# Patient Record
Sex: MALE | Race: WHITE | Employment: OTHER | URBAN - METROPOLITAN AREA
[De-identification: names, ages, dates, MRNs, and addresses within clinical notes are randomized per-mention and may not be internally consistent; named-entity substitution may affect disease eponyms.]

---

## 2017-01-20 ENCOUNTER — TELEPHONE (OUTPATIENT)
Dept: INTERNAL MEDICINE CLINIC | Age: 57
End: 2017-01-20

## 2017-01-20 DIAGNOSIS — I48.19 PERSISTENT ATRIAL FIBRILLATION (HCC): ICD-10-CM

## 2017-01-20 DIAGNOSIS — E78.5 DYSLIPIDEMIA: ICD-10-CM

## 2017-01-20 DIAGNOSIS — E11.9 WELL CONTROLLED DIABETES MELLITUS (HCC): Primary | ICD-10-CM

## 2017-01-20 DIAGNOSIS — I10 ESSENTIAL HYPERTENSION: ICD-10-CM

## 2017-02-03 ENCOUNTER — HOSPITAL ENCOUNTER (OUTPATIENT)
Dept: LAB | Age: 57
Discharge: HOME | End: 2017-02-03

## 2017-02-03 DIAGNOSIS — I48.19 PERSISTENT ATRIAL FIBRILLATION (HCC): ICD-10-CM

## 2017-02-03 DIAGNOSIS — E11.9 WELL CONTROLLED DIABETES MELLITUS (HCC): ICD-10-CM

## 2017-02-03 DIAGNOSIS — E78.5 DYSLIPIDEMIA: ICD-10-CM

## 2017-02-03 DIAGNOSIS — I10 ESSENTIAL HYPERTENSION: ICD-10-CM

## 2017-02-03 LAB — SENTARA SPECIMEN COL,SENBCF: NORMAL

## 2017-02-03 PROCEDURE — 99001 SPECIMEN HANDLING PT-LAB: CPT | Performed by: NURSE PRACTITIONER

## 2017-02-13 ENCOUNTER — OFFICE VISIT (OUTPATIENT)
Dept: INTERNAL MEDICINE CLINIC | Age: 57
End: 2017-02-13

## 2017-02-13 VITALS
HEIGHT: 71 IN | SYSTOLIC BLOOD PRESSURE: 118 MMHG | BODY MASS INDEX: 37.94 KG/M2 | RESPIRATION RATE: 22 BRPM | TEMPERATURE: 98.2 F | WEIGHT: 271 LBS | DIASTOLIC BLOOD PRESSURE: 76 MMHG | OXYGEN SATURATION: 96 % | HEART RATE: 76 BPM

## 2017-02-13 DIAGNOSIS — G47.33 OSA (OBSTRUCTIVE SLEEP APNEA): ICD-10-CM

## 2017-02-13 DIAGNOSIS — E78.5 HYPERLIPIDEMIA, UNSPECIFIED HYPERLIPIDEMIA TYPE: ICD-10-CM

## 2017-02-13 DIAGNOSIS — E66.9 OBESITY (BMI 30-39.9): ICD-10-CM

## 2017-02-13 DIAGNOSIS — I10 ESSENTIAL HYPERTENSION: ICD-10-CM

## 2017-02-13 DIAGNOSIS — Z23 ENCOUNTER FOR IMMUNIZATION: ICD-10-CM

## 2017-02-13 DIAGNOSIS — I48.19 PERSISTENT ATRIAL FIBRILLATION (HCC): ICD-10-CM

## 2017-02-13 NOTE — MR AVS SNAPSHOT
Visit Information Date & Time Provider Department Dept. Phone Encounter #  
 2/13/2017  8:30 AM Elijah Andre NP Internist of [address] [place] [address] Place [id number] Follow-up Instructions Return in about 2 weeks (around 2/27/2017), or if symptoms worsen or fail to improve. Your Appointments 2/27/2017 11:30 AM  
Office Visit with Elijah Andre NP Internist of [facility] ([facility]) Appt Note: 2 week follow up  
 [address], [address] Ramos [address] [place]  
  
   
 [address], [address]  
  
    
 5/1/2017  9:00 AM  
PROCEDURE with Tony Arenas MD  
[facility]) Appt Note: [facility]  
 [address] [address]  
[phone] [address] [id number]  
  
    
 6/16/2017  8:20 AM  
Follow Up with Yuridia Alicia MD  
[facility] ([facility]) Appt Note: 6 mth f/up [place] [id number]  
[phone] [address] Upcoming Health Maintenance Date Due Pneumococcal 19-64 Medium Risk (1 of 1 - PPSV23) 10/11/1978 DTaP/Tdap/Td series (1 - Tdap) 10/11/1980 INFLUENZA AGE 9 TO ADULT 8/1/2016 HEMOGLOBIN A1C Q6M 10/4/2016 MICROALBUMIN Q1 4/4/2017 LIPID PANEL Q1 4/4/2017 EYE EXAM RETINAL OR DILATED Q1 11/30/2017 FOOT EXAM Q1 2/13/2018 COLONOSCOPY 6/27/2026 Allergies as of 2/13/2017  Review Complete On: 2/13/2017 By: Elijah Andre NP Severity Noted Reaction Type Reactions Penicillins  10/16/2012    Hives Current Immunizations  Never Reviewed Name Date Pneumococcal Polysaccharide (PPSV-23) 2/13/2017 Tdap  Incomplete Not reviewed this visit You Were Diagnosed With   
  
 Codes Comments Uncontrolled type 2 diabetes mellitus without complication, with long-term current use of insulin ([facility] [address]    -  Primary ICD-10-CM: E11.65, Z79.4 ICD-9-CM: 250.02, V58.67 Essential hypertension     ICD-10-CM: I10 
ICD-9-CM: 401.9 Hyperlipidemia, unspecified hyperlipidemia type     ICD-10-CM: E78.5 ICD-9-CM: 272.4 Persistent atrial fibrillation (HCC)     ICD-10-CM: I48.1 ICD-9-CM: 427.31 Obesity (BMI 30-39. 9)     ICD-10-CM: E66.9 ICD-9-CM: 278.00   
 FITO (obstructive sleep apnea)     ICD-10-CM: G47.33 
ICD-9-CM: 327.23 Encounter for immunization     ICD-10-CM: X09 ICD-9-CM: V03.89 Vitals BP Pulse Temp Resp Height(growth percentile) Weight(growth percentile) 118/76 76 98.2 °F (36.8 °C) 22 5' 11\" (1.803 m) 271 lb (122.9 kg) SpO2 BMI Smoking Status 96% 37.8 kg/m2 Passive Smoke Exposure - Never Smoker Vitals History BMI and BSA Data Body Mass Index Body Surface Area  
 37.8 kg/m 2 2.48 m 2 Preferred Pharmacy Pharmacy Name Phone [facility] [phone] [place] [address] [phone] Your Updated Medication List  
  
   
This list is accurate as of: 2/13/17  9:19 AM.  Always use your most recent med list.  
  
  
  
  
 albuterol 90 mcg/actuation inhaler Commonly known as:  PROVENTIL HFA, VENTOLIN HFA, PROAIR HFA Take 2 Puffs by inhalation every six (6) hours as needed for Wheezing. amLODIPine 10 mg tablet Commonly known as:  Muldoon Blade Take 1 Tab by mouth daily. bipap machine kit  
by Does Not Apply route. BiPAP at 23/18 cm with heated humidifier. Mask: Simplus FF, medium size or mask of choice. Length of need 99 months. Replace mask and accessories as needed times 12 months. Please download data after 30 days and fax at [phone]. Dx: Severe FITO, Obesity, snoring. cholecalciferol 1,000 unit Cap Commonly known as:  VITAMIN D3  
 Take 2 Caps by mouth daily. Fenofibrate 150 mg Cap TAKE 1 CAPSULE BY MOUTH DAILY  
  
 gabapentin 300 mg capsule Commonly known as:  NEURONTIN Take 1 Cap by mouth two (2) times a day. hydroCHLOROthiazide 12.5 mg tablet Commonly known as:  HYDRODIURIL Take 1 Tab by mouth daily. HYDROcodone-acetaminophen 5-325 mg per tablet Commonly known as:  NORCO  
1 PO Q12hrs PRN pain  
  
 hyoscyamine SL 0.125 mg SL tablet Commonly known as:  LEVSIN/SL  
0.125 mg by SubLINGual route every four (4) hours as needed. insulin aspart 100 unit/mL Inpn Commonly known as:  NOVOLOG  
by SubCUTAneous route. Sliding scale:  101-150 4 units 151-200 6 units 201-250  8 units 251-300 10 units Call if blood sugar is greater than 300 LEVEMIR FLEXTOUCH 100 unit/mL (3 mL) Inpn Generic drug:  insulin detemir INJECT 20 UNITS UNDER THE SKIN EVERY MORNING  
  
 meloxicam 15 mg tablet Commonly known as:  MOBIC Take 15 mg by mouth daily. metoprolol succinate 50 mg XL tablet Commonly known as:  TOPROL-XL  
TAKE 1 TABLET BY MOUTH EVERY DAY Vianney Pen Needle 32 gauge x 5/32\" Ndle Generic drug:  Insulin Needles (Disposable) USS THREE TIMES DAILY PLUS SLIDING SCALE  
  
 NASONEX 50 mcg/actuation nasal spray Generic drug:  mometasone USE 2 SPRAYS IN EACH NOSTRIL DAILY Omega-3-DHA-EPA-Fish Oil 1,000 mg (120 mg-180 mg) Cap Take  by mouth two (2) times a day. ONE-A-DAY MEN'S 50+ ADVANTAGE PO Take  by mouth daily. rivaroxaban 20 mg Tab tablet Commonly known as:  Jeneen Mention Take 1 Tab by mouth daily (with breakfast). rosuvastatin 40 mg tablet Commonly known as:  CRESTOR Take 1 Tab by mouth nightly. NON FORMULARY SITagliptin-metFORMIN 50-1,000 mg per tablet Commonly known as:  Andree Mateus Take 1 Tab by mouth two (2) times daily (with meals). SYMBICORT 160-4.5 mcg/actuation HFA inhaler Generic drug:  budesonide-formoterol TAKE 2 PUFFS BY MOUTH TWICE DAILY  
  
 traMADol 50 mg tablet Commonly known as:  ULTRAM  
Take 50 mg by mouth every six (6) hours as needed for Pain.  
  
 valsartan 320 mg tablet Commonly known as:  DIOVAN Take 1 Tab by mouth daily. VITAMIN B-12 1,000 mcg tablet Generic drug:  cyanocobalamin Take 1,000 mcg by mouth daily. We Performed the Following INFLUENZA VIRUS VAC QUAD,SPLIT,PRESV FREE SYRINGE 3/> YRS IM [id number] CPT(R)] PNEUMOCOCCAL POLYSACCHARIDE VACCINE, 23-VALENT, ADULT OR IMMUNOSUPPRESSED PT DOSE, [73193 CPT(R)] REFERRAL TO NUTRITION [REF50 Custom] Comments:  
 Poorly controlled diabetes, A1C 10, obese. TETANUS, DIPHTHERIA TOXOIDS AND ACELLULAR PERTUSSIS VACCINE (TDAP), IN INDIVIDS. >=7, IM [id number] CPT(R)] Follow-up Instructions Return in about 2 weeks (around 2/27/2017), or if symptoms worsen or fail to improve. Referral Information Referral ID Referred By Referred To  
  
 [id number] Ihsan Ford Not Available Visits Status Start Date End Date 1 New Request 2/13/17 2/13/18 If your referral has a status of pending review or denied, additional information will be sent to support the outcome of this decision. Patient Instructions 1. Increase levemir to 25 units in the morning. 2. Very important to check your blood sugars before every meal and before bedtime. Bring this log to follow up apt in 2 weeks. 3. Food journal- everything you eat. 4. Referral to nutrition. Nutrition Tips for Diabetes: After Your Visit Your Care Instructions A healthy diet is important to manage diabetes. It helps you lose weight (if you need to) and keep it off. It gives you the nutrition and energy your body needs and helps prevent heart disease. But a diet for diabetes does not mean that you have to eat special foods. You can eat what your family eats, including occasional sweets and other favorites. But you do have to pay attention to how often you eat and how much you eat of certain foods. The right plan for you will give you meals that help you keep your blood sugar at healthy levels. Try to eat a variety of foods and to spread carbohydrate throughout the day. Carbohydrate raises blood sugar higher and more quickly than any other nutrient does. Carbohydrate is found in sugar, breads and cereals, fruit, starchy vegetables such as potatoes and corn, and milk and yogurt. You may want to work with a dietitian or diabetes educator to help you plan meals and snacks. A dietitian or diabetes educator also can help you lose weight if that is one of your goals. The following tips can help you enjoy your meals and stay healthy. Follow-up care is a key part of your treatment and safety. Be sure to make and go to all appointments, and call your doctor if you are having problems. Its also a good idea to know your test results and keep a list of the medicines you take. How can you care for yourself at home? · Learn which foods have carbohydrate and how much carbohydrate to eat. A dietitian or diabetes educator can help you learn to keep track of how much carbohydrate you eat. · Spread carbohydrate throughout the day. Eat some carbohydrate at all meals, but do not eat too much at any one time. · Plan meals to include food from all the food groups. These are the food groups and some example portion sizes: ¨ Grains: 1 slice of bread (1 ounce), ½ cup of cooked cereal, and 1/3 cup of cooked pasta or rice. These have about 15 grams of carbohydrate in a serving. Choose whole grains such as whole wheat bread or crackers, oatmeal, and brown rice more often than refined grains. ¨ Fruit: 1 small fresh fruit, such as an apple or orange; ½ of a banana; ½ cup of chopped, cooked, or canned fruit; ½ cup of fruit juice; 1 cup of melon or raspberries; and 2 tablespoons of dried fruit. These have about 15 grams of carbohydrate in a serving. ¨ Dairy: 1 cup of nonfat or low-fat milk and 2/3 cup of plain yogurt. These have about 15 grams of carbohydrate in a serving. ¨ Protein foods: Beef, chicken, turkey, fish, eggs, tofu, cheese, cottage cheese, and peanut butter. A serving size of meat is 3 ounces, which is about the size of a deck of cards. Examples of meat substitute serving sizes (equal to 1 ounce of meat) are 1/4 cup of cottage cheese, 1 egg, 1 tablespoon of peanut butter, and ½ cup of tofu. These have very little or no carbohydrate per serving. ¨ Vegetables: Starchy vegetables such as ½ cup of cooked dried beans, peas, potatoes, or corn have about 15 grams of carbohydrate. Nonstarchy vegetables have very little carbohydrate, such as 1 cup of raw leafy vegetables (such as spinach), ½ cup of other vegetables (cooked or chopped), and 3/4 cup of vegetable juice. · Use the plate format to plan meals. It is a good, quick way to make sure that you have a balanced meal. It also helps you spread carbohydrate throughout the day. You divide your plate by types of foods. Put vegetables on half the plate, meat or meat substitutes on one-quarter of the plate, and a grain or starchy vegetable (such as brown rice or a potato) in the final quarter of the plate.  To this you can add a small piece of fruit and 1 cup of milk or yogurt, depending on how much carbohydrate you are supposed to eat at a meal. 
 · Talk to your dietitian or diabetes educator about ways to add limited amounts of sweets into your meal plan. You can eat these foods now and then, as long as you include the amount of carbohydrate they have in your daily carbohydrate allowance. · If you drink alcohol, limit it to no more than 1 drink a day for women and 2 drinks a day for men. If you are pregnant, no amount of alcohol is known to be safe. · Protein, fat, and fiber do not raise blood sugar as much as carbohydrate does. If you eat a lot of these nutrients in a meal, your blood sugar will rise more slowly than it would otherwise. · Limit saturated fats, such as those from meat and dairy products. Try to replace it with monounsaturated fat, such as olive oil. This is a healthier choice because people who have diabetes are at higher-than-average risk of heart disease. But use a modest amount of olive oil. A tablespoon of olive oil has 14 grams of fat and 120 calories. · Exercise lowers blood sugar. If you take insulin by shots or pump, you can use less than you would if you were not exercising. Keep in mind that timing matters. If you exercise within 1 hour after a meal, your body may need less insulin for that meal than it would if you exercised 3 hours after the meal. Test your blood sugar to find out how exercise affects your need for insulin. · Exercise on most days of the week. Aim for at least 30 minutes. Exercise helps you stay at a healthy weight and helps your body use insulin. Walking is an easy way to get exercise. Gradually increase the amount you walk every day. You also may want to swim, bike, or do other activities. When you eat out · Learn to estimate the serving sizes of foods that have carbohydrate. If you measure food at home, it will be easier to estimate the amount in a serving of restaurant food. · If the meal you order has too much carbohydrate (such as potatoes, corn, or baked beans), ask to have a low-carbohydrate food instead. Ask for a salad or green vegetables. · If you use insulin, check your blood sugar before and after eating out to help you plan how much to eat in the future. · If you eat more carbohydrate at a meal than you had planned, take a walk or do other exercise. This will help lower your blood sugar. Where can you learn more? Go to BioIQ.be Enter I254 in the search box to learn more about \"Nutrition Tips for Diabetes: After Your Visit. \"  
© 1904-6041 Healthwise, Incorporated. Care instructions adapted under license by [employer] (which disclaims liability or warranty for this information). This care instruction is for use with your licensed healthcare professional. If you have questions about a medical condition or this instruction, always ask your healthcare professional. [address] any warranty or liability for your use of this information. Content Version: 70.9.011442; Current as of: June 4, 2014 Learning About Type 2 Diabetes What is type 2 diabetes? Insulin is a hormone that helps your body use sugar from your food as energy. Type 2 diabetes happens when your body can't use insulin the right way. Over time, the pancreas can't make enough insulin. If you don't have enough insulin, too much sugar stays in your blood. If you are overweight, get little or no exercise, or have type 2 diabetes in your family, you are more likely to have problems with the way insulin works in your body.  Americans, Hispanics, Native Americans,  Americans, and Pacific Islanders have a higher risk for type 2 diabetes. Type 2 diabetes can be prevented or delayed with a healthy lifestyle, which includes staying at a healthy weight, making smart food choices, and getting regular exercise. What can you expect with type 2 diabetes? Sarah Laughlin keep hearing about how important it is to keep your blood sugar within a target range. That's because over time, high blood sugar can lead to serious problems. It can: 
· Harm your eyes, nerves, and kidneys. · Damage your blood vessels, leading to heart disease and stroke. · Reduce blood flow and cause nerve damage to parts of your body, especially your feet. This can cause slow healing and pain when you walk. · Make your immune system weak and less able to fight infections. When people hear the word \"diabetes,\" they often think of problems like these. But daily care and treatment can help prevent or delay these problems. The goal is to keep your blood sugar in a target range. That's the best way to reduce your chance of having more problems from diabetes. What are the symptoms? Some people who have type 2 diabetes may not have any symptoms early on. Many people with the disease don't even know they have it at first. But with time, diabetes starts to cause symptoms. You experience most symptoms of type 2 diabetes when your blood sugar is either too high or too low. The most common symptoms of high blood sugar include: · Thirst. 
· Frequent urination. · Weight loss. · Blurry vision. The symptoms of low blood sugar include: · Sweating. · Shakiness. · Weakness. · Hunger. · Confusion. How can you prevent type 2 diabetes? The best way to prevent or delay type 2 diabetes is to adopt healthy habits, which include: 
· Staying at a healthy weight. · Exercising regularly. · Eating healthy foods. How is type 2 diabetes treated? If you have type 2 diabetes, here are the most important things you can do. · Take your diabetes medicines. · Check your blood sugar as often as your doctor recommends. Also, get a hemoglobin A1c test at least every 6 months. · Try to eat a variety of foods and to spread carbohydrate throughout the day. Carbohydrate raises blood sugar higher and more quickly than any other nutrient does. Carbohydrate is found in sugar, breads and cereals, fruit, starchy vegetables such as potatoes and corn, and milk and yogurt. · Get at least 30 minutes of exercise on most days of the week. Walking is a good choice. You also may want to do other activities, such as running, swimming, cycling, or playing tennis or team sports. If your doctor says it's okay, do muscle-strengthening exercises at least 2 times a week. · See your doctor for checkups and tests on a regular schedule. · If you have high blood pressure or high cholesterol, take the medicines as prescribed by your doctor. · Do not smoke. Smoking can make health problems worse. This includes problems you might have with type 2 diabetes. If you need help quitting, talk to your doctor about stop-smoking programs and medicines. These can increase your chances of quitting for good. Follow-up care is a key part of your treatment and safety. Be sure to make and go to all appointments, and call your doctor if you are having problems. It's also a good idea to know your test results and keep a list of the medicines you take. Where can you learn more? Go to http://vasile-fito.info/. Enter W574 in the search box to learn more about \"Learning About Type 2 Diabetes. \" Current as of: May 23, 2016 Content Version: 11.1 © 8952-0539 [employer]. Care instructions adapted under license by [employer] (which disclaims liability or warranty for this information). If you have questions about a medical condition or this instruction, always ask your healthcare professional. [place] [address] any warranty or liability for your use of this information. Introducing \[facility] [place] [facility]\"" & HEALTH SERVICES! Earl Marcos introduces [facility] patient portal. Now you can access parts of your medical record, email your doctor's office, and request medication refills online. 1. In your internet browser, go to [email or web address] 2. Click on the First Time User? Click Here link in the Sign In box. You will see the New Member Sign Up page. 3. Enter your [facility] Access Code exactly as it appears below. You will not need to use this code after youve completed the sign-up process. If you do not sign up before the expiration date, you must request a new code. · [facility] Access Code: [email or web address] Expires: 5/14/2017  8:31 AM 
 
4. Enter the last four digits of your Social Security Number (xxxx) and Date of Birth (mm/dd/yyyy) as indicated and click Submit. You will be taken to the next sign-up page. 5. Create a [facility] ID. This will be your [facility] login ID and cannot be changed, so think of one that is secure and easy to remember. 6. Create a [facility] password. You can change your password at any time. 7. Enter your Password Reset Question and Answer. This can be used at a later time if you forget your password. 8. Enter your e-mail address. You will receive e-mail notification when new information is available in [address] 9. Click Sign Up. You can now view and download portions of your medical record. 10. Click the Download Summary menu link to download a portable copy of your medical information. If you have questions, please visit the Frequently Asked Questions section of the [employer] website. Remember, [facility] is NOT to be used for urgent needs. For medical emergencies, dial 911. Now available from your iPhone and Android! Please provide this summary of care documentation to your next provider. Your primary care clinician is listed as Radha Agrawal. If you have any questions after today's visit, please call [phone].

## 2017-02-13 NOTE — PROGRESS NOTES
Rustam Guo. is a 61 y.o. male who presents for routine immunizations. He denies any symptoms , reactions or allergies that would exclude them from being immunized today. Risks and adverse reactions were discussed and the VIS was given to them. All questions were addressed. He was observed for 10   min post injection. There were no reactions observed.     Ayde Hall LPN
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
## 2017-02-13 NOTE — PATIENT INSTRUCTIONS
1. Increase levemir to 25 units in the morning. 2. Very important to check your blood sugars before every meal and before bedtime. Bring this log to follow up apt in 2 weeks. 3. Food journal- everything you eat. 4. Referral to nutrition. Nutrition Tips for Diabetes: After Your Visit  Your Care Instructions  A healthy diet is important to manage diabetes. It helps you lose weight (if you need to) and keep it off. It gives you the nutrition and energy your body needs and helps prevent heart disease. But a diet for diabetes does not mean that you have to eat special foods. You can eat what your family eats, including occasional sweets and other favorites. But you do have to pay attention to how often you eat and how much you eat of certain foods. The right plan for you will give you meals that help you keep your blood sugar at healthy levels. Try to eat a variety of foods and to spread carbohydrate throughout the day. Carbohydrate raises blood sugar higher and more quickly than any other nutrient does. Carbohydrate is found in sugar, breads and cereals, fruit, starchy vegetables such as potatoes and corn, and milk and yogurt. You may want to work with a dietitian or diabetes educator to help you plan meals and snacks. A dietitian or diabetes educator also can help you lose weight if that is one of your goals. The following tips can help you enjoy your meals and stay healthy. Follow-up care is a key part of your treatment and safety. Be sure to make and go to all appointments, and call your doctor if you are having problems. Its also a good idea to know your test results and keep a list of the medicines you take. How can you care for yourself at home? · Learn which foods have carbohydrate and how much carbohydrate to eat. A dietitian or diabetes educator can help you learn to keep track of how much carbohydrate you eat. · Spread carbohydrate throughout the day.  Eat some carbohydrate at all meals, but do not eat too much at any one time. · Plan meals to include food from all the food groups. These are the food groups and some example portion sizes:  ¨ Grains: 1 slice of bread (1 ounce), ½ cup of cooked cereal, and 1/3 cup of cooked pasta or rice. These have about 15 grams of carbohydrate in a serving. Choose whole grains such as whole wheat bread or crackers, oatmeal, and brown rice more often than refined grains. ¨ Fruit: 1 small fresh fruit, such as an apple or orange; ½ of a banana; ½ cup of chopped, cooked, or canned fruit; ½ cup of fruit juice; 1 cup of melon or raspberries; and 2 tablespoons of dried fruit. These have about 15 grams of carbohydrate in a serving. ¨ Dairy: 1 cup of nonfat or low-fat milk and 2/3 cup of plain yogurt. These have about 15 grams of carbohydrate in a serving. ¨ Protein foods: Beef, chicken, turkey, fish, eggs, tofu, cheese, cottage cheese, and peanut butter. A serving size of meat is 3 ounces, which is about the size of a deck of cards. Examples of meat substitute serving sizes (equal to 1 ounce of meat) are 1/4 cup of cottage cheese, 1 egg, 1 tablespoon of peanut butter, and ½ cup of tofu. These have very little or no carbohydrate per serving. ¨ Vegetables: Starchy vegetables such as ½ cup of cooked dried beans, peas, potatoes, or corn have about 15 grams of carbohydrate. Nonstarchy vegetables have very little carbohydrate, such as 1 cup of raw leafy vegetables (such as spinach), ½ cup of other vegetables (cooked or chopped), and 3/4 cup of vegetable juice. · Use the plate format to plan meals. It is a good, quick way to make sure that you have a balanced meal. It also helps you spread carbohydrate throughout the day. You divide your plate by types of foods. Put vegetables on half the plate, meat or meat substitutes on one-quarter of the plate, and a grain or starchy vegetable (such as brown rice or a potato) in the final quarter of the plate.  To this you can add a small piece of fruit and 1 cup of milk or yogurt, depending on how much carbohydrate you are supposed to eat at a meal.  · Talk to your dietitian or diabetes educator about ways to add limited amounts of sweets into your meal plan. You can eat these foods now and then, as long as you include the amount of carbohydrate they have in your daily carbohydrate allowance. · If you drink alcohol, limit it to no more than 1 drink a day for women and 2 drinks a day for men. If you are pregnant, no amount of alcohol is known to be safe. · Protein, fat, and fiber do not raise blood sugar as much as carbohydrate does. If you eat a lot of these nutrients in a meal, your blood sugar will rise more slowly than it would otherwise. · Limit saturated fats, such as those from meat and dairy products. Try to replace it with monounsaturated fat, such as olive oil. This is a healthier choice because people who have diabetes are at higher-than-average risk of heart disease. But use a modest amount of olive oil. A tablespoon of olive oil has 14 grams of fat and 120 calories. · Exercise lowers blood sugar. If you take insulin by shots or pump, you can use less than you would if you were not exercising. Keep in mind that timing matters. If you exercise within 1 hour after a meal, your body may need less insulin for that meal than it would if you exercised 3 hours after the meal. Test your blood sugar to find out how exercise affects your need for insulin. · Exercise on most days of the week. Aim for at least 30 minutes. Exercise helps you stay at a healthy weight and helps your body use insulin. Walking is an easy way to get exercise. Gradually increase the amount you walk every day. You also may want to swim, bike, or do other activities. When you eat out  · Learn to estimate the serving sizes of foods that have carbohydrate. If you measure food at home, it will be easier to estimate the amount in a serving of restaurant food.   · If the meal you order has too much carbohydrate (such as potatoes, corn, or baked beans), ask to have a low-carbohydrate food instead. Ask for a salad or green vegetables. · If you use insulin, check your blood sugar before and after eating out to help you plan how much to eat in the future. · If you eat more carbohydrate at a meal than you had planned, take a walk or do other exercise. This will help lower your blood sugar. Where can you learn more? Go to [employer]Fl[employer]it.be  Enter D548 in the search box to learn more about \"Nutrition Tips for Diabetes: After Your Visit. \"   © 3617-7976 [employer]wise, [employer]. Care instructions adapted under license by [employer] (which disclaims liability or warranty for this information). This care instruction is for use with your licensed healthcare professional. If you have questions about a medical condition or this instruction, always ask your healthcare professional. [place] [id number] any warranty or liability for your use of this information. Content Version: 89.6.141344; Current as of: June 4, 2014                 Learning About Type 2 Diabetes  What is type 2 diabetes? Insulin is a hormone that helps your body use sugar from your food as energy. Type 2 diabetes happens when your body can't use insulin the right way. Over time, the pancreas can't make enough insulin. If you don't have enough insulin, too much sugar stays in your blood. If you are overweight, get little or no exercise, or have type 2 diabetes in your family, you are more likely to have problems with the way insulin works in your body.  Americans, Hispanics, Native Americans,  Americans, and Pacific Islanders have a higher risk for type 2 diabetes. Type 2 diabetes can be prevented or delayed with a healthy lifestyle, which includes staying at a healthy weight, making smart food choices, and getting regular exercise.   What can you expect with type 2 diabetes? Miguel Bo keep hearing about how important it is to keep your blood sugar within a target range. That's because over time, high blood sugar can lead to serious problems. It can:  · Harm your eyes, nerves, and kidneys. · Damage your blood vessels, leading to heart disease and stroke. · Reduce blood flow and cause nerve damage to parts of your body, especially your feet. This can cause slow healing and pain when you walk. · Make your immune system weak and less able to fight infections. When people hear the word \"diabetes,\" they often think of problems like these. But daily care and treatment can help prevent or delay these problems. The goal is to keep your blood sugar in a target range. That's the best way to reduce your chance of having more problems from diabetes. What are the symptoms? Some people who have type 2 diabetes may not have any symptoms early on. Many people with the disease don't even know they have it at first. But with time, diabetes starts to cause symptoms. You experience most symptoms of type 2 diabetes when your blood sugar is either too high or too low. The most common symptoms of high blood sugar include:  · Thirst.  · Frequent urination. · Weight loss. · Blurry vision. The symptoms of low blood sugar include:  · Sweating. · Shakiness. · Weakness. · Hunger. · Confusion. How can you prevent type 2 diabetes? The best way to prevent or delay type 2 diabetes is to adopt healthy habits, which include:  · Staying at a healthy weight. · Exercising regularly. · Eating healthy foods. How is type 2 diabetes treated? If you have type 2 diabetes, here are the most important things you can do. · Take your diabetes medicines. · Check your blood sugar as often as your doctor recommends. Also, get a hemoglobin A1c test at least every 6 months. · Try to eat a variety of foods and to spread carbohydrate throughout the day.  Carbohydrate raises blood sugar higher and more quickly than any other nutrient does. Carbohydrate is found in sugar, breads and cereals, fruit, starchy vegetables such as potatoes and corn, and milk and yogurt. · Get at least 30 minutes of exercise on most days of the week. Walking is a good choice. You also may want to do other activities, such as running, swimming, cycling, or playing tennis or team sports. If your doctor says it's okay, do muscle-strengthening exercises at least 2 times a week. · See your doctor for checkups and tests on a regular schedule. · If you have high blood pressure or high cholesterol, take the medicines as prescribed by your doctor. · Do not smoke. Smoking can make health problems worse. This includes problems you might have with type 2 diabetes. If you need help quitting, talk to your doctor about stop-smoking programs and medicines. These can increase your chances of quitting for good. Follow-up care is a key part of your treatment and safety. Be sure to make and go to all appointments, and call your doctor if you are having problems. It's also a good idea to know your test results and keep a list of the medicines you take. Where can you learn more? Go to http://vasile-fito.info/. Enter M758 in the search box to learn more about \"Learning About Type 2 Diabetes. \"  Current as of: May 23, 2016  Content Version: 11.1  © 7254-4136 [employer]. Care instructions adapted under license by [facility] (which disclaims liability or warranty for this information). If you have questions about a medical condition or this instruction, always ask your healthcare professional. [place] [id number] any warranty or liability for your use of this information.

## 2017-02-27 ENCOUNTER — OFFICE VISIT (OUTPATIENT)
Dept: INTERNAL MEDICINE CLINIC | Age: 57
End: 2017-02-27

## 2017-02-27 VITALS
SYSTOLIC BLOOD PRESSURE: 127 MMHG | WEIGHT: 262.4 LBS | HEART RATE: 74 BPM | OXYGEN SATURATION: 96 % | TEMPERATURE: 98.2 F | HEIGHT: 71 IN | DIASTOLIC BLOOD PRESSURE: 72 MMHG | BODY MASS INDEX: 36.73 KG/M2 | RESPIRATION RATE: 14 BRPM

## 2017-02-27 DIAGNOSIS — Z79.4 TYPE 2 DIABETES MELLITUS WITHOUT COMPLICATION, WITH LONG-TERM CURRENT USE OF INSULIN (HCC): Primary | ICD-10-CM

## 2017-02-27 DIAGNOSIS — E11.9 TYPE 2 DIABETES MELLITUS WITHOUT COMPLICATION, WITH LONG-TERM CURRENT USE OF INSULIN (HCC): Primary | ICD-10-CM

## 2017-02-27 NOTE — PATIENT INSTRUCTIONS
Learning About Type 2 Diabetes  What is type 2 diabetes? Insulin is a hormone that helps your body use sugar from your food as energy. Type 2 diabetes happens when your body can't use insulin the right way. Over time, the pancreas can't make enough insulin. If you don't have enough insulin, too much sugar stays in your blood. If you are overweight, get little or no exercise, or have type 2 diabetes in your family, you are more likely to have problems with the way insulin works in your body.  Americans, Hispanics, Native Americans,  Americans, and Pacific Islanders have a higher risk for type 2 diabetes. Type 2 diabetes can be prevented or delayed with a healthy lifestyle, which includes staying at a healthy weight, making smart food choices, and getting regular exercise. What can you expect with type 2 diabetes? Darwin Alejandro keep hearing about how important it is to keep your blood sugar within a target range. That's because over time, high blood sugar can lead to serious problems. It can:  · Harm your eyes, nerves, and kidneys. · Damage your blood vessels, leading to heart disease and stroke. · Reduce blood flow and cause nerve damage to parts of your body, especially your feet. This can cause slow healing and pain when you walk. · Make your immune system weak and less able to fight infections. When people hear the word \"diabetes,\" they often think of problems like these. But daily care and treatment can help prevent or delay these problems. The goal is to keep your blood sugar in a target range. That's the best way to reduce your chance of having more problems from diabetes. What are the symptoms? Some people who have type 2 diabetes may not have any symptoms early on. Many people with the disease don't even know they have it at first. But with time, diabetes starts to cause symptoms. You experience most symptoms of type 2 diabetes when your blood sugar is either too high or too low.   The most common symptoms of high blood sugar include:  · Thirst.  · Frequent urination. · Weight loss. · Blurry vision. The symptoms of low blood sugar include:  · Sweating. · Shakiness. · Weakness. · Hunger. · Confusion. How can you prevent type 2 diabetes? The best way to prevent or delay type 2 diabetes is to adopt healthy habits, which include:  · Staying at a healthy weight. · Exercising regularly. · Eating healthy foods. How is type 2 diabetes treated? If you have type 2 diabetes, here are the most important things you can do. · Take your diabetes medicines. · Check your blood sugar as often as your doctor recommends. Also, get a hemoglobin A1c test at least every 6 months. · Try to eat a variety of foods and to spread carbohydrate throughout the day. Carbohydrate raises blood sugar higher and more quickly than any other nutrient does. Carbohydrate is found in sugar, breads and cereals, fruit, starchy vegetables such as potatoes and corn, and milk and yogurt. · Get at least 30 minutes of exercise on most days of the week. Walking is a good choice. You also may want to do other activities, such as running, swimming, cycling, or playing tennis or team sports. If your doctor says it's okay, do muscle-strengthening exercises at least 2 times a week. · See your doctor for checkups and tests on a regular schedule. · If you have high blood pressure or high cholesterol, take the medicines as prescribed by your doctor. · Do not smoke. Smoking can make health problems worse. This includes problems you might have with type 2 diabetes. If you need help quitting, talk to your doctor about stop-smoking programs and medicines. These can increase your chances of quitting for good. Follow-up care is a key part of your treatment and safety. Be sure to make and go to all appointments, and call your doctor if you are having problems.  It's also a good idea to know your test results and keep a list of the medicines you take. Where can you learn more? Go to http://vasile-fito.info/. Enter P458 in the search box to learn more about \"Learning About Type 2 Diabetes. \"  Current as of: May 23, 2016  Content Version: 11.1  © 2202-4632 [employer]. Care instructions adapted under license by [employer] (which disclaims liability or warranty for this information). If you have questions about a medical condition or this instruction, always ask your healthcare professional. [place] [address] any warranty or liability for your use of this information.   Health Maintenance Due   Topic Date Due    INFLUENZA AGE 9 TO ADULT  08/01/2016    HEMOGLOBIN A1C Q6M  10/04/2016

## 2017-02-27 NOTE — MR AVS SNAPSHOT
Visit Information Date & Time Provider Department Dept. Phone Encounter #  
 2/27/2017 11:30 AM Aura Lynn NP Internist of [address] [place] [address] Place [id number] Follow-up Instructions Return in about 4 weeks (around 3/27/2017), or if symptoms worsen or fail to improve. Your Appointments 3/27/2017 10:30 AM  
Office Visit with Aura Lynn NP Internist of [facility] ([address] [place] [address]) Appt Note: ov 4wks michael [address], [address] [place] [address] [address]  
  
   
 [address], Critical access hospital  
  
    
 5/1/2017  9:00 AM  
PROCEDURE with Reymundo Collins MD  
[facility] [address] [place] [address]) Appt Note: [facility]U  
 [address] [address]  
[phone] [address] [id number]  
  
    
 6/16/2017  8:20 AM  
Follow Up with Chaparrita Whiting MD  
[facility] [place] [address] ([address] [place] [address]) Appt Note: 6 mth f/up [place] [address] [address] [address] [phone] [address] Upcoming Health Maintenance Date Due INFLUENZA AGE 9 TO ADULT 8/1/2016 HEMOGLOBIN A1C Q6M 10/4/2016 MICROALBUMIN Q1 4/4/2017 EYE EXAM RETINAL OR DILATED Q1 11/30/2017 LIPID PANEL Q1 2/4/2018 FOOT EXAM Q1 2/13/2018 COLONOSCOPY 6/27/2026 DTaP/Tdap/Td series (2 - Td) 2/13/2027 Allergies as of 2/27/2017  Review Complete On: 2/27/2017 By: Aura Lynn NP Severity Noted Reaction Type Reactions Penicillins Medium 08/17/2010    Hives Current Immunizations  Reviewed on 2/13/2017 Name Date Pneumococcal Polysaccharide (PPSV-23) 2/13/2017 Tdap 2/13/2017 Not reviewed this visit You Were Diagnosed With   
  
 Codes Comments Type 2 diabetes mellitus without complication, with long-term current use of insulin (HCC)    -  Primary ICD-10-CM: E11.9, Z79.4 ICD-9-CM: 250.00, V58.67 Vitals BP  
  
  
  
  
  
 127/72 (BP 1 Location: Left arm, BP Patient Position: Sitting) BMI and BSA Data Body Mass Index Body Surface Area  
 36.6 kg/m 2 2.44 m 2 Preferred Pharmacy Pharmacy Name Phone [facility]aria[facility] Byrne [address] [place] [address] [phone] Your Updated Medication List  
  
   
This list is accurate as of: 2/27/17 12:21 PM.  Always use your most recent med list.  
  
  
  
  
 albuterol 90 mcg/actuation inhaler Commonly known as:  PROVENTIL HFA, VENTOLIN HFA, PROAIR HFA Take 2 Puffs by inhalation every six (6) hours as needed for Wheezing. amLODIPine 10 mg tablet Commonly known as:  [place] Take 1 Tab by mouth daily. bipap machine kit  
by Does Not Apply route. BiPAP at 23/18 cm with heated humidifier. Mask: Simplus FF, medium size or mask of choice. Length of need 99 months. Replace mask and accessories as needed times 12 months. Please download data after 30 days and fax at [phone]. Dx: Severe FITO, Obesity, snoring. cholecalciferol 1,000 unit Cap Commonly known as:  VITAMIN D3 Take 2 Caps by mouth daily. Fenofibrate 150 mg Cap TAKE 1 CAPSULE BY MOUTH DAILY  
  
 gabapentin 300 mg capsule Commonly known as:  NEURONTIN Take 1 Cap by mouth two (2) times a day. hydroCHLOROthiazide 12.5 mg tablet Commonly known as:  HYDRODIURIL Take 1 Tab by mouth daily. HYDROcodone-acetaminophen 5-325 mg per tablet Commonly known as:  NORCO  
1 PO Q12hrs PRN pain  
  
 hyoscyamine SL 0.125 mg SL tablet Commonly known as:  LEVSIN/SL  
0.125 mg by SubLINGual route every four (4) hours as needed. insulin aspart 100 unit/mL Inpn Commonly known as:  Chen García by SubCUTAneous route. Sliding scale:  101-150 4 units 151-200 6 units 201-250  8 units 251-300 10 units Call if blood sugar is greater than 300 LEVEMIR FLEXTOUCH 100 unit/mL (3 mL) Inpn Generic drug:  insulin detemir INJECT 20 UNITS UNDER THE SKIN EVERY MORNING  
  
 meloxicam 15 mg tablet Commonly known as:  MOBIC Take 15 mg by mouth daily. metoprolol succinate 50 mg XL tablet Commonly known as:  TOPROL-XL  
TAKE 1 TABLET BY MOUTH EVERY DAY Vianney Pen Needle 32 gauge x 5/32\" Ndle Generic drug:  Insulin Needles (Disposable) USS THREE TIMES DAILY PLUS SLIDING SCALE  
  
 NASONEX 50 mcg/actuation nasal spray Generic drug:  mometasone USE 2 SPRAYS IN EACH NOSTRIL DAILY Omega-3-DHA-EPA-Fish Oil 1,000 mg (120 mg-180 mg) Cap Take  by mouth two (2) times a day. ONE-A-DAY MEN'S 50+ ADVANTAGE PO Take  by mouth daily. rivaroxaban 20 mg Tab tablet Commonly known as:  Dieter Safe Take 1 Tab by mouth daily (with breakfast). rosuvastatin 40 mg tablet Commonly known as:  CRESTOR Take 1 Tab by mouth nightly. NON FORMULARY SITagliptin-metFORMIN 50-1,000 mg per tablet Commonly known as:  Johanna Room Take 1 Tab by mouth two (2) times daily (with meals). SYMBICORT 160-4.5 mcg/actuation HFA inhaler Generic drug:  budesonide-formoterol TAKE 2 PUFFS BY MOUTH TWICE DAILY  
  
 traMADol 50 mg tablet Commonly known as:  ULTRAM  
Take 50 mg by mouth every six (6) hours as needed for Pain.  
  
 valsartan 320 mg tablet Commonly known as:  DIOVAN Take 1 Tab by mouth daily. VITAMIN B-12 1,000 mcg tablet Generic drug:  cyanocobalamin Take 1,000 mcg by mouth daily. Follow-up Instructions Return in about 4 weeks (around 3/27/2017), or if symptoms worsen or fail to improve. To-Do List   
 03/28/2017 8:30 AM  
  Appointment with Nataly Zavala RD at [facility] Patient Instructions Learning About Type 2 Diabetes What is type 2 diabetes? Insulin is a hormone that helps your body use sugar from your food as energy. Type 2 diabetes happens when your body can't use insulin the right way. Over time, the pancreas can't make enough insulin. If you don't have enough insulin, too much sugar stays in your blood. If you are overweight, get little or no exercise, or have type 2 diabetes in your family, you are more likely to have problems with the way insulin works in your body.  Americans, Hispanics, Native Americans,  Americans, and Pacific Islanders have a higher risk for type 2 diabetes. Type 2 diabetes can be prevented or delayed with a healthy lifestyle, which includes staying at a healthy weight, making smart food choices, and getting regular exercise. What can you expect with type 2 diabetes? Asia Kilgore keep hearing about how important it is to keep your blood sugar within a target range. That's because over time, high blood sugar can lead to serious problems. It can: 
· Harm your eyes, nerves, and kidneys. · Damage your blood vessels, leading to heart disease and stroke. · Reduce blood flow and cause nerve damage to parts of your body, especially your feet. This can cause slow healing and pain when you walk. · Make your immune system weak and less able to fight infections. When people hear the word \"diabetes,\" they often think of problems like these. But daily care and treatment can help prevent or delay these problems. The goal is to keep your blood sugar in a target range. That's the best way to reduce your chance of having more problems from diabetes. What are the symptoms? Some people who have type 2 diabetes may not have any symptoms early on. Many people with the disease don't even know they have it at first. But with time, diabetes starts to cause symptoms. You experience most symptoms of type 2 diabetes when your blood sugar is either too high or too low. The most common symptoms of high blood sugar include: · Thirst. 
· Frequent urination. · Weight loss. · Blurry vision. The symptoms of low blood sugar include: · Sweating. · Shakiness. · Weakness. · Hunger. · Confusion. How can you prevent type 2 diabetes? The best way to prevent or delay type 2 diabetes is to adopt healthy habits, which include: 
· Staying at a healthy weight. · Exercising regularly. · Eating healthy foods. How is type 2 diabetes treated? If you have type 2 diabetes, here are the most important things you can do. · Take your diabetes medicines. · Check your blood sugar as often as your doctor recommends. Also, get a hemoglobin A1c test at least every 6 months. · Try to eat a variety of foods and to spread carbohydrate throughout the day. Carbohydrate raises blood sugar higher and more quickly than any other nutrient does. Carbohydrate is found in sugar, breads and cereals, fruit, starchy vegetables such as potatoes and corn, and milk and yogurt. · Get at least 30 minutes of exercise on most days of the week. Walking is a good choice. You also may want to do other activities, such as running, swimming, cycling, or playing tennis or team sports. If your doctor says it's okay, do muscle-strengthening exercises at least 2 times a week. · See your doctor for checkups and tests on a regular schedule. · If you have high blood pressure or high cholesterol, take the medicines as prescribed by your doctor. · Do not smoke. Smoking can make health problems worse. This includes problems you might have with type 2 diabetes. If you need help quitting, talk to your doctor about stop-smoking programs and medicines. These can increase your chances of quitting for good. Follow-up care is a key part of your treatment and safety. Be sure to make and go to all appointments, and call your doctor if you are having problems. It's also a good idea to know your test results and keep a list of the medicines you take. Where can you learn more? Go to http://vasile-fito.info/. Enter H691 in the search box to learn more about \"Learning About Type 2 Diabetes. \" Current as of: May 23, 2016 Content Version: 11.1 © 9231-7035 [employer]. Care instructions adapted under license by [employer] (which disclaims liability or warranty for this information). If you have questions about a medical condition or this instruction, always ask your healthcare professional. [address] any warranty or liability for your use of this information. Health Maintenance Due Topic Date Due  
 INFLUENZA AGE 9 TO ADULT  08/01/2016  
 HEMOGLOBIN A1C Q6M  10/04/2016 Introducing [facility]! Rachel Wright introduces SAVO patient portal. Now you can access parts of your medical record, email your doctor's office, and request medication refills online. 1. In your internet browser, go to [email or web address] 2. Click on the First Time User? Click Here link in the Sign In box. You will see the New Member Sign Up page. 3. Enter your SAVO Access Code exactly as it appears below. You will not need to use this code after youve completed the sign-up process. If you do not sign up before the expiration date, you must request a new code. · SAVO Access Code: [email or web address] Expires: 5/14/2017  8:31 AM 
 
4. Enter the last four digits of your Social Security Number (xxxx) and Date of Birth (mm/dd/yyyy) as indicated and click Submit. You will be taken to the next sign-up page. 5. Create a SAVO ID. This will be your SAVO login ID and cannot be changed, so think of one that is secure and easy to remember. 6. Create a Listiki password. You can change your password at any time. 7. Enter your Password Reset Question and Answer. This can be used at a later time if you forget your password. 8. Enter your e-mail address. You will receive e-mail notification when new information is available in [address] 9. Click Sign Up. You can now view and download portions of your medical record. 10. Click the Download Summary menu link to download a portable copy of your medical information. If you have questions, please visit the Frequently Asked Questions section of the Listiki website. Remember, Listiki is NOT to be used for urgent needs. For medical emergencies, dial 911. Now available from your iPhone and Android! Please provide this summary of care documentation to your next provider. Your primary care clinician is listed as Lyssa Ortiz. If you have any questions after today's visit, please call [phone].

## 2017-02-27 NOTE — PROGRESS NOTES
Chief Complaint   Patient presents with    Diabetes     patient has Blood Sugar Log to discuss    Hypertension     follow up     1. Have you been to the ER, urgent care clinic since your last visit? Hospitalized since your last visit? No    2. Have you seen or consulted any other health care providers outside of the [address] [place] [address] since your last visit? Include any pap smears or colon screening.  No
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
## 2017-03-22 ENCOUNTER — TELEPHONE (OUTPATIENT)
Dept: INTERNAL MEDICINE CLINIC | Age: 57
End: 2017-03-22

## 2017-03-22 NOTE — TELEPHONE ENCOUNTER
[facility]  To us a few times and it was faxed back not filled out. I don't see anything scanned in computer. Please check.   PT# [id number]

## 2017-03-27 ENCOUNTER — OFFICE VISIT (OUTPATIENT)
Dept: INTERNAL MEDICINE CLINIC | Age: 57
End: 2017-03-27

## 2017-03-27 ENCOUNTER — TELEPHONE (OUTPATIENT)
Dept: CARDIOLOGY CLINIC | Age: 57
End: 2017-03-27

## 2017-03-27 ENCOUNTER — TELEPHONE (OUTPATIENT)
Dept: INTERNAL MEDICINE CLINIC | Age: 57
End: 2017-03-27

## 2017-03-27 VITALS
OXYGEN SATURATION: 96 % | SYSTOLIC BLOOD PRESSURE: 115 MMHG | BODY MASS INDEX: 37.13 KG/M2 | HEART RATE: 68 BPM | TEMPERATURE: 97 F | WEIGHT: 265.2 LBS | HEIGHT: 71 IN | RESPIRATION RATE: 20 BRPM | DIASTOLIC BLOOD PRESSURE: 70 MMHG

## 2017-03-27 DIAGNOSIS — E11.00 UNCONTROLLED TYPE 2 DIABETES MELLITUS WITH HYPEROSMOLARITY WITHOUT COMA, WITH LONG-TERM CURRENT USE OF INSULIN (HCC): Primary | ICD-10-CM

## 2017-03-27 DIAGNOSIS — R22.31 FINGER MASS, RIGHT: ICD-10-CM

## 2017-03-27 DIAGNOSIS — I48.19 PERSISTENT ATRIAL FIBRILLATION (HCC): ICD-10-CM

## 2017-03-27 DIAGNOSIS — Z79.4 UNCONTROLLED TYPE 2 DIABETES MELLITUS WITH HYPEROSMOLARITY WITHOUT COMA, WITH LONG-TERM CURRENT USE OF INSULIN (HCC): Primary | ICD-10-CM

## 2017-03-27 DIAGNOSIS — I10 ESSENTIAL HYPERTENSION: ICD-10-CM

## 2017-03-27 DIAGNOSIS — Z01.818 PRE-OP EVALUATION: ICD-10-CM

## 2017-03-27 DIAGNOSIS — G47.33 OSA ON CPAP: ICD-10-CM

## 2017-03-27 DIAGNOSIS — E78.5 DYSLIPIDEMIA: ICD-10-CM

## 2017-03-27 DIAGNOSIS — Z99.89 OSA ON CPAP: ICD-10-CM

## 2017-03-27 DIAGNOSIS — J44.9 CHRONIC OBSTRUCTIVE PULMONARY DISEASE, UNSPECIFIED COPD TYPE (HCC): ICD-10-CM

## 2017-03-27 NOTE — TELEPHONE ENCOUNTER
Pt asking at check out if he needs to take any antibiotic before surgery for his knee. Says you talked about it in his ov today but doesn't remember if you answered.

## 2017-03-27 NOTE — TELEPHONE ENCOUNTER
PCP calling to see if it is ok for patient to have a mass removed from his finger with MAC. Pt is on Xarelto for afib. Verbal order and read back per Olvin Quintanilla MD  [facility] to hold xarelto 2 days prior to procedure.

## 2017-03-27 NOTE — PROGRESS NOTES
Chief Complaint   Patient presents with    Diabetes     follow up    Pre-op Exam     Pre Op Clearance Form to complete for upcoming Surgery on 4-12-17 with Right Middle Finger Mass Excision at the [facility] Dr Concepcion Millan     1. Have you been to the ER, urgent care clinic since your last visit? Hospitalized since your last visit? No    2. Have you seen or consulted any other health care providers outside of the [address] [place] [address] since your last visit? Include any pap smears or colon screening.  No

## 2017-03-27 NOTE — TELEPHONE ENCOUNTER
Called patient back, no prophylaxis indicated for previous knee replacement surg, but he may be on treatment per his hand surgeon.

## 2017-03-27 NOTE — MR AVS SNAPSHOT
Visit Information Date & Time Provider Department Dept. Phone Encounter #  
 3/27/2017 10:30 AM Tiffany Cerda NP Internist of [address] [place] [address] Place [id number] Follow-up Instructions Return if symptoms worsen or fail to improve. Your Appointments 5/1/2017  9:00 AM  
PROCEDURE with Deepak Alexander MD  
[facility] [address] [place] [address]) Appt Note: KUB  
 [address] Benji A [address] [address]  
[phone] [address] [address] 5/1/2017 11:00 AM  
Office Visit with Tiffany Cerda NP Internist of [facility] ([facility]) Appt Note: follow up; follow up  
 [address] [facility], [address] Jessica Shankar [address] [place] [address] [place]  
  
   
 [address], [address]  
  
    
 6/16/2017  8:20 AM  
Follow Up with Thierno Hunt MD  
[facility] ([address] [place] [address]) Appt Note: 6 mth f/up [place] Jessica Shankar [id number]  
[phone] [address] [facility] [address] Upcoming Health Maintenance Date Due INFLUENZA AGE 9 TO ADULT 8/1/2016 HEMOGLOBIN A1C Q6M 10/4/2016 MICROALBUMIN Q1 4/4/2017 EYE EXAM RETINAL OR DILATED Q1 11/30/2017 LIPID PANEL Q1 2/4/2018 FOOT EXAM Q1 2/13/2018 COLONOSCOPY 6/27/2026 DTaP/Tdap/Td series (2 - Td) 2/13/2027 Allergies as of 3/27/2017  Review Complete On: 3/27/2017 By: Tiffany Cerda NP Severity Noted Reaction Type Reactions Penicillins Medium 08/17/2010    Hives Current Immunizations  Reviewed on 2/13/2017 Name Date Pneumococcal Polysaccharide (PPSV-23) 2/13/2017 Tdap 2/13/2017 Not reviewed this visit You Were Diagnosed With   
  
 Codes Comments Uncontrolled type 2 diabetes mellitus with hyperosmolarity without coma, with long-term current use of insulin ([facility].)    -  Primary ICD-10-CM: E11.00, Z79.4 ICD-9-CM: 250.22, V58.67 Essential hypertension     ICD-10-CM: I10 
ICD-9-CM: 401.9 Dyslipidemia     ICD-10-CM: E78.5 ICD-9-CM: 272.4 Persistent atrial fibrillation (HCC)     ICD-10-CM: I48.1 ICD-9-CM: 427.31   
 FITO on CPAP     ICD-10-CM: G47.33 
ICD-9-CM: 327.23 Finger mass, right     ICD-10-CM: R22.31 
ICD-9-CM: 782.2 Pre-op evaluation     ICD-10-CM: D71.614 ICD-9-CM: V72.84 Vitals BP Pulse Temp Resp Height(growth percentile) Weight(growth percentile) 115/70 (BP 1 Location: Left arm, BP Patient Position: Sitting) 68 97 °F (36.1 °C) (Oral) 20 5' 11\" (1.803 m) 265 lb 3.2 oz (120.3 kg) SpO2 BMI Smoking Status 96% 36.99 kg/m2 Passive Smoke Exposure - Never Smoker BMI and BSA Data Body Mass Index Body Surface Area  
 36.99 kg/m 2 2.45 m 2 Preferred Pharmacy Pharmacy Name Phone Zachariah Byrne [address] [place] [address] [phone] Your Updated Medication List  
  
   
This list is accurate as of: 3/27/17 11:36 AM.  Always use your most recent med list.  
  
  
  
  
 albuterol 90 mcg/actuation inhaler Commonly known as:  PROVENTIL HFA, VENTOLIN HFA, PROAIR HFA Take 2 Puffs by inhalation every six (6) hours as needed for Wheezing. amLODIPine 10 mg tablet Commonly known as:  Ilana Mcdonough Take 1 Tab by mouth daily. bipap machine kit  
by Does Not Apply route. BiPAP at 23/18 cm with heated humidifier. Mask: Simplus FF, medium size or mask of choice. Length of need 99 months. Replace mask and accessories as needed times 12 months. Please download data after 30 days and fax at [phone]. Dx: Severe FITO, Obesity, snoring. cholecalciferol 1,000 unit Cap Commonly known as:  VITAMIN D3  
 Take 2 Caps by mouth daily. Fenofibrate 150 mg Cap TAKE 1 CAPSULE BY MOUTH DAILY  
  
 gabapentin 300 mg capsule Commonly known as:  NEURONTIN Take 1 Cap by mouth two (2) times a day. hydroCHLOROthiazide 12.5 mg tablet Commonly known as:  HYDRODIURIL Take 1 Tab by mouth daily. HYDROcodone-acetaminophen 5-325 mg per tablet Commonly known as:  NORCO  
1 PO Q12hrs PRN pain  
  
 hyoscyamine SL 0.125 mg SL tablet Commonly known as:  LEVSIN/SL  
0.125 mg by SubLINGual route every four (4) hours as needed. insulin aspart 100 unit/mL Inpn Commonly known as:  NOVOLOG  
by SubCUTAneous route. Sliding scale:  101-150 4 units 151-200 6 units 201-250  8 units 251-300 10 units Call if blood sugar is greater than 300 LEVEMIR FLEXTOUCH 100 unit/mL (3 mL) Inpn Generic drug:  insulin detemir INJECT 20 UNITS UNDER THE SKIN EVERY MORNING  
  
 meloxicam 15 mg tablet Commonly known as:  MOBIC Take 15 mg by mouth daily. metoprolol succinate 50 mg XL tablet Commonly known as:  TOPROL-XL  
TAKE 1 TABLET BY MOUTH EVERY DAY Vianney Pen Needle 32 gauge x 5/32\" Ndle Generic drug:  Insulin Needles (Disposable) USS THREE TIMES DAILY PLUS SLIDING SCALE  
  
 NASONEX 50 mcg/actuation nasal spray Generic drug:  mometasone USE 2 SPRAYS IN EACH NOSTRIL DAILY Omega-3-DHA-EPA-Fish Oil 1,000 mg (120 mg-180 mg) Cap Take  by mouth two (2) times a day. ONE-A-DAY MEN'S 50+ ADVANTAGE PO Take  by mouth daily. rivaroxaban 20 mg Tab tablet Commonly known as:  Sharon Sparrow Take 1 Tab by mouth daily (with breakfast). rosuvastatin 40 mg tablet Commonly known as:  CRESTOR Take 1 Tab by mouth nightly. NON FORMULARY SITagliptin-metFORMIN 50-1,000 mg per tablet Commonly known as:  Kenton Brochure Take 1 Tab by mouth two (2) times daily (with meals). SYMBICORT 160-4.5 mcg/actuation HFA inhaler Generic drug:  budesonide-formoterol TAKE 2 PUFFS BY MOUTH TWICE DAILY  
  
 traMADol 50 mg tablet Commonly known as:  ULTRAM  
Take 50 mg by mouth every six (6) hours as needed for Pain.  
  
 valsartan 320 mg tablet Commonly known as:  DIOVAN Take 1 Tab by mouth daily. VITAMIN B-12 1,000 mcg tablet Generic drug:  cyanocobalamin Take 1,000 mcg by mouth daily. Follow-up Instructions Return if symptoms worsen or fail to improve. To-Do List   
 03/28/2017 8:30 AM  
  Appointment with Joana Soto RD at [facility] OP NUTRITION  
  
 05/01/2017 Lab:  HEMOGLOBIN A1C WITH EAG   
  
 05/01/2017 Lab:  LIPID PANEL   
  
 05/01/2017 Lab:  METABOLIC PANEL, COMPREHENSIVE Patient Instructions Health Maintenance Due Topic Date Due  
 INFLUENZA AGE 9 TO ADULT  08/01/2016  
 HEMOGLOBIN A1C Q6M  10/04/2016  MICROALBUMIN Q1  04/04/2017 Stop eliquis 2 days prior to finger surgery. Learning About Diabetes Food Guidelines Your Care Instructions Meal planning is important to manage diabetes. It helps keep your blood sugar at a target level (which you set with your doctor). You don't have to eat special foods. You can eat what your family eats, including sweets once in a while. But you do have to pay attention to how often you eat and how much you eat of certain foods. You may want to work with a dietitian or a certified diabetes educator (CDE) to help you plan meals and snacks. A dietitian or CDE can also help you lose weight if that is one of your goals. What should you know about eating carbs? Managing the amount of carbohydrate (carbs) you eat is an important part of healthy meals when you have diabetes. Carbohydrate is found in many foods. · Learn which foods have carbs. And learn the amounts of carbs in different foods. ¨ Bread, cereal, pasta, and rice have about 15 grams of carbs in a serving. A serving is 1 slice of bread (1 ounce), ½ cup of cooked cereal, or 1/3 cup of cooked pasta or rice. ¨ Fruits have 15 grams of carbs in a serving. A serving is 1 small fresh fruit, such as an apple or orange; ½ of a banana; ½ cup of cooked or canned fruit; ½ cup of fruit juice; 1 cup of melon or raspberries; or 2 tablespoons of dried fruit. ¨ Milk and no-sugar-added yogurt have 15 grams of carbs in a serving. A serving is 1 cup of milk or 2/3 cup of no-sugar-added yogurt. ¨ Starchy vegetables have 15 grams of carbs in a serving. A serving is ½ cup of mashed potatoes or sweet potato; 1 cup winter squash; ½ of a small baked potato; ½ cup of cooked beans; or ½ cup cooked corn or green peas. · Learn how much carbs to eat each day and at each meal. A dietitian or CDE can teach you how to keep track of the amount of carbs you eat. This is called carbohydrate counting. · If you are not sure how to count carbohydrate grams, use the Plate Method to plan meals. It is a good, quick way to make sure that you have a balanced meal. It also helps you spread carbs throughout the day. ¨ Divide your plate by types of foods. Put non-starchy vegetables on half the plate, meat or other protein food on one-quarter of the plate, and a grain or starchy vegetable in the final quarter of the plate.  To this you can add a small piece of fruit and 1 cup of milk or yogurt, depending on how many carbs you are supposed to eat at a meal. 
· Try to eat about the same amount of carbs at each meal. Do not \"save up\" your daily allowance of carbs to eat at one meal. 
 · Proteins have very little or no carbs per serving. Examples of proteins are beef, chicken, turkey, fish, eggs, tofu, cheese, cottage cheese, and peanut butter. A serving size of meat is 3 ounces, which is about the size of a deck of cards. Examples of meat substitute serving sizes (equal to 1 ounce of meat) are 1/4 cup of cottage cheese, 1 egg, 1 tablespoon of peanut butter, and ½ cup of tofu. How can you eat out and still eat healthy? · Learn to estimate the serving sizes of foods that have carbohydrate. If you measure food at home, it will be easier to estimate the amount in a serving of restaurant food. · If the meal you order has too much carbohydrate (such as potatoes, corn, or baked beans), ask to have a low-carbohydrate food instead. Ask for a salad or green vegetables. · If you use insulin, check your blood sugar before and after eating out to help you plan how much to eat in the future. · If you eat more carbohydrate at a meal than you had planned, take a walk or do other exercise. This will help lower your blood sugar. What else should you know? · Limit saturated fat, such as the fat from meat and dairy products. This is a healthy choice because people who have diabetes are at higher risk of heart disease. So choose lean cuts of meat and nonfat or low-fat dairy products. Use olive or canola oil instead of butter or shortening when cooking. · Don't skip meals. Your blood sugar may drop too low if you skip meals and take insulin or certain medicines for diabetes. · Check with your doctor before you drink alcohol. Alcohol can cause your blood sugar to drop too low. Alcohol can also cause a bad reaction if you take certain diabetes medicines. Follow-up care is a key part of your treatment and safety. Be sure to make and go to all appointments, and call your doctor if you are having problems. It's also a good idea to know your test results and keep a list of the medicines you take. Where can you learn more? Go to http://vasile-fito.info/. Enter T923 in the search box to learn more about \"Learning About Diabetes Food Guidelines. \" Current as of: May 23, 2016 Content Version: 11.1 © 0305-4510 [employer]. Care instructions adapted under license by [employer] (which disclaims liability or warranty for this information). If you have questions about a medical condition or this instruction, always ask your healthcare professional. [address] any warranty or liability for your use of this information. Introducing [facility]! Orquidea Harris introduces Leveler patient portal. Now you can access parts of your medical record, email your doctor's office, and request medication refills online. 1. In your internet browser, go to [email or web address]. [email or web address] 2. Click on the First Time User? Click Here link in the Sign In box. You will see the New Member Sign Up page. 3. Enter your Leveler Access Code exactly as it appears below. You will not need to use this code after youve completed the sign-up process. If you do not sign up before the expiration date, you must request a new code. · Leveler Access Code: [email or web address] Expires: 5/14/2017  9:31 AM 
 
4. Enter the last four digits of your Social Security Number (xxxx) and Date of Birth (mm/dd/yyyy) as indicated and click Submit. You will be taken to the next sign-up page. 5. Create a Leveler ID. This will be your Leveler login ID and cannot be changed, so think of one that is secure and easy to remember. 6. Create a Leveler password. You can change your password at any time. 7. Enter your Password Reset Question and Answer. This can be used at a later time if you forget your password. 8. Enter your e-mail address. You will receive e-mail notification when new information is available in [employer]. 9. Click Sign Up. You can now view and download portions of your medical record. 10. Click the Download Summary menu link to download a portable copy of your medical information. If you have questions, please visit the Frequently Asked Questions section of the [employer] website. Remember, [employer] is NOT to be used for urgent needs. For medical emergencies, dial 911. Now available from your iPhone and Android! Please provide this summary of care documentation to your next provider. Your primary care clinician is listed as Brian Jimenes. If you have any questions after today's visit, please call [phone].
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
## 2017-03-27 NOTE — PATIENT INSTRUCTIONS
Health Maintenance Due   Topic Date Due    INFLUENZA AGE 9 TO ADULT  08/01/2016    HEMOGLOBIN A1C Q6M  10/04/2016    MICROALBUMIN Q1  04/04/2017     Stop eliquis 2 days prior to finger surgery. Learning About Diabetes Food Guidelines  Your Care Instructions  Meal planning is important to manage diabetes. It helps keep your blood sugar at a target level (which you set with your doctor). You don't have to eat special foods. You can eat what your family eats, including sweets once in a while. But you do have to pay attention to how often you eat and how much you eat of certain foods. You may want to work with a dietitian or a certified diabetes educator (CDE) to help you plan meals and snacks. A dietitian or CDE can also help you lose weight if that is one of your goals. What should you know about eating carbs? Managing the amount of carbohydrate (carbs) you eat is an important part of healthy meals when you have diabetes. Carbohydrate is found in many foods. · Learn which foods have carbs. And learn the amounts of carbs in different foods. ¨ Bread, cereal, pasta, and rice have about 15 grams of carbs in a serving. A serving is 1 slice of bread (1 ounce), ½ cup of cooked cereal, or 1/3 cup of cooked pasta or rice. ¨ Fruits have 15 grams of carbs in a serving. A serving is 1 small fresh fruit, such as an apple or orange; ½ of a banana; ½ cup of cooked or canned fruit; ½ cup of fruit juice; 1 cup of melon or raspberries; or 2 tablespoons of dried fruit. ¨ Milk and no-sugar-added yogurt have 15 grams of carbs in a serving. A serving is 1 cup of milk or 2/3 cup of no-sugar-added yogurt. ¨ Starchy vegetables have 15 grams of carbs in a serving. A serving is ½ cup of mashed potatoes or sweet potato; 1 cup winter squash; ½ of a small baked potato; ½ cup of cooked beans; or ½ cup cooked corn or green peas.   · Learn how much carbs to eat each day and at each meal. A dietitian or CDE can teach you how to keep track of the amount of carbs you eat. This is called carbohydrate counting. · If you are not sure how to count carbohydrate grams, use the Plate Method to plan meals. It is a good, quick way to make sure that you have a balanced meal. It also helps you spread carbs throughout the day. ¨ Divide your plate by types of foods. Put non-starchy vegetables on half the plate, meat or other protein food on one-quarter of the plate, and a grain or starchy vegetable in the final quarter of the plate. To this you can add a small piece of fruit and 1 cup of milk or yogurt, depending on how many carbs you are supposed to eat at a meal.  · Try to eat about the same amount of carbs at each meal. Do not \"save up\" your daily allowance of carbs to eat at one meal.  · Proteins have very little or no carbs per serving. Examples of proteins are beef, chicken, turkey, fish, eggs, tofu, cheese, cottage cheese, and peanut butter. A serving size of meat is 3 ounces, which is about the size of a deck of cards. Examples of meat substitute serving sizes (equal to 1 ounce of meat) are 1/4 cup of cottage cheese, 1 egg, 1 tablespoon of peanut butter, and ½ cup of tofu. How can you eat out and still eat healthy? · Learn to estimate the serving sizes of foods that have carbohydrate. If you measure food at home, it will be easier to estimate the amount in a serving of restaurant food. · If the meal you order has too much carbohydrate (such as potatoes, corn, or baked beans), ask to have a low-carbohydrate food instead. Ask for a salad or green vegetables. · If you use insulin, check your blood sugar before and after eating out to help you plan how much to eat in the future. · If you eat more carbohydrate at a meal than you had planned, take a walk or do other exercise. This will help lower your blood sugar. What else should you know? · Limit saturated fat, such as the fat from meat and dairy products.  This is a healthy choice because people who have diabetes are at higher risk of heart disease. So choose lean cuts of meat and nonfat or low-fat dairy products. Use olive or canola oil instead of butter or shortening when cooking. · Don't skip meals. Your blood sugar may drop too low if you skip meals and take insulin or certain medicines for diabetes. · Check with your doctor before you drink alcohol. Alcohol can cause your blood sugar to drop too low. Alcohol can also cause a bad reaction if you take certain diabetes medicines. Follow-up care is a key part of your treatment and safety. Be sure to make and go to all appointments, and call your doctor if you are having problems. It's also a good idea to know your test results and keep a list of the medicines you take. Where can you learn more? Go to http://vasile-fito.info/. Enter D411 in the search box to learn more about \"Learning About Diabetes Food Guidelines. \"  Current as of: May 23, 2016  Content Version: 11.1  © 7031-3762 [employer]. Care instructions adapted under license by [employer] (which disclaims liability or warranty for this information). If you have questions about a medical condition or this instruction, always ask your healthcare professional. [address] any warranty or liability for your use of this information.

## 2017-03-28 ENCOUNTER — HOSPITAL ENCOUNTER (OUTPATIENT)
Dept: NUTRITION | Age: 57
Discharge: HOME | End: 2017-03-28
Payer: COMMERCIAL

## 2017-03-28 PROCEDURE — 97802 MEDICAL NUTRITION INDIV IN: CPT

## 2017-03-28 NOTE — PROGRESS NOTES
[address] [facility]  [address] [facility], [address] - Phone: [phone]     Nutrition Assessment - Medical Nutrition Therapy   Outpatient  Initial Evaluation         Patient Name: Luella Fabry. : 1960   Treatment Diagnosis: Diabetes Onset Date:    Referral Source: Vinh Arita NP Start of Care [facility]): 3/28/2017     Ht:  71 in  cm Wt: 269.8 lb  kg   BMI: 37.6  BMR male 5   BMR female      Diagnosis: See Above        Medications of Nutritional Significance:   Pt will bring med list to next visit     Subjective/Assessment:  Pt is a 60 yo male with diabetes, heart disease, and high BP. Pt has labs from feb: A1C: 10, HDL: 26L, LDL:4L, TRIH. Pt is retired , but does work part time 3 nights per week from [id number]. Pt lives alone and loves to cook, he has started adopting better eating habits since his results were reviewed with his PCP. Pt no longer eats outside of the house packs his lunch, and has given up bread and soda. Pt drinks a lot of water, and has coffee periodically throughout the week. Pt does often choose carbs alone for meals and snacks, but does not consume large portions sizes, he also has large gaps of time and skips meals at times. -Educated pt on the pathophysiology of Type II Diabetes and insulin resistance and the rationale for dietary modification and increased activity. Eat 3 meals + 1-2 optional high protein low-no carb snacks each day. (within carbohydrate limits 15-25 gm per meal and 10-15 gm per snack)  Discussed eating with 1-2 hours of waking, going no longer than 5 hours without eating, but no closer than 3 hours, and stopping eating 2 hours before bed. Pt has a stationary bike at home and used to walk neighborhood. Will start exercising for 30 min 4 times per week. Pt had his mother with him to help keep him accountable and work on diet/lifestyle changes.  Pt expressed comprehension, high motivation, and compliance is expected. Current Eating Patterns:  0700: wake up  0730: 1 packet oatmeal (plain), water  Skipped lunch  1900: 6 chicken wings in ranch, green peas, water  2100: boiled egg, water  0100: sleep     Handouts/  Information Provided: [x]  Carbohydrates  [x]  Protein  []  Fiber  [x]  Serving Sizes  [x]  Meal and Snack Ideas  []  Food Journals [x]  Diabetes  []  Cholesterol  []  Sodium  [x]  Gen Nutr Guidelines  []  SBGM Guidelines  [x]  Others: Veggies    Estimate Needs:   Calories: 1800  Protein: 180 Carbs: 135 Fat: 60   Kcal/day  g/day  g/day  g/day         percent: 40 percent: 30 percent: 30     Nutritional Goal - To promote lifestyle changes to result in:    [x]  weight loss  [x]  Improved diabetic control  []  Decreased cholesterol levels  []  Decreased blood pressure  []  weight maintenance []  Preventing any interactions associated with food allergies  []  Adequate weight gain toward goal weight  []  Other:          Recommendations: 1. Follow meal timeline (eat every 3-5 hours)  2. Always have protein with carbohydrates; use the plate method for portion control  3. Carb limits: 15-25 gm at meals; 10-15 gm at snacks  4.  Exercise: Start using stationary bike or walk around neighborhood for 30 min 4 times per week      Information Reviewed with: Patient and Patients mom   Potential Barriers to Learning: None     Dietitian Signature: Chance Jones MA RD Date: 3/28/2017   Follow-up: 5-11-17 @ 0830 Time: 8:10 AM

## 2017-04-05 DIAGNOSIS — N20.0 KIDNEY STONE: Primary | ICD-10-CM

## 2017-04-05 NOTE — PROGRESS NOTES
RBV Per Dr. Oralia Mckeon order placed for patient to have KUB prior to next appointment. Cara Padilla

## 2017-04-11 DIAGNOSIS — Z12.5 PROSTATE CANCER SCREENING: ICD-10-CM

## 2017-04-11 DIAGNOSIS — Z11.59 NEED FOR HEPATITIS C SCREENING TEST: ICD-10-CM

## 2017-04-11 DIAGNOSIS — E11.9 WELL CONTROLLED DIABETES MELLITUS (HCC): ICD-10-CM

## 2017-04-11 DIAGNOSIS — I10 ESSENTIAL HYPERTENSION: ICD-10-CM

## 2017-04-11 RX ORDER — ROSUVASTATIN CALCIUM 40 MG/1
40 TABLET, COATED ORAL
Qty: 90 TAB | Refills: 3 | Status: SHIPPED | OUTPATIENT
Start: 2017-04-11 | End: 2018-04-30 | Stop reason: SDUPTHER

## 2017-04-26 ENCOUNTER — HOSPITAL ENCOUNTER (OUTPATIENT)
Dept: LAB | Age: 57
Discharge: HOME | End: 2017-04-26

## 2017-04-26 LAB — SENTARA SPECIMEN COL,SENBCF: NORMAL

## 2017-04-26 PROCEDURE — 99001 SPECIMEN HANDLING PT-LAB: CPT | Performed by: NURSE PRACTITIONER

## 2017-04-27 LAB
A-G RATIO,AGRAT: 2.5 RATIO (ref 1.1–2.6)
ALBUMIN SERPL-MCNC: 4.7 G/DL (ref 3.5–5)
ALP SERPL-CCNC: 48 U/L (ref 25–115)
ALT SERPL-CCNC: 17 U/L (ref 5–40)
ANION GAP SERPL CALC-SCNC: 15 MMOL/L
AST SERPL W P-5'-P-CCNC: 20 U/L (ref 10–37)
AVG GLU, 10930: 173 MG/DL (ref 91–123)
BILIRUB SERPL-MCNC: 0.4 MG/DL (ref 0.2–1.2)
BUN SERPL-MCNC: 29 MG/DL (ref 6–22)
CALCIUM SERPL-MCNC: 10.2 MG/DL (ref 8.4–10.4)
CHLORIDE SERPL-SCNC: 101 MMOL/L (ref 98–110)
CHOLEST SERPL-MCNC: 97 MG/DL (ref 110–200)
CO2 SERPL-SCNC: 25 MMOL/L (ref 20–32)
CREAT SERPL-MCNC: 1 MG/DL (ref 0.5–1.2)
GFRAA, 66117: >60
GFRNA, 66118: >60
GLOBULIN,GLOB: 1.9 G/DL (ref 2–4)
GLUCOSE SERPL-MCNC: 189 MG/DL (ref 65–99)
HBA1C MFR BLD HPLC: 7.7 % (ref 4.8–5.9)
HDLC SERPL-MCNC: 24 MG/DL (ref 40–59)
LDLC SERPL CALC-MCNC: 22 MG/DL (ref 50–99)
POTASSIUM SERPL-SCNC: 4.4 MMOL/L (ref 3.5–5.5)
PROT SERPL-MCNC: 6.6 G/DL (ref 6.4–8.3)
SODIUM SERPL-SCNC: 141 MMOL/L (ref 133–145)
TRIGL SERPL-MCNC: 257 MG/DL (ref 40–149)
VLDLC SERPL CALC-MCNC: 51 MG/DL (ref 8–30)

## 2017-05-01 ENCOUNTER — OFFICE VISIT (OUTPATIENT)
Dept: UROLOGY | Age: 57
End: 2017-05-01

## 2017-05-01 ENCOUNTER — OFFICE VISIT (OUTPATIENT)
Dept: INTERNAL MEDICINE CLINIC | Age: 57
End: 2017-05-01

## 2017-05-01 VITALS
HEART RATE: 72 BPM | TEMPERATURE: 97.1 F | BODY MASS INDEX: 38.22 KG/M2 | DIASTOLIC BLOOD PRESSURE: 84 MMHG | SYSTOLIC BLOOD PRESSURE: 134 MMHG | HEIGHT: 71 IN | WEIGHT: 273 LBS | OXYGEN SATURATION: 97 %

## 2017-05-01 VITALS
HEART RATE: 70 BPM | TEMPERATURE: 98.3 F | SYSTOLIC BLOOD PRESSURE: 125 MMHG | BODY MASS INDEX: 38.08 KG/M2 | OXYGEN SATURATION: 97 % | HEIGHT: 71 IN | DIASTOLIC BLOOD PRESSURE: 82 MMHG | WEIGHT: 272 LBS

## 2017-05-01 DIAGNOSIS — N20.0 KIDNEY STONE: Primary | ICD-10-CM

## 2017-05-01 DIAGNOSIS — F32.A DEPRESSION, UNSPECIFIED DEPRESSION TYPE: ICD-10-CM

## 2017-05-01 DIAGNOSIS — E11.00 UNCONTROLLED TYPE 2 DIABETES MELLITUS WITH HYPEROSMOLARITY WITHOUT COMA, WITH LONG-TERM CURRENT USE OF INSULIN (HCC): ICD-10-CM

## 2017-05-01 DIAGNOSIS — Z79.4 UNCONTROLLED TYPE 2 DIABETES MELLITUS WITH HYPEROSMOLARITY WITHOUT COMA, WITH LONG-TERM CURRENT USE OF INSULIN (HCC): ICD-10-CM

## 2017-05-01 DIAGNOSIS — E78.5 DYSLIPIDEMIA: ICD-10-CM

## 2017-05-01 DIAGNOSIS — E11.9 WELL CONTROLLED DIABETES MELLITUS (HCC): Primary | ICD-10-CM

## 2017-05-01 DIAGNOSIS — N40.0 BENIGN NON-NODULAR PROSTATIC HYPERPLASIA, PRESENCE OF LOWER URINARY TRACT SYMPTOMS UNSPECIFIED: ICD-10-CM

## 2017-05-01 DIAGNOSIS — I48.19 PERSISTENT ATRIAL FIBRILLATION (HCC): ICD-10-CM

## 2017-05-01 DIAGNOSIS — I10 ESSENTIAL HYPERTENSION: ICD-10-CM

## 2017-05-01 LAB
BILIRUB UR QL STRIP: NEGATIVE
GLUCOSE UR-MCNC: NEGATIVE MG/DL
KETONES P FAST UR STRIP-MCNC: NEGATIVE MG/DL
PH UR STRIP: 5.5 [PH] (ref 4.6–8)
PROT UR QL STRIP: NEGATIVE MG/DL
SP GR UR STRIP: 1.02 (ref 1–1.03)
UA UROBILINOGEN AMB POC: NORMAL (ref 0.2–1)
URINALYSIS CLARITY POC: CLEAR
URINALYSIS COLOR POC: YELLOW
URINE BLOOD POC: NEGATIVE
URINE LEUKOCYTES POC: NEGATIVE
URINE NITRITES POC: NEGATIVE

## 2017-05-01 RX ORDER — BUPROPION HYDROCHLORIDE 150 MG/1
150 TABLET, EXTENDED RELEASE ORAL DAILY
Qty: 30 TAB | Refills: 3 | Status: SHIPPED | OUTPATIENT
Start: 2017-05-01 | End: 2017-08-13 | Stop reason: SDUPTHER

## 2017-05-01 RX ORDER — BUDESONIDE AND FORMOTEROL FUMARATE DIHYDRATE 160; 4.5 UG/1; UG/1
AEROSOL RESPIRATORY (INHALATION)
COMMUNITY
End: 2017-05-01 | Stop reason: ALTCHOICE

## 2017-05-01 RX ORDER — FENOFIBRATE 145 MG/1
145 TABLET, COATED ORAL
COMMUNITY
End: 2017-05-01 | Stop reason: SDUPTHER
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
## 2017-05-01 NOTE — MR AVS SNAPSHOT
Visit Information Date & Time Provider Department Dept. Phone Encounter #  
 5/1/2017  9:00 AM Manish Valenciar, [address] [facility] [phone] Your Appointments 5/1/2017 11:00 AM  
Office Visit with Jasvir Roberts NP Internist of [facility] ([facility]) Appt Note: follow up; follow up  
 [address], [address] [place] [address] [place]  
  
   
 [address], [address]  
  
    
 6/16/2017  8:20 AM  
Follow Up with Denver Munoz MD  
[facility] \[facility]\"" ([facility]) Appt Note: 6 mth f/up [place] [id number] [phone] [address]  
  
    
 7/27/2017  7:30 AM  
Follow Up with Ivon Napoles MD  
[facility] - [address]) Appt Note: rt knee fu  
 [address] [place], [address] [facility] [address]  
[phone]  
  
   
 [address] [place], [address] [address] Upcoming Health Maintenance Date Due MICROALBUMIN Q1 4/4/2017 INFLUENZA AGE 9 TO ADULT 8/1/2017 HEMOGLOBIN A1C Q6M 10/26/2017 EYE EXAM RETINAL OR DILATED Q1 11/30/2017 FOOT EXAM Q1 2/13/2018 LIPID PANEL Q1 4/26/2018 COLONOSCOPY 6/27/2026 DTaP/Tdap/Td series (2 - Td) 2/13/2027 Allergies as of 5/1/2017  Review Complete On: 5/1/2017 By: Adelfo Sanchez LPN Severity Noted Reaction Type Reactions Penicillins Medium 08/17/2010    Hives Current Immunizations  Reviewed on 2/13/2017 Name Date Pneumococcal Polysaccharide (PPSV-23) 2/13/2017 Tdap 2/13/2017 Not reviewed this visit You Were Diagnosed With   
  
 Codes Comments Kidney stone    -  Primary ICD-10-CM: N20.0 ICD-9-CM: 592.0 Benign non-nodular prostatic hyperplasia, presence of lower urinary tract symptoms unspecified     ICD-10-CM: N40.0 ICD-9-CM: 600.90 Vitals BP Pulse Temp Height(growth percentile) Weight(growth percentile) SpO2  
 134/84 (BP 1 Location: Left arm, BP Patient Position: Sitting) 72 97.1 °F (36.2 °C) 5' 11\" (1.803 m) 273 lb (123.8 kg) 97% BMI Smoking Status 38.08 kg/m2 Passive Smoke Exposure - Never Smoker Vitals History BMI and BSA Data Body Mass Index Body Surface Area 38.08 kg/m 2 2.49 m 2 Preferred Pharmacy Pharmacy Name Phone [facility] [phone] [phone] [facility] 18 [phone] Your Updated Medication List  
  
   
This list is accurate as of: 5/1/17  9:33 AM.  Always use your most recent med list.  
  
  
  
  
 albuterol 90 mcg/actuation inhaler Commonly known as:  PROVENTIL HFA, VENTOLIN HFA, PROAIR HFA Take 2 Puffs by inhalation every six (6) hours as needed for Wheezing. amLODIPine 10 mg tablet Commonly known as:  [place] Roxie Take 1 Tab by mouth daily. bipap machine kit  
by Does Not Apply route. BiPAP at 23/18 cm with heated humidifier. Mask: Simplus FF, medium size or mask of choice. Length of need 99 months. Replace mask and accessories as needed times 12 months. Please download data after 30 days and fax at [phone]. Dx: Severe FITO, Obesity, snoring. cholecalciferol 1,000 unit Cap Commonly known as:  VITAMIN D3 Take 2 Caps by mouth daily. Fenofibrate 150 mg Cap TAKE 1 CAPSULE BY MOUTH DAILY  
  
 fenofibrate nanocrystallized 145 mg tablet Commonly known as:  TRICOR  
145 mg.  
  
 gabapentin 300 mg capsule Commonly known as:  NEURONTIN Take 1 Cap by mouth two (2) times a day. hydroCHLOROthiazide 12.5 mg tablet Commonly known as:  HYDRODIURIL Take 1 Tab by mouth daily. HYDROcodone-acetaminophen 5-325 mg per tablet Commonly known as:  NORCO  
1 PO Q12hrs PRN pain  
  
 hyoscyamine SL 0.125 mg SL tablet Commonly known as:  LEVSIN/SL  
0.125 mg by SubLINGual route every four (4) hours as needed. insulin aspart 100 unit/mL Inpn Commonly known as:  NOVOLOG  
by SubCUTAneous route. Sliding scale:  101-150 4 units 151-200 6 units 201-250  8 units 251-300 10 units Call if blood sugar is greater than 300 LEVEMIR FLEXTOUCH 100 unit/mL (3 mL) Inpn Generic drug:  insulin detemir INJECT 20 UNITS UNDER THE SKIN EVERY MORNING  
  
 meloxicam 15 mg tablet Commonly known as:  MOBIC Take 15 mg by mouth daily. metoprolol succinate 50 mg XL tablet Commonly known as:  TOPROL-XL  
TAKE 1 TABLET BY MOUTH EVERY DAY Vianney Pen Needle 32 gauge x 5/32\" Ndle Generic drug:  Insulin Needles (Disposable) USS THREE TIMES DAILY PLUS SLIDING SCALE  
  
 NASONEX 50 mcg/actuation nasal spray Generic drug:  mometasone USE 2 SPRAYS IN EACH NOSTRIL DAILY Omega-3-DHA-EPA-Fish Oil 1,000 mg (120 mg-180 mg) Cap Take  by mouth two (2) times a day. ONE-A-DAY MEN'S 50+ ADVANTAGE PO Take  by mouth daily. rivaroxaban 20 mg Tab tablet Commonly known as:  Macrina Hicks Take 1 Tab by mouth daily (with breakfast). rosuvastatin 40 mg tablet Commonly known as:  CRESTOR Take 1 Tab by mouth nightly. NON FORMULARY SITagliptin-metFORMIN 50-1,000 mg per tablet Commonly known as:  Stephanie Ugartece Take 1 Tab by mouth two (2) times daily (with meals). * SYMBICORT 160-4.5 mcg/actuation HFA inhaler Generic drug:  budesonide-formoterol Take  inhaled by mouth. * SYMBICORT 160-4.5 mcg/actuation HFA inhaler Generic drug:  budesonide-formoterol TAKE 2 PUFFS BY MOUTH TWICE DAILY  
  
 traMADol 50 mg tablet Commonly known as:  ULTRAM  
Take 50 mg by mouth every six (6) hours as needed for Pain.  
  
 valsartan 320 mg tablet Commonly known as:  DIOVAN Take 1 Tab by mouth daily. VITAMIN B-12 1,000 mcg tablet Generic drug:  cyanocobalamin Take 1,000 mcg by mouth daily. * Notice: This list has 2 medication(s) that are the same as other medications prescribed for you. Read the directions carefully, and ask your doctor or other care provider to review them with you. We Performed the Following AMB POC URINALYSIS DIP STICK AUTO W/O MICRO [16111 CPT(R)] MS COLLECTION VENOUS BLOOD,VENIPUNCTURE [id number] CPT(R)] PROSTATE SPECIFIC AG (PSA) [id number] CPT(R)] To-Do List   
 05/11/2017 8:30 AM  
  Appointment with Yolande Lambert RD at [facility] Patient Instructions Learning About Diet for Kidney Stone Prevention What are kidney stones? Kidney stones are made of salts and minerals in the urine that form small \"maddie. \" Stones can form in the kidneys and the ureters (the tubes that lead from the kidneys to the bladder). They can also form in the bladder. Stones may not cause a problem as long as they stay in the kidneys. But they can cause sudden, severe pain. Pain is most likely when the stones travel from the kidneys to the bladder. Kidney stones can cause bloody urine. Kidney stones often run in families. You are more likely to get them if you don't drink enough fluids, mainly water. Certain foods and drinks and some dietary supplements may also increase your risk for kidney stones if you consume too much of them. What can you do to prevent kidney stones? Changing what you eat may not prevent all types of kidney stones. But for people who have a history of certain kinds of kidney stones, some changes in diet may help. A dietitian can help you set up a meal plan that includes healthy, low-oxalate choices. Here are some general guidelines to get you started. Plan your meals and snacks around foods that are low in oxalate. These foods include: · Corn, kale, parsnips, and squash,. · Beef, chicken, pork, turkey, and fish. · Milk, butter, cheese, and yogurt. You can eat certain foods that are medium-high in oxalate, but eat them only once in a while. These foods include: · Bread. · Brown rice. · English muffins. · Figs. · Popcorn. · String beans. · Tomatoes. Limit very high-oxalate foods, including: · Black tea. · Coffee. · Chocolate. · Dark green vegetables. · Nuts. Here are some other things you can do to help prevent kidney stones. · Drink plenty of fluids. If you have kidney, heart, or liver disease and have to limit fluids, talk with your doctor before you increase the amount of fluids you drink. · Do not take more than the recommended daily dose of vitamins C and D. 
· Limit the salt in your diet. · Eat a balanced diet that is not too high in protein. Follow-up care is a key part of your treatment and safety. Be sure to make and go to all appointments, and call your doctor if you are having problems. It's also a good idea to know your test results and keep a list of the medicines you take. Where can you learn more? Go to http://vasile-fito.info/. Enter C138 in the search box to learn more about \"Learning About Diet for Kidney Stone Prevention. \" Current as of: July 26, 2016 Content Version: 11.2 © 5031-2413 [employer]. Care instructions adapted under license by [employer] (which disclaims liability or warranty for this information). If you have questions about a medical condition or this instruction, always ask your healthcare professional. [place] [address] any warranty or liability for your use of this information. Introducing [facility]! Maryam Anguloverkamron introduces [employer] patient portal. Now you can access parts of your medical record, email your doctor's office, and request medication refills online. 1. In your internet browser, go to [email or web address] 2. Click on the First Time User? Click Here link in the Sign In box. You will see the New Member Sign Up page. 3. Enter your Moprise Access Code exactly as it appears below. You will not need to use this code after youve completed the sign-up process. If you do not sign up before the expiration date, you must request a new code. · Moprise Access Code: [email or web address] Expires: 5/14/2017  9:31 AM 
 
4. Enter the last four digits of your Social Security Number (xxxx) and Date of Birth (mm/dd/yyyy) as indicated and click Submit. You will be taken to the next sign-up page. 5. Create a Moprise ID. This will be your Moprise login ID and cannot be changed, so think of one that is secure and easy to remember. 6. Create a Moprise password. You can change your password at any time. 7. Enter your Password Reset Question and Answer. This can be used at a later time if you forget your password. 8. Enter your e-mail address. You will receive e-mail notification when new information is available in [address] 9. Click Sign Up. You can now view and download portions of your medical record. 10. Click the Download Summary menu link to download a portable copy of your medical information. If you have questions, please visit the Frequently Asked Questions section of the Moprise website. Remember, Moprise is NOT to be used for urgent needs. For medical emergencies, dial 911. Now available from your iPhone and Android! Please provide this summary of care documentation to your next provider. Your primary care clinician is listed as Toney Viera. If you have any questions after today's visit, please call [phone].

## 2017-05-01 NOTE — PATIENT INSTRUCTIONS
Learning About Depression Screening  What is depression screening? Depression screening is a way to see if you have depression symptoms. It may be done by a doctor or counselor. This screening is often part of a routine checkup. That's because your mental health is just as important as your physical health. Depression is a medical illness that affects how you feel, think, and act. You may:  · Have less energy. · Lose interest in your daily activities. · Feel sad and grouchy for a long time. Depression is very common. It affects men and women of all ages. Many things can trigger depression. Some people become depressed after they have a stroke or find out they have a major illness like cancer or heart disease. The death of a loved one, a breakup, or changes in the natural brain chemicals may lead to depression. It can run in families. Most experts believe that a combination of family history (a person's genes) and stressful life events can cause depression. What happens during screening? Your doctor may ask about your feelings, any changes in eating habits, your energy level, and your interest in your daily tasks. He or she may ask other questions, such as how well you are sleeping and if you can focus on the things you do. This may be an informal talk between the two of you. Or your doctor may ask you to fill out a quick form and then talk about your answers. Some diseases or changes in your body can cause symptoms that look like depression. So your doctor may do blood tests to help rule out other problems, such as hormone changes, a low thyroid level, or anemia. What happens after screening? If you have signs of depression, your doctor will talk to you about your options. Doctors usually treat depression with medicines or counseling. Often, combining the two works best. Many people don't get help because they think that they'll get over the depression on their own.  But people with depression may not get better unless they get treatment. Many people feel embarrassed or ashamed about having depression. But it isn't a sign of personal weakness. It's not a character flaw. A person who is depressed is not \"crazy. \" Depression is caused by changes in the brain. A serious symptom of depression is thinking about death or suicide. If you or someone you care about talks about this or about feeling hopeless, get help right away. It's important to know that depression can be treated. The first step toward feeling better is often just seeing that the problem exists. Where can you learn more? Go to http://vasilePanacela Labsfito.info/. Enter T185 in the search box to learn more about \"Learning About Depression Screening. \"  Current as of: October 28, 2016  Content Version: 11.2  © 4242-9246 [employer]. Care instructions adapted under license by [employer] (which disclaims liability or warranty for this information). If you have questions about a medical condition or this instruction, always ask your healthcare professional. [place] [address] any warranty or liability for your use of this information. Recovering From Depression: Care Instructions  Your Care Instructions  Taking good care of yourself is important as you recover from depression. In time, your symptoms will fade as your treatment takes hold. Do not give up. Instead, focus your energy on getting better. Your mood will improve. It just takes some time. Focus on things that can help you feel better, such as being with friends and family, eating well, and getting enough rest. But take things slowly. Do not do too much too soon. You will begin to feel better gradually. Follow-up care is a key part of your treatment and safety. Be sure to make and go to all appointments, and call your doctor if you are having problems.  It's also a good idea to know your test results and keep a list of the medicines you take.  How can you care for yourself at home? Be realistic  · If you have a large task to do, break it up into smaller steps you can handle, and just do what you can. · You may want to put off important decisions until your depression has lifted. If you have plans that will have a major impact on your life, such as marriage, divorce, or a job change, try to wait a bit. Talk it over with friends and loved ones who can help you look at the overall picture first.  · Reaching out to people for help is important. Do not isolate yourself. Let your family and friends help you. Find someone you can trust and confide in, and talk to that person. · Be patient, and be kind to yourself. Remember that depression is not your fault and is not something you can overcome with willpower alone. Treatment is necessary for depression, just like for any other illness. Feeling better takes time, and your mood will improve little by little. Stay active  · Stay busy and get outside. Take a walk, or try some other light exercise. · Talk with your doctor about an exercise program. Exercise can help with mild depression. · Go to a movie or concert. Take part in a Religion activity or other social gathering. Go to a ball game. · Ask a friend to have dinner with you. Take care of yourself  · Eat a balanced diet with plenty of fresh fruits and vegetables, whole grains, and lean protein. If you have lost your appetite, eat small snacks rather than large meals. · Avoid drinking alcohol or using illegal drugs. Do not take medicines that have not been prescribed for you. They may interfere with medicines you may be taking for depression, or they may make your depression worse. · Take your medicines exactly as they are prescribed. You may start to feel better within 1 to 3 weeks of taking antidepressant medicine. But it can take as many as 6 to 8 weeks to see more improvement.  If you have questions or concerns about your medicines, or if you do not notice any improvement by 3 weeks, talk to your doctor. · If you have any side effects from your medicine, tell your doctor. Antidepressants can make you feel tired, dizzy, or nervous. Some people have dry mouth, constipation, headaches, sexual problems, or diarrhea. Many of these side effects are mild and will go away on their own after you have been taking the medicine for a few weeks. Some may last longer. Talk to your doctor if side effects are bothering you too much. You might be able to try a different medicine. · Get enough sleep. If you have problems sleeping:  ¨ Go to bed at the same time every night, and get up at the same time every morning. ¨ Keep your bedroom dark and quiet. ¨ Do not exercise after 5:00 p.m. ¨ Avoid drinks with caffeine after 5:00 p.m. · Avoid sleeping pills unless they are prescribed by the doctor treating your depression. Sleeping pills may make you groggy during the day, and they may interact with other medicine you are taking. · If you have any other illnesses, such as diabetes, heart disease, or high blood pressure, make sure to continue with your treatment. Tell your doctor about all of the medicines you take, including those with or without a prescription. · Keep the numbers for these national suicide hotlines: 0-153-120-TALK (9-886.751.9372) and 0-916-KASGVCL (1-833.929.1314). If you or someone you know talks about suicide or feeling hopeless, get help right away. When should you call for help? Call 911 anytime you think you may need emergency care. For example, call if:  · You feel like hurting yourself or someone else. · Someone you know has depression and is about to attempt or is attempting suicide. Call your doctor now or seek immediate medical care if:  · You hear voices. · Someone you know has depression and:  ¨ Starts to give away his or her possessions. ¨ Uses illegal drugs or drinks alcohol heavily.   ¨ Talks or writes about death, including writing suicide notes or talking about guns, knives, or pills. ¨ Starts to spend a lot of time alone. ¨ Acts very aggressively or suddenly appears calm. Watch closely for changes in your health, and be sure to contact your doctor if:  · You do not get better as expected. Where can you learn more? Go to http://vasile-fito.info/. Enter Z962 in the search box to learn more about \"Recovering From Depression: Care Instructions. \"  Current as of: July 26, 2016  Content Version: 11.2  © 7591-1600 [employer]. Care instructions adapted under license by [employer] (which disclaims liability or warranty for this information). If you have questions about a medical condition or this instruction, always ask your healthcare professional. [address] any warranty or liability for your use of this information. Bupropion (By mouth)   Bupropion ([id number])  Treats depression and aids in quitting smoking. Also prevents depression caused by seasonal affective disorder (SAD). Brand Name(s): Aplenzin, Forfivo XL, Wellbutrin, Wellbutrin SR, Wellbutrin XL, Zyban   There may be other brand names for this medicine. When This Medicine Should Not Be Used: This medicine is not right for everyone. Do not use it if you had an allergic reaction to bupropion, or if you have seizures, anorexia, or bulimia. How to Use This Medicine:   Tablet, Long Acting Tablet  · Take your medicine as directed. Your dose may need to be changed several times to find what works best for you. · You may need to take Wellbutrin® for up to 4 weeks before you feel better. You may need to take Zyban® for 1 to 2 weeks before the date that you plan to stop smoking. · Swallow the tablet whole. Do not break, crush, divide, or chew it. · It is best to take Aplenzin® in the morning. · Do not take Wellbutrin® or Zyban® close to bedtime if you have trouble sleeping.   · You may take this medicine with or without food. If you have nausea, it may help to take it with food. · If you take the extended-release tablet, part of the tablet may pass into your stools. This is normal and is nothing to worry about. · This medicine should come with a Medication Guide. Ask your pharmacist for a copy if you do not have one. · Missed dose: Skip the missed dose and go back to your regular dosing schedule. Never take extra medicine to make up for a missed dose. · Store the medicine in a closed container at room temperature, away from heat, moisture, and direct light. Drugs and Foods to Avoid:   Ask your doctor or pharmacist before using any other medicine, including over-the-counter medicines, vitamins, and herbal products. · Do not use this medicine and an MAO inhibitor (MAOI), such as linezolid or methylene blue injection, within 14 days of each other. Do not use Zyban® to quit smoking if you already take Aplenzin® or Wellbutrin® for depression, because they are the same medicine. · Some medicines can affect how bupropion works. Tell your doctor if you are using the following:   ¨ Amantadine, cimetidine, clopidogrel, cyclophosphamide, levodopa, nicotine patch, orphenadrine, tamoxifen, theophylline, thiotepa, ticlopidine  ¨ Beta blocker (such as metoprolol), insulin or diabetes medicine that you take by mouth, medicine for heart rhythm problems (propafenone, flecainide), medicine to treat HIV or AIDS (efavirenz, lopinavir, nelfinavir, ritonavir), medicine for seizures (carbamazepine, phenobarbital, phenytoin), other medicine for depression (desipramine, fluoxetine, imipramine, nortriptyline, paroxetine, sertraline), medicine to treat mental illness (haloperidol, risperidone, thioridazine), steroid medicine (hydrocortisone, methylprednisolone, prednisone, prednisolone, dexamethasone), or a blood thinner  · Limit alcohol, or do not drink alcohol at all while you are using this medicine.   Warnings While Using This Medicine: · Tell your doctor if you are pregnant or breastfeeding, or if you have kidney disease, liver disease, diabetes, glaucoma, heart disease, or high blood pressure. · Tell your doctor if you take barbiturates, benzodiazepines, antiseizure medicine, or sedatives, or if you recently stopped taking them. Tell your doctor if you have a history of drug addiction, or if you drink alcohol. · For some children, teenagers, and young adults, this medicine may increase mental or emotional problems. This may lead to thoughts of suicide and violence. Talk with your doctor right away if you have any thoughts or behavior changes that concern you. Tell your doctor if you or anyone in your family has a history of bipolar disorder or suicide attempts. · This medicine may cause the following problems:  ¨ An increased risk of seizures  ¨ Changes in mood or behavior  ¨ High blood pressure  ¨ Serious skin reactions  · This medicine may make you dizzy or drowsy. Do not drive or do anything that could be dangerous until you know how this medicine affects you. · Zyban® is only part of a complete program to help you quit smoking. You may still want to smoke at times. Have a plan to cope with these situations. · Do not stop using this medicine suddenly. Your doctor will need to slowly decrease your dose before you stop it completely. · Tell any doctor or dentist who treats you that you are using this medicine. This medicine may affect certain medical test results. · Your doctor will check your progress and the effects of this medicine at regular visits. Keep all appointments. · Keep all medicine out of the reach of children. Never share your medicine with anyone.   Possible Side Effects While Using This Medicine:   Call your doctor right away if you notice any of these side effects:  · Allergic reaction: Itching or hives, swelling in your face or hands, swelling or tingling in your mouth or throat, chest tightness, trouble breathing  · Blistering, peeling, or red skin rash  · Chest pain, trouble breathing, fast, slow, or uneven heartbeat  · Muscle or joint pain, fever with rash  · Seeing or hearing things that are not there, feeling like people are against you  · Seizures or tremors  · Sudden increase in energy, racing thoughts, trouble sleeping  · Thoughts of hurting yourself, worsening depression, severe agitation or confusion  If you notice these less serious side effects, talk with your doctor:   · Dry mouth  · Eye pain, vision changes, seeing halos around lights  · Headache or dizziness  · Nausea, vomiting, constipation, diarrhea, gas, stomach pain  · Weight gain or loss  If you notice other side effects that you think are caused by this medicine, tell your doctor. Call your doctor for medical advice about side effects. You may report side effects to FDA at 4-369-FDA-4585  © 2017 [address] Information is for End User's use only and may not be sold, redistributed or otherwise used for commercial purposes. The above information is an  only. It is not intended as medical advice for individual conditions or treatments. Talk to your doctor, nurse or pharmacist before following any medical regimen to see if it is safe and effective for you.

## 2017-05-01 NOTE — PROGRESS NOTES
Mr. Tamica Uriostegui has a reminder for a \"due or due soon\" health maintenance. I have asked that he contact his primary care provider for follow-up on this health maintenance.

## 2017-05-01 NOTE — PATIENT INSTRUCTIONS
Learning About Diet for Kidney Stone Prevention  What are kidney stones? Kidney stones are made of salts and minerals in the urine that form small \"maddie. \" Stones can form in the kidneys and the ureters (the tubes that lead from the kidneys to the bladder). They can also form in the bladder. Stones may not cause a problem as long as they stay in the kidneys. But they can cause sudden, severe pain. Pain is most likely when the stones travel from the kidneys to the bladder. Kidney stones can cause bloody urine. Kidney stones often run in families. You are more likely to get them if you don't drink enough fluids, mainly water. Certain foods and drinks and some dietary supplements may also increase your risk for kidney stones if you consume too much of them. What can you do to prevent kidney stones? Changing what you eat may not prevent all types of kidney stones. But for people who have a history of certain kinds of kidney stones, some changes in diet may help. A dietitian can help you set up a meal plan that includes healthy, low-oxalate choices. Here are some general guidelines to get you started. Plan your meals and snacks around foods that are low in oxalate. These foods include:  · Corn, kale, parsnips, and squash,. · Beef, chicken, pork, turkey, and fish. · Milk, butter, cheese, and yogurt. You can eat certain foods that are medium-high in oxalate, but eat them only once in a while. These foods include:  · Bread. · Brown rice. · English muffins. · Figs. · Popcorn. · String beans. · Tomatoes. Limit very high-oxalate foods, including:  · Black tea. · Coffee. · Chocolate. · Dark green vegetables. · Nuts. Here are some other things you can do to help prevent kidney stones. · Drink plenty of fluids. If you have kidney, heart, or liver disease and have to limit fluids, talk with your doctor before you increase the amount of fluids you drink.   · Do not take more than the recommended daily dose of vitamins C and D.  · Limit the salt in your diet. · Eat a balanced diet that is not too high in protein. Follow-up care is a key part of your treatment and safety. Be sure to make and go to all appointments, and call your doctor if you are having problems. It's also a good idea to know your test results and keep a list of the medicines you take. Where can you learn more? Go to http://vasile-fito.info/. Enter C138 in the search box to learn more about \"Learning About Diet for Kidney Stone Prevention. \"  Current as of: July 26, 2016  Content Version: 11.2  © 0033-4110 [employer]. Care instructions adapted under license by [employer] (which disclaims liability or warranty for this information). If you have questions about a medical condition or this instruction, always ask your healthcare professional. [address] any warranty or liability for your use of this information.

## 2017-05-01 NOTE — MR AVS SNAPSHOT
Visit Information Date & Time Provider Department Dept. Phone Encounter #  
 5/1/2017 11:00 AM Asia Urias NP Internist of [address] [id number] Your Appointments 6/6/2017  8:00 AM  
Office Visit with Spencer Mallory MD  
Internist of [address] [place] [address]) Appt Note: 1 month f/u  
 [address] [facility], [address] [place] [address] [place]  
  
   
 [address], [address]  
  
    
 6/16/2017  8:20 AM  
Follow Up with Vinny Kemp MD  
[facility] ([facility]) Appt Note: 6 mth f/up [place] [id number] [phone] [address]  
  
    
 7/27/2017  7:30 AM  
Follow Up with Gabino Hein MD  
[facility] [address]) Appt Note: rt knee fu  
 [address], [address] [facility] [address]  
[phone]  
  
   
 [address], [address] [address] 5/1/2018  9:00 AM  
PROCEDURE with Puneet Multani MD  
[facility]) Appt Note: Needs order for KUB  
 [address] [place] [address] [facility] [address]  
[phone] [address] [place] [id number] Upcoming Health Maintenance Date Due MICROALBUMIN Q1 4/4/2017 INFLUENZA AGE 9 TO ADULT 8/1/2017 HEMOGLOBIN A1C Q6M 10/26/2017 EYE EXAM RETINAL OR DILATED Q1 11/30/2017 FOOT EXAM Q1 2/13/2018 LIPID PANEL Q1 4/26/2018 COLONOSCOPY 6/27/2026 DTaP/Tdap/Td series (2 - Td) 2/13/2027 Allergies as of 5/1/2017  Review Complete On: 5/1/2017 By: Asia Urias NP Severity Noted Reaction Type Reactions Penicillins Medium 08/17/2010    Hives Current Immunizations  Reviewed on 2/13/2017 Name Date Pneumococcal Polysaccharide (PPSV-23) 2/13/2017 Tdap 2/13/2017 Not reviewed this visit You Were Diagnosed With   
  
 Codes Comments Well controlled diabetes mellitus ([facility] [address]    -  Primary ICD-10-CM: E11.9 ICD-9-CM: 250.00 Essential hypertension     ICD-10-CM: I10 
ICD-9-CM: 401.9 Dyslipidemia     ICD-10-CM: E78.5 ICD-9-CM: 272.4 Depression, unspecified depression type     ICD-10-CM: F32.9 ICD-9-CM: 952 Persistent atrial fibrillation (HCC)     ICD-10-CM: I48.1 ICD-9-CM: 427.31 Vitals BP Pulse Temp Height(growth percentile) Weight(growth percentile) SpO2  
 125/82 70 98.3 °F (36.8 °C) (Oral) 5' 11\" (1.803 m) 272 lb (123.4 kg) 97% BMI Smoking Status 37.94 kg/m2 Passive Smoke Exposure - Never Smoker Vitals History BMI and BSA Data Body Mass Index Body Surface Area  
 37.94 kg/m 2 2.49 m 2 Preferred Pharmacy Pharmacy Name Phone [facility] [phone] [place] [address] [phone] Your Updated Medication List  
  
   
This list is accurate as of: 5/1/17 11:41 AM.  Always use your most recent med list.  
  
  
  
  
 albuterol 90 mcg/actuation inhaler Commonly known as:  PROVENTIL HFA, VENTOLIN HFA, PROAIR HFA Take 2 Puffs by inhalation every six (6) hours as needed for Wheezing. amLODIPine 10 mg tablet Commonly known as:  [place] Take 1 Tab by mouth daily. bipap machine kit  
by Does Not Apply route. BiPAP at 23/18 cm with heated humidifier. Mask: Simplus FF, medium size or mask of choice. Length of need 99 months. Replace mask and accessories as needed times 12 months. Please download data after 30 days and fax at [phone]. Dx: Severe FITO, Obesity, snoring. buPROPion  mg SR tablet Commonly known as:  Tiffany Rear Take 1 Tab by mouth daily. cholecalciferol 1,000 unit Cap Commonly known as:  VITAMIN D3 Take 2 Caps by mouth daily. Fenofibrate 150 mg Cap TAKE 1 CAPSULE BY MOUTH DAILY  
  
 gabapentin 300 mg capsule Commonly known as:  NEURONTIN Take 1 Cap by mouth two (2) times a day. hydroCHLOROthiazide 12.5 mg tablet Commonly known as:  HYDRODIURIL Take 1 Tab by mouth daily. HYDROcodone-acetaminophen 5-325 mg per tablet Commonly known as:  NORCO  
1 PO Q12hrs PRN pain  
  
 hyoscyamine SL 0.125 mg SL tablet Commonly known as:  LEVSIN/SL  
0.125 mg by SubLINGual route every four (4) hours as needed. insulin aspart 100 unit/mL Inpn Commonly known as:  NOVOLOG  
by SubCUTAneous route. Sliding scale:  101-150 4 units 151-200 6 units 201-250  8 units 251-300 10 units Call if blood sugar is greater than 300 LEVEMIR FLEXTOUCH 100 unit/mL (3 mL) Inpn Generic drug:  insulin detemir INJECT 20 UNITS UNDER THE SKIN EVERY MORNING  
  
 meloxicam 15 mg tablet Commonly known as:  MOBIC Take 15 mg by mouth daily. metoprolol succinate 50 mg XL tablet Commonly known as:  TOPROL-XL  
TAKE 1 TABLET BY MOUTH EVERY DAY Vianney Pen Needle 32 gauge x 5/32\" Ndle Generic drug:  Insulin Needles (Disposable) USS THREE TIMES DAILY PLUS SLIDING SCALE Omega-3-DHA-EPA-Fish Oil 1,000 mg (120 mg-180 mg) Cap Take  by mouth two (2) times a day. ONE-A-DAY MEN'S 50+ ADVANTAGE PO Take  by mouth daily. rivaroxaban 20 mg Tab tablet Commonly known as:  [place] Take 1 Tab by mouth daily (with breakfast). rosuvastatin 40 mg tablet Commonly known as:  CRESTOR Take 1 Tab by mouth nightly. NON FORMULARY SITagliptin-metFORMIN 50-1,000 mg per tablet Commonly known as:  Bijal Louder Take 1 Tab by mouth two (2) times daily (with meals). SYMBICORT 160-4.5 mcg/actuation HFA inhaler Generic drug:  budesonide-formoterol TAKE 2 PUFFS BY MOUTH TWICE DAILY  
  
 traMADol 50 mg tablet Commonly known as:  ULTRAM  
Take 50 mg by mouth every six (6) hours as needed for Pain.  
  
 valsartan 320 mg tablet Commonly known as:  DIOVAN Take 1 Tab by mouth daily. VITAMIN B-12 1,000 mcg tablet Generic drug:  cyanocobalamin Take 1,000 mcg by mouth daily. Prescriptions Sent to Pharmacy Refills buPROPion SR (WELLBUTRIN SR) 150 mg SR tablet 3 Sig: Take 1 Tab by mouth daily. Class: Normal  
 Pharmacy: [facility]  [address] Ph #: [phone] Route: Oral  
  
To-Do List   
 05/11/2017 8:30 AM  
  Appointment with Dmitri Pardo RD at [address] [facility] Patient Instructions Learning About Depression Screening What is depression screening? Depression screening is a way to see if you have depression symptoms. It may be done by a doctor or counselor. This screening is often part of a routine checkup. That's because your mental health is just as important as your physical health. Depression is a medical illness that affects how you feel, think, and act. You may: 
· Have less energy. · Lose interest in your daily activities. · Feel sad and grouchy for a long time. Depression is very common. It affects men and women of all ages. Many things can trigger depression. Some people become depressed after they have a stroke or find out they have a major illness like cancer or heart disease. The death of a loved one, a breakup, or changes in the natural brain chemicals may lead to depression. It can run in families. Most experts believe that a combination of family history (a person's genes) and stressful life events can cause depression. What happens during screening? Your doctor may ask about your feelings, any changes in eating habits, your energy level, and your interest in your daily tasks. He or she may ask other questions, such as how well you are sleeping and if you can focus on the things you do. This may be an informal talk between the two of you. Or your doctor may ask you to fill out a quick form and then talk about your answers. Some diseases or changes in your body can cause symptoms that look like depression. So your doctor may do blood tests to help rule out other problems, such as hormone changes, a low thyroid level, or anemia. What happens after screening? If you have signs of depression, your doctor will talk to you about your options. Doctors usually treat depression with medicines or counseling. Often, combining the two works best. Many people don't get help because they think that they'll get over the depression on their own. But people with depression may not get better unless they get treatment. Many people feel embarrassed or ashamed about having depression. But it isn't a sign of personal weakness. It's not a character flaw. A person who is depressed is not \"crazy. \" Depression is caused by changes in the brain. A serious symptom of depression is thinking about death or suicide. If you or someone you care about talks about this or about feeling hopeless, get help right away. It's important to know that depression can be treated. The first step toward feeling better is often just seeing that the problem exists. Where can you learn more? Go to http://vasile-fito.info/. Enter T185 in the search box to learn more about \"Learning About Depression Screening. \" Current as of: October 28, 2016 Content Version: 11.2 © 4425-1413 Healthwise, Incorporated. Care instructions adapted under license by [employer] (which disclaims liability or warranty for this information). If you have questions about a medical condition or this instruction, always ask your healthcare professional. [address] any warranty or liability for your use of this information. Recovering From Depression: Care Instructions Your Care Instructions Taking good care of yourself is important as you recover from depression. In time, your symptoms will fade as your treatment takes hold. Do not give up. Instead, focus your energy on getting better. Your mood will improve. It just takes some time. Focus on things that can help you feel better, such as being with friends and family, eating well, and getting enough rest. But take things slowly. Do not do too much too soon. You will begin to feel better gradually. Follow-up care is a key part of your treatment and safety. Be sure to make and go to all appointments, and call your doctor if you are having problems. It's also a good idea to know your test results and keep a list of the medicines you take. How can you care for yourself at home? Be realistic · If you have a large task to do, break it up into smaller steps you can handle, and just do what you can. · You may want to put off important decisions until your depression has lifted. If you have plans that will have a major impact on your life, such as marriage, divorce, or a job change, try to wait a bit. Talk it over with friends and loved ones who can help you look at the overall picture first. 
· Reaching out to people for help is important. Do not isolate yourself. Let your family and friends help you. Find someone you can trust and confide in, and talk to that person. · Be patient, and be kind to yourself. Remember that depression is not your fault and is not something you can overcome with willpower alone. Treatment is necessary for depression, just like for any other illness. Feeling better takes time, and your mood will improve little by little. Stay active · Stay busy and get outside. Take a walk, or try some other light exercise. · Talk with your doctor about an exercise program. Exercise can help with mild depression. · Go to a movie or concert. Take part in a Mormonism activity or other social gathering. Go to a [employer] game. · Ask a friend to have dinner with you. Take care of yourself · Eat a balanced diet with plenty of fresh fruits and vegetables, whole grains, and lean protein. If you have lost your appetite, eat small snacks rather than large meals. · Avoid drinking alcohol or using illegal drugs. Do not take medicines that have not been prescribed for you. They may interfere with medicines you may be taking for depression, or they may make your depression worse. · Take your medicines exactly as they are prescribed. You may start to feel better within 1 to 3 weeks of taking antidepressant medicine. But it can take as many as 6 to 8 weeks to see more improvement. If you have questions or concerns about your medicines, or if you do not notice any improvement by 3 weeks, talk to your doctor. · If you have any side effects from your medicine, tell your doctor. Antidepressants can make you feel tired, dizzy, or nervous. Some people have dry mouth, constipation, headaches, sexual problems, or diarrhea. Many of these side effects are mild and will go away on their own after you have been taking the medicine for a few weeks. Some may last longer. Talk to your doctor if side effects are bothering you too much. You might be able to try a different medicine. · Get enough sleep. If you have problems sleeping: ¨ Go to bed at the same time every night, and get up at the same time every morning. ¨ Keep your bedroom dark and quiet. ¨ Do not exercise after 5:00 p.m. ¨ Avoid drinks with caffeine after 5:00 p.m. · Avoid sleeping pills unless they are prescribed by the doctor treating your depression. Sleeping pills may make you groggy during the day, and they may interact with other medicine you are taking. · If you have any other illnesses, such as diabetes, heart disease, or high blood pressure, make sure to continue with your treatment. Tell your doctor about all of the medicines you take, including those with or without a prescription. · Keep the numbers for these national suicide hotlines: 8-861-925-TALK (2-517.249.9124) and 6-162-ALHNOWW (9-570.597.6383). If you or someone you know talks about suicide or feeling hopeless, get help right away. When should you call for help? Call 911 anytime you think you may need emergency care. For example, call if: 
· You feel like hurting yourself or someone else. · Someone you know has depression and is about to attempt or is attempting suicide. Call your doctor now or seek immediate medical care if: 
· You hear voices. · Someone you know has depression and: 
¨ Starts to give away his or her possessions. ¨ Uses illegal drugs or drinks alcohol heavily. ¨ Talks or writes about death, including writing suicide notes or talking about guns, knives, or pills. ¨ Starts to spend a lot of time alone. ¨ Acts very aggressively or suddenly appears calm. Watch closely for changes in your health, and be sure to contact your doctor if: 
· You do not get better as expected. Where can you learn more? Go to http://vasile-fito.info/. Enter V328 in the search box to learn more about \"Recovering From Depression: Care Instructions. \" Current as of: July 26, 2016 Content Version: 11.2 © 2055-5526 [employer] Assistant. Care instructions adapted under license by [employer] Inc[employer] (which disclaims liability or warranty for this information). If you have questions about a medical condition or this instruction, always ask your healthcare professional. [address] any warranty or liability for your use of this information. Bupropion (By mouth) Bupropion ([id number]-PIQF-uap-on) Treats depression and aids in quitting smoking. Also prevents depression caused by seasonal affective disorder (SAD). Brand Name(s): Aplenzin, Forfivo XL, Wellbutrin, Wellbutrin SR, Wellbutrin XL, Zyban There may be other brand names for this medicine. When This Medicine Should Not Be Used: This medicine is not right for everyone. Do not use it if you had an allergic reaction to bupropion, or if you have seizures, anorexia, or bulimia. How to Use This Medicine:  
Tablet, Long Acting Tablet · Take your medicine as directed. Your dose may need to be changed several times to find what works best for you. · You may need to take Wellbutrin® for up to 4 weeks before you feel better. You may need to take Zyban® for 1 to 2 weeks before the date that you plan to stop smoking. · Swallow the tablet whole. Do not break, crush, divide, or chew it. · It is best to take Aplenzin® in the morning. · Do not take Wellbutrin® or Zyban® close to bedtime if you have trouble sleeping. · You may take this medicine with or without food. If you have nausea, it may help to take it with food. · If you take the extended-release tablet, part of the tablet may pass into your stools. This is normal and is nothing to worry about. · This medicine should come with a Medication Guide. Ask your pharmacist for a copy if you do not have one. · Missed dose: Skip the missed dose and go back to your regular dosing schedule. Never take extra medicine to make up for a missed dose. · Store the medicine in a closed container at room temperature, away from heat, moisture, and direct light. Drugs and Foods to Avoid: Ask your doctor or pharmacist before using any other medicine, including over-the-counter medicines, vitamins, and herbal products. · Do not use this medicine and an MAO inhibitor (MAOI), such as linezolid or methylene blue injection, within 14 days of each other. Do not use Zyban® to quit smoking if you already take Aplenzin® or Wellbutrin® for depression, because they are the same medicine. · Some medicines can affect how bupropion works. Tell your doctor if you are using the following: ¨ Amantadine, cimetidine, clopidogrel, cyclophosphamide, levodopa, nicotine patch, orphenadrine, tamoxifen, theophylline, thiotepa, ticlopidine ¨ Beta blocker (such as metoprolol), insulin or diabetes medicine that you take by mouth, medicine for heart rhythm problems (propafenone, flecainide), medicine to treat HIV or AIDS (efavirenz, lopinavir, nelfinavir, ritonavir), medicine for seizures (carbamazepine, phenobarbital, phenytoin), other medicine for depression (desipramine, fluoxetine, imipramine, nortriptyline, paroxetine, sertraline), medicine to treat mental illness (haloperidol, risperidone, thioridazine), steroid medicine (hydrocortisone, methylprednisolone, prednisone, prednisolone, dexamethasone), or a blood thinner · Limit alcohol, or do not drink alcohol at all while you are using this medicine. Warnings While Using This Medicine: · Tell your doctor if you are pregnant or breastfeeding, or if you have kidney disease, liver disease, diabetes, glaucoma, heart disease, or high blood pressure. · Tell your doctor if you take barbiturates, benzodiazepines, antiseizure medicine, or sedatives, or if you recently stopped taking them. Tell your doctor if you have a history of drug addiction, or if you drink alcohol. · For some children, teenagers, and young adults, this medicine may increase mental or emotional problems. This may lead to thoughts of suicide and violence. Talk with your doctor right away if you have any thoughts or behavior changes that concern you. Tell your doctor if you or anyone in your family has a history of bipolar disorder or suicide attempts. · This medicine may cause the following problems: ¨ An increased risk of seizures ¨ Changes in mood or behavior ¨ High blood pressure ¨ Serious skin reactions · This medicine may make you dizzy or drowsy. Do not drive or do anything that could be dangerous until you know how this medicine affects you. · Zyban® is only part of a complete program to help you quit smoking. You may still want to smoke at times. Have a plan to cope with these situations. · Do not stop using this medicine suddenly. Your doctor will need to slowly decrease your dose before you stop it completely. · Tell any doctor or dentist who treats you that you are using this medicine. This medicine may affect certain medical test results. · Your doctor will check your progress and the effects of this medicine at regular visits. Keep all appointments. · Keep all medicine out of the reach of children. Never share your medicine with anyone. Possible Side Effects While Using This Medicine:  
Call your doctor right away if you notice any of these side effects: · Allergic reaction: Itching or hives, swelling in your face or hands, swelling or tingling in your mouth or throat, chest tightness, trouble breathing · Blistering, peeling, or red skin rash · Chest pain, trouble breathing, fast, slow, or uneven heartbeat · Muscle or joint pain, fever with rash · Seeing or hearing things that are not there, feeling like people are against you · Seizures or tremors · Sudden increase in energy, racing thoughts, trouble sleeping · Thoughts of hurting yourself, worsening depression, severe agitation or confusion If you notice these less serious side effects, talk with your doctor: · Dry mouth · Eye pain, vision changes, seeing halos around lights · Headache or dizziness · Nausea, vomiting, constipation, diarrhea, gas, stomach pain · Weight gain or loss If you notice other side effects that you think are caused by this medicine, tell your doctor. Call your doctor for medical advice about side effects. You may report side effects to FDA at 6-050-FGX-9100 © 2017 [address] Information is for End User's use only and may not be sold, redistributed or otherwise used for commercial purposes. The above information is an  only. It is not intended as medical advice for individual conditions or treatments. Talk to your doctor, nurse or pharmacist before following any medical regimen to see if it is safe and effective for you. Introducing hospitals & HEALTH SERVICES! [employer] introduces [employer] patient portal. Now you can access parts of your medical record, email your doctor's office, and request medication refills online. 1. In your internet browser, go to [email or web address] Health: [email or web address] 2. Click on the First Time User? Click Here link in the Sign In box. You will see the New Member Sign Up page. 3. Enter your [employer] Access Code exactly as it appears below. You will not need to use this code after youve completed the sign-up process. If you do not sign up before the expiration date, you must request a new code. · [employer] Access Code: [email or web address] Expires: 5/14/2017  9:31 AM 
 
4. Enter the last four digits of your Social Security Number (xxxx) and Date of Birth (mm/dd/yyyy) as indicated and click Submit. You will be taken to the next sign-up page. 5. Create a [employer] ID. This will be your [employer] login ID and cannot be changed, so think of one that is secure and easy to remember. 6. Create a American Board of Addiction Medicine (ABAM) password. You can change your password at any time. 7. Enter your Password Reset Question and Answer. This can be used at a later time if you forget your password. 8. Enter your e-mail address. You will receive e-mail notification when new information is available in [address] 9. Click Sign Up. You can now view and download portions of your medical record. 10. Click the Download Summary menu link to download a portable copy of your medical information. If you have questions, please visit the Frequently Asked Questions section of the American Board of Addiction Medicine (ABAM) website. Remember, American Board of Addiction Medicine (ABAM) is NOT to be used for urgent needs. For medical emergencies, dial 911. Now available from your iPhone and Android! Please provide this summary of care documentation to your next provider. Your primary care clinician is listed as Asia Urias. If you have any questions after today's visit, please call [phone].

## 2017-05-02 LAB — PSA SERPL-MCNC: 0.97 NG/ML
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
## 2017-05-11 ENCOUNTER — HOSPITAL ENCOUNTER (OUTPATIENT)
Dept: NUTRITION | Age: 57
Discharge: HOME | End: 2017-05-11
Payer: COMMERCIAL

## 2017-05-11 PROCEDURE — 97803 MED NUTRITION INDIV SUBSEQ: CPT

## 2017-05-11 NOTE — PROGRESS NOTES
NUTRITION - DAILY TREATMENT NOTE  Patient Name: Arpit Cunningham. Date: 2017  : 1960    YES Patient  Verified  Insurance: Payor: Dana Cabral / Plan: [facility] PPO / Product Type: PPO /    Diagnosis: In time:   830             Out time:  0900   Total Treatment Time (min):   30     SUBJECTIVE    Medication Changes/New allergies or changes in medical history, any new surgeries or procedures? NO    If yes, update Summary List   Subjective Functional Status/Changes:  []  No changes reported   Pt has been doing well. His A1C has dropped to 7.7 from 10! He has been working on not skipping meals and eats 3 meals a day the majority of the time. He dies admit he needs to work on portion sizes and increasing his exercise. Pt Cholesterol has reduced but his HDL is also reduced, Trigs are still elevated: 254 (improved from one year ago. Discussed healthy fats and good foods choices for snacks. Discussed cuts of meat and balancing his plate. Pt expressed comprehension, high motivation, and compliance is expected. Current Wt: 271.2 Previous Wt: 269.8 Wt Change: +1.4     OBJECTIVE    Patient Education:  [x]  Review current plan with patient   []  Other:    Handouts/  Information Provided: []  Carbohydrates  []  Protein  []  Fiber  []  Serving Sizes  []  Fluids  []  General guidelines []  Diabetes  []  Cholesterol  []  Sodium  []  SBGM  []  Food Journals  []  Others:    Estimated Needs: 1800 180 135 60    Calories Protein CHO Fat     ASSESSMENT    [x]  Pt Progressing Well  []  Slow Progress []  No Progress    Other:    [x]  Understand Dietary Changes/Recs []  No Change [x]  Improving []  N/A   [x]  Weight [x]  No Change []  Improving []  N/A   x  Glucose Levels []  No Change [x]  Improving []  N/A   [x]  Cholesterol Levels []  No Change [x]  Improving []  N/A     RECOMMENDATIONS    1. Work on reducing you portion size   2. Absolutely No skipping meals; eat at least 3 meals /day   3.  Start eating healthy fats and increase your exercsie   4.    5.       PLAN    [x]  Continue on current plan []  Follow-up PRN   []  Discharge due to :    [x]  Next Appt[de-identified] 6-27-17 @ 0900     Dietitian: Mary Lou Rowan MA, RD    Date: 5/11/2017 Time: 8:27 AM

## 2017-06-09 ENCOUNTER — OFFICE VISIT (OUTPATIENT)
Dept: INTERNAL MEDICINE CLINIC | Age: 57
End: 2017-06-09

## 2017-06-09 VITALS
BODY MASS INDEX: 37.94 KG/M2 | WEIGHT: 271 LBS | TEMPERATURE: 98.2 F | DIASTOLIC BLOOD PRESSURE: 72 MMHG | SYSTOLIC BLOOD PRESSURE: 136 MMHG | HEIGHT: 71 IN | HEART RATE: 76 BPM | OXYGEN SATURATION: 96 % | RESPIRATION RATE: 22 BRPM

## 2017-06-09 DIAGNOSIS — I10 ESSENTIAL HYPERTENSION: ICD-10-CM

## 2017-06-09 DIAGNOSIS — R80.9 MICROALBUMINURIA: ICD-10-CM

## 2017-06-09 DIAGNOSIS — E11.9 WELL CONTROLLED DIABETES MELLITUS (HCC): Primary | ICD-10-CM

## 2017-06-09 PROBLEM — N28.1 RENAL CYST: Status: RESOLVED | Noted: 2017-06-09 | Resolved: 2017-06-09

## 2017-06-09 PROBLEM — J44.9 COPD (CHRONIC OBSTRUCTIVE PULMONARY DISEASE) (HCC): Status: ACTIVE | Noted: 2017-06-09

## 2017-06-09 PROBLEM — K75.81 NASH (NONALCOHOLIC STEATOHEPATITIS): Status: ACTIVE | Noted: 2017-06-09

## 2017-06-09 PROBLEM — N28.1 RENAL CYST: Status: ACTIVE | Noted: 2017-06-09

## 2017-06-09 PROBLEM — N40.0 BPH (BENIGN PROSTATIC HYPERPLASIA): Status: ACTIVE | Noted: 2017-06-09

## 2017-06-09 PROBLEM — N20.0 NEPHROLITHIASIS: Status: ACTIVE | Noted: 2017-06-09

## 2017-06-09 PROBLEM — M75.81 RIGHT ROTATOR CUFF TENDINITIS: Status: ACTIVE | Noted: 2017-06-09

## 2017-06-09 PROBLEM — K57.90 DIVERTICULOSIS: Status: ACTIVE | Noted: 2017-06-09

## 2017-06-09 PROBLEM — D12.6 TUBULAR ADENOMA OF COLON: Status: ACTIVE | Noted: 2017-06-09

## 2017-06-09 NOTE — PROGRESS NOTES
1. Have you been to the ER, urgent care clinic since your last visit? Hospitalized since your last visit? No    2. Have you seen or consulted any other health care providers outside of the [address] [place] [address] since your last visit? Include any pap smears or colon screening.  No

## 2017-06-09 NOTE — PATIENT INSTRUCTIONS
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
## 2017-06-09 NOTE — MR AVS SNAPSHOT
Visit Information Date & Time Provider Department Dept. Phone Encounter #  
 6/9/2017  8:00 AM Raisa Tinoco MD Internist of [facility] [address] [id number] Follow-up Instructions Return in about 4 weeks (around 7/7/2017) for BP check, DM check. Your Appointments 7/17/2017  9:30 AM  
Office Visit with Raisa Tinoco MD  
Internist of [facility] [address] [place] [address]) Appt Note: 4 week follow up  
 [address] [facility], [address] [facility] [address] [place]  
  
   
 [address] [facility], [address]  
  
    
 7/27/2017  7:30 AM  
Follow Up with Calvin Fuentes MD  
[facility] - [address]) Appt Note: rt knee fu  
 [address] [facility], [address]  
[phone]  
  
   
 [address] [facility], [address] [address]  
  
    
 8/9/2017  9:00 AM  
Follow Up with Laurel Hewitt MD  
[facility] ([address]) Appt Note: 6 mth f/up; 5/5/17 - lmom to r/s june appt provider out of office plk- plk; 6 mth f/up [facility] [place] [id number]  
[phone] [facility] [address] [phone]  
  
    
 5/1/2018  9:00 AM  
PROCEDURE with Artemio Garrett MD  
[facility] [address]) Appt Note: Needs order for KUB  
 [address] [facility] [address]  
[phone] [address] [facility] [address] Upcoming Health Maintenance Date Due MICROALBUMIN Q1 4/4/2017 INFLUENZA AGE 9 TO ADULT 8/1/2017 HEMOGLOBIN A1C Q6M 10/26/2017 EYE EXAM RETINAL OR DILATED Q1 11/30/2017 FOOT EXAM Q1 2/13/2018 LIPID PANEL Q1 4/26/2018 COLONOSCOPY 6/27/2026 DTaP/Tdap/Td series (2 - Td) 2/13/2027 Allergies as of 6/9/2017  Review Complete On: 6/9/2017 By: Anthony Boston LPN  
  
 Severity Noted Reaction Type Reactions Penicillins Medium 08/17/2010    Hives Current Immunizations  Reviewed on 2/13/2017 Name Date Pneumococcal Polysaccharide (PPSV-23) 2/13/2017 Tdap 2/13/2017 Not reviewed this visit You Were Diagnosed With   
  
 Codes Comments Well controlled diabetes mellitus ([facility] [address]    -  Primary ICD-10-CM: E11.9 ICD-9-CM: 250.00 Microalbuminuria     ICD-10-CM: R80.9 ICD-9-CM: 791.0 Essential hypertension     ICD-10-CM: I10 
ICD-9-CM: 401.9 Vitals BP Pulse Temp Resp Height(growth percentile) Weight(growth percentile) 136/72 76 98.2 °F (36.8 °C) 22 5' 11\" (1.803 m) 271 lb (122.9 kg) SpO2 BMI Smoking Status 96% 37.8 kg/m2 Passive Smoke Exposure - Never Smoker Vitals History BMI and BSA Data Body Mass Index Body Surface Area  
 37.8 kg/m 2 2.48 m 2 Preferred Pharmacy Pharmacy Name Phone [facility] [address] [place] [address] [phone] Your Updated Medication List  
  
   
This list is accurate as of: 6/9/17  8:54 AM.  Always use your most recent med list.  
  
  
  
  
 albuterol 90 mcg/actuation inhaler Commonly known as:  PROVENTIL HFA, VENTOLIN HFA, PROAIR HFA Take 2 Puffs by inhalation every six (6) hours as needed for Wheezing. amLODIPine 10 mg tablet Commonly known as:  Madisontta Hikes Take 1 Tab by mouth daily. bipap machine kit  
by Does Not Apply route. BiPAP at 23/18 cm with heated humidifier. Mask: Simplus FF, medium size or mask of choice. Length of need 99 months. Replace mask and accessories as needed times 12 months. Please download data after 30 days and fax at [phone]. Dx: Severe FITO, Obesity, snoring. buPROPion  mg SR tablet Commonly known as:  Cote Buddy Take 1 Tab by mouth daily. cholecalciferol 1,000 unit Cap Commonly known as:  VITAMIN D3  
 Take 2 Caps by mouth daily. Fenofibrate 150 mg Cap TAKE 1 CAPSULE BY MOUTH DAILY  
  
 gabapentin 300 mg capsule Commonly known as:  NEURONTIN Take 1 Cap by mouth two (2) times a day. hydroCHLOROthiazide 12.5 mg tablet Commonly known as:  HYDRODIURIL Take 1 Tab by mouth daily. HYDROcodone-acetaminophen 5-325 mg per tablet Commonly known as:  NORCO  
1 PO Q12hrs PRN pain  
  
 hyoscyamine SL 0.125 mg SL tablet Commonly known as:  LEVSIN/SL  
0.125 mg by SubLINGual route every four (4) hours as needed. insulin aspart 100 unit/mL Inpn Commonly known as:  NOVOLOG  
by SubCUTAneous route. Sliding scale:  101-150 4 units 151-200 6 units 201-250  8 units 251-300 10 units Call if blood sugar is greater than 300 LEVEMIR FLEXTOUCH 100 unit/mL (3 mL) Inpn Generic drug:  insulin detemir INJECT 20 UNITS UNDER THE SKIN EVERY MORNING  
  
 metoprolol succinate 50 mg XL tablet Commonly known as:  TOPROL-XL  
TAKE 1 TABLET BY MOUTH EVERY DAY Vianney Pen Needle 32 gauge x 5/32\" Ndle Generic drug:  Insulin Needles (Disposable) USS THREE TIMES DAILY PLUS SLIDING SCALE Omega-3-DHA-EPA-Fish Oil 1,000 mg (120 mg-180 mg) Cap Take  by mouth two (2) times a day. ONE-A-DAY MEN'S 50+ ADVANTAGE PO Take  by mouth daily. rivaroxaban 20 mg Tab tablet Commonly known as:  Eric Stephanie Take 1 Tab by mouth daily (with breakfast). rosuvastatin 40 mg tablet Commonly known as:  CRESTOR Take 1 Tab by mouth nightly. NON FORMULARY SITagliptin-metFORMIN 50-1,000 mg per tablet Commonly known as:  Latrelle Tiffany Take 1 Tab by mouth two (2) times daily (with meals). SYMBICORT 160-4.5 mcg/actuation HFA inhaler Generic drug:  budesonide-formoterol TAKE 2 PUFFS BY MOUTH TWICE DAILY  
  
 traMADol 50 mg tablet Commonly known as:  ULTRAM  
Take 50 mg by mouth every six (6) hours as needed for Pain.  
  
 valsartan 320 mg tablet Commonly known as:  DIOVAN  
 Take 1 Tab by mouth daily. VITAMIN B-12 1,000 mcg tablet Generic drug:  cyanocobalamin Take 1,000 mcg by mouth daily. Follow-up Instructions Return in about 4 weeks (around 7/7/2017) for BP check, DM check. To-Do List   
 06/27/2017 9:00 AM  
  Appointment with Ely Vazquez RD at [facility] OP NUTRITION  
  
 08/08/2017 Lab:  MICROALBUMIN, UR, RAND W/ MICROALBUMIN/CREA RATIO Patient Instructions Learning About Diabetes Food Guidelines Your Care Instructions Meal planning is important to manage diabetes. It helps keep your blood sugar at a target level (which you set with your doctor). You don't have to eat special foods. You can eat what your family eats, including sweets once in a while. But you do have to pay attention to how often you eat and how much you eat of certain foods. You may want to work with a dietitian or a certified diabetes educator (CDE) to help you plan meals and snacks. A dietitian or CDE can also help you lose weight if that is one of your goals. What should you know about eating carbs? Managing the amount of carbohydrate (carbs) you eat is an important part of healthy meals when you have diabetes. Carbohydrate is found in many foods. · Learn which foods have carbs. And learn the amounts of carbs in different foods. ¨ Bread, cereal, pasta, and rice have about 15 grams of carbs in a serving. A serving is 1 slice of bread (1 ounce), ½ cup of cooked cereal, or 1/3 cup of cooked pasta or rice. ¨ Fruits have 15 grams of carbs in a serving. A serving is 1 small fresh fruit, such as an apple or orange; ½ of a banana; ½ cup of cooked or canned fruit; ½ cup of fruit juice; 1 cup of melon or raspberries; or 2 tablespoons of dried fruit. ¨ Milk and no-sugar-added yogurt have 15 grams of carbs in a serving. A serving is 1 cup of milk or 2/3 cup of no-sugar-added yogurt. ¨ Starchy vegetables have 15 grams of carbs in a serving. A serving is ½ cup of mashed potatoes or sweet potato; 1 cup winter squash; ½ of a small baked potato; ½ cup of cooked beans; or ½ cup cooked corn or green peas. · Learn how much carbs to eat each day and at each meal. A dietitian or CDE can teach you how to keep track of the amount of carbs you eat. This is called carbohydrate counting. · If you are not sure how to count carbohydrate grams, use the Plate Method to plan meals. It is a good, quick way to make sure that you have a balanced meal. It also helps you spread carbs throughout the day. ¨ Divide your plate by types of foods. Put non-starchy vegetables on half the plate, meat or other protein food on one-quarter of the plate, and a grain or starchy vegetable in the final quarter of the plate. To this you can add a small piece of fruit and 1 cup of milk or yogurt, depending on how many carbs you are supposed to eat at a meal. 
· Try to eat about the same amount of carbs at each meal. Do not \"save up\" your daily allowance of carbs to eat at one meal. 
· Proteins have very little or no carbs per serving. Examples of proteins are beef, chicken, turkey, fish, eggs, tofu, cheese, cottage cheese, and peanut butter. A serving size of meat is 3 ounces, which is about the size of a deck of cards. Examples of meat substitute serving sizes (equal to 1 ounce of meat) are 1/4 cup of cottage cheese, 1 egg, 1 tablespoon of peanut butter, and ½ cup of tofu. How can you eat out and still eat healthy? · Learn to estimate the serving sizes of foods that have carbohydrate. If you measure food at home, it will be easier to estimate the amount in a serving of restaurant food. · If the meal you order has too much carbohydrate (such as potatoes, corn, or baked beans), ask to have a low-carbohydrate food instead. Ask for a salad or green vegetables. · If you use insulin, check your blood sugar before and after eating out to help you plan how much to eat in the future. · If you eat more carbohydrate at a meal than you had planned, take a walk or do other exercise. This will help lower your blood sugar. What else should you know? · Limit saturated fat, such as the fat from meat and dairy products. This is a healthy choice because people who have diabetes are at higher risk of heart disease. So choose lean cuts of meat and nonfat or low-fat dairy products. Use olive or canola oil instead of butter or shortening when cooking. · Don't skip meals. Your blood sugar may drop too low if you skip meals and take insulin or certain medicines for diabetes. · Check with your doctor before you drink alcohol. Alcohol can cause your blood sugar to drop too low. Alcohol can also cause a bad reaction if you take certain diabetes medicines. Follow-up care is a key part of your treatment and safety. Be sure to make and go to all appointments, and call your doctor if you are having problems. It's also a good idea to know your test results and keep a list of the medicines you take. Where can you learn more? Go to http://vasile-fito.info/. Enter P537 in the search box to learn more about \"Learning About Diabetes Food Guidelines. \" Current as of: May 23, 2016 Content Version: 11.2 © 5454-4096 [employer]. Care instructions adapted under license by [employer] (which disclaims liability or warranty for this information). If you have questions about a medical condition or this instruction, always ask your healthcare professional. [place] [address] any warranty or liability for your use of this information. Introducing [facility]! Maryam Calle introduces [employer] patient portal. Now you can access parts of your medical record, email your doctor's office, and request medication refills online. 1. In your internet browser, go to [email or web address] 2. Click on the First Time User? Click Here link in the Sign In box. You will see the New Member Sign Up page. 3. Enter your FamilyID Access Code exactly as it appears below. You will not need to use this code after youve completed the sign-up process. If you do not sign up before the expiration date, you must request a new code. · FamilyID Access Code: [email or web address] Expires: 9/7/2017  8:54 AM 
 
4. Enter the last four digits of your Social Security Number (xxxx) and Date of Birth (mm/dd/yyyy) as indicated and click Submit. You will be taken to the next sign-up page. 5. Create a [employer] On[employer]et ID. This will be your FamilyID login ID and cannot be changed, so think of one that is secure and easy to remember. 6. Create a FamilyID password. You can change your password at any time. 7. Enter your Password Reset Question and Answer. This can be used at a later time if you forget your password. 8. Enter your e-mail address. You will receive e-mail notification when new information is available in [address] 9. Click Sign Up. You can now view and download portions of your medical record. 10. Click the Download Summary menu link to download a portable copy of your medical information. If you have questions, please visit the Frequently Asked Questions section of the FamilyID website. Remember, FamilyID is NOT to be used for urgent needs. For medical emergencies, dial 911. Now available from your iPhone and Android! Please provide this summary of care documentation to your next provider. Your primary care clinician is listed as Melita Luna. If you have any questions after today's visit, please call [phone].

## 2017-06-10 LAB
CREATININE, URINE: 73 MG/DL
MICROALB/CREAT RATIO, 140286: 21.5 MCG/MG OF CREATININE (ref 0–30)
MICROALBUMIN,URINE RANDOM 140054: 15.7 UG/ML (ref 0.1–17)

## 2017-06-11 NOTE — PROGRESS NOTES
Please let  know that his urine protein screening shows no evidence of reversible kidney injury like it did 1 year ago. He has reversed his kidney injury and should continue working hard to improve his blood pressure and diabetes numbers/goals. No changes to his medications are necessary at this time.     Dr. Sherie Cano  [facility] [address] [facility]  Phone: [phone]  Fax: [phone]

## 2017-06-16 ENCOUNTER — TELEPHONE (OUTPATIENT)
Dept: INTERNAL MEDICINE CLINIC | Age: 57
End: 2017-06-16

## 2017-06-16 NOTE — TELEPHONE ENCOUNTER
----- Message from Hernan Drew MD sent at 6/11/2017  5:00 PM EDT -----  Please let  know that his urine protein screening shows no evidence of reversible kidney injury like it did 1 year ago. He has reversed his kidney injury and should continue working hard to improve his blood pressure and diabetes numbers/goals. No changes to his medications are necessary at this time.     Dr. Kathe Swain  [facility]ists of [address]  Phone: [phone]  Fax: [phone]

## 2017-07-11 RX ORDER — AMLODIPINE BESYLATE 10 MG/1
10 TABLET ORAL DAILY
Qty: 90 TAB | Refills: 3 | Status: SHIPPED | OUTPATIENT
Start: 2017-07-11 | End: 2018-07-21 | Stop reason: SDUPTHER

## 2017-07-11 RX ORDER — GABAPENTIN 300 MG/1
300 CAPSULE ORAL 2 TIMES DAILY
Qty: 180 CAP | Refills: 3 | Status: SHIPPED | OUTPATIENT
Start: 2017-07-11 | End: 2018-07-21 | Stop reason: SDUPTHER

## 2017-07-11 RX ORDER — VALSARTAN 320 MG/1
320 TABLET ORAL DAILY
Qty: 90 TAB | Refills: 3 | Status: SHIPPED | OUTPATIENT
Start: 2017-07-11 | End: 2018-08-06 | Stop reason: ALTCHOICE

## 2017-07-11 RX ORDER — FENOFIBRATE 150 MG/1
CAPSULE ORAL
Qty: 90 CAP | Refills: 3 | Status: SHIPPED | OUTPATIENT
Start: 2017-07-11 | End: 2018-07-21 | Stop reason: SDUPTHER

## 2017-07-17 ENCOUNTER — OFFICE VISIT (OUTPATIENT)
Dept: INTERNAL MEDICINE CLINIC | Age: 57
End: 2017-07-17

## 2017-07-17 VITALS
HEART RATE: 70 BPM | HEIGHT: 71 IN | OXYGEN SATURATION: 96 % | WEIGHT: 269.2 LBS | SYSTOLIC BLOOD PRESSURE: 129 MMHG | BODY MASS INDEX: 37.69 KG/M2 | DIASTOLIC BLOOD PRESSURE: 69 MMHG | TEMPERATURE: 96.9 F | RESPIRATION RATE: 18 BRPM

## 2017-07-17 DIAGNOSIS — E11.9 WELL CONTROLLED DIABETES MELLITUS (HCC): Primary | ICD-10-CM

## 2017-07-17 NOTE — PROGRESS NOTES
Chief Complaint   Patient presents with    Hypertension     follow up    Diabetes     follow up    Labs     done 6-9-17 to discuss     Patient was given a copy of the Advanced Directive form and understands to bring it in once completed. 1. Have you been to the ER, urgent care clinic since your last visit? Hospitalized since your last visit? No    2. Have you seen or consulted any other health care providers outside of the [address] [place] [address] since your last visit? Include any pap smears or colon screening.  No

## 2017-07-17 NOTE — MR AVS SNAPSHOT
Visit Information Date & Time Provider Department Dept. Phone Encounter #  
 7/17/2017  9:30 AM Chris Burden MD [facility] [place] [phone] Follow-up Instructions Return in about 14 weeks (around 10/23/2017) for BP check, DM check. Your Appointments 7/27/2017  7:30 AM  
Follow Up with Zan Diallo MD  
[facility] - [address]) Appt Note: rt knee fu  
 [address], [address] [facility] [address]  
[phone]  
  
   
 [address], [address] [address]  
  
    
 7/27/2017  8:00 AM  
LAB with [facility] NURSE VISIT Internist of [facility] ([facility]) Appt Note: lab only per [address] [facility] [address] [address]  
  
   
 [address], [address]  
  
    
 8/9/2017  9:00 AM  
Follow Up with Veronica Daugherty MD  
[facility] ([facility]) Appt Note: 6 mth f/up; 5/5/17 - lmom to r/s polly appt provider out of office plk- plk; 6 mth f/up [facility] [address] [address] [address] [phone] [facility]  
  
    
 10/24/2017  8:00 AM  
Office Visit with Chris Burden MD  
[facility] [place]) Appt Note: 3 months [address], [address] [place] [address] [address]  
  
   
 [address], [address]  
  
    
 5/1/2018  9:00 AM  
PROCEDURE with Gabino Desai MD  
[facility] [address] [place] [address]) Appt Note: Needs order for KUB  
 [address] [place] [address] [address]  
[phone] [address] [address] Upcoming Health Maintenance Date Due INFLUENZA AGE 9 TO ADULT 8/1/2017 HEMOGLOBIN A1C Q6M 10/26/2017 EYE EXAM RETINAL OR DILATED Q1 11/30/2017 FOOT EXAM Q1 2/13/2018 LIPID PANEL Q1 4/26/2018 MICROALBUMIN Q1 6/9/2018 COLONOSCOPY 6/27/2026 DTaP/Tdap/Td series (2 - Td) 2/13/2027 Allergies as of 7/17/2017  Review Complete On: 7/17/2017 By: Karen Borges Severity Noted Reaction Type Reactions Penicillins Medium 08/17/2010    Hives Current Immunizations  Reviewed on 2/13/2017 Name Date Pneumococcal Polysaccharide (PPSV-23) 2/13/2017 Tdap 2/13/2017 Not reviewed this visit You Were Diagnosed With   
  
 Codes Comments Well [facility]    -  Primary ICD-10-CM: E11.9 ICD-9-CM: 250.00 Vitals BP Pulse Temp Resp Height(growth percentile) Weight(growth percentile) 129/69 (BP 1 Location: Left arm, BP Patient Position: Sitting) 70 96.9 °F (36.1 °C) (Oral) 18 5' 11\" (1.803 m) 269 lb 3.2 oz (122.1 kg) SpO2 BMI Smoking Status 96% 37.55 kg/m2 Passive Smoke Exposure - Never Smoker BMI and BSA Data Body Mass Index Body Surface Area  
 37.55 kg/m 2 2.47 m 2 Preferred Pharmacy Pharmacy Name Phone [facility] [address] [address] [phone] Your Updated Medication List  
  
   
This list is accurate as of: 7/17/17 10:15 AM.  Always use your most recent med list.  
  
  
  
  
 albuterol 90 mcg/actuation inhaler Commonly known as:  PROVENTIL HFA, VENTOLIN HFA, PROAIR HFA Take 2 Puffs by inhalation every six (6) hours as needed for Wheezing. amLODIPine 10 mg tablet Commonly known as:  Tanisha Shield Take 1 Tab by mouth daily. bipap machine kit  
by Does Not Apply route. BiPAP at 23/18 cm with heated humidifier. Mask: Simplus FF, medium size or mask of choice. Length of need 99 months. Replace mask and accessories as needed times 12 months. Please download data after 30 days and fax at [phone]. Dx: Severe FITO, Obesity, snoring. buPROPion  mg SR tablet Commonly known as:  [place] Take 1 Tab by mouth daily. cholecalciferol 1,000 unit Cap Commonly known as:  VITAMIN D3 Take 2 Caps by mouth daily. Fenofibrate 150 mg Cap TAKE 1 CAPSULE BY MOUTH DAILY  
  
 gabapentin 300 mg capsule Commonly known as:  NEURONTIN Take 1 Cap by mouth two (2) times a day. hydroCHLOROthiazide 12.5 mg tablet Commonly known as:  HYDRODIURIL Take 1 Tab by mouth daily. hyoscyamine SL 0.125 mg SL tablet Commonly known as:  LEVSIN/SL  
0.125 mg by SubLINGual route every four (4) hours as needed. insulin aspart 100 unit/mL Inpn Commonly known as:  NOVOLOG  
by SubCUTAneous route. Sliding scale:  101-150 4 units 151-200 6 units 201-250  8 units 251-300 10 units Call if blood sugar is greater than 300 LEVEMIR FLEXTOUCH 100 unit/mL (3 mL) Inpn Generic drug:  insulin detemir INJECT 20 UNITS UNDER THE SKIN EVERY MORNING  
  
 metoprolol succinate 50 mg XL tablet Commonly known as:  TOPROL-XL  
TAKE 1 TABLET BY MOUTH EVERY DAY Vianney Pen Needle 32 gauge x 5/32\" Ndle Generic drug:  Insulin Needles (Disposable) USS THREE TIMES DAILY PLUS SLIDING SCALE Omega-3-DHA-EPA-Fish Oil 1,000 mg (120 mg-180 mg) Cap Take  by mouth two (2) times a day. ONE-A-DAY MEN'S 50+ ADVANTAGE PO Take  by mouth daily. rivaroxaban 20 mg Tab tablet Commonly known as:  Anand Preethi Take 1 Tab by mouth daily (with breakfast). rosuvastatin 40 mg tablet Commonly known as:  CRESTOR Take 1 Tab by mouth nightly. NON FORMULARY SITagliptin-metFORMIN 50-1,000 mg per tablet Commonly known as:  Luther Carrow Take 1 Tab by mouth two (2) times daily (with meals). SYMBICORT 160-4.5 mcg/actuation HFA inhaler Generic drug:  budesonide-formoterol TAKE 2 PUFFS BY MOUTH TWICE DAILY  
  
 traMADol 50 mg tablet Commonly known as:  Tesfaye Pali  
 Take 50 mg by mouth every six (6) hours as needed for Pain.  
  
 valsartan 320 mg tablet Commonly known as:  DIOVAN Take 1 Tab by mouth daily. VITAMIN B-12 1,000 mcg tablet Generic drug:  cyanocobalamin Take 1,000 mcg by mouth daily. Follow-up Instructions Return in about 14 weeks (around 10/23/2017) for BP check, DM check. To-Do List   
 Around 07/28/2017 Lab:  HEMOGLOBIN A1C W/O EAG Patient Instructions There are no preventive care reminders to display for this patient. Patient was given a copy of the Advanced Directive form and understands to bring it in once completed Learning About Meal Planning for Diabetes Why plan your meals? Meal planning can be a key part of managing diabetes. Planning meals and snacks with the right balance of carbohydrate, protein, and fat can help you keep your blood sugar at the target level you set with your doctor. You don't have to eat special foods. You can eat what your family eats, including sweets once in a while. But you do have to pay attention to how often you eat and how much you eat of certain foods. You may want to work with a dietitian or a certified diabetes educator. He or she can give you tips and meal ideas and can answer your questions about meal planning. This health professional can also help you reach a healthy weight if that is one of your goals. What plan is right for you? Your dietitian or diabetes educator may suggest that you start with the plate format or carbohydrate counting. The plate format The plate format is a simple way to help you manage how you eat. You plan meals by learning how much space each food should take on a plate. Using the plate format helps you spread carbohydrate throughout the day. It can make it easier to keep your blood sugar level within your target range. It also helps you see if you're eating healthy portion sizes. To use the plate format, you put non-starchy vegetables on half your plate. Add meat or meat substitutes on one-quarter of the plate. Put a grain or starchy vegetable (such as brown rice or a potato) on the final quarter of the plate. You can add a small piece of fruit and some low-fat or fat-free milk or yogurt, depending on your carbohydrate goal for each meal. 
Here are some tips for using the plate format: · Make sure that you are not using an oversized plate. A 9-inch plate is best. Many restaurants use larger plates. · Get used to using the plate format at home. Then you can use it when you eat out. · Write down your questions about using the plate format. Talk to your doctor, a dietitian, or a diabetes educator about your concerns. Carbohydrate counting With carbohydrate counting, you plan meals based on the amount of carbohydrate in each food. Carbohydrate raises blood sugar higher and more quickly than any other nutrient. It is found in desserts, breads and cereals, and fruit. It's also found in starchy vegetables such as potatoes and corn, grains such as rice and pasta, and milk and yogurt. Spreading carbohydrate throughout the day helps keep your blood sugar levels within your target range. Your daily amount depends on several things, including your weight, how active you are, which diabetes medicines you take, and what your goals are for your blood sugar levels. A registered dietitian or diabetes educator can help you plan how much carbohydrate to include in each meal and snack. A guideline for your daily amount of carbohydrate is: · 45 to 60 grams at each meal. That's about the same as 3 to 4 carbohydrate servings. · 15 to 20 grams at each snack. That's about the same as 1 carbohydrate serving. The Nutrition Facts label on packaged foods tells you how much carbohydrate is in a serving of the food. First, look at the serving size on the food label. Is that the amount you eat in a serving? All of the nutrition information on a food label is based on that serving size. So if you eat more or less than that, you'll need to adjust the other numbers. Total carbohydrate is the next thing you need to look for on the label. If you count carbohydrate servings, one serving of carbohydrate is 15 grams. For foods that don't come with labels, such as fresh fruits and vegetables, you'll need a guide that lists carbohydrate in these foods. Ask your doctor, dietitian, or diabetes educator about books or other nutrition guides you can use. If you take insulin, you need to know how many grams of carbohydrate are in a meal. This lets you know how much rapid-acting insulin to take before you eat. If you use an insulin pump, you get a constant rate of insulin during the day. So the pump must be programmed at meals to give you extra insulin to cover the rise in blood sugar after meals. When you know how much carbohydrate you will eat, you can take the right amount of insulin. Or, if you always use the same amount of insulin, you need to make sure that you eat the same amount of carbohydrate at meals. If you need more help to understand carbohydrate counting and food labels, ask your doctor, dietitian, or diabetes educator. How do you get started with meal planning? Here are some tips to get started: 
· Plan your meals a week at a time. Don't forget to include snacks too. · Use cookbooks or online recipes to plan several main meals. Plan some quick meals for busy nights. You also can double some recipes that freeze well. Then you can save half for other busy nights when you don't have time to cook. · Make sure you have the ingredients you need for your recipes. If you're running low on basic items, put these items on your shopping list too. · List foods that you use to make breakfasts, lunches, and snacks. List plenty of fruits and vegetables. · Post this list on the refrigerator. Add to it as you think of more things you need. · Take the list to the store to do your weekly shopping. Follow-up care is a key part of your treatment and safety. Be sure to make and go to all appointments, and call your doctor if you are having problems. It's also a good idea to know your test results and keep a list of the medicines you take. Where can you learn more? Go to http://vasile-fito.info/. Lisa Check in the search box to learn more about \"Learning About Meal Planning for Diabetes. \" Current as of: March 13, 2017 Content Version: 11.3 © 1451-7062 [employer]. Care instructions adapted under license by [employer] (which disclaims liability or warranty for this information). If you have questions about a medical condition or this instruction, always ask your healthcare professional. [address] any warranty or liability for your use of this information. Introducing [facility]! [facility] introduces [employer] patient portal. Now you can access parts of your medical record, email your doctor's office, and request medication refills online. 1. In your internet browser, go to [email or web address] 2. Click on the First Time User? Click Here link in the Sign In box. You will see the New Member Sign Up page. 3. Enter your [employer] Access Code exactly as it appears below. You will not need to use this code after youve completed the sign-up process. If you do not sign up before the expiration date, you must request a new code. · [employer] Access Code: [email or web address] Expires: 9/7/2017  8:54 AM 
 
 4. Enter the last four digits of your Social Security Number (xxxx) and Date of Birth (mm/dd/yyyy) as indicated and click Submit. You will be taken to the next sign-up page. 5. Create a [employer] ID. This will be your [employer] login ID and cannot be changed, so think of one that is secure and easy to remember. 6. Create a [employer] password. You can change your password at any time. 7. Enter your Password Reset Question and Answer. This can be used at a later time if you forget your password. 8. Enter your e-mail address. You will receive e-mail notification when new information is available in [address] 9. Click Sign Up. You can now view and download portions of your medical record. 10. Click the Download Summary menu link to download a portable copy of your medical information. If you have questions, please visit the Frequently Asked Questions section of the [employer] website. Remember, [employer] is NOT to be used for urgent needs. For medical emergencies, dial 911. Now available from your iPhone and Android! Please provide this summary of care documentation to your next provider. Your primary care clinician is listed as Vinny Wall. If you have any questions after today's visit, please call [phone].
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
## 2017-07-17 NOTE — PATIENT INSTRUCTIONS
There are no preventive care reminders to display for this patient. Patient was given a copy of the Advanced Directive form and understands to bring it in once completed     Learning About Meal Planning for Diabetes  Why plan your meals? Meal planning can be a key part of managing diabetes. Planning meals and snacks with the right balance of carbohydrate, protein, and fat can help you keep your blood sugar at the target level you set with your doctor. You don't have to eat special foods. You can eat what your family eats, including sweets once in a while. But you do have to pay attention to how often you eat and how much you eat of certain foods. You may want to work with a dietitian or a certified diabetes educator. He or she can give you tips and meal ideas and can answer your questions about meal planning. This health professional can also help you reach a healthy weight if that is one of your goals. What plan is right for you? Your dietitian or diabetes educator may suggest that you start with the plate format or carbohydrate counting. The plate format  The plate format is a simple way to help you manage how you eat. You plan meals by learning how much space each food should take on a plate. Using the plate format helps you spread carbohydrate throughout the day. It can make it easier to keep your blood sugar level within your target range. It also helps you see if you're eating healthy portion sizes. To use the plate format, you put non-starchy vegetables on half your plate. Add meat or meat substitutes on one-quarter of the plate. Put a grain or starchy vegetable (such as brown rice or a potato) on the final quarter of the plate. You can add a small piece of fruit and some low-fat or fat-free milk or yogurt, depending on your carbohydrate goal for each meal.  Here are some tips for using the plate format:  · Make sure that you are not using an oversized plate.  A 9-inch plate is best. Many restaurants use larger plates. · Get used to using the plate format at home. Then you can use it when you eat out. · Write down your questions about using the plate format. Talk to your doctor, a dietitian, or a diabetes educator about your concerns. Carbohydrate counting  With carbohydrate counting, you plan meals based on the amount of carbohydrate in each food. Carbohydrate raises blood sugar higher and more quickly than any other nutrient. It is found in desserts, breads and cereals, and fruit. It's also found in starchy vegetables such as potatoes and corn, grains such as rice and pasta, and milk and yogurt. Spreading carbohydrate throughout the day helps keep your blood sugar levels within your target range. Your daily amount depends on several things, including your weight, how active you are, which diabetes medicines you take, and what your goals are for your blood sugar levels. A registered dietitian or diabetes educator can help you plan how much carbohydrate to include in each meal and snack. A guideline for your daily amount of carbohydrate is:  · 45 to 60 grams at each meal. That's about the same as 3 to 4 carbohydrate servings. · 15 to 20 grams at each snack. That's about the same as 1 carbohydrate serving. The Nutrition Facts label on packaged foods tells you how much carbohydrate is in a serving of the food. First, look at the serving size on the food label. Is that the amount you eat in a serving? All of the nutrition information on a food label is based on that serving size. So if you eat more or less than that, you'll need to adjust the other numbers. Total carbohydrate is the next thing you need to look for on the label. If you count carbohydrate servings, one serving of carbohydrate is 15 grams. For foods that don't come with labels, such as fresh fruits and vegetables, you'll need a guide that lists carbohydrate in these foods.  Ask your doctor, dietitian, or diabetes educator about books or other nutrition guides you can use. If you take insulin, you need to know how many grams of carbohydrate are in a meal. This lets you know how much rapid-acting insulin to take before you eat. If you use an insulin pump, you get a constant rate of insulin during the day. So the pump must be programmed at meals to give you extra insulin to cover the rise in blood sugar after meals. When you know how much carbohydrate you will eat, you can take the right amount of insulin. Or, if you always use the same amount of insulin, you need to make sure that you eat the same amount of carbohydrate at meals. If you need more help to understand carbohydrate counting and food labels, ask your doctor, dietitian, or diabetes educator. How do you get started with meal planning? Here are some tips to get started:  · Plan your meals a week at a time. Don't forget to include snacks too. · Use cookbooks or online recipes to plan several main meals. Plan some quick meals for busy nights. You also can double some recipes that freeze well. Then you can save half for other busy nights when you don't have time to cook. · Make sure you have the ingredients you need for your recipes. If you're running low on basic items, put these items on your shopping list too. · List foods that you use to make breakfasts, lunches, and snacks. List plenty of fruits and vegetables. · Post this list on the refrigerator. Add to it as you think of more things you need. · Take the list to the store to do your weekly shopping. Follow-up care is a key part of your treatment and safety. Be sure to make and go to all appointments, and call your doctor if you are having problems. It's also a good idea to know your test results and keep a list of the medicines you take. Where can you learn more? Go to http://vasile-fito.info/. Luther Landaverde in the search box to learn more about \"Learning About Meal Planning for Diabetes. \"  Current as of: March 13, 2017  Content Version: 11.3  © 4307-0914 [employer]. Care instructions adapted under license by [employer] (which disclaims liability or warranty for this information). If you have questions about a medical condition or this instruction, always ask your healthcare professional. [address] any warranty or liability for your use of this information.

## 2017-07-27 ENCOUNTER — OFFICE VISIT (OUTPATIENT)
Dept: ORTHOPEDIC SURGERY | Facility: CLINIC | Age: 57
End: 2017-07-27

## 2017-07-27 ENCOUNTER — LAB ONLY (OUTPATIENT)
Dept: INTERNAL MEDICINE CLINIC | Age: 57
End: 2017-07-27

## 2017-07-27 VITALS
OXYGEN SATURATION: 97 % | HEIGHT: 71 IN | SYSTOLIC BLOOD PRESSURE: 133 MMHG | DIASTOLIC BLOOD PRESSURE: 76 MMHG | HEART RATE: 67 BPM | BODY MASS INDEX: 37.66 KG/M2 | TEMPERATURE: 97.5 F | RESPIRATION RATE: 18 BRPM | WEIGHT: 269 LBS

## 2017-07-27 DIAGNOSIS — Z96.651 HISTORY OF RIGHT KNEE JOINT REPLACEMENT: Primary | ICD-10-CM

## 2017-07-27 DIAGNOSIS — E11.9 WELL CONTROLLED DIABETES MELLITUS (HCC): Primary | ICD-10-CM

## 2017-07-27 DIAGNOSIS — M70.51 PES ANSERINUS BURSITIS OF RIGHT KNEE: ICD-10-CM

## 2017-07-27 RX ORDER — DICLOFENAC SODIUM 20 MG/G
2 SOLUTION TOPICAL
Qty: 1 BOTTLE | Refills: 0 | Status: SHIPPED | OUTPATIENT
Start: 2017-07-27 | End: 2017-10-24 | Stop reason: ALTCHOICE
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
## 2017-07-28 ENCOUNTER — OFFICE VISIT (OUTPATIENT)
Dept: ORTHOPEDIC SURGERY | Age: 57
End: 2017-07-28

## 2017-07-28 DIAGNOSIS — E11.9 WELL CONTROLLED DIABETES MELLITUS (HCC): ICD-10-CM

## 2017-07-28 DIAGNOSIS — M70.51 PES ANSERINUS BURSITIS OF RIGHT KNEE: Primary | ICD-10-CM

## 2017-07-28 LAB
AVG GLU, 10930: 158 MG/DL (ref 91–123)
HBA1C MFR BLD HPLC: 7.1 % (ref 4.8–5.9)

## 2017-07-28 RX ORDER — BETAMETHASONE SODIUM PHOSPHATE AND BETAMETHASONE ACETATE 3; 3 MG/ML; MG/ML
6 INJECTION, SUSPENSION INTRA-ARTICULAR; INTRALESIONAL; INTRAMUSCULAR; SOFT TISSUE ONCE
Qty: 1 ML | Refills: 0
Start: 2017-07-28 | End: 2017-07-28
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
## 2017-08-08 DIAGNOSIS — I10 ESSENTIAL HYPERTENSION: ICD-10-CM

## 2017-08-08 DIAGNOSIS — E11.9 WELL CONTROLLED DIABETES MELLITUS (HCC): ICD-10-CM

## 2017-08-08 DIAGNOSIS — R80.9 MICROALBUMINURIA: ICD-10-CM

## 2017-08-08 NOTE — TELEPHONE ENCOUNTER
Refill req for levemir flextouch 100 unit, 90 day supply, pls send  to [facility] on file, [facility]

## 2017-08-09 ENCOUNTER — OFFICE VISIT (OUTPATIENT)
Dept: CARDIOLOGY CLINIC | Age: 57
End: 2017-08-09

## 2017-08-09 VITALS
WEIGHT: 270 LBS | HEIGHT: 71 IN | SYSTOLIC BLOOD PRESSURE: 126 MMHG | HEART RATE: 75 BPM | DIASTOLIC BLOOD PRESSURE: 78 MMHG | BODY MASS INDEX: 37.8 KG/M2

## 2017-08-09 DIAGNOSIS — I48.19 PERSISTENT ATRIAL FIBRILLATION (HCC): Primary | ICD-10-CM

## 2017-08-09 DIAGNOSIS — E66.9 OBESITY, UNSPECIFIED OBESITY SEVERITY, UNSPECIFIED OBESITY TYPE: ICD-10-CM

## 2017-08-09 DIAGNOSIS — E78.5 DYSLIPIDEMIA: ICD-10-CM

## 2017-08-09 DIAGNOSIS — I10 ESSENTIAL HYPERTENSION: ICD-10-CM

## 2017-08-09 DIAGNOSIS — G47.33 OSA TREATED WITH BIPAP: ICD-10-CM

## 2017-08-09 NOTE — PATIENT INSTRUCTIONS
Continue current medications.    If you have any further questions or concerns, please contact our office. [phone]

## 2017-08-09 NOTE — PROGRESS NOTES
1. Have you been to the ER, urgent care clinic since your last visit? Hospitalized since your last visit? no    2. Have you seen or consulted any other health care providers outside of the [address] [place] [address] since your last visit? Include any pap smears or colon screening.   no
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
## 2017-08-09 NOTE — MR AVS SNAPSHOT
Visit Information Date & Time Provider Department Dept. Phone Encounter #  
 8/9/2017  9:00 AM Sushant Glez MD [facility] [address] [id number] Your Appointments 10/24/2017  8:00 AM  
Office Visit with Dawood Meraz MD  
Internist of [address] [place] [address] [address] [facility]) Appt Note: 3 months [address], [address] [place] [address] [place]  
  
   
 [address], [facility]  
  
    
 5/1/2018  9:00 AM  
PROCEDURE with Haley Hernandez MD  
[facility]) Appt Note: Needs order for KUB  
 [address] [place] [facility] [address]  
[phone] [address] [facility] [address] Upcoming Health Maintenance Date Due INFLUENZA AGE 9 TO ADULT 8/1/2017 EYE EXAM RETINAL OR DILATED Q1 11/30/2017 HEMOGLOBIN A1C Q6M 1/27/2018 FOOT EXAM Q1 2/13/2018 LIPID PANEL Q1 4/26/2018 MICROALBUMIN Q1 6/9/2018 COLONOSCOPY 6/27/2026 DTaP/Tdap/Td series (2 - Td) 2/13/2027 Allergies as of 8/9/2017  Review Complete On: 7/28/2017 By: Phyliss Habermann, MD  
  
 Severity Noted Reaction Type Reactions Penicillins Medium 08/17/2010    Hives Current Immunizations  Reviewed on 2/13/2017 Name Date Pneumococcal Polysaccharide (PPSV-23) 2/13/2017 Tdap 2/13/2017 Not reviewed this visit You Were Diagnosed With   
  
 Codes Comments Persistent atrial fibrillation (HCC)    -  Primary ICD-10-CM: I48.1 ICD-9-CM: 427.31 Essential hypertension     ICD-10-CM: I10 
ICD-9-CM: 401.9 Dyslipidemia     ICD-10-CM: E78.5 ICD-9-CM: 272.4 Vitals BP Pulse Height(growth percentile) Weight(growth percentile) BMI Smoking Status 126/78 75 5' 11\" (1.803 m) 270 lb (122.5 kg) 37.66 kg/m2 Passive Smoke Exposure - Never Smoker Vitals History BMI and BSA Data Body Mass Index Body Surface Area 37.66 kg/m 2 2.48 m 2 Preferred Pharmacy Pharmacy Name Phone [facility] [phone] [place] [address] [phone] Your Updated Medication List  
  
   
This list is accurate as of: 8/9/17 10:00 AM.  Always use your most recent med list.  
  
  
  
  
 albuterol 90 mcg/actuation inhaler Commonly known as:  PROVENTIL HFA, VENTOLIN HFA, PROAIR HFA Take 2 Puffs by inhalation every six (6) hours as needed for Wheezing. amLODIPine 10 mg tablet Commonly known as:  Skip Newcomer Take 1 Tab by mouth daily. bipap machine kit  
by Does Not Apply route. BiPAP at 23/18 cm with heated humidifier. Mask: Simplus FF, medium size or mask of choice. Length of need 99 months. Replace mask and accessories as needed times 12 months. Please download data after 30 days and fax at [phone]. Dx: Severe FITO, Obesity, snoring. buPROPion  mg SR tablet Commonly known as:  Alicia Pak Take 1 Tab by mouth daily. cholecalciferol 1,000 unit Cap Commonly known as:  VITAMIN D3 Take 2 Caps by mouth daily. diclofenac sodium 20 mg/gram /actuation(2 %) Sopm  
Commonly known as:  PENNSAID  
2 Pump(s) by Apply Externally route two (2) times daily as needed. Fenofibrate 150 mg Cap TAKE 1 CAPSULE BY MOUTH DAILY  
  
 gabapentin 300 mg capsule Commonly known as:  NEURONTIN Take 1 Cap by mouth two (2) times a day. hydroCHLOROthiazide 12.5 mg tablet Commonly known as:  HYDRODIURIL Take 1 Tab by mouth daily. hyoscyamine SL 0.125 mg SL tablet Commonly known as:  LEVSIN/SL  
0.125 mg by SubLINGual route every four (4) hours as needed. insulin aspart 100 unit/mL Inpn Commonly known as:  NOVOLOG  
by SubCUTAneous route. Sliding scale:  101-150 4 units 151-200 6 units 201-250  8 units 251-300 10 units Call if blood sugar is greater than 300  
  
 insulin detemir 100 unit/mL (3 mL) Inpn Commonly known as:  Enrique Collier INJECT 25 UNITS UNDER THE SKIN EVERY MORNING  
  
 metoprolol succinate 50 mg XL tablet Commonly known as:  TOPROL-XL  
TAKE 1 TABLET BY MOUTH EVERY DAY Vianney Pen Needle 32 gauge x 5/32\" Ndle Generic drug:  Insulin Needles (Disposable) USS THREE TIMES DAILY PLUS SLIDING SCALE Omega-3-DHA-EPA-Fish Oil 1,000 mg (120 mg-180 mg) Cap Take  by mouth two (2) times a day. ONE-A-DAY MEN'S 50+ ADVANTAGE PO Take  by mouth daily. rivaroxaban 20 mg Tab tablet Commonly known as:  Wendi Benes Take 1 Tab by mouth daily (with breakfast). rosuvastatin 40 mg tablet Commonly known as:  CRESTOR Take 1 Tab by mouth nightly. NON FORMULARY SITagliptin-metFORMIN 50-1,000 mg per tablet Commonly known as:  Julian Chito Take 1 Tab by mouth two (2) times daily (with meals). SYMBICORT 160-4.5 mcg/actuation HFA inhaler Generic drug:  budesonide-formoterol TAKE 2 PUFFS BY MOUTH TWICE DAILY  
  
 traMADol 50 mg tablet Commonly known as:  ULTRAM  
Take 50 mg by mouth every six (6) hours as needed for Pain.  
  
 valsartan 320 mg tablet Commonly known as:  DIOVAN Take 1 Tab by mouth daily. VITAMIN B-12 1,000 mcg tablet Generic drug:  cyanocobalamin Take 1,000 mcg by mouth daily. We Performed the Following AMB POC EKG ROUTINE W/ 12 LEADS, INTER & REP [90878 CPT(R)] To-Do List   
 08/21/2017 10:00 AM  
  Appointment with Maria Guadalupe Vera RD at [address] [place] [address] Patient Instructions Continue current medications. If you have any further questions or concerns, please contact our office. [phone] [facility]! Dear Clary Soulier: 
Thank you for requesting a Se[employer] account. Our records indicate that you already have an active Seekly account. You can access your account anytime at [email or web address] Did you know that you can access your hospital and ER discharge instructions at any time in [employer]? You can also review all of your test results from your hospital stay or ER visit. Additional Information If you have questions, please visit the Frequently Asked Questions section of the [employer] website at [email or web address] Remember, [employer] is NOT to be used for urgent needs. For medical emergencies, dial 911. Now available from your iPhone and Android! Please provide this summary of care documentation to your next provider. Your primary care clinician is listed as Trish Nichole. If you have any questions after today's visit, please call [phone].

## 2017-08-14 RX ORDER — METOPROLOL SUCCINATE 50 MG/1
50 TABLET, EXTENDED RELEASE ORAL DAILY
Qty: 90 TAB | Refills: 3 | Status: SHIPPED | OUTPATIENT
Start: 2017-08-14 | End: 2018-07-21 | Stop reason: SDUPTHER

## 2017-08-21 ENCOUNTER — HOSPITAL ENCOUNTER (OUTPATIENT)
Dept: NUTRITION | Age: 57
Discharge: HOME | End: 2017-08-21
Payer: COMMERCIAL

## 2017-08-21 PROCEDURE — 97803 MED NUTRITION INDIV SUBSEQ: CPT

## 2017-09-28 ENCOUNTER — APPOINTMENT (OUTPATIENT)
Dept: NUTRITION | Age: 57
End: 2017-09-28

## 2017-09-28 RX ORDER — PEN NEEDLE, DIABETIC 31 GX3/16"
NEEDLE, DISPOSABLE MISCELLANEOUS
Qty: 300 PEN NEEDLE | Refills: 3 | Status: SHIPPED | OUTPATIENT
Start: 2017-09-28 | End: 2018-06-14

## 2017-10-24 ENCOUNTER — OFFICE VISIT (OUTPATIENT)
Dept: INTERNAL MEDICINE CLINIC | Age: 57
End: 2017-10-24

## 2017-10-24 VITALS
WEIGHT: 262.4 LBS | DIASTOLIC BLOOD PRESSURE: 64 MMHG | TEMPERATURE: 97.2 F | BODY MASS INDEX: 36.73 KG/M2 | HEIGHT: 71 IN | HEART RATE: 74 BPM | OXYGEN SATURATION: 95 % | RESPIRATION RATE: 18 BRPM | SYSTOLIC BLOOD PRESSURE: 123 MMHG

## 2017-10-24 DIAGNOSIS — B37.0 THRUSH: ICD-10-CM

## 2017-10-24 DIAGNOSIS — Z99.89 OSA ON CPAP: ICD-10-CM

## 2017-10-24 DIAGNOSIS — R16.1 SPLENOMEGALY: ICD-10-CM

## 2017-10-24 DIAGNOSIS — K58.0 IRRITABLE BOWEL SYNDROME WITH DIARRHEA: ICD-10-CM

## 2017-10-24 DIAGNOSIS — E11.9 WELL CONTROLLED DIABETES MELLITUS (HCC): ICD-10-CM

## 2017-10-24 DIAGNOSIS — I48.19 PERSISTENT ATRIAL FIBRILLATION (HCC): ICD-10-CM

## 2017-10-24 DIAGNOSIS — R19.7 DIARRHEA, UNSPECIFIED TYPE: Primary | ICD-10-CM

## 2017-10-24 DIAGNOSIS — G47.33 OSA ON CPAP: ICD-10-CM

## 2017-10-24 DIAGNOSIS — R19.7 DIARRHEA, UNSPECIFIED TYPE: ICD-10-CM

## 2017-10-24 DIAGNOSIS — I10 ESSENTIAL HYPERTENSION: ICD-10-CM

## 2017-10-24 RX ORDER — NYSTATIN 100000 [USP'U]/ML
500000 SUSPENSION ORAL 4 TIMES DAILY
Qty: 473 ML | Refills: 11 | Status: SHIPPED | OUTPATIENT
Start: 2017-10-24 | End: 2018-06-11 | Stop reason: ALTCHOICE

## 2017-10-24 NOTE — PROGRESS NOTES
Chief Complaint   Patient presents with    Diabetes     Type 2 follow up    Labs     done 7-27-17     Patient still has his copy of the Advanced Directive form and understands to bring it in once completed. 1. Have you been to the ER, urgent care clinic since your last visit? Hospitalized since your last visit? No    2. Have you seen or consulted any other health care providers outside of the [address] [place] [address] since your last visit? Include any pap smears or colon screening.  No
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
## 2017-10-24 NOTE — ACP (ADVANCE CARE PLANNING)
Patient still has his copy of the Advanced Directive form and understands to bring it in once completed

## 2017-10-24 NOTE — PATIENT INSTRUCTIONS
Patient still has his copy of the Advanced Directive form and understands to bring it in once completed  There are no preventive care reminders to display for this patient. Learning About Diabetes Food Guidelines  Your Care Instructions  Meal planning is important to manage diabetes. It helps keep your blood sugar at a target level (which you set with your doctor). You don't have to eat special foods. You can eat what your family eats, including sweets once in a while. But you do have to pay attention to how often you eat and how much you eat of certain foods. You may want to work with a dietitian or a certified diabetes educator (CDE) to help you plan meals and snacks. A dietitian or CDE can also help you lose weight if that is one of your goals. What should you know about eating carbs? Managing the amount of carbohydrate (carbs) you eat is an important part of healthy meals when you have diabetes. Carbohydrate is found in many foods. · Learn which foods have carbs. And learn the amounts of carbs in different foods. ¨ Bread, cereal, pasta, and rice have about 15 grams of carbs in a serving. A serving is 1 slice of bread (1 ounce), ½ cup of cooked cereal, or 1/3 cup of cooked pasta or rice. ¨ Fruits have 15 grams of carbs in a serving. A serving is 1 small fresh fruit, such as an apple or orange; ½ of a banana; ½ cup of cooked or canned fruit; ½ cup of fruit juice; 1 cup of melon or raspberries; or 2 tablespoons of dried fruit. ¨ Milk and no-sugar-added yogurt have 15 grams of carbs in a serving. A serving is 1 cup of milk or 2/3 cup of no-sugar-added yogurt. ¨ Starchy vegetables have 15 grams of carbs in a serving. A serving is ½ cup of mashed potatoes or sweet potato; 1 cup winter squash; ½ of a small baked potato; ½ cup of cooked beans; or ½ cup cooked corn or green peas.   · Learn how much carbs to eat each day and at each meal. A dietitian or CDE can teach you how to keep track of the amount of carbs you eat. This is called carbohydrate counting. · If you are not sure how to count carbohydrate grams, use the Plate Method to plan meals. It is a good, quick way to make sure that you have a balanced meal. It also helps you spread carbs throughout the day. ¨ Divide your plate by types of foods. Put non-starchy vegetables on half the plate, meat or other protein food on one-quarter of the plate, and a grain or starchy vegetable in the final quarter of the plate. To this you can add a small piece of fruit and 1 cup of milk or yogurt, depending on how many carbs you are supposed to eat at a meal.  · Try to eat about the same amount of carbs at each meal. Do not \"save up\" your daily allowance of carbs to eat at one meal.  · Proteins have very little or no carbs per serving. Examples of proteins are beef, chicken, turkey, fish, eggs, tofu, cheese, cottage cheese, and peanut butter. A serving size of meat is 3 ounces, which is about the size of a deck of cards. Examples of meat substitute serving sizes (equal to 1 ounce of meat) are 1/4 cup of cottage cheese, 1 egg, 1 tablespoon of peanut butter, and ½ cup of tofu. How can you eat out and still eat healthy? · Learn to estimate the serving sizes of foods that have carbohydrate. If you measure food at home, it will be easier to estimate the amount in a serving of restaurant food. · If the meal you order has too much carbohydrate (such as potatoes, corn, or baked beans), ask to have a low-carbohydrate food instead. Ask for a salad or green vegetables. · If you use insulin, check your blood sugar before and after eating out to help you plan how much to eat in the future. · If you eat more carbohydrate at a meal than you had planned, take a walk or do other exercise. This will help lower your blood sugar. What else should you know? · Limit saturated fat, such as the fat from meat and dairy products.  This is a healthy choice because people who have diabetes are Do not use more than directed. · It is best to take this medicine with food or milk. · This medicine should come with a Medication Guide. Ask your pharmacist for a copy if you do not have one. · Missed dose: Skip the missed dose and go back to your regular dosing schedule. Do not double doses. · Store the medicine in a closed container at room temperature, away from heat, moisture, and direct light. Drugs and Foods to Avoid:   Ask your doctor or pharmacist before using any other medicine, including over-the-counter medicines, vitamins, and herbal products. · Some foods and medicines can affect how eluxadoline works. Tell your doctor if you are using any of the following:  ¨ Alfentanil, alosetron, bupropion, ciprofloxacin, clarithromycin, cyclosporine, dihydroergotamine, eltrombopag, ergotamine, fentanyl, fluconazole, gemfibrozil, loperamide, paroxetine, pimozide, quinidine, rifampin, rosuvastatin, sirolimus, tacrolimus  ¨ Medicine to treat HIV/AIDS (including atazanavir, lopinavir, ritonavir, saquinavir, tipranavir)  ¨ Narcotic pain medicines  · Tell your doctor if you drink alcohol. You should limit the amount of alcohol you drink while you are taking this medicine. You should not take this medicine if you have a history of alcohol addiction or if you drink more than 3 alcoholic drinks a day. Warnings While Using This Medicine:   · Tell your doctor right away if you are pregnant or breastfeeding, or if you have liver disease or a history of chronic or severe constipation. · This medicine may cause the following problems:  ¨ Sphincter of Oddi spasm (increased risk if you do not have a gallbladder)  ¨ Pancreatitis  · This medicine may make you dizzy or drowsy. Do not drive or do anything that could be dangerous until you know how this medicine affects you. · Call your doctor if your symptoms do not improve or if they get worse. Call your doctor if you have constipation for more than 4 days.   · Your doctor will do lab tests at regular visits to check on the effects of this medicine. Keep all appointments. · Keep all medicine out of the reach of children. Never share your medicine with anyone. Possible Side Effects While Using This Medicine:   Call your doctor right away if you notice any of these side effects:  · Allergic reaction: Itching or hives, swelling in your face or hands, swelling or tingling in your mouth or throat, chest tightness, trouble breathing  · Constipation  · Sudden and severe stomach pain, nausea, vomiting, fever, lightheadedness  If you notice these less serious side effects, talk with your doctor:   · Cough, stuffy or runny nose, sore throat  If you notice other side effects that you think are caused by this medicine, tell your doctor. Call your doctor for medical advice about side effects. You may report side effects to FDA at 8-472-FDA-7104  © 2017 [employer] Information is for End User's use only and may not be sold, redistributed or otherwise used for commercial purposes. The above information is an  only. It is not intended as medical advice for individual conditions or treatments. Talk to your doctor, nurse or pharmacist before following any medical regimen to see if it is safe and effective for you.

## 2017-10-24 NOTE — MR AVS SNAPSHOT
Visit Information Date & Time Provider Department Dept. Phone Encounter #  
 10/24/2017  8:00 AM Claudia Santoro MD [facility] [phone] [id number] Follow-up Instructions Return in about 4 weeks (around 11/21/2017) for BP check, DM check, diarrhea. Your Appointments 11/27/2017  8:30 AM  
Office Visit with Claudia Santoro MD  
[facility] [address] [place] [address]) Appt Note: ov 4wks [place] [address], [address] [place] [address] [address]  
  
   
 [address], [address]  
  
    
 2/9/2018  8:40 AM  
Follow Up with Cari Delgado MD  
[facility] ([facility]) Appt Note: 6 month follow up [place] [address] [address] [id number] [phone] [place] [id number]  
  
    
 5/1/2018  9:00 AM  
PROCEDURE with Sky Mcdonnell MD  
[facility] [address] [place] [address]) Appt Note: Needs order for KUB  
 [address] [place] [address] [facility] [address]  
[phone] [address] [place] [address] Upcoming Health Maintenance Date Due  
 EYE EXAM RETINAL OR DILATED Q1 11/30/2017 HEMOGLOBIN A1C Q6M 1/27/2018 FOOT EXAM Q1 2/13/2018 LIPID PANEL Q1 4/26/2018 MICROALBUMIN Q1 6/9/2018 COLONOSCOPY 6/27/2026 DTaP/Tdap/Td series (2 - Td) 2/13/2027 Allergies as of 10/24/2017  Review Complete On: 10/24/2017 By: Claudia Santoro MD  
  
 Severity Noted Reaction Type Reactions Penicillins Medium 08/17/2010    Hives Current Immunizations  Reviewed on 2/13/2017 Name Date Influenza Vaccine 10/1/2017 Pneumococcal Polysaccharide (PPSV-23) 2/13/2017 Tdap 2/13/2017 Not reviewed this visit You Were Diagnosed With   
  
 Codes Comments Irritable bowel syndrome with diarrhea    -  Primary ICD-10-CM: K58.0 ICD-9-CM: 432.7 Well controlled diabetes mellitus ([facility] [address]     ICD-10-CM: E11.9 ICD-9-CM: 250.00 Persistent atrial fibrillation (HCC)     ICD-10-CM: I48.1 ICD-9-CM: 427.31   
 FITO on CPAP     ICD-10-CM: G47.33, Z99.89 ICD-9-CM: 327.23, V46.8 Class 2 obesity with serious comorbidity and body mass index (BMI) of 36.0 to 36.9 in adult, unspecified obesity type     ICD-10-CM: E66.9, Z68.36 
ICD-9-CM: 278.00, V85.36 Essential hypertension     ICD-10-CM: I10 
ICD-9-CM: 401.9 Splenomegaly     ICD-10-CM: R16.1 ICD-9-CM: 789.2 Diarrhea, unspecified type     ICD-10-CM: R19.7 ICD-9-CM: 787.91 Vitals BP Pulse Temp Resp Height(growth percentile) Weight(growth percentile) 123/64 (BP 1 Location: Left arm, BP Patient Position: Sitting) 74 97.2 °F (36.2 °C) (Oral) 18 5' 11\" (1.803 m) 262 lb 6.4 oz (119 kg) SpO2 BMI Smoking Status 95% 36.6 kg/m2 Passive Smoke Exposure - Never Smoker BMI and BSA Data Body Mass Index Body Surface Area  
 36.6 kg/m 2 2.44 m 2 Preferred Pharmacy Pharmacy Name Phone [facility] [address] [place] [address] [phone] Your Updated Medication List  
  
   
This list is accurate as of: 10/24/17  8:50 AM.  Always use your most recent med list.  
  
  
  
  
 albuterol 90 mcg/actuation inhaler Commonly known as:  PROVENTIL HFA, VENTOLIN HFA, PROAIR HFA Take 2 Puffs by inhalation every six (6) hours as needed for Wheezing. amLODIPine 10 mg tablet Commonly known as:  Izetta Harder Take 1 Tab by mouth daily. bipap machine kit  
by Does Not Apply route. BiPAP at 23/18 cm with heated humidifier. Mask: Simplus FF, medium size or mask of choice. Length of need 99 months. Replace mask and accessories as needed times 12 months. Please download data after 30 days and fax at [phone]. Dx: Severe FITO, Obesity, snoring. buPROPion  mg SR tablet Commonly known as:  WELLBUTRIN SR  
TAKE 1 TABLET BY MOUTH DAILY  
  
 cholecalciferol 1,000 unit Cap Commonly known as:  VITAMIN D3 Take 2 Caps by mouth daily. eluxadoline 75 mg tablet Commonly known as:  VIBERZI Take 1 Tab by mouth two (2) times daily (with meals). Max Daily Amount: 150 mg. Indications: DIARRHEA PREDOMINANT IRRITABLE BOWEL SYNDROME Fenofibrate 150 mg Cap TAKE 1 CAPSULE BY MOUTH DAILY  
  
 gabapentin 300 mg capsule Commonly known as:  NEURONTIN Take 1 Cap by mouth two (2) times a day. hydroCHLOROthiazide 12.5 mg tablet Commonly known as:  HYDRODIURIL Take 1 Tab by mouth daily. hyoscyamine SL 0.125 mg SL tablet Commonly known as:  LEVSIN/SL  
0.125 mg by SubLINGual route every four (4) hours as needed. * insulin aspart 100 unit/mL Inpn Commonly known as:  NOVOLOG  
by SubCUTAneous route. Sliding scale:  101-150 4 units 151-200 6 units 201-250  8 units 251-300 10 units Call if blood sugar is greater than 300 * NovoLOG Flexpen 100 unit/mL Inpn Generic drug:  insulin aspart INJECT 6 UNITS UNDER THE SKIN THREE TIMES DAILY  
  
 insulin detemir 100 unit/mL (3 mL) Inpn Commonly known as:  Jerone Sovereign INJECT 25 UNITS UNDER THE SKIN EVERY MORNING Insulin Needles (Disposable) 32 gauge x 5/32\" Ndle Commonly known as:  Vianney Pen Needle USS THREE TIMES DAILY PLUS SLIDING SCALE  
  
 metoprolol succinate 50 mg XL tablet Commonly known as:  TOPROL-XL Take 1 Tab by mouth daily. Omega-3-DHA-EPA-Fish Oil 1,000 mg (120 mg-180 mg) Cap Take  by mouth two (2) times a day. ONE-A-DAY MEN'S 50+ ADVANTAGE PO Take  by mouth daily. rivaroxaban 20 mg Tab tablet Commonly known as:  Eve Cj Take 1 Tab by mouth daily (with breakfast). rosuvastatin 40 mg tablet Commonly known as:  CRESTOR Take 1 Tab by mouth nightly. NON FORMULARY SITagliptin-metFORMIN 50-1,000 mg per tablet Commonly known as:  Andree Mateus Take 1 Tab by mouth two (2) times daily (with meals). SYMBICORT 160-4.5 mcg/actuation [facility]faa Generic drug:  budesonide-formoterol TAKE 2 PUFFS BY MOUTH TWICE DAILY  
  
 traMADol 50 mg tablet Commonly known as:  ULTRAM  
Take 50 mg by mouth every six (6) hours as needed for Pain.  
  
 valsartan 320 mg tablet Commonly known as:  DIOVAN Take 1 Tab by mouth daily. VITAMIN B-12 1,000 mcg tablet Generic drug:  cyanocobalamin Take 1,000 mcg by mouth daily. * Notice: This list has 2 medication(s) that are the same as other medications prescribed for you. Read the directions carefully, and ask your doctor or other care provider to review them with you. Prescriptions Printed Refills  
 eluxadoline (VIBERZI) 75 mg tablet 1 Sig: Take 1 Tab by mouth two (2) times daily (with meals). Max Daily Amount: 150 mg. Indications: DIARRHEA PREDOMINANT IRRITABLE BOWEL SYNDROME Class: Print Route: Oral  
  
Follow-up Instructions Return in about 4 weeks (around 11/21/2017) for BP check, DM check, diarrhea. To-Do List   
 10/24/2017 Lab:  CBC WITH AUTOMATED DIFF   
  
 10/24/2017 Imaging:  CT ABD PELV W WO CONT   
  
 10/24/2017 Lab:  LIPASE   
  
 10/24/2017 Lab:  METABOLIC PANEL, COMPREHENSIVE Referral Information Referral ID Referred By Referred To  
  
 [id number] Zulema Meño Not Available Visits Status Start Date End Date 1 New Request 10/24/17 10/24/18 If your referral has a status of pending review or denied, additional information will be sent to support the outcome of this decision. Patient Instructions Patient still has his copy of the Advanced Directive form and understands to bring it in once completed There are no preventive care reminders to display for this patient. Learning About Diabetes Food Guidelines Your Care Instructions Meal planning is important to manage diabetes. It helps keep your blood sugar at a target level (which you set with your doctor). You don't have to eat special foods. You can eat what your family eats, including sweets once in a while. But you do have to pay attention to how often you eat and how much you eat of certain foods. You may want to work with a dietitian or a certified diabetes educator (CDE) to help you plan meals and snacks. A dietitian or CDE can also help you lose weight if that is one of your goals. What should you know about eating carbs? Managing the amount of carbohydrate (carbs) you eat is an important part of healthy meals when you have diabetes. Carbohydrate is found in many foods. · Learn which foods have carbs. And learn the amounts of carbs in different foods. ¨ Bread, cereal, pasta, and rice have about 15 grams of carbs in a serving. A serving is 1 slice of bread (1 ounce), ½ cup of cooked cereal, or 1/3 cup of cooked pasta or rice. ¨ Fruits have 15 grams of carbs in a serving. A serving is 1 small fresh fruit, such as an apple or orange; ½ of a banana; ½ cup of cooked or canned fruit; ½ cup of fruit juice; 1 cup of melon or raspberries; or 2 tablespoons of dried fruit. ¨ Milk and no-sugar-added yogurt have 15 grams of carbs in a serving. A serving is 1 cup of milk or 2/3 cup of no-sugar-added yogurt. ¨ Starchy vegetables have 15 grams of carbs in a serving. A serving is ½ cup of mashed potatoes or sweet potato; 1 cup winter squash; ½ of a small baked potato; ½ cup of cooked beans; or ½ cup cooked corn or green peas. · Learn how much carbs to eat each day and at each meal. A dietitian or CDE can teach you how to keep track of the amount of carbs you eat. This is called carbohydrate counting. · If you are not sure how to count carbohydrate grams, use the Plate Method to plan meals. It is a good, quick way to make sure that you have a balanced meal. It also helps you spread carbs throughout the day. ¨ Divide your plate by types of foods. Put non-starchy vegetables on half the plate, meat or other protein food on one-quarter of the plate, and a grain or starchy vegetable in the final quarter of the plate. To this you can add a small piece of fruit and 1 cup of milk or yogurt, depending on how many carbs you are supposed to eat at a meal. 
· Try to eat about the same amount of carbs at each meal. Do not \"save up\" your daily allowance of carbs to eat at one meal. 
· Proteins have very little or no carbs per serving. Examples of proteins are beef, chicken, turkey, fish, eggs, tofu, cheese, cottage cheese, and peanut butter. A serving size of meat is 3 ounces, which is about the size of a deck of cards. Examples of meat substitute serving sizes (equal to 1 ounce of meat) are 1/4 cup of cottage cheese, 1 egg, 1 tablespoon of peanut butter, and ½ cup of tofu. How can you eat out and still eat healthy? · Learn to estimate the serving sizes of foods that have carbohydrate. If you measure food at home, it will be easier to estimate the amount in a serving of restaurant food. · If the meal you order has too much carbohydrate (such as potatoes, corn, or baked beans), ask to have a low-carbohydrate food instead. Ask for a salad or green vegetables. · If you use insulin, check your blood sugar before and after eating out to help you plan how much to eat in the future. · If you eat more carbohydrate at a meal than you had planned, take a walk or do other exercise. This will help lower your blood sugar. What else should you know? · Limit saturated fat, such as the fat from meat and dairy products. This is a healthy choice because people who have diabetes are at higher risk of heart disease. So choose lean cuts of meat and nonfat or low-fat dairy products. Use olive or canola oil instead of butter or shortening when cooking. · Don't skip meals. Your blood sugar may drop too low if you skip meals and take insulin or certain medicines for diabetes. · Check with your doctor before you drink alcohol. Alcohol can cause your blood sugar to drop too low. Alcohol can also cause a bad reaction if you take certain diabetes medicines. Follow-up care is a key part of your treatment and safety. Be sure to make and go to all appointments, and call your doctor if you are having problems. It's also a good idea to know your test results and keep a list of the medicines you take. Where can you learn more? Go to http://vasile-fito.info/. Enter F217 in the search box to learn more about \"Learning About Diabetes Food Guidelines. \" Current as of: March 13, 2017 Content Version: 11.3 © 2714-3244 [employer]. Care instructions adapted under license by [employer] (which disclaims liability or warranty for this information). If you have questions about a medical condition or this instruction, always ask your healthcare professional. [address] any warranty or liability for your use of this information. Eluxadoline (By mouth) Eluxadoline (yy-ti-TZ-oh-cuco) Treats irritable bowel syndrome with diarrhea. Brand Name(s): Luis Alberto Orellana There may be other brand names for this medicine. When This Medicine Should Not Be Used: This medicine is not right for everyone. Do not use it if you had an allergic reaction to eluxadoline, or if you have a gallbladder blockage, sphincter of Oddi disease, history of pancreatitis, pancreas disease (including a blockage), or a bowel blockage. Do not use this medicine if you do not have a gallbladder. How to Use This Medicine:  
Tablet · Your doctor will tell you how much medicine to use. Do not use more than directed. · It is best to take this medicine with food or milk. · This medicine should come with a Medication Guide. Ask your pharmacist for a copy if you do not have one. · Missed dose: Skip the missed dose and go back to your regular dosing schedule. Do not double doses. · Store the medicine in a closed container at room temperature, away from heat, moisture, and direct light. Drugs and Foods to Avoid: Ask your doctor or pharmacist before using any other medicine, including over-the-counter medicines, vitamins, and herbal products. · Some foods and medicines can affect how eluxadoline works. Tell your doctor if you are using any of the following: ¨ Alfentanil, alosetron, bupropion, ciprofloxacin, clarithromycin, cyclosporine, dihydroergotamine, eltrombopag, ergotamine, fentanyl, fluconazole, gemfibrozil, loperamide, paroxetine, pimozide, quinidine, rifampin, rosuvastatin, sirolimus, tacrolimus ¨ Medicine to treat HIV/AIDS (including atazanavir, lopinavir, ritonavir, saquinavir, tipranavir) ¨ Narcotic pain medicines · Tell your doctor if you drink alcohol. You should limit the amount of alcohol you drink while you are taking this medicine. You should not take this medicine if you have a history of alcohol addiction or if you drink more than 3 alcoholic drinks a day. Warnings While Using This Medicine: · Tell your doctor right away if you are pregnant or breastfeeding, or if you have liver disease or a history of chronic or severe constipation. · This medicine may cause the following problems: ¨ Sphincter of Oddi spasm (increased risk if you do not have a gallbladder) ¨ Pancreatitis · This medicine may make you dizzy or drowsy. Do not drive or do anything that could be dangerous until you know how this medicine affects you. · Call your doctor if your symptoms do not improve or if they get worse. Call your doctor if you have constipation for more than 4 days. · Your doctor will do lab tests at regular visits to check on the effects of this medicine. Keep all appointments. · Keep all medicine out of the reach of children. Never share your medicine with anyone. Possible Side Effects While Using This Medicine:  
Call your doctor right away if you notice any of these side effects: · Allergic reaction: Itching or hives, swelling in your face or hands, swelling or tingling in your mouth or throat, chest tightness, trouble breathing · Constipation · Sudden and severe stomach pain, nausea, vomiting, fever, lightheadedness If you notice these less serious side effects, talk with your doctor: · Cough, stuffy or runny nose, sore throat If you notice other side effects that you think are caused by this medicine, tell your doctor. Call your doctor for medical advice about side effects. You may report side effects to FDA at 6-137-FDA-1069 © 2017 [facility] Information is for End User's use only and may not be sold, redistributed or otherwise used for commercial purposes. The above information is an  only. It is not intended as medical advice for individual conditions or treatments. Talk to your doctor, nurse or pharmacist before following any medical regimen to see if it is safe and effective for you. Introducing \[facility]\"" & HEALTH SERVICES! Dear [facility]: Thank you for requesting a [employer] account. Our records indicate that you already have an active [employer] account. You can access your account anytime at [email or web address] Did you know that you can access your hospital and ER discharge instructions at any time in [employer]? You can also review all of your test results from your hospital stay or ER visit. Additional Information If you have questions, please visit the Frequently Asked Questions section of the [employer] website at [email or web address] Remember, [employer] is NOT to be used for urgent needs. For medical emergencies, dial 911. Now available from your iPhone and Android! Please provide this summary of care documentation to your next provider. Your primary care clinician is listed as Rachel Trevino. If you have any questions after today's visit, please call [phone].

## 2017-11-03 ENCOUNTER — HOSPITAL ENCOUNTER (OUTPATIENT)
Dept: CT IMAGING | Age: 57
Discharge: HOME | End: 2017-11-03
Attending: INTERNAL MEDICINE
Payer: COMMERCIAL

## 2017-11-03 DIAGNOSIS — R16.1 SPLENOMEGALY: ICD-10-CM

## 2017-11-03 DIAGNOSIS — R19.7 DIARRHEA, UNSPECIFIED TYPE: ICD-10-CM

## 2017-11-03 LAB — CREAT UR-MCNC: 1.1 MG/DL (ref 0.6–1.3)

## 2017-11-03 PROCEDURE — 82565 ASSAY OF CREATININE: CPT

## 2017-11-03 PROCEDURE — 74011636320 HC RX REV CODE- 636/320: Performed by: INTERNAL MEDICINE

## 2017-11-03 PROCEDURE — 74178 CT ABD&PLV WO CNTR FLWD CNTR: CPT

## 2017-11-03 RX ADMIN — IOPAMIDOL 100 ML: 612 INJECTION, SOLUTION INTRAVENOUS at 08:00

## 2017-11-08 PROBLEM — M51.9 LUMBAR DISC DISEASE: Status: ACTIVE | Noted: 2017-11-08

## 2017-11-08 PROBLEM — R16.1 SPLENOMEGALY: Status: ACTIVE | Noted: 2017-11-08

## 2017-11-08 NOTE — PROGRESS NOTES
Please let the pt know that his spleen is still enlarged. He has no suspicious masses on his CT study. There is a benign collection of fatty tissue - lipoma - in his stomach. He also has evidence of diverticula but no sign of diverticulitis - inflammation of the colon. He has evidence of lower back disc disease as well. No additional studies are warranted for these findings at this time - except for the splenomegaly. I will discuss his splenomegaly with him more in detail at his f/u apt. He will need to be referred to the Hematology team for additional studies - which I will discuss with him at his f/u apt with me later this month.     Dr. Liz Bonner  [facility] [place] [address] [address]  Phone: [phone]  Fax: [phone]

## 2017-12-28 ENCOUNTER — TELEPHONE (OUTPATIENT)
Dept: INTERNAL MEDICINE CLINIC | Age: 57
End: 2017-12-28

## 2018-02-09 ENCOUNTER — OFFICE VISIT (OUTPATIENT)
Dept: CARDIOLOGY CLINIC | Age: 58
End: 2018-02-09

## 2018-02-09 VITALS
OXYGEN SATURATION: 98 % | SYSTOLIC BLOOD PRESSURE: 144 MMHG | DIASTOLIC BLOOD PRESSURE: 80 MMHG | BODY MASS INDEX: 37.94 KG/M2 | WEIGHT: 271 LBS | HEIGHT: 71 IN | HEART RATE: 63 BPM

## 2018-02-09 DIAGNOSIS — I48.19 PERSISTENT ATRIAL FIBRILLATION (HCC): Primary | ICD-10-CM

## 2018-02-09 DIAGNOSIS — G47.33 OSA TREATED WITH BIPAP: ICD-10-CM

## 2018-02-09 DIAGNOSIS — E66.9 OBESITY, UNSPECIFIED OBESITY SEVERITY, UNSPECIFIED OBESITY TYPE: ICD-10-CM

## 2018-02-09 DIAGNOSIS — E11.9 WELL CONTROLLED DIABETES MELLITUS (HCC): ICD-10-CM

## 2018-02-09 DIAGNOSIS — E78.5 DYSLIPIDEMIA: ICD-10-CM

## 2018-02-09 DIAGNOSIS — I10 ESSENTIAL HYPERTENSION: ICD-10-CM

## 2018-02-09 NOTE — MR AVS SNAPSHOT
[address] [address] [address] [address] [phone] Patient: Nel Huertas. MRN: [id number] :[id number] Visit Information Date & Time Provider Department Dept. Phone Encounter #  
 2018  8:40 AM Carmen Whyte MD [facility] [address] [id number] Your Appointments 2018  9:00 AM  
PROCEDURE with Dirk Frankel, MD  
[facility] Appt Note: Needs order for KUB  
 [address] [place] [address] [address]  
[phone] [address] [place] [address] Upcoming Health Maintenance Date Due  
 EYE EXAM RETINAL OR DILATED Q1 2017 HEMOGLOBIN A1C Q6M 2018 FOOT EXAM Q1 2018 LIPID PANEL Q1 2018 MICROALBUMIN Q1 2018 COLONOSCOPY 2026 DTaP/Tdap/Td series (2 - Td) 2027 Allergies as of 2018  Review Complete On: 11/3/2017 By: Veena Edgar Severity Noted Reaction Type Reactions Penicillins Medium 2010    Hives Current Immunizations  Reviewed on 2017 Name Date Influenza Vaccine 10/1/2017 Pneumococcal Polysaccharide (PPSV-23) 2017 Tdap 2017 Not reviewed this visit You Were Diagnosed With   
  
 Codes Comments Persistent atrial fibrillation (HCC)    -  Primary ICD-10-CM: I48.1 ICD-9-CM: 427.31 Essential hypertension     ICD-10-CM: I10 
ICD-9-CM: 401.9 Dyslipidemia     ICD-10-CM: E78.5 ICD-9-CM: 272.4 Vitals BP Pulse Height(growth percentile) Weight(growth percentile) SpO2 BMI  
 144/80 63 5' 11\" (1.803 m) 271 lb (122.9 kg) 98% 37.8 kg/m2 Smoking Status Passive Smoke Exposure - Never Smoker Vitals History BMI and BSA Data Body Mass Index Body Surface Area  
 37.8 kg/m 2 2.48 m 2 Preferred Pharmacy Pharmacy Name Phone [facility] [phone] 29 [facility] [address] [phone] Your Updated Medication List  
  
   
This list is accurate as of: 2/9/18  9:08 AM.  Always use your most recent med list.  
  
  
  
  
 albuterol 90 mcg/actuation inhaler Commonly known as:  PROVENTIL HFA, VENTOLIN HFA, PROAIR HFA Take 2 Puffs by inhalation every six (6) hours as needed for Wheezing. amLODIPine 10 mg tablet Commonly known as:  Flagler Sandhoff Take 1 Tab by mouth daily. bipap machine kit  
by Does Not Apply route. BiPAP at 23/18 cm with heated humidifier. Mask: Simplus FF, medium size or mask of choice. Length of need 99 months. Replace mask and accessories as needed times 12 months. Please download data after 30 days and fax at [phone]. Dx: Severe FITO, Obesity, snoring. buPROPion  mg SR tablet Commonly known as:  WELLBUTRIN SR  
TAKE 1 TABLET BY MOUTH DAILY  
  
 cholecalciferol 1,000 unit Cap Commonly known as:  VITAMIN D3 Take 2 Caps by mouth daily. Fenofibrate 150 mg Cap TAKE 1 CAPSULE BY MOUTH DAILY  
  
 gabapentin 300 mg capsule Commonly known as:  NEURONTIN Take 1 Cap by mouth two (2) times a day. * hydroCHLOROthiazide 12.5 mg tablet Commonly known as:  HYDRODIURIL Take 1 Tab by mouth daily. * hydroCHLOROthiazide 12.5 mg capsule Commonly known as:  Deborah Alexi TAKE ONE CAPSULE BY MOUTH EVERY MORNING FOR HIGH BLOOD PRESSURE TAKE WITH BANANAS OR ORANGE JUICE  
  
 hyoscyamine SL 0.125 mg SL tablet Commonly known as:  LEVSIN/SL  
0.125 mg by SubLINGual route every four (4) hours as needed. * insulin aspart 100 unit/mL Inpn Commonly known as:  NOVOLOG  
by SubCUTAneous route. Sliding scale:  101-150 4 units 151-200 6 units 201-250  8 units 251-300 10 units Call if blood sugar is greater than 300 * NovoLOG Flexpen 100 unit/mL Inpn Generic drug:  insulin aspart INJECT 6 UNITS UNDER THE SKIN THREE TIMES DAILY  
  
 insulin detemir 100 unit/mL (3 mL) Inpn Commonly known as:  Manolo Matlock INJECT 25 UNITS UNDER THE SKIN EVERY MORNING Insulin Needles (Disposable) 32 gauge x 5/32\" Ndle Commonly known as:  Vianney Pen Needle USS THREE TIMES DAILY PLUS SLIDING SCALE  
  
 metoprolol succinate 50 mg XL tablet Commonly known as:  TOPROL-XL Take 1 Tab by mouth daily. nystatin 100,000 unit/mL suspension Commonly known as:  MYCOSTATIN Take 5 mL by mouth four (4) times daily. swish and spit Omega-3-DHA-EPA-Fish Oil 1,000 mg (120 mg-180 mg) Cap Take  by mouth two (2) times a day. ONE-A-DAY MEN'S 50+ ADVANTAGE PO Take  by mouth daily. rosuvastatin 40 mg tablet Commonly known as:  CRESTOR Take 1 Tab by mouth nightly. NON FORMULARY SITagliptin-metFORMIN 50-1,000 mg per tablet Commonly known as:  Hannah Osier Take 1 Tab by mouth two (2) times daily (with meals). SYMBICORT 160-4.5 mcg/actuation [facility] Generic drug:  budesonide-formoterol TAKE 2 PUFFS BY MOUTH TWICE DAILY  
  
 traMADol 50 mg tablet Commonly known as:  ULTRAM  
Take 50 mg by mouth every six (6) hours as needed for Pain.  
  
 valsartan 320 mg tablet Commonly known as:  DIOVAN Take 1 Tab by mouth daily. VIBERZI 75 mg tablet Generic drug:  eluxadoline TAKE 1 TABLET BY MOUTH TWICE DAILY WITH MEALS  
  
 VITAMIN B-12 1,000 mcg tablet Generic drug:  cyanocobalamin Take 1,000 mcg by mouth daily. XARELTO 20 mg Tab tablet Generic drug:  rivaroxaban TAKE 1 TABLET BY MOUTH DAILY WITH BREAKFAST * Notice: This list has 4 medication(s) that are the same as other medications prescribed for you. Read the directions carefully, and ask your doctor or other care provider to review them with you. We Performed the Following AMB POC EKG ROUTINE W/ 12 LEADS, INTER & REP [28369 CPT(R)] Introducing [facility]! Dear Viola Meyer: Thank you for requesting a [employer] account. Our records indicate that you already have an active [employer] account. You can access your account anytime at [email or web address] Did you know that you can access your hospital and ER discharge instructions at any time in [employer]? You can also review all of your test results from your hospital stay or ER visit. Additional Information If you have questions, please visit the Frequently Asked Questions section of the [employer] website at [email or web address] Remember, [employer] is NOT to be used for urgent needs. For medical emergencies, dial 911. Now available from your iPhone and Android! Please provide this summary of care documentation to your next provider. Your primary care clinician is listed as Tarah Vásquez. If you have any questions after today's visit, please call [phone].
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
## 2018-02-09 NOTE — PROGRESS NOTES
1. Have you been to the ER, urgent care clinic since your last visit? Hospitalized since your last visit? No     2. Have you seen or consulted any other health care providers outside of the [address] [place] [address] since your last visit? Include any pap smears or colon screening.  No

## 2018-04-11 DIAGNOSIS — N20.0 KIDNEY STONE: Primary | ICD-10-CM

## 2018-04-11 NOTE — PROGRESS NOTES
RBV Per Dr. Sandra Paredes order placed for patient to have KUB prior to next appointment. Brandyn Galvan

## 2018-04-23 ENCOUNTER — HOSPITAL ENCOUNTER (OUTPATIENT)
Dept: GENERAL RADIOLOGY | Age: 58
Discharge: HOME | End: 2018-04-23
Payer: COMMERCIAL

## 2018-04-23 DIAGNOSIS — N20.0 KIDNEY STONE: ICD-10-CM

## 2018-04-23 PROCEDURE — 74018 RADEX ABDOMEN 1 VIEW: CPT

## 2018-04-25 ENCOUNTER — OFFICE VISIT (OUTPATIENT)
Dept: ORTHOPEDIC SURGERY | Facility: CLINIC | Age: 58
End: 2018-04-25

## 2018-04-25 VITALS
DIASTOLIC BLOOD PRESSURE: 82 MMHG | WEIGHT: 271.2 LBS | RESPIRATION RATE: 18 BRPM | OXYGEN SATURATION: 96 % | TEMPERATURE: 97.6 F | BODY MASS INDEX: 37.97 KG/M2 | SYSTOLIC BLOOD PRESSURE: 146 MMHG | HEIGHT: 71 IN | HEART RATE: 79 BPM

## 2018-04-25 DIAGNOSIS — Z96.651 HISTORY OF TOTAL RIGHT KNEE REPLACEMENT: Primary | ICD-10-CM

## 2018-04-25 DIAGNOSIS — M25.561 CHRONIC PAIN OF RIGHT KNEE: ICD-10-CM

## 2018-04-25 DIAGNOSIS — G89.29 CHRONIC PAIN OF RIGHT KNEE: ICD-10-CM

## 2018-04-25 DIAGNOSIS — M70.51 PES ANSERINUS BURSITIS OF RIGHT KNEE: ICD-10-CM

## 2018-04-25 PROBLEM — E66.01 SEVERE OBESITY (BMI 35.0-39.9) WITH COMORBIDITY (HCC): Status: ACTIVE | Noted: 2018-04-25

## 2018-04-25 RX ORDER — BETAMETHASONE SODIUM PHOSPHATE AND BETAMETHASONE ACETATE 3; 3 MG/ML; MG/ML
6 INJECTION, SUSPENSION INTRA-ARTICULAR; INTRALESIONAL; INTRAMUSCULAR; SOFT TISSUE ONCE
Qty: 1 ML | Refills: 0
Start: 2018-04-25 | End: 2018-04-25

## 2018-04-30 DIAGNOSIS — E11.9 WELL CONTROLLED DIABETES MELLITUS (HCC): ICD-10-CM

## 2018-04-30 DIAGNOSIS — I10 ESSENTIAL HYPERTENSION: ICD-10-CM

## 2018-04-30 DIAGNOSIS — Z11.59 NEED FOR HEPATITIS C SCREENING TEST: ICD-10-CM

## 2018-04-30 DIAGNOSIS — Z12.5 PROSTATE CANCER SCREENING: ICD-10-CM

## 2018-04-30 NOTE — TELEPHONE ENCOUNTER
Last Visit: 10/24/2017 with MD Nida Haley    Next Appointment: 05/03/2018 with MD Nida Haley   Previous Refill Encounters: 04/11/2017 per NP Sara Burrows #[phone] with 3 refills     Requested Prescriptions     Pending Prescriptions Disp Refills    rosuvastatin (CRESTOR) 40 mg tablet 90 Tab 3     Sig: Take 1 Tab by mouth nightly.

## 2018-05-01 ENCOUNTER — OFFICE VISIT (OUTPATIENT)
Dept: UROLOGY | Age: 58
End: 2018-05-01

## 2018-05-01 VITALS
HEART RATE: 69 BPM | SYSTOLIC BLOOD PRESSURE: 151 MMHG | OXYGEN SATURATION: 96 % | BODY MASS INDEX: 38.22 KG/M2 | HEIGHT: 71 IN | WEIGHT: 273 LBS | DIASTOLIC BLOOD PRESSURE: 77 MMHG

## 2018-05-01 DIAGNOSIS — N40.0 BENIGN PROSTATIC HYPERPLASIA, UNSPECIFIED WHETHER LOWER URINARY TRACT SYMPTOMS PRESENT: ICD-10-CM

## 2018-05-01 DIAGNOSIS — N52.02 CORPORO-VENOUS OCCLUSIVE ERECTILE DYSFUNCTION: ICD-10-CM

## 2018-05-01 DIAGNOSIS — N20.0 KIDNEY STONES: Primary | ICD-10-CM

## 2018-05-01 LAB
BILIRUB UR QL STRIP: NEGATIVE
GLUCOSE UR-MCNC: NORMAL MG/DL
KETONES P FAST UR STRIP-MCNC: NEGATIVE MG/DL
PH UR STRIP: 5 [PH] (ref 4.6–8)
PROT UR QL STRIP: NORMAL
SP GR UR STRIP: 1.02 (ref 1–1.03)
UA UROBILINOGEN AMB POC: NORMAL (ref 0.2–1)
URINALYSIS CLARITY POC: CLEAR
URINALYSIS COLOR POC: YELLOW
URINE BLOOD POC: NEGATIVE
URINE LEUKOCYTES POC: NEGATIVE
URINE NITRITES POC: NEGATIVE

## 2018-05-01 RX ORDER — ROSUVASTATIN CALCIUM 40 MG/1
40 TABLET, COATED ORAL
Qty: 90 TAB | Refills: 3 | Status: SHIPPED | OUTPATIENT
Start: 2018-05-01 | End: 2019-05-18 | Stop reason: SDUPTHER

## 2018-05-01 RX ORDER — VARDENAFIL HYDROCHLORIDE 20 MG/1
20 TABLET ORAL AS NEEDED
Qty: 2 TAB | Refills: 11 | Status: SHIPPED | OUTPATIENT
Start: 2018-05-01 | End: 2019-07-16 | Stop reason: ALTCHOICE
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
## 2018-05-01 NOTE — PROGRESS NOTES
Mr. Collene Kanner has a reminder for a \"due or due soon\" health maintenance. I have asked that he contact his primary care provider for follow-up on this health maintenance. RBV Per Dr. Clovis Valderrama draw lab today for PSA for BPH.

## 2018-05-01 NOTE — PATIENT INSTRUCTIONS
Kidney Stone: Care Instructions  Your Care Instructions    Kidney stones are formed when salts, minerals, and other substances normally found in the urine clump together. They can be as small as grains of sand or, rarely, as large as golf balls. While the stone is traveling through the ureter, which is the tube that carries urine from the kidney to the bladder, you will probably feel pain. The pain may be mild or very severe. You may also have some blood in your urine. As soon as the stone reaches the bladder, any intense pain should go away. If a stone is too large to pass on its own, you may need a medical procedure to help you pass the stone. The doctor has checked you carefully, but problems can develop later. If you notice any problems or new symptoms, get medical treatment right away. Follow-up care is a key part of your treatment and safety. Be sure to make and go to all appointments, and call your doctor if you are having problems. It's also a good idea to know your test results and keep a list of the medicines you take. How can you care for yourself at home? · Drink plenty of fluids, enough so that your urine is light yellow or clear like water. If you have kidney, heart, or liver disease and have to limit fluids, talk with your doctor before you increase the amount of fluids you drink. · Take pain medicines exactly as directed. Call your doctor if you think you are having a problem with your medicine. ¨ If the doctor gave you a prescription medicine for pain, take it as prescribed. ¨ If you are not taking a prescription pain medicine, ask your doctor if you can take an over-the-counter medicine. Read and follow all instructions on the label. · Your doctor may ask you to strain your urine so that you can collect your kidney stone when it passes. You can use a kitchen strainer or a tea strainer to catch the stone. Store it in a plastic bag until you see your doctor again.   Preventing future kidney stones  Some changes in your diet may help prevent kidney stones. Depending on the cause of your stones, your doctor may recommend that you:  · Drink plenty of fluids, enough so that your urine is light yellow or clear like water. If you have kidney, heart, or liver disease and have to limit fluids, talk with your doctor before you increase the amount of fluids you drink. · Limit coffee, tea, and alcohol. Also avoid grapefruit juice. · Do not take more than the recommended daily dose of vitamins C and D.  · Avoid antacids such as Gaviscon, Maalox, Mylanta, or Tums. · Limit the amount of salt (sodium) in your diet. · Eat a balanced diet that is not too high in protein. · Limit foods that are high in a substance called oxalate, which can cause kidney stones. These foods include dark green vegetables, rhubarb, chocolate, wheat bran, nuts, cranberries, and beans. When should you call for help? Call your doctor now or seek immediate medical care if:  ? · You cannot keep down fluids. ? · Your pain gets worse. ? · You have a fever or chills. ? · You have new or worse pain in your back just below your rib cage (the flank area). ? · You have new or more blood in your urine. ? Watch closely for changes in your health, and be sure to contact your doctor if:  ? · You do not get better as expected. Where can you learn more? Go to http://vasile-fito.info/. Enter Q848 in the search box to learn more about \"Kidney Stone: Care Instructions. \"  Current as of: May 12, 2017  Content Version: 11.4  © 7968-6737 [employer]. Care instructions adapted under license by [employer] (which disclaims liability or warranty for this information). If you have questions about a medical condition or this instruction, always ask your healthcare professional. [address] any warranty or liability for your use of this information.

## 2018-05-01 NOTE — MR AVS SNAPSHOT
[address] [facility] Benji A [address] [address] 
[phone] Patient: Elsy Castellanos. MRN: [id number] :[id number] Visit Information Date & Time Provider Department Dept. Phone Encounter #  
 2018  9:00 AM Lani Danielle, [address] [facility]  Your Appointments 5/3/2018  8:30 AM  
Office Visit with Anand Barcenas MD  
[facility] Appt Note: f/u for diabetes [address], [address] [place] [address] [address] [place]  
  
   
 [address], [address] [facility]  
  
    
 5/10/2018  8:30 AM  
Follow Up with Kolby Araujo MD  
[facility]) Appt Note: 2 WK FU LT KNEE  
 [address] [facility], [address] [facility] [address]  
[phone]  
  
   
 [address] [facility], [address] [facility] [address] 2018  9:40 AM  
Follow Up with Tamica Lancaster MD  
[facility] Appt Note: 6 month follow up [place] [address] [address] [address] [phone] [address]  
  
    
 2019  9:00 AM  
Office Visit with Lani Danielle MD  
[facility]) Appt Note: checkup/No KUB per Dr Charline Guillen [address] [facility] [address] Ben[place] A [address] [address]  
[phone] [address] [facility] [address] [facility] [address] Upcoming Health Maintenance Date Due  
 EYE EXAM RETINAL OR DILATED Q1 2017 HEMOGLOBIN A1C Q6M 2018 FOOT EXAM Q1 2018 LIPID PANEL Q1 2018 MICROALBUMIN Q1 2018 Influenza Age 5 to Adult 2018 COLONOSCOPY 2026 DTaP/Tdap/Td series (2 - Td) 2027 Allergies as of 2018  Review Complete On: 2018 By: Lani Danielle MD  
  
 Severity Noted Reaction Type Reactions Penicillins Medium 08/17/2010    Hives Current Immunizations  Reviewed on 2/13/2017 Name Date Influenza Vaccine 10/1/2017 Pneumococcal Polysaccharide (PPSV-23) 2/13/2017 Tdap 2/13/2017 Not reviewed this visit You Were Diagnosed With   
  
 Codes Comments Kidney stones    -  Primary ICD-10-CM: N20.0 ICD-9-CM: 592.0 Benign prostatic hyperplasia, unspecified whether lower urinary tract symptoms present     ICD-10-CM: N40.0 ICD-9-CM: 600.00 Corporo-venous occlusive erectile dysfunction     ICD-10-CM: N52.02 
ICD-9-CM: 607.84 Vitals BP Pulse Height(growth percentile) Weight(growth percentile) SpO2 BMI  
 151/77 (BP 1 Location: Left arm, BP Patient Position: Sitting) 69 5' 11\" (1.803 m) 273 lb (123.8 kg) 96% 38.08 kg/m2 Smoking Status Passive Smoke Exposure - Never Smoker Vitals History BMI and BSA Data Body Mass Index Body Surface Area 38.08 kg/m 2 2.49 m 2 Preferred Pharmacy Pharmacy Name Phone [facility] [address] [place] [address] [phone] Your Updated Medication List  
  
   
This list is accurate as of 5/1/18  2:31 PM.  Always use your most recent med list.  
  
  
  
  
 albuterol 90 mcg/actuation inhaler Commonly known as:  PROVENTIL HFA, VENTOLIN HFA, PROAIR HFA Take 2 Puffs by inhalation every six (6) hours as needed for Wheezing. amLODIPine 10 mg tablet Commonly known as:  Erick Gasca Take 1 Tab by mouth daily. bipap machine kit  
by Does Not Apply route. BiPAP at 23/18 cm with heated humidifier. Mask: Simplus FF, medium size or mask of choice. Length of need 99 months. Replace mask and accessories as needed times 12 months. Please download data after 30 days and fax at [phone]. Dx: Severe FITO, Obesity, snoring. buPROPion  mg SR tablet Commonly known as:  WELLBUTRIN SR  
TAKE 1 TABLET BY MOUTH DAILY  
  
 cholecalciferol 1,000 unit Cap Commonly known as:  VITAMIN D3 Take 2 Caps by mouth daily. Fenofibrate 150 mg Cap TAKE 1 CAPSULE BY MOUTH DAILY  
  
 gabapentin 300 mg capsule Commonly known as:  NEURONTIN Take 1 Cap by mouth two (2) times a day. * hydroCHLOROthiazide 12.5 mg tablet Commonly known as:  HYDRODIURIL Take 1 Tab by mouth daily. * hydroCHLOROthiazide 12.5 mg capsule Commonly known as:  Jennifer Crest TAKE ONE CAPSULE BY MOUTH EVERY MORNING FOR HIGH BLOOD PRESSURE TAKE WITH BANANAS OR ORANGE JUICE  
  
 hyoscyamine SL 0.125 mg SL tablet Commonly known as:  LEVSIN/SL  
0.125 mg by SubLINGual route every four (4) hours as needed. * insulin aspart U-100 100 unit/mL Inpn Commonly known as:  NOVOLOG  
by SubCUTAneous route. Sliding scale:  101-150 4 units 151-200 6 units 201-250  8 units 251-300 10 units Call if blood sugar is greater than 300 * NovoLOG Flexpen U-100 Insulin 100 unit/mL Inpn Generic drug:  insulin aspart U-100 INJECT 6 UNITS UNDER THE SKIN THREE TIMES DAILY Insulin Needles (Disposable) 32 gauge x 5/32\" Ndle Commonly known as:  Vianney Pen Needle USS THREE TIMES DAILY PLUS SLIDING SCALE  
  
 LEVEMIR FLEXTOUCH U-100 INSULN 100 unit/mL (3 mL) Inpn Generic drug:  insulin detemir U-100 INJECT 25 UNITS UNDER THE SKIN EVERY MORNING  
  
 metoprolol succinate 50 mg XL tablet Commonly known as:  TOPROL-XL Take 1 Tab by mouth daily. nystatin 100,000 unit/mL suspension Commonly known as:  MYCOSTATIN Take 5 mL by mouth four (4) times daily. swish and spit  
  
 omega 3-DHA-EPA-fish oil 1,000 mg (120 mg-180 mg) capsule Take  by mouth two (2) times a day. ONE-A-DAY MEN'S 50+ ADVANTAGE PO Take  by mouth daily. rosuvastatin 40 mg tablet Commonly known as:  CRESTOR Take 1 Tab by mouth nightly. SITagliptin-metFORMIN 50-1,000 mg per tablet Commonly known as:  Sugey Kristen Take 1 Tab by mouth two (2) times daily (with meals). SYMBICORT 160-4.5 mcg/actuation [facility] Generic drug:  budesonide-formoterol TAKE 2 PUFFS BY MOUTH TWICE DAILY  
  
 traMADol 50 mg tablet Commonly known as:  ULTRAM  
Take 50 mg by mouth every six (6) hours as needed for Pain.  
  
 valsartan 320 mg tablet Commonly known as:  DIOVAN Take 1 Tab by mouth daily. vardenafil 20 mg tablet Commonly known as:  LEVITRA Take 20 mg by mouth as needed. Indications: Erectile Dysfunction VIBERZI 75 mg tablet Generic drug:  eluxadoline TAKE 1 TABLET BY MOUTH TWICE DAILY WITH MEALS  
  
 VITAMIN B-12 1,000 mcg tablet Generic drug:  cyanocobalamin Take 1,000 mcg by mouth daily. XARELTO 20 mg Tab tablet Generic drug:  rivaroxaban TAKE 1 TABLET BY MOUTH DAILY WITH BREAKFAST * Notice: This list has 4 medication(s) that are the same as other medications prescribed for you. Read the directions carefully, and ask your doctor or other care provider to review them with you. Prescriptions Sent to Pharmacy Refills  
 vardenafil (LEVITRA) 20 mg tablet 11 Sig: Take 20 mg by mouth as needed. Indications: Erectile Dysfunction Class: Normal  
 Pharmacy: [facility] [address] [place] [address] Wilfred[address] [place],[facility] Ph #: [phone] Route: Oral  
  
We Performed the Following AMB POC URINALYSIS DIP STICK AUTO W/O MICRO [17035 CPT(R)] COLLECTION VENOUS BLOOD,VENIPUNCTURE [id number] CPT(R)] PSA, DIAGNOSTIC (PROSTATE SPECIFIC AG) [id number] CPT(R)] Patient Instructions Kidney Stone: Care Instructions Your Care Instructions Kidney stones are formed when salts, minerals, and other substances normally found in the urine clump together. They can be as small as grains of sand or, rarely, as large as golf balls. While the stone is traveling through the ureter, which is the tube that carries urine from the kidney to the bladder, you will probably feel pain. The pain may be mild or very severe. You may also have some blood in your urine. As soon as the stone reaches the bladder, any intense pain should go away. If a stone is too large to pass on its own, you may need a medical procedure to help you pass the stone. The doctor has checked you carefully, but problems can develop later. If you notice any problems or new symptoms, get medical treatment right away. Follow-up care is a key part of your treatment and safety. Be sure to make and go to all appointments, and call your doctor if you are having problems. It's also a good idea to know your test results and keep a list of the medicines you take. How can you care for yourself at home? · Drink plenty of fluids, enough so that your urine is light yellow or clear like water. If you have kidney, heart, or liver disease and have to limit fluids, talk with your doctor before you increase the amount of fluids you drink. · Take pain medicines exactly as directed. Call your doctor if you think you are having a problem with your medicine. ¨ If the doctor gave you a prescription medicine for pain, take it as prescribed. ¨ If you are not taking a prescription pain medicine, ask your doctor if you can take an over-the-counter medicine. Read and follow all instructions on the label. · Your doctor may ask you to strain your urine so that you can collect your kidney stone when it passes. You can use a kitchen strainer or a tea strainer to catch the stone. Store it in a plastic bag until you see your doctor again. Preventing future kidney stones Some changes in your diet may help prevent kidney stones. Depending on the cause of your stones, your doctor may recommend that you: · Drink plenty of fluids, enough so that your urine is light yellow or clear like water. If you have kidney, heart, or liver disease and have to limit fluids, talk with your doctor before you increase the amount of fluids you drink. · Limit coffee, tea, and alcohol. Also avoid grapefruit juice. · Do not take more than the recommended daily dose of vitamins C and D. 
· Avoid antacids such as Gaviscon, Maalox, Mylanta, or Tums. · Limit the amount of salt (sodium) in your diet. · Eat a balanced diet that is not too high in protein. · Limit foods that are high in a substance called oxalate, which can cause kidney stones. These foods include dark green vegetables, rhubarb, chocolate, wheat bran, nuts, cranberries, and beans. When should you call for help? Call your doctor now or seek immediate medical care if: 
? · You cannot keep down fluids. ? · Your pain gets worse. ? · You have a fever or chills. ? · You have new or worse pain in your back just below your rib cage (the flank area). ? · You have new or more blood in your urine. ? Watch closely for changes in your health, and be sure to contact your doctor if: 
? · You do not get better as expected. Where can you learn more? Go to http://vasile-fito.info/. Enter Y920 in the search box to learn more about \"Kidney Stone: Care Instructions. \" Current as of: May 12, 2017 Content Version: 11.4 © 3954-2954 [employer]. Care instructions adapted under license by [employer] (which disclaims liability or warranty for this information). If you have questions about a medical condition or this instruction, always ask your healthcare professional. [place] [address] any warranty or liability for your use of this information. Introducing [facility]! Dear Anaya Starr: Thank you for requesting a [employer] account. Our records indicate that you already have an active [employer] account. You can access your account anytime at [email or web address] Did you know that you can access your hospital and ER discharge instructions at any time in [employer]? You can also review all of your test results from your hospital stay or ER visit. Additional Information If you have questions, please visit the Frequently Asked Questions section of the [employer] website at [email or web address] Remember, [employer] is NOT to be used for urgent needs. For medical emergencies, dial 911. Now available from your iPhone and Android! Please provide this summary of care documentation to your next provider. Your primary care clinician is listed as Guicho Worley. If you have any questions after today's visit, please call [phone].

## 2018-05-02 LAB — PSA SERPL-MCNC: 1.08 NG/ML

## 2018-05-03 ENCOUNTER — OFFICE VISIT (OUTPATIENT)
Dept: INTERNAL MEDICINE CLINIC | Age: 58
End: 2018-05-03

## 2018-05-03 VITALS
HEIGHT: 71 IN | BODY MASS INDEX: 38.08 KG/M2 | HEART RATE: 70 BPM | RESPIRATION RATE: 16 BRPM | DIASTOLIC BLOOD PRESSURE: 82 MMHG | OXYGEN SATURATION: 96 % | SYSTOLIC BLOOD PRESSURE: 137 MMHG | WEIGHT: 272 LBS | TEMPERATURE: 96.7 F

## 2018-05-03 DIAGNOSIS — J44.9 CHRONIC OBSTRUCTIVE PULMONARY DISEASE, UNSPECIFIED COPD TYPE (HCC): ICD-10-CM

## 2018-05-03 DIAGNOSIS — I10 ESSENTIAL HYPERTENSION: ICD-10-CM

## 2018-05-03 DIAGNOSIS — E66.01 SEVERE OBESITY (BMI 35.0-39.9) WITH COMORBIDITY (HCC): ICD-10-CM

## 2018-05-03 DIAGNOSIS — K75.81 NASH (NONALCOHOLIC STEATOHEPATITIS): ICD-10-CM

## 2018-05-03 DIAGNOSIS — G47.33 OSA TREATED WITH BIPAP: ICD-10-CM

## 2018-05-03 DIAGNOSIS — E78.5 DYSLIPIDEMIA: ICD-10-CM

## 2018-05-03 DIAGNOSIS — N40.0 BENIGN PROSTATIC HYPERPLASIA, UNSPECIFIED WHETHER LOWER URINARY TRACT SYMPTOMS PRESENT: ICD-10-CM

## 2018-05-03 DIAGNOSIS — R16.1 SPLENOMEGALY: ICD-10-CM

## 2018-05-03 DIAGNOSIS — R80.9 MICROALBUMINURIA: ICD-10-CM

## 2018-05-03 DIAGNOSIS — E11.9 WELL CONTROLLED DIABETES MELLITUS (HCC): Primary | ICD-10-CM

## 2018-05-03 DIAGNOSIS — I48.19 PERSISTENT ATRIAL FIBRILLATION (HCC): ICD-10-CM
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
## 2018-05-03 NOTE — PROGRESS NOTES
1. Have you been to the ER, urgent care clinic since your last visit? Hospitalized since your last visit? No    2. Have you seen or consulted any other health care providers outside of the [address] [place] [address] since your last visit? Include any pap smears or colon screening.    Urology

## 2018-05-03 NOTE — MR AVS SNAPSHOT
Lorenajessica Archibald 
 
 
 [address], [address] [facility] 
[phone] Patient: Ivon Barors. MRN: [id number] :1960 Visit Information Date & Time Provider Department Dept. Phone Encounter #  
 5/3/2018  8:30 AM Hermes Patterson MD [phone] Follow-up Instructions Return in about 14 weeks (around 2018) for BP check, DM check. Your Appointments 5/10/2018  8:30 AM  
Follow Up with Author Luiz MD  
[facility] [address]) Appt Note: 2 WK FU LT KNEE  
 [address] [facility], [address] [facility] [address] [phone]  
  
   
 [address] [employer], [address] [place] [id number] 2018  9:40 AM  
Follow Up with Raeann Greene MD  
[facility] ([facility]) Appt Note: 6 month follow up [place] [address] [address] [address] [phone] [address] [facility] [address]  [address] 2018  8:00 AM  
Office Visit with Hermes Patterson MD  
[facility] Appt Note: 3 month follow up  
 [address] [facility], [address] [address] [address] [place]  
  
   
 [address] [facility]  
  
    
 2019  9:00 AM  
Office Visit with Courtney Kumar MD  
[facility] Appt Note: checkup/No KUB per Dr Ragini Martines [address] [place] [facility] [address]  
[phone] [address] [facility] [address] [place] [address] Upcoming Health Maintenance Date Due  
 EYE EXAM RETINAL OR DILATED Q1 2017 HEMOGLOBIN A1C Q6M 2018 FOOT EXAM Q1 2018 LIPID PANEL Q1 2018 MICROALBUMIN Q1 2018 Influenza Age 5 to Adult 2018 COLONOSCOPY 2026 DTaP/Tdap/Td series (2 - Td) 2027 Allergies as of 5/3/2018  Review Complete On: 5/3/2018 By: Rosita Osler, MD  
  
 Severity Noted Reaction Type Reactions Penicillins Medium 08/17/2010    Hives Current Immunizations  Reviewed on 2/13/2017 Name Date Influenza Vaccine 10/1/2017 Pneumococcal Polysaccharide (PPSV-23) 2/13/2017 Tdap 2/13/2017 Not reviewed this visit You Were Diagnosed With   
  
 Codes Comments Severe obesity (BMI 35.0-39. 9) with comorbidity ([address]    -  Primary ICD-10-CM: E66.01 
ICD-9-CM: 278.01 Well controlled diabetes mellitus ([address]     ICD-10-CM: E11.9 ICD-9-CM: 250.00 Splenomegaly     ICD-10-CM: R16.1 ICD-9-CM: 789.2 Benign prostatic hyperplasia, unspecified whether lower urinary tract symptoms present     ICD-10-CM: N40.0 ICD-9-CM: 600.00 Microalbuminuria     ICD-10-CM: R80.9 ICD-9-CM: 791.0   
 KILLIAN (nonalcoholic steatohepatitis)     ICD-10-CM: W15.94 ICD-9-CM: 571.8 Essential hypertension     ICD-10-CM: I10 
ICD-9-CM: 401.9 Dyslipidemia     ICD-10-CM: E78.5 ICD-9-CM: 272.4 FITO treated with BiPAP     ICD-10-CM: G47.33 
ICD-9-CM: 327.23 Vitals BP Pulse Temp Resp Height(growth percentile) Weight(growth percentile) 137/82 70 96.7 °F (35.9 °C) 16 5' 11\" (1.803 m) 272 lb (123.4 kg) SpO2 BMI Smoking Status 96% 37.94 kg/m2 Passive Smoke Exposure - Never Smoker Vitals History BMI and BSA Data Body Mass Index Body Surface Area  
 37.94 kg/m 2 2.49 m 2 Preferred Pharmacy Pharmacy Name Phone [facility] [address] [place] [address] [phone] Your Updated Medication List  
  
   
This list is accurate as of 5/3/18  9:14 AM.  Always use your most recent med list.  
  
  
  
  
 albuterol 90 mcg/actuation inhaler Commonly known as:  PROVENTIL HFA, VENTOLIN HFA, PROAIR HFA Take 2 Puffs by inhalation every six (6) hours as needed for Wheezing. amLODIPine 10 mg tablet Commonly known as:  Melissa Matar Take 1 Tab by mouth daily. bipap machine kit  
by Does Not Apply route. BiPAP at 23/18 cm with heated humidifier. Mask: Simplus FF, medium size or mask of choice. Length of need 99 months. Replace mask and accessories as needed times 12 months. Please download data after 30 days and fax at [phone]. Dx: Severe FITO, Obesity, snoring. buPROPion  mg SR tablet Commonly known as:  WELLBUTRIN SR  
TAKE 1 TABLET BY MOUTH DAILY  
  
 cholecalciferol 1,000 unit Cap Commonly known as:  VITAMIN D3 Take 2 Caps by mouth daily. Fenofibrate 150 mg Cap TAKE 1 CAPSULE BY MOUTH DAILY  
  
 gabapentin 300 mg capsule Commonly known as:  NEURONTIN Take 1 Cap by mouth two (2) times a day. hydroCHLOROthiazide 12.5 mg capsule Commonly known as:  Marianna Donate TAKE ONE CAPSULE BY MOUTH EVERY MORNING FOR HIGH BLOOD PRESSURE TAKE WITH BANANAS OR ORANGE JUICE  
  
 hyoscyamine SL 0.125 mg SL tablet Commonly known as:  LEVSIN/SL  
0.125 mg by SubLINGual route every four (4) hours as needed. * insulin aspart U-100 100 unit/mL Inpn Commonly known as:  NOVOLOG  
by SubCUTAneous route. Sliding scale:  101-150 4 units 151-200 6 units 201-250  8 units 251-300 10 units Call if blood sugar is greater than 300 * NovoLOG Flexpen U-100 Insulin 100 unit/mL Inpn Generic drug:  insulin aspart U-100 INJECT 6 UNITS UNDER THE SKIN THREE TIMES DAILY Insulin Needles (Disposable) 32 gauge x 5/32\" Ndle Commonly known as:  Vianney Pen Needle USS THREE TIMES DAILY PLUS SLIDING SCALE  
  
 LEVEMIR FLEXTOUCH U-100 INSULN 100 unit/mL (3 mL) Inpn Generic drug:  insulin detemir U-100 INJECT 25 UNITS UNDER THE SKIN EVERY MORNING  
  
 metoprolol succinate 50 mg XL tablet Commonly known as:  TOPROL-XL Take 1 Tab by mouth daily. nystatin 100,000 unit/mL suspension Commonly known as:  MYCOSTATIN  
 Take 5 mL by mouth four (4) times daily. swish and spit  
  
 omega 3-DHA-EPA-fish oil 1,000 mg (120 mg-180 mg) capsule Take  by mouth two (2) times a day. ONE-A-DAY MEN'S 50+ ADVANTAGE PO Take  by mouth daily. rosuvastatin 40 mg tablet Commonly known as:  CRESTOR Take 1 Tab by mouth nightly. SITagliptin-metFORMIN 50-1,000 mg per tablet Commonly known as:  Emily De La Torre Take 1 Tab by mouth two (2) times daily (with meals). SYMBICORT 160-4.5 mcg/actuation [facility] Generic drug:  budesonide-formoterol TAKE 2 PUFFS BY MOUTH TWICE DAILY  
  
 traMADol 50 mg tablet Commonly known as:  ULTRAM  
Take 50 mg by mouth every six (6) hours as needed for Pain.  
  
 valsartan 320 mg tablet Commonly known as:  DIOVAN Take 1 Tab by mouth daily. vardenafil 20 mg tablet Commonly known as:  LEVITRA Take 20 mg by mouth as needed. Indications: Erectile Dysfunction VIBERZI 75 mg tablet Generic drug:  eluxadoline TAKE 1 TABLET BY MOUTH TWICE DAILY WITH MEALS  
  
 VITAMIN B-12 1,000 mcg tablet Generic drug:  cyanocobalamin Take 1,000 mcg by mouth daily. XARELTO 20 mg Tab tablet Generic drug:  rivaroxaban TAKE 1 TABLET BY MOUTH DAILY WITH BREAKFAST * Notice: This list has 2 medication(s) that are the same as other medications prescribed for you. Read the directions carefully, and ask your doctor or other care provider to review them with you. Follow-up Instructions Return in about 14 weeks (around 8/9/2018) for BP check, DM check. To-Do List   
 05/03/2018 Lab:  CBC WITH AUTOMATED DIFF Around 05/03/2018 Lab:  HEMOGLOBIN A1C W/O EAG   
  
 05/03/2018 Lab:  LIPID PANEL   
  
 05/03/2018 Lab:  METABOLIC PANEL, COMPREHENSIVE   
  
 05/03/2018 Lab:  MICROALBUMIN, UR, RAND W/ MICROALB/CREAT RATIO Patient Instructions Learning About Diabetes Food Guidelines Your Care Instructions Meal planning is important to manage diabetes. It helps keep your blood sugar at a target level (which you set with your doctor). You don't have to eat special foods. You can eat what your family eats, including sweets once in a while. But you do have to pay attention to how often you eat and how much you eat of certain foods. You may want to work with a dietitian or a certified diabetes educator (CDE) to help you plan meals and snacks. A dietitian or CDE can also help you lose weight if that is one of your goals. What should you know about eating carbs? Managing the amount of carbohydrate (carbs) you eat is an important part of healthy meals when you have diabetes. Carbohydrate is found in many foods. · Learn which foods have carbs. And learn the amounts of carbs in different foods. ¨ Bread, cereal, pasta, and rice have about 15 grams of carbs in a serving. A serving is 1 slice of bread (1 ounce), ½ cup of cooked cereal, or 1/3 cup of cooked pasta or rice. ¨ Fruits have 15 grams of carbs in a serving. A serving is 1 small fresh fruit, such as an apple or orange; ½ of a banana; ½ cup of cooked or canned fruit; ½ cup of fruit juice; 1 cup of melon or raspberries; or 2 tablespoons of dried fruit. ¨ Milk and no-sugar-added yogurt have 15 grams of carbs in a serving. A serving is 1 cup of milk or 2/3 cup of no-sugar-added yogurt. ¨ Starchy vegetables have 15 grams of carbs in a serving. A serving is ½ cup of mashed potatoes or sweet potato; 1 cup winter squash; ½ of a small baked potato; ½ cup of cooked beans; or ½ cup cooked corn or green peas. · Learn how much carbs to eat each day and at each meal. A dietitian or CDE can teach you how to keep track of the amount of carbs you eat. This is called carbohydrate counting. · If you are not sure how to count carbohydrate grams, use the Plate Method to plan meals. It is a good, quick way to make sure that you have a balanced meal. It also helps you spread carbs throughout the day. ¨ Divide your plate by types of foods. Put non-starchy vegetables on half the plate, meat or other protein food on one-quarter of the plate, and a grain or starchy vegetable in the final quarter of the plate. To this you can add a small piece of fruit and 1 cup of milk or yogurt, depending on how many carbs you are supposed to eat at a meal. 
· Try to eat about the same amount of carbs at each meal. Do not \"save up\" your daily allowance of carbs to eat at one meal. 
· Proteins have very little or no carbs per serving. Examples of proteins are beef, chicken, turkey, fish, eggs, tofu, cheese, cottage cheese, and peanut butter. A serving size of meat is 3 ounces, which is about the size of a deck of cards. Examples of meat substitute serving sizes (equal to 1 ounce of meat) are 1/4 cup of cottage cheese, 1 egg, 1 tablespoon of peanut butter, and ½ cup of tofu. How can you eat out and still eat healthy? · Learn to estimate the serving sizes of foods that have carbohydrate. If you measure food at home, it will be easier to estimate the amount in a serving of restaurant food. · If the meal you order has too much carbohydrate (such as potatoes, corn, or baked beans), ask to have a low-carbohydrate food instead. Ask for a salad or green vegetables. · If you use insulin, check your blood sugar before and after eating out to help you plan how much to eat in the future. · If you eat more carbohydrate at a meal than you had planned, take a walk or do other exercise. This will help lower your blood sugar. What else should you know? · Limit saturated fat, such as the fat from meat and dairy products. This is a healthy choice because people who have diabetes are at higher risk of heart disease. So choose lean cuts of meat and nonfat or low-fat dairy products. Use olive or canola oil instead of butter or shortening when cooking. · Don't skip meals. Your blood sugar may drop too low if you skip meals and take insulin or certain medicines for diabetes. · Check with your doctor before you drink alcohol. Alcohol can cause your blood sugar to drop too low. Alcohol can also cause a bad reaction if you take certain diabetes medicines. Follow-up care is a key part of your treatment and safety. Be sure to make and go to all appointments, and call your doctor if you are having problems. It's also a good idea to know your test results and keep a list of the medicines you take. Where can you learn more? Go to http://vasile-fito.info/. Enter J117 in the search box to learn more about \"Learning About Diabetes Food Guidelines. \" Current as of: March 13, 2017 Content Version: 11.4 © 1416-8035 [employer]. Care instructions adapted under license by [employer] (which disclaims liability or warranty for this information). If you have questions about a medical condition or this instruction, always ask your healthcare professional. [address] any warranty or liability for your use of this information. Introducing [facility]! Dear Shanice Rosales: 
Thank you for requesting a Phylogy account. Our records indicate that you already have an active Phylogy account. You can access your account anytime at [email or web address] Did you know that you can access your hospital and ER discharge instructions at any time in Phylogy? You can also review all of your test results from your hospital stay or ER visit. Additional Information If you have questions, please visit the Frequently Asked Questions section of the [email or web address] website at [email or web address] Remember, Vidaao is NOT to be used for urgent needs. For medical emergencies, dial 911. Now available from your iPhone and Android! Please provide this summary of care documentation to your next provider. Your primary care clinician is listed as Alvarado Barron. If you have any questions after today's visit, please call [phone].

## 2018-05-03 NOTE — PATIENT INSTRUCTIONS

## 2018-05-04 ENCOUNTER — TELEPHONE (OUTPATIENT)
Dept: INTERNAL MEDICINE CLINIC | Age: 58
End: 2018-05-04

## 2018-05-10 ENCOUNTER — OFFICE VISIT (OUTPATIENT)
Dept: ORTHOPEDIC SURGERY | Facility: CLINIC | Age: 58
End: 2018-05-10

## 2018-05-10 ENCOUNTER — HOSPITAL ENCOUNTER (OUTPATIENT)
Dept: LAB | Age: 58
Discharge: HOME | End: 2018-05-10

## 2018-05-10 VITALS
RESPIRATION RATE: 18 BRPM | HEART RATE: 84 BPM | HEIGHT: 71 IN | WEIGHT: 277.4 LBS | OXYGEN SATURATION: 95 % | BODY MASS INDEX: 38.84 KG/M2 | DIASTOLIC BLOOD PRESSURE: 83 MMHG | TEMPERATURE: 97 F | SYSTOLIC BLOOD PRESSURE: 143 MMHG

## 2018-05-10 DIAGNOSIS — M70.51 PES ANSERINUS BURSITIS OF RIGHT KNEE: ICD-10-CM

## 2018-05-10 DIAGNOSIS — Z96.651 HISTORY OF TOTAL RIGHT KNEE REPLACEMENT: ICD-10-CM

## 2018-05-10 DIAGNOSIS — E11.8 TYPE 2 DIABETES MELLITUS WITH COMPLICATION, UNSPECIFIED WHETHER LONG TERM INSULIN USE: ICD-10-CM

## 2018-05-10 DIAGNOSIS — M17.12 PRIMARY OSTEOARTHRITIS OF LEFT KNEE: Primary | ICD-10-CM

## 2018-05-10 LAB — SENTARA SPECIMEN COL,SENBCF: NORMAL

## 2018-05-10 PROCEDURE — 99001 SPECIMEN HANDLING PT-LAB: CPT | Performed by: ORTHOPAEDIC SURGERY

## 2018-05-10 RX ORDER — BETAMETHASONE SODIUM PHOSPHATE AND BETAMETHASONE ACETATE 3; 3 MG/ML; MG/ML
6 INJECTION, SUSPENSION INTRA-ARTICULAR; INTRALESIONAL; INTRAMUSCULAR; SOFT TISSUE ONCE
Qty: 1 ML | Refills: 0
Start: 2018-05-10 | End: 2018-05-10
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
## 2018-05-17 ENCOUNTER — TELEPHONE (OUTPATIENT)
Dept: ORTHOPEDIC SURGERY | Facility: CLINIC | Age: 58
End: 2018-05-17

## 2018-06-11 ENCOUNTER — OFFICE VISIT (OUTPATIENT)
Dept: ORTHOPEDIC SURGERY | Age: 58
End: 2018-06-11

## 2018-06-11 VITALS
TEMPERATURE: 97.4 F | SYSTOLIC BLOOD PRESSURE: 141 MMHG | BODY MASS INDEX: 38.08 KG/M2 | HEIGHT: 71 IN | OXYGEN SATURATION: 95 % | DIASTOLIC BLOOD PRESSURE: 78 MMHG | HEART RATE: 68 BPM | WEIGHT: 272 LBS

## 2018-06-11 DIAGNOSIS — M43.16 SPONDYLOLISTHESIS OF LUMBAR REGION: Primary | ICD-10-CM

## 2018-06-11 DIAGNOSIS — E11.42 DIABETIC POLYNEUROPATHY ASSOCIATED WITH TYPE 2 DIABETES MELLITUS (HCC): ICD-10-CM

## 2018-06-11 RX ORDER — TOPIRAMATE 25 MG/1
TABLET ORAL
Qty: 60 TAB | Refills: 1 | Status: SHIPPED | OUTPATIENT
Start: 2018-06-11 | End: 2018-10-01

## 2018-06-11 NOTE — MR AVS SNAPSHOT
[address] [place] [address] [address] [facility] [address] 
[phone] Patient: Armond Garcia. MRN: [id number] :1960 Visit Information Date & Time Provider Department Dept. Phone Encounter #  
 2018  2:30  [address], MD  [facility] [phone] [id number] Follow-up Instructions Return in about 6 weeks (around 2018). Your Appointments 2018 11:30 AM  
Nurse Visit with [facility] ROHIT [address] ([facility]) Appt Note: apt 12pm  
 [address] [place] [address], [address] [address]  
[phone]  
  
   
 [address] [place] [address], [address] [place] [address],[address]  [facility] [address]  
  
    
 2018 12:00 PM  
Follow Up with Ashia Dial MD  
[address] ([facility]) Appt Note: from 2016; [place], [address] [address]  
[phone]  
  
   
 [address] [place] [address], [address] [place] [address],[address]  [facility] [address]  
  
    
 2018  8:15 AM  
Follow Up with Geroldine Rinne, MD  
[address]14 [facility], [address] [facility] - Shivani Guerrero [facility]) Appt Note: 3 mo f/u LT KNEE  
 [address] [facility], [address] [facility] [address]  
[phone]  
  
   
 340 [facility], [address] [place] [address] 2018  9:40 AM  
Follow Up with Kate Ferreira MD  
[facility] ([facility]) Appt Note: 6 month follow up [facility] [address] [address] [address] [phone] 1212 [facility] [address] 2018  8:00 AM  
Office Visit with Rosita Osler, MD  
[facility] [address] [place] [address] [facility]) Appt Note: 3 month follow up  
 [address], [address], [address] [place] [address] Rd  
  
    
 2019  9:00 AM  
 Office Visit with Kristopher Oro MD  
[facility]) Appt Note: checkup/No KUB per Dr Haily Hickey [address] [facility] [address] [facility] [address]  
[phone] [address] [place] [address] Upcoming Health Maintenance Date Due  
 LIPID PANEL Q1 4/26/2018 MICROALBUMIN Q1 6/9/2018 Influenza Age 5 to Adult 8/1/2018 HEMOGLOBIN A1C Q6M 11/11/2018 EYE EXAM RETINAL OR DILATED Q1 2/15/2019 FOOT EXAM Q1 5/3/2019 COLONOSCOPY 6/27/2026 DTaP/Tdap/Td series (2 - Td) 2/13/2027 Allergies as of 6/11/2018  Review Complete On: 6/11/2018 By: Michael Gutierrez Severity Noted Reaction Type Reactions Penicillins Medium 08/17/2010    Hives Current Immunizations  Reviewed on 2/13/2017 Name Date Influenza Vaccine 10/1/2017 Pneumococcal Polysaccharide (PPSV-23) 2/13/2017 Tdap 2/13/2017 Not reviewed this visit You Were Diagnosed With   
  
 Codes Comments Spondylolisthesis of lumbar region    -  Primary ICD-10-CM: M43.16 
ICD-9-CM: 738.4 Diabetic polyneuropathy associated with type 2 diabetes mellitus ([facility].)     ICD-10-CM: E11.42 
ICD-9-CM: 250.60, 357.2 Vitals BP Pulse Temp Height(growth percentile) Weight(growth percentile) SpO2  
 141/78 68 97.4 °F (36.3 °C) (Oral) 5' 11\" (1.803 m) 272 lb (123.4 kg) 95% BMI Smoking Status 37.94 kg/m2 Passive Smoke Exposure - Never Smoker BMI and BSA Data Body Mass Index Body Surface Area  
 37.94 kg/m 2 2.49 m 2 Preferred Pharmacy Pharmacy Name Phone [facility] [phone] [facility] [phone] Your Updated Medication List  
  
   
This list is accurate as of 6/11/18  3:13 PM.  Always use your most recent med list.  
  
  
  
  
 albuterol 90 mcg/actuation inhaler Commonly known as:  PROVENTIL HFA, VENTOLIN HFA, PROAIR HFA  
 Take 2 Puffs by inhalation every six (6) hours as needed for Wheezing. amLODIPine 10 mg tablet Commonly known as:  Nettiekareen Slater Take 1 Tab by mouth daily. bipap machine kit  
by Does Not Apply route. BiPAP at 23/18 cm with heated humidifier. Mask: Simplus FF, medium size or mask of choice. Length of need 99 months. Replace mask and accessories as needed times 12 months. Please download data after 30 days and fax at [phone]. Dx: Severe FITO, Obesity, snoring. buPROPion  mg SR tablet Commonly known as:  WELLBUTRIN SR  
TAKE 1 TABLET BY MOUTH DAILY  
  
 cholecalciferol 1,000 unit Cap Commonly known as:  VITAMIN D3 Take 2 Caps by mouth daily. Fenofibrate 150 mg Cap TAKE 1 CAPSULE BY MOUTH DAILY  
  
 gabapentin 300 mg capsule Commonly known as:  NEURONTIN Take 1 Cap by mouth two (2) times a day. hydroCHLOROthiazide 12.5 mg capsule Commonly known as:  Amna Rand TAKE ONE CAPSULE BY MOUTH EVERY MORNING FOR HIGH BLOOD PRESSURE TAKE WITH BANANAS OR ORANGE JUICE  
  
 * insulin aspart U-100 100 unit/mL Inpn Commonly known as:  NOVOLOG  
by SubCUTAneous route. Sliding scale:  101-150 4 units 151-200 6 units 201-250  8 units 251-300 10 units Call if blood sugar is greater than 300 * NovoLOG Flexpen U-100 Insulin 100 unit/mL Inpn Generic drug:  insulin aspart U-100 INJECT 6 UNITS UNDER THE SKIN THREE TIMES DAILY Insulin Needles (Disposable) 32 gauge x 5/32\" Ndle Commonly known as:  Vianney Pen Needle USS THREE TIMES DAILY PLUS SLIDING SCALE  
  
 LEVEMIR FLEXTOUCH U-100 INSULN 100 unit/mL (3 mL) Inpn Generic drug:  insulin detemir U-100 INJECT 25 UNITS UNDER THE SKIN EVERY MORNING  
  
 metoprolol succinate 50 mg XL tablet Commonly known as:  TOPROL-XL Take 1 Tab by mouth daily. omega 3-DHA-EPA-fish oil 1,000 mg (120 mg-180 mg) capsule Take  by mouth two (2) times a day. ONE-A-DAY MEN'S 50+ ADVANTAGE PO Take  by mouth daily. rosuvastatin 40 mg tablet Commonly known as:  CRESTOR Take 1 Tab by mouth nightly. SITagliptin-metFORMIN 50-1,000 mg per tablet Commonly known as:  Lavona Rand Take 1 Tab by mouth two (2) times daily (with meals). SYMBICORT 160-4.5 mcg/actuation Hfaa Generic drug:  budesonide-formoterol TAKE 2 PUFFS BY MOUTH TWICE DAILY topiramate 25 mg tablet Commonly known as:  TOPAMAX Take 1 tab po QHS for 5 days then increase to 1 tab po BID  
  
 valsartan 320 mg tablet Commonly known as:  DIOVAN Take 1 Tab by mouth daily. vardenafil 20 mg tablet Commonly known as:  LEVITRA Take 20 mg by mouth as needed. Indications: Erectile Dysfunction VIBERZI 75 mg tablet Generic drug:  eluxadoline TAKE 1 TABLET BY MOUTH TWICE DAILY WITH MEALS  
  
 VITAMIN B-12 1,000 mcg tablet Generic drug:  cyanocobalamin Take 1,000 mcg by mouth daily. XARELTO 20 mg Tab tablet Generic drug:  rivaroxaban TAKE 1 TABLET BY MOUTH DAILY WITH BREAKFAST * Notice: This list has 2 medication(s) that are the same as other medications prescribed for you. Read the directions carefully, and ask your doctor or other care provider to review them with you. Prescriptions Sent to Pharmacy Refills  
 topiramate (TOPAMAX) 25 mg tablet 1 Sig: Take 1 tab po QHS for 5 days then increase to 1 tab po BID Class: Normal  
 Pharmacy: [facility]  [address],[facility] Ph #: [phone] We Performed the Following AMB POC XRAY, SPINE, LUMBOSACRAL; 4+ [id number] CPT(R)] Follow-up Instructions Return in about 6 weeks (around 7/23/2018). Patient Instructions Spondylolysis and Spondylolisthesis: Exercises Your Care Instructions Here are some examples of typical rehabilitation exercises for your condition. Start each exercise slowly. Ease off the exercise if you start to have pain. Your doctor or physical therapist will tell you when you can start these exercises and which ones will work best for you. How to do the exercises Single knee-to-chest 
 
1. Lie on your back with your knees bent and your feet flat on the floor. You can put a small pillow under your head and neck if it is more comfortable. 2. Bring one knee to your chest, keeping the other foot flat on the floor. 3. Keep your lower back pressed to the floor. Hold for 15 to 30 seconds. 4. Relax, and lower the knee to the starting position. 5. Repeat with the other leg. Repeat 2 to 4 times with each leg. 6. To get more stretch, put your other leg flat on the floor while pulling your knee to your chest. 
Double knee-to-chest 
 
1. Lie on your back with your knees bent and your feet flat on the floor. You can put a small pillow under your head and neck if it is more comfortable. 2. Bring both knees to your chest. 
3. Keep your lower back pressed to the floor. Hold for 15 to 30 seconds. 4. Relax, and lower your knees to the starting position. 5. Repeat 2 to 4 times. Alternate arm and leg (bird dog) exercise Do this exercise slowly. Try to keep your body straight at all times. 1. Start on the floor, on your hands and knees. 2. Tighten your belly muscles by pulling your belly button in toward your spine. Be sure you continue to breathe normally and do not hold your breath. 3. Raise one arm off the floor, and hold it straight out in front of you. Be careful not to let your shoulder drop down, because that will twist your trunk. 4. Hold for about 6 seconds, then lower your arm and switch to your other arm. 5. Repeat 8 to 12 times on each arm. 6. When you can do this exercise with ease and no pain, repeat steps 1 through 5. But this time do it with one leg raised off the floor, holding your leg straight out behind you. Be careful not to let your hip drop down, because that will twist your trunk. 7. When holding your leg straight out becomes easier, try raising your opposite arm at the same time, and repeat steps 1 through 5. Bridging 1. Lie on your back with both knees bent. Your knees should be bent about 90 degrees. 2. Then push your feet into the floor, squeeze your buttocks, and lift your hips off the floor until your shoulders, hips, and knees are all in a straight line. 3. Hold for about 6 seconds as you continue to breathe normally, and then slowly lower your hips back down to the floor and rest for up to 10 seconds. 4. Repeat 8 to 12 times. Curl-ups 1. Lie on the floor on your back with your knees bent at a 90-degree angle. Your feet should be flat on the floor, about 12 inches from your buttocks. 2. Cross your arms over your chest. If this bothers your neck, try putting your hands behind your neck (not your head), with your elbows spread apart. 3. Slowly tighten your belly muscles and raise your shoulder blades off the floor. 4. Keep your head in line with your body, and do not press your chin to your chest. 
5. Hold this position for 1 or 2 seconds, then slowly lower yourself back down to the floor. 6. Repeat 8 to 12 times. Shine Vicente Do this exercise slowly. Try to keep your body straight at all times, and do not let one hip drop lower than the other. 1. Lie on your stomach, resting your upper body on your forearms. 2. Tighten your belly muscles by pulling your belly button in toward your spine. 3. Keeping your knees on the floor, press down with your forearms to lift your upper body off the floor. 4. Hold for about 6 seconds, then lower your body to the floor. Rest for up to 10 seconds. 5. Repeat 8 to 12 times. 6. Over time, work up to holding for 15 to 30 seconds each time. 7. If this exercise is easy to do with your knees on the floor, try doing this exercise with your knees and legs straight, supported by your toes on the floor. Follow-up care is a key part of your treatment and safety. Be sure to make and go to all appointments, and call your doctor if you are having problems. It's also a good idea to know your test results and keep a list of the medicines you take. Where can you learn more? Go to http://vasile-fito.info/. Enter [phone] in the search box to learn more about \"Spondylolysis and Spondylolisthesis: Exercises. \" Current as of: March 21, 2017 Content Version: 11.4 © 4939-9943 [employer]. Care instructions adapted under license by [employer] (which disclaims liability or warranty for this information). If you have questions about a medical condition or this instruction, always ask your healthcare professional. [address] any warranty or liability for your use of this information. Introducing [facility]! Dear Delmy Metz: 
Thank you for requesting a [employer] account. Our records indicate that you already have an active [employer] account. You can access your account anytime at [email or web address]. [email or web address] Did you know that you can access your hospital and ER discharge instructions at any time in [employer]? You can also review all of your test results from your hospital stay or ER visit. Additional Information If you have questions, please visit the Frequently Asked Questions section of the [employer] website at [email or web address] Remember, [employer] is NOT to be used for urgent needs. For medical emergencies, dial 911. Now available from your iPhone and Android! Please provide this summary of care documentation to your next provider. Your primary care clinician is listed as Tamara Reyes. If you have any questions after today's visit, please call [phone].

## 2018-06-11 NOTE — PATIENT INSTRUCTIONS
Spondylolysis and Spondylolisthesis: Exercises  Your Care Instructions  Here are some examples of typical rehabilitation exercises for your condition. Start each exercise slowly. Ease off the exercise if you start to have pain. Your doctor or physical therapist will tell you when you can start these exercises and which ones will work best for you. How to do the exercises  Single knee-to-chest    1. Lie on your back with your knees bent and your feet flat on the floor. You can put a small pillow under your head and neck if it is more comfortable. 2. Bring one knee to your chest, keeping the other foot flat on the floor. 3. Keep your lower back pressed to the floor. Hold for 15 to 30 seconds. 4. Relax, and lower the knee to the starting position. 5. Repeat with the other leg. Repeat 2 to 4 times with each leg. 6. To get more stretch, put your other leg flat on the floor while pulling your knee to your chest.  Double knee-to-chest    1. Lie on your back with your knees bent and your feet flat on the floor. You can put a small pillow under your head and neck if it is more comfortable. 2. Bring both knees to your chest.  3. Keep your lower back pressed to the floor. Hold for 15 to 30 seconds. 4. Relax, and lower your knees to the starting position. 5. Repeat 2 to 4 times. Alternate arm and leg (bird dog) exercise    Do this exercise slowly. Try to keep your body straight at all times. 1. Start on the floor, on your hands and knees. 2. Tighten your belly muscles by pulling your belly button in toward your spine. Be sure you continue to breathe normally and do not hold your breath. 3. Raise one arm off the floor, and hold it straight out in front of you. Be careful not to let your shoulder drop down, because that will twist your trunk. 4. Hold for about 6 seconds, then lower your arm and switch to your other arm. 5. Repeat 8 to 12 times on each arm.   6. When you can do this exercise with ease and no pain, repeat steps 1 through 5. But this time do it with one leg raised off the floor, holding your leg straight out behind you. Be careful not to let your hip drop down, because that will twist your trunk. 7. When holding your leg straight out becomes easier, try raising your opposite arm at the same time, and repeat steps 1 through 5. Bridging    1. Lie on your back with both knees bent. Your knees should be bent about 90 degrees. 2. Then push your feet into the floor, squeeze your buttocks, and lift your hips off the floor until your shoulders, hips, and knees are all in a straight line. 3. Hold for about 6 seconds as you continue to breathe normally, and then slowly lower your hips back down to the floor and rest for up to 10 seconds. 4. Repeat 8 to 12 times. Curl-ups    1. Lie on the floor on your back with your knees bent at a 90-degree angle. Your feet should be flat on the floor, about 12 inches from your buttocks. 2. Cross your arms over your chest. If this bothers your neck, try putting your hands behind your neck (not your head), with your elbows spread apart. 3. Slowly tighten your belly muscles and raise your shoulder blades off the floor. 4. Keep your head in line with your body, and do not press your chin to your chest.  5. Hold this position for 1 or 2 seconds, then slowly lower yourself back down to the floor. 6. Repeat 8 to 12 times. Plank    Do this exercise slowly. Try to keep your body straight at all times, and do not let one hip drop lower than the other. 1. Lie on your stomach, resting your upper body on your forearms. 2. Tighten your belly muscles by pulling your belly button in toward your spine. 3. Keeping your knees on the floor, press down with your forearms to lift your upper body off the floor. 4. Hold for about 6 seconds, then lower your body to the floor. Rest for up to 10 seconds. 5. Repeat 8 to 12 times.   6. Over time, work up to holding for 15 to 30 seconds each time.  7. If this exercise is easy to do with your knees on the floor, try doing this exercise with your knees and legs straight, supported by your toes on the floor. Follow-up care is a key part of your treatment and safety. Be sure to make and go to all appointments, and call your doctor if you are having problems. It's also a good idea to know your test results and keep a list of the medicines you take. Where can you learn more? Go to http://vasile-fito.info/. Enter [phone] in the search box to learn more about \"Spondylolysis and Spondylolisthesis: Exercises. \"  Current as of: March 21, 2017  Content Version: 11.4  © 8834-4405 [employer]. Care instructions adapted under license by [employer] (which disclaims liability or warranty for this information). If you have questions about a medical condition or this instruction, always ask your healthcare professional. [place] [address] any warranty or liability for your use of this information.

## 2018-06-11 NOTE — PROGRESS NOTES
Verbal order entered per Dr. Debbie Aly as documented on blue sheet:Topamax 25mg take 1 tab po QHS x 5 days, then increase to 1 tab po BID
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
## 2018-06-14 ENCOUNTER — OFFICE VISIT (OUTPATIENT)
Dept: PULMONOLOGY | Age: 58
End: 2018-06-14

## 2018-06-14 VITALS
WEIGHT: 273 LBS | SYSTOLIC BLOOD PRESSURE: 130 MMHG | TEMPERATURE: 98.1 F | RESPIRATION RATE: 20 BRPM | OXYGEN SATURATION: 98 % | HEART RATE: 70 BPM | HEIGHT: 71 IN | DIASTOLIC BLOOD PRESSURE: 70 MMHG | BODY MASS INDEX: 38.22 KG/M2

## 2018-06-14 DIAGNOSIS — J41.8 MIXED SIMPLE AND MUCOPURULENT CHRONIC BRONCHITIS (HCC): Primary | ICD-10-CM

## 2018-06-14 DIAGNOSIS — K21.9 GASTROESOPHAGEAL REFLUX DISEASE WITHOUT ESOPHAGITIS: ICD-10-CM

## 2018-06-14 DIAGNOSIS — I10 ESSENTIAL HYPERTENSION: ICD-10-CM

## 2018-06-14 DIAGNOSIS — G47.33 OSA (OBSTRUCTIVE SLEEP APNEA): ICD-10-CM

## 2018-06-14 DIAGNOSIS — I48.19 PERSISTENT ATRIAL FIBRILLATION (HCC): ICD-10-CM

## 2018-06-14 DIAGNOSIS — E66.01 SEVERE OBESITY (BMI 35.0-39.9) WITH COMORBIDITY (HCC): ICD-10-CM

## 2018-06-14 DIAGNOSIS — E66.2 CLASS 2 OBESITY WITH ALVEOLAR HYPOVENTILATION, SERIOUS COMORBIDITY, AND BODY MASS INDEX (BMI) OF 38.0 TO 38.9 IN ADULT (HCC): ICD-10-CM

## 2018-06-14 DIAGNOSIS — G47.33 OSA TREATED WITH BIPAP: ICD-10-CM

## 2018-06-14 RX ORDER — BUDESONIDE AND FORMOTEROL FUMARATE DIHYDRATE 160; 4.5 UG/1; UG/1
AEROSOL RESPIRATORY (INHALATION)
Qty: 1 INHALER | Refills: 6 | Status: SHIPPED | OUTPATIENT
Start: 2018-06-14 | End: 2019-06-07 | Stop reason: SDUPTHER

## 2018-06-14 RX ORDER — ALBUTEROL SULFATE 90 UG/1
2 AEROSOL, METERED RESPIRATORY (INHALATION)
Qty: 1 INHALER | Refills: 6 | Status: SHIPPED | OUTPATIENT
Start: 2018-06-14 | End: 2020-10-07 | Stop reason: SDUPTHER

## 2018-06-14 NOTE — PROGRESS NOTES
[facility]  Pulmonary, Critical Care, and Sleep Medicine      Pulmonary Office Visit    Name: Carlos Enrique Olmedo. : 1960     Date: 2018        Subjective:     Patient is a 61 y.o. male is here for follow up- chronic bronchitis. 18   Has not been seen in clinic since 2016- Had sick family members  Overall doing well- had 1 episode of lower respiratory  infection 6 months back. Ran out of Symbicort  Was using Symbicort daily. Has some residual cough with minimal clear mucus. Denies any chest pain. SOB with exertion- he is trying to walk more and exercise. Cough and wheezing improved since last visit. No need for ER visits or prednisone taper over past year. Denies fever, chills, night sweats. Admits to weight gain. Uses BiPAP at night for FITO    HPI:  Patient initially seen for frequent episodes of persistent and difficult to clear cough over past 2-3 years. Had 2 episode last year and 3-4 flareup's [facility] and need for antibiotics on 3 occasions since spring. Eventually had CT scan and referred to pulmonary. Patient admited to some nasal congestion, postnasal drip and history of chronic sinus problems with previous sinus surgery. Had reflux and had difficulty sleeping with head elevated  Past Surgical History:   Procedure Laterality Date    HX APPENDECTOMY      HX COLONOSCOPY  2010    tubular adenoma, Dr. Kayce Lawson    HX HEENT      sinus surgery    HX KNEE ARTHROSCOPY      HX KNEE REPLACEMENT Right 2014    HX TONSILLECTOMY      HX WISDOM TEETH EXTRACTION      x4     Family History   Problem Relation Age of Onset    Other Father      Knee replacement    Elevated Lipids Mother     Glaucoma Maternal Grandmother     No Known Problems Maternal Grandfather     No Known Problems Paternal Grandmother     No Known Problems Paternal Grandfather     Arthritis-osteo Other     Hypertension Other      .   Current Outpatient Prescriptions   Medication Sig Dispense Refill    topiramate (TOPAMAX) 25 mg tablet Take 1 tab po QHS for 5 days then increase to 1 tab po BID 60 Tab 1    rosuvastatin (CRESTOR) 40 mg tablet Take 1 Tab by mouth nightly. 90 Tab 3    vardenafil (LEVITRA) 20 mg tablet Take 20 mg by mouth as needed. Indications: Erectile Dysfunction 2 Tab 11    LEVEMIR FLEXTOUCH U-100 INSULN 100 unit/mL (3 mL) inpn INJECT 25 UNITS UNDER THE SKIN EVERY MORNING 15 mL 6    VIBERZI 75 mg tablet TAKE 1 TABLET BY MOUTH TWICE DAILY WITH MEALS 60 Tab 0    XARELTO 20 mg tab tablet TAKE 1 TABLET BY MOUTH DAILY WITH BREAKFAST 30 Tab 11    hydroCHLOROthiazide (MICROZIDE) 12.5 mg capsule TAKE ONE CAPSULE BY MOUTH EVERY MORNING FOR HIGH BLOOD PRESSURE TAKE WITH BANANAS OR ORANGE JUICE 30 Cap 11    NOVOLOG FLEXPEN 100 unit/mL inpn INJECT 6 UNITS UNDER THE SKIN THREE TIMES DAILY 15 mL 11    metoprolol succinate (TOPROL-XL) 50 mg XL tablet Take 1 Tab by mouth daily. 90 Tab 3    SITagliptin-metFORMIN (JANUMET) 50-1,000 mg per tablet Take 1 Tab by mouth two (2) times daily (with meals). 180 Tab 3    buPROPion SR (WELLBUTRIN SR) 150 mg SR tablet TAKE 1 TABLET BY MOUTH DAILY 30 Tab 11    Fenofibrate 150 mg cap TAKE 1 CAPSULE BY MOUTH DAILY 90 Cap 3    amLODIPine (NORVASC) 10 mg tablet Take 1 Tab by mouth daily. 90 Tab 3    gabapentin (NEURONTIN) 300 mg capsule Take 1 Cap by mouth two (2) times a day. 180 Cap 3    valsartan (DIOVAN) 320 mg tablet Take 1 Tab by mouth daily. 90 Tab 3    insulin aspart (NOVOLOG) 100 unit/mL inpn by SubCUTAneous route. Sliding scale:   101-150 4 units  151-200 6 units  201-250  8 units  251-300 10 units  Call if blood sugar is greater than 300      cholecalciferol (VITAMIN D3) 1,000 unit cap Take 2 Caps by mouth daily. 60 Cap 5    Omega-3-DHA-EPA-Fish Oil 1,000 mg (120 mg-180 mg) cap Take  by mouth two (2) times a day.       SYMBICORT 160-4.5 mcg/actuation HFA inhaler TAKE 2 PUFFS BY MOUTH TWICE DAILY 1 Inhaler 6    MV,MINERALS/FA/LYCOPENE/GINKGO (ONE-A-DAY MEN'S 50+ ADVANTAGE PO) Take  by mouth daily.  bipap machine kit by Does Not Apply route. BiPAP at 23/18 cm with heated humidifier. Mask: Simplus FF, medium size or mask of choice. Length of need 99 months. Replace mask and accessories as needed times 12 months. Please download data after 30 days and fax at [phone]. Dx: Severe FITO, Obesity, snoring. 1 Kit 0    cyanocobalamin (VITAMIN B-12) 1,000 mcg tablet Take 1,000 mcg by mouth daily.  albuterol (PROVENTIL HFA, VENTOLIN HFA) 90 mcg/actuation inhaler Take 2 Puffs by inhalation every six (6) hours as needed for Wheezing.  1 Inhaler 0     Review of Systems:  HEENT: No epistaxis, no nasal drainage, no difficulty in swallowing, no redness in eyes  Respiratory: as above  Cardiovascular: no chest pain, no palpitations, no chronic leg edema, no syncope  Gastrointestinal: no abd pain, no vomiting, no diarrhea, no bleeding symptoms  Genitourinary: No urinary symptoms or hematuria  Integument/breast: No ulcers or rashes  Musculoskeletal:Neg  Neurological: No focal weakness, no seizures, no headaches  Behvioral/Psych: No anxiety, no depression  Constitutional: No fever, no chills, no weight loss, no night sweats     Objective:     Visit Vitals    /70 (BP 1 Location: Left arm, BP Patient Position: At rest)    Pulse 70    Temp 98.1 °F (36.7 °C) (Oral)    Resp 20    Ht 5' 11\" (1.803 m)    Wt 123.8 kg (273 lb)    SpO2 98%    BMI 38.08 kg/m2        Physical Exam:   General: comfortable, no acute distress  HEENT: pupils reactive, sclera anicteric, EOM intact  Neck: No adenopathy or thyroid swelling, no lymphadenopathy or JVD, supple  CVS: S1S2 no murmurs  RS: Mod AE bilaterally, no tactile fremitus or egophony, no accessory muscle use  Abd: soft, non tender, no hepatosplenomegaly  Neuro: non focal, awake, alert  Extrm: no leg edema, clubbing or cyanosis  Skin: no rash    Data review:   Sleep Study: 7/23/2014:  CPAP was applied at 5 cm and titrated up to 20 cm. At the setting  of 16 and 17 cm, sleep efficiency was 100%, AHI was 0, but REM or  REM supine was not seen and the lowest oxygen saturation was 90  and 88% respectively. At the setting of 20 cm, sleep efficiency  was 100%, AHI was 0, but REM or REM supine was not seen and the  lowest oxygen saturation was 90%. Mask leak, arousal and snoring  indices were tolerable. BiPAP was applied at 21/17 cm and titrated up to 23/18 cm. At the  setting of 23/18 cm, sleep efficiency was 98%, AHI was 1.7 with 1  obstructive hypopnea, REM was seen, REM supine was not seen and  the lowest oxygen saturation was 85% during hypopnea for 0.2 min  but mean oxygen saturation was 92%. Mask leak was tolerable, and  arousal and snoring were low    PFT's:  06/14/18    Patient effort:   Good  Meets ATS criteria for interpretation    Flows:  Maximal Mid Expiratory Flow rate is reduced to 36 % predicted  Forced Expiratory Volume in one second is reduced to 56 % predicted  FEV 1% is reduced    Flow Volume Loop:  Nonspecific obstructive pattern in Flow Volume Loop    Bronchodilator:  Significant partial  improvement with bronchodilator    Impression:  Moderate obstructive defect, Mild restrictive ventilatory defect    Comment:  Reduced Vital Capacity may be due to airflow obstruction or restrictive defect. Obtain lung volumes if clinically indicated, See technicians comments.       4/14/2016:  Maximal Mid Expiratory Flow rate is reduced to 41 % predicted  Forced Expiratory Volume in one second is reduced to 62 %  predicted  FEV 1% is reduced  Volumes:  Vital Capacity is reduced, Residual Volume is elevated and Total  Lung Capacity is normal  Flow Volume Loop:  Nonspecific obstructive pattern in Flow Volume Loop  Bronchodilator:  Significant improvement with bronchodilator  Diffusion:  Abnormal Diffusion Capacity reduced to 68 % predicted  Impression:  Mild to moderate obstructive defect, predominately small airways,  Air trapping, Reduced diffusion capacity indicating a decrease in  alveolar surface area for gas exchange      Imaging:  I have personally reviewed the patients radiographs and have reviewed the reports:  CXR-8/2014:  A single view of the chest demonstrates clear lungs. Atherosclerosis   of the aorta. Borderline heart size. Thoracic spondylosis. IMPRESSION: No acute cardiopulmonary disease. March 2013-FINDINGS: New peripheral predominantly alveolar opacities are observed in the   right midlung zone, presumably involving the inferior lateral aspect of the   right upper lobe. Additional new increased streaky densities are also observed   in the right lung base region. The cardiac silhouette appears mild to   moderately enlarged. No significant pleural effusion. No pneumothorax. IMPRESSION:   Right upper lobe and right middle lobe with new opacities as described above. Suggestive of developing multilobar pneumonia. CXR- April 2013 and June 2013- clear  Ct scan chest 6/13:  Acute or chronic bronchitis. Minimal infiltrate or scarring in the medial   bases. .   Gastric lipoma. Fatty infiltration of the liver. Prominent coronary artery calcifications    IMPRESSION:   · Asthma/COPD- significantly improved control however needs reenforcement for continuation of maintenance therapy  · Recurrent lower respiratory infections- acute obstructive and chronic obstructive bronchitis with episode of pneumonia-alveolitis. PFT's with obstructive and restrictive impairment with bronchodilator response. Has multiple triggers- chronic sinusitis and GERD as cause for recurrent insult. Has as a consequence developed reactive airways dysfunction- cough variant asthma. Evaluation for sinopulmonary syndrome /acquired hypogammaglobulinema is not diagnostic . · FITO- needs continued treatment optimization. New BiPAP and settings noted.   · DM  · Obesity  · GERD        RECOMMENDATIONS: · Continue Symbicort 160/4.5 and nasonex- prescriptions refilled  · Albuterol prn for rescue  · GERD instructions and strict control  · Bronchial hygiene and rescue inhaler methods reviewed. · Treat FITO- BiPAP use discussed   · Supplies for BiPAP ordered- DME changed from [facility] to ? [facility]  · Encouraged incremental exercise, weight loss  · Consider pulmonary rehab. · Discussed PFT's. · Follow up in 6 months. Discussed d/d and treatment rationale with patient.      Maverick Espinosa MD

## 2018-06-14 NOTE — PROGRESS NOTES
Chief Complaint   Patient presents with    COPD     1. Have you been to the ER, urgent care clinic since your last visit? Hospitalized since your last visit? Yes Where: [facility]     2. Have you seen or consulted any other health care providers outside of the [address] [place] [address] since your last visit? Include any pap smears or colon screening.  Yes Where: [facility]

## 2018-06-14 NOTE — MR AVS SNAPSHOT
[address] [facility], [address] [address] 
[phone] Patient: Thuy Mac. MRN: [id number] :[id number] Visit Information Date & Time Provider Department Dept. Phone Encounter #  
 2018 12:00 PM Mauricio Vilchis MD [facility] [place] [id number] Follow-up Instructions Return in about 6 months (around 2018). Your Appointments 2018  2:00 PM  
Follow Up with Maciej Orr MD  
[facility] ([address] [place] [address]) Appt Note: 6 wk f/u  
 [address] [facility] [address]  
[phone]  
  
   
 [address] [facility] [address]  
  
    
 2018  8:15 AM  
Follow Up with Keyla José MD  
[address] [place] [address], [address] [facility] - [address]) Appt Note: 3 mo f/u LT KNEE  
 [address] [facility], [address] [facility] [id number] [phone]  
  
   
 [address] [place], [address] [facility] [address] 2018  9:40 AM  
Follow Up with Avinash Harrington MD  
[facility]"" ([address] [place] [address]) Appt Note: 6 month follow up [place] [id number]  
[phone] [address] [facility] [address]  [address] 2018  8:00 AM  
Office Visit with Brittany Renteria MD  
[facility] [address] [place] [address]) Appt Note: 3 month follow up  
 [address], [address] [place] [address] [place]  
  
   
 [address], [facility]  
  
    
 2019  9:00 AM  
Office Visit with Leena Licona MD  
[facility] [address] [place] [address]) Appt Note: checkup/No KUB per Dr Yvonne Pinto [address] [address]  
[phone] [address] [facility] [address] Upcoming [facility] Date Due  
 LIPID PANEL Q1 4/26/2018 MICROALBUMIN Q1 6/9/2018 Influenza Age 5 to Adult 8/1/2018 HEMOGLOBIN A1C Q6M 11/11/2018 EYE EXAM RETINAL OR DILATED Q1 2/15/2019 FOOT EXAM Q1 5/3/2019 COLONOSCOPY 6/27/2026 DTaP/Tdap/Td series (2 - Td) 2/13/2027 Allergies as of 6/14/2018  Review Complete On: 6/14/2018 By: Guillermo Adorno MD  
  
 Severity Noted Reaction Type Reactions Penicillins Medium 08/17/2010    Hives Current Immunizations  Reviewed on 2/13/2017 Name Date Influenza Vaccine 10/1/2017 Pneumococcal Polysaccharide (PPSV-23) 2/13/2017 Tdap 2/13/2017 Not reviewed this visit You Were Diagnosed With   
  
 Codes Comments Chronic obstructive pulmonary disease, unspecified COPD type ([facility]    -  Primary ICD-10-CM: J44.9 ICD-9-CM: 149 FITO treated with BiPAP     ICD-10-CM: G47.33 
ICD-9-CM: 327.23 Persistent atrial fibrillation (HCC)     ICD-10-CM: I48.1 ICD-9-CM: 427.31 Severe obesity (BMI 35.0-39. 9) with comorbidity ([facility]     ICD-10-CM: E66.01 
ICD-9-CM: 278.01   
 FITO (obstructive sleep apnea)     ICD-10-CM: G47.33 
ICD-9-CM: 327.23 Class 2 obesity with alveolar hypoventilation, serious comorbidity, and body mass index (BMI) of 38.0 to 38.9 in adult [facility])     ICD-10-CM: E66.2, L68.11 
ICD-9-CM: 278.03, V85.38 Gastroesophageal reflux disease without esophagitis     ICD-10-CM: K21.9 ICD-9-CM: 530.81 Essential hypertension     ICD-10-CM: I10 
ICD-9-CM: 401.9 Vitals BP Pulse Temp Resp Height(growth percentile) Weight(growth percentile) 130/70 (BP 1 Location: Left arm, BP Patient Position: At rest) 70 98.1 °F (36.7 °C) (Oral) 20 5' 11\" (1.803 m) 273 lb (123.8 kg) SpO2 BMI Smoking Status 98% 38.08 kg/m2 Passive Smoke Exposure - Never Smoker BMI and BSA Data Body Mass Index Body Surface Area 38.08 kg/m 2 2.49 m 2 Preferred Pharmacy Pharmacy Name Phone [facility] [address] [facility] [address] [phone] Your Updated Medication List  
  
   
This list is accurate as of 6/14/18 12:30 PM.  Always use your most recent med list.  
  
  
  
  
 albuterol 90 mcg/actuation inhaler Commonly known as:  PROVENTIL HFA, VENTOLIN HFA, PROAIR HFA Take 2 Puffs by inhalation every six (6) hours as needed for Wheezing. amLODIPine 10 mg tablet Commonly known as:  Skip Newcomer Take 1 Tab by mouth daily. bipap machine kit  
by Does Not Apply route. BiPAP at 23/18 cm with heated humidifier. Mask: Simplus FF, medium size or mask of choice. Length of need 99 months. Replace mask and accessories as needed times 12 months. Please download data after 30 days and fax at [phone]. Dx: Severe FITO, Obesity, snoring. budesonide-formoterol 160-4.5 mcg/actuation Hfaa Commonly known as:  SYMBICORT  
TAKE 2 PUFFS BY MOUTH TWICE DAILY  
  
 buPROPion  mg SR tablet Commonly known as:  WELLBUTRIN SR  
TAKE 1 TABLET BY MOUTH DAILY  
  
 cholecalciferol 1,000 unit Cap Commonly known as:  VITAMIN D3 Take 2 Caps by mouth daily. Fenofibrate 150 mg Cap TAKE 1 CAPSULE BY MOUTH DAILY  
  
 gabapentin 300 mg capsule Commonly known as:  NEURONTIN Take 1 Cap by mouth two (2) times a day. hydroCHLOROthiazide 12.5 mg capsule Commonly known as:  Kentrell Torey TAKE ONE CAPSULE BY MOUTH EVERY MORNING FOR HIGH BLOOD PRESSURE TAKE WITH BANANAS OR ORANGE JUICE  
  
 * insulin aspart U-100 100 unit/mL Inpn Commonly known as:  NOVOLOG  
by SubCUTAneous route. Sliding scale:  101-150 4 units 151-200 6 units 201-250  8 units 251-300 10 units Call if blood sugar is greater than 300 * NovoLOG Flexpen U-100 Insulin 100 unit/mL Inpn Generic drug:  insulin aspart U-100 INJECT 6 UNITS UNDER THE SKIN THREE TIMES DAILY LEVEMIR FLEXTOUCH U-100 INSULN 100 unit/mL (3 mL) Inpn Generic drug:  insulin detemir U-100 INJECT 25 UNITS UNDER THE SKIN EVERY MORNING  
  
 metoprolol succinate 50 mg XL tablet Commonly known as:  TOPROL-XL Take 1 Tab by mouth daily. omega 3-DHA-EPA-fish oil 1,000 mg (120 mg-180 mg) capsule Take  by mouth two (2) times a day. ONE-A-DAY MEN'S 50+ ADVANTAGE PO Take  by mouth daily. rosuvastatin 40 mg tablet Commonly known as:  CRESTOR Take 1 Tab by mouth nightly. SITagliptin-metFORMIN 50-1,000 mg per tablet Commonly known as:  Emily De La Torre Take 1 Tab by mouth two (2) times daily (with meals). topiramate 25 mg tablet Commonly known as:  TOPAMAX Take 1 tab po QHS for 5 days then increase to 1 tab po BID  
  
 valsartan 320 mg tablet Commonly known as:  DIOVAN Take 1 Tab by mouth daily. vardenafil 20 mg tablet Commonly known as:  LEVITRA Take 20 mg by mouth as needed. Indications: Erectile Dysfunction VIBERZI 75 mg tablet Generic drug:  eluxadoline TAKE 1 TABLET BY MOUTH TWICE DAILY WITH MEALS  
  
 VITAMIN B-12 1,000 mcg tablet Generic drug:  cyanocobalamin Take 1,000 mcg by mouth daily. XARELTO 20 mg Tab tablet Generic drug:  rivaroxaban TAKE 1 TABLET BY MOUTH DAILY WITH BREAKFAST * Notice: This list has 2 medication(s) that are the same as other medications prescribed for you. Read the directions carefully, and ask your doctor or other care provider to review them with you. Prescriptions Sent to Pharmacy Refills  
 budesonide-formoterol (SYMBICORT) 160-4.5 mcg/actuation HFAA 6 Sig: TAKE 2 PUFFS BY MOUTH TWICE DAILY Class: Normal  
 Pharmacy: [facility] [address] [facility] Ph #: [phone]  
 albuterol (PROVENTIL HFA, VENTOLIN HFA, PROAIR HFA) 90 mcg/actuation inhaler 6 Sig: Take 2 Puffs by inhalation every six (6) hours as needed for Wheezing.   
 Class: Normal  
 Pharm[facility]: [facility]  [address] [facility] #: [phone] Route: Inhalation We Performed the Following AMB POC PFT COMPLETE W/BRONCHODILATOR [51913 CPT(R)] Follow-up Instructions Return in about 6 months (around 12/14/2018). To-Do List   
 06/14/2018 Procedures: AMB POC PFT COMPLETE W/BRONCHODILATOR Introducing [facility] SERVICES! Dear Ayanna Iverson: 
Thank you for requesting a [employer] account. Our records indicate that you already have an active [employer] account. You can access your account anytime at [email or web address] Did you know that you can access your hospital and ER discharge instructions at any time in [employer]? You can also review all of your test results from your hospital stay or ER visit. Additional Information If you have questions, please visit the Frequently Asked Questions section of the [employer] website at [email or web address] Remember, [employer] is NOT to be used for urgent needs. For medical emergencies, dial 911. Now available from your iPhone and Android! Please provide this summary of care documentation to your next provider. Your primary care clinician is listed as Stephanie Colón. If you have any questions after today's visit, please call [phone].

## 2018-06-14 NOTE — PROGRESS NOTES
Verbal Order with read back per mG Ugarte MD  For PFT smart panel. AMB POC PFT complete w/ bronchodilator  AMB POC PFT complete w/o bronchodilator    Dr. Alvin Marley MD will co-sign the orders.

## 2018-06-26 ENCOUNTER — TELEPHONE (OUTPATIENT)
Dept: INTERNAL MEDICINE CLINIC | Age: 58
End: 2018-06-26

## 2018-07-30 ENCOUNTER — OFFICE VISIT (OUTPATIENT)
Dept: ORTHOPEDIC SURGERY | Age: 58
End: 2018-07-30

## 2018-07-30 DIAGNOSIS — M43.16 SPONDYLOLISTHESIS OF LUMBAR REGION: Primary | ICD-10-CM

## 2018-07-30 NOTE — PATIENT INSTRUCTIONS
Low Back Pain: Exercises  Your Care Instructions  Here are some examples of typical rehabilitation exercises for your condition. Start each exercise slowly. Ease off the exercise if you start to have pain. Your doctor or physical therapist will tell you when you can start these exercises and which ones will work best for you. How to do the exercises  Press-up    1. Lie on your stomach, supporting your body with your forearms. 2. Press your elbows down into the floor to raise your upper back. As you do this, relax your stomach muscles and allow your back to arch without using your back muscles. As your press up, do not let your hips or pelvis come off the floor. 3. Hold for 15 to 30 seconds, then relax. 4. Repeat 2 to 4 times. Alternate arm and leg (bird dog) exercise    Do this exercise slowly. Try to keep your body straight at all times, and do not let one hip drop lower than the other. 1. Start on the floor, on your hands and knees. 2. Tighten your belly muscles. 3. Raise one leg off the floor, and hold it straight out behind you. Be careful not to let your hip drop down, because that will twist your trunk. 4. Hold for about 6 seconds, then lower your leg and switch to the other leg. 5. Repeat 8 to 12 times on each leg. 6. Over time, work up to holding for 10 to 30 seconds each time. 7. If you feel stable and secure with your leg raised, try raising the opposite arm straight out in front of you at the same time. Knee-to-chest exercise    1. Lie on your back with your knees bent and your feet flat on the floor. 2. Bring one knee to your chest, keeping the other foot flat on the floor (or keeping the other leg straight, whichever feels better on your lower back). 3. Keep your lower back pressed to the floor. Hold for at least 15 to 30 seconds. 4. Relax, and lower the knee to the starting position. 5. Repeat with the other leg. Repeat 2 to 4 times with each leg.   6. To get more stretch, put your other leg flat on the floor while pulling your knee to your chest.  Curl-ups    1. Lie on the floor on your back with your knees bent at a 90-degree angle. Your feet should be flat on the floor, about 12 inches from your buttocks. 2. Cross your arms over your chest. If this bothers your neck, try putting your hands behind your neck (not your head), with your elbows spread apart. 3. Slowly tighten your belly muscles and raise your shoulder blades off the floor. 4. Keep your head in line with your body, and do not press your chin to your chest.  5. Hold this position for 1 or 2 seconds, then slowly lower yourself back down to the floor. 6. Repeat 8 to 12 times. Pelvic tilt exercise    1. Lie on your back with your knees bent. 2. \"Brace\" your stomach. This means to tighten your muscles by pulling in and imagining your belly button moving toward your spine. You should feel like your back is pressing to the floor and your hips and pelvis are rocking back. 3. Hold for about 6 seconds while you breathe smoothly. 4. Repeat 8 to 12 times. Heel dig bridging    1. Lie on your back with both knees bent and your ankles bent so that only your heels are digging into the floor. Your knees should be bent about 90 degrees. 2. Then push your heels into the floor, squeeze your buttocks, and lift your hips off the floor until your shoulders, hips, and knees are all in a straight line. 3. Hold for about 6 seconds as you continue to breathe normally, and then slowly lower your hips back down to the floor and rest for up to 10 seconds. 4. Do 8 to 12 repetitions. Hamstring stretch in doorway    1. Lie on your back in a doorway, with one leg through the open door. 2. Slide your leg up the wall to straighten your knee. You should feel a gentle stretch down the back of your leg. 3. Hold the stretch for at least 15 to 30 seconds. Do not arch your back, point your toes, or bend either knee.  Keep one heel touching the floor and the other heel touching the wall. 4. Repeat with your other leg. 5. Do 2 to 4 times for each leg. Hip flexor stretch    1. Kneel on the floor with one knee bent and one leg behind you. Place your forward knee over your foot. Keep your other knee touching the floor. 2. Slowly push your hips forward until you feel a stretch in the upper thigh of your rear leg. 3. Hold the stretch for at least 15 to 30 seconds. Repeat with your other leg. 4. Do 2 to 4 times on each side. Wall sit    1. Stand with your back 10 to 12 inches away from a wall. 2. Lean into the wall until your back is flat against it. 3. Slowly slide down until your knees are slightly bent, pressing your lower back into the wall. 4. Hold for about 6 seconds, then slide back up the wall. 5. Repeat 8 to 12 times. Follow-up care is a key part of your treatment and safety. Be sure to make and go to all appointments, and call your doctor if you are having problems. It's also a good idea to know your test results and keep a list of the medicines you take. Where can you learn more? Go to http://vasile-fito.info/. Enter Y481 in the search box to learn more about \"Low Back Pain: Exercises. \"  Current as of: November 29, 2017  Content Version: 11.7  © 0888-5144 [employer]. Care instructions adapted under license by [employer] (which disclaims liability or warranty for this information). If you have questions about a medical condition or this instruction, always ask your healthcare professional. [place] [address] any warranty or liability for your use of this information. Learning About Medial Branch Block and Neurotomy  What are medial branch block and neurotomy? Facet joints connect your vertebrae to each other. Problems in these joints can cause chronic (long-term) pain in the neck or back. They can sometimes affect the shoulders, arms, buttocks, or legs.   Medial branch nerves are the nerves that carry many of the pain messages from your facet joints. Radiofrequency medial branch neurotomy is a type of medial branch neurotomy that is used to relieve arthritis pain. It uses radio waves to damage nerves in your neck or back so that they can no longer send pain messages to your brain. Before your doctor knows if a neurotomy will help you, he or she will do a medial branch block to find out if certain nerves are the ones that are a source of your pain. You will need two separate visits to the outpatient center or hospital to have both procedures. How is a medial branch block done? The doctor will use a tiny needle to numb the skin where you will get the block. Then he or she puts the block needle into the numbed area. You may feel some pressure, but you should not feel pain. Using fluoroscopy (live X-ray) to guide the needle, the doctor injects medicine onto one or more nerves to make them numb. If you get relief from your pain in the next 4 to 6 hours, it's a sign that those nerves may be contributing to your pain. The relief will last only a short time. You may then have a medial branch neurotomy at a later visit to try to get longer relief. It takes 20 to 30 minutes to get the block. You can go home after the doctor watches you for about an hour. You will get instructions on how to report how much pain you have when you are at home. You will need someone to drive you home. How is medial branch neurotomy done? The doctor will use a tiny needle to numb the skin where you will get the neurotomy. Then he or she puts the neurotomy needle into the numbed area. You may feel some pressure. Using fluoroscopy (live X-ray) to guide the needle, the doctor sends radio waves through the needle to the nerve for 60 to 90 seconds. The radio waves heat the nerve, which damages it. The doctor may do this several times. And he or she may treat more than one nerve.   It takes 45 to 90 minutes to get a neurotomy, depending on how many nerves are heated. You will probably go home 30 to 60 minutes later. You will need someone to drive you home. What can you expect after a neurotomy? You may feel a little sore or tender at the injection site at first. But after a successful neurotomy, most people have pain relief right away. It often lasts for 9 to 12 months or longer. Sometimes the pain relief is permanent. If your pain does come back, it may mean that the damaged nerve has healed and can send pain messages again. Or it can mean that a different nerve is causing pain. Your doctor will discuss your options with you. Follow-up care is a key part of your treatment and safety. Be sure to make and go to all appointments, and call your doctor if you are having problems. It's also a good idea to know your test results and keep a list of the medicines you take. Where can you learn more? Go to http://vasile-fito.info/. Enter O096 in the search box to learn more about \"Learning About Medial Branch Block and Neurotomy. \"  Current as of: October 9, 2017  Content Version: 11.7  © 0927-7980 [employer]. Care instructions adapted under license by [employer] (which disclaims liability or warranty for this information). If you have questions about a medical condition or this instruction, always ask your healthcare professional. [address] any warranty or liability for your use of this information.
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
## 2018-07-30 NOTE — MR AVS SNAPSHOT
[address] [facility] 
 
 
 [address] [facility] [address] 
[phone] Patient: Neela Aaron. MRN: [id number] :1960 Visit Information Date & Time Provider Department Dept. Phone Encounter #  
 2018  2:00 PM Bettye Valentin MD [facility] MAST [phone] [id number] Follow-up Instructions Return for after injections. Your Appointments 2018  8:15 AM  
Follow Up with Vivica Klinefelter, MD  
[facility] - [address]) Appt Note: 3 mo f/u LT KNEE  
 [address] [facility], [address] [facility] [id number] [phone]  
  
   
 [address] [facility], [address] [place] [address] Road [address] 2018  9:40 AM  
Follow Up with Angel Rutherford MD  
[facility] ([facility]) Appt Note: 6 month follow up [facility] [address] [address] [address] [phone] [address] [facility] [address] 2018  8:00 AM  
Office Visit with Gem Max MD  
[facility] [address] [place] [address]) Appt Note: 3 month follow up  
 [address] [facility], [address] [address] [address]  
  
   
 [address], [address]  
  
    
 10/1/2018  9:15 AM  
Follow Up with Btetye Valentin MD  
[facility] ([facility]) Appt Note: Facet Blk Ranjith L5-S1 2018  
 [address] [facility] [address]  
[phone]  
  
   
 [address] [facility] [address] 2019  9:00 AM  
Office Visit with Thai Reinoso MD  
[facility] [address] [facility]) Appt Note: checkup/No KUB per Dr Abbey Capellan [address] [facility] [address]  
[phone] [address] [address] Upcoming [facility] Maintenance Date Due  
 LIPID PANEL Q1 4/26/2018 MICROALBUMIN Q1 6/9/2018 Influenza Age 5 to Adult 8/1/2018 HEMOGLOBIN A1C Q6M 11/11/2018 EYE EXAM RETINAL OR DILATED Q1 2/15/2019 FOOT EXAM Q1 5/3/2019 COLONOSCOPY 6/27/2026 DTaP/Tdap/Td series (2 - Td) 2/13/2027 Allergies as of 7/30/2018  Review Complete On: 7/30/2018 By: Laron Chino LPN Severity Noted Reaction Type Reactions Penicillins Medium 08/17/2010    Hives Current Immunizations  Reviewed on 2/13/2017 Name Date Influenza Vaccine 10/1/2017 Pneumococcal Polysaccharide (PPSV-23) 2/13/2017 Tdap 2/13/2017 Not reviewed this visit You Were Diagnosed With   
  
 Codes Comments Spondylolisthesis of lumbar region    -  Primary ICD-10-CM: M43.16 
ICD-9-CM: 738.4 Vitals Smoking Status Passive Smoke Exposure - Never Smoker Preferred Pharmacy Pharmacy Name Phone [facility] [address] [address] [phone] Your Updated Medication List  
  
   
This list is accurate as of 7/30/18  4:05 PM.  Always use your most recent med list.  
  
  
  
  
 albuterol 90 mcg/actuation inhaler Commonly known as:  PROVENTIL HFA, VENTOLIN HFA, PROAIR HFA Take 2 Puffs by inhalation every six (6) hours as needed for Wheezing. amLODIPine 10 mg tablet Commonly known as:  Primo Presser TAKE 1 TABLET BY MOUTH DAILY  
  
 bipap machine kit  
by Does Not Apply route. BiPAP at 23/18 cm with heated humidifier. Mask: Simplus FF, medium size or mask of choice. Length of need 99 months. Replace mask and accessories as needed times 12 months. Please download data after 30 days and fax at [phone]. Dx: Severe FITO, Obesity, snoring. budesonide-formoterol 160-4.5 mcg/actuation Hfaa Commonly known as:  SYMBICORT  
TAKE 2 PUFFS BY MOUTH TWICE DAILY  
  
 buPROPion  mg SR tablet Commonly known as:  WELLBUTRIN SR  
TAKE 1 TABLET BY MOUTH DAILY  
  
 cholecalciferol 1,000 unit Cap Commonly known as:  VITAMIN D3 Take 2 Caps by mouth daily. Fenofibrate 150 mg Cap TAKE 1 CAPSULE BY MOUTH DAILY  
  
 gabapentin 300 mg capsule Commonly known as:  NEURONTIN  
TAKE 1 CAPSULE BY MOUTH TWICE DAILY  
  
 hydroCHLOROthiazide 12.5 mg capsule Commonly known as:  Yasmany Ny TAKE ONE CAPSULE BY MOUTH EVERY MORNING FOR HIGH BLOOD PRESSURE TAKE WITH BANANAS OR ORANGE JUICE  
  
 * insulin aspart U-100 100 unit/mL Inpn Commonly known as:  NOVOLOG  
by SubCUTAneous route. Sliding scale:  101-150 4 units 151-200 6 units 201-250  8 units 251-300 10 units Call if blood sugar is greater than 300 * NovoLOG Flexpen U-100 Insulin 100 unit/mL Inpn Generic drug:  insulin aspart U-100 INJECT 6 UNITS UNDER THE SKIN THREE TIMES DAILY JANUMET 50-1,000 mg per tablet Generic drug:  SITagliptin-metFORMIN  
TAKE 1 TABLET BY MOUTH TWICE DAILY WITH MEALS  
  
 LEVEMIR FLEXTOUCH U-100 INSULN 100 unit/mL (3 mL) Inpn Generic drug:  insulin detemir U-100 INJECT 25 UNITS UNDER THE SKIN EVERY MORNING  
  
 metoprolol succinate 50 mg XL tablet Commonly known as:  TOPROL-XL  
TAKE 1 TABLET BY MOUTH DAILY  
  
 omega 3-DHA-EPA-fish oil 1,000 mg (120 mg-180 mg) capsule Take  by mouth two (2) times a day. ONE-A-DAY MEN'S 50+ ADVANTAGE PO Take  by mouth daily. rosuvastatin 40 mg tablet Commonly known as:  CRESTOR Take 1 Tab by mouth nightly. topiramate 25 mg tablet Commonly known as:  TOPAMAX Take 1 tab po QHS for 5 days then increase to 1 tab po BID  
  
 valsartan 320 mg tablet Commonly known as:  DIOVAN Take 1 Tab by mouth daily. vardenafil 20 mg tablet Commonly known as:  LEVITRA Take 20 mg by mouth as needed. Indications: Erectile Dysfunction VIBERZI 75 mg tablet Generic drug:  eluxadoline TAKE 1 TABLET BY MOUTH TWICE DAILY WITH MEALS  
  
 VITAMIN B-12 1,000 mcg tablet Generic drug:  cyanocobalamin Take 1,000 mcg by mouth daily. XARELTO 20 mg Tab tablet Generic drug:  rivaroxaban TAKE 1 TABLET BY MOUTH DAILY WITH BREAKFAST * Notice: This list has 2 medication(s) that are the same as other medications prescribed for you. Read the directions carefully, and ask your doctor or other care provider to review them with you. Follow-up Instructions Return for after injections. Patient Instructions Low Back Pain: Exercises Your Care Instructions Here are some examples of typical rehabilitation exercises for your condition. Start each exercise slowly. Ease off the exercise if you start to have pain. Your doctor or physical therapist will tell you when you can start these exercises and which ones will work best for you. How to do the exercises Press-up 1. Lie on your stomach, supporting your body with your forearms. 2. Press your elbows down into the floor to raise your upper back. As you do this, relax your stomach muscles and allow your back to arch without using your back muscles. As your press up, do not let your hips or pelvis come off the floor. 3. Hold for 15 to 30 seconds, then relax. 4. Repeat 2 to 4 times. Alternate arm and leg (bird dog) exercise Do this exercise slowly. Try to keep your body straight at all times, and do not let one hip drop lower than the other. 1. Start on the floor, on your hands and knees. 2. Tighten your belly muscles. 3. Raise one leg off the floor, and hold it straight out behind you. Be careful not to let your hip drop down, because that will twist your trunk. 4. Hold for about 6 seconds, then lower your leg and switch to the other leg. 5. Repeat 8 to 12 times on each leg. 6. Over time, work up to holding for 10 to 30 seconds each time. 7. If you feel stable and secure with your leg raised, try raising the opposite arm straight out in front of you at the same time. Knee-to-chest exercise 1. Lie on your back with your knees bent and your feet flat on the floor. 2. Bring one knee to your chest, keeping the other foot flat on the floor (or keeping the other leg straight, whichever feels better on your lower back). 3. Keep your lower back pressed to the floor. Hold for at least 15 to 30 seconds. 4. Relax, and lower the knee to the starting position. 5. Repeat with the other leg. Repeat 2 to 4 times with each leg. 6. To get more stretch, put your other leg flat on the floor while pulling your knee to your chest. 
Curl-ups 1. Lie on the floor on your back with your knees bent at a 90-degree angle. Your feet should be flat on the floor, about 12 inches from your buttocks. 2. Cross your arms over your chest. If this bothers your neck, try putting your hands behind your neck (not your head), with your elbows spread apart. 3. Slowly tighten your belly muscles and raise your shoulder blades off the floor. 4. Keep your head in line with your body, and do not press your chin to your chest. 
5. Hold this position for 1 or 2 seconds, then slowly lower yourself back down to the floor. 6. Repeat 8 to 12 times. Pelvic tilt exercise 1. Lie on your back with your knees bent. 2. \"Brace\" your stomach. This means to tighten your muscles by pulling in and imagining your belly button moving toward your spine. You should feel like your back is pressing to the floor and your hips and pelvis are rocking back. 3. Hold for about 6 seconds while you breathe smoothly. 4. Repeat 8 to 12 times. Heel dig bridging 1. Lie on your back with both knees bent and your ankles bent so that only your heels are digging into the floor. Your knees should be bent about 90 degrees. 2. Then push your heels into the floor, squeeze your buttocks, and lift your hips off the floor until your shoulders, hips, and knees are all in a straight line. 3. Hold for about 6 seconds as you continue to breathe normally, and then slowly lower your hips back down to the floor and rest for up to 10 seconds. 4. Do 8 to 12 repetitions. Hamstring stretch in doorway 1. Lie on your back in a doorway, with one leg through the open door. 2. Slide your leg up the wall to straighten your knee. You should feel a gentle stretch down the back of your leg. 3. Hold the stretch for at least 15 to 30 seconds. Do not arch your back, point your toes, or bend either knee. Keep one heel touching the floor and the other heel touching the wall. 4. Repeat with your other leg. 5. Do 2 to 4 times for each leg. Hip flexor stretch 1. Kneel on the floor with one knee bent and one leg behind you. Place your forward knee over your foot. Keep your other knee touching the floor. 2. Slowly push your hips forward until you feel a stretch in the upper thigh of your rear leg. 3. Hold the stretch for at least 15 to 30 seconds. Repeat with your other leg. 4. Do 2 to 4 times on each side. Wall sit 1. Stand with your back 10 to 12 inches away from a wall. 2. Lean into the wall until your back is flat against it. 3. Slowly slide down until your knees are slightly bent, pressing your lower back into the wall. 4. Hold for about 6 seconds, then slide back up the wall. 5. Repeat 8 to 12 times. Follow-up care is a key part of your treatment and safety. Be sure to make and go to all appointments, and call your doctor if you are having problems. It's also a good idea to know your test results and keep a list of the medicines you take. Where can you learn more? Go to http://vasile-fito.info/. Enter Y581 in the search box to learn more about \"Low Back Pain: Exercises. \" Current as of: November 29, 2017 Content Version: 11.7 © 2690-0419 ybuy. Care instructions adapted under license by "[employer] (which disclaims liability or warranty for this information). If you have questions about a medical condition or this instruction, always ask your healthcare professional. [address] any warranty or liability for your use of this information. Learning About Medial Branch Block and Neurotomy What are medial branch block and neurotomy? Facet joints connect your vertebrae to each other. Problems in these joints can cause chronic (long-term) pain in the neck or back. They can sometimes affect the shoulders, arms, buttocks, or legs. Medial branch nerves are the nerves that carry many of the pain messages from your facet joints. Radiofrequency medial branch neurotomy is a type of medial branch neurotomy that is used to relieve arthritis pain. It uses radio waves to damage nerves in your neck or back so that they can no longer send pain messages to your brain. Before your doctor knows if a neurotomy will help you, he or she will do a medial branch block to find out if certain nerves are the ones that are a source of your pain. You will need two separate visits to the outpatient center or hospital to have both procedures. How is a medial branch block done? The doctor will use a tiny needle to numb the skin where you will get the block. Then he or she puts the block needle into the numbed area. You may feel some pressure, but you should not feel pain. Using fluoroscopy (live X-ray) to guide the needle, the doctor injects medicine onto one or more nerves to make them numb. If you get relief from your pain in the next 4 to 6 hours, it's a sign that those nerves may be contributing to your pain. The relief will last only a short time. You may then have a medial branch neurotomy at a later visit to try to get longer relief. It takes 20 to 30 minutes to get the block. You can go home after the doctor watches you for about an hour. You will get instructions on how to report how much pain you have when you are at home. You will need someone to drive you home. How is medial branch neurotomy done? The doctor will use a tiny needle to numb the skin where you will get the neurotomy. Then he or she puts the neurotomy needle into the numbed area. You may feel some pressure. Using fluoroscopy (live X-ray) to guide the needle, the doctor sends radio waves through the needle to the nerve for 60 to 90 seconds. The radio waves heat the nerve, which damages it. The doctor may do this several times. And he or she may treat more than one nerve. It takes 45 to 90 minutes to get a neurotomy, depending on how many nerves are heated. You will probably go home 30 to 60 minutes later. You will need someone to drive you home. What can you expect after a neurotomy? You may feel a little sore or tender at the injection site at first. But after a successful neurotomy, most people have pain relief right away. It often lasts for 9 to 12 months or longer. Sometimes the pain relief is permanent. If your pain does come back, it may mean that the damaged nerve has healed and can send pain messages again. Or it can mean that a different nerve is causing pain. Your doctor will discuss your options with you. Follow-up care is a key part of your treatment and safety. Be sure to make and go to all appointments, and call your doctor if you are having problems. It's also a good idea to know your test results and keep a list of the medicines you take. Where can you learn more? Go to http://vasile-fito.info/. Enter G417 in the search box to learn more about \"Learning About Medial Branch Block and Neurotomy. \" Current as of: October 9, 2017 Content Version: 11.7 © 4997-7351 [employer]. Care instructions adapted under license by [employer] (which disclaims liability or warranty for this information). If you have questions about a medical condition or this instruction, always ask your healthcare professional. [address] any warranty or liability for your use of this information. Introducing [facility]! Dear Rosario Boone: 
Thank you for requesting a [employer] account. Our records indicate that you already have an active [employer] account. You can access your account anytime at [email or web address]. [email or web address] Did you know that you can access your hospital and ER discharge instructions at any time in [employer]? You can also review all of your test results from your hospital stay or ER visit. Additional Information If you have questions, please visit the Frequently Asked Questions section of the [employer] website at [email or web address] Remember, [employer] is NOT to be used for urgent needs. For medical emergencies, dial 911. Now available from your iPhone and Android! Please provide this summary of care documentation to your next provider. Your primary care clinician is listed as Vivian Cote. If you have any questions after today's visit, please call [phone].

## 2018-08-06 ENCOUNTER — TELEPHONE (OUTPATIENT)
Dept: INTERNAL MEDICINE CLINIC | Age: 58
End: 2018-08-06

## 2018-08-06 DIAGNOSIS — E11.9 WELL CONTROLLED DIABETES MELLITUS (HCC): Primary | ICD-10-CM

## 2018-08-06 DIAGNOSIS — E78.5 DYSLIPIDEMIA: ICD-10-CM

## 2018-08-06 DIAGNOSIS — I10 ESSENTIAL HYPERTENSION: Primary | ICD-10-CM

## 2018-08-06 DIAGNOSIS — E66.01 SEVERE OBESITY (BMI 35.0-39.9) WITH COMORBIDITY (HCC): ICD-10-CM

## 2018-08-06 RX ORDER — FENOFIBRATE 145 MG/1
145 TABLET, COATED ORAL DAILY
Qty: 90 TAB | Refills: 3 | Status: SHIPPED | OUTPATIENT
Start: 2018-08-06 | End: 2019-07-17 | Stop reason: SDUPTHER

## 2018-08-06 RX ORDER — LOSARTAN POTASSIUM 100 MG/1
100 TABLET ORAL DAILY
Qty: 90 TAB | Refills: 3 | Status: ON HOLD | OUTPATIENT
Start: 2018-08-06 | End: 2018-12-17 | Stop reason: SDUPTHER

## 2018-08-06 NOTE — TELEPHONE ENCOUNTER
1. Finofibrate needs a prior auth per patient. .    2. Moses Saunders is on recall. He needs a replacement medication.      Mari Urbina

## 2018-08-06 NOTE — PROGRESS NOTES
Per his pharmacy team, his insurance will cover fenofibrate tablet 145mg daily in place of fenofibrate capsule 150mg daily. Rx changed as a result to 145mg daily of fenofibrate tablets.     Dr. Birder Cushing  [facility] [address]  Phone: [phone]  Fax: [phone]

## 2018-08-06 NOTE — TELEPHONE ENCOUNTER
Chief Complaint   Patient presents with    Prior Auth    Request For New Medication     per Dr So Adame has been recalled, new medication Losartan 100 mg one tab daily has been ordered check your pharmacy for      Please let him know that his valsartan was recalled. I am ordering losartan in place of this medication. He needs to monitor his BP and notify me if his BP is persistently greater tahn 140/90 for 2 consecutive weeks. Patient informed to check his pharmacy for  on the Losartan 100 mg and understands to take as directed. The patient states he has no more of the Valsartan tablets, and understands this medication must be stopped, due to the recall.

## 2018-08-09 ENCOUNTER — OFFICE VISIT (OUTPATIENT)
Dept: ORTHOPEDIC SURGERY | Facility: CLINIC | Age: 58
End: 2018-08-09

## 2018-08-09 VITALS
BODY MASS INDEX: 37.6 KG/M2 | SYSTOLIC BLOOD PRESSURE: 120 MMHG | HEART RATE: 65 BPM | HEIGHT: 71 IN | DIASTOLIC BLOOD PRESSURE: 80 MMHG | WEIGHT: 268.6 LBS | TEMPERATURE: 96.6 F | RESPIRATION RATE: 18 BRPM | OXYGEN SATURATION: 98 %

## 2018-08-09 DIAGNOSIS — M17.12 PRIMARY OSTEOARTHRITIS OF LEFT KNEE: ICD-10-CM

## 2018-08-09 DIAGNOSIS — M70.51 PES ANSERINUS BURSITIS OF RIGHT KNEE: Primary | ICD-10-CM

## 2018-08-09 RX ORDER — BETAMETHASONE SODIUM PHOSPHATE AND BETAMETHASONE ACETATE 3; 3 MG/ML; MG/ML
6 INJECTION, SUSPENSION INTRA-ARTICULAR; INTRALESIONAL; INTRAMUSCULAR; SOFT TISSUE ONCE
Qty: 1 ML | Refills: 0
Start: 2018-08-09 | End: 2018-08-09
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
## 2018-08-13 ENCOUNTER — OFFICE VISIT (OUTPATIENT)
Dept: CARDIOLOGY CLINIC | Age: 58
End: 2018-08-13

## 2018-08-13 VITALS
BODY MASS INDEX: 37.24 KG/M2 | OXYGEN SATURATION: 98 % | WEIGHT: 266 LBS | SYSTOLIC BLOOD PRESSURE: 132 MMHG | HEART RATE: 72 BPM | HEIGHT: 71 IN | DIASTOLIC BLOOD PRESSURE: 72 MMHG

## 2018-08-13 DIAGNOSIS — I48.19 PERSISTENT ATRIAL FIBRILLATION (HCC): Primary | ICD-10-CM

## 2018-08-13 DIAGNOSIS — I10 ESSENTIAL HYPERTENSION: ICD-10-CM

## 2018-08-13 DIAGNOSIS — E78.5 DYSLIPIDEMIA: ICD-10-CM

## 2018-08-13 DIAGNOSIS — E66.9 OBESITY, UNSPECIFIED OBESITY SEVERITY, UNSPECIFIED OBESITY TYPE: ICD-10-CM

## 2018-08-13 DIAGNOSIS — G47.33 OSA TREATED WITH BIPAP: ICD-10-CM

## 2018-08-13 NOTE — MR AVS SNAPSHOT
[address] [place] [address] [address] [place] [address] 
[phone] Patient: Roshan Dooley MRN: [id number] :1960 Visit Information Date & Time Provider Department Dept. Phone Encounter #  
 2018  9:40 AM Linda Carrel, MD [facility] [address] [id number] Your Appointments 2018  8:00 AM  
Office Visit with Sola العلي MD  
[facility] [address]) Appt Note: 3 month follow up  
 [address], [address]  
  
   
 [address], [address]  
  
    
 10/1/2018  9:15 AM  
Follow Up with Casimiro Schaefer MD  
[facility] ([address] [place] [address]) Appt Note: Facet Blk Javier L5-S1 2018  
 [address] [facility] [address]  
[phone]  
  
   
 [address]  
  
    
 2018  8:00 AM  
Follow Up with Sharon Rand MD  
[address] [facility], [address] [facility] [address]) Appt Note: 3 M FU JAVIER KNEE  
 [address] [facility], [address] [facility] [address]  
[phone]  
  
   
 [address] [facility], [address] [place] [address] Road [address] 2019  9:00 AM  
Office Visit with Shira Rae MD  
[facility] [address] [place] [address]) Appt Note: checkup/No KUB per Dr David Viveros [address] [place] [address] [address]  
[phone] [address] [place] [address] Upcoming Health Maintenance Date Due  
 LIPID PANEL Q1 2018 MICROALBUMIN Q1 2018 Influenza Age 5 to Adult 2018 HEMOGLOBIN A1C Q6M 2018 EYE EXAM RETINAL OR DILATED Q1 2/15/2019 FOOT EXAM Q1 5/3/2019 COLONOSCOPY 2026 DTaP/Tdap/Td series (2 - Td) 2027 Allergies as of 2018  Review Complete On: 2018 By: Sharon Rand MD  
 Severity Noted Reaction Type Reactions Penicillins Medium 08/17/2010    Hives Current Immunizations  Reviewed on 2/13/2017 Name Date Influenza Vaccine 10/1/2017 Pneumococcal Polysaccharide (PPSV-23) 2/13/2017 Tdap 2/13/2017 Not reviewed this visit You Were Diagnosed With   
  
 Codes Comments Persistent atrial fibrillation (HCC)    -  Primary ICD-10-CM: I48.1 ICD-9-CM: 427.31 Essential hypertension     ICD-10-CM: I10 
ICD-9-CM: 401.9 Dyslipidemia     ICD-10-CM: E78.5 ICD-9-CM: 272.4 Vitals BP Pulse Height(growth percentile) Weight(growth percentile) SpO2 BMI  
 132/72 72 5' 11\" (1.803 m) 266 lb (120.7 kg) 98% 37.1 kg/m2 Smoking Status Passive Smoke Exposure - Never Smoker Vitals History BMI and BSA Data Body Mass Index Body Surface Area  
 37.1 kg/m 2 2.46 m 2 Preferred Pharmacy Pharmacy Name Phone [facility] [phone] [place] [address] [phone] Your Updated Medication List  
  
   
This list is accurate as of 8/13/18 10:05 AM.  Always use your most recent med list.  
  
  
  
  
 albuterol 90 mcg/actuation inhaler Commonly known as:  PROVENTIL HFA, VENTOLIN HFA, PROAIR HFA Take 2 Puffs by inhalation every six (6) hours as needed for Wheezing. amLODIPine 10 mg tablet Commonly known as:  Fadia Reinoso TAKE 1 TABLET BY MOUTH DAILY  
  
 bipap machine kit  
by Does Not Apply route. BiPAP at 23/18 cm with heated humidifier. Mask: Simplus FF, medium size or mask of choice. Length of need 99 months. Replace mask and accessories as needed times 12 months. Please download data after 30 days and fax at [phone]. Dx: Severe FITO, Obesity, snoring. budesonide-formoterol 160-4.5 mcg/actuation Hfaa Commonly known as:  SYMBICORT  
TAKE 2 PUFFS BY MOUTH TWICE DAILY  
  
 buPROPion  mg SR tablet Commonly known as:  WELLBUTRIN SR  
TAKE 1 TABLET BY MOUTH DAILY  
  
 cholecalciferol 1,000 unit Cap Commonly known as:  VITAMIN D3 Take 2 Caps by mouth daily. fenofibrate nanocrystallized 145 mg tablet Commonly known as:  [employer] Take 1 Tab by mouth daily. gabapentin 300 mg capsule Commonly known as:  NEURONTIN  
TAKE 1 CAPSULE BY MOUTH TWICE DAILY  
  
 hydroCHLOROthiazide 12.5 mg capsule Commonly known as:  Bill Scales TAKE ONE CAPSULE BY MOUTH EVERY MORNING FOR HIGH BLOOD PRESSURE TAKE WITH BANANAS OR ORANGE JUICE  
  
 * insulin aspart U-100 100 unit/mL Inpn Commonly known as:  NOVOLOG  
by SubCUTAneous route. Sliding scale:  101-150 4 units 151-200 6 units 201-250  8 units 251-300 10 units Call if blood sugar is greater than 300 * NovoLOG Flexpen U-100 Insulin 100 unit/mL Inpn Generic drug:  insulin aspart U-100 INJECT 6 UNITS UNDER THE SKIN THREE TIMES DAILY JANUMET 50-1,000 mg per tablet Generic drug:  SITagliptin-metFORMIN  
TAKE 1 TABLET BY MOUTH TWICE DAILY WITH MEALS  
  
 LEVEMIR FLEXTOUCH U-100 INSULN 100 unit/mL (3 mL) Inpn Generic drug:  insulin detemir U-100 INJECT 25 UNITS UNDER THE SKIN EVERY MORNING  
  
 losartan 100 mg tablet Commonly known as:  COZAAR Take 1 Tab by mouth daily. metoprolol succinate 50 mg XL tablet Commonly known as:  TOPROL-XL  
TAKE 1 TABLET BY MOUTH DAILY  
  
 omega 3-DHA-EPA-fish oil 1,000 mg (120 mg-180 mg) capsule Take  by mouth two (2) times a day. ONE-A-DAY MEN'S 50+ ADVANTAGE PO Take  by mouth daily. rosuvastatin 40 mg tablet Commonly known as:  CRESTOR Take 1 Tab by mouth nightly. topiramate 25 mg tablet Commonly known as:  TOPAMAX Take 1 tab po QHS for 5 days then increase to 1 tab po BID  
  
 vardenafil 20 mg tablet Commonly known as:  LEVITRA Take 20 mg by mouth as needed. Indications: Erectile Dysfunction VIBERZI 75 mg tablet Generic drug:  eluxadoline TAKE 1 TABLET BY MOUTH TWICE DAILY WITH MEALS  
  
 VITAMIN B-12 1,000 mcg tablet Generic drug:  cyanocobalamin Take 1,000 mcg by mouth daily. XARELTO 20 mg Tab tablet Generic drug:  rivaroxaban TAKE 1 TABLET BY MOUTH DAILY WITH BREAKFAST * Notice: This list has 2 medication(s) that are the same as other medications prescribed for you. Read the directions carefully, and ask your doctor or other care provider to review them with you. We Performed the Following AMB POC EKG ROUTINE W/ 12 LEADS, INTER & REP [33353 CPT(R)] Introducing [facility]! Dear Lizy Washington: 
Thank you for requesting a [employer] account. Our records indicate that you already have an active [employer] account. You can access your account anytime at [email or web address]. [email or web address] Did you know that you can access your hospital and ER discharge instructions at any time in [employer]? You can also review all of your test results from your hospital stay or ER visit. Additional Information If you have questions, please visit the Frequently Asked Questions section of the [employer] website at [email or web address] Remember, [employer] is NOT to be used for urgent needs. For medical emergencies, dial 911. Now available from your iPhone and Android! Please provide this summary of care documentation to your next provider. Your primary care clinician is listed as Alvarado Marquez. If you have any questions after today's visit, please call [phone].

## 2018-08-13 NOTE — PROGRESS NOTES
1. Have you been to the ER, urgent care clinic since your last visit? Hospitalized since your last visit?no     2. Have you seen or consulted any other health care providers outside of the [address] [place] [address] since your last visit? Include any pap smears or colon screening.  No
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
## 2018-08-16 ENCOUNTER — OFFICE VISIT (OUTPATIENT)
Dept: INTERNAL MEDICINE CLINIC | Age: 58
End: 2018-08-16

## 2018-08-16 VITALS
WEIGHT: 262 LBS | BODY MASS INDEX: 36.68 KG/M2 | TEMPERATURE: 98.4 F | HEART RATE: 65 BPM | OXYGEN SATURATION: 97 % | RESPIRATION RATE: 16 BRPM | HEIGHT: 71 IN | DIASTOLIC BLOOD PRESSURE: 82 MMHG | SYSTOLIC BLOOD PRESSURE: 141 MMHG

## 2018-08-16 DIAGNOSIS — Z13.0 SCREENING FOR DEFICIENCY ANEMIA: ICD-10-CM

## 2018-08-16 DIAGNOSIS — K75.81 NASH (NONALCOHOLIC STEATOHEPATITIS): ICD-10-CM

## 2018-08-16 DIAGNOSIS — I10 ESSENTIAL HYPERTENSION: ICD-10-CM

## 2018-08-16 DIAGNOSIS — E78.5 DYSLIPIDEMIA: ICD-10-CM

## 2018-08-16 DIAGNOSIS — E11.9 WELL CONTROLLED DIABETES MELLITUS (HCC): Primary | ICD-10-CM

## 2018-08-16 DIAGNOSIS — N40.0 BENIGN PROSTATIC HYPERPLASIA, UNSPECIFIED WHETHER LOWER URINARY TRACT SYMPTOMS PRESENT: ICD-10-CM

## 2018-08-16 DIAGNOSIS — E66.01 SEVERE OBESITY (BMI 35.0-39.9) WITH COMORBIDITY (HCC): ICD-10-CM

## 2018-08-16 DIAGNOSIS — G47.33 OSA TREATED WITH BIPAP: ICD-10-CM

## 2018-08-16 LAB
ABSOLUTE LYMPHOCYTE COUNT, 10803: 1.9 K/UL (ref 1–4.8)
BASOPHILS # BLD: 0 K/UL (ref 0–0.2)
BASOPHILS NFR BLD: 0 % (ref 0–2)
CREATININE, URINE: 74 MG/DL
EOSINOPHIL # BLD: 0.2 K/UL (ref 0–0.5)
EOSINOPHIL NFR BLD: 3 % (ref 0–6)
ERYTHROCYTE [DISTWIDTH] IN BLOOD BY AUTOMATED COUNT: 14.6 % (ref 10–15.5)
GRANULOCYTES,GRANS: 59 % (ref 40–75)
HCT VFR BLD AUTO: 43 % (ref 39.3–51.6)
HGB BLD-MCNC: 13.3 G/DL (ref 13.1–17.2)
LYMPHOCYTES, LYMLT: 28 % (ref 20–45)
MCH RBC QN AUTO: 28 PG (ref 26–34)
MCHC RBC AUTO-ENTMCNC: 31 G/DL (ref 31–36)
MCV RBC AUTO: 90 FL (ref 80–95)
MICROALB/CREAT RATIO, 140286: 42.7 MCG/MG OF CREATININE (ref 0–30)
MICROALBUMIN,URINE RANDOM 140054: 31.6 MCG/ML (ref 0.1–17)
MONOCYTES # BLD: 0.7 K/UL (ref 0.1–1)
MONOCYTES NFR BLD: 10 % (ref 3–12)
NEUTROPHILS # BLD AUTO: 4.1 K/UL (ref 1.8–7.7)
PLATELET # BLD AUTO: 196 K/UL (ref 140–440)
PMV BLD AUTO: 10.4 FL (ref 9–13)
RBC # BLD AUTO: 4.78 M/UL (ref 3.8–5.8)
WBC # BLD AUTO: 7 K/UL (ref 4–11)
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
## 2018-08-16 NOTE — PATIENT INSTRUCTIONS

## 2018-08-16 NOTE — MR AVS SNAPSHOT
[address] [facility] 
 
 
 [address] [facility], [facility] [address] [facility] 
[phone] Patient: Wes Gee MRN: [id number] :1960 Visit Information Date & Time Provider Department Dept. Phone Encounter #  
 2018  8:00 AM Vicente Nayak MD [facility] [phone] Follow-up Instructions Return in about 3 months (around 2018) for BP check, DM check. Your Appointments 10/1/2018  9:15 AM  
Follow Up with Mat Yo MD  
[facility] ([facility] Appt Note: Facet Blk Javier L5-S1 2018  
 [address] [facility] [address]  
[phone]  
  
   
 [address]  
  
    
 2018  8:00 AM  
Follow Up with Tamir Cross MD  
[facility], [address] [facility] [address] [place] [address]) Appt Note: 3 M FU JAVIER KNEE  
 [address] [facility], [address] [place] [phone]  
  
   
 [address] [facility], [address] [place] [address]  
  
    
 11/15/2018  9:00 AM  
Office Visit with Vicente Nayak MD  
[facility]) [address] [facility], [facility] [address] [address]  
  
   
 [address], [address]  
  
    
 2019  8:40 AM  
Follow Up with Vi Rasheed MD  
[facility] ([facility]) Appt Note: 6 month f/up [place] [address] [address] [address] [phone] [address]  
  
    
 2019  9:00 AM  
Office Visit with Vega Metz MD  
[facility]) Appt Note: checkup/No KUB per Dr Yolanda Still [address] [address]  
[phone] [address] [facility] 600 [facility] [address] Upcoming Health Maintenance  Date Due  
 LIPID PANEL Q1 4/26/2018 MICROALBUMIN Q1 6/9/2018 Influenza Age 5 to Adult 8/1/2018 HEMOGLOBIN A1C Q6M 11/11/2018 EYE EXAM RETINAL OR DILATED Q1 2/15/2019 FOOT EXAM Q1 5/3/2019 COLONOSCOPY 6/27/2026 DTaP/Tdap/Td series (2 - Td) 2/13/2027 Allergies as of 8/16/2018  Review Complete On: 8/16/2018 By: Nichole Barriga LPN Severity Noted Reaction Type Reactions Penicillins Medium 08/17/2010    Hives Current Immunizations  Reviewed on 2/13/2017 Name Date Influenza Vaccine 10/1/2017 Pneumococcal Polysaccharide (PPSV-23) 2/13/2017 Tdap 2/13/2017 Not reviewed this visit You Were Diagnosed With   
  
 Codes Comments Well controlled diabetes mellitus ([facility] [address]    -  Primary ICD-10-CM: E11.9 ICD-9-CM: 250.00 Severe obesity (BMI 35.0-39. 9) with comorbidity (HCC)     ICD-10-CM: E66.01 
ICD-9-CM: 278.01   
 KILLIAN (nonalcoholic steatohepatitis)     ICD-10-CM: R39.69 ICD-9-CM: 571.8 Essential hypertension     ICD-10-CM: I10 
ICD-9-CM: 401.9 Dyslipidemia     ICD-10-CM: E78.5 ICD-9-CM: 272.4 Screening for deficiency anemia     ICD-10-CM: Z13.0 ICD-9-CM: V78.1 Benign prostatic hyperplasia, unspecified whether lower urinary tract symptoms present     ICD-10-CM: N40.0 ICD-9-CM: 600.00 Vitals BP Pulse Temp Resp Height(growth percentile) Weight(growth percentile) 141/82 65 98.4 °F (36.9 °C) 16 5' 11\" (1.803 m) 262 lb (118.8 kg) SpO2 BMI Smoking Status 97% 36.54 kg/m2 Passive Smoke Exposure - Never Smoker Vitals History BMI and BSA Data Body Mass Index Body Surface Area  
 36.54 kg/m 2 2.44 m 2 Preferred Pharmacy Pharmacy Name Phone [facility] [phone], [address] [place] [address] [phone] Your Updated Medication List  
  
   
This list is accurate as of 8/16/18  8:34 AM.  Always use your most recent med list.  
  
  
  
  
 albuterol 90 mcg/actuation inhaler Commonly known as:  PROVENTIL HFA, VENTOLIN HFA, PROAIR HFA Take 2 Puffs by inhalation every six (6) hours as needed for Wheezing. amLODIPine 10 mg tablet Commonly known as:  Love Breach TAKE 1 TABLET BY MOUTH DAILY  
  
 bipap machine kit  
by Does Not Apply route. BiPAP at 23/18 cm with heated humidifier. Mask: Simplus FF, medium size or mask of choice. Length of need 99 months. Replace mask and accessories as needed times 12 months. Please download data after 30 days and fax at [phone]. Dx: Severe FITO, Obesity, snoring. budesonide-formoterol 160-4.5 mcg/actuation Hfaa Commonly known as:  SYMBICORT  
TAKE 2 PUFFS BY MOUTH TWICE DAILY  
  
 buPROPion  mg SR tablet Commonly known as:  WELLBUTRIN SR  
TAKE 1 TABLET BY MOUTH DAILY  
  
 cholecalciferol 1,000 unit Cap Commonly known as:  VITAMIN D3 Take 2 Caps by mouth daily. fenofibrate nanocrystallized 145 mg tablet Commonly known as:  [employer] Take 1 Tab by mouth daily. gabapentin 300 mg capsule Commonly known as:  NEURONTIN  
TAKE 1 CAPSULE BY MOUTH TWICE DAILY  
  
 hydroCHLOROthiazide 12.5 mg capsule Commonly known as:  Ardella Dus TAKE ONE CAPSULE BY MOUTH EVERY MORNING FOR HIGH BLOOD PRESSURE TAKE WITH BANANAS OR ORANGE JUICE  
  
 * insulin aspart U-100 100 unit/mL Inpn Commonly known as:  NOVOLOG  
by SubCUTAneous route. Sliding scale:  101-150 4 units 151-200 6 units 201-250  8 units 251-300 10 units Call if blood sugar is greater than 300 * NovoLOG Flexpen U-100 Insulin 100 unit/mL Inpn Generic drug:  insulin aspart U-100 INJECT 6 UNITS UNDER THE SKIN THREE TIMES DAILY JANUMET 50-1,000 mg per tablet Generic drug:  SITagliptin-metFORMIN  
TAKE 1 TABLET BY MOUTH TWICE DAILY WITH MEALS  
  
 LEVEMIR FLEXTOUCH U-100 INSULN 100 unit/mL (3 mL) Inpn Generic drug:  insulin detemir U-100 INJECT 25 UNITS UNDER THE SKIN EVERY MORNING  
  
 losartan 100 mg tablet Commonly known as:  COZAAR Take 1 Tab by mouth daily. metoprolol succinate 50 mg XL tablet Commonly known as:  TOPROL-XL  
TAKE 1 TABLET BY MOUTH DAILY  
  
 omega 3-DHA-EPA-fish oil 1,000 mg (120 mg-180 mg) capsule Take  by mouth two (2) times a day. ONE-A-DAY MEN'S 50+ ADVANTAGE PO Take  by mouth daily. rosuvastatin 40 mg tablet Commonly known as:  CRESTOR Take 1 Tab by mouth nightly. topiramate 25 mg tablet Commonly known as:  TOPAMAX Take 1 tab po QHS for 5 days then increase to 1 tab po BID  
  
 vardenafil 20 mg tablet Commonly known as:  LEVITRA Take 20 mg by mouth as needed. Indications: Erectile Dysfunction VIBERZI 75 mg tablet Generic drug:  eluxadoline TAKE 1 TABLET BY MOUTH TWICE DAILY WITH MEALS  
  
 VITAMIN B-12 1,000 mcg tablet Generic drug:  cyanocobalamin Take 1,000 mcg by mouth daily. XARELTO 20 mg Tab tablet Generic drug:  rivaroxaban TAKE 1 TABLET BY MOUTH DAILY WITH BREAKFAST * Notice: This list has 2 medication(s) that are the same as other medications prescribed for you. Read the directions carefully, and ask your doctor or other care provider to review them with you. Follow-up Instructions Return in about 3 months (around 11/16/2018) for BP check, DM check. To-Do List   
 08/16/2018 Lab:  CBC WITH AUTOMATED DIFF Around 08/16/2018 Lab:  HEMOGLOBIN A1C W/O EAG   
  
 08/16/2018 Lab:  LIPID PANEL   
  
 08/16/2018 Lab:  METABOLIC PANEL, COMPREHENSIVE   
  
 08/16/2018 Lab:  MICROALBUMIN, UR, RAND W/ MICROALB/CREAT RATIO   
  
 08/16/2018 Lab:  PSA, DIAGNOSTIC (PROSTATE SPECIFIC AG) Patient Instructions Learning About Diabetes Food Guidelines Your Care Instructions Meal planning is important to manage diabetes. It helps keep your blood sugar at a target level (which you set with your doctor). You don't have to eat special foods. You can eat what your family eats, including sweets once in a while. But you do have to pay attention to how often you eat and how much you eat of certain foods. You may want to work with a dietitian or a certified diabetes educator (CDE) to help you plan meals and snacks. A dietitian or CDE can also help you lose weight if that is one of your goals. What should you know about eating carbs? Managing the amount of carbohydrate (carbs) you eat is an important part of healthy meals when you have diabetes. Carbohydrate is found in many foods. · Learn which foods have carbs. And learn the amounts of carbs in different foods. ¨ Bread, cereal, pasta, and rice have about 15 grams of carbs in a serving. A serving is 1 slice of bread (1 ounce), ½ cup of cooked cereal, or 1/3 cup of cooked pasta or rice. ¨ Fruits have 15 grams of carbs in a serving. A serving is 1 small fresh fruit, such as an apple or orange; ½ of a banana; ½ cup of cooked or canned fruit; ½ cup of fruit juice; 1 cup of melon or raspberries; or 2 tablespoons of dried fruit. ¨ Milk and no-sugar-added yogurt have 15 grams of carbs in a serving. A serving is 1 cup of milk or 2/3 cup of no-sugar-added yogurt. ¨ Starchy vegetables have 15 grams of carbs in a serving. A serving is ½ cup of mashed potatoes or sweet potato; 1 cup winter squash; ½ of a small baked potato; ½ cup of cooked beans; or ½ cup cooked corn or green peas. · Learn how much carbs to eat each day and at each meal. A dietitian or CDE can teach you how to keep track of the amount of carbs you eat. This is called carbohydrate counting. · If you are not sure how to count carbohydrate grams, use the Plate Method to plan meals. It is a good, quick way to make sure that you have a balanced meal. It also helps you spread carbs throughout the day. ¨ Divide your plate by types of foods. Put non-starchy vegetables on half the plate, meat or other protein food on one-quarter of the plate, and a grain or starchy vegetable in the final quarter of the plate. To this you can add a small piece of fruit and 1 cup of milk or yogurt, depending on how many carbs you are supposed to eat at a meal. 
· Try to eat about the same amount of carbs at each meal. Do not \"save up\" your daily allowance of carbs to eat at one meal. 
· Proteins have very little or no carbs per serving. Examples of proteins are beef, chicken, turkey, fish, eggs, tofu, cheese, cottage cheese, and peanut butter. A serving size of meat is 3 ounces, which is about the size of a deck of cards. Examples of meat substitute serving sizes (equal to 1 ounce of meat) are 1/4 cup of cottage cheese, 1 egg, 1 tablespoon of peanut butter, and ½ cup of tofu. How can you eat out and still eat healthy? · Learn to estimate the serving sizes of foods that have carbohydrate. If you measure food at home, it will be easier to estimate the amount in a serving of restaurant food. · If the meal you order has too much carbohydrate (such as potatoes, corn, or baked beans), ask to have a low-carbohydrate food instead. Ask for a salad or green vegetables. · If you use insulin, check your blood sugar before and after eating out to help you plan how much to eat in the future. · If you eat more carbohydrate at a meal than you had planned, take a walk or do other exercise. This will help lower your blood sugar. What else should you know? · Limit saturated fat, such as the fat from meat and dairy products. This is a healthy choice because people who have diabetes are at higher risk of heart disease. So choose lean cuts of meat and nonfat or low-fat dairy products. Use olive or canola oil instead of butter or shortening when cooking. · Don't skip meals. Your blood sugar may drop too low if you skip meals and take insulin or certain medicines for diabetes. · Check with your doctor before you drink alcohol. Alcohol can cause your blood sugar to drop too low. Alcohol can also cause a bad reaction if you take certain diabetes medicines. Follow-up care is a key part of your treatment and safety. Be sure to make and go to all appointments, and call your doctor if you are having problems. It's also a good idea to know your test results and keep a list of the medicines you take. Where can you learn more? Go to http://vasile-fito.info/. Enter V385 in the search box to learn more about \"Learning About Diabetes Food Guidelines. \" Current as of: December 7, 2017 Content Version: 11.7 © 0963-8511 [employer]. Care instructions adapted under license by [employer] (which disclaims liability or warranty for this information). If you have questions about a medical condition or this instruction, always ask your healthcare professional. [address] any warranty or liability for your use of this information. Introducing [facility]! Dear Gurvinder Trinidad: 
Thank you for requesting a TrackaPhone account. Our records indicate that you already have an active TrackaPhone account. You can access your account anytime at [email or web address]. [employer] Did you know that you can access your hospital and ER discharge instructions at any time in TrackaPhone? You can also review all of your test results from your hospital stay or ER visit. Additional Information If you have questions, please visit the Frequently Asked Questions section of the Guidekickhart website at [email or web address] Remember, [employer] is NOT to be used for urgent needs. For medical emergencies, dial 911. Now available from your iPhone and Android! Please provide this summary of care documentation to your next provider. Your primary care clinician is listed as Shelby Méndez. If you have any questions after today's visit, please call [phone].

## 2018-08-17 LAB
A-G RATIO,AGRAT: 1.8 RATIO (ref 1.1–2.6)
ALBUMIN SERPL-MCNC: 4.2 G/DL (ref 3.5–5)
ALP SERPL-CCNC: 51 U/L (ref 25–115)
ALT SERPL-CCNC: 19 U/L (ref 5–40)
ANION GAP SERPL CALC-SCNC: 21 MMOL/L
AST SERPL W P-5'-P-CCNC: 23 U/L (ref 10–37)
AVG GLU, 10930: 181 MG/DL (ref 91–123)
BILIRUB SERPL-MCNC: 0.3 MG/DL (ref 0.2–1.2)
BUN SERPL-MCNC: 27 MG/DL (ref 6–22)
CALCIUM SERPL-MCNC: 9.7 MG/DL (ref 8.4–10.4)
CHLORIDE SERPL-SCNC: 100 MMOL/L (ref 98–110)
CHOLEST SERPL-MCNC: 91 MG/DL (ref 110–200)
CO2 SERPL-SCNC: 21 MMOL/L (ref 20–32)
CREAT SERPL-MCNC: 1 MG/DL (ref 0.5–1.2)
GFRAA, 66117: >60
GFRNA, 66118: >60
GLOBULIN,GLOB: 2.4 G/DL (ref 2–4)
GLUCOSE SERPL-MCNC: 131 MG/DL (ref 70–99)
HBA1C MFR BLD HPLC: 7.9 % (ref 4.8–5.9)
HDLC SERPL-MCNC: 3 MG/DL (ref 0–5)
HDLC SERPL-MCNC: 30 MG/DL (ref 40–59)
LDLC SERPL CALC-MCNC: 23 MG/DL (ref 50–99)
POTASSIUM SERPL-SCNC: 4.1 MMOL/L (ref 3.5–5.5)
PROT SERPL-MCNC: 6.6 G/DL (ref 6.4–8.3)
PSA SERPL-MCNC: 0.82 NG/ML
SODIUM SERPL-SCNC: 142 MMOL/L (ref 133–145)
TRIGL SERPL-MCNC: 190 MG/DL (ref 40–149)
VLDLC SERPL CALC-MCNC: 38 MG/DL (ref 8–30)

## 2018-08-20 ENCOUNTER — TELEPHONE (OUTPATIENT)
Dept: INTERNAL MEDICINE CLINIC | Age: 58
End: 2018-08-20

## 2018-08-20 DIAGNOSIS — E11.9 WELL CONTROLLED DIABETES MELLITUS (HCC): Primary | ICD-10-CM

## 2018-08-20 NOTE — PROGRESS NOTES
Please let him know that his kidney function labs shows some reversible strain per his urine test result. There is no chronic kidney disease per his creatinine blood test result though. Meanwhile, his blood counts are normal. His cholesterol is adequately controlled with fenofibrate and Crestor. His PSA is down to 0.882. His A1C is up ot 7.9 from 7.1. He should continue all of his rx as prescribed but I want him to increase his insulin to 28 units daily vs 25 units of levemir daily. We need to get his A1C down to 7. He needs to lower his carbs!     Dr. Geovanna Gonzales  [facility], [facility], [address] [place] [address] Str.  Phone: [phone]  Fax: [phone]

## 2018-08-20 NOTE — TELEPHONE ENCOUNTER
Chief Complaint   Patient presents with    Labs     done 8-16-18 per Dr Larsen Due     Please let him know that his kidney function labs shows some reversible strain per his urine test result. There is no chronic kidney disease per his creatinine blood test result though. Meanwhile, his blood counts are normal. His cholesterol is adequately controlled with fenofibrate and Crestor. His PSA is down to 0.882. His A1C is up ot 7.9 from 7.1. He should continue all of his rx as prescribed but I want him to increase his insulin to 28 units daily vs 25 units of levemir daily.  We need to get his A1C down to 7. He needs to lower his carbs! Patient reached and informed, he understands to increase Levemir to 28 units insulin once a day, and will lower his carbohydrates to eat more of a Heart Healthy Diet.

## 2018-09-04 ENCOUNTER — APPOINTMENT (OUTPATIENT)
Dept: GENERAL RADIOLOGY | Age: 58
End: 2018-09-04
Attending: PHYSICAL MEDICINE & REHABILITATION
Payer: COMMERCIAL

## 2018-09-04 ENCOUNTER — HOSPITAL ENCOUNTER (OUTPATIENT)
Age: 58
Setting detail: OUTPATIENT SURGERY
Discharge: HOME | End: 2018-09-04
Attending: PHYSICAL MEDICINE & REHABILITATION | Admitting: PHYSICAL MEDICINE & REHABILITATION
Payer: COMMERCIAL

## 2018-09-04 VITALS
BODY MASS INDEX: 36.68 KG/M2 | OXYGEN SATURATION: 99 % | DIASTOLIC BLOOD PRESSURE: 82 MMHG | HEIGHT: 71 IN | HEART RATE: 77 BPM | SYSTOLIC BLOOD PRESSURE: 122 MMHG | TEMPERATURE: 98.3 F | RESPIRATION RATE: 18 BRPM | WEIGHT: 262 LBS

## 2018-09-04 LAB — GLUCOSE BLD STRIP.AUTO-MCNC: 103 MG/DL (ref 70–110)

## 2018-09-04 PROCEDURE — 74011250637 HC RX REV CODE- 250/637: Performed by: PHYSICAL MEDICINE & REHABILITATION

## 2018-09-04 PROCEDURE — 77030039433 HC TY MYLEOGRAM BD -B: Performed by: PHYSICAL MEDICINE & REHABILITATION

## 2018-09-04 PROCEDURE — 74011636320 HC RX REV CODE- 636/320

## 2018-09-04 PROCEDURE — 76010000009 HC PAIN MGT 0 TO 30 MIN PROC: Performed by: PHYSICAL MEDICINE & REHABILITATION

## 2018-09-04 PROCEDURE — 74011250636 HC RX REV CODE- 250/636: Performed by: PHYSICAL MEDICINE & REHABILITATION

## 2018-09-04 PROCEDURE — 77030003676 HC NDL SPN MPRI -A: Performed by: PHYSICAL MEDICINE & REHABILITATION

## 2018-09-04 PROCEDURE — 74011250636 HC RX REV CODE- 250/636

## 2018-09-04 PROCEDURE — 82962 GLUCOSE BLOOD TEST: CPT

## 2018-09-04 PROCEDURE — 74011636320 HC RX REV CODE- 636/320: Performed by: PHYSICAL MEDICINE & REHABILITATION

## 2018-09-04 RX ORDER — DEXAMETHASONE SODIUM PHOSPHATE 100 MG/10ML
INJECTION INTRAMUSCULAR; INTRAVENOUS AS NEEDED
Status: DISCONTINUED | OUTPATIENT
Start: 2018-09-04 | End: 2018-09-04 | Stop reason: HOSPADM

## 2018-09-04 RX ORDER — DIAZEPAM 5 MG/1
5-20 TABLET ORAL ONCE
Status: DISCONTINUED | OUTPATIENT
Start: 2018-09-04 | End: 2018-09-04 | Stop reason: HOSPADM

## 2018-09-04 RX ORDER — DIAZEPAM 5 MG/1
2.5-1 TABLET ORAL ONCE
Status: COMPLETED | OUTPATIENT
Start: 2018-09-04 | End: 2018-09-04

## 2018-09-04 RX ORDER — LIDOCAINE HYDROCHLORIDE 10 MG/ML
INJECTION, SOLUTION EPIDURAL; INFILTRATION; INTRACAUDAL; PERINEURAL AS NEEDED
Status: DISCONTINUED | OUTPATIENT
Start: 2018-09-04 | End: 2018-09-04 | Stop reason: HOSPADM

## 2018-09-04 RX ADMIN — DIAZEPAM 10 MG: 5 TABLET ORAL at 13:49

## 2018-09-04 NOTE — INTERVAL H&P NOTE
H&P Update:  Neela Bloom was seen and briefly examined. Interim PCP, Cardiology, and ortho notes appreciated. History and physical has been reviewed.   There have been no significant clinical changes since the completion of the originally dated History and Physical.    Signed By: Liliane Melvin MD     September 4, 2018 2:41 PM

## 2018-09-04 NOTE — PROCEDURES
Facet Joint Diagnostic/Therapeutic Block Procedure Note    Patient Name: Eder Candelaria. Date of Procedure: September 4, 2018    Preoperative Diagnosis: SPONDYLOSIS w/listhesis    Post Operative Diagnosis:SPONDYLOSIS     Procedure:  bilateral  L5-S1 Facet Joint Block        Consent: Informed consent was obtained prior to the procedure. The patient was given the opportunity to ask questions regarding the procedure and its associated risks. In addition to the potential risks associated with the procedure itself, the patient was informed both verbally and in writing of potential side effects of the use of glucocorticoids. The patient appeared to comprehend the informed consent and desired to have the procedure performed. Procedure: The patient was placed in the prone position on the fluoroscopy table and the back was prepped and draped in the usual sterile manner. At each blocked level, the exact location of the facet joint was identified with flouroscopy, and after local Lidocaine 1% injection, a #22 gauge spinal needle was then advanced toward the joint. Yes a small amount of Isovue was used to confirm placement, no vascular uptake was identified. A total of 30 mg of preservative free Dexamethasone and 5 cc of 1% Lidocaine was injected in and around all of the sites. The patient tolerated the procedure well. The injection area was cleaned and bandaids applied. No excessive bleeding was noted. Patient dressed and was discharged to home with instructions.        Margo España MD  September 4, 2018

## 2018-09-04 NOTE — DISCHARGE INSTRUCTIONS
[facility]   (HOOD)  Dr. Jordon Venegas, Dr. Earline Sanchez, Dr. August Ni not drive a car, operate heavy machinery or dangerous equipment for 24 hours. * Activity as tolerated; rest for the remainder of the day. * Resume pre-block medications including those for your family doctor. * Do not drink alcoholic beverages for 24 hours. Alcohol and the medications you have received may interact and cause an adverse reaction. * You may feel better this evening and worse tomorrow, as the numbing medications wears off and the steroid has yet to begin to work. After 48 hrs the steroid should begin to release bringing you relief. * You may shower this evening and remove any bandages. * Avoid hot tubs and heating pads for 24 hours. You may use cold packs on the procedure site as tolerated for the first 24 hours. * If a headache develops, drink plenty of fluids and rest.  Take over the counter medications for headache if needed. If the headache continues longer than 24 hours, call MD at the [address] [place] [address]. [phone]    * Continue taking pain medications as needed. * You may resume your regular diet if tolerated. Otherwise, start with sips of water and advance slowly. * If Diabetic: check your blood sugar three times a day for the next 3 days. If your sugar is greater than 300 call your family doctor. If your sugar is greater than 400, have someone transport you to the nearest Emergency Room. * If you experience any of the following problems, Please Call the [address] [place] [address] at [phone].         * Shortness of Breath    * Fever of 101 or higher    * Nausea / Vomiting    * Severe Headache    * Weakness or numbness in arms or legs that is not      resolving    * Prolonged increase in pain greater than 4 days      DISCHARGE SUMMARY from Nurse      PATIENT INSTRUCTIONS:    After oral sedation, for 24 hours or while taking prescription Narcotics:  · Limit your activities  · Do not drive and operate hazardous machinery  · Do not make important personal or business decisions  · Do  not drink alcoholic beverages  · If you have not urinated within 8 hours after discharge, please contact your surgeon on call. Report the following to your surgeon:  · Excessive pain, swelling, redness or odor of or around the surgical area  · Temperature over 101  · Nausea and vomiting lasting longer than 4 hours or if unable to take medications  · Any signs of decreased circulation or nerve impairment to extremity: change in color, persistent  numbness, tingling, coldness or increase pain  · Any questions            What to do at Home:  Recommended activity: Activity as tolerated, NO DRIVING FOR 12 Hours post injection          *  Please give a list of your current medications to your Primary Care Provider. *  Please update this list whenever your medications are discontinued, doses are      changed, or new medications (including over-the-counter products) are added. *  Please carry medication information at all times in case of emergency situations. These are general instructions for a healthy lifestyle:    No smoking/ No tobacco products/ Avoid exposure to second hand smoke    Surgeon General's Warning:  Quitting smoking now greatly reduces serious risk to your health. Obesity, smoking, and sedentary lifestyle greatly increases your risk for illness    A healthy diet, regular physical exercise & weight monitoring are important for maintaining a healthy lifestyle    You may be retaining fluid if you have a history of heart failure or if you experience any of the following symptoms:  Weight gain of 3 pounds or more overnight or 5 pounds in a week, increased swelling in our hands or feet or shortness of breath while lying flat in bed.   Please call your doctor as soon as you notice any of these symptoms; do not wait until your next office visit. Recognize signs and symptoms of STROKE:    F-face looks uneven    A-arms unable to move or move unevenly    S-speech slurred or non-existent    T-time-call 911 as soon as signs and symptoms begin-DO NOT go       Back to bed or wait to see if you get better-TIME IS BRAIN.
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
## 2018-10-01 ENCOUNTER — OFFICE VISIT (OUTPATIENT)
Dept: ORTHOPEDIC SURGERY | Age: 58
End: 2018-10-01

## 2018-10-01 VITALS
OXYGEN SATURATION: 95 % | HEART RATE: 80 BPM | RESPIRATION RATE: 18 BRPM | WEIGHT: 270 LBS | BODY MASS INDEX: 37.8 KG/M2 | TEMPERATURE: 97.8 F | DIASTOLIC BLOOD PRESSURE: 84 MMHG | SYSTOLIC BLOOD PRESSURE: 138 MMHG | HEIGHT: 71 IN

## 2018-10-01 DIAGNOSIS — M43.16 SPONDYLOLISTHESIS OF LUMBAR REGION: Primary | ICD-10-CM

## 2018-10-01 DIAGNOSIS — E11.42 DIABETIC POLYNEUROPATHY ASSOCIATED WITH TYPE 2 DIABETES MELLITUS (HCC): ICD-10-CM

## 2018-10-01 RX ORDER — CYCLOBENZAPRINE HCL 10 MG
10 TABLET ORAL
Qty: 30 TAB | Refills: 0 | Status: SHIPPED | OUTPATIENT
Start: 2018-10-01 | End: 2019-07-17 | Stop reason: SDUPTHER

## 2018-10-01 RX ORDER — TRAMADOL HYDROCHLORIDE 50 MG/1
TABLET ORAL
Qty: 28 TAB | Refills: 0 | Status: SHIPPED | OUTPATIENT
Start: 2018-10-01 | End: 2020-08-12
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
## 2018-10-01 NOTE — PROGRESS NOTES
Verbal order entered per Dr. Aleksey Kirk as documented on blue sheet:Tramadol 50mg take 1 to 2 tabs po every day to BID prn severe pain. Disp 28 no refills. Flexeril 10mg take 1 po QHS prn pain/spasms.  Disp 30 no refills

## 2018-10-01 NOTE — PATIENT INSTRUCTIONS
Back Care and Preventing Injuries: Care Instructions  Your Care Instructions    You can hurt your back doing many everyday activities: lifting a heavy box, bending down to garden, exercising at the gym, and even getting out of bed. But you can keep your back strong and healthy by doing some exercises. You also can follow a few tips for sitting, sleeping, and lifting to avoid hurting your back again. Talk to your doctor before you start an exercise program. Ask for help if you want to learn more about keeping your back healthy. Follow-up care is a key part of your treatment and safety. Be sure to make and go to all appointments, and call your doctor if you are having problems. It's also a good idea to know your test results and keep a list of the medicines you take. How can you care for yourself at home? · Stay at a healthy weight to avoid strain on your lower back. · Do not smoke. Smoking increases the risk of osteoporosis, which weakens the spine. If you need help quitting, talk to your doctor about stop-smoking programs and medicines. These can increase your chances of quitting for good. · Make sure you sleep in a position that maintains your back's normal curves and on a mattress that feels comfortable. Sleep on your side with a pillow between your knees, or sleep on your back with a pillow under your knees. These positions can reduce strain on your back. · When you get out of bed, lie on your side and bend both knees. Drop your feet over the edge of the bed as you push up with both arms. Scoot to the edge of the bed. Make sure your feet are in line with your rear end (buttocks), and then stand up. · If you must stand for a long time, put one foot on a stool, ledge, or box. Exercise to strengthen your back and other muscles  · Get at least 30 minutes of exercise on most days of the week. Walking is a good choice.  You also may want to do other activities, such as running, swimming, cycling, or playing tennis or team sports. · Stretch your back muscles. Here are few exercises to try:  Sangeetha Andino on your back with your knees bent and your feet flat on the floor. Gently pull one bent knee to your chest. Put that foot back on the floor, and then pull the other knee to your chest. Hold for 15 to 30 seconds. Repeat 2 to 4 times. ¨ Do pelvic tilts. Lie on your back with your knees bent. Tighten your stomach muscles. Pull your belly button (navel) in and up toward your ribs. You should feel like your back is pressing to the floor and your hips and pelvis are slightly lifting off the floor. Hold for 6 seconds while breathing smoothly. · Keep your core muscles strong. The muscles of your back, belly (abdomen), and buttocks support your spine. ¨ Pull in your belly, and imagine pulling your navel toward your spine. Hold this for 6 seconds, then relax. Remember to keep breathing normally as you tense your muscles. ¨ Do curl-ups. Always do them with your knees bent. Keep your low back on the floor, and curl your shoulders toward your knees using a smooth, slow motion. Keep your arms folded across your chest. If this bothers your neck, try putting your hands behind your neck (not your head), with your elbows spread apart. ¨ Lie on your back with your knees bent and your feet flat on the floor. Tighten your belly muscles, and then push with your feet and raise your buttocks up a few inches. Hold this position 6 seconds as you continue to breathe normally, then lower yourself slowly to the floor. Repeat 8 to 12 times. ¨ If you like group exercise, try Pilates or yoga. These classes have poses that strengthen the core muscles. Protect your back when you sit  · Place a small pillow, a rolled-up towel, or a lumbar roll in the curve of your back if you need extra support. · Sit in a chair that is low enough to let you place both feet flat on the floor with both knees nearly level with your hips.  If your chair or desk is too high, use a foot rest to raise your knees. · When driving, keep your knees nearly level with your hips. Sit straight, and drive with both hands on the steering wheel. Your arms should be in a slightly bent position. · Try a kneeling chair, which helps tilt your hips forward. This takes pressure off your lower back. · Try sitting on an exercise ball. It can rock from side to side, which helps keep your back loose. Lift properly  · Squat down, bending at the hips and knees only. If you need to, put one knee to the floor and extend your other knee in front of you, bent at a right angle (half kneeling). · Press your chest straight forward. This helps keep your upper back straight while keeping a slight arch in your low back. · Hold the load as close to your body as possible, at the level of your navel. · Use your feet to change direction, taking small steps. · Lead with your hips as you change direction. Keep your shoulders in line with your hips as you move. Do not twist your body. · Set down your load carefully, squatting with your knees and hips only. When should you call for help? Watch closely for changes in your health, and be sure to contact your doctor if you have any problems. Where can you learn more? Go to http://vasile-fito.info/. Enter S810 in the search box to learn more about \"Back Care and Preventing Injuries: Care Instructions. \"  Current as of: November 29, 2017  Content Version: 11.7  © 7881-0116 [employer]. Care instructions adapted under license by "[employer]" (which disclaims liability or warranty for this information). If you have questions about a medical condition or this instruction, always ask your healthcare professional. [place] [address] any warranty or liability for your use of this information. Learning About Medial Branch Block and Neurotomy  What are medial branch block and neurotomy?     Facet joints connect your vertebrae to each other. Problems in these joints can cause chronic (long-term) pain in the neck or back. They can sometimes affect the shoulders, arms, buttocks, or legs. Medial branch nerves are the nerves that carry many of the pain messages from your facet joints. Radiofrequency medial branch neurotomy is a type of medial branch neurotomy that is used to relieve arthritis pain. It uses radio waves to damage nerves in your neck or back so that they can no longer send pain messages to your brain. Before your doctor knows if a neurotomy will help you, he or she will do a medial branch block to find out if certain nerves are the ones that are a source of your pain. You will need two separate visits to the outpatient center or hospital to have both procedures. How is a medial branch block done? The doctor will use a tiny needle to numb the skin where you will get the block. Then he or she puts the block needle into the numbed area. You may feel some pressure, but you should not feel pain. Using fluoroscopy (live X-ray) to guide the needle, the doctor injects medicine onto one or more nerves to make them numb. If you get relief from your pain in the next 4 to 6 hours, it's a sign that those nerves may be contributing to your pain. The relief will last only a short time. You may then have a medial branch neurotomy at a later visit to try to get longer relief. It takes 20 to 30 minutes to get the block. You can go home after the doctor watches you for about an hour. You will get instructions on how to report how much pain you have when you are at home. You will need someone to drive you home. How is medial branch neurotomy done? The doctor will use a tiny needle to numb the skin where you will get the neurotomy. Then he or she puts the neurotomy needle into the numbed area. You may feel some pressure.  Using fluoroscopy (live X-ray) to guide the needle, the doctor sends radio waves through the needle to the nerve for 60 to 90 seconds. The radio waves heat the nerve, which damages it. The doctor may do this several times. And he or she may treat more than one nerve. It takes 45 to 90 minutes to get a neurotomy, depending on how many nerves are heated. You will probably go home 30 to 60 minutes later. You will need someone to drive you home. What can you expect after a neurotomy? You may feel a little sore or tender at the injection site at first. But after a successful neurotomy, most people have pain relief right away. It often lasts for 9 to 12 months or longer. Sometimes the pain relief is permanent. If your pain does come back, it may mean that the damaged nerve has healed and can send pain messages again. Or it can mean that a different nerve is causing pain. Your doctor will discuss your options with you. Follow-up care is a key part of your treatment and safety. Be sure to make and go to all appointments, and call your doctor if you are having problems. It's also a good idea to know your test results and keep a list of the medicines you take. Where can you learn more? Go to http://vasile-fito.info/. Enter B952 in the search box to learn more about \"Learning About Medial Branch Block and Neurotomy. \"  Current as of: October 9, 2017  Content Version: 11.7  © 6465-0255 [employer]. Care instructions adapted under license by [employer] (which disclaims liability or warranty for this information). If you have questions about a medical condition or this instruction, always ask your healthcare professional. [place] [address] any warranty or liability for your use of this information.

## 2018-10-01 NOTE — MR AVS SNAPSHOT
[address] [place] [address] [address] [facility] [address] 
[phone] Patient: Manoj James. MRN: [id number] :1960 Visit Information Date & Time Provider Department Dept. Phone Encounter #  
 10/1/2018  9:15 AM Wil Pierre MD [facility] [phone] [id number] Follow-up Instructions Return if symptoms worsen or fail to improve. Your Appointments 2018  8:00 AM  
Follow Up with Kathrin Fonseca MD  
[facility] [address] [place] [address] [facility] Appt Note: 3 M FU JAVIER KNEE  
 [address] [facility], [address] [facility]  
[phone]  
  
   
 [address] [place] [address], [address] [place] [address]  
  
    
 11/15/2018  9:00 AM  
Office Visit with Ugo Barba MD  
[facility]: 3 month f/u  
 [address] [facility], [address] [address] [address] [place]  
  
   
 [address], [address]  
  
    
 2019  8:40 AM  
Follow Up with Gorge Ryder MD  
[facility] [place] [facility]"" ([facility] Appt Note: 6 month f/up [place] [address] [address] [id number] [phone] [address]  
  
    
 2019  9:00 AM  
Office Visit with Pato Spencer MD  
[facility] Appt Note: checkup/No KUB per Dr Emerita Velásquez [address] [place] A [address] [address]  
[phone] [address] [place] [address] Upcoming Health Maintenance Date Due Shingrix Vaccine Age 50> (1 of 2) 10/11/2009 Influenza Age 5 to Adult 2018 EYE EXAM RETINAL OR DILATED Q1 2/15/2019 HEMOGLOBIN A1C Q6M 2019 FOOT EXAM Q1 5/3/2019 MICROALBUMIN Q1 2019 LIPID PANEL Q1 2019 COLONOSCOPY 2026 DTaP/Tdap/Td series (2 - Td) 2/13/2027 Allergies as of 10/1/2018  Review Complete On: 10/1/2018 By: Cori Tejeda MD  
  
 Severity Noted Reaction Type Reactions Penicillins Medium 08/17/2010    Hives Current Immunizations  Reviewed on 2/13/2017 Name Date Influenza Vaccine 10/1/2017 Pneumococcal Polysaccharide (PPSV-23) 2/13/2017 Tdap 2/13/2017 Not reviewed this visit You Were Diagnosed With   
  
 Codes Comments Spondylolisthesis of lumbar region    -  Primary ICD-10-CM: M43.16 
ICD-9-CM: 738.4 Diabetic polyneuropathy associated with type 2 diabetes mellitus ([facility]     ICD-10-CM: E11.42 
ICD-9-CM: 250.60, 357.2 Vitals BP Pulse Temp Resp Height(growth percentile) Weight(growth percentile) 138/84 80 97.8 °F (36.6 °C) 18 5' 11\" (1.803 m) 270 lb (122.5 kg) SpO2 BMI Smoking Status 95% 37.66 kg/m2 Passive Smoke Exposure - Never Smoker BMI and BSA Data Body Mass Index Body Surface Area  
 37.66 kg/m 2 2.48 m 2 Preferred Pharmacy Pharmacy Name Phone [facility] [address] [place] [address] [phone] Your Updated Medication List  
  
   
This list is accurate as of 10/1/18  9:47 AM.  Always use your most recent med list.  
  
  
  
  
 albuterol 90 mcg/actuation inhaler Commonly known as:  PROVENTIL HFA, VENTOLIN HFA, PROAIR HFA Take 2 Puffs by inhalation every six (6) hours as needed for Wheezing. amLODIPine 10 mg tablet Commonly known as:  Archana Evans TAKE 1 TABLET BY MOUTH DAILY  
  
 bipap machine kit  
by Does Not Apply route. BiPAP at 23/18 cm with heated humidifier. Mask: Simplus FF, medium size or mask of choice. Length of need 99 months. Replace mask and accessories as needed times 12 months. Please download data after 30 days and fax at [phone]. Dx: Severe FITO, Obesity, snoring.   
  
 budesonide-formoterol 160-4.5 mcg/actuation Hfaa  
 Commonly known as:  SYMBICORT  
TAKE 2 PUFFS BY MOUTH TWICE DAILY  
  
 buPROPion  mg SR tablet Commonly known as:  WELLBUTRIN SR  
TAKE 1 TABLET BY MOUTH DAILY  
  
 cholecalciferol 1,000 unit Cap Commonly known as:  VITAMIN D3 Take 2 Caps by mouth daily. cyclobenzaprine 10 mg tablet Commonly known as:  FLEXERIL Take 1 Tab by mouth nightly. fenofibrate nanocrystallized 145 mg tablet Commonly known as:  [employer] Take 1 Tab by mouth daily. gabapentin 300 mg capsule Commonly known as:  NEURONTIN  
TAKE 1 CAPSULE BY MOUTH TWICE DAILY  
  
 hydroCHLOROthiazide 12.5 mg capsule Commonly known as:  Cee Harley TAKE ONE CAPSULE BY MOUTH EVERY MORNING FOR HIGH BLOOD PRESSURE TAKE WITH BANANAS OR ORANGE JUICE  
  
 * insulin aspart U-100 100 unit/mL Inpn Commonly known as:  NOVOLOG  
by SubCUTAneous route. Sliding scale:  101-150 4 units 151-200 6 units 201-250  8 units 251-300 10 units Call if blood sugar is greater than 300 * NovoLOG Flexpen U-100 Insulin 100 unit/mL Inpn Generic drug:  insulin aspart U-100 INJECT 6 UNITS UNDER THE SKIN THREE TIMES DAILY  
  
 insulin detemir U-100 100 unit/mL (3 mL) Inpn Commonly known as:  LEVEMIR FLEXTOUCH U-100 INSULN  
28 Units by SubCUTAneous route daily. JANUMET 50-1,000 mg per tablet Generic drug:  SITagliptin-metFORMIN  
TAKE 1 TABLET BY MOUTH TWICE DAILY WITH MEALS  
  
 losartan 100 mg tablet Commonly known as:  COZAAR Take 1 Tab by mouth daily. metoprolol succinate 50 mg XL tablet Commonly known as:  TOPROL-XL  
TAKE 1 TABLET BY MOUTH DAILY Vianney Pen Needle 32 gauge x 5/32\" Ndle Generic drug:  Insulin Needles (Disposable) USE TO TEST BLOOD SUGAR THREE TIMES DAILY PLUS SLIDING SCALE  
  
 omega 3-DHA-EPA-fish oil 1,000 mg (120 mg-180 mg) capsule Take  by mouth two (2) times a day. ONE-A-DAY MEN'S 50+ ADVANTAGE PO Take  by mouth daily. rosuvastatin 40 mg tablet Commonly known as:  CRESTOR Take 1 Tab by mouth nightly. traMADol 50 mg tablet Commonly known as:  ULTRAM  
Take 1 to 2 tabs po every day to BID as needed severe pain  
  
 vardenafil 20 mg tablet Commonly known as:  LEVITRA Take 20 mg by mouth as needed. Indications: Erectile Dysfunction VIBERZI 75 mg tablet Generic drug:  eluxadoline TAKE 1 TABLET BY MOUTH TWICE DAILY WITH MEALS  
  
 VITAMIN B-12 1,000 mcg tablet Generic drug:  cyanocobalamin Take 1,000 mcg by mouth daily. XARELTO 20 mg Tab tablet Generic drug:  rivaroxaban TAKE 1 TABLET BY MOUTH DAILY WITH BREAKFAST * Notice: This list has 2 medication(s) that are the same as other medications prescribed for you. Read the directions carefully, and ask your doctor or other care provider to review them with you. Prescriptions Printed Refills  
 traMADol (ULTRAM) 50 mg tablet 0 Sig: Take 1 to 2 tabs po every day to BID as needed severe pain  
 Class: Print Prescriptions Sent to Pharmacy Refills  
 cyclobenzaprine (FLEXERIL) 10 mg tablet 0 Sig: Take 1 Tab by mouth nightly. Class: Normal  
 Pharmacy: [facility] [address] [place] [address] Wilfredo [address],[facility] Ph #: [phone] Route: Oral  
  
Follow-up Instructions Return if symptoms worsen or fail to improve. Patient Instructions Back Care and Preventing Injuries: Care Instructions Your Care Instructions You can hurt your back doing many everyday activities: lifting a heavy box, bending down to garden, exercising at the gym, and even getting out of bed. But you can keep your back strong and healthy by doing some exercises. You also can follow a few tips for sitting, sleeping, and lifting to avoid hurting your back again. Talk to your doctor before you start an exercise program. Ask for help if you want to learn more about keeping your back healthy. Follow-up care is a key part of your treatment and safety. Be sure to make and go to all appointments, and call your doctor if you are having problems. It's also a good idea to know your test results and keep a list of the medicines you take. How can you care for yourself at home? · Stay at a healthy weight to avoid strain on your lower back. · Do not smoke. Smoking increases the risk of osteoporosis, which weakens the spine. If you need help quitting, talk to your doctor about stop-smoking programs and medicines. These can increase your chances of quitting for good. · Make sure you sleep in a position that maintains your back's normal curves and on a mattress that feels comfortable. Sleep on your side with a pillow between your knees, or sleep on your back with a pillow under your knees. These positions can reduce strain on your back. · When you get out of bed, lie on your side and bend both knees. Drop your feet over the edge of the bed as you push up with both arms. Scoot to the edge of the bed. Make sure your feet are in line with your rear end (buttocks), and then stand up. · If you must stand for a long time, put one foot on a stool, ledge, or box. Exercise to strengthen your back and other muscles · Get at least 30 minutes of exercise on most days of the week. Walking is a good choice. You also may want to do other activities, such as running, swimming, cycling, or playing tennis or team sports. · Stretch your back muscles. Here are few exercises to try: ¨ Lie on your back with your knees bent and your feet flat on the floor. Gently pull one bent knee to your chest. Put that foot back on the floor, and then pull the other knee to your chest. Hold for 15 to 30 seconds. Repeat 2 to 4 times. ¨ Do pelvic tilts. Lie on your back with your knees bent. Tighten your stomach muscles. Pull your belly button (navel) in and up toward your ribs. You should feel like your back is pressing to the floor and your hips and pelvis are slightly lifting off the floor. Hold for 6 seconds while breathing smoothly. · Keep your core muscles strong. The muscles of your back, belly (abdomen), and buttocks support your spine. ¨ Pull in your belly, and imagine pulling your navel toward your spine. Hold this for 6 seconds, then relax. Remember to keep breathing normally as you tense your muscles. ¨ Do curl-ups. Always do them with your knees bent. Keep your low back on the floor, and curl your shoulders toward your knees using a smooth, slow motion. Keep your arms folded across your chest. If this bothers your neck, try putting your hands behind your neck (not your head), with your elbows spread apart. ¨ Lie on your back with your knees bent and your feet flat on the floor. Tighten your belly muscles, and then push with your feet and raise your buttocks up a few inches. Hold this position 6 seconds as you continue to breathe normally, then lower yourself slowly to the floor. Repeat 8 to 12 times. ¨ If you like group exercise, try Pilates or yoga. These classes have poses that strengthen the core muscles. Protect your back when you sit · Place a small pillow, a rolled-up towel, or a lumbar roll in the curve of your back if you need extra support. · Sit in a chair that is low enough to let you place both feet flat on the floor with both knees nearly level with your hips. If your chair or desk is too high, use a foot rest to raise your knees. · When driving, keep your knees nearly level with your hips. Sit straight, and drive with both hands on the steering wheel. Your arms should be in a slightly bent position. · Try a kneeling chair, which helps tilt your hips forward. This takes pressure off your lower back. · Try sitting on an exercise ball. It can rock from side to side, which helps keep your back loose. Lift properly · Squat down, bending at the hips and knees only. If you need to, put one knee to the floor and extend your other knee in front of you, bent at a right angle (half kneeling). · Press your chest straight forward. This helps keep your upper back straight while keeping a slight arch in your low back. · Hold the load as close to your body as possible, at the level of your navel. · Use your feet to change direction, taking small steps. · Lead with your hips as you change direction. Keep your shoulders in line with your hips as you move. Do not twist your body. · Set down your load carefully, squatting with your knees and hips only. When should you call for help? Watch closely for changes in your health, and be sure to contact your doctor if you have any problems. Where can you learn more? Go to http://vasile-fito.info/. Enter S810 in the search box to learn more about \"Back Care and Preventing Injuries: Care Instructions. \" Current as of: November 29, 2017 Content Version: 11.7 © 9511-0150 [employer]. Care instructions adapted under license by [employer] (which disclaims liability or warranty for this information). If you have questions about a medical condition or this instruction, always ask your healthcare professional. [address] any warranty or liability for your use of this information. Learning About Medial Branch Block and Neurotomy What are medial branch block and neurotomy? Facet joints connect your vertebrae to each other. Problems in these joints can cause chronic (long-term) pain in the neck or back. They can sometimes affect the shoulders, arms, buttocks, or legs. Medial branch nerves are the nerves that carry many of the pain messages from your facet joints. Radiofrequency medial branch neurotomy is a type of medial branch neurotomy that is used to relieve arthritis pain. It uses radio waves to damage nerves in your neck or back so that they can no longer send pain messages to your brain. Before your doctor knows if a neurotomy will help you, he or she will do a medial branch block to find out if certain nerves are the ones that are a source of your pain. You will need two separate visits to the outpatient center or hospital to have both procedures. How is a medial branch block done? The doctor will use a tiny needle to numb the skin where you will get the block. Then he or she puts the block needle into the numbed area. You may feel some pressure, but you should not feel pain. Using fluoroscopy (live X-ray) to guide the needle, the doctor injects medicine onto one or more nerves to make them numb. If you get relief from your pain in the next 4 to 6 hours, it's a sign that those nerves may be contributing to your pain. The relief will last only a short time. You may then have a medial branch neurotomy at a later visit to try to get longer relief. It takes 20 to 30 minutes to get the block. You can go home after the doctor watches you for about an hour. You will get instructions on how to report how much pain you have when you are at home. You will need someone to drive you home. How is medial branch neurotomy done? The doctor will use a tiny needle to numb the skin where you will get the neurotomy. Then he or she puts the neurotomy needle into the numbed area. You may feel some pressure. Using fluoroscopy (live X-ray) to guide the needle, the doctor sends radio waves through the needle to the nerve for 60 to 90 seconds. The radio waves heat the nerve, which damages it. The doctor may do this several times. And he or she may treat more than one nerve. It takes 45 to 90 minutes to get a neurotomy, depending on how many nerves are heated. You will probably go home 30 to 60 minutes later. You will need someone to drive you home. What can you expect after a neurotomy? You may feel a little sore or tender at the injection site at first. But after a successful neurotomy, most people have pain relief right away. It often lasts for 9 to 12 months or longer. Sometimes the pain relief is permanent. If your pain does come back, it may mean that the damaged nerve has healed and can send pain messages again. Or it can mean that a different nerve is causing pain. Your doctor will discuss your options with you. Follow-up care is a key part of your treatment and safety. Be sure to make and go to all appointments, and call your doctor if you are having problems. It's also a good idea to know your test results and keep a list of the medicines you take. Where can you learn more? Go to http://vasile-fito.info/. Enter D101 in the search box to learn more about \"Learning About Medial Branch Block and Neurotomy. \" Current as of: October 9, 2017 Content Version: 11.7 © 1180-3473 [employer]D. Care instructions adapted under license by [employer] (which disclaims liability or warranty for this information). If you have questions about a medical condition or this instruction, always ask your healthcare professional. [address] any warranty or liability for your use of this information. Introducing [facility]! Dear Remedios Needs: 
Thank you for requesting a [employer] account. Our records indicate that you already have an active [employer] account. You can access your account anytime at [email or web address] Did you know that you can access your hospital and ER discharge instructions at any time in [employer]? You can also review all of your test results from your hospital stay or ER visit. Additional Information If you have questions, please visit the Frequently Asked Questions section of the [employer] website at [email or web address] Remember, [employer] is NOT to be used for urgent needs. For medical emergencies, dial 911. Now available from your iPhone and Android! Please provide this summary of care documentation to your next provider. Your primary care clinician is listed as Delmi Grajeda. If you have any questions after today's visit, please call [phone].

## 2018-11-08 ENCOUNTER — OFFICE VISIT (OUTPATIENT)
Dept: ORTHOPEDIC SURGERY | Facility: CLINIC | Age: 58
End: 2018-11-08

## 2018-11-08 VITALS
OXYGEN SATURATION: 98 % | HEART RATE: 57 BPM | SYSTOLIC BLOOD PRESSURE: 138 MMHG | BODY MASS INDEX: 37.77 KG/M2 | WEIGHT: 269.8 LBS | TEMPERATURE: 96.6 F | DIASTOLIC BLOOD PRESSURE: 81 MMHG | HEIGHT: 71 IN | RESPIRATION RATE: 18 BRPM

## 2018-11-08 DIAGNOSIS — M70.51 PES ANSERINUS BURSITIS OF RIGHT KNEE: ICD-10-CM

## 2018-11-08 DIAGNOSIS — Z96.651 HISTORY OF TOTAL RIGHT KNEE REPLACEMENT: ICD-10-CM

## 2018-11-08 DIAGNOSIS — M17.12 PRIMARY OSTEOARTHRITIS OF LEFT KNEE: Primary | ICD-10-CM

## 2018-11-08 RX ORDER — BETAMETHASONE SODIUM PHOSPHATE AND BETAMETHASONE ACETATE 3; 3 MG/ML; MG/ML
6 INJECTION, SUSPENSION INTRA-ARTICULAR; INTRALESIONAL; INTRAMUSCULAR; SOFT TISSUE ONCE
Qty: 1 ML | Refills: 0
Start: 2018-11-08 | End: 2018-11-08

## 2018-11-08 RX ORDER — DICLOFENAC SODIUM 20 MG/G
1 SOLUTION TOPICAL
Qty: 1 BOTTLE | Refills: 2 | Status: SHIPPED | OUTPATIENT
Start: 2018-11-08 | End: 2018-12-21
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
## 2018-11-15 ENCOUNTER — OFFICE VISIT (OUTPATIENT)
Dept: INTERNAL MEDICINE CLINIC | Age: 58
End: 2018-11-15

## 2018-11-15 VITALS
OXYGEN SATURATION: 96 % | BODY MASS INDEX: 37.94 KG/M2 | HEART RATE: 65 BPM | TEMPERATURE: 98.6 F | WEIGHT: 271 LBS | DIASTOLIC BLOOD PRESSURE: 64 MMHG | HEIGHT: 71 IN | RESPIRATION RATE: 12 BRPM | SYSTOLIC BLOOD PRESSURE: 128 MMHG

## 2018-11-15 DIAGNOSIS — E11.42 DIABETIC POLYNEUROPATHY ASSOCIATED WITH TYPE 2 DIABETES MELLITUS (HCC): ICD-10-CM

## 2018-11-15 DIAGNOSIS — I10 ESSENTIAL HYPERTENSION: Primary | ICD-10-CM

## 2018-11-15 RX ORDER — PNEUMOCOCCAL 13-VALENT CONJUGATE VACCINE 2.2; 2.2; 2.2; 2.2; 2.2; 4.4; 2.2; 2.2; 2.2; 2.2; 2.2; 2.2; 2.2 UG/.5ML; UG/.5ML; UG/.5ML; UG/.5ML; UG/.5ML; UG/.5ML; UG/.5ML; UG/.5ML; UG/.5ML; UG/.5ML; UG/.5ML; UG/.5ML; UG/.5ML
INJECTION, SUSPENSION INTRAMUSCULAR
Refills: 0 | COMMUNITY
Start: 2018-10-01 | End: 2018-11-15 | Stop reason: ALTCHOICE

## 2018-11-15 NOTE — PROGRESS NOTES
Chief Complaint   Patient presents with    Diabetes     Type 2 follow up    Hypertension     follow up    Labs     done 8-16-18       1. Have you been to the ER, urgent care clinic since your last visit? Hospitalized since your last visit? No    2. Have you seen or consulted any other health care providers outside of the [address] [place] [address] since your last visit? Include any pap smears or colon screening. No    Patient was given a copy of the Advanced Directive and understands to bring it in once completed.   Health Maintenance Due   Topic Date Due    Shingrix Vaccine Age 49> (1 of 2) 10/11/2009
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
## 2018-11-15 NOTE — PATIENT INSTRUCTIONS
Patient was given a copy of the Advanced Medical Directive Form, and understands to bring it in once completed. Health Maintenance Due   Topic Date Due    Shingrix Vaccine Age 48> (1 of 2) 10/11/2009          Learning About Diabetes Food Guidelines  Your Care Instructions    Meal planning is important to manage diabetes. It helps keep your blood sugar at a target level (which you set with your doctor). You don't have to eat special foods. You can eat what your family eats, including sweets once in a while. But you do have to pay attention to how often you eat and how much you eat of certain foods. You may want to work with a dietitian or a certified diabetes educator (CDE) to help you plan meals and snacks. A dietitian or CDE can also help you lose weight if that is one of your goals. What should you know about eating carbs? Managing the amount of carbohydrate (carbs) you eat is an important part of healthy meals when you have diabetes. Carbohydrate is found in many foods. · Learn which foods have carbs. And learn the amounts of carbs in different foods. ? Bread, cereal, pasta, and rice have about 15 grams of carbs in a serving. A serving is 1 slice of bread (1 ounce), ½ cup of cooked cereal, or 1/3 cup of cooked pasta or rice. ? Fruits have 15 grams of carbs in a serving. A serving is 1 small fresh fruit, such as an apple or orange; ½ of a banana; ½ cup of cooked or canned fruit; ½ cup of fruit juice; 1 cup of melon or raspberries; or 2 tablespoons of dried fruit. ? Milk and no-sugar-added yogurt have 15 grams of carbs in a serving. A serving is 1 cup of milk or 2/3 cup of no-sugar-added yogurt. ? Starchy vegetables have 15 grams of carbs in a serving. A serving is ½ cup of mashed potatoes or sweet potato; 1 cup winter squash; ½ of a small baked potato; ½ cup of cooked beans; or ½ cup cooked corn or green peas.   · Learn how much carbs to eat each day and at each meal. A dietitian or CDE can teach you how to keep track of the amount of carbs you eat. This is called carbohydrate counting. · If you are not sure how to count carbohydrate grams, use the Plate Method to plan meals. It is a good, quick way to make sure that you have a balanced meal. It also helps you spread carbs throughout the day. ? Divide your plate by types of foods. Put non-starchy vegetables on half the plate, meat or other protein food on one-quarter of the plate, and a grain or starchy vegetable in the final quarter of the plate. To this you can add a small piece of fruit and 1 cup of milk or yogurt, depending on how many carbs you are supposed to eat at a meal.  · Try to eat about the same amount of carbs at each meal. Do not \"save up\" your daily allowance of carbs to eat at one meal.  · Proteins have very little or no carbs per serving. Examples of proteins are beef, chicken, turkey, fish, eggs, tofu, cheese, cottage cheese, and peanut butter. A serving size of meat is 3 ounces, which is about the size of a deck of cards. Examples of meat substitute serving sizes (equal to 1 ounce of meat) are 1/4 cup of cottage cheese, 1 egg, 1 tablespoon of peanut butter, and ½ cup of tofu. How can you eat out and still eat healthy? · Learn to estimate the serving sizes of foods that have carbohydrate. If you measure food at home, it will be easier to estimate the amount in a serving of restaurant food. · If the meal you order has too much carbohydrate (such as potatoes, corn, or baked beans), ask to have a low-carbohydrate food instead. Ask for a salad or green vegetables. · If you use insulin, check your blood sugar before and after eating out to help you plan how much to eat in the future. · If you eat more carbohydrate at a meal than you had planned, take a walk or do other exercise. This will help lower your blood sugar. What else should you know? · Limit saturated fat, such as the fat from meat and dairy products.  This is a healthy choice because people who have diabetes are at higher risk of heart disease. So choose lean cuts of meat and nonfat or low-fat dairy products. Use olive or canola oil instead of butter or shortening when cooking. · Don't skip meals. Your blood sugar may drop too low if you skip meals and take insulin or certain medicines for diabetes. · Check with your doctor before you drink alcohol. Alcohol can cause your blood sugar to drop too low. Alcohol can also cause a bad reaction if you take certain diabetes medicines. Follow-up care is a key part of your treatment and safety. Be sure to make and go to all appointments, and call your doctor if you are having problems. It's also a good idea to know your test results and keep a list of the medicines you take. Where can you learn more? Go to http://vasile-fito.info/. Enter Z463 in the search box to learn more about \"Learning About Diabetes Food Guidelines. \"  Current as of: December 7, 2017  Content Version: 11.8  © 0436-5115 [employer]. Care instructions adapted under license by [employer] (which disclaims liability or warranty for this information). If you have questions about a medical condition or this instruction, always ask your healthcare professional. [address] any warranty or liability for your use of this information.

## 2018-11-19 ENCOUNTER — APPOINTMENT (OUTPATIENT)
Dept: INTERNAL MEDICINE CLINIC | Age: 58
End: 2018-11-19

## 2018-11-19 LAB — HBA1C MFR BLD HPLC: 7.4 %

## 2018-11-21 ENCOUNTER — TELEPHONE (OUTPATIENT)
Dept: INTERNAL MEDICINE CLINIC | Age: 58
End: 2018-11-21

## 2018-11-21 NOTE — TELEPHONE ENCOUNTER
Called and spoke to patient about getting scheduled with Luis Miguel Jennings for Diabetic education. 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 Offered to get the patient scheduled for the week after and patient declined and stated that he will just call back when he is ready. Closed referral for the meantime.

## 2018-11-25 ENCOUNTER — APPOINTMENT (OUTPATIENT)
Dept: GENERAL RADIOLOGY | Age: 58
End: 2018-11-25
Attending: PHYSICIAN ASSISTANT
Payer: COMMERCIAL

## 2018-11-25 ENCOUNTER — APPOINTMENT (OUTPATIENT)
Dept: CT IMAGING | Age: 58
End: 2018-11-25
Attending: PHYSICIAN ASSISTANT
Payer: COMMERCIAL

## 2018-11-25 ENCOUNTER — HOSPITAL ENCOUNTER (EMERGENCY)
Age: 58
Discharge: HOME | End: 2018-11-25
Attending: EMERGENCY MEDICINE
Payer: COMMERCIAL

## 2018-11-25 VITALS
RESPIRATION RATE: 20 BRPM | WEIGHT: 265 LBS | HEART RATE: 77 BPM | HEIGHT: 71 IN | OXYGEN SATURATION: 97 % | TEMPERATURE: 97.9 F | DIASTOLIC BLOOD PRESSURE: 91 MMHG | SYSTOLIC BLOOD PRESSURE: 156 MMHG | BODY MASS INDEX: 37.1 KG/M2
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
## 2018-11-25 DIAGNOSIS — S60.221A CONTUSION OF RIGHT HAND, INITIAL ENCOUNTER: Primary | ICD-10-CM

## 2018-11-25 DIAGNOSIS — S80.02XA CONTUSION OF LEFT KNEE, INITIAL ENCOUNTER: ICD-10-CM

## 2018-11-25 DIAGNOSIS — H11.31 SUBCONJUNCTIVAL HEMORRHAGE OF RIGHT EYE: ICD-10-CM

## 2018-11-25 DIAGNOSIS — S05.01XA ABRASION OF RIGHT CORNEA, INITIAL ENCOUNTER: ICD-10-CM

## 2018-11-25 DIAGNOSIS — S09.90XA TRAUMATIC INJURY OF HEAD, INITIAL ENCOUNTER: ICD-10-CM

## 2018-11-25 DIAGNOSIS — M25.511 ACUTE PAIN OF RIGHT SHOULDER: ICD-10-CM

## 2018-11-25 PROCEDURE — 99283 EMERGENCY DEPT VISIT LOW MDM: CPT

## 2018-11-25 PROCEDURE — 74011250637 HC RX REV CODE- 250/637: Performed by: PHYSICIAN ASSISTANT

## 2018-11-25 PROCEDURE — 73130 X-RAY EXAM OF HAND: CPT

## 2018-11-25 PROCEDURE — 70450 CT HEAD/BRAIN W/O DYE: CPT

## 2018-11-25 PROCEDURE — 73030 X-RAY EXAM OF SHOULDER: CPT

## 2018-11-25 PROCEDURE — 74011000250 HC RX REV CODE- 250: Performed by: PHYSICIAN ASSISTANT

## 2018-11-25 PROCEDURE — 73564 X-RAY EXAM KNEE 4 OR MORE: CPT

## 2018-11-25 PROCEDURE — 70480 CT ORBIT/EAR/FOSSA W/O DYE: CPT

## 2018-11-25 PROCEDURE — 75810000053 HC SPLINT APPLICATION

## 2018-11-25 RX ORDER — ERYTHROMYCIN 5 MG/G
OINTMENT OPHTHALMIC
Qty: 1 G | Refills: 0 | Status: SHIPPED | OUTPATIENT
Start: 2018-11-25 | End: 2018-12-03

## 2018-11-25 RX ORDER — HYDROCODONE BITARTRATE AND ACETAMINOPHEN 5; 325 MG/1; MG/1
1 TABLET ORAL
Qty: 10 TAB | Refills: 0 | Status: SHIPPED | OUTPATIENT
Start: 2018-11-25 | End: 2018-12-03

## 2018-11-25 RX ORDER — OXYCODONE AND ACETAMINOPHEN 5; 325 MG/1; MG/1
1 TABLET ORAL
Status: COMPLETED | OUTPATIENT
Start: 2018-11-25 | End: 2018-11-25

## 2018-11-25 RX ORDER — NAPROXEN 500 MG/1
500 TABLET ORAL 2 TIMES DAILY WITH MEALS
Qty: 20 TAB | Refills: 0 | Status: SHIPPED | OUTPATIENT
Start: 2018-11-25 | End: 2018-12-05

## 2018-11-25 RX ORDER — PROPARACAINE HYDROCHLORIDE 5 MG/ML
1 SOLUTION/ DROPS OPHTHALMIC
Status: COMPLETED | OUTPATIENT
Start: 2018-11-25 | End: 2018-11-25

## 2018-11-25 RX ADMIN — OXYCODONE HYDROCHLORIDE AND ACETAMINOPHEN 1 TABLET: 5; 325 TABLET ORAL at 15:25

## 2018-11-25 RX ADMIN — FLUORESCEIN SODIUM 1 STRIP: 0.6 STRIP OPHTHALMIC at 14:32

## 2018-11-25 RX ADMIN — PROPARACAINE HYDROCHLORIDE 1 DROP: 5 SOLUTION/ DROPS OPHTHALMIC at 14:32

## 2018-11-25 NOTE — ED TRIAGE NOTES
Pt states he tripped and fell earlier. Hit his face on concrete. No LOC. Has red right eye, swollen right hand, right shoulder pain, left knee is bruised, has abrasions to right leg

## 2018-11-25 NOTE — LETTER
NOTIFICATION OF RETURN TO WORK / SCHOOL 
 
11/25/2018 Mr. Ibrahima Albarran. [address] [id number] To Whom it may concern, Please excuse Ibrahima Albarran. from work 11/25/18 - 12/1/18 for medical reasons, or until cleared to return by ortho.   
 
 
Sincerely,  
 
 
 
 
Mejia Jones PA-C
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
## 2018-11-25 NOTE — DISCHARGE INSTRUCTIONS
Hand Bruises: Care Instructions  Your Care Instructions  Bruises, or contusions, can happen as a result of an impact or fall. Most people think of a bruise as a black-and-blue spot. This happens when small blood vessels get torn and leak blood under the skin. The bruise may turn purplish black, reddish blue, or yellowish green as it heals. But bones and muscles can also get bruised. This may damage the hand but not cause a bruise that you can see. Most bruises aren't serious and will go away on their own in 2 to 4 weeks. But sometimes a more serious hand injury might not heal on its own. Tell your doctor if you have new symptoms or your injury is not getting better over time. You may have tests to see if you have bone or nerve damage. These tests may include X-rays, a CT scan, or an MRI. If you damaged bones or muscles, you may need more treatment. The doctor has checked you carefully, but problems can develop later. If you notice any problems or new symptoms, get medical treatment right away. Follow-up care is a key part of your treatment and safety. Be sure to make and go to all appointments, and call your doctor if you are having problems. It's also a good idea to know your test results and keep a list of the medicines you take. How can you care for yourself at home? · Put ice or a cold pack on the hand for 10 to 20 minutes at a time. Put a thin cloth between the ice and your skin. · Prop up your hand on a pillow when you ice it or anytime you sit or lie down during the next 3 days. Try to keep your hand above the level of your heart. This will help reduce swelling. · Be safe with medicines. Read and follow all instructions on the label. ? If the doctor gave you a prescription medicine for pain, take it as prescribed. ? If you are not taking a prescription pain medicine, ask your doctor if you can take an over-the-counter medicine.   · Be sure to follow your doctor's advice about moving and exercising your injured hand. When should you call for help? Call your doctor now or seek immediate medical care if:    · Your pain gets worse.     · You have new or worse swelling.     · You have tingling, weakness, or numbness in the area near the bruise.     · The area near the bruise is cold or pale.     · You have symptoms of infection, such as:  ? Increased pain, swelling, warmth, or redness. ? Red streaks leading from the area. ? Pus draining from the area. ? A fever.    Watch closely for changes in your health, and be sure to contact your doctor if:    · You do not get better as expected. Where can you learn more? Go to http://vasile-fito.info/. Enter K483 in the search box to learn more about \"Hand Bruises: Care Instructions. \"  Current as of: November 20, 2017  Content Version: 11.8  © 9188-5580 [employer]. Care instructions adapted under license by [employer] (which disclaims liability or warranty for this information). If you have questions about a medical condition or this instruction, always ask your healthcare professional. [place] [address] any warranty or liability for your use of this information. Learning About a Closed Head Injury  What is a closed head injury? A closed head injury happens when your head gets hit hard. The strong force of the blow causes your brain to shake in your skull. This movement can cause the brain to bruise, swell, or tear. Sometimes nerves or blood vessels also get damaged. This can cause bleeding in or around the brain. A concussion is a type of closed head injury. What are the symptoms? If you have a mild concussion, you may have a mild headache or feel \"not quite right. \" These symptoms are common. They usually go away over a few days to 4 weeks. But sometimes after a concussion, you feel like you can't function as well as before the injury. And you have new symptoms.  This is called postconcussive syndrome. You may:  · Find it harder to solve problems, think, concentrate, or remember. · Have headaches. · Have changes in your sleep patterns, such as not being able to sleep or sleeping all the time. · Have changes in your personality. · Not be interested in your usual activities. · Feel angry or anxious without a clear reason. · Lose your sense of taste or smell. · Be dizzy, lightheaded, or unsteady. It may be hard to stand or walk. How is a closed head injury treated? Any person who may have a concussion needs to see a doctor. Some people have to stay in the hospital to be watched. Others can go home safely. If you go home, follow your doctor's instructions. He or she will tell you if you need someone to watch you closely for the next 24 hours or longer. Rest is the best treatment. Get plenty of sleep at night. And try to rest during the day. · Avoid activities that are physically or mentally demanding. These include housework, exercise, and schoolwork. And don't play video games, send text messages, or use the computer. You may need to change your school or work schedule to be able to avoid these activities. · Ask your doctor when it's okay to drive, ride a bike, or operate machinery. · Take an over-the-counter pain medicine, such as acetaminophen (Tylenol), ibuprofen (Advil, Motrin), or naproxen (Aleve). Be safe with medicines. Read and follow all instructions on the label. · Check with your doctor before you use any other medicines for pain. · Do not drink alcohol or use illegal drugs. They can slow recovery. They can also increase your risk of getting a second head injury. Follow-up care is a key part of your treatment and safety. Be sure to make and go to all appointments, and call your doctor if you are having problems. It's also a good idea to know your test results and keep a list of the medicines you take. Where can you learn more?   Go to http://lili.info/. Enter E235 in the search box to learn more about \"Learning About a Closed Head Injury. \"  Current as of: June 4, 2018  Content Version: 11.8  © 2298-2090 [employer]. Care instructions adapted under license by [employer] (which disclaims liability or warranty for this information). If you have questions about a medical condition or this instruction, always ask your healthcare professional. [place] [address] any warranty or liability for your use of this information.

## 2018-11-28 ENCOUNTER — HOSPITAL ENCOUNTER (OUTPATIENT)
Dept: PREADMISSION TESTING | Age: 58
Discharge: HOME | End: 2018-11-28
Payer: COMMERCIAL

## 2018-11-28 ENCOUNTER — OFFICE VISIT (OUTPATIENT)
Dept: ORTHOPEDIC SURGERY | Facility: CLINIC | Age: 58
End: 2018-11-28

## 2018-11-28 VITALS
SYSTOLIC BLOOD PRESSURE: 138 MMHG | WEIGHT: 270.2 LBS | HEART RATE: 63 BPM | BODY MASS INDEX: 37.83 KG/M2 | RESPIRATION RATE: 18 BRPM | DIASTOLIC BLOOD PRESSURE: 78 MMHG | HEIGHT: 71 IN | OXYGEN SATURATION: 97 % | TEMPERATURE: 97.4 F

## 2018-11-28 DIAGNOSIS — Z01.818 PRE-OP TESTING: ICD-10-CM

## 2018-11-28 DIAGNOSIS — Z01.818 PRE-OP TESTING: Primary | ICD-10-CM

## 2018-11-28 DIAGNOSIS — S63.054A CARPOMETACARPAL JOINT DISLOCATION, RIGHT, INITIAL ENCOUNTER: Primary | ICD-10-CM

## 2018-11-28 DIAGNOSIS — M79.641 RIGHT HAND PAIN: ICD-10-CM

## 2018-11-28 LAB
ALBUMIN SERPL-MCNC: 4.4 G/DL (ref 3.4–5)
ALBUMIN/GLOB SERPL: 1.7 {RATIO} (ref 0.8–1.7)
ALP SERPL-CCNC: 52 U/L (ref 45–117)
ALT SERPL-CCNC: 26 U/L (ref 16–61)
ANION GAP SERPL CALC-SCNC: 7 MMOL/L (ref 3–18)
AST SERPL-CCNC: 28 U/L (ref 15–37)
ATRIAL RATE: 72 BPM
BASOPHILS # BLD: 0 K/UL (ref 0–0.1)
BASOPHILS NFR BLD: 0 % (ref 0–2)
BILIRUB SERPL-MCNC: 0.4 MG/DL (ref 0.2–1)
BUN SERPL-MCNC: 22 MG/DL (ref 7–18)
BUN/CREAT SERPL: 18 (ref 12–20)
CALCIUM SERPL-MCNC: 9.8 MG/DL (ref 8.5–10.1)
CALCULATED R AXIS, ECG10: 60 DEGREES
CALCULATED T AXIS, ECG11: 40 DEGREES
CHLORIDE SERPL-SCNC: 105 MMOL/L (ref 100–108)
CO2 SERPL-SCNC: 30 MMOL/L (ref 21–32)
CREAT SERPL-MCNC: 1.25 MG/DL (ref 0.6–1.3)
DIAGNOSIS, 93000: NORMAL
DIFFERENTIAL METHOD BLD: NORMAL
EOSINOPHIL # BLD: 0.2 K/UL (ref 0–0.4)
EOSINOPHIL NFR BLD: 3 % (ref 0–5)
ERYTHROCYTE [DISTWIDTH] IN BLOOD BY AUTOMATED COUNT: 14.2 % (ref 11.6–14.5)
GLOBULIN SER CALC-MCNC: 2.6 G/DL (ref 2–4)
GLUCOSE SERPL-MCNC: 106 MG/DL (ref 74–99)
HCT VFR BLD AUTO: 41.1 % (ref 36–48)
HGB BLD-MCNC: 13 G/DL (ref 13–16)
LYMPHOCYTES # BLD: 2 K/UL (ref 0.9–3.6)
LYMPHOCYTES NFR BLD: 31 % (ref 21–52)
MCH RBC QN AUTO: 27.2 PG (ref 24–34)
MCHC RBC AUTO-ENTMCNC: 31.6 G/DL (ref 31–37)
MCV RBC AUTO: 86 FL (ref 74–97)
MONOCYTES # BLD: 0.5 K/UL (ref 0.05–1.2)
MONOCYTES NFR BLD: 8 % (ref 3–10)
NEUTS SEG # BLD: 3.7 K/UL (ref 1.8–8)
NEUTS SEG NFR BLD: 58 % (ref 40–73)
PLATELET # BLD AUTO: 234 K/UL (ref 135–420)
PMV BLD AUTO: 10.3 FL (ref 9.2–11.8)
POTASSIUM SERPL-SCNC: 4.5 MMOL/L (ref 3.5–5.5)
PROT SERPL-MCNC: 7 G/DL (ref 6.4–8.2)
Q-T INTERVAL, ECG07: 382 MS
QRS DURATION, ECG06: 94 MS
QTC CALCULATION (BEZET), ECG08: 420 MS
RBC # BLD AUTO: 4.78 M/UL (ref 4.7–5.5)
SODIUM SERPL-SCNC: 142 MMOL/L (ref 136–145)
VENTRICULAR RATE, ECG03: 73 BPM
WBC # BLD AUTO: 6.5 K/UL (ref 4.6–13.2)

## 2018-11-28 PROCEDURE — 80053 COMPREHEN METABOLIC PANEL: CPT

## 2018-11-28 PROCEDURE — 93005 ELECTROCARDIOGRAM TRACING: CPT

## 2018-11-28 PROCEDURE — 36415 COLL VENOUS BLD VENIPUNCTURE: CPT

## 2018-11-28 PROCEDURE — 85025 COMPLETE CBC W/AUTO DIFF WBC: CPT
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
## 2018-12-02 ENCOUNTER — ANESTHESIA EVENT (OUTPATIENT)
Dept: SURGERY | Age: 58
End: 2018-12-02
Payer: COMMERCIAL

## 2018-12-03 ENCOUNTER — ANESTHESIA (OUTPATIENT)
Dept: SURGERY | Age: 58
End: 2018-12-03
Payer: COMMERCIAL

## 2018-12-03 ENCOUNTER — HOSPITAL ENCOUNTER (OUTPATIENT)
Age: 58
Setting detail: OUTPATIENT SURGERY
Discharge: HOME | End: 2018-12-03
Attending: ORTHOPAEDIC SURGERY | Admitting: ORTHOPAEDIC SURGERY
Payer: COMMERCIAL

## 2018-12-03 VITALS
HEIGHT: 71 IN | DIASTOLIC BLOOD PRESSURE: 75 MMHG | BODY MASS INDEX: 38.08 KG/M2 | HEART RATE: 76 BPM | WEIGHT: 272 LBS | OXYGEN SATURATION: 92 % | SYSTOLIC BLOOD PRESSURE: 128 MMHG | RESPIRATION RATE: 18 BRPM | TEMPERATURE: 96.8 F

## 2018-12-03 DIAGNOSIS — S63.054D DISLOCATION OF CARPOMETACARPAL JOINT OF RIGHT HAND, SUBSEQUENT ENCOUNTER: Primary | ICD-10-CM

## 2018-12-03 PROBLEM — S63.056A CMC (CARPOMETACARPAL JOINT) DISLOCATION: Status: ACTIVE | Noted: 2018-12-03

## 2018-12-03 LAB
GLUCOSE BLD STRIP.AUTO-MCNC: 137 MG/DL (ref 70–110)
GLUCOSE BLD STRIP.AUTO-MCNC: 164 MG/DL (ref 70–110)

## 2018-12-03 PROCEDURE — 77030032490 HC SLV COMPR SCD KNE COVD -B: Performed by: ORTHOPAEDIC SURGERY

## 2018-12-03 PROCEDURE — 77030033263 HC DRSG MEPILEX 16-48IN BORD MOLN -B: Performed by: ORTHOPAEDIC SURGERY

## 2018-12-03 PROCEDURE — 74011000250 HC RX REV CODE- 250

## 2018-12-03 PROCEDURE — 76210000021 HC REC RM PH II 0.5 TO 1 HR: Performed by: ORTHOPAEDIC SURGERY

## 2018-12-03 PROCEDURE — 74011000258 HC RX REV CODE- 258: Performed by: ORTHOPAEDIC SURGERY

## 2018-12-03 PROCEDURE — 74011636637 HC RX REV CODE- 636/637: Performed by: NURSE ANESTHETIST, CERTIFIED REGISTERED

## 2018-12-03 PROCEDURE — 77030020754 HC CUF TRNQT 2BLA STRY -B: Performed by: ORTHOPAEDIC SURGERY

## 2018-12-03 PROCEDURE — 74011250636 HC RX REV CODE- 250/636

## 2018-12-03 PROCEDURE — 74011250636 HC RX REV CODE- 250/636: Performed by: NURSE ANESTHETIST, CERTIFIED REGISTERED

## 2018-12-03 PROCEDURE — 74011000250 HC RX REV CODE- 250: Performed by: ORTHOPAEDIC SURGERY

## 2018-12-03 PROCEDURE — 74011250637 HC RX REV CODE- 250/637: Performed by: NURSE ANESTHETIST, CERTIFIED REGISTERED

## 2018-12-03 PROCEDURE — 76010000149 HC OR TIME 1 TO 1.5 HR: Performed by: ORTHOPAEDIC SURGERY

## 2018-12-03 PROCEDURE — 76060000033 HC ANESTHESIA 1 TO 1.5 HR: Performed by: ORTHOPAEDIC SURGERY

## 2018-12-03 PROCEDURE — 77030013140 HC MSK NEB VYRM -A: Performed by: ORTHOPAEDIC SURGERY

## 2018-12-03 PROCEDURE — 76210000017 HC OR PH I REC 1.5 TO 2 HR: Performed by: ORTHOPAEDIC SURGERY

## 2018-12-03 PROCEDURE — 77030020782 HC GWN BAIR PAWS FLX 3M -B: Performed by: ORTHOPAEDIC SURGERY

## 2018-12-03 PROCEDURE — 77030010396 HC WRE FIX C CNMD -A: Performed by: ORTHOPAEDIC SURGERY

## 2018-12-03 PROCEDURE — 77030008833 HC WRE K BRSM -A: Performed by: ORTHOPAEDIC SURGERY

## 2018-12-03 PROCEDURE — 82962 GLUCOSE BLOOD TEST: CPT

## 2018-12-03 DEVICE — IMPLANTABLE DEVICE: Type: IMPLANTABLE DEVICE | Site: HAND | Status: FUNCTIONAL

## 2018-12-03 RX ORDER — ALBUTEROL SULFATE 0.83 MG/ML
2.5 SOLUTION RESPIRATORY (INHALATION)
Status: COMPLETED | OUTPATIENT
Start: 2018-12-03 | End: 2018-12-03

## 2018-12-03 RX ORDER — MIDAZOLAM HYDROCHLORIDE 1 MG/ML
INJECTION, SOLUTION INTRAMUSCULAR; INTRAVENOUS AS NEEDED
Status: DISCONTINUED | OUTPATIENT
Start: 2018-12-03 | End: 2018-12-03 | Stop reason: HOSPADM

## 2018-12-03 RX ORDER — SODIUM CHLORIDE 0.9 % (FLUSH) 0.9 %
5-10 SYRINGE (ML) INJECTION EVERY 8 HOURS
Status: DISCONTINUED | OUTPATIENT
Start: 2018-12-03 | End: 2018-12-03 | Stop reason: HOSPADM

## 2018-12-03 RX ORDER — PROPOFOL 10 MG/ML
INJECTION, EMULSION INTRAVENOUS AS NEEDED
Status: DISCONTINUED | OUTPATIENT
Start: 2018-12-03 | End: 2018-12-03 | Stop reason: HOSPADM

## 2018-12-03 RX ORDER — FENTANYL CITRATE 50 UG/ML
INJECTION, SOLUTION INTRAMUSCULAR; INTRAVENOUS AS NEEDED
Status: DISCONTINUED | OUTPATIENT
Start: 2018-12-03 | End: 2018-12-03 | Stop reason: HOSPADM

## 2018-12-03 RX ORDER — SODIUM CHLORIDE 0.9 % (FLUSH) 0.9 %
5-10 SYRINGE (ML) INJECTION AS NEEDED
Status: DISCONTINUED | OUTPATIENT
Start: 2018-12-03 | End: 2018-12-03 | Stop reason: HOSPADM

## 2018-12-03 RX ORDER — LIDOCAINE HYDROCHLORIDE 20 MG/ML
INJECTION, SOLUTION EPIDURAL; INFILTRATION; INTRACAUDAL; PERINEURAL AS NEEDED
Status: DISCONTINUED | OUTPATIENT
Start: 2018-12-03 | End: 2018-12-03 | Stop reason: HOSPADM

## 2018-12-03 RX ORDER — SODIUM CHLORIDE, SODIUM LACTATE, POTASSIUM CHLORIDE, CALCIUM CHLORIDE 600; 310; 30; 20 MG/100ML; MG/100ML; MG/100ML; MG/100ML
75 INJECTION, SOLUTION INTRAVENOUS CONTINUOUS
Status: DISCONTINUED | OUTPATIENT
Start: 2018-12-03 | End: 2018-12-03 | Stop reason: HOSPADM

## 2018-12-03 RX ORDER — ALBUTEROL SULFATE 0.83 MG/ML
SOLUTION RESPIRATORY (INHALATION)
Status: COMPLETED
Start: 2018-12-03 | End: 2018-12-03

## 2018-12-03 RX ORDER — HYDROCODONE BITARTRATE AND ACETAMINOPHEN 5; 325 MG/1; MG/1
1 TABLET ORAL
Qty: 20 TAB | Refills: 0 | Status: SHIPPED | OUTPATIENT
Start: 2018-12-03 | End: 2018-12-21

## 2018-12-03 RX ORDER — DEXTROSE 50 % IN WATER (D50W) INTRAVENOUS SYRINGE
25-50 AS NEEDED
Status: DISCONTINUED | OUTPATIENT
Start: 2018-12-03 | End: 2018-12-03 | Stop reason: HOSPADM

## 2018-12-03 RX ORDER — ONDANSETRON 2 MG/ML
INJECTION INTRAMUSCULAR; INTRAVENOUS AS NEEDED
Status: DISCONTINUED | OUTPATIENT
Start: 2018-12-03 | End: 2018-12-03 | Stop reason: HOSPADM

## 2018-12-03 RX ORDER — BUPIVACAINE HYDROCHLORIDE 5 MG/ML
INJECTION, SOLUTION EPIDURAL; INTRACAUDAL AS NEEDED
Status: DISCONTINUED | OUTPATIENT
Start: 2018-12-03 | End: 2018-12-03 | Stop reason: HOSPADM

## 2018-12-03 RX ORDER — INSULIN LISPRO 100 [IU]/ML
INJECTION, SOLUTION INTRAVENOUS; SUBCUTANEOUS ONCE
Status: DISCONTINUED | OUTPATIENT
Start: 2018-12-03 | End: 2018-12-03 | Stop reason: HOSPADM

## 2018-12-03 RX ORDER — ONDANSETRON 2 MG/ML
4 INJECTION INTRAMUSCULAR; INTRAVENOUS ONCE
Status: DISCONTINUED | OUTPATIENT
Start: 2018-12-03 | End: 2018-12-03 | Stop reason: HOSPADM

## 2018-12-03 RX ORDER — FENTANYL CITRATE 50 UG/ML
50 INJECTION, SOLUTION INTRAMUSCULAR; INTRAVENOUS AS NEEDED
Status: DISCONTINUED | OUTPATIENT
Start: 2018-12-03 | End: 2018-12-03 | Stop reason: HOSPADM

## 2018-12-03 RX ORDER — GLYCOPYRROLATE 0.2 MG/ML
INJECTION INTRAMUSCULAR; INTRAVENOUS AS NEEDED
Status: DISCONTINUED | OUTPATIENT
Start: 2018-12-03 | End: 2018-12-03 | Stop reason: HOSPADM

## 2018-12-03 RX ORDER — INSULIN LISPRO 100 [IU]/ML
INJECTION, SOLUTION INTRAVENOUS; SUBCUTANEOUS ONCE
Status: COMPLETED | OUTPATIENT
Start: 2018-12-03 | End: 2018-12-03

## 2018-12-03 RX ORDER — FAMOTIDINE 20 MG/1
20 TABLET, FILM COATED ORAL ONCE
Status: COMPLETED | OUTPATIENT
Start: 2018-12-03 | End: 2018-12-03

## 2018-12-03 RX ORDER — MAGNESIUM SULFATE 100 %
4 CRYSTALS MISCELLANEOUS AS NEEDED
Status: DISCONTINUED | OUTPATIENT
Start: 2018-12-03 | End: 2018-12-03 | Stop reason: HOSPADM

## 2018-12-03 RX ADMIN — ALBUTEROL SULFATE 2.5 MG: 0.83 SOLUTION RESPIRATORY (INHALATION) at 09:33

## 2018-12-03 RX ADMIN — INSULIN LISPRO 3 UNITS: 100 INJECTION, SOLUTION INTRAVENOUS; SUBCUTANEOUS at 06:45

## 2018-12-03 RX ADMIN — MIDAZOLAM HYDROCHLORIDE 2 MG: 1 INJECTION, SOLUTION INTRAMUSCULAR; INTRAVENOUS at 07:34

## 2018-12-03 RX ADMIN — FENTANYL CITRATE 50 MCG: 50 INJECTION, SOLUTION INTRAMUSCULAR; INTRAVENOUS at 07:39

## 2018-12-03 RX ADMIN — PROPOFOL 200 MG: 10 INJECTION, EMULSION INTRAVENOUS at 07:39

## 2018-12-03 RX ADMIN — FAMOTIDINE 20 MG: 20 TABLET, FILM COATED ORAL at 06:40

## 2018-12-03 RX ADMIN — LIDOCAINE HYDROCHLORIDE 100 MG: 20 INJECTION, SOLUTION EPIDURAL; INFILTRATION; INTRACAUDAL; PERINEURAL at 07:39

## 2018-12-03 RX ADMIN — GLYCOPYRROLATE 0.4 MG: 0.2 INJECTION INTRAMUSCULAR; INTRAVENOUS at 07:43

## 2018-12-03 RX ADMIN — ONDANSETRON 4 MG: 2 INJECTION INTRAMUSCULAR; INTRAVENOUS at 07:43

## 2018-12-03 RX ADMIN — SODIUM CHLORIDE, SODIUM LACTATE, POTASSIUM CHLORIDE, AND CALCIUM CHLORIDE 75 ML/HR: 600; 310; 30; 20 INJECTION, SOLUTION INTRAVENOUS at 06:30

## 2018-12-03 RX ADMIN — ALBUTEROL SULFATE 2.5 MG: 2.5 SOLUTION RESPIRATORY (INHALATION) at 09:33

## 2018-12-03 RX ADMIN — FENTANYL CITRATE 50 MCG: 50 INJECTION, SOLUTION INTRAMUSCULAR; INTRAVENOUS at 08:30

## 2018-12-03 RX ADMIN — CLINDAMYCIN 900 MG: 150 INJECTION, SOLUTION INTRAMUSCULAR; INTRAVENOUS at 07:36

## 2018-12-03 NOTE — DISCHARGE INSTRUCTIONS
Do not lift, push, pull or carry anything with operative hand. Keep dressing clean, dry and in place until follow up. Keep hand elavated to reduce swelling and pain and promote healing. Learning About Closed Reduction of a Fractured Bone  What is a closed reduction? A closed reduction is a procedure to line up the ends of a broken (fractured) bone without the need for surgery. This will help the fractured bone heal correctly. It may be done right after your injury or several days later. How is a closed reduction done? Your doctor will give you medicine to help you relax and help with pain. He or she will push or pull the ends of the fractured bone until they line up. This part of the procedure is called reduction. Then your doctor will put a cast or splint on the affected arm or leg to help hold the bone in place while it heals. The doctor will take an X-ray to check that the bone is properly lined up. What can you expect after a closed reduction? Most people go home right after the procedure. You will need someone to drive you home. You may need extra help at home, especially if you live alone or provide care for another person. You may have some mild bone pain or aching for 2 to 3 weeks. It usually takes 6 to 12 weeks for a fractured bone to heal. This depends on your age, which bone you fractured, the type of fracture you have, and how badly the bone was injured. You will need to wear a cast or splint until the bone has healed. How soon you can return to work and your normal routine depends on your job and how long it takes the bone to heal. If you have a fractured leg and you sit at work, you may be able to go back within several days. But if your job requires a lot of standing or walking, you will need to wait until your fracture has healed. Follow-up care is a key part of your treatment and safety. Be sure to make and go to all appointments, and call your doctor if you are having problems. It's also a good idea to know your test results and keep a list of the medicines you take. Where can you learn more? Go to http://vasile-fito.info/. Enter U117 in the search box to learn more about \"Learning About Closed Reduction of a Fractured Bone. \"  Current as of: November 29, 2017  Content Version: 11.8  © 3635-4865 [employer]. Care instructions adapted under license by [employer] (which disclaims liability or warranty for this information). If you have questions about a medical condition or this instruction, always ask your healthcare professional. [address] any warranty or liability for your use of this information. Caring for Your Cast: After Your Visit  Your Care Instructions  A cast protects a broken bone or other injury. Most casts are made of fiberglass, but plaster casts are still sometimes used. Once a cast is on, you can't remove it yourself. Your doctor will take it off. Follow-up care is a key part of your treatment and safety. Be sure to make and go to all appointments, and call your doctor if you are having problems. It's also a good idea to know your test results and keep a list of the medicines you take. How can you care for yourself at home? General care  · Follow your doctor's instructions for when you can first put weight on the cast. Fiberglass casts dry quickly and are soon ready to bear weight. But plaster casts may take several days before they are hard enough to use. When it's okay to put weight on your cast, do not stand or walk on it unless it is designed for walking. · Prop up the injured arm or leg on a pillow anytime you sit or lie down during the next 3 days. Try to keep it above the level of your heart. This will help reduce swelling. · If the fingers or toes on the limb with the cast were not injured, wiggle them every now and then. This helps move the blood and fluids in the injured limb.   · Be safe with medicines. Read and follow all instructions on the label. ¨ If the doctor gave you a prescription medicine for pain, take it as prescribed. ¨ If you are not taking a prescription pain medicine, ask your doctor if you can take an over-the-counter medicine. · Keep up your muscle strength and tone as much as you can while protecting your injured limb or joint. Your doctor may want you to tense and relax the muscles protected by the cast. Check with your doctor or physical therapist for instructions. Water and your cast  · Do not get your cast wet unless you have a fiberglass cast with a quick-drying lining. · Keep your cast covered with at least two layers of plastic when you take a shower or bath or when you have any other contact with water. Moisture can collect under the cast and cause skin irritation and itching. It can make infection more likely if you have had surgery or have a wound under the cast.  · If you have a fiberglass cast with a fast-drying lining, make sure to rinse it with fresh water after you swim. It will take about an hour for the lining to dry. Cast and skin care  · Try blowing cool air from a hair dryer or fan into the cast to help relieve itching. Never stick items under your cast to scratch the skin. · Don't use oils or lotions near your cast. If the skin gets red or irritated around the edge of the cast, you may pad the edges with a soft material or use tape to cover them. · Watch for pressure sores. These can develop over bony areas. Symptoms include a warm spot under the cast, pain, drainage, or an odor. Call your doctor if you think you have a pressure sore. · Watch for compartment syndrome. This happens when pressure builds up in a group of muscles, nerves, and blood vessels. It is an emergency. Symptoms include severe pain or tingling or numbness. When should you call for help?   Call your doctor now or seek immediate medical care if:  · You have increased or severe pain.  · You feel a warm or painful spot under the cast.  · You have problems with your cast. For example:  ¨ The skin under the cast burns or stings. ¨ The cast feels too tight. ¨ There is a lot of swelling near the cast. (Some swelling is normal.)  ¨ You have a new fever. ¨ There is drainage or a bad smell coming from the cast.  · Your foot or hand is cool or pale or changes color. · You have trouble moving your fingers or toes. · You have symptoms of a blood clot in your arm or leg (called a deep vein thrombosis). These may include:  ¨ Pain in the arm, calf, back of the knee, thigh, or groin. ¨ Redness and swelling in the arm, leg, or groin. Watch closely for changes in your health, and be sure to contact your doctor if:  · The cast is breaking apart. · You are not getting better as expected. Where can you learn more? Go to agreement24 avtal24.be  Enter F968 in the search box to learn more about \"Caring for Your Cast: After Your Visit. \"   © 5799-6763 [employer]. Care instructions adapted under license by [employer] (which disclaims liability or warranty for this information). This care instruction is for use with your licensed healthcare professional. If you have questions about a medical condition or this instruction, always ask your healthcare professional. [address] any warranty or liability for your use of this information. Content Version: 24.8.664728; Current as of: June 4, 2014    Constipation: Care Instructions  Your Care Instructions    Constipation means that you have a hard time passing stools (bowel movements). People pass stools from 3 times a day to once every 3 days. What is normal for you may be different. Constipation may occur with pain in the rectum and cramping. The pain may get worse when you try to pass stools. Sometimes there are small amounts of bright red blood on toilet paper or the surface of stools.  This is because of enlarged veins near the rectum (hemorrhoids). A few changes in your diet and lifestyle may help you avoid ongoing constipation. Your doctor may also prescribe medicine to help loosen your stool. Some medicines can cause constipation. These include pain medicines and antidepressants. Tell your doctor about all the medicines you take. Your doctor may want to make a medicine change to ease your symptoms. Follow-up care is a key part of your treatment and safety. Be sure to make and go to all appointments, and call your doctor if you are having problems. It's also a good idea to know your test results and keep a list of the medicines you take. How can you care for yourself at home? · Drink plenty of fluids, enough so that your urine is light yellow or clear like water. If you have kidney, heart, or liver disease and have to limit fluids, talk with your doctor before you increase the amount of fluids you drink. · Include high-fiber foods in your diet each day. These include fruits, vegetables, beans, and whole grains. · Get at least 30 minutes of exercise on most days of the week. Walking is a good choice. You also may want to do other activities, such as running, swimming, cycling, or playing tennis or team sports. · Take a fiber supplement, such as Citrucel or Metamucil, every day. Read and follow all instructions on the label. · Schedule time each day for a bowel movement. A daily routine may help. Take your time having your bowel movement. · Support your feet with a small step stool when you sit on the toilet. This helps flex your hips and places your pelvis in a squatting position. · Your doctor may recommend an over-the-counter laxative to relieve your constipation. Examples are Milk of Magnesia and MiraLax. Read and follow all instructions on the label. Do not use laxatives on a long-term basis. When should you call for help?   Call your doctor now or seek immediate medical care if:    · You have new or worse belly pain.     · You have new or worse nausea or vomiting.     · You have blood in your stools.    Watch closely for changes in your health, and be sure to contact your doctor if:    · Your constipation is getting worse.     · You do not get better as expected. Where can you learn more? Go to http://vasile-fito.info/. Enter 21 [id number] in the search box to learn more about \"Constipation: Care Instructions. \"  Current as of: November 20, 2017  Content Version: 11.8  © 1122-5223 [employer]. Care instructions adapted under license by [employer] (which disclaims liability or warranty for this information). If you have questions about a medical condition or this instruction, always ask your healthcare professional. [place] [address] any warranty or liability for your use of this information. Narcotic-Analgesic/Acetaminophen (Percocet, Norco, Lorcet HD, Lortab 10/325) - (By mouth)   Why this medicine is used:   Relieves pain. Contact a nurse or doctor right away if you have:  · Extreme weakness, shallow breathing, slow heartbeat  · Severe confusion, lightheadedness, dizziness, fainting  · Yellow skin or eyes, dark urine or pale stools  · Severe constipation, severe stomach pain, nausea, vomiting, loss of appetite  · Sweating or cold, clammy skin     Common side effects:  · Mild constipation, nausea, vomiting  · Sleepiness, tiredness  · Itching, rash  © 2017 [facility] Information is for End User's use only and may not be sold, redistributed or otherwise used for commercial purposes.   DISCHARGE SUMMARY from Nurse    PATIENT INSTRUCTIONS:    After general anesthesia or intravenous sedation, for 24 hours or while taking prescription Narcotics:  · Limit your activities  · Do not drive and operate hazardous machinery  · Do not make important personal or business decisions  · Do  not drink alcoholic beverages  · If you have not urinated within 8 hours after discharge, please contact your surgeon on call. Report the following to your surgeon:  · Excessive pain, swelling, redness or odor of or around the surgical area  · Temperature over 100.5  · Nausea and vomiting lasting longer than 4 hours or if unable to take medications  · Any signs of decreased circulation or nerve impairment to extremity: change in color, persistent  numbness, tingling, coldness or increase pain  · Any questions      *  Please give a list of your current medications to your Primary Care Provider. *  Please update this list whenever your medications are discontinued, doses are      changed, or new medications (including over-the-counter products) are added. *  Please carry medication information at all times in case of emergency situations. These are general instructions for a healthy lifestyle:    No smoking/ No tobacco products/ Avoid exposure to second hand smoke  Surgeon General's Warning:  Quitting smoking now greatly reduces serious risk to your health. Obesity, smoking, and sedentary lifestyle greatly increases your risk for illness    A healthy diet, regular physical exercise & weight monitoring are important for maintaining a healthy lifestyle    You may be retaining fluid if you have a history of heart failure or if you experience any of the following symptoms:  Weight gain of 3 pounds or more overnight or 5 pounds in a week, increased swelling in our hands or feet or shortness of breath while lying flat in bed. Please call your doctor as soon as you notice any of these symptoms; do not wait until your next office visit. Recognize signs and symptoms of STROKE:    F-face looks uneven    A-arms unable to move or move unevenly    S-speech slurred or non-existent    T-time-call 911 as soon as signs and symptoms begin-DO NOT go       Back to bed or wait to see if you get better-TIME IS BRAIN.     Warning Signs of HEART ATTACK     Call 911 if you have these symptoms:   Chest discomfort. Most heart attacks involve discomfort in the center of the chest that lasts more than a few minutes, or that goes away and comes back. It can feel like uncomfortable pressure, squeezing, fullness, or pain.  Discomfort in other areas of the upper body. Symptoms can include pain or discomfort in one or both arms, the back, neck, jaw, or stomach.  Shortness of breath with or without chest discomfort.  Other signs may include breaking out in a cold sweat, nausea, or lightheadedness. Don't wait more than five minutes to call 911 - MINUTES MATTER! Fast action can save your life. Calling 911 is almost always the fastest way to get lifesaving treatment. Emergency Medical Services staff can begin treatment when they arrive -- up to an hour sooner than if someone gets to the hospital by car. The discharge information has been reviewed with the patient/daughter. The patient/daughter verbalized understanding. Discharge medications reviewed with the patient/daughter and appropriate educational materials and side effects teaching were provided.   ___________________________________________________________________________________________________________________________________

## 2018-12-03 NOTE — H&P
H&P Update:  Jacinta Harden. was seen and examined. History and physical has been reviewed. The patient has been examined.  There have been no significant clinical changes since the completion of the originally dated History and Physical.    Signed By: Ricky Siemens, DO     December 3, 2018 7:16 AM

## 2018-12-03 NOTE — ANESTHESIA PREPROCEDURE EVALUATION
Anesthetic History No history of anesthetic complications Review of Systems / Medical History Patient summary reviewed and pertinent labs reviewed Pulmonary COPD: mild Sleep apnea Asthma : well controlled Neuro/Psych Within defined limits Cardiovascular Hypertension: well controlled Dysrhythmias : atrial fibrillation Comments: Pt denies recent CP or change in cardiac status. GI/Hepatic/Renal 
  
GERD: well controlled Liver disease Endo/Other Diabetes: type 2 Morbid obesity and arthritis Other Findings Physical Exam 
 
Airway Mallampati: III 
TM Distance: 4 - 6 cm Neck ROM: normal range of motion Mouth opening: Normal 
 
 Cardiovascular Dental 
No notable dental hx Pulmonary Abdominal 
GI exam deferred Other Findings Anesthetic Plan ASA: 3 Anesthesia type: MAC and general - backup Induction: Intravenous Anesthetic plan and risks discussed with: Patient

## 2018-12-03 NOTE — BRIEF OP NOTE
BRIEF OPERATIVE NOTE    Date of Procedure: 12/3/2018   Preoperative Diagnosis: S63.054A RIGHT FIFTH CARPOMETACARPAL DISLOCATION  Postoperative Diagnosis: S63.054A RIGHT FIFTH CARPOMETACARPAL DISLOCATION    Procedure(s):  RIGHT CLOSED REDUCTION/PERCUATNEOUS PINNING RIGHT FIFTH CARPOMETACARPAL JOINT  Surgeon(s) and Role:     Lexx Tucker DO - Primary         Surgical Assistant: Noy Hu    Surgical Staff:  Circ-1: Martinez Delgado  Scrub RN-1: Fay Riley RN  Surg Asst-1: Jelani Rios  Event Time In Time Out   Incision Start 0800    Incision Close 0840      Anesthesia: General   Estimated Blood Loss: 1 mL  Specimens: * No specimens in log *   Findings: Unstable Right 5th CMC dislocation   Complications: none  Implants:   Implant Name Type Inv. Item Serial No.  Lot No. LRB No. Used Action   c-wire . 045 inch spade (1.14mm)    [employer] [id number] Right 1 Implanted   plain single trocar point. 035   N/A  N/A Right 1 Implanted

## 2018-12-03 NOTE — ANESTHESIA POSTPROCEDURE EVALUATION
Procedure(s): RIGHT CLOSED REDUCTION/PERCUATNEOUS PINNING RIGHT FIFTH CARPOMETACARPAL JOINT//MINI C-ARM/HAND TABLE/ACUMED/K-WIRES. Anesthesia Post Evaluation Multimodal analgesia: multimodal analgesia used between 6 hours prior to anesthesia start to PACU discharge Patient location during evaluation: bedside Patient participation: complete - patient participated Level of consciousness: awake Pain management: adequate Airway patency: patent Anesthetic complications: no 
Cardiovascular status: stable Respiratory status: acceptable Hydration status: acceptable Post anesthesia nausea and vomiting:  controlled Visit Vitals /75 (BP 1 Location: Left arm, BP Patient Position: At rest) Pulse 76 Temp 36 °C (96.8 °F) Resp 18 Ht 5' 11\" (1.803 m) Wt 123.4 kg (272 lb) SpO2 92% BMI 37.94 kg/m²

## 2018-12-03 NOTE — PROGRESS NOTES
conducted a pre-surgery visit with Bertha Feliciano, who is a 60 y.o.,adult. The  provided the following Interventions:  Initiated a relationship of care and support. Plan:  Chaplains will continue to follow and will provide pastoral care on an as needed/requested basis.  recommends bedside caregivers page  on duty if patient shows signs of acute spiritual or emotional distress.     [facility] Certified [address] [place] [address]   [phone]

## 2018-12-03 NOTE — PERIOP NOTES
Patient armband removed and shredded  Patient confirmed by two identifiers with discharge instructions prior too being provided to patient and daughter/sister.

## 2018-12-03 NOTE — OP NOTES
[address] [place] [address]   OPERATIVE REPORT    Name:IRENE JIANG  MR#: [id number]  : 1960  ACCOUNT #: [de-identified]   DATE OF SERVICE: 2018    PREOPERATIVE DIAGNOSIS:  Right 5th carpometacarpal dislocation. POSTOPERATIVE DIAGNOSIS:  Right 5th carpometacarpal dislocation. PROCEDURE PERFORMED:  Right closed reduction and percutaneous pinning of the 5th carpometacarpal joint. [address] [place] [address] Avenue:  Suzie Terrazas DO    ASSISTANT:  Zoe Dumont    ANESTHESIA:  General with local.    ESTIMATED BLOOD LOSS:  1 mL. SPECIMENS REMOVED:  None. FINDINGS:  Unstable right 5th carpometacarpal dislocation. COMPLICATIONS:  None. IMPLANTS:  One 0.045 K-wire and one 0.035 K-wire     INDICATIONS:  Unstable right 5th carpometacarpal dislocation. INFORMED CONSENT:  The patient had the procedure discussed with him in detail. Risks and benefits of the procedure included but are not limited to nerve injury, arterial injury, need for more surgery, anesthetic complications, hardware loosening, as well as potentially death. PROCEDURE IN DETAIL:  After informed consent was obtained as above, the patient was sent to the operative suite. The right upper extremity was prepped and draped in the normal sterile fashion. A tourniquet was applied prior to this. The right upper extremity was exsanguinated and the tourniquet was elevated to 250 mmHg. Using fluoroscopic guidance, the right 5th carpometacarpal joint was reduced. It was found to be grossly unstable and had to be held in reduction with the traction. Using a 0.045 K-wire in a retrograde fashion through the subchondral recess of the fifth metacarpal distally, the K-wire was passed while the fifth CMC joint was held in reduction. There was found to be a stable reduction and a K-wire in good position. After this another wire was passed from the 5th to the 4th metacarpal base across the inner metacarpal joint.   The fifth CMC joint was found to be stable at this point. The 3.5 K-wire that was passed from the fifth to fourth metacarpal base was carefully placed to not be too dorsal to avoid injury of the superficial branch of the ulnar nerve. The K-wires were cut and bent. Xeroform gauze was wrapped around the K-wires at their entry site. The 4 x 4's were placed over the 2 K-wire spots over the fifth metacarpal.  A sterile cast padding was also placed. This was wrapped with an Ace bandage and over the top of this was a brace that the patient originally came with. The patient was given appropriate instructions regarding his splint as well as instructions given to the family and the patient regarding starting to move his fingers very gently once a day. I will see the patient back for followup in 2 weeks.       DO AISHWARYA Jimenez  D: 12/03/2018 08:51     T: 12/03/2018 12:57  JOB #: [id number]

## 2018-12-09 ENCOUNTER — HOSPITAL ENCOUNTER (INPATIENT)
Age: 58
LOS: 11 days | Discharge: HOME HEALTH | DRG: 326 | End: 2018-12-21
Attending: EMERGENCY MEDICINE | Admitting: INTERNAL MEDICINE
Payer: COMMERCIAL

## 2018-12-09 ENCOUNTER — APPOINTMENT (OUTPATIENT)
Dept: CT IMAGING | Age: 58
DRG: 326 | End: 2018-12-09
Attending: EMERGENCY MEDICINE
Payer: COMMERCIAL

## 2018-12-09 ENCOUNTER — APPOINTMENT (OUTPATIENT)
Dept: GENERAL RADIOLOGY | Age: 58
DRG: 326 | End: 2018-12-09
Attending: EMERGENCY MEDICINE
Payer: COMMERCIAL

## 2018-12-09 DIAGNOSIS — I10 ESSENTIAL HYPERTENSION: ICD-10-CM

## 2018-12-09 DIAGNOSIS — K92.1 GASTROINTESTINAL HEMORRHAGE WITH MELENA: Primary | ICD-10-CM

## 2018-12-09 DIAGNOSIS — N28.9 RENAL INSUFFICIENCY: ICD-10-CM

## 2018-12-09 LAB
ALBUMIN SERPL-MCNC: 3.4 G/DL (ref 3.4–5)
ALBUMIN/GLOB SERPL: 1.2 {RATIO} (ref 0.8–1.7)
ALP SERPL-CCNC: 53 U/L (ref 45–117)
ALT SERPL-CCNC: 29 U/L (ref 16–61)
ANION GAP SERPL CALC-SCNC: 11 MMOL/L (ref 3–18)
AST SERPL-CCNC: 20 U/L (ref 15–37)
BASOPHILS # BLD: 0 K/UL (ref 0–0.1)
BASOPHILS NFR BLD: 0 % (ref 0–2)
BILIRUB SERPL-MCNC: 0.4 MG/DL (ref 0.2–1)
BUN SERPL-MCNC: 92 MG/DL (ref 7–18)
BUN/CREAT SERPL: 46 (ref 12–20)
CALCIUM SERPL-MCNC: 10 MG/DL (ref 8.5–10.1)
CHLORIDE SERPL-SCNC: 101 MMOL/L (ref 100–108)
CO2 SERPL-SCNC: 25 MMOL/L (ref 21–32)
CREAT SERPL-MCNC: 1.99 MG/DL (ref 0.6–1.3)
DIFFERENTIAL METHOD BLD: NORMAL
EOSINOPHIL # BLD: 0.1 K/UL (ref 0–0.4)
EOSINOPHIL NFR BLD: 1 % (ref 0–5)
ERYTHROCYTE [DISTWIDTH] IN BLOOD BY AUTOMATED COUNT: 14.1 % (ref 11.6–14.5)
GLOBULIN SER CALC-MCNC: 2.9 G/DL (ref 2–4)
GLUCOSE SERPL-MCNC: 355 MG/DL (ref 74–99)
HCT VFR BLD AUTO: 28.8 % (ref 36–48)
HEMOCCULT STL QL: POSITIVE
HGB BLD-MCNC: 8.9 G/DL (ref 13–16)
LIPASE SERPL-CCNC: 281 U/L (ref 73–393)
LYMPHOCYTES # BLD: 2.5 K/UL (ref 0.9–3.6)
LYMPHOCYTES NFR BLD: 21 % (ref 21–52)
MCH RBC QN AUTO: 27.3 PG (ref 24–34)
MCHC RBC AUTO-ENTMCNC: 30.9 G/DL (ref 31–37)
MCV RBC AUTO: 88.3 FL (ref 74–97)
MONOCYTES # BLD: 1.1 K/UL (ref 0.05–1.2)
MONOCYTES NFR BLD: 10 % (ref 3–10)
NEUTS SEG # BLD: 8 K/UL (ref 1.8–8)
NEUTS SEG NFR BLD: 68 % (ref 40–73)
PLATELET # BLD AUTO: 345 K/UL (ref 135–420)
PMV BLD AUTO: 10.6 FL (ref 9.2–11.8)
POTASSIUM SERPL-SCNC: 5 MMOL/L (ref 3.5–5.5)
PROT SERPL-MCNC: 6.3 G/DL (ref 6.4–8.2)
RBC # BLD AUTO: 3.26 M/UL (ref 4.7–5.5)
SODIUM SERPL-SCNC: 137 MMOL/L (ref 136–145)
WBC # BLD AUTO: 11.7 K/UL (ref 4.6–13.2)

## 2018-12-09 PROCEDURE — 85730 THROMBOPLASTIN TIME PARTIAL: CPT

## 2018-12-09 PROCEDURE — 82270 OCCULT BLOOD FECES: CPT

## 2018-12-09 PROCEDURE — 85610 PROTHROMBIN TIME: CPT

## 2018-12-09 PROCEDURE — 74011250636 HC RX REV CODE- 250/636

## 2018-12-09 PROCEDURE — 96375 TX/PRO/DX INJ NEW DRUG ADDON: CPT

## 2018-12-09 PROCEDURE — 74011250636 HC RX REV CODE- 250/636: Performed by: EMERGENCY MEDICINE

## 2018-12-09 PROCEDURE — 86901 BLOOD TYPING SEROLOGIC RH(D): CPT

## 2018-12-09 PROCEDURE — 71045 X-RAY EXAM CHEST 1 VIEW: CPT

## 2018-12-09 PROCEDURE — 80053 COMPREHEN METABOLIC PANEL: CPT

## 2018-12-09 PROCEDURE — 74177 CT ABD & PELVIS W/CONTRAST: CPT

## 2018-12-09 PROCEDURE — 83690 ASSAY OF LIPASE: CPT

## 2018-12-09 PROCEDURE — 85027 COMPLETE CBC AUTOMATED: CPT

## 2018-12-09 PROCEDURE — 99285 EMERGENCY DEPT VISIT HI MDM: CPT

## 2018-12-09 PROCEDURE — 86920 COMPATIBILITY TEST SPIN: CPT

## 2018-12-09 PROCEDURE — 96361 HYDRATE IV INFUSION ADD-ON: CPT

## 2018-12-09 RX ORDER — ONDANSETRON 2 MG/ML
INJECTION INTRAMUSCULAR; INTRAVENOUS
Status: COMPLETED
Start: 2018-12-09 | End: 2018-12-09

## 2018-12-09 RX ORDER — ONDANSETRON 2 MG/ML
4 INJECTION INTRAMUSCULAR; INTRAVENOUS
Status: COMPLETED | OUTPATIENT
Start: 2018-12-09 | End: 2018-12-09

## 2018-12-09 RX ADMIN — SODIUM CHLORIDE 1000 ML: 900 INJECTION, SOLUTION INTRAVENOUS at 23:22

## 2018-12-09 RX ADMIN — ONDANSETRON 4 MG: 2 INJECTION INTRAMUSCULAR; INTRAVENOUS at 23:52

## 2018-12-09 NOTE — LETTER
[address] [place] [address] [facility] 
[address] [facility] [id number] [phone] Work/School Note Date: 12/9/2018 To Whom It May concern: 
 
Please excuse Candiss Goodell from work 12/10/2018. Sincerely, Jose Enirque Rosas RN BSN ROSE NRP

## 2018-12-10 ENCOUNTER — ANESTHESIA EVENT (OUTPATIENT)
Dept: ENDOSCOPY | Age: 58
DRG: 326 | End: 2018-12-10
Payer: COMMERCIAL

## 2018-12-10 ENCOUNTER — APPOINTMENT (OUTPATIENT)
Dept: GENERAL RADIOLOGY | Age: 58
DRG: 326 | End: 2018-12-10
Attending: INTERNAL MEDICINE
Payer: COMMERCIAL

## 2018-12-10 ENCOUNTER — ANESTHESIA (OUTPATIENT)
Dept: ENDOSCOPY | Age: 58
DRG: 326 | End: 2018-12-10
Payer: COMMERCIAL

## 2018-12-10 PROBLEM — K92.2 ACUTE GI BLEEDING: Status: ACTIVE | Noted: 2018-12-10

## 2018-12-10 PROBLEM — I48.21 PERMANENT ATRIAL FIBRILLATION (HCC): Status: ACTIVE | Noted: 2018-12-10

## 2018-12-10 PROBLEM — D64.9 SYMPTOMATIC ANEMIA: Status: ACTIVE | Noted: 2018-12-10

## 2018-12-10 PROBLEM — Z79.01 CHRONIC ANTICOAGULATION: Status: ACTIVE | Noted: 2018-12-10

## 2018-12-10 LAB
ANION GAP SERPL CALC-SCNC: 10 MMOL/L (ref 3–18)
APTT PPP: 24.1 SEC (ref 23–36.4)
APTT PPP: 30.9 SEC (ref 23–36.4)
BASOPHILS # BLD: 0 K/UL (ref 0–0.1)
BASOPHILS NFR BLD: 0 % (ref 0–2)
BUN SERPL-MCNC: 97 MG/DL (ref 7–18)
BUN/CREAT SERPL: 57 (ref 12–20)
CALCIUM SERPL-MCNC: 9.4 MG/DL (ref 8.5–10.1)
CHLORIDE SERPL-SCNC: 107 MMOL/L (ref 100–108)
CO2 SERPL-SCNC: 23 MMOL/L (ref 21–32)
CREAT SERPL-MCNC: 1.69 MG/DL (ref 0.6–1.3)
DIFFERENTIAL METHOD BLD: ABNORMAL
EOSINOPHIL # BLD: 0 K/UL (ref 0–0.4)
EOSINOPHIL NFR BLD: 0 % (ref 0–5)
ERYTHROCYTE [DISTWIDTH] IN BLOOD BY AUTOMATED COUNT: 14.4 % (ref 11.6–14.5)
ERYTHROCYTE [DISTWIDTH] IN BLOOD BY AUTOMATED COUNT: 14.6 % (ref 11.6–14.5)
EST. AVERAGE GLUCOSE BLD GHB EST-MCNC: 166 MG/DL
GLUCOSE BLD STRIP.AUTO-MCNC: 166 MG/DL (ref 70–110)
GLUCOSE BLD STRIP.AUTO-MCNC: 219 MG/DL (ref 70–110)
GLUCOSE BLD STRIP.AUTO-MCNC: 259 MG/DL (ref 70–110)
GLUCOSE BLD STRIP.AUTO-MCNC: 322 MG/DL (ref 70–110)
GLUCOSE BLD STRIP.AUTO-MCNC: 349 MG/DL (ref 70–110)
GLUCOSE SERPL-MCNC: 346 MG/DL (ref 74–99)
HBA1C MFR BLD: 7.4 % (ref 4.2–5.6)
HCT VFR BLD AUTO: 22.5 % (ref 36–48)
HCT VFR BLD AUTO: 24 % (ref 36–48)
HCT VFR BLD AUTO: 24.4 % (ref 36–48)
HCT VFR BLD AUTO: 24.6 % (ref 36–48)
HGB BLD-MCNC: 7.1 G/DL (ref 13–16)
HGB BLD-MCNC: 7.5 G/DL (ref 13–16)
HGB BLD-MCNC: 7.6 G/DL (ref 13–16)
HGB BLD-MCNC: 7.6 G/DL (ref 13–16)
INR PPP: 1.2 (ref 0.8–1.2)
INR PPP: 1.4 (ref 0.8–1.2)
LYMPHOCYTES # BLD: 1.8 K/UL (ref 0.9–3.6)
LYMPHOCYTES NFR BLD: 19 % (ref 21–52)
MAGNESIUM SERPL-MCNC: 1.8 MG/DL (ref 1.6–2.6)
MCH RBC QN AUTO: 26.7 PG (ref 24–34)
MCH RBC QN AUTO: 27.1 PG (ref 24–34)
MCHC RBC AUTO-ENTMCNC: 30.5 G/DL (ref 31–37)
MCHC RBC AUTO-ENTMCNC: 31.7 G/DL (ref 31–37)
MCV RBC AUTO: 85.7 FL (ref 74–97)
MCV RBC AUTO: 87.5 FL (ref 74–97)
MONOCYTES # BLD: 0.8 K/UL (ref 0.05–1.2)
MONOCYTES NFR BLD: 9 % (ref 3–10)
NEUTS SEG # BLD: 6.7 K/UL (ref 1.8–8)
NEUTS SEG NFR BLD: 72 % (ref 40–73)
PHOSPHATE SERPL-MCNC: 2.5 MG/DL (ref 2.5–4.9)
PLATELET # BLD AUTO: 271 K/UL (ref 135–420)
PLATELET # BLD AUTO: 286 K/UL (ref 135–420)
PMV BLD AUTO: 10 FL (ref 9.2–11.8)
PMV BLD AUTO: 10.4 FL (ref 9.2–11.8)
POTASSIUM SERPL-SCNC: 4.5 MMOL/L (ref 3.5–5.5)
PROTHROMBIN TIME: 14.6 SEC (ref 11.5–15.2)
PROTHROMBIN TIME: 16.7 SEC (ref 11.5–15.2)
RBC # BLD AUTO: 2.8 M/UL (ref 4.7–5.5)
RBC # BLD AUTO: 2.81 M/UL (ref 4.7–5.5)
SODIUM SERPL-SCNC: 140 MMOL/L (ref 136–145)
WBC # BLD AUTO: 9.2 K/UL (ref 4.6–13.2)
WBC # BLD AUTO: 9.3 K/UL (ref 4.6–13.2)

## 2018-12-10 PROCEDURE — 74011250636 HC RX REV CODE- 250/636: Performed by: EMERGENCY MEDICINE

## 2018-12-10 PROCEDURE — C9113 INJ PANTOPRAZOLE SODIUM, VIA: HCPCS | Performed by: EMERGENCY MEDICINE

## 2018-12-10 PROCEDURE — 74011000258 HC RX REV CODE- 258: Performed by: EMERGENCY MEDICINE

## 2018-12-10 PROCEDURE — 83036 HEMOGLOBIN GLYCOSYLATED A1C: CPT

## 2018-12-10 PROCEDURE — 74011250637 HC RX REV CODE- 250/637: Performed by: EMERGENCY MEDICINE

## 2018-12-10 PROCEDURE — 77030010522

## 2018-12-10 PROCEDURE — C9113 INJ PANTOPRAZOLE SODIUM, VIA: HCPCS | Performed by: INTERNAL MEDICINE

## 2018-12-10 PROCEDURE — 74011636637 HC RX REV CODE- 636/637: Performed by: EMERGENCY MEDICINE

## 2018-12-10 PROCEDURE — 80048 BASIC METABOLIC PNL TOTAL CA: CPT

## 2018-12-10 PROCEDURE — 85610 PROTHROMBIN TIME: CPT

## 2018-12-10 PROCEDURE — 74011636637 HC RX REV CODE- 636/637: Performed by: INTERNAL MEDICINE

## 2018-12-10 PROCEDURE — 85014 HEMATOCRIT: CPT

## 2018-12-10 PROCEDURE — 96365 THER/PROPH/DIAG IV INF INIT: CPT

## 2018-12-10 PROCEDURE — 87077 CULTURE AEROBIC IDENTIFY: CPT

## 2018-12-10 PROCEDURE — 74011250636 HC RX REV CODE- 250/636

## 2018-12-10 PROCEDURE — 77030037875 HC DRSG MEPILEX <16IN BORD MOLN -A

## 2018-12-10 PROCEDURE — 74011636320 HC RX REV CODE- 636/320: Performed by: EMERGENCY MEDICINE

## 2018-12-10 PROCEDURE — 74011000250 HC RX REV CODE- 250: Performed by: INTERNAL MEDICINE

## 2018-12-10 PROCEDURE — 76060000031 HC ANESTHESIA FIRST 0.5 HR: Performed by: INTERNAL MEDICINE

## 2018-12-10 PROCEDURE — 77030018846 HC SOL IRR STRL H20 ICUM -A: Performed by: INTERNAL MEDICINE

## 2018-12-10 PROCEDURE — 87186 SC STD MICRODIL/AGAR DIL: CPT

## 2018-12-10 PROCEDURE — 88305 TISSUE EXAM BY PATHOLOGIST: CPT

## 2018-12-10 PROCEDURE — 74011000258 HC RX REV CODE- 258: Performed by: INTERNAL MEDICINE

## 2018-12-10 PROCEDURE — 77030010541

## 2018-12-10 PROCEDURE — 74011250636 HC RX REV CODE- 250/636: Performed by: INTERNAL MEDICINE

## 2018-12-10 PROCEDURE — 74011250637 HC RX REV CODE- 250/637: Performed by: PHYSICIAN ASSISTANT

## 2018-12-10 PROCEDURE — 76040000019: Performed by: INTERNAL MEDICINE

## 2018-12-10 PROCEDURE — 88342 IMHCHEM/IMCYTCHM 1ST ANTB: CPT

## 2018-12-10 PROCEDURE — 77030034850

## 2018-12-10 PROCEDURE — 65660000000 HC RM CCU STEPDOWN

## 2018-12-10 PROCEDURE — 77030008565 HC TBNG SUC IRR ERBE -B: Performed by: INTERNAL MEDICINE

## 2018-12-10 PROCEDURE — 82962 GLUCOSE BLOOD TEST: CPT

## 2018-12-10 PROCEDURE — 85025 COMPLETE CBC W/AUTO DIFF WBC: CPT

## 2018-12-10 PROCEDURE — 85730 THROMBOPLASTIN TIME PARTIAL: CPT

## 2018-12-10 PROCEDURE — 85027 COMPLETE CBC AUTOMATED: CPT

## 2018-12-10 PROCEDURE — 77030037878 HC DRSG MEPILEX >48IN BORD MOLN -B

## 2018-12-10 PROCEDURE — 77030019988 HC FCPS ENDOSC DISP BSC -B: Performed by: INTERNAL MEDICINE

## 2018-12-10 PROCEDURE — 94640 AIRWAY INHALATION TREATMENT: CPT

## 2018-12-10 PROCEDURE — 36415 COLL VENOUS BLD VENIPUNCTURE: CPT

## 2018-12-10 PROCEDURE — 83735 ASSAY OF MAGNESIUM: CPT

## 2018-12-10 PROCEDURE — 84100 ASSAY OF PHOSPHORUS: CPT

## 2018-12-10 PROCEDURE — C1751 CATH, INF, PER/CENT/MIDLINE: HCPCS

## 2018-12-10 PROCEDURE — 77030011256 HC DRSG MEPILEX <16IN NO BORD MOLN -A

## 2018-12-10 PROCEDURE — 87070 CULTURE OTHR SPECIMN AEROBIC: CPT

## 2018-12-10 RX ORDER — PANTOPRAZOLE SODIUM 40 MG/10ML
80 INJECTION, POWDER, LYOPHILIZED, FOR SOLUTION INTRAVENOUS
Status: COMPLETED | OUTPATIENT
Start: 2018-12-10 | End: 2018-12-10

## 2018-12-10 RX ORDER — SODIUM CHLORIDE 0.9 % (FLUSH) 0.9 %
5-10 SYRINGE (ML) INJECTION AS NEEDED
Status: CANCELLED | OUTPATIENT
Start: 2018-12-10

## 2018-12-10 RX ORDER — SODIUM CHLORIDE 0.9 % (FLUSH) 0.9 %
5-10 SYRINGE (ML) INJECTION EVERY 8 HOURS
Status: CANCELLED | OUTPATIENT
Start: 2018-12-10

## 2018-12-10 RX ORDER — ONDANSETRON 2 MG/ML
4 INJECTION INTRAMUSCULAR; INTRAVENOUS
Status: COMPLETED | OUTPATIENT
Start: 2018-12-10 | End: 2018-12-10

## 2018-12-10 RX ORDER — ONDANSETRON 2 MG/ML
4 INJECTION INTRAMUSCULAR; INTRAVENOUS ONCE
Status: CANCELLED | OUTPATIENT
Start: 2018-12-10 | End: 2018-12-10

## 2018-12-10 RX ORDER — INSULIN LISPRO 100 [IU]/ML
INJECTION, SOLUTION INTRAVENOUS; SUBCUTANEOUS
Status: DISCONTINUED | OUTPATIENT
Start: 2018-12-10 | End: 2018-12-21 | Stop reason: HOSPADM

## 2018-12-10 RX ORDER — INSULIN GLARGINE 100 [IU]/ML
14 INJECTION, SOLUTION SUBCUTANEOUS
Status: DISCONTINUED | OUTPATIENT
Start: 2018-12-10 | End: 2018-12-11

## 2018-12-10 RX ORDER — DEXTROSE 50 % IN WATER (D50W) INTRAVENOUS SYRINGE
25-50 AS NEEDED
Status: CANCELLED | OUTPATIENT
Start: 2018-12-10

## 2018-12-10 RX ORDER — PROPOFOL 10 MG/ML
INJECTION, EMULSION INTRAVENOUS AS NEEDED
Status: DISCONTINUED | OUTPATIENT
Start: 2018-12-10 | End: 2018-12-10 | Stop reason: HOSPADM

## 2018-12-10 RX ORDER — MAGNESIUM SULFATE 100 %
4 CRYSTALS MISCELLANEOUS AS NEEDED
Status: DISCONTINUED | OUTPATIENT
Start: 2018-12-10 | End: 2018-12-21 | Stop reason: HOSPADM

## 2018-12-10 RX ORDER — NALOXONE HYDROCHLORIDE 0.4 MG/ML
0.4 INJECTION, SOLUTION INTRAMUSCULAR; INTRAVENOUS; SUBCUTANEOUS AS NEEDED
Status: DISCONTINUED | OUTPATIENT
Start: 2018-12-10 | End: 2018-12-21 | Stop reason: HOSPADM

## 2018-12-10 RX ORDER — INSULIN LISPRO 100 [IU]/ML
INJECTION, SOLUTION INTRAVENOUS; SUBCUTANEOUS ONCE
Status: CANCELLED | OUTPATIENT
Start: 2018-12-10 | End: 2018-12-10

## 2018-12-10 RX ORDER — ROSUVASTATIN CALCIUM 20 MG/1
20 TABLET, COATED ORAL
Status: DISCONTINUED | OUTPATIENT
Start: 2018-12-10 | End: 2018-12-21 | Stop reason: HOSPADM

## 2018-12-10 RX ORDER — MORPHINE SULFATE 2 MG/ML
1 INJECTION, SOLUTION INTRAMUSCULAR; INTRAVENOUS
Status: DISCONTINUED | OUTPATIENT
Start: 2018-12-10 | End: 2018-12-13

## 2018-12-10 RX ORDER — MAGNESIUM SULFATE 100 %
4 CRYSTALS MISCELLANEOUS AS NEEDED
Status: CANCELLED | OUTPATIENT
Start: 2018-12-10

## 2018-12-10 RX ORDER — LIDOCAINE HYDROCHLORIDE 20 MG/ML
INJECTION, SOLUTION EPIDURAL; INFILTRATION; INTRACAUDAL; PERINEURAL AS NEEDED
Status: DISCONTINUED | OUTPATIENT
Start: 2018-12-10 | End: 2018-12-10 | Stop reason: HOSPADM

## 2018-12-10 RX ORDER — SODIUM CHLORIDE 9 MG/ML
250 INJECTION, SOLUTION INTRAVENOUS AS NEEDED
Status: DISCONTINUED | OUTPATIENT
Start: 2018-12-10 | End: 2018-12-17

## 2018-12-10 RX ORDER — IPRATROPIUM BROMIDE AND ALBUTEROL SULFATE 2.5; .5 MG/3ML; MG/3ML
3 SOLUTION RESPIRATORY (INHALATION)
Status: DISCONTINUED | OUTPATIENT
Start: 2018-12-10 | End: 2018-12-21 | Stop reason: HOSPADM

## 2018-12-10 RX ORDER — FENTANYL CITRATE 50 UG/ML
50 INJECTION, SOLUTION INTRAMUSCULAR; INTRAVENOUS AS NEEDED
Status: CANCELLED | OUTPATIENT
Start: 2018-12-10

## 2018-12-10 RX ORDER — ONDANSETRON 2 MG/ML
4 INJECTION INTRAMUSCULAR; INTRAVENOUS
Status: DISCONTINUED | OUTPATIENT
Start: 2018-12-10 | End: 2018-12-21 | Stop reason: HOSPADM

## 2018-12-10 RX ORDER — SODIUM CHLORIDE 9 MG/ML
200 INJECTION, SOLUTION INTRAVENOUS CONTINUOUS
Status: DISCONTINUED | OUTPATIENT
Start: 2018-12-10 | End: 2018-12-10

## 2018-12-10 RX ORDER — SODIUM CHLORIDE, SODIUM LACTATE, POTASSIUM CHLORIDE, CALCIUM CHLORIDE 600; 310; 30; 20 MG/100ML; MG/100ML; MG/100ML; MG/100ML
75 INJECTION, SOLUTION INTRAVENOUS CONTINUOUS
Status: CANCELLED | OUTPATIENT
Start: 2018-12-10 | End: 2018-12-10

## 2018-12-10 RX ORDER — DEXTROSE 50 % IN WATER (D50W) INTRAVENOUS SYRINGE
25-50 AS NEEDED
Status: DISCONTINUED | OUTPATIENT
Start: 2018-12-10 | End: 2018-12-21 | Stop reason: HOSPADM

## 2018-12-10 RX ORDER — DOCUSATE SODIUM 100 MG/1
100 CAPSULE, LIQUID FILLED ORAL
Status: DISCONTINUED | OUTPATIENT
Start: 2018-12-10 | End: 2018-12-21 | Stop reason: HOSPADM

## 2018-12-10 RX ORDER — INSULIN LISPRO 100 [IU]/ML
INJECTION, SOLUTION INTRAVENOUS; SUBCUTANEOUS
Status: DISCONTINUED | OUTPATIENT
Start: 2018-12-10 | End: 2018-12-10

## 2018-12-10 RX ORDER — INSULIN LISPRO 100 [IU]/ML
INJECTION, SOLUTION INTRAVENOUS; SUBCUTANEOUS EVERY 4 HOURS
Status: DISCONTINUED | OUTPATIENT
Start: 2018-12-10 | End: 2018-12-10

## 2018-12-10 RX ORDER — SODIUM CHLORIDE 9 MG/ML
100 INJECTION, SOLUTION INTRAVENOUS CONTINUOUS
Status: DISCONTINUED | OUTPATIENT
Start: 2018-12-10 | End: 2018-12-10

## 2018-12-10 RX ORDER — ACETAMINOPHEN 325 MG/1
650 TABLET ORAL
Status: DISCONTINUED | OUTPATIENT
Start: 2018-12-10 | End: 2018-12-21 | Stop reason: HOSPADM

## 2018-12-10 RX ORDER — CHLORPHENIRAMINE MALEATE 4 MG
TABLET ORAL 2 TIMES DAILY
Status: DISPENSED | OUTPATIENT
Start: 2018-12-10 | End: 2018-12-17

## 2018-12-10 RX ORDER — SODIUM CHLORIDE 9 MG/ML
500 INJECTION, SOLUTION INTRAVENOUS ONCE
Status: COMPLETED | OUTPATIENT
Start: 2018-12-10 | End: 2018-12-10

## 2018-12-10 RX ORDER — BUDESONIDE AND FORMOTEROL FUMARATE DIHYDRATE 160; 4.5 UG/1; UG/1
2 AEROSOL RESPIRATORY (INHALATION)
Status: DISCONTINUED | OUTPATIENT
Start: 2018-12-10 | End: 2018-12-21 | Stop reason: HOSPADM

## 2018-12-10 RX ADMIN — INSULIN LISPRO 3 UNITS: 100 INJECTION, SOLUTION INTRAVENOUS; SUBCUTANEOUS at 21:30

## 2018-12-10 RX ADMIN — LIDOCAINE HYDROCHLORIDE 60 MG: 20 INJECTION, SOLUTION EPIDURAL; INFILTRATION; INTRACAUDAL; PERINEURAL at 11:31

## 2018-12-10 RX ADMIN — SODIUM CHLORIDE 40 MG: 9 INJECTION, SOLUTION INTRAMUSCULAR; INTRAVENOUS; SUBCUTANEOUS at 20:49

## 2018-12-10 RX ADMIN — INSULIN LISPRO 8 UNITS: 100 INJECTION, SOLUTION INTRAVENOUS; SUBCUTANEOUS at 06:50

## 2018-12-10 RX ADMIN — INSULIN LISPRO 6 UNITS: 100 INJECTION, SOLUTION INTRAVENOUS; SUBCUTANEOUS at 13:11

## 2018-12-10 RX ADMIN — INSULIN LISPRO 12 UNITS: 100 INJECTION, SOLUTION INTRAVENOUS; SUBCUTANEOUS at 18:40

## 2018-12-10 RX ADMIN — PROPOFOL 100 MG: 10 INJECTION, EMULSION INTRAVENOUS at 11:29

## 2018-12-10 RX ADMIN — SODIUM CHLORIDE 500 ML: 900 INJECTION, SOLUTION INTRAVENOUS at 06:50

## 2018-12-10 RX ADMIN — SODIUM CHLORIDE 8 MG/HR: 900 INJECTION INTRAVENOUS at 00:39

## 2018-12-10 RX ADMIN — SODIUM CHLORIDE 200 ML/HR: 900 INJECTION, SOLUTION INTRAVENOUS at 02:02

## 2018-12-10 RX ADMIN — SODIUM CHLORIDE 40 MG: 9 INJECTION, SOLUTION INTRAMUSCULAR; INTRAVENOUS; SUBCUTANEOUS at 13:55

## 2018-12-10 RX ADMIN — ROSUVASTATIN CALCIUM 20 MG: 20 TABLET, FILM COATED ORAL at 21:14

## 2018-12-10 RX ADMIN — PANTOPRAZOLE SODIUM 80 MG: 40 INJECTION, POWDER, FOR SOLUTION INTRAVENOUS at 00:39

## 2018-12-10 RX ADMIN — INSULIN GLARGINE 14 UNITS: 100 INJECTION, SOLUTION SUBCUTANEOUS at 21:11

## 2018-12-10 RX ADMIN — PROPOFOL 70 MG: 10 INJECTION, EMULSION INTRAVENOUS at 11:39

## 2018-12-10 RX ADMIN — PROPOFOL 50 MG: 10 INJECTION, EMULSION INTRAVENOUS at 11:34

## 2018-12-10 RX ADMIN — SODIUM CHLORIDE 100 ML/HR: 900 INJECTION, SOLUTION INTRAVENOUS at 06:54

## 2018-12-10 RX ADMIN — SODIUM CHLORIDE 8 MG/HR: 9 INJECTION, SOLUTION INTRAVENOUS at 04:37

## 2018-12-10 RX ADMIN — ONDANSETRON 4 MG: 2 INJECTION INTRAMUSCULAR; INTRAVENOUS at 02:06

## 2018-12-10 RX ADMIN — IOPAMIDOL 100 ML: 612 INJECTION, SOLUTION INTRAVENOUS at 00:25

## 2018-12-10 RX ADMIN — BUDESONIDE AND FORMOTEROL FUMARATE DIHYDRATE 2 PUFF: 160; 4.5 AEROSOL RESPIRATORY (INHALATION) at 20:38
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
## 2018-12-10 NOTE — DIABETES MGMT
GLYCEMIC CONTROL PLAN OF CARE Assessment/Recommendations: 
Pt is a 60year old male with a past medical history significant for diabetes, hypertension, GERD, COPD, and A-fib. Blood glucose elevated above targets Recommend addition of Lantus insulin 14 units daily (50% of home dose) Recommend changing frequency of Lispro insulin to 4 times daily ACHS Will advance to the resistant correctional Lispro scale per protocol. Most recent blood glucose values: 
12/10/2018 05:43 12/10/2018 11:29 12/10/2018 12:11  
349 (H) 219 (H) 259 (H) Current A1C of 7.9% is equivalent to average blood glucose of 180 mg/dl over the past 2-3 months. Current hospital diabetes medications:  
Correctional Lispro insulin every 4 hours (normal sensitivity) Home diabetes medications: 
Levemir insulin 28 units daily Janumet twice daily Novolog insulin per sliding scale Diet: Clear liquid Education:  ____Refer to Diabetes Education Record _x___Education not indicated at this time Sharita Lopez, [address] [place] [address]

## 2018-12-10 NOTE — ANESTHESIA POSTPROCEDURE EVALUATION
Procedure(s): ENDOSCOPY with biopsies. Anesthesia Post Evaluation Multimodal analgesia: multimodal analgesia not used between 6 hours prior to anesthesia start to PACU discharge Patient location during evaluation: ICU Patient participation: complete - patient participated Level of consciousness: awake and alert Pain score: 0 Airway patency: patent Anesthetic complications: no 
Cardiovascular status: acceptable Respiratory status: acceptable Hydration status: acceptable Post anesthesia nausea and vomiting:  none Visit Vitals /62 Pulse 85 Temp 36.9 °C (98.4 °F) Resp 16 Wt 122.5 kg (270 lb) SpO2 100% BMI 37.66 kg/m²

## 2018-12-10 NOTE — ROUTINE PROCESS
TRANSFER - OUT REPORT: 
 
Verbal report given to [facility] RN on [facility].  being transferred to ICU(unit) for routine post - op Report consisted of patients Situation, Background, Assessment and  
Recommendations(SBAR). Information from the following report(s) SBAR was reviewed with the receiving nurse. Lines:  
Peripheral IV 12/09/18 Right Antecubital (Active) Site Assessment Clean, dry, & intact 12/10/2018  8:00 AM  
Phlebitis Assessment 0 12/10/2018  8:00 AM  
Infiltration Assessment 0 12/10/2018  8:00 AM  
Dressing Status Clean, dry, & intact 12/10/2018  8:00 AM  
Dressing Type Transparent;Tape 12/10/2018  8:00 AM  
Hub Color/Line Status Pink; Infusing;Flushed;Patent 12/10/2018  8:00 AM  
Action Taken Open ports on tubing capped 12/10/2018  8:00 AM  
Alcohol Cap Used Yes 12/10/2018  8:00 AM  
   
Peripheral IV 12/10/18 Left Wrist (Active) Site Assessment Clean, dry, & intact 12/10/2018  8:00 AM  
Phlebitis Assessment 0 12/10/2018  8:00 AM  
Infiltration Assessment 0 12/10/2018  8:00 AM  
Dressing Status Clean, dry, & intact 12/10/2018  8:00 AM  
Dressing Type Transparent 12/10/2018  8:00 AM  
Hub Color/Line Status Blue;Capped;Flushed;Patent 12/10/2018  8:00 AM  
Action Taken Open ports on tubing capped 12/10/2018  8:00 AM  
Alcohol Cap Used Yes 12/10/2018  8:00 AM  
  
 
Opportunity for questions and clarification was provided. Patient transported with: 
 Monitor O2 @ 3 liters Registered Nurse CRNA

## 2018-12-10 NOTE — PROGRESS NOTES
Visit Vitals /68 (BP 1 Location: Left arm, BP Patient Position: At rest) Pulse 90 Temp 98.2 °F (36.8 °C) Resp 19 Wt 122.5 kg (270 lb) SpO2 99% BMI 37.66 kg/m² Patient seen in f/u. Awake, alert. D/w Dr Author Hickey- he is planning surgery on 12/13/18. preOp cardio eval, check echo. Hold BP meds given low BP. Resume home bipap. Resume basal insulin. D/w patient

## 2018-12-10 NOTE — PROGRESS NOTES
TRANSFER - IN REPORT: 
 
Telephone report received from [facility] RN(name) on Shiva Rodriguez.  being received from [facility]) for routine progression of care Report consisted of patients Situation, Background, Assessment and  
Recommendations(SBAR). Information from the following report(s) SBAR, Kardex, ED Summary, Procedure Summary, Intake/Output, MAR, Accordion and Recent Results was reviewed with the receiving nurse. Opportunity for questions and clarification was provided. Assessment completed upon patients arrival to unit and care assumed.

## 2018-12-10 NOTE — ED NOTES
Patient ambulated to bathroom for bowel movement; missed specimen hat yet only a smear of stool in hat. Specimen/stool guiac positive and Provider informed.

## 2018-12-10 NOTE — ANESTHESIA PREPROCEDURE EVALUATION
Anesthetic History No history of anesthetic complications Review of Systems / Medical History Patient summary reviewed and pertinent labs reviewed Pulmonary COPD Sleep apnea: BiPAP Asthma Neuro/Psych Within defined limits Cardiovascular Within defined limits Hypertension Dysrhythmias : atrial fibrillation Exercise tolerance: >4 METS 
  
GI/Hepatic/Renal 
Within defined limits GERD Liver disease Comments: Upper GIB Endo/Other Within defined limits Diabetes: type 2 Morbid obesity Other Findings Physical Exam 
 
Airway Mallampati: III 
TM Distance: 4 - 6 cm Neck ROM: decreased range of motion, short neck Mouth opening: Diminished (comment) Cardiovascular Regular rate and rhythm,  S1 and S2 normal,  no murmur, click, rub, or gallop Rhythm: regular Rate: normal 
 
 
 
 Dental 
 
Dentition: Lower dentition intact and Upper dentition intact Pulmonary Breath sounds clear to auscultation Abdominal 
GI exam deferred Other Findings Anesthetic Plan ASA: 3, emergent Anesthesia type: MAC Induction: Intravenous Anesthetic plan and risks discussed with: Patient

## 2018-12-10 NOTE — ED TRIAGE NOTES
Patient states he has been vomiting \"coffee ground color emesis today\" along with dark diarrhea. Patient noted to be on Xarelto.

## 2018-12-10 NOTE — PROGRESS NOTES
Reason for Admission:  Acute GI bleeding RRAT Score:    12 Plan for utilizing home health: To be determined. Likelihood of Readmission:   LOW Transition of Care Plan:         
Initial assessment completed with patient, Emilee (daughter), and Noy Dukesuse, parent. Face sheet information confirmed:  yes. His daughter lives with him. He was independent of his ADL/IADL's prior to admission. His daughter will provide transportation upon discharge. He ambulates without assistance. He states that he can't stand for too long because he gets tired. He has no DME at home. The patient states that he can obtain his medications from the pharmacy, and take his medications as directed. He has no concerns for discharge. Patient is not currently active with home health. Patient has not ever stayed in a skilled nursing facility or rehab. Currently, the discharge plan is Home vs. Home with home health. Patient currently in ICU setting pending transfer to [address] [place] [address]. Care Management Interventions PCP Verified by CM: Yes Last Visit to PCP: 11/15/18 Mode of Transport at Discharge: Self Transition of Care Consult (CM Consult): Discharge Planning Discharge Durable Medical Equipment: No 
Physical Therapy Consult: No 
Occupational Therapy Consult: No 
Current Support Network: Other(His daughter: Emilee lives with him.) Confirm Follow Up Transport: Family(Daughter) Plan discussed with Pt/Family/Caregiver: Yes Discharge Location Discharge Placement: Other:(Home vs. home with home health) Terry Wise MSN, RN, [facility] Care Management [phone]

## 2018-12-10 NOTE — PROGRESS NOTES
Bedside and Verbal shift change report given to Sabina Peñaloza (oncoming nurse) by [address] [facility] (offgoing nurse). Report included the following information SBAR, Kardex, ED Summary, Procedure Summary, Intake/Output, MAR, Accordion, Recent Results, Med Rec Status, Cardiac Rhythm A-Fib and Alarm Parameters .

## 2018-12-10 NOTE — ED NOTES
Pt remains awake/alert/oriented with calm/conversant affect. Denies dizziness/shortness of breath/chest pain/other concerns. IV site clear/infusing with rails up x 2. No active vomiting/emesis/stools at this time. Plan of care for admission and further treatment, as well as possible need for possible blood transfusion as well as risks/benefits involved. Pt and daughter voiced understanding. Intensivist has been paged twice; still awaiting return call at this this time.

## 2018-12-10 NOTE — PROCEDURES
[place]  [facility], [address]    Procedure Note    Patient: Tia Garay MRN: [id number]  SSN: [id number]    YOB: 1960  Age: 60 y.o. 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    Recommendations:  -gen sug eval for resection Dr Noris Lerner notified   -continue ppi bid   - transfuse as needed   -repeat endoscopy for recurrent bleeding not rec as this lesion can not be enodscopically treated     Indication:  Hematemesis  Pre-operative Diagnosis: see indication above  Post-operative Diagnosis: see findings below  :  Chance Nichole MD  Referring Provider:   Zachary Price MD    Exam:  Airway: clear, no airway problems anticipated  Heart: RRR, without gallops or rubs  Lungs: clear bilaterally without wheezes, crackles, or rhonchi  Abdomen: soft, nontender, nondistended, bowel sounds present  Mental Status: awake, alert and oriented to person, place and time     Anethesia/Sedation:  MAC anesthesia Propofol  Procedure Details   After informed consent was obtained for the procedure, with all risks and benefits of procedure explained the patient was taken to the endoscopy suite and placed in the left lateral decubitus position. Following sequential administration of sedation as per above, the [id number] gastroscope was inserted into the mouth and advanced under direct vision to third portion of the duodenum. A careful inspection was made as the gastroscope was withdrawn, including a retroflexed view of the proximal stomach; findings and interventions are described below.                   Findings: Ulcerated 3cm mass with central ulceration c/w GIST     Therapies:  none  Specimens: ulcerated GIST in mid gastric body  Estimated blood loss:  none           Complications:   None; patient tolerated the procedure well. Discharge disposition:   To ICU  Luis Gamez MD

## 2018-12-10 NOTE — ED NOTES
Received report from off-going-shift RN (Ashlee),and assumed care of patient. Call bell with reach. Patient c/o nausea. Provider informed.

## 2018-12-10 NOTE — PROGRESS NOTES
conducted an initial consultation and Spiritual Assessment for Halima Armstrong, who is a 60 y.o.,adult. Patients Primary Language is: [place]. According to the patients EMR Evangelical Affiliation is: [facility].  
 
The reason the Patient came to the hospital is:  
Patient Active Problem List  
 Diagnosis Date Noted  Acute GI bleeding 12/10/2018  Symptomatic anemia 12/10/2018  Chronic anticoagulation 12/10/2018  Permanent atrial fibrillation ([address] 12/10/2018  CMC (carpometacarpal joint) dislocation 12/03/2018  Diabetic polyneuropathy associated with type 2 diabetes mellitus ([address] 10/01/2018  Spondylolisthesis of lumbar region, L5/S1 06/11/2018  Severe obesity (BMI 35.0-39. 9) with comorbidity ([address] 04/25/2018  Lumbar disc disease per abdomen CT findings 11/08/2017  Splenomegaly 11/08/2017  BPH (benign prostatic hyperplasia) 06/09/2017  Renal cyst 06/09/2017  COPD (chronic obstructive pulmonary disease)  06/09/2017  Microalbuminuria 06/09/2017  Tubular adenoma of colon, 6/27/16 06/09/2017  
 KILLIAN (nonalcoholic steatohepatitis) 06/09/2017  Diverticulosis 06/09/2017  Right rotator cuff tendinitis 06/09/2017  Nephrolithiasis 06/09/2017  Essential hypertension 12/07/2016  Persistent atrial fibrillation ([address] 05/09/2016  Well controlled diabetes mellitus ([address] 03/24/2016  Dyslipidemia 03/24/2016  Osteoarthrosis, unspecified whether generalized or localized, lower leg 12/15/2014  FITO treated with BiPAP  Acid reflux The  provided the following Interventions: 
Initiated a relationship of care and support. Explored issues of abel, belief, spirituality and Pentecostal/ritual needs while hospitalized. Listened empathically. Provided information about Spiritual Care Services. Chart reviewed. The following outcomes where achieved: Patient shared limited information about both their medical narrative and spiritual journey/beliefs. Patient expressed gratitude for 's visit. Assessment: 
Patient does not have any Restoration/cultural needs that will affect patients preferences in health care. There are no spiritual or Restoration issues which require intervention at this time. Plan: 
Chaplains will continue to follow and will provide pastoral care on an as needed/requested basis.  recommends bedside caregivers page  on duty if patient shows signs of acute spiritual or emotional distress. Rozann Favre [facility] 
([phone])
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
## 2018-12-10 NOTE — ROUTINE PROCESS
TRANSFER - OUT REPORT: 
 
Verbal report given to Moriah Minor RN(name) on Naomie Burns.  being transferred to MICU room 313 (unit) for urgent transfer for GI bleeding Report consisted of patients Situation, Background, Assessment and  
Recommendations (SBAR). Pt history/allergies/complaints/findings/plan of care reviewed. Information from the following report(s) ED Summary was reviewed with the receiving nurse. Lines:  
Peripheral IV 12/09/18 Right Antecubital (Active) Site Assessment Clean, dry, & intact 12/9/2018 11:19 PM  
Phlebitis Assessment 0 12/9/2018 11:19 PM  
Infiltration Assessment 0 12/9/2018 11:19 PM  
Dressing Status Clean, dry, & intact 12/9/2018 11:19 PM  
Dressing Type Tape;Transparent 12/9/2018 11:19 PM  
Hub Color/Line Status Flushed;Patent 12/9/2018 11:19 PM  
Action Taken Blood drawn 12/9/2018 11:19 PM  
  
 
Opportunity for questions and clarification was provided. Patient transported with: 
 Monitor/nasal cannula/iv fluids/iv medications

## 2018-12-10 NOTE — ED NOTES
Pt is awake/alert/oriented; affect calm/conversant. Respirations regular/non labored. Plan of care/explanation of wait times has been explained/understood. Pt placed on cardiac monitor/continuous pulse oximetry/continuous bp monitor. No active vomiting noted; iv site clear/rails up x 2.
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
## 2018-12-10 NOTE — PROGRESS NOTES
Problem: Falls - Risk of 
Goal: *Absence of Falls Document Niurka Jane Fall Risk and appropriate interventions in the flowsheet. Outcome: Progressing Towards Goal 
Fall Risk Interventions: 
Mobility Interventions: Assess mobility with egress test, Bed/chair exit alarm, Communicate number of staff needed for ambulation/transfer, OT consult for ADLs, PT Consult for mobility concerns, PT Consult for assist device competence Medication Interventions: Assess postural VS orthostatic hypotension, Bed/chair exit alarm, Evaluate medications/consider consulting pharmacy, Patient to call before getting OOB Elimination Interventions: Call light in reach, Urinal in reach History of Falls Interventions: Bed/chair exit alarm, Door open when patient unattended, Evaluate medications/consider consulting pharmacy, Investigate reason for fall, Room close to nurse's station
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
## 2018-12-10 NOTE — ED NOTES
Report given to EMS; pt en route to [address]. No active vomiting/bleeding noted; pt remains awake/alert/oriented/affect calm/conversant/respirations regular/non labored/skin warm and dry. No distress noted. No further needs identified at this time.

## 2018-12-10 NOTE — ROUTINE PROCESS
TRANSFER - IN REPORT: 
 
Verbal report received from [facility]) on [address]  being received from (unit) for ordered procedure Report consisted of patients Situation, Background, Assessment and  
Recommendations(SBAR). Information from the following report(s) SBAR was reviewed with the receiving nurse. Opportunity for questions and clarification was provided. Assessment completed upon patients arrival to unit and care assumed.

## 2018-12-11 LAB
ALBUMIN SERPL-MCNC: 3.6 G/DL (ref 3.4–5)
ALBUMIN/GLOB SERPL: 1.3 {RATIO} (ref 0.8–1.7)
ALP SERPL-CCNC: 34 U/L (ref 45–117)
ALT SERPL-CCNC: 24 U/L (ref 16–61)
ANION GAP SERPL CALC-SCNC: 10 MMOL/L (ref 3–18)
AST SERPL-CCNC: 17 U/L (ref 15–37)
ATRIAL RATE: 99 BPM
BASOPHILS # BLD: 0 K/UL (ref 0–0.06)
BASOPHILS NFR BLD: 0 % (ref 0–3)
BILIRUB SERPL-MCNC: 0.3 MG/DL (ref 0.2–1)
BUN SERPL-MCNC: 75 MG/DL (ref 7–18)
BUN/CREAT SERPL: 45 (ref 12–20)
CALCIUM SERPL-MCNC: 9.2 MG/DL (ref 8.5–10.1)
CALCULATED R AXIS, ECG10: 21 DEGREES
CALCULATED T AXIS, ECG11: -112 DEGREES
CHLORIDE SERPL-SCNC: 110 MMOL/L (ref 100–108)
CO2 SERPL-SCNC: 24 MMOL/L (ref 21–32)
CREAT SERPL-MCNC: 1.68 MG/DL (ref 0.6–1.3)
DIAGNOSIS, 93000: NORMAL
DIFFERENTIAL METHOD BLD: ABNORMAL
EOSINOPHIL # BLD: 0.3 K/UL (ref 0–0.4)
EOSINOPHIL NFR BLD: 3 % (ref 0–5)
ERYTHROCYTE [DISTWIDTH] IN BLOOD BY AUTOMATED COUNT: 14.9 % (ref 11.6–14.5)
ERYTHROCYTE [DISTWIDTH] IN BLOOD BY AUTOMATED COUNT: 15.2 % (ref 11.6–14.5)
GLOBULIN SER CALC-MCNC: 2.7 G/DL (ref 2–4)
GLUCOSE BLD STRIP.AUTO-MCNC: 194 MG/DL (ref 70–110)
GLUCOSE BLD STRIP.AUTO-MCNC: 202 MG/DL (ref 70–110)
GLUCOSE BLD STRIP.AUTO-MCNC: 209 MG/DL (ref 70–110)
GLUCOSE BLD STRIP.AUTO-MCNC: 258 MG/DL (ref 70–110)
GLUCOSE SERPL-MCNC: 169 MG/DL (ref 74–99)
HCT VFR BLD AUTO: 21.3 % (ref 36–48)
HCT VFR BLD AUTO: 23.4 % (ref 36–48)
HGB BLD-MCNC: 6.8 G/DL (ref 13–16)
HGB BLD-MCNC: 7.6 G/DL (ref 13–16)
INR PPP: 1.2 (ref 0.8–1.2)
LYMPHOCYTES # BLD: 2 K/UL (ref 0.8–3.5)
LYMPHOCYTES NFR BLD: 24 % (ref 20–51)
MAGNESIUM SERPL-MCNC: 1.9 MG/DL (ref 1.6–2.6)
MCH RBC QN AUTO: 27.8 PG (ref 24–34)
MCH RBC QN AUTO: 27.9 PG (ref 24–34)
MCHC RBC AUTO-ENTMCNC: 31.9 G/DL (ref 31–37)
MCHC RBC AUTO-ENTMCNC: 32.5 G/DL (ref 31–37)
MCV RBC AUTO: 86 FL (ref 74–97)
MCV RBC AUTO: 86.9 FL (ref 74–97)
MONOCYTES # BLD: 0.3 K/UL (ref 0–1)
MONOCYTES NFR BLD: 4 % (ref 2–9)
NEUTS SEG # BLD: 5.7 K/UL (ref 1.8–8)
NEUTS SEG NFR BLD: 69 % (ref 42–75)
PHOSPHATE SERPL-MCNC: 3.7 MG/DL (ref 2.5–4.9)
PLATELET # BLD AUTO: 218 K/UL (ref 135–420)
PLATELET # BLD AUTO: 273 K/UL (ref 135–420)
PLATELET COMMENTS,PCOM: ABNORMAL
PMV BLD AUTO: 8.8 FL (ref 9.2–11.8)
PMV BLD AUTO: 9.8 FL (ref 9.2–11.8)
POTASSIUM SERPL-SCNC: 3.6 MMOL/L (ref 3.5–5.5)
PROT SERPL-MCNC: 6.3 G/DL (ref 6.4–8.2)
PROTHROMBIN TIME: 14.7 SEC (ref 11.5–15.2)
Q-T INTERVAL, ECG07: 340 MS
QRS DURATION, ECG06: 98 MS
QTC CALCULATION (BEZET), ECG08: 436 MS
RBC # BLD AUTO: 2.45 M/UL (ref 4.7–5.5)
RBC # BLD AUTO: 2.72 M/UL (ref 4.7–5.5)
RBC MORPH BLD: ABNORMAL
RBC MORPH BLD: ABNORMAL
SODIUM SERPL-SCNC: 144 MMOL/L (ref 136–145)
VENTRICULAR RATE, ECG03: 99 BPM
WBC # BLD AUTO: 8.2 K/UL (ref 4.6–13.2)
WBC # BLD AUTO: 8.3 K/UL (ref 4.6–13.2)

## 2018-12-11 PROCEDURE — 93005 ELECTROCARDIOGRAM TRACING: CPT

## 2018-12-11 PROCEDURE — 65660000000 HC RM CCU STEPDOWN

## 2018-12-11 PROCEDURE — 85025 COMPLETE CBC W/AUTO DIFF WBC: CPT

## 2018-12-11 PROCEDURE — 0DB68ZX EXCISION OF STOMACH, VIA NATURAL OR ARTIFICIAL OPENING ENDOSCOPIC, DIAGNOSTIC: ICD-10-PCS | Performed by: INTERNAL MEDICINE

## 2018-12-11 PROCEDURE — 74011250636 HC RX REV CODE- 250/636: Performed by: INTERNAL MEDICINE

## 2018-12-11 PROCEDURE — 36415 COLL VENOUS BLD VENIPUNCTURE: CPT

## 2018-12-11 PROCEDURE — 85610 PROTHROMBIN TIME: CPT

## 2018-12-11 PROCEDURE — 36430 TRANSFUSION BLD/BLD COMPNT: CPT

## 2018-12-11 PROCEDURE — 74011250637 HC RX REV CODE- 250/637: Performed by: EMERGENCY MEDICINE

## 2018-12-11 PROCEDURE — 82962 GLUCOSE BLOOD TEST: CPT

## 2018-12-11 PROCEDURE — 83735 ASSAY OF MAGNESIUM: CPT

## 2018-12-11 PROCEDURE — 80053 COMPREHEN METABOLIC PANEL: CPT

## 2018-12-11 PROCEDURE — C9113 INJ PANTOPRAZOLE SODIUM, VIA: HCPCS | Performed by: INTERNAL MEDICINE

## 2018-12-11 PROCEDURE — 84100 ASSAY OF PHOSPHORUS: CPT

## 2018-12-11 PROCEDURE — 74011636637 HC RX REV CODE- 636/637: Performed by: EMERGENCY MEDICINE

## 2018-12-11 PROCEDURE — 94640 AIRWAY INHALATION TREATMENT: CPT

## 2018-12-11 PROCEDURE — 74011250637 HC RX REV CODE- 250/637: Performed by: PHYSICIAN ASSISTANT

## 2018-12-11 PROCEDURE — 5A09358 ASSISTANCE WITH RESPIRATORY VENTILATION, LESS THAN 24 CONSECUTIVE HOURS, INTERMITTENT POSITIVE AIRWAY PRESSURE: ICD-10-PCS | Performed by: EMERGENCY MEDICINE

## 2018-12-11 PROCEDURE — P9016 RBC LEUKOCYTES REDUCED: HCPCS

## 2018-12-11 PROCEDURE — 74011250637 HC RX REV CODE- 250/637: Performed by: INTERNAL MEDICINE

## 2018-12-11 PROCEDURE — 85027 COMPLETE CBC AUTOMATED: CPT

## 2018-12-11 PROCEDURE — 74011000250 HC RX REV CODE- 250: Performed by: INTERNAL MEDICINE

## 2018-12-11 RX ORDER — SODIUM CHLORIDE 9 MG/ML
250 INJECTION, SOLUTION INTRAVENOUS AS NEEDED
Status: DISCONTINUED | OUTPATIENT
Start: 2018-12-11 | End: 2018-12-17

## 2018-12-11 RX ORDER — GABAPENTIN 300 MG/1
300 CAPSULE ORAL 2 TIMES DAILY
Status: DISCONTINUED | OUTPATIENT
Start: 2018-12-11 | End: 2018-12-21 | Stop reason: HOSPADM

## 2018-12-11 RX ORDER — BUPROPION HYDROCHLORIDE 150 MG/1
150 TABLET, EXTENDED RELEASE ORAL DAILY
Status: DISCONTINUED | OUTPATIENT
Start: 2018-12-11 | End: 2018-12-21 | Stop reason: HOSPADM

## 2018-12-11 RX ORDER — METOPROLOL TARTRATE 25 MG/1
12.5 TABLET, FILM COATED ORAL 2 TIMES DAILY
Status: DISCONTINUED | OUTPATIENT
Start: 2018-12-11 | End: 2018-12-21 | Stop reason: HOSPADM

## 2018-12-11 RX ORDER — INSULIN GLARGINE 100 [IU]/ML
17 INJECTION, SOLUTION SUBCUTANEOUS
Status: DISCONTINUED | OUTPATIENT
Start: 2018-12-11 | End: 2018-12-13

## 2018-12-11 RX ADMIN — BUDESONIDE AND FORMOTEROL FUMARATE DIHYDRATE 2 PUFF: 160; 4.5 AEROSOL RESPIRATORY (INHALATION) at 20:19

## 2018-12-11 RX ADMIN — SODIUM CHLORIDE 40 MG: 9 INJECTION, SOLUTION INTRAMUSCULAR; INTRAVENOUS; SUBCUTANEOUS at 08:31

## 2018-12-11 RX ADMIN — CLOTRIMAZOLE: 1 CREAM TOPICAL at 08:30

## 2018-12-11 RX ADMIN — GABAPENTIN 300 MG: 300 CAPSULE ORAL at 12:37

## 2018-12-11 RX ADMIN — GABAPENTIN 300 MG: 300 CAPSULE ORAL at 17:51

## 2018-12-11 RX ADMIN — ROSUVASTATIN CALCIUM 20 MG: 20 TABLET, FILM COATED ORAL at 21:21

## 2018-12-11 RX ADMIN — INSULIN LISPRO 6 UNITS: 100 INJECTION, SOLUTION INTRAVENOUS; SUBCUTANEOUS at 22:16

## 2018-12-11 RX ADMIN — BUPROPION HYDROCHLORIDE 150 MG: 150 TABLET, EXTENDED RELEASE ORAL at 12:37

## 2018-12-11 RX ADMIN — ACETAMINOPHEN 650 MG: 325 TABLET ORAL at 12:39

## 2018-12-11 RX ADMIN — INSULIN LISPRO 3 UNITS: 100 INJECTION, SOLUTION INTRAVENOUS; SUBCUTANEOUS at 17:52

## 2018-12-11 RX ADMIN — METOPROLOL TARTRATE 12.5 MG: 25 TABLET ORAL at 10:37

## 2018-12-11 RX ADMIN — INSULIN LISPRO 9 UNITS: 100 INJECTION, SOLUTION INTRAVENOUS; SUBCUTANEOUS at 08:31

## 2018-12-11 RX ADMIN — BUDESONIDE AND FORMOTEROL FUMARATE DIHYDRATE 2 PUFF: 160; 4.5 AEROSOL RESPIRATORY (INHALATION) at 07:40

## 2018-12-11 RX ADMIN — METOPROLOL TARTRATE 12.5 MG: 25 TABLET ORAL at 17:51

## 2018-12-11 RX ADMIN — INSULIN GLARGINE 17 UNITS: 100 INJECTION, SOLUTION SUBCUTANEOUS at 22:18

## 2018-12-11 RX ADMIN — CLOTRIMAZOLE: 1 CREAM TOPICAL at 17:52

## 2018-12-11 RX ADMIN — INSULIN LISPRO 6 UNITS: 100 INJECTION, SOLUTION INTRAVENOUS; SUBCUTANEOUS at 12:37

## 2018-12-11 RX ADMIN — SODIUM CHLORIDE 40 MG: 9 INJECTION, SOLUTION INTRAMUSCULAR; INTRAVENOUS; SUBCUTANEOUS at 22:19

## 2018-12-11 NOTE — PROGRESS NOTES
Problem: Falls - Risk of 
Goal: *Absence of Falls Document [facility] Fall Risk and appropriate interventions in the flowsheet. Outcome: Progressing Towards Goal 
Fall Risk Interventions: 
Mobility Interventions: Assess mobility with egress test, Bed/chair exit alarm, Communicate number of staff needed for ambulation/transfer, OT consult for ADLs, PT Consult for mobility concerns, PT Consult for assist device competence Medication Interventions: Assess postural VS orthostatic hypotension, Bed/chair exit alarm, Evaluate medications/consider consulting pharmacy, Patient to call before getting OOB Elimination Interventions: Call light in reach, Urinal in reach History of Falls Interventions: Bed/chair exit alarm, Door open when patient unattended, Evaluate medications/consider consulting pharmacy, Investigate reason for fall, Room close to nurse's station

## 2018-12-11 NOTE — PROGRESS NOTES
[email or web address]  [phone]    Gastroenterology follow up-Progress note    Impression:  1. 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 Acute anemia due to #1  3. Afib - on Xarelto  4. Hx GERD  5. DM  6. COPD  7. S/p R metacarpal fx repair 10/4/2018  8. Obesity      Plan:  1. Per surgery resection scheduled for 12/13/2018  2. Continue to hold Xarelto if ok with cardiology  3. Monitor h/h, transfuse if hgb < 7.0  4. Diet per surgery/primary team      Chief Complaint: UGI bleed      Subjective:  Vomiting resolved, no abdominal pain. Notes slight improvement in melena      Eyes: conjunctiva normal, EOM normal   Neck: ROM normal, supple and trachea normal   Cardiovascular: heart normal, intact distal pulses, normal rate and regular rhythm   Pulmonary/Chest Wall: breath sounds normal and effort normal   Abdominal: obese, bowel sounds normal and soft, non-acute, non-tender     Patient Active Problem List   Diagnosis Code    FITO treated with BiPAP G47.33    Acid reflux K21.9    Osteoarthrosis, unspecified whether generalized or localized, lower leg M17.10    Well controlled diabetes mellitus ([address] E11.9    Dyslipidemia E78.5    Persistent atrial fibrillation ([address] I48.1    Essential hypertension I10    BPH (benign prostatic hyperplasia) N40.0    Renal cyst N28.1    COPD (chronic obstructive pulmonary disease)  J44.9    Microalbuminuria R80.9    Tubular adenoma of colon, 6/27/16 D12.6    KILLIAN (nonalcoholic steatohepatitis) K75.81    Diverticulosis K57.90    Right rotator cuff tendinitis M75.81    Nephrolithiasis N20.0    Lumbar disc disease per abdomen CT findings M51.9    Splenomegaly R16.1    Severe obesity (BMI 35.0-39. 9) with comorbidity ([address] E66.01    Spondylolisthesis of lumbar region, L5/S1 M43.16    Diabetic polyneuropathy associated with type 2 diabetes mellitus (HCC) E11.42    CMC (carpometacarpal joint) dislocation W92.220L    Acute GI bleeding K92.2    Symptomatic anemia D64.9    Chronic anticoagulation Z79.01    Permanent atrial fibrillation (HCC) I48.2         Visit Vitals  /69 (BP 1 Location: Left arm, BP Patient Position: At rest)   Pulse 85   Temp 98.1 °F (36.7 °C)   Resp 18   Wt 122.5 kg (270 lb)   SpO2 98%   BMI 37.66 kg/m²         No intake or output data in the 24 hours ending 12/11/18 1251    CBC w/Diff    Lab Results   Component Value Date/Time    WBC 8.3 12/11/2018 08:55 AM    RBC 2.45 (L) 12/11/2018 08:55 AM    HGB 6.8 (L) 12/11/2018 08:55 AM    HCT 21.3 (L) 12/11/2018 08:55 AM    MCV 86.9 12/11/2018 08:55 AM    MCH 27.8 12/11/2018 08:55 AM    MCHC 31.9 12/11/2018 08:55 AM    RDW 15.2 (H) 12/11/2018 08:55 AM     12/11/2018 08:55 AM    Lab Results   Component Value Date/Time    GRANS 69 12/11/2018 08:55 AM    LYMPH 24 12/11/2018 08:55 AM    EOS 3 12/11/2018 08:55 AM    BASOS 0 12/11/2018 08:55 AM      Basic Metabolic Profile   Recent Labs     12/11/18  0133      K 3.6   *   CO2 24   BUN 75*   CA 9.2   MG 1.9   PHOS 3.7        Hepatic Function    Lab Results   Component Value Date/Time    ALB 3.6 12/11/2018 01:33 AM    TP 6.3 (L) 12/11/2018 01:33 AM    AP 34 (L) 12/11/2018 01:33 AM    Lab Results   Component Value Date/Time    SGOT 17 12/11/2018 01:33 AM          Coags   Recent Labs     12/11/18  0133 12/10/18  1018 12/09/18  2300   PTP 14.7 14.6 16.7*   INR 1.2 1.2 1.4*   APTT  --  24.1 30.9               CARLINE Ervin    [facility]. [email or web address]  Phone: [phone]  Pager: [phone]

## 2018-12-11 NOTE — DIABETES MGMT
Glycemic Control Plan of Care    T2DM with current A1c of 7.4% (12/10/2018). See separate notes, 12/11/2018, for assessment of home diabetes management and education. Home diabetes meds: Levemir insulin 28 units daily every morning, novolog sliding scale insulin, and Janumet  mg twice daily with meals. POC BG rang on 12/10/2018: 166-349 mg/dL. POC BG report on 12/11/2018 at time of review: 258, 209 mg/dL. Recommendation(s):  1.) Consider increasing basal lantus insulin dose to 18 units daily. 2.) Modified clear liquid diet by adding no concentrated sweets. Assessment:  Patient is 60year old with past medical history including type 2 diabetes mellitus with polyneuropathy, COPD, atrial fibrillation, dyslipidemia, obesity, and recent ORIF right hand. He took extra naprosyn instead of narcotic - was admitted on 12/09/2018 with report of vomiting, diarrhea, abdominal pain, and hemetemesis. Noted:  Status post EGD on 12/10/2018: ulcerated GIST lesion. T2DM with current A1c of 7.4% (12/102018). Most recent blood glucose values:    Results for Miguel Angel Gaitan (MRN [id number]) as of 12/11/2018 11:40   Ref. Range 12/10/2018 05:43 12/10/2018 11:29 12/10/2018 12:11 12/10/2018 18:36 12/10/2018 21:13   GLUCOSE,FAST - POC Latest Ref Range: 70 - 110 mg/dL 349 (H) 219 (H) 259 (H) 322 (H) 166 (H)     Results for Miguel Agnel Gaitan (MRN [id number]) as of 12/11/2018 11:40   Ref. Range 12/11/2018 07:57 12/11/2018 11:36   GLUCOSE,FAST - POC Latest Ref Range: 70 - 110 mg/dL 258 (H) 209 (H)     Current A1C: 7.4% (12/10/2018) which is equivalent to estimated average blood glucose of 166 mg/dL during the past 2-3 months. Current hospital diabetes medications:  Basal lantus insulin 14 units daily at bedtime. Correctional lispro insulin ACHS. Very resistant dose. Total daily dose insulin requirement previous day: 12/10/2018  Lantus: 14 units  Lispro: 29 units (correctional).   TDD: 43 units of insulin    Home diabetes medications: Reported by patient on 12/10/2018:  Levemir insulin 28 units daily every morning. Humalog sliding scale insulin. Janumet  mg twice daily with meals. Diet:     Goals:  Blood glucose will be within target range of  mg/dL by 12/14/2018.     Education:  _X__  Refer to Diabetes Education Record: 12/11/2018             ___  Education not indicated at this time    Madhu Rodgers RN [facility]  Pager: [phone]

## 2018-12-11 NOTE — PROGRESS NOTES
Bedside shift change report given to this rn (oncoming nurse) by Felicia Merrill RN (offgoing nurse). Report included the following information SBAR, Kardex and Cardiac Rhythm afib. Pt A&OX4, Pt continues clear liquid diet. Pt planned for surgery on Thursday. Instructed to use call light when needs anything and will continue to monitor. Dr Shikha Braun informed of HGB of 6.8, no new orders at this time, will continue to monitor. Pt transfused with 1unit prbcs and will continue to monitor.

## 2018-12-11 NOTE — DIABETES MGMT
Diabetes Patient/Family Education Record  Factors That  May Influence Patients Ability  to Learn or  Comply with Recommendations   []   Language barrier    []   Cultural needs   []   Motivation    []   Cognitive limitation    []   Physical   [x]   Education    []   Physiological factors   []   Hearing/vision/speaking impairment   []   Faith beliefs    []   Financial factors   []  Other:   []  No factors identified at this time. Person Instructed:   [x]   Patient   [x]   Family: Retired cousin who was a registered nurse was visiting and she actively participated participated during education to continue to help patient. []  Other     Preference for Learning:   [x]   Verbal   []   Written   []  Demonstration     Level of Comprehension & Competence:    [x]  Good                                      [] Fair                                     []  Poor                             []  Needs Reinforcement   [x]  Teachback completed    Education Component:   [x]  Medication management, including how to administer insulin (if appropriate) and potential medication interactions: Yes. Patient reported his home diabetes medications and her verbalized understanding about action of meds:  Levemir 28 units daily every morning: long acting insulin taken daily at same time. Novolog sliding scale insulin: fast acting insulin must eat within 15 minutes after taking this insulin to help prevent low blood sugar. Janumet  mg twice daily taken before meals: patient verbalized understanding that this is two medications in one pill. [x]  Nutritional management -obtain usual meal pattern: Yes. Patient reported that he is actually eating better now since his daughter moved in to stay with her. Daughter and cousin who is a retired nurse are both helping patient to eat better including serving size/portion control of carbs (starches, fruits, dairy) and limiting intake of concentrated sweets.    [x] Exercise: Yes. See notes below under the subject of A1c. [x]  Signs, symptoms, and treatment of hyperglycemia and hypoglycemia: Yes. Patient verbalized understanding of high blood sugar, symptoms, causes, treatment, prevention, and when to contact his medical provide, or call for emergency medical help for persistently elevated high blood sugar. [x] Prevention, recognition and treatment of hyperglycemia and hypoglycemia: Yes. Patient verbalized understanding about low blood sugar, symptoms, causes, treatment, prevention, and when to contact his medical provider or call 911 for emergency medical help if unable to treat low blood sugar above 70. [x]  Importance of blood glucose monitoring and how to obtain a blood glucose meter: Yes. Patient reported that he has BG meter and testing supplies. He is monitoring his blood sugar at least 3x daily because he is on sliding scale insulin before each meal (breakfast, lunch, dinner). []  Instruction on use of the blood glucose meter   [x]  Discuss the importance of HbA1C monitoring: Yes, Patient verbalized understanding about A1c level and current A1c of 7.4% (12/10/2018) which is equivalent to estimated average blood glucose of 166 mg/dL during the past 2-3 months. Patient verbalized understanding that his current A1c slightly above the recommended level of <7%. Encouraged patient to continue to follow his diabetes treatment plan: medications, diet, and recommended regular exercise activity by his medical provider. Patient reported eating better now and exercising regularly since his daughter recently moved to stay with him. [x]  Sick day guidelines: Yes. Patient verbalized understanding about the recommendation to continue taking diabetes medications when sick or ill because blood sugar will be running high.  Call medical provider when blood sugar is 250 and above for further assistance, or get medical help when blood sugar is 300 and above with symptoms including nausea, vomiting, abd pain, confusion, fruity breath odor. [x]  Proper use and disposal of lancets, needles, syringes or insulin pens (if appropriate): Yes. [x]  Potential long-term complications (retinopathy, kidney disease, neuropathy, foot care): Yes. [x] Information about whom to contact in case of emergency or for more information: Yes. [x]  Goal:  Patient/family will demonstrate understanding of Diabetes Self Management Skills by: 12/18/2018  Plan for post-discharge education or self-management support:    [x] Outpatient class schedule provided            [] Patient Declined    [] Scheduled for outpatient classes (date) _______  Verify:  Does patient understand how diabetes medications work? Yes. Does patient know what their most recent A1c is? Yes. Does patient monitor glucose at home? Yes. Does patient have a glucometer, testing supplies or difficulty obtaining diabetes medications? See notes below. Does patient have BG meter and testing supplies? Yes. Does patient have difficulty obtaining testing supplies and diabetes medications?  No.     Charisse Prescott RN  [facility] [phone]

## 2018-12-11 NOTE — ROUTINE PROCESS
1900 Bedside and Verbal shift change  Received from Larry Chang RN (outgoing nurse), to DOMINICK Wilder (oncoming)  Pt. Is AOX 4. IV SL, Pt. denies  pain at this time. Report included the following information SBAR, Kardex, Procedure Summary, Intake/Output, MAR, Recent Lab Results, and  Cardiac Rhythm @ Afib. Will resume care and monitor Pt. Condition. Pt. Educated on call bell when in need of help and assistance. Pt. verbalized understanding. Pt. Walking in room. 2000 Pt. Head to toe Assessment Done and documented. 2100  Pt made no complaints. 2200 Pt denies pain. 0000  Pt resting comfortably, no sign of distress. 0200  Pt. OOB to bedroom, no complaints made. 0400  Pt. Denies pain. 0600  Pt. Able to rest well throughout the shift. Verbal and bedside Shift changed report given to Jayna Lucero RN (oncoming RN) on Pt. Condition. Report consisted of patients Situation, History, Activities, intake/output,  Background, Assessment and Recommendations(SBAR). Information from the following report(s) Kardex, order Summary, Lab results and MAR was reviewed with the receiving nurse. Opportunity for questions and clarification was provided.
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
## 2018-12-12 ENCOUNTER — APPOINTMENT (OUTPATIENT)
Dept: NON INVASIVE DIAGNOSTICS | Age: 58
DRG: 326 | End: 2018-12-12
Attending: PHYSICIAN ASSISTANT
Payer: COMMERCIAL

## 2018-12-12 LAB
ANION GAP SERPL CALC-SCNC: 8 MMOL/L (ref 3–18)
BASOPHILS # BLD: 0 K/UL (ref 0–0.1)
BASOPHILS NFR BLD: 0 % (ref 0–2)
BUN SERPL-MCNC: 48 MG/DL (ref 7–18)
BUN/CREAT SERPL: 36 (ref 12–20)
CALCIUM SERPL-MCNC: 9 MG/DL (ref 8.5–10.1)
CHLORIDE SERPL-SCNC: 110 MMOL/L (ref 100–108)
CO2 SERPL-SCNC: 25 MMOL/L (ref 21–32)
CREAT SERPL-MCNC: 1.34 MG/DL (ref 0.6–1.3)
DIFFERENTIAL METHOD BLD: ABNORMAL
ECHO AO ROOT DIAM: 3.29 CM
ECHO LA VOL BP: 95.11 ML (ref 18–58)
ECHO LA VOL/BSA BIPLANE: 39.7 ML/M2 (ref 16–28)
EOSINOPHIL # BLD: 0.2 K/UL (ref 0–0.4)
EOSINOPHIL NFR BLD: 3 % (ref 0–5)
ERYTHROCYTE [DISTWIDTH] IN BLOOD BY AUTOMATED COUNT: 15 % (ref 11.6–14.5)
ERYTHROCYTE [DISTWIDTH] IN BLOOD BY AUTOMATED COUNT: 15 % (ref 11.6–14.5)
ERYTHROCYTE [DISTWIDTH] IN BLOOD BY AUTOMATED COUNT: 15.1 % (ref 11.6–14.5)
ERYTHROCYTE [DISTWIDTH] IN BLOOD BY AUTOMATED COUNT: 15.2 % (ref 11.6–14.5)
GLUCOSE BLD STRIP.AUTO-MCNC: 158 MG/DL (ref 70–110)
GLUCOSE BLD STRIP.AUTO-MCNC: 181 MG/DL (ref 70–110)
GLUCOSE BLD STRIP.AUTO-MCNC: 194 MG/DL (ref 70–110)
GLUCOSE BLD STRIP.AUTO-MCNC: 229 MG/DL (ref 70–110)
GLUCOSE SERPL-MCNC: 149 MG/DL (ref 74–99)
HCT VFR BLD AUTO: 22.4 % (ref 36–48)
HCT VFR BLD AUTO: 23.4 % (ref 36–48)
HCT VFR BLD AUTO: 23.5 % (ref 36–48)
HCT VFR BLD AUTO: 24.1 % (ref 36–48)
HGB BLD-MCNC: 7.2 G/DL (ref 13–16)
HGB BLD-MCNC: 7.3 G/DL (ref 13–16)
HGB BLD-MCNC: 7.6 G/DL (ref 13–16)
HGB BLD-MCNC: 7.7 G/DL (ref 13–16)
INR PPP: 1.2 (ref 0.8–1.2)
LYMPHOCYTES # BLD: 1.7 K/UL (ref 0.9–3.6)
LYMPHOCYTES NFR BLD: 26 % (ref 21–52)
MCH RBC QN AUTO: 27.2 PG (ref 24–34)
MCH RBC QN AUTO: 27.8 PG (ref 24–34)
MCH RBC QN AUTO: 28 PG (ref 24–34)
MCH RBC QN AUTO: 28.5 PG (ref 24–34)
MCHC RBC AUTO-ENTMCNC: 31.2 G/DL (ref 31–37)
MCHC RBC AUTO-ENTMCNC: 32 G/DL (ref 31–37)
MCHC RBC AUTO-ENTMCNC: 32.1 G/DL (ref 31–37)
MCHC RBC AUTO-ENTMCNC: 32.3 G/DL (ref 31–37)
MCV RBC AUTO: 86.5 FL (ref 74–97)
MCV RBC AUTO: 87.3 FL (ref 74–97)
MCV RBC AUTO: 87.6 FL (ref 74–97)
MCV RBC AUTO: 88 FL (ref 74–97)
MONOCYTES # BLD: 0.7 K/UL (ref 0.05–1.2)
MONOCYTES NFR BLD: 10 % (ref 3–10)
NEUTS SEG # BLD: 4 K/UL (ref 1.8–8)
NEUTS SEG NFR BLD: 61 % (ref 40–73)
PLATELET # BLD AUTO: 199 K/UL (ref 135–420)
PLATELET # BLD AUTO: 201 K/UL (ref 135–420)
PLATELET # BLD AUTO: 207 K/UL (ref 135–420)
PLATELET # BLD AUTO: 209 K/UL (ref 135–420)
PMV BLD AUTO: 9.1 FL (ref 9.2–11.8)
PMV BLD AUTO: 9.4 FL (ref 9.2–11.8)
PMV BLD AUTO: 9.5 FL (ref 9.2–11.8)
PMV BLD AUTO: 9.6 FL (ref 9.2–11.8)
POTASSIUM SERPL-SCNC: 3.6 MMOL/L (ref 3.5–5.5)
PROTHROMBIN TIME: 15.3 SEC (ref 11.5–15.2)
RBC # BLD AUTO: 2.59 M/UL (ref 4.7–5.5)
RBC # BLD AUTO: 2.67 M/UL (ref 4.7–5.5)
RBC # BLD AUTO: 2.68 M/UL (ref 4.7–5.5)
RBC # BLD AUTO: 2.75 M/UL (ref 4.7–5.5)
SODIUM SERPL-SCNC: 143 MMOL/L (ref 136–145)
WBC # BLD AUTO: 6.4 K/UL (ref 4.6–13.2)
WBC # BLD AUTO: 6.4 K/UL (ref 4.6–13.2)
WBC # BLD AUTO: 6.5 K/UL (ref 4.6–13.2)
WBC # BLD AUTO: 6.6 K/UL (ref 4.6–13.2)

## 2018-12-12 PROCEDURE — 85025 COMPLETE CBC W/AUTO DIFF WBC: CPT

## 2018-12-12 PROCEDURE — C9113 INJ PANTOPRAZOLE SODIUM, VIA: HCPCS | Performed by: INTERNAL MEDICINE

## 2018-12-12 PROCEDURE — 65660000000 HC RM CCU STEPDOWN

## 2018-12-12 PROCEDURE — 94640 AIRWAY INHALATION TREATMENT: CPT

## 2018-12-12 PROCEDURE — 74011250636 HC RX REV CODE- 250/636: Performed by: INTERNAL MEDICINE

## 2018-12-12 PROCEDURE — 80048 BASIC METABOLIC PNL TOTAL CA: CPT

## 2018-12-12 PROCEDURE — 74011250637 HC RX REV CODE- 250/637: Performed by: PHYSICIAN ASSISTANT

## 2018-12-12 PROCEDURE — 74011250637 HC RX REV CODE- 250/637: Performed by: INTERNAL MEDICINE

## 2018-12-12 PROCEDURE — 93306 TTE W/DOPPLER COMPLETE: CPT

## 2018-12-12 PROCEDURE — 74011000258 HC RX REV CODE- 258: Performed by: EMERGENCY MEDICINE

## 2018-12-12 PROCEDURE — 74011000250 HC RX REV CODE- 250: Performed by: INTERNAL MEDICINE

## 2018-12-12 PROCEDURE — 36415 COLL VENOUS BLD VENIPUNCTURE: CPT

## 2018-12-12 PROCEDURE — 74011250636 HC RX REV CODE- 250/636: Performed by: EMERGENCY MEDICINE

## 2018-12-12 PROCEDURE — 82962 GLUCOSE BLOOD TEST: CPT

## 2018-12-12 PROCEDURE — 74011250637 HC RX REV CODE- 250/637: Performed by: EMERGENCY MEDICINE

## 2018-12-12 PROCEDURE — 85610 PROTHROMBIN TIME: CPT

## 2018-12-12 PROCEDURE — 74011636637 HC RX REV CODE- 636/637: Performed by: EMERGENCY MEDICINE

## 2018-12-12 PROCEDURE — 85027 COMPLETE CBC AUTOMATED: CPT

## 2018-12-12 RX ADMIN — METOPROLOL TARTRATE 12.5 MG: 25 TABLET ORAL at 17:26

## 2018-12-12 RX ADMIN — BUDESONIDE AND FORMOTEROL FUMARATE DIHYDRATE 2 PUFF: 160; 4.5 AEROSOL RESPIRATORY (INHALATION) at 10:59

## 2018-12-12 RX ADMIN — INSULIN LISPRO 3 UNITS: 100 INJECTION, SOLUTION INTRAVENOUS; SUBCUTANEOUS at 12:06

## 2018-12-12 RX ADMIN — GABAPENTIN 300 MG: 300 CAPSULE ORAL at 08:41

## 2018-12-12 RX ADMIN — ROSUVASTATIN CALCIUM 20 MG: 20 TABLET, FILM COATED ORAL at 22:06

## 2018-12-12 RX ADMIN — BUPROPION HYDROCHLORIDE 150 MG: 150 TABLET, EXTENDED RELEASE ORAL at 08:40

## 2018-12-12 RX ADMIN — INSULIN LISPRO 6 UNITS: 100 INJECTION, SOLUTION INTRAVENOUS; SUBCUTANEOUS at 08:41

## 2018-12-12 RX ADMIN — GABAPENTIN 300 MG: 300 CAPSULE ORAL at 17:26

## 2018-12-12 RX ADMIN — IRON SUCROSE 200 MG: 20 INJECTION, SOLUTION INTRAVENOUS at 17:26

## 2018-12-12 RX ADMIN — METOPROLOL TARTRATE 12.5 MG: 25 TABLET ORAL at 08:40

## 2018-12-12 RX ADMIN — SODIUM CHLORIDE 40 MG: 9 INJECTION, SOLUTION INTRAMUSCULAR; INTRAVENOUS; SUBCUTANEOUS at 22:05

## 2018-12-12 RX ADMIN — ACETAMINOPHEN 650 MG: 325 TABLET ORAL at 17:30

## 2018-12-12 RX ADMIN — INSULIN LISPRO 3 UNITS: 100 INJECTION, SOLUTION INTRAVENOUS; SUBCUTANEOUS at 22:21

## 2018-12-12 RX ADMIN — SODIUM CHLORIDE 40 MG: 9 INJECTION, SOLUTION INTRAMUSCULAR; INTRAVENOUS; SUBCUTANEOUS at 08:41

## 2018-12-12 RX ADMIN — INSULIN LISPRO 3 UNITS: 100 INJECTION, SOLUTION INTRAVENOUS; SUBCUTANEOUS at 17:27

## 2018-12-12 RX ADMIN — INSULIN GLARGINE 17 UNITS: 100 INJECTION, SOLUTION SUBCUTANEOUS at 22:19

## 2018-12-12 RX ADMIN — CLOTRIMAZOLE: 1 CREAM TOPICAL at 18:00

## 2018-12-12 RX ADMIN — CLOTRIMAZOLE: 1 CREAM TOPICAL at 09:00

## 2018-12-12 RX ADMIN — BUDESONIDE AND FORMOTEROL FUMARATE DIHYDRATE 2 PUFF: 160; 4.5 AEROSOL RESPIRATORY (INHALATION) at 20:53

## 2018-12-12 NOTE — DIABETES MGMT
Glycemic Control Plan of Care    T2DM with current A1c of 7.4% (12/10/2018). See separate notes, 12/11/2018, for assessment of home diabetes management and education. Home diabetes meds: Levemir insulin 28 units daily every morning, novolog sliding scale insulin, and Janumet  mg twice daily with meals. POC BG rang on 12/11/2018: 194-258 mg/dL. Increased basal lantus insulin to 17 units QHS. POC BG report on 12/12/2018 at time of review: 229, 181 mg/dL. Patient received a total of 41 units of insulin on 12/11/2018: lantus and lispro. Patient to echo. Recommendation(s):  1.) Consider increasing basal lantus insulin dose to 20 units daily at bedtime if BG remain above target range. Assessment:  Patient is 60year old with past medical history including type 2 diabetes mellitus with polyneuropathy, COPD, atrial fibrillation, dyslipidemia, obesity, and recent ORIF right hand. He took extra naprosyn instead of narcotic - was admitted on 12/09/2018 with report of vomiting, diarrhea, abdominal pain, and hemetemesis. Noted:  Status post EGD on 12/10/2018: ulcerated GIST lesion. T2DM with current A1c of 7.4% (12/102018). Most recent blood glucose values:    Results for Murphy Negrete (MRN [id number]) as of 12/12/2018 14:15   Ref. Range 12/11/2018 07:57 12/11/2018 11:36 12/11/2018 16:21 12/11/2018 22:03   GLUCOSE,FAST - POC Latest Ref Range: 70 - 110 mg/dL 258 (H) 209 (H) 194 (H) 202 (H)     Results for Murphy Negrete (MRN [id number]) as of 12/12/2018 14:15   Ref. Range 12/12/2018 11:37   GLUCOSE,FAST - POC Latest Ref Range: 70 - 110 mg/dL 181 (H)     Current A1C: 7.4% (12/10/2018) which is equivalent to estimated average blood glucose of 166 mg/dL during the past 2-3 months. Current hospital diabetes medications:  Basal lantus insulin 17 units daily at bedtime. Correctional lispro insulin ACHS. Very resistant dose.     Total daily dose insulin requirement previous day: 12/11/2018  Lantus: 17 units  Lispro: 24 units (correctional). TDD: 41 units of insulin    Home diabetes medications: Reported by patient on 12/10/2018:  Levemir insulin 28 units daily every morning. Humalog sliding scale insulin. Janumet  mg twice daily with meals. Diet: Clear liquid; no concentrated sweets. Goals:  Blood glucose will be within target range of  mg/dL by 12/15/2018.     Education:  _X__  Refer to Diabetes Education Record: 12/11/2018             ___  Education not indicated at this time    Ron Mcclure RN [facility]  Pager: [phone]

## 2018-12-12 NOTE — PROGRESS NOTES
[facility] - Progress Note  Admit Date: 12/9/2018    Assessment:     Hospital Problems  Date Reviewed: 11/28/2018          Codes Class Noted POA    * (Principal) Acute GI bleeding ICD-10-CM: K92.2  ICD-9-CM: 578.9  12/10/2018 Unknown        Symptomatic anemia ICD-10-CM: D64.9  ICD-9-CM: 285.9  12/10/2018 Unknown        Chronic anticoagulation ICD-10-CM: Z79.01  ICD-9-CM: V58.61  12/10/2018 Unknown        Permanent atrial fibrillation (HCC) ICD-10-CM: I48.2  ICD-9-CM: 427.31  12/10/2018 Unknown        COPD (chronic obstructive pulmonary disease)  ICD-10-CM: J44.9  ICD-9-CM: 028  6/9/2017 Yes        Essential hypertension ICD-10-CM: I10  ICD-9-CM: 401.9  12/7/2016 Yes                -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 Presented with coffee ground emesis and dark diarrhea in the setting of Xarelto (last dose 12/9/2018 AM). Reasonable risk from cardiac standpoint for planned surgical procedure.  -Acute blood loss anemia.  -Acute kidney injury.  -Recent hand fracture from mechanical fall requiring surgery.   -Persistent atrial fibrillation. Rate controlled with metoprolol and anticoagulated with Xarelto as outpatient.  -Hypertension. Low normal this admission. On metoprolol, amlodipine, losartan, and HCTZ as outpatient. Normal LV function EF 55-60% with moderate LVH by echo 2016.   -Diabetes mellitus. Hgb A1c 7.4.  -Dyslipidemia. On statin. -Mild nonobstructive CAD on cath 2010. -FITO on Bipap.     Primary cardiologist Dr. Cirilo Peoples. Plan: Will review echocardiogram once complete, techs to complete today. Tentative plan for surgery tomorrow. Hemodynamics stable, continued on low dose lopressor. Anticoagulation remains on hold. Cardiac status stable. Subjective:     No CP.  No SOb    Objective:      Patient Vitals for the past 8 hrs:   Temp Pulse Resp BP SpO2   12/12/18 1119 98.2 °F (36.8 °C) 83 18 149/82 100 %   12/12/18 1100     98 % 12/12/18 0752 98.1 °F (36.7 °C) 94 18 161/81 97 %         Patient Vitals for the past 96 hrs:   Weight   12/09/18 2239 122.5 kg (270 lb)                    Intake/Output Summary (Last 24 hours) at 12/12/2018 1225  Last data filed at 12/12/2018 8244  Gross per 24 hour   Intake 1855 ml   Output    Net 1855 ml       Physical Exam:  General:  alert, cooperative, no distress, appears stated age  Neck:  no JVD  Lungs:  clear to auscultation bilaterally  Heart:  irregularly irregular rhythm  Abdomen:  abdomen is soft without significant tenderness, masses, organomegaly or guarding  Extremities:  extremities normal, atraumatic, no cyanosis or edema    Data Review:     Labs: Results:       Chemistry Recent Labs     12/12/18  0313 12/11/18  0133 12/10/18  0400 12/09/18  2300   * 169* 346* 355*    144 140 137   K 3.6 3.6 4.5 5.0   * 110* 107 101   CO2 25 24 23 25   BUN 48* 75* 97* 92*   CREA 1.34* 1.68* 1.69* 1.99*   CA 9.0 9.2 9.4 10.0   MG  --  1.9 1.8  --    PHOS  --  3.7 2.5  --    AGAP 8 10 10 11   BUCR 36* 45* 57* 46*   AP  --  34*  --  53   TP  --  6.3*  --  6.3*   ALB  --  3.6  --  3.4   GLOB  --  2.7  --  2.9   AGRAT  --  1.3  --  1.2      CBC w/Diff Recent Labs     12/12/18  0930 12/12/18  0313 12/11/18  2120 12/11/18  0855  12/10/18  0400   WBC 6.4 6.5 8.2 8.3   < > 9.3   RBC 2.68* 2.59* 2.72* 2.45*   < > 2.80*   HGB 7.3* 7.2* 7.6* 6.8*   < > 7.6*  7.6*   HCT 23.4* 22.4* 23.4* 21.3*   < > 24.0*  24.4*    209 218 273   < > 286   GRANS  --  61  --  69  --  72   LYMPH  --  26  --  24  --  19*   EOS  --  3  --  3  --  0    < > = values in this interval not displayed.       Cardiac Enzymes No results found for: CPK, CK, CKMMB, CKMB, RCK3, CKMBT, CKNDX, CKND1, NAIN, TROPT, TROIQ, MAXI, TROPT, TNIPOC, BNP, BNPP   Coagulation Recent Labs     12/12/18  0313 12/11/18  0133 12/10/18  1018 12/09/18  2300   PTP 15.3* 14.7 14.6 16.7*   INR 1.2 1.2 1.2 1.4*   APTT  --   --  24.1 30.9       Lipid Panel Lab Results   Component Value Date/Time    Cholesterol, total 91 (L) 08/16/2018 08:51 AM    HDL Cholesterol 30 (L) 08/16/2018 08:51 AM    LDL,Direct 99 04/04/2016 10:35 AM    LDL, calculated 23 (L) 08/16/2018 08:51 AM    VLDL, calculated 38 (H) 08/16/2018 08:51 AM    Triglyceride 190 (H) 08/16/2018 08:51 AM      BNP No results found for: BNP, BNPP, XBNPT   Liver Enzymes Recent Labs     12/11/18  0133   TP 6.3*   ALB 3.6   AP 34*   SGOT 17      Digoxin    Thyroid Studies Lab Results   Component Value Date/Time    T4, Total 7.2 08/18/2010 12:00 PM    T3 Uptake 45 (H) 08/18/2010 12:00 PM    TSH 2.98 04/04/2016 10:35 AM          Signed By: CARLINE Tubbs     December 12, 2018

## 2018-12-12 NOTE — PROGRESS NOTES
0715-  Bedside and Verbal shift change report given to Jada Farrell RN (oncoming nurse) by Hodan Pack RN (offgoing nurse). Report included the following information SBAR, Kardex, Intake/Output, MAR and Recent Results. 1915- Bedside and Verbal shift change report given to Aiyana uAstin RN (oncoming nurse) by Jdaa Farrell RN (offgoing nurse). Report included the following information SBAR, Kardex, Intake/Output, MAR and Recent Results.

## 2018-12-12 NOTE — PROGRESS NOTES
Surgery  Good spirits.  Discussed risks and benefits of surgery, A-fib and GIST tumors  AF VSS  Normal abd exam  Labs reviewed Hct 22%, suspect he is equilibrating  No echo as yet  Planning resection in AM

## 2018-12-12 NOTE — ROUTINE PROCESS
Received report from [facility]. Pt AAOx3, NAD, breathing non labored, on room air, HOB up. IV site clean, dry and intact. Right wrist on a brace. Bed at the lowest level on lock position, call bell w/i reach. Bedside and Verbal shift change report given to TAYLOR Tee (oncoming nurse) by me (offgoing nurse). Report included the following information SBAR, Kardex, Intake/Output, MAR, Recent Results and Cardiac Rhythm A-Fib.

## 2018-12-12 NOTE — PROGRESS NOTES
[facility] Hospitalist Group  Progress Note    Patient: John Manjarrez.  Age: 60 y.o. : 1960 MR#: [id number] SSN: [id number]  Date/Time: 2018    Subjective:     Patient sitting in bed in NAD, awake, alert, denies cp or sob    Assessment/Plan:     1- UGI Bleed  2- Gastrointestinal Stromal Tumor ( GIST )  3- Chronic A Fib, was on xarelto  4- DM2  5- HTN  6- Acute blood loss anemia  7- dyslipidemia  8- Recent Right Hand fracture ( prior to this admission )  9- FITO    PLAN  Monitor H&H, transfuse PRN  S/p EGD  Surgery planning resection on 18  Cardio following  On BB  BIPAP QHS  D/w patient and family  Full code    Case discussed with:  [x]Patient  [x]Family  []Nursing  []Case Management  DVT Prophylaxis:  []Lovenox  []Hep SQ  [x]SCDs  []Coumadin   []On Heparin gtt    Objective:   VS:   Visit Vitals  /76 (BP 1 Location: Right arm, BP Patient Position: At rest)   Pulse 88   Temp 98.4 °F (36.9 °C)   Resp 18   Wt 122.5 kg (270 lb)   SpO2 99%   BMI 37.66 kg/m²      Tmax/24hrs: Temp (24hrs), Av.8 °F (36.6 °C), Min:97 °F (36.1 °C), Max:98.4 °F (36.9 °C)    Input/Output:     Intake/Output Summary (Last 24 hours) at 2018 1905  Last data filed at 2018 1828  Gross per 24 hour   Intake 315 ml   Output    Net 315 ml       General:  Awake, alert  Cardiovascular:  S1S2+, RRR  Pulmonary:  CTA b/l  GI:  Soft, BS+, NT, ND, obese  Extremities:  trace edema        Labs:    Recent Results (from the past 24 hour(s))   GLUCOSE, POC    Collection Time: 12/10/18  9:13 PM   Result Value Ref Range    Glucose (POC) 166 (H) 70 - 110 mg/dL   MAGNESIUM    Collection Time: 18  1:33 AM   Result Value Ref Range    Magnesium 1.9 1.6 - 2.6 mg/dL   PHOSPHORUS    Collection Time: 18  1:33 AM   Result Value Ref Range    Phosphorus 3.7 2.5 - 4.9 MG/DL   PROTHROMBIN TIME + INR    Collection Time: 18  1:33 AM   Result Value Ref Range    Prothrombin time 14.7 11.5 - 15.2 sec INR 1.2 0.8 - 1.2     METABOLIC PANEL, COMPREHENSIVE    Collection Time: 12/11/18  1:33 AM   Result Value Ref Range    Sodium 144 136 - 145 mmol/L    Potassium 3.6 3.5 - 5.5 mmol/L    Chloride 110 (H) 100 - 108 mmol/L    CO2 24 21 - 32 mmol/L    Anion gap 10 3.0 - 18 mmol/L    Glucose 169 (H) 74 - 99 mg/dL    BUN 75 (H) 7.0 - 18 MG/DL    Creatinine 1.68 (H) 0.6 - 1.3 MG/DL    BUN/Creatinine ratio 45 (H) 12 - 20      GFR est AA 51 (L) >60 ml/min/1.73m2    GFR est non-AA 42 (L) >60 ml/min/1.73m2    Calcium 9.2 8.5 - 10.1 MG/DL    Bilirubin, total 0.3 0.2 - 1.0 MG/DL    ALT (SGPT) 24 16 - 61 U/L    AST (SGOT) 17 15 - 37 U/L    Alk. phosphatase 34 (L) 45 - 117 U/L    Protein, total 6.3 (L) 6.4 - 8.2 g/dL    Albumin 3.6 3.4 - 5.0 g/dL    Globulin 2.7 2.0 - 4.0 g/dL    A-G Ratio 1.3 0.8 - 1.7     GLUCOSE, POC    Collection Time: 12/11/18  7:57 AM   Result Value Ref Range    Glucose (POC) 258 (H) 70 - 110 mg/dL   CBC WITH AUTOMATED DIFF    Collection Time: 12/11/18  8:55 AM   Result Value Ref Range    WBC 8.3 4.6 - 13.2 K/uL    RBC 2.45 (L) 4.70 - 5.50 M/uL    HGB 6.8 (L) 13.0 - 16.0 g/dL    HCT 21.3 (L) 36.0 - 48.0 %    MCV 86.9 74.0 - 97.0 FL    MCH 27.8 24.0 - 34.0 PG    MCHC 31.9 31.0 - 37.0 g/dL    RDW 15.2 (H) 11.6 - 14.5 %    PLATELET 761 747 - 098 K/uL    MPV 9.8 9.2 - 11.8 FL    NEUTROPHILS 69 42 - 75 %    LYMPHOCYTES 24 20 - 51 %    MONOCYTES 4 2 - 9 %    EOSINOPHILS 3 0 - 5 %    BASOPHILS 0 0 - 3 %    ABS. NEUTROPHILS 5.7 1.8 - 8.0 K/UL    ABS. LYMPHOCYTES 2.0 0.8 - 3.5 K/UL    ABS. MONOCYTES 0.3 0 - 1.0 K/UL    ABS. EOSINOPHILS 0.3 0.0 - 0.4 K/UL    ABS.  BASOPHILS 0.0 0.0 - 0.06 K/UL    DF MANUAL      PLATELET COMMENTS ADEQUATE PLATELETS      RBC COMMENTS ANISOCYTOSIS  1+        RBC COMMENTS POLYCHROMASIA  1+       EKG, 12 LEAD, SUBSEQUENT    Collection Time: 12/11/18 11:06 AM   Result Value Ref Range    Ventricular Rate 99 BPM    Atrial Rate 99 BPM    QRS Duration 98 ms    Q-T Interval 340 ms    QTC Calculation (Bezet) 436 ms    Calculated R Axis 21 degrees    Calculated T Axis -112 degrees    Diagnosis       Atrial fibrillation with premature atrial beats  Low voltage QRS  Nonspecific T wave abnormality  Abnormal ECG  Confirmed by Dalia Peguero MD, Archana Ordoñez ([id number]) on 12/11/2018 4:03:21 PM     GLUCOSE, POC    Collection Time: 12/11/18 11:36 AM   Result Value Ref Range    Glucose (POC) 209 (H) 70 - 110 mg/dL   GLUCOSE, POC    Collection Time: 12/11/18  4:21 PM   Result Value Ref Range    Glucose (POC) 194 (H) 70 - 110 mg/dL     Additional Data Reviewed:      Signed By: Alex Livingston MD     December 11, 2018

## 2018-12-12 NOTE — PROGRESS NOTES
Problem: Falls - Risk of  Goal: *Absence of Falls  Document Danielle Fall Risk and appropriate interventions in the flowsheet.   Outcome: Progressing Towards Goal  Fall Risk Interventions:  Mobility Interventions: Assess mobility with egress test, Bed/chair exit alarm, Strengthening exercises (ROM-active/passive)         Medication Interventions: Assess postural VS orthostatic hypotension, Evaluate medications/consider consulting pharmacy, Teach patient to arise slowly    Elimination Interventions: Call light in reach, Elevated toilet seat, Patient to call for help with toileting needs, Toilet paper/wipes in reach, Toileting schedule/hourly rounds, Urinal in reach    History of Falls Interventions: Evaluate medications/consider consulting pharmacy, Investigate reason for fall

## 2018-12-13 ENCOUNTER — ANESTHESIA EVENT (OUTPATIENT)
Dept: SURGERY | Age: 58
DRG: 326 | End: 2018-12-13
Payer: COMMERCIAL

## 2018-12-13 ENCOUNTER — ANESTHESIA (OUTPATIENT)
Dept: SURGERY | Age: 58
DRG: 326 | End: 2018-12-13
Payer: COMMERCIAL

## 2018-12-13 LAB
ABO + RH BLD: NORMAL
ABO + RH BLD: NORMAL
ANION GAP SERPL CALC-SCNC: 8 MMOL/L (ref 3–18)
BASOPHILS # BLD: 0 K/UL (ref 0–0.1)
BASOPHILS NFR BLD: 0 % (ref 0–2)
BLD PROD TYP BPU: NORMAL
BLD PROD TYP BPU: NORMAL
BLOOD GROUP ANTIBODIES SERPL: NORMAL
BLOOD GROUP ANTIBODIES SERPL: NORMAL
BPU ID: NORMAL
BPU ID: NORMAL
BUN SERPL-MCNC: 23 MG/DL (ref 7–18)
BUN/CREAT SERPL: 21 (ref 12–20)
CALCIUM SERPL-MCNC: 8.6 MG/DL (ref 8.5–10.1)
CALLED TO:,BCALL1: NORMAL
CALLED TO:,BCALL2: NORMAL
CHLORIDE SERPL-SCNC: 109 MMOL/L (ref 100–108)
CO2 SERPL-SCNC: 24 MMOL/L (ref 21–32)
CREAT SERPL-MCNC: 1.07 MG/DL (ref 0.6–1.3)
CROSSMATCH RESULT,%XM: NORMAL
CROSSMATCH RESULT,%XM: NORMAL
DIFFERENTIAL METHOD BLD: ABNORMAL
EOSINOPHIL # BLD: 0.2 K/UL (ref 0–0.4)
EOSINOPHIL NFR BLD: 3 % (ref 0–5)
ERYTHROCYTE [DISTWIDTH] IN BLOOD BY AUTOMATED COUNT: 15.3 % (ref 11.6–14.5)
ERYTHROCYTE [DISTWIDTH] IN BLOOD BY AUTOMATED COUNT: 15.3 % (ref 11.6–14.5)
ERYTHROCYTE [DISTWIDTH] IN BLOOD BY AUTOMATED COUNT: 15.6 % (ref 11.6–14.5)
GLUCOSE BLD STRIP.AUTO-MCNC: 155 MG/DL (ref 70–110)
GLUCOSE BLD STRIP.AUTO-MCNC: 156 MG/DL (ref 70–110)
GLUCOSE BLD STRIP.AUTO-MCNC: 174 MG/DL (ref 70–110)
GLUCOSE BLD STRIP.AUTO-MCNC: 226 MG/DL (ref 70–110)
GLUCOSE BLD STRIP.AUTO-MCNC: 235 MG/DL (ref 70–110)
GLUCOSE SERPL-MCNC: 147 MG/DL (ref 74–99)
HCT VFR BLD AUTO: 22.6 % (ref 36–48)
HCT VFR BLD AUTO: 23.1 % (ref 36–48)
HCT VFR BLD AUTO: 23.6 % (ref 36–48)
HGB BLD-MCNC: 7.2 G/DL (ref 13–16)
HGB BLD-MCNC: 7.2 G/DL (ref 13–16)
HGB BLD-MCNC: 7.5 G/DL (ref 13–16)
INR PPP: 1.2 (ref 0.8–1.2)
LYMPHOCYTES # BLD: 1.4 K/UL (ref 0.9–3.6)
LYMPHOCYTES NFR BLD: 25 % (ref 21–52)
MCH RBC QN AUTO: 28.1 PG (ref 24–34)
MCH RBC QN AUTO: 28.2 PG (ref 24–34)
MCH RBC QN AUTO: 28.5 PG (ref 24–34)
MCHC RBC AUTO-ENTMCNC: 31.2 G/DL (ref 31–37)
MCHC RBC AUTO-ENTMCNC: 31.8 G/DL (ref 31–37)
MCHC RBC AUTO-ENTMCNC: 31.9 G/DL (ref 31–37)
MCV RBC AUTO: 88.7 FL (ref 74–97)
MCV RBC AUTO: 89.3 FL (ref 74–97)
MCV RBC AUTO: 90.2 FL (ref 74–97)
MONOCYTES # BLD: 0.5 K/UL (ref 0.05–1.2)
MONOCYTES NFR BLD: 9 % (ref 3–10)
NEUTS SEG # BLD: 3.6 K/UL (ref 1.8–8)
NEUTS SEG NFR BLD: 63 % (ref 40–73)
PLATELET # BLD AUTO: 189 K/UL (ref 135–420)
PLATELET # BLD AUTO: 210 K/UL (ref 135–420)
PLATELET # BLD AUTO: 214 K/UL (ref 135–420)
PMV BLD AUTO: 9.8 FL (ref 9.2–11.8)
PMV BLD AUTO: 9.8 FL (ref 9.2–11.8)
PMV BLD AUTO: 9.9 FL (ref 9.2–11.8)
POTASSIUM SERPL-SCNC: 3.4 MMOL/L (ref 3.5–5.5)
PROTHROMBIN TIME: 15 SEC (ref 11.5–15.2)
RBC # BLD AUTO: 2.53 M/UL (ref 4.7–5.5)
RBC # BLD AUTO: 2.56 M/UL (ref 4.7–5.5)
RBC # BLD AUTO: 2.66 M/UL (ref 4.7–5.5)
SODIUM SERPL-SCNC: 141 MMOL/L (ref 136–145)
SPECIMEN EXP DATE BLD: NORMAL
SPECIMEN EXP DATE BLD: NORMAL
STATUS OF UNIT,%ST: NORMAL
STATUS OF UNIT,%ST: NORMAL
UNIT DIVISION, %UDIV: 0
UNIT DIVISION, %UDIV: 0
WBC # BLD AUTO: 5.6 K/UL (ref 4.6–13.2)
WBC # BLD AUTO: 8.9 K/UL (ref 4.6–13.2)
WBC # BLD AUTO: 9.4 K/UL (ref 4.6–13.2)

## 2018-12-13 PROCEDURE — 88307 TISSUE EXAM BY PATHOLOGIST: CPT

## 2018-12-13 PROCEDURE — 77030002966 HC SUT PDS J&J -A

## 2018-12-13 PROCEDURE — 94640 AIRWAY INHALATION TREATMENT: CPT

## 2018-12-13 PROCEDURE — C1751 CATH, INF, PER/CENT/MIDLINE: HCPCS

## 2018-12-13 PROCEDURE — C9113 INJ PANTOPRAZOLE SODIUM, VIA: HCPCS | Performed by: INTERNAL MEDICINE

## 2018-12-13 PROCEDURE — 77030009969 HC RELD STPLR GIA COVD -B

## 2018-12-13 PROCEDURE — 77030010296 HC STPLR INT COVD -B

## 2018-12-13 PROCEDURE — 65660000000 HC RM CCU STEPDOWN

## 2018-12-13 PROCEDURE — 77030009972 HC RELD STPLR INT COVD -B

## 2018-12-13 PROCEDURE — 74011000250 HC RX REV CODE- 250: Performed by: ANESTHESIOLOGY

## 2018-12-13 PROCEDURE — 77030008467 HC STPLR SKN COVD -B

## 2018-12-13 PROCEDURE — 77030013797 HC KT TRNSDUC PRSSR EDWD -A

## 2018-12-13 PROCEDURE — 74011250636 HC RX REV CODE- 250/636: Performed by: ANESTHESIOLOGY

## 2018-12-13 PROCEDURE — 74011636637 HC RX REV CODE- 636/637: Performed by: ANESTHESIOLOGY

## 2018-12-13 PROCEDURE — 77030015424 HC RELD STPLR TA COVD -B

## 2018-12-13 PROCEDURE — 74011250636 HC RX REV CODE- 250/636

## 2018-12-13 PROCEDURE — 74011250636 HC RX REV CODE- 250/636: Performed by: INTERNAL MEDICINE

## 2018-12-13 PROCEDURE — 77030002933 HC SUT MCRYL J&J -A

## 2018-12-13 PROCEDURE — 77030010293 HC STPLR INT TI J&J -B

## 2018-12-13 PROCEDURE — 74011000250 HC RX REV CODE- 250

## 2018-12-13 PROCEDURE — 77030002996 HC SUT SLK J&J -A

## 2018-12-13 PROCEDURE — 76210000006 HC OR PH I REC 0.5 TO 1 HR

## 2018-12-13 PROCEDURE — 82962 GLUCOSE BLOOD TEST: CPT

## 2018-12-13 PROCEDURE — 74011250636 HC RX REV CODE- 250/636: Performed by: EMERGENCY MEDICINE

## 2018-12-13 PROCEDURE — 80048 BASIC METABOLIC PNL TOTAL CA: CPT

## 2018-12-13 PROCEDURE — 86901 BLOOD TYPING SEROLOGIC RH(D): CPT

## 2018-12-13 PROCEDURE — 77030018836 HC SOL IRR NACL ICUM -A

## 2018-12-13 PROCEDURE — 74011636637 HC RX REV CODE- 636/637: Performed by: EMERGENCY MEDICINE

## 2018-12-13 PROCEDURE — 36415 COLL VENOUS BLD VENIPUNCTURE: CPT

## 2018-12-13 PROCEDURE — 76010000153 HC OR TIME 1.5 TO 2 HR

## 2018-12-13 PROCEDURE — 74011250637 HC RX REV CODE- 250/637: Performed by: PHYSICIAN ASSISTANT

## 2018-12-13 PROCEDURE — 88304 TISSUE EXAM BY PATHOLOGIST: CPT

## 2018-12-13 PROCEDURE — 85610 PROTHROMBIN TIME: CPT

## 2018-12-13 PROCEDURE — 77030018846 HC SOL IRR STRL H20 ICUM -A

## 2018-12-13 PROCEDURE — 77030032490 HC SLV COMPR SCD KNE COVD -B

## 2018-12-13 PROCEDURE — 76060000034 HC ANESTHESIA 1.5 TO 2 HR

## 2018-12-13 PROCEDURE — 0DB60ZZ EXCISION OF STOMACH, OPEN APPROACH: ICD-10-PCS

## 2018-12-13 PROCEDURE — 77030013079 HC BLNKT BAIR HGGR 3M -A

## 2018-12-13 PROCEDURE — 77030011264 HC ELECTRD BLD EXT COVD -A

## 2018-12-13 PROCEDURE — 77030034850

## 2018-12-13 PROCEDURE — 77030020782 HC GWN BAIR PAWS FLX 3M -B

## 2018-12-13 PROCEDURE — 85027 COMPLETE CBC AUTOMATED: CPT

## 2018-12-13 PROCEDURE — 77030031139 HC SUT VCRL2 J&J -A

## 2018-12-13 PROCEDURE — 85025 COMPLETE CBC W/AUTO DIFF WBC: CPT

## 2018-12-13 PROCEDURE — 74011250637 HC RX REV CODE- 250/637: Performed by: EMERGENCY MEDICINE

## 2018-12-13 RX ORDER — MIDAZOLAM HYDROCHLORIDE 1 MG/ML
INJECTION, SOLUTION INTRAMUSCULAR; INTRAVENOUS AS NEEDED
Status: DISCONTINUED | OUTPATIENT
Start: 2018-12-13 | End: 2018-12-13 | Stop reason: HOSPADM

## 2018-12-13 RX ORDER — FAMOTIDINE 10 MG/ML
20 INJECTION INTRAVENOUS EVERY 12 HOURS
Status: DISCONTINUED | OUTPATIENT
Start: 2018-12-14 | End: 2018-12-14 | Stop reason: SDUPTHER

## 2018-12-13 RX ORDER — MORPHINE SULFATE 2 MG/ML
2 INJECTION, SOLUTION INTRAMUSCULAR; INTRAVENOUS
Status: DISCONTINUED | OUTPATIENT
Start: 2018-12-13 | End: 2018-12-14 | Stop reason: SDUPTHER

## 2018-12-13 RX ORDER — MORPHINE SULFATE 10 MG/ML
5 INJECTION, SOLUTION INTRAMUSCULAR; INTRAVENOUS
Status: DISCONTINUED | OUTPATIENT
Start: 2018-12-13 | End: 2018-12-21 | Stop reason: HOSPADM

## 2018-12-13 RX ORDER — SUCCINYLCHOLINE CHLORIDE 20 MG/ML
INJECTION INTRAMUSCULAR; INTRAVENOUS AS NEEDED
Status: DISCONTINUED | OUTPATIENT
Start: 2018-12-13 | End: 2018-12-13 | Stop reason: HOSPADM

## 2018-12-13 RX ORDER — GLYCOPYRROLATE 0.2 MG/ML
INJECTION INTRAMUSCULAR; INTRAVENOUS AS NEEDED
Status: DISCONTINUED | OUTPATIENT
Start: 2018-12-13 | End: 2018-12-13 | Stop reason: HOSPADM

## 2018-12-13 RX ORDER — SODIUM CHLORIDE 9 MG/ML
75 INJECTION, SOLUTION INTRAVENOUS CONTINUOUS
Status: DISCONTINUED | OUTPATIENT
Start: 2018-12-13 | End: 2018-12-14

## 2018-12-13 RX ORDER — INSULIN GLARGINE 100 [IU]/ML
8 INJECTION, SOLUTION SUBCUTANEOUS
Status: DISCONTINUED | OUTPATIENT
Start: 2018-12-13 | End: 2018-12-19

## 2018-12-13 RX ORDER — FENTANYL CITRATE 50 UG/ML
INJECTION, SOLUTION INTRAMUSCULAR; INTRAVENOUS AS NEEDED
Status: DISCONTINUED | OUTPATIENT
Start: 2018-12-13 | End: 2018-12-13 | Stop reason: HOSPADM

## 2018-12-13 RX ORDER — LABETALOL HYDROCHLORIDE 5 MG/ML
INJECTION, SOLUTION INTRAVENOUS AS NEEDED
Status: DISCONTINUED | OUTPATIENT
Start: 2018-12-13 | End: 2018-12-13 | Stop reason: HOSPADM

## 2018-12-13 RX ORDER — NEOSTIGMINE METHYLSULFATE 5 MG/5 ML
SYRINGE (ML) INTRAVENOUS AS NEEDED
Status: DISCONTINUED | OUTPATIENT
Start: 2018-12-13 | End: 2018-12-13 | Stop reason: HOSPADM

## 2018-12-13 RX ORDER — INSULIN LISPRO 100 [IU]/ML
INJECTION, SOLUTION INTRAVENOUS; SUBCUTANEOUS ONCE
Status: COMPLETED | OUTPATIENT
Start: 2018-12-13 | End: 2018-12-13

## 2018-12-13 RX ORDER — FENTANYL CITRATE 50 UG/ML
25 INJECTION, SOLUTION INTRAMUSCULAR; INTRAVENOUS
Status: DISCONTINUED | OUTPATIENT
Start: 2018-12-13 | End: 2018-12-13 | Stop reason: HOSPADM

## 2018-12-13 RX ORDER — SODIUM CHLORIDE 9 MG/ML
250 INJECTION, SOLUTION INTRAVENOUS AS NEEDED
Status: DISCONTINUED | OUTPATIENT
Start: 2018-12-13 | End: 2018-12-17

## 2018-12-13 RX ORDER — HYDROCODONE BITARTRATE AND ACETAMINOPHEN 5; 325 MG/1; MG/1
1 TABLET ORAL
Status: DISCONTINUED | OUTPATIENT
Start: 2018-12-13 | End: 2018-12-13 | Stop reason: HOSPADM

## 2018-12-13 RX ORDER — SODIUM CHLORIDE, SODIUM LACTATE, POTASSIUM CHLORIDE, CALCIUM CHLORIDE 600; 310; 30; 20 MG/100ML; MG/100ML; MG/100ML; MG/100ML
INJECTION, SOLUTION INTRAVENOUS
Status: DISCONTINUED | OUTPATIENT
Start: 2018-12-13 | End: 2018-12-13 | Stop reason: HOSPADM

## 2018-12-13 RX ORDER — SODIUM CHLORIDE, SODIUM LACTATE, POTASSIUM CHLORIDE, CALCIUM CHLORIDE 600; 310; 30; 20 MG/100ML; MG/100ML; MG/100ML; MG/100ML
125 INJECTION, SOLUTION INTRAVENOUS CONTINUOUS
Status: DISCONTINUED | OUTPATIENT
Start: 2018-12-13 | End: 2018-12-13 | Stop reason: HOSPADM

## 2018-12-13 RX ORDER — MAGNESIUM SULFATE 100 %
4 CRYSTALS MISCELLANEOUS AS NEEDED
Status: DISCONTINUED | OUTPATIENT
Start: 2018-12-13 | End: 2018-12-13 | Stop reason: HOSPADM

## 2018-12-13 RX ORDER — DEXTROSE 50 % IN WATER (D50W) INTRAVENOUS SYRINGE
25-50 AS NEEDED
Status: DISCONTINUED | OUTPATIENT
Start: 2018-12-13 | End: 2018-12-13 | Stop reason: HOSPADM

## 2018-12-13 RX ORDER — NALOXONE HYDROCHLORIDE 0.4 MG/ML
0.2 INJECTION, SOLUTION INTRAMUSCULAR; INTRAVENOUS; SUBCUTANEOUS AS NEEDED
Status: DISCONTINUED | OUTPATIENT
Start: 2018-12-13 | End: 2018-12-13 | Stop reason: HOSPADM

## 2018-12-13 RX ORDER — ROCURONIUM BROMIDE 10 MG/ML
INJECTION, SOLUTION INTRAVENOUS AS NEEDED
Status: DISCONTINUED | OUTPATIENT
Start: 2018-12-13 | End: 2018-12-13 | Stop reason: HOSPADM

## 2018-12-13 RX ORDER — PROPOFOL 10 MG/ML
INJECTION, EMULSION INTRAVENOUS AS NEEDED
Status: DISCONTINUED | OUTPATIENT
Start: 2018-12-13 | End: 2018-12-13 | Stop reason: HOSPADM

## 2018-12-13 RX ORDER — ONDANSETRON 2 MG/ML
INJECTION INTRAMUSCULAR; INTRAVENOUS AS NEEDED
Status: DISCONTINUED | OUTPATIENT
Start: 2018-12-13 | End: 2018-12-13 | Stop reason: HOSPADM

## 2018-12-13 RX ORDER — SODIUM CHLORIDE 9 MG/ML
50 INJECTION, SOLUTION INTRAVENOUS CONTINUOUS
Status: DISCONTINUED | OUTPATIENT
Start: 2018-12-13 | End: 2018-12-13 | Stop reason: HOSPADM

## 2018-12-13 RX ORDER — LABETALOL HCL 20 MG/4 ML
5 SYRINGE (ML) INTRAVENOUS AS NEEDED
Status: DISCONTINUED | OUTPATIENT
Start: 2018-12-13 | End: 2018-12-13 | Stop reason: HOSPADM

## 2018-12-13 RX ORDER — HYDROMORPHONE HYDROCHLORIDE 2 MG/ML
0.5 INJECTION, SOLUTION INTRAMUSCULAR; INTRAVENOUS; SUBCUTANEOUS ONCE
Status: DISCONTINUED | OUTPATIENT
Start: 2018-12-13 | End: 2018-12-13 | Stop reason: HOSPADM

## 2018-12-13 RX ADMIN — SODIUM CHLORIDE, SODIUM LACTATE, POTASSIUM CHLORIDE, CALCIUM CHLORIDE: 600; 310; 30; 20 INJECTION, SOLUTION INTRAVENOUS at 08:58

## 2018-12-13 RX ADMIN — SODIUM CHLORIDE 50 ML/HR: 900 INJECTION, SOLUTION INTRAVENOUS at 08:43

## 2018-12-13 RX ADMIN — MORPHINE SULFATE 2 MG: 2 INJECTION, SOLUTION INTRAMUSCULAR; INTRAVENOUS at 22:17

## 2018-12-13 RX ADMIN — ONDANSETRON 4 MG: 2 INJECTION INTRAMUSCULAR; INTRAVENOUS at 10:16

## 2018-12-13 RX ADMIN — MORPHINE SULFATE 1 MG: 2 INJECTION, SOLUTION INTRAMUSCULAR; INTRAVENOUS at 15:42

## 2018-12-13 RX ADMIN — GLYCOPYRROLATE 0.5 MG: 0.2 INJECTION INTRAMUSCULAR; INTRAVENOUS at 10:31

## 2018-12-13 RX ADMIN — MORPHINE SULFATE 1 MG: 2 INJECTION, SOLUTION INTRAMUSCULAR; INTRAVENOUS at 00:04

## 2018-12-13 RX ADMIN — SUCCINYLCHOLINE CHLORIDE 160 MG: 20 INJECTION INTRAMUSCULAR; INTRAVENOUS at 09:32

## 2018-12-13 RX ADMIN — MORPHINE SULFATE 2 MG: 2 INJECTION, SOLUTION INTRAMUSCULAR; INTRAVENOUS at 17:01

## 2018-12-13 RX ADMIN — SODIUM CHLORIDE 75 ML/HR: 900 INJECTION, SOLUTION INTRAVENOUS at 22:57

## 2018-12-13 RX ADMIN — GABAPENTIN 300 MG: 300 CAPSULE ORAL at 17:01

## 2018-12-13 RX ADMIN — INSULIN GLARGINE 8 UNITS: 100 INJECTION, SOLUTION SUBCUTANEOUS at 22:57

## 2018-12-13 RX ADMIN — LABETALOL HYDROCHLORIDE 5 MG: 5 INJECTION, SOLUTION INTRAVENOUS at 10:40

## 2018-12-13 RX ADMIN — MIDAZOLAM HYDROCHLORIDE 2 MG: 1 INJECTION, SOLUTION INTRAMUSCULAR; INTRAVENOUS at 09:18

## 2018-12-13 RX ADMIN — ROCURONIUM BROMIDE 5 MG: 10 INJECTION, SOLUTION INTRAVENOUS at 09:32

## 2018-12-13 RX ADMIN — METOPROLOL TARTRATE 12.5 MG: 25 TABLET ORAL at 17:00

## 2018-12-13 RX ADMIN — INSULIN LISPRO 6 UNITS: 100 INJECTION, SOLUTION INTRAVENOUS; SUBCUTANEOUS at 11:45

## 2018-12-13 RX ADMIN — INSULIN LISPRO 3 UNITS: 100 INJECTION, SOLUTION INTRAVENOUS; SUBCUTANEOUS at 22:58

## 2018-12-13 RX ADMIN — SODIUM CHLORIDE 40 MG: 9 INJECTION, SOLUTION INTRAMUSCULAR; INTRAVENOUS; SUBCUTANEOUS at 22:24

## 2018-12-13 RX ADMIN — FAMOTIDINE 20 MG: 10 INJECTION INTRAVENOUS at 08:42

## 2018-12-13 RX ADMIN — PROPOFOL 150 MG: 10 INJECTION, EMULSION INTRAVENOUS at 09:32

## 2018-12-13 RX ADMIN — BUPROPION HYDROCHLORIDE 150 MG: 150 TABLET, EXTENDED RELEASE ORAL at 17:01

## 2018-12-13 RX ADMIN — INSULIN LISPRO 3 UNITS: 100 INJECTION, SOLUTION INTRAVENOUS; SUBCUTANEOUS at 09:12

## 2018-12-13 RX ADMIN — LABETALOL HYDROCHLORIDE 5 MG: 5 INJECTION, SOLUTION INTRAVENOUS at 11:26

## 2018-12-13 RX ADMIN — FENTANYL CITRATE 50 MCG: 50 INJECTION, SOLUTION INTRAMUSCULAR; INTRAVENOUS at 10:32

## 2018-12-13 RX ADMIN — FAMOTIDINE 20 MG: 10 INJECTION, SOLUTION INTRAVENOUS at 22:31

## 2018-12-13 RX ADMIN — INSULIN LISPRO 3 UNITS: 100 INJECTION, SOLUTION INTRAVENOUS; SUBCUTANEOUS at 17:02

## 2018-12-13 RX ADMIN — ROCURONIUM BROMIDE 25 MG: 10 INJECTION, SOLUTION INTRAVENOUS at 09:43

## 2018-12-13 RX ADMIN — FENTANYL CITRATE 50 MCG: 50 INJECTION, SOLUTION INTRAMUSCULAR; INTRAVENOUS at 10:11

## 2018-12-13 RX ADMIN — Medication 2.5 MG: at 10:31

## 2018-12-13 RX ADMIN — FENTANYL CITRATE 50 MCG: 50 INJECTION, SOLUTION INTRAMUSCULAR; INTRAVENOUS at 09:49

## 2018-12-13 RX ADMIN — CLOTRIMAZOLE: 1 CREAM TOPICAL at 18:00

## 2018-12-13 RX ADMIN — BUDESONIDE AND FORMOTEROL FUMARATE DIHYDRATE 2 PUFF: 160; 4.5 AEROSOL RESPIRATORY (INHALATION) at 21:01

## 2018-12-13 RX ADMIN — FENTANYL CITRATE 100 MCG: 50 INJECTION, SOLUTION INTRAMUSCULAR; INTRAVENOUS at 09:30

## 2018-12-13 NOTE — PROGRESS NOTES
conducted a Follow up consultation and Spiritual Assessment for Case Traylor, who is a 60 y.o.,adult. The  provided the following Interventions:  Continued the relationship of care and support. Patient was standing in his hospital room doorway. He said he is having surgery tomorrow. He talked about how he came to the hospital. His son then walked up and they went back into his room. Listened empathically. Offered prayer and assurance of continued prayer on patients behalf. Chart reviewed. The following outcomes were achieved:  Patient expressed gratitude for pastoral care visit. Patient indicated he is ready and accepting of the surgery. Assessment:  There are no further spiritual or Sabianist issues which require Spiritual Care Services interventions at this time. Plan:  Chaplains will continue to follow and will provide pastoral care on an as needed/requested basis.  recommends bedside caregivers page  on duty if patient shows signs of acute spiritual or emotional distress. Kevin Rodarte MDiv.   Board Certified [employer] [phone]

## 2018-12-13 NOTE — ANESTHESIA PREPROCEDURE EVALUATION
Anesthetic History   No history of anesthetic complications            Review of Systems / Medical History  Patient summary reviewed and pertinent labs reviewed    Pulmonary    COPD: mild    Sleep apnea: No treatment    Asthma : well controlled       Neuro/Psych   Within defined limits           Cardiovascular    Hypertension: well controlled        Dysrhythmias : atrial fibrillation        Comments: Pt denies recent CP or change in cardiac status.      GI/Hepatic/Renal     GERD: well controlled      Liver disease     Endo/Other    Diabetes: type 2    Morbid obesity, arthritis and anemia     Other Findings              Physical Exam    Airway  Mallampati: III  TM Distance: 4 - 6 cm  Neck ROM: normal range of motion   Mouth opening: Normal     Cardiovascular               Dental  No notable dental hx       Pulmonary                 Abdominal  GI exam deferred       Other Findings            Anesthetic Plan    ASA: 3, emergent  Anesthesia type: MAC and general - backup          Induction: Intravenous  Anesthetic plan and risks discussed with: Patient

## 2018-12-13 NOTE — PROGRESS NOTES
0715-  Bedside and Verbal shift change report given to Basilio Lopez RN (oncoming nurse) by Francisca Kennedy RN (offgoing nurse). Report included the following information SBAR, Kardex, Intake/Output, MAR and Recent Results. 1915-  Bedside and Verbal shift change report given to Francisca Kennedy RN (oncoming nurse) by Basilio Lopez RN (offgoing nurse). Report included the following information SBAR, Kardex, Intake/Output, MAR and Recent Results.

## 2018-12-13 NOTE — BRIEF OP NOTE
BRIEF OPERATIVE NOTE    Date of Procedure: 12/13/2018   Preoperative Diagnosis: Gastric Gist Tumor  Postoperative Diagnosis: Gastric Gist Tumor    Procedure(s):  LAPAROTOMY EXPLORATORY  Surgeon(s) and Role:     Mart Oppenheim, MD - Primary         Surgical Assistant: [address] [place] [address]    Surgical Staff:  Circ-1: Armen Arndt RN  Scrub Tech-1: Avani Stock  Surg Asst-1: Dell Lanes  Event Time In Time Out   Incision Start 12/13/2018 0950    Incision Close       Anesthesia: General   Estimated Blood Loss: <25cc  Specimens:   ID Type Source Tests Collected by Time Destination   1 : gastric tumor Preservative Abdomen  Kieran Damon MD 12/13/2018 5712 Pathology      Findings: mucosal tumor on posterior wall   Complications: 0  Implants: * No implants in log *

## 2018-12-13 NOTE — PROGRESS NOTES
Problem: Falls - Risk of  Goal: *Absence of Falls  Document Danielle Fall Risk and appropriate interventions in the flowsheet.   Outcome: Progressing Towards Goal  Fall Risk Interventions:  Mobility Interventions: Patient to call before getting OOB         Medication Interventions: Patient to call before getting OOB    Elimination Interventions: Call light in reach    History of Falls Interventions: Door open when patient unattended

## 2018-12-13 NOTE — ANESTHESIA POSTPROCEDURE EVALUATION
Date of procedure: 08/07/17      Surgeon: Dr. Chioma Gordon    Assistant: TAL Paul    Preoperative diagnosis: Acute urinary tract infection [N39.0], Colovesical fistula    Postoperative diagnosis: Acute urinary tract infection [N39.0], colovesical fistula    Procedure performed: Laparoscopic colostomy    Specimens: None    Indications for procedure: Patient has had recurrent UTIs. Workup this admission included CT scan with evidence of colovesical fistula and patient reports passing stool with urine for quite some time.   Risks, benefits and alternatives of surgical intervention were discussed with the patient and family at the bedside prior to surgery.    Procedure in detail:    Supine position.    4cm incision with GelPort at colostomy site marked by CELINA preoperatively. No complications upon entering abdomen.  Three 5 mm ports placed in right lower quadrant, all under direct visualization.    Pathology noted in the mid sigmoid colon adherent to bladder.      Left colon mobilized at the line of Toldt.      Abdomen inspected for hemostasis-achieved.     Bowel extracorporealized, brought up through gelport in normal anatomic position.     Gelport removed.     Colostomy matured using Keara technique with stoma bridge.     Skin closed with monocryl. Dermabond applied.     All counts were announced as correct at the end of the case. Patient tolerated procedure well, extubated in the operating room and transferred to recovery room in stable condition.        Procedure(s):  LAPAROTOMY EXPLORATORY.     Anesthesia Post Evaluation      Multimodal analgesia: multimodal analgesia used between 6 hours prior to anesthesia start to PACU discharge  Patient location during evaluation: PACU  Patient participation: complete - patient participated  Level of consciousness: awake  Pain score: 5  Pain management: adequate  Airway patency: patent  Anesthetic complications: no  Cardiovascular status: stable  Respiratory status: acceptable  Hydration status: acceptable  Post anesthesia nausea and vomiting:  controlled      Visit Vitals  /66 (BP 1 Location: Right arm, BP Patient Position: At rest)   Pulse 74   Temp 37.1 °C (98.8 °F)   Resp 18   Ht 5' 11\" (1.803 m)   Wt 122.5 kg (270 lb)   SpO2 98%   BMI 37.66 kg/m²
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
## 2018-12-13 NOTE — PROGRESS NOTES
[facility]  -  Progress Note      Patient: Tia Garay MRN: [id number]  SSN: [id number]    YOB: 1960  Age: 60 y.o. Sex: adult      Admit Date: 12/9/2018    Assessment:     -Gastrointestinal stromal tumor (GIST) of stomach by EGD, presented with coffee ground emesis and dark diarrhea in the setting of Xarelto (last dose 12/9/2018 AM).  Now s/p exploratory laparotomy by Dr. Noris Lerner 12/13/18.  -Acute blood loss anemia.  -Acute kidney injury.  -Recent hand fracture from mechanical fall requiring surgery.   -Persistent atrial fibrillation. Rate controlled with metoprolol and anticoagulated with Xarelto as outpatient.  -Hypertension. Low normal this admission. On metoprolol, amlodipine, losartan, and HCTZ as outpatient. Normal LV function EF 55-60% with moderate LVH by echo 2016.   -Diabetes mellitus. Hgb A1c 7.4.  -Dyslipidemia. On statin. -Mild nonobstructive CAD on cath 2010. -FITO on Bipap.     Primary cardiologist Dr. Rama Pierce. Plan:     -Defer timing of resuming anticoagulation per surgical team, appreciate assistance. -Remains in atrial fibrillation, rate-controlled. -No further recommendations from cardiac standpoint. Will sign off and be available as needed if additional questions arise. Subjective:     Resting comfortably in bed post-surgery.     Objective:      Patient Vitals for the past 8 hrs:   Temp Pulse Resp BP SpO2   12/13/18 1222 98.8 °F (37.1 °C) 74 18 111/66 98 %   12/13/18 1153 98 °F (36.7 °C)       12/13/18 1152  78 16 124/74 100 %   12/13/18 1142  78 24 127/74 100 %   12/13/18 1132  82 25 145/73 100 %   12/13/18 1126  94  (!) 159/91    12/13/18 1122  94 27 (!) 159/91 98 %   12/13/18 1112  99 25 (!) 163/94 100 %   12/13/18 1102 98.6 °F (37 °C) 100 14 (!) 169/102 97 %   12/13/18 0901 97.8 °F (36.6 °C) 84 20 129/74 100 %         Patient Vitals for the past 96 hrs:   Weight   12/13/18 0901 270 lb (122.5 kg)   12/12/18 1333 270 lb (122.5 kg)   12/09/18 2239 270 lb (122.5 kg)         Intake/Output Summary (Last 24 hours) at 12/13/2018 1304  Last data filed at 12/13/2018 1101  Gross per 24 hour   Intake 1600 ml   Output 155 ml   Net 1445 ml       Physical Exam:  General:  Resting comfortably in bed, no distress, appears stated age  Neck:  No JVD  Lungs:  clear to auscultation bilaterally to anterolateral lung fields  Heart:  Irregularly irregular rhythm  Abdomen:  abdomen is soft without significant tenderness, masses, organomegaly or guarding  Extremities:  Atraumatic, no edema     Data Review:     Labs: Results:       Chemistry Recent Labs     12/13/18 0513 12/12/18 0313 12/11/18  0133   * 149* 169*    143 144   K 3.4* 3.6 3.6   * 110* 110*   CO2 24 25 24   BUN 23* 48* 75*   CREA 1.07 1.34* 1.68*   CA 8.6 9.0 9.2   MG  --   --  1.9   PHOS  --   --  3.7   AGAP 8 8 10   BUCR 21* 36* 45*   AP  --   --  34*   TP  --   --  6.3*   ALB  --   --  3.6   GLOB  --   --  2.7   AGRAT  --   --  1.3      CBC w/Diff Recent Labs     12/13/18 0513 12/12/18  2221 12/12/18  1522  12/12/18 0313  12/11/18  0855   WBC 5.6 6.4 6.6   < > 6.5   < > 8.3   RBC 2.66* 2.67* 2.75*   < > 2.59*   < > 2.45*   HGB 7.5* 7.6* 7.7*   < > 7.2*   < > 6.8*   HCT 23.6* 23.5* 24.1*   < > 22.4*   < > 21.3*    201 207   < > 209   < > 273   GRANS 63  --   --   --  61  --  69   LYMPH 25  --   --   --  26  --  24   EOS 3  --   --   --  3  --  3    < > = values in this interval not displayed.       Coagulation Recent Labs     12/13/18 0513 12/12/18 0313   PTP 15.0 15.3*   INR 1.2 1.2       Lipid Panel Lab Results   Component Value Date/Time    Cholesterol, total 91 (L) 08/16/2018 08:51 AM    HDL Cholesterol 30 (L) 08/16/2018 08:51 AM    LDL,Direct 99 04/04/2016 10:35 AM    LDL, calculated 23 (L) 08/16/2018 08:51 AM    VLDL, calculated 38 (H) 08/16/2018 08:51 AM    Triglyceride 190 (H) 08/16/2018 08:51 AM      Liver Enzymes Recent Labs     12/11/18  0133   TP 6.3* ALB 3.6   AP 34*   SGOT 17      Thyroid Studies Lab Results   Component Value Date/Time    T4, Total 7.2 08/18/2010 12:00 PM    T3 Uptake 45 (H) 08/18/2010 12:00 PM    TSH 2.98 04/04/2016 10:35 AM

## 2018-12-13 NOTE — PERIOP NOTES
TRANSFER - OUT REPORT:    Verbal report given to George Trujillo RN(name) on [facility]  being transferred to [address] [place] [address](unit) for routine post - op       Report consisted of patients Situation, Background, Assessment and   Recommendations(SBAR). Information from the following report(s) SBAR, Kardex, OR Summary, Procedure Summary, Intake/Output, MAR, Recent Results and Med Rec Status was reviewed with the receiving nurse. Lines:   Peripheral IV 12/13/18 Anterior; Left Hand (Active)   Site Assessment Clean, dry, & intact 12/13/2018 11:02 AM   Phlebitis Assessment 0 12/13/2018 11:02 AM   Infiltration Assessment 0 12/13/2018 11:02 AM   Dressing Status Clean, dry, & intact 12/13/2018 11:02 AM   Dressing Type Tape;Transparent 12/13/2018 11:02 AM   Hub Color/Line Status Pink; Infusing 12/13/2018 11:02 AM   Alcohol Cap Used No 12/13/2018  9:09 AM        Opportunity for questions and clarification was provided.       Patient transported with:   Tech  3 L of oxygen

## 2018-12-14 LAB
ANION GAP SERPL CALC-SCNC: 9 MMOL/L (ref 3–18)
BASOPHILS # BLD: 0 K/UL (ref 0–0.1)
BASOPHILS NFR BLD: 0 % (ref 0–2)
BUN SERPL-MCNC: 14 MG/DL (ref 7–18)
BUN/CREAT SERPL: 15 (ref 12–20)
CALCIUM SERPL-MCNC: 8.6 MG/DL (ref 8.5–10.1)
CHLORIDE SERPL-SCNC: 111 MMOL/L (ref 100–108)
CO2 SERPL-SCNC: 23 MMOL/L (ref 21–32)
CREAT SERPL-MCNC: 0.94 MG/DL (ref 0.6–1.3)
DIFFERENTIAL METHOD BLD: ABNORMAL
EOSINOPHIL # BLD: 0.1 K/UL (ref 0–0.4)
EOSINOPHIL NFR BLD: 1 % (ref 0–5)
ERYTHROCYTE [DISTWIDTH] IN BLOOD BY AUTOMATED COUNT: 16.5 % (ref 11.6–14.5)
ERYTHROCYTE [DISTWIDTH] IN BLOOD BY AUTOMATED COUNT: 16.7 % (ref 11.6–14.5)
ERYTHROCYTE [DISTWIDTH] IN BLOOD BY AUTOMATED COUNT: 16.9 % (ref 11.6–14.5)
GLUCOSE BLD STRIP.AUTO-MCNC: 133 MG/DL (ref 70–110)
GLUCOSE BLD STRIP.AUTO-MCNC: 161 MG/DL (ref 70–110)
GLUCOSE BLD STRIP.AUTO-MCNC: 162 MG/DL (ref 70–110)
GLUCOSE BLD STRIP.AUTO-MCNC: 167 MG/DL (ref 70–110)
GLUCOSE SERPL-MCNC: 133 MG/DL (ref 74–99)
HCT VFR BLD AUTO: 21.4 % (ref 36–48)
HCT VFR BLD AUTO: 24.1 % (ref 36–48)
HCT VFR BLD AUTO: 24.9 % (ref 36–48)
HGB BLD-MCNC: 6.6 G/DL (ref 13–16)
HGB BLD-MCNC: 7.5 G/DL (ref 13–16)
HGB BLD-MCNC: 7.7 G/DL (ref 13–16)
INR PPP: 1.2 (ref 0.8–1.2)
LYMPHOCYTES # BLD: 1.3 K/UL (ref 0.9–3.6)
LYMPHOCYTES NFR BLD: 14 % (ref 21–52)
MAGNESIUM SERPL-MCNC: 2.4 MG/DL (ref 1.6–2.6)
MCH RBC QN AUTO: 28.2 PG (ref 24–34)
MCHC RBC AUTO-ENTMCNC: 30.8 G/DL (ref 31–37)
MCHC RBC AUTO-ENTMCNC: 30.9 G/DL (ref 31–37)
MCHC RBC AUTO-ENTMCNC: 31.1 G/DL (ref 31–37)
MCV RBC AUTO: 90.6 FL (ref 74–97)
MCV RBC AUTO: 91.2 FL (ref 74–97)
MCV RBC AUTO: 91.5 FL (ref 74–97)
MONOCYTES # BLD: 0.8 K/UL (ref 0.05–1.2)
MONOCYTES NFR BLD: 8 % (ref 3–10)
NEUTS SEG # BLD: 6.8 K/UL (ref 1.8–8)
NEUTS SEG NFR BLD: 77 % (ref 40–73)
PLATELET # BLD AUTO: 182 K/UL (ref 135–420)
PLATELET # BLD AUTO: 194 K/UL (ref 135–420)
PLATELET # BLD AUTO: 194 K/UL (ref 135–420)
PMV BLD AUTO: 9.7 FL (ref 9.2–11.8)
PMV BLD AUTO: 9.7 FL (ref 9.2–11.8)
PMV BLD AUTO: 9.8 FL (ref 9.2–11.8)
POTASSIUM SERPL-SCNC: 3.9 MMOL/L (ref 3.5–5.5)
PROTHROMBIN TIME: 14.9 SEC (ref 11.5–15.2)
RBC # BLD AUTO: 2.34 M/UL (ref 4.7–5.5)
RBC # BLD AUTO: 2.66 M/UL (ref 4.7–5.5)
RBC # BLD AUTO: 2.73 M/UL (ref 4.7–5.5)
SODIUM SERPL-SCNC: 143 MMOL/L (ref 136–145)
WBC # BLD AUTO: 8 K/UL (ref 4.6–13.2)
WBC # BLD AUTO: 8.9 K/UL (ref 4.6–13.2)
WBC # BLD AUTO: 9.2 K/UL (ref 4.6–13.2)

## 2018-12-14 PROCEDURE — 80048 BASIC METABOLIC PNL TOTAL CA: CPT

## 2018-12-14 PROCEDURE — 94640 AIRWAY INHALATION TREATMENT: CPT

## 2018-12-14 PROCEDURE — C9113 INJ PANTOPRAZOLE SODIUM, VIA: HCPCS | Performed by: INTERNAL MEDICINE

## 2018-12-14 PROCEDURE — 85027 COMPLETE CBC AUTOMATED: CPT

## 2018-12-14 PROCEDURE — 74011250637 HC RX REV CODE- 250/637: Performed by: PHYSICIAN ASSISTANT

## 2018-12-14 PROCEDURE — 74011636637 HC RX REV CODE- 636/637: Performed by: EMERGENCY MEDICINE

## 2018-12-14 PROCEDURE — 36415 COLL VENOUS BLD VENIPUNCTURE: CPT

## 2018-12-14 PROCEDURE — 74011000250 HC RX REV CODE- 250: Performed by: INTERNAL MEDICINE

## 2018-12-14 PROCEDURE — 85610 PROTHROMBIN TIME: CPT

## 2018-12-14 PROCEDURE — 74011250636 HC RX REV CODE- 250/636

## 2018-12-14 PROCEDURE — 85025 COMPLETE CBC W/AUTO DIFF WBC: CPT

## 2018-12-14 PROCEDURE — 74011250636 HC RX REV CODE- 250/636: Performed by: FAMILY MEDICINE

## 2018-12-14 PROCEDURE — 65660000000 HC RM CCU STEPDOWN

## 2018-12-14 PROCEDURE — 74011250637 HC RX REV CODE- 250/637: Performed by: EMERGENCY MEDICINE

## 2018-12-14 PROCEDURE — 74011250636 HC RX REV CODE- 250/636: Performed by: INTERNAL MEDICINE

## 2018-12-14 PROCEDURE — 82962 GLUCOSE BLOOD TEST: CPT

## 2018-12-14 PROCEDURE — 83735 ASSAY OF MAGNESIUM: CPT

## 2018-12-14 PROCEDURE — 94760 N-INVAS EAR/PLS OXIMETRY 1: CPT

## 2018-12-14 RX ORDER — SODIUM CHLORIDE 9 MG/ML
125 INJECTION, SOLUTION INTRAVENOUS CONTINUOUS
Status: DISCONTINUED | OUTPATIENT
Start: 2018-12-14 | End: 2018-12-17

## 2018-12-14 RX ADMIN — BUPROPION HYDROCHLORIDE 150 MG: 150 TABLET, EXTENDED RELEASE ORAL at 09:49

## 2018-12-14 RX ADMIN — CLOTRIMAZOLE: 1 CREAM TOPICAL at 17:55

## 2018-12-14 RX ADMIN — BUDESONIDE AND FORMOTEROL FUMARATE DIHYDRATE 2 PUFF: 160; 4.5 AEROSOL RESPIRATORY (INHALATION) at 20:34

## 2018-12-14 RX ADMIN — BUDESONIDE AND FORMOTEROL FUMARATE DIHYDRATE 2 PUFF: 160; 4.5 AEROSOL RESPIRATORY (INHALATION) at 08:15

## 2018-12-14 RX ADMIN — MORPHINE SULFATE 5 MG: 10 INJECTION INTRAMUSCULAR; INTRAVENOUS; SUBCUTANEOUS at 19:39

## 2018-12-14 RX ADMIN — INSULIN LISPRO 3 UNITS: 100 INJECTION, SOLUTION INTRAVENOUS; SUBCUTANEOUS at 22:02

## 2018-12-14 RX ADMIN — MORPHINE SULFATE 5 MG: 10 INJECTION INTRAMUSCULAR; INTRAVENOUS; SUBCUTANEOUS at 01:41

## 2018-12-14 RX ADMIN — MORPHINE SULFATE 5 MG: 10 INJECTION INTRAMUSCULAR; INTRAVENOUS; SUBCUTANEOUS at 09:49

## 2018-12-14 RX ADMIN — SODIUM CHLORIDE 40 MG: 9 INJECTION, SOLUTION INTRAMUSCULAR; INTRAVENOUS; SUBCUTANEOUS at 21:58

## 2018-12-14 RX ADMIN — SODIUM CHLORIDE 40 MG: 9 INJECTION, SOLUTION INTRAMUSCULAR; INTRAVENOUS; SUBCUTANEOUS at 09:49

## 2018-12-14 RX ADMIN — INSULIN GLARGINE 8 UNITS: 100 INJECTION, SOLUTION SUBCUTANEOUS at 22:09

## 2018-12-14 RX ADMIN — METOPROLOL TARTRATE 12.5 MG: 25 TABLET ORAL at 17:52

## 2018-12-14 RX ADMIN — MORPHINE SULFATE 5 MG: 10 INJECTION INTRAMUSCULAR; INTRAVENOUS; SUBCUTANEOUS at 14:09

## 2018-12-14 RX ADMIN — METOPROLOL TARTRATE 12.5 MG: 25 TABLET ORAL at 09:49

## 2018-12-14 RX ADMIN — SODIUM CHLORIDE 125 ML/HR: 900 INJECTION, SOLUTION INTRAVENOUS at 17:54

## 2018-12-14 RX ADMIN — GABAPENTIN 300 MG: 300 CAPSULE ORAL at 09:49

## 2018-12-14 RX ADMIN — GABAPENTIN 300 MG: 300 CAPSULE ORAL at 17:52

## 2018-12-14 NOTE — PROGRESS NOTES
[facility]  Progress Note    Patient: John Manjarrez.  Age: 60 y.o. : 1960 MR#: [id number] SSN: [id number]  Date/Time: 2018    Subjective:     Patient lying in bed in NAD, awake, alert, s/p surgery, ng tube in place    Assessment/Plan:     1- UGI Bleed  2- Gastrointestinal Stromal Tumor ( GIST )  3- Chronic A Fib, was on xarelto  4- DM2  5- HTN  6- Acute blood loss anemia  7- dyslipidemia  8- Recent Right Hand fracture ( prior to this admission )  9- FITO    PLAN  Monitor H&H, transfuse PRN  S/p EGD  S/p surgery, follow path  Cardio input noted  On BB  BIPAP QHS  Gentle hydration while npo  D/w patient and daughter  Full code  Consider resuming anticoagulation when ok with surgeon    Case discussed with:  [x]Patient  [x]Family  []Nursing  []Case Management  DVT Prophylaxis:  []Lovenox  []Hep SQ  [x]SCDs  []Coumadin   []On Heparin gtt    Objective:   VS:   Visit Vitals  /61 (BP 1 Location: Right arm, BP Patient Position: At rest)   Pulse 75   Temp 97.5 °F (36.4 °C)   Resp 18   Ht 5' 11\" (1.803 m)   Wt 122.5 kg (270 lb)   SpO2 96%   BMI 37.66 kg/m²      Tmax/24hrs: Temp (24hrs), Av.8 °F (36.6 °C), Min:97 °F (36.1 °C), Max:98.8 °F (37.1 °C)    Input/Output:     Intake/Output Summary (Last 24 hours) at 2018  Last data filed at 2018 1746  Gross per 24 hour   Intake 700 ml   Output 605 ml   Net 95 ml       General:  Awake, alert  Cardiovascular:  S1S2+, RRR  Pulmonary:  CTA b/l  GI:  Soft, BS+, NT, ND  Extremities:  No edema        Labs:    Recent Results (from the past 24 hour(s))   GLUCOSE, POC    Collection Time: 18 10:15 PM   Result Value Ref Range    Glucose (POC) 158 (H) 70 - 110 mg/dL   CBC W/O DIFF    Collection Time: 18 10:21 PM   Result Value Ref Range    WBC 6.4 4.6 - 13.2 K/uL    RBC 2.67 (L) 4.70 - 5.50 M/uL    HGB 7.6 (L) 13.0 - 16.0 g/dL    HCT 23.5 (L) 36.0 - 48.0 %    MCV 88.0 74.0 - 97.0 FL    MCH 28.5 24.0 - 34.0 PG    MCHC 32.3 31.0 - 37.0 g/dL    RDW 15.1 (H) 11.6 - 14.5 %    PLATELET 411 040 - 575 K/uL    MPV 9.5 9.2 - 11.0 FL   METABOLIC PANEL, BASIC    Collection Time: 12/13/18  5:13 AM   Result Value Ref Range    Sodium 141 136 - 145 mmol/L    Potassium 3.4 (L) 3.5 - 5.5 mmol/L    Chloride 109 (H) 100 - 108 mmol/L    CO2 24 21 - 32 mmol/L    Anion gap 8 3.0 - 18 mmol/L    Glucose 147 (H) 74 - 99 mg/dL    BUN 23 (H) 7.0 - 18 MG/DL    Creatinine 1.07 0.6 - 1.3 MG/DL    BUN/Creatinine ratio 21 (H) 12 - 20      GFR est AA >60 >60 ml/min/1.73m2    GFR est non-AA >60 >60 ml/min/1.73m2    Calcium 8.6 8.5 - 10.1 MG/DL   CBC WITH AUTOMATED DIFF    Collection Time: 12/13/18  5:13 AM   Result Value Ref Range    WBC 5.6 4.6 - 13.2 K/uL    RBC 2.66 (L) 4.70 - 5.50 M/uL    HGB 7.5 (L) 13.0 - 16.0 g/dL    HCT 23.6 (L) 36.0 - 48.0 %    MCV 88.7 74.0 - 97.0 FL    MCH 28.2 24.0 - 34.0 PG    MCHC 31.8 31.0 - 37.0 g/dL    RDW 15.3 (H) 11.6 - 14.5 %    PLATELET 762 498 - 305 K/uL    MPV 9.8 9.2 - 11.8 FL    NEUTROPHILS 63 40 - 73 %    LYMPHOCYTES 25 21 - 52 %    MONOCYTES 9 3 - 10 %    EOSINOPHILS 3 0 - 5 %    BASOPHILS 0 0 - 2 %    ABS. NEUTROPHILS 3.6 1.8 - 8.0 K/UL    ABS. LYMPHOCYTES 1.4 0.9 - 3.6 K/UL    ABS. MONOCYTES 0.5 0.05 - 1.2 K/UL    ABS. EOSINOPHILS 0.2 0.0 - 0.4 K/UL    ABS.  BASOPHILS 0.0 0.0 - 0.1 K/UL    DF AUTOMATED     PROTHROMBIN TIME + INR    Collection Time: 12/13/18  5:13 AM   Result Value Ref Range    Prothrombin time 15.0 11.5 - 15.2 sec    INR 1.2 0.8 - 1.2     GLUCOSE, POC    Collection Time: 12/13/18  8:13 AM   Result Value Ref Range    Glucose (POC) 174 (H) 70 - 110 mg/dL   GLUCOSE, POC    Collection Time: 12/13/18 11:41 AM   Result Value Ref Range    Glucose (POC) 226 (H) 70 - 110 mg/dL   GLUCOSE, POC    Collection Time: 12/13/18 12:23 PM   Result Value Ref Range    Glucose (POC) 235 (H) 70 - 110 mg/dL   TYPE & SCREEN    Collection Time: 12/13/18  1:13 PM   Result Value Ref Range    Crossmatch Expiration 12/16/2018     ABO/Rh(D) B POSITIVE     Antibody screen NEG    CBC W/O DIFF    Collection Time: 12/13/18  1:13 PM   Result Value Ref Range    WBC 8.9 4.6 - 13.2 K/uL    RBC 2.53 (L) 4.70 - 5.50 M/uL    HGB 7.2 (L) 13.0 - 16.0 g/dL    HCT 22.6 (L) 36.0 - 48.0 %    MCV 89.3 74.0 - 97.0 FL    MCH 28.5 24.0 - 34.0 PG    MCHC 31.9 31.0 - 37.0 g/dL    RDW 15.3 (H) 11.6 - 14.5 %    PLATELET 661 150 - 791 K/uL    MPV 9.8 9.2 - 11.8 FL   GLUCOSE, POC    Collection Time: 12/13/18  3:57 PM   Result Value Ref Range    Glucose (POC) 156 (H) 70 - 110 mg/dL   CBC W/O DIFF    Collection Time: 12/13/18  7:00 PM   Result Value Ref Range    WBC 9.4 4.6 - 13.2 K/uL    RBC 2.56 (L) 4.70 - 5.50 M/uL    HGB 7.2 (L) 13.0 - 16.0 g/dL    HCT 23.1 (L) 36.0 - 48.0 %    MCV 90.2 74.0 - 97.0 FL    MCH 28.1 24.0 - 34.0 PG    MCHC 31.2 31.0 - 37.0 g/dL    RDW 15.6 (H) 11.6 - 14.5 %    PLATELET 647 405 - 299 K/uL    MPV 9.9 9.2 - 11.8 FL     Additional Data Reviewed:      Signed By: Isa Duverney, MD     December 13, 2018

## 2018-12-14 NOTE — PROGRESS NOTES
[facility]  Progress Note    Patient: Johanna Blood. Age: 60 y.o. : 1960 MR#: [id number] SSN: [id number]  Date/Time: 2018    Subjective:     Seen with family in room. Denies F/C, N/V, CP, SOB, pain c/o. NGTube in place. No flatus, BM as yet. Assessment/Plan:   1. UGIBleed with GIST, acute blood loss anemia - s/p partial gastrectomy and mass resxn. Awaiting path. Will resume DOAC when ok with surgery. H/H stable. 2. Chronic AFib - Xarelto. Rate controlled. On BBlocker. 3. HTN - BPs wnl.   4. DM2 - SSI. 5. Dyslipidemia - statin. 6. Recent R hand fx - splinted. 7. FITO hx - no acute. 8. COPD hx - no acute. 9. 12/10 EGD path report: benign gastric mucosa with mild chronic active gastritis. PPI BID. Additional Notes:      Case discussed with:  [x]Patient  [x]Family  []Nursing  []Case Management  DVT Prophylaxis:  []Lovenox  []Hep SQ  [x]SCDs  []Coumadin   []On Heparin gtt    Objective:   VS:   Visit Vitals  /66 (BP 1 Location: Right arm, BP Patient Position: At rest)   Pulse 84   Temp 97.7 °F (36.5 °C)   Resp 19   Ht 5' 11\" (1.803 m)   Wt 122.5 kg (270 lb)   SpO2 96%   BMI 37.66 kg/m²      Tmax/24hrs: Temp (24hrs), Av.8 °F (36.6 °C), Min:96.9 °F (36.1 °C), Max:98.5 °F (36.9 °C)    Input/Output:     Intake/Output Summary (Last 24 hours) at 2018 1710  Last data filed at 2018 0953  Gross per 24 hour   Intake 0 ml   Output 500 ml   Net -500 ml       General:  Awake, alert, NAD. Cardiovascular:  RRR. Pulmonary:  CTA B.  GI:  Soft, ND, scattered BSounds. Op site dressed, c/d/i. Extremities:  No CT or edema.    Additional:      Labs:    Recent Results (from the past 24 hour(s))   CBC W/O DIFF    Collection Time: 18  7:00 PM   Result Value Ref Range    WBC 9.4 4.6 - 13.2 K/uL    RBC 2.56 (L) 4.70 - 5.50 M/uL    HGB 7.2 (L) 13.0 - 16.0 g/dL    HCT 23.1 (L) 36.0 - 48.0 %    MCV 90.2 74.0 - 97.0 FL    MCH 28.1 24.0 - 34.0 PG    MCHC 31.2 31.0 - 37.0 g/dL    RDW 15.6 (H) 11.6 - 14.5 %    PLATELET 121 595 - 993 K/uL    MPV 9.9 9.2 - 11.8 FL   GLUCOSE, POC    Collection Time: 12/13/18 10:02 PM   Result Value Ref Range    Glucose (POC) 155 (H) 70 - 110 mg/dL   PROTHROMBIN TIME + INR    Collection Time: 12/14/18  5:33 AM   Result Value Ref Range    Prothrombin time 14.9 11.5 - 15.2 sec    INR 1.2 0.8 - 1.2     CBC WITH AUTOMATED DIFF    Collection Time: 12/14/18  5:33 AM   Result Value Ref Range    WBC 8.9 4.6 - 13.2 K/uL    RBC 2.66 (L) 4.70 - 5.50 M/uL    HGB 7.5 (L) 13.0 - 16.0 g/dL    HCT 24.1 (L) 36.0 - 48.0 %    MCV 90.6 74.0 - 97.0 FL    MCH 28.2 24.0 - 34.0 PG    MCHC 31.1 31.0 - 37.0 g/dL    RDW 16.5 (H) 11.6 - 14.5 %    PLATELET 013 267 - 418 K/uL    MPV 9.7 9.2 - 11.8 FL    NEUTROPHILS 77 (H) 40 - 73 %    LYMPHOCYTES 14 (L) 21 - 52 %    MONOCYTES 8 3 - 10 %    EOSINOPHILS 1 0 - 5 %    BASOPHILS 0 0 - 2 %    ABS. NEUTROPHILS 6.8 1.8 - 8.0 K/UL    ABS. LYMPHOCYTES 1.3 0.9 - 3.6 K/UL    ABS. MONOCYTES 0.8 0.05 - 1.2 K/UL    ABS. EOSINOPHILS 0.1 0.0 - 0.4 K/UL    ABS.  BASOPHILS 0.0 0.0 - 0.1 K/UL    DF AUTOMATED     METABOLIC PANEL, BASIC    Collection Time: 12/14/18  5:33 AM   Result Value Ref Range    Sodium 143 136 - 145 mmol/L    Potassium 3.9 3.5 - 5.5 mmol/L    Chloride 111 (H) 100 - 108 mmol/L    CO2 23 21 - 32 mmol/L    Anion gap 9 3.0 - 18 mmol/L    Glucose 133 (H) 74 - 99 mg/dL    BUN 14 7.0 - 18 MG/DL    Creatinine 0.94 0.6 - 1.3 MG/DL    BUN/Creatinine ratio 15 12 - 20      GFR est AA >60 >60 ml/min/1.73m2    GFR est non-AA >60 >60 ml/min/1.73m2    Calcium 8.6 8.5 - 10.1 MG/DL   MAGNESIUM    Collection Time: 12/14/18  5:33 AM   Result Value Ref Range    Magnesium 2.4 1.6 - 2.6 mg/dL   GLUCOSE, POC    Collection Time: 12/14/18  8:54 AM   Result Value Ref Range    Glucose (POC) 133 (H) 70 - 110 mg/dL   CBC W/O DIFF    Collection Time: 12/14/18  9:50 AM   Result Value Ref Range    WBC 9.2 4.6 - 13.2 K/uL    RBC 2.73 (L) 4.70 - 5.50 M/uL    HGB 7.7 (L) 13.0 - 16.0 g/dL    HCT 24.9 (L) 36.0 - 48.0 %    MCV 91.2 74.0 - 97.0 FL    MCH 28.2 24.0 - 34.0 PG    MCHC 30.9 (L) 31.0 - 37.0 g/dL    RDW 16.7 (H) 11.6 - 14.5 %    PLATELET 900 031 - 387 K/uL    MPV 9.8 9.2 - 11.8 FL   GLUCOSE, POC    Collection Time: 12/14/18 12:17 PM   Result Value Ref Range    Glucose (POC) 167 (H) 70 - 110 mg/dL   GLUCOSE, POC    Collection Time: 12/14/18  4:43 PM   Result Value Ref Range    Glucose (POC) 162 (H) 70 - 110 mg/dL     Additional Data Reviewed:      Signed By: Evelyn Berry MD     December 14, 2018 5:10 PM

## 2018-12-14 NOTE — PROGRESS NOTES
Discharge Planning:  Pt has strong family support  Pt plans to return home with home health services  Pt's family will transport this pt when ready  Pt requires a PT and OT evaluation for needs for discharge to be address    [facility]  [phone]

## 2018-12-14 NOTE — PROGRESS NOTES
Pt refused administration of Bipap, stating that the device was to loud (alarms) and he could not sleep w/ it.
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
## 2018-12-14 NOTE — PROGRESS NOTES
NUTRITION    Nutrition Screen      RECOMMENDATIONS / PLAN:     - Recommend starting clear liquid diet when medically appropriate.  - Continue RD inpatient monitoring and evaluation. NUTRITION INTERVENTIONS & DIAGNOSIS:     [x] Meals/snacks: modify composition; NPO    Nutrition Diagnosis:  Inadequate energy intake related to inability to tolerate po due to recent partial gastrectomy and mass resection as evidenced by pt NPO, with NGT to suction. ASSESSMENT:     Pt had good appetite and meal intake PTA per family report. Had been tolerating po intake PTA. Was NPO, then on clear liquid diet x 2 days since admission. NPO yesterday for partial gastrectomy and mass resection. Pt remain NPO today with NGT to suction. Has some abdominal pain. Per chart review, pt has episodes of n/v and diarrhea PTA.      Average po intake adequate to meet patients estimated nutritional needs:   [] Yes     [x] No   [] Unable to determine at this time    Diet: DIET NPO      Food Allergies:  None known   Current Appetite:   [] Good     [x] Fair     [] Poor     [] Other:  Appetite/meal intake prior to admission:   [x] Good     [] Fair     [] Poor     [] Other:  Feeding Limitations:  [] Swallowing difficulty    [] Chewing difficulty    [] Other:  Current Meal Intake:   Patient Vitals for the past 100 hrs:   % Diet Eaten   12/14/18 0953 0 %   12/13/18 1746 0 %   12/13/18 1327 0 %   12/12/18 1749 75 %   12/12/18 1333 100 %   12/12/18 0934 100 %       NGT to suction: 500 mL output in last 24 hours  BM: 12/13  Skin Integrity:  Abdominal wound; right percutaneous wound  Edema:   [] No     [x] Yes   Pertinent Medications: Reviewed: lantus, SSI, zofran, phenergan    Recent Labs     12/14/18  0533 12/13/18  0513 12/12/18  0313    141 143   K 3.9 3.4* 3.6   * 109* 110*   CO2 23 24 25   * 147* 149*   BUN 14 23* 48*   CREA 0.94 1.07 1.34*   CA 8.6 8.6 9.0   MG 2.4  --   --        Intake/Output Summary (Last 24 hours) at 12/14/2018 1609  Last data filed at 12/14/2018 0953  Gross per 24 hour   Intake 0 ml   Output 950 ml   Net -950 ml       Anthropometrics:  Ht Readings from Last 1 Encounters:   12/13/18 5' 11\" (1.803 m)     Last 3 Recorded Weights in this Encounter    12/09/18 2239 12/12/18 1333 12/13/18 0901   Weight: 122.5 kg (270 lb) 122.5 kg (270 lb) 122.5 kg (270 lb)     Body mass index is 37.66 kg/m². Weight History:    Pt had not experienced any recent changes in weight PTA per family report    Weight Metrics 12/13/2018 12/3/2018 11/28/2018 11/25/2018 11/15/2018 11/8/2018 10/1/2018   Weight 270 lb 272 lb 270 lb 3.2 oz 265 lb 271 lb 269 lb 12.8 oz 270 lb   BMI 37.66 kg/m2 37.94 kg/m2 37.69 kg/m2 36.96 kg/m2 37.8 kg/m2 37.63 kg/m2 37.66 kg/m2        Admitting Diagnosis: Acute GI bleeding  Pertinent PMHx:  COPD, DM, GERD, high cholesterol, HTN, pneumonia    Education Needs:        [x] None identified  [] Identified - Not appropriate at this time  []  Identified and addressed - refer to education log  Learning Limitations:   [x] None identified  [] Identified    Cultural, Voodoo & ethnic food preferences:  [x] None identified    [] Identified and addressed     ESTIMATED NUTRITION NEEDS:     1000 kcal deficit for promotion of weight loss  Calories: 2009-6980 kcal (25-28 kcal/kg) based on  [x] Actual BW: 123      [] IBW   Protein: 123-148 gm (1-1.2 gm/kg) based on  [x] Actual BW      [] IBW   Fluid: 1 mL/kcal     MONITORING & EVALUATION:     Nutrition Goal(s):   1. Po intake of meals will meet >75% of patient estimated nutritional needs within the next 7 days.   Outcome:  [] Met/Ongoing    []  Not Met    [x] New/Initial Goal     Monitoring:   [x] Food and beverage intake   [x] Diet order   [x] Nutrition-focused physical findings   [x] Treatment/therapy   [] Weight   [] Enteral nutrition intake        Previous Recommendations (for follow-up assessments only):     []   Implemented       []   Not Implemented (RD to address) [] No Longer Appropriate     [] No Recommendation Made     Discharge Planning:  Cardiac, diabetic diet  [x] Participated in care planning, discharge planning, & interdisciplinary rounds as appropriate      Nathaniel Murray RD   Pager: [phone]

## 2018-12-14 NOTE — PROGRESS NOTES
Surgery  POD 1 s/p partial gastrectomy and mass resection  AF VSS  Dressing intact  500cc NG  600cc urine  Labs reviewed  IS stressed  Await path

## 2018-12-15 LAB
ANION GAP SERPL CALC-SCNC: 9 MMOL/L (ref 3–18)
BASOPHILS # BLD: 0 K/UL (ref 0–0.1)
BASOPHILS NFR BLD: 0 % (ref 0–2)
BUN SERPL-MCNC: 15 MG/DL (ref 7–18)
BUN/CREAT SERPL: 17 (ref 12–20)
CALCIUM SERPL-MCNC: 8.7 MG/DL (ref 8.5–10.1)
CHLORIDE SERPL-SCNC: 112 MMOL/L (ref 100–108)
CO2 SERPL-SCNC: 22 MMOL/L (ref 21–32)
CREAT SERPL-MCNC: 0.86 MG/DL (ref 0.6–1.3)
DIFFERENTIAL METHOD BLD: ABNORMAL
EOSINOPHIL # BLD: 0.1 K/UL (ref 0–0.4)
EOSINOPHIL NFR BLD: 1 % (ref 0–5)
ERYTHROCYTE [DISTWIDTH] IN BLOOD BY AUTOMATED COUNT: 17 % (ref 11.6–14.5)
GLUCOSE BLD STRIP.AUTO-MCNC: 146 MG/DL (ref 70–110)
GLUCOSE BLD STRIP.AUTO-MCNC: 156 MG/DL (ref 70–110)
GLUCOSE BLD STRIP.AUTO-MCNC: 157 MG/DL (ref 70–110)
GLUCOSE BLD STRIP.AUTO-MCNC: 175 MG/DL (ref 70–110)
GLUCOSE SERPL-MCNC: 154 MG/DL (ref 74–99)
HCT VFR BLD AUTO: 24.2 % (ref 36–48)
HGB BLD-MCNC: 7.4 G/DL (ref 13–16)
LYMPHOCYTES # BLD: 1 K/UL (ref 0.9–3.6)
LYMPHOCYTES NFR BLD: 12 % (ref 21–52)
MCH RBC QN AUTO: 28 PG (ref 24–34)
MCHC RBC AUTO-ENTMCNC: 30.6 G/DL (ref 31–37)
MCV RBC AUTO: 91.7 FL (ref 74–97)
MONOCYTES # BLD: 0.9 K/UL (ref 0.05–1.2)
MONOCYTES NFR BLD: 10 % (ref 3–10)
NEUTS SEG # BLD: 6.6 K/UL (ref 1.8–8)
NEUTS SEG NFR BLD: 77 % (ref 40–73)
PLATELET # BLD AUTO: 210 K/UL (ref 135–420)
PMV BLD AUTO: 9.9 FL (ref 9.2–11.8)
POTASSIUM SERPL-SCNC: 4 MMOL/L (ref 3.5–5.5)
RBC # BLD AUTO: 2.64 M/UL (ref 4.7–5.5)
SODIUM SERPL-SCNC: 143 MMOL/L (ref 136–145)
WBC # BLD AUTO: 8.5 K/UL (ref 4.6–13.2)

## 2018-12-15 PROCEDURE — 74011250637 HC RX REV CODE- 250/637: Performed by: EMERGENCY MEDICINE

## 2018-12-15 PROCEDURE — 74011250636 HC RX REV CODE- 250/636: Performed by: INTERNAL MEDICINE

## 2018-12-15 PROCEDURE — 74011250636 HC RX REV CODE- 250/636: Performed by: FAMILY MEDICINE

## 2018-12-15 PROCEDURE — 36415 COLL VENOUS BLD VENIPUNCTURE: CPT

## 2018-12-15 PROCEDURE — 74011000250 HC RX REV CODE- 250: Performed by: INTERNAL MEDICINE

## 2018-12-15 PROCEDURE — 82962 GLUCOSE BLOOD TEST: CPT

## 2018-12-15 PROCEDURE — 94640 AIRWAY INHALATION TREATMENT: CPT

## 2018-12-15 PROCEDURE — 74011636637 HC RX REV CODE- 636/637: Performed by: EMERGENCY MEDICINE

## 2018-12-15 PROCEDURE — 74011250636 HC RX REV CODE- 250/636

## 2018-12-15 PROCEDURE — C9113 INJ PANTOPRAZOLE SODIUM, VIA: HCPCS | Performed by: INTERNAL MEDICINE

## 2018-12-15 PROCEDURE — 80048 BASIC METABOLIC PNL TOTAL CA: CPT

## 2018-12-15 PROCEDURE — 65660000000 HC RM CCU STEPDOWN

## 2018-12-15 PROCEDURE — 74011250637 HC RX REV CODE- 250/637: Performed by: PHYSICIAN ASSISTANT

## 2018-12-15 PROCEDURE — 85025 COMPLETE CBC W/AUTO DIFF WBC: CPT

## 2018-12-15 RX ADMIN — MORPHINE SULFATE 5 MG: 10 INJECTION INTRAMUSCULAR; INTRAVENOUS; SUBCUTANEOUS at 15:22

## 2018-12-15 RX ADMIN — SODIUM CHLORIDE 125 ML/HR: 900 INJECTION, SOLUTION INTRAVENOUS at 02:26

## 2018-12-15 RX ADMIN — INSULIN LISPRO 3 UNITS: 100 INJECTION, SOLUTION INTRAVENOUS; SUBCUTANEOUS at 13:03

## 2018-12-15 RX ADMIN — BUDESONIDE AND FORMOTEROL FUMARATE DIHYDRATE 2 PUFF: 160; 4.5 AEROSOL RESPIRATORY (INHALATION) at 20:18

## 2018-12-15 RX ADMIN — GABAPENTIN 300 MG: 300 CAPSULE ORAL at 09:29

## 2018-12-15 RX ADMIN — BUPROPION HYDROCHLORIDE 150 MG: 150 TABLET, EXTENDED RELEASE ORAL at 09:28

## 2018-12-15 RX ADMIN — MORPHINE SULFATE 5 MG: 10 INJECTION INTRAMUSCULAR; INTRAVENOUS; SUBCUTANEOUS at 02:27

## 2018-12-15 RX ADMIN — MORPHINE SULFATE 5 MG: 10 INJECTION INTRAMUSCULAR; INTRAVENOUS; SUBCUTANEOUS at 21:04

## 2018-12-15 RX ADMIN — CLOTRIMAZOLE: 1 CREAM TOPICAL at 10:52

## 2018-12-15 RX ADMIN — INSULIN LISPRO 3 UNITS: 100 INJECTION, SOLUTION INTRAVENOUS; SUBCUTANEOUS at 16:22

## 2018-12-15 RX ADMIN — METOPROLOL TARTRATE 12.5 MG: 25 TABLET ORAL at 17:10

## 2018-12-15 RX ADMIN — INSULIN GLARGINE 8 UNITS: 100 INJECTION, SOLUTION SUBCUTANEOUS at 23:20

## 2018-12-15 RX ADMIN — GABAPENTIN 300 MG: 300 CAPSULE ORAL at 17:10

## 2018-12-15 RX ADMIN — METOPROLOL TARTRATE 12.5 MG: 25 TABLET ORAL at 09:29

## 2018-12-15 RX ADMIN — MORPHINE SULFATE 5 MG: 10 INJECTION INTRAMUSCULAR; INTRAVENOUS; SUBCUTANEOUS at 10:49

## 2018-12-15 RX ADMIN — INSULIN LISPRO 3 UNITS: 100 INJECTION, SOLUTION INTRAVENOUS; SUBCUTANEOUS at 09:36

## 2018-12-15 RX ADMIN — BUDESONIDE AND FORMOTEROL FUMARATE DIHYDRATE 2 PUFF: 160; 4.5 AEROSOL RESPIRATORY (INHALATION) at 10:28

## 2018-12-15 RX ADMIN — SODIUM CHLORIDE 40 MG: 9 INJECTION, SOLUTION INTRAMUSCULAR; INTRAVENOUS; SUBCUTANEOUS at 09:32

## 2018-12-15 RX ADMIN — SODIUM CHLORIDE 40 MG: 9 INJECTION, SOLUTION INTRAMUSCULAR; INTRAVENOUS; SUBCUTANEOUS at 21:04

## 2018-12-15 RX ADMIN — SODIUM CHLORIDE 125 ML/HR: 900 INJECTION, SOLUTION INTRAVENOUS at 20:30

## 2018-12-15 NOTE — PROGRESS NOTES
[facility]  Progress Note    Patient: Shiva Rodriguez. Age: 60 y.o. : 1960 MR#: [id number] SSN: [id number]  Date/Time: 12/15/2018    Subjective:     Seen with family present. No F/C, N/V, CP, SOB. Pain controlled. States he may have passed a tiny amount of gas. No BM. Reviewed path results with pt and family. Surgery note reviewed and appreciated. Assessment/Plan:   1. UGIBleed with GIST, acute blood loss anemia - s/p partial gastrectomy and mass resxn. Path report reviewed, results pasted below. Continue to follow H/H. Aim for NGTube removal tomorrow and resumption of Xarelto, as d/w surgery. 2. Chronic AFib - Xarelto as above. Rate remains controlled. On BBlocker. 3. HTN - BPs acceptable. 4. DM2 - SSI. 5. Dyslipidemia - statin. 6. Recent R hand fx - splinted. 7. FITO hx - no acute. 8. COPD hx - no acute. 9. 12/10 EGD path report: benign gastric mucosa with mild chronic active gastritis. PPI BID. Additional Notes:     Path report:      Case discussed with:  [x]Patient  [x]Family  []Nursing  []Case Management  DVT Prophylaxis:  []Lovenox  []Hep SQ  [x]SCDs  []Coumadin   []On Heparin gtt    Objective:   VS:   Visit Vitals  /80 (BP 1 Location: Right arm, BP Patient Position: At rest)   Pulse 93   Temp 96.9 °F (36.1 °C)   Resp 18   Ht 5' 11\" (1.803 m)   Wt 122.5 kg (270 lb)   SpO2 96%   BMI 37.66 kg/m²      Tmax/24hrs: Temp (24hrs), Av.8 °F (36.6 °C), Min:96.9 °F (36.1 °C), Max:98.5 °F (36.9 °C)    Input/Output:     Intake/Output Summary (Last 24 hours) at 12/15/2018 1429  Last data filed at 12/15/2018 0825  Gross per 24 hour   Intake    Output 1150 ml   Net -1150 ml       General:  Awake, alert, NAD. Cardiovascular:  RRR. Pulmonary:  CTA B.  GI:  Soft, ND, scattered BSounds. Op site dressed, c/d/i. Extremities:  No CT or edema.    Additional:      Labs:    Recent Results (from the past 24 hour(s))   GLUCOSE, POC Collection Time: 12/14/18  4:43 PM   Result Value Ref Range    Glucose (POC) 162 (H) 70 - 110 mg/dL   CBC W/O DIFF    Collection Time: 12/14/18  6:52 PM   Result Value Ref Range    WBC 8.0 4.6 - 13.2 K/uL    RBC 2.34 (L) 4.70 - 5.50 M/uL    HGB 6.6 (L) 13.0 - 16.0 g/dL    HCT 21.4 (L) 36.0 - 48.0 %    MCV 91.5 74.0 - 97.0 FL    MCH 28.2 24.0 - 34.0 PG    MCHC 30.8 (L) 31.0 - 37.0 g/dL    RDW 16.9 (H) 11.6 - 14.5 %    PLATELET 975 775 - 858 K/uL    MPV 9.7 9.2 - 11.8 FL   GLUCOSE, POC    Collection Time: 12/14/18  9:53 PM   Result Value Ref Range    Glucose (POC) 161 (H) 70 - 057 mg/dL   METABOLIC PANEL, BASIC    Collection Time: 12/15/18  4:20 AM   Result Value Ref Range    Sodium 143 136 - 145 mmol/L    Potassium 4.0 3.5 - 5.5 mmol/L    Chloride 112 (H) 100 - 108 mmol/L    CO2 22 21 - 32 mmol/L    Anion gap 9 3.0 - 18 mmol/L    Glucose 154 (H) 74 - 99 mg/dL    BUN 15 7.0 - 18 MG/DL    Creatinine 0.86 0.6 - 1.3 MG/DL    BUN/Creatinine ratio 17 12 - 20      GFR est AA >60 >60 ml/min/1.73m2    GFR est non-AA >60 >60 ml/min/1.73m2    Calcium 8.7 8.5 - 10.1 MG/DL   CBC WITH AUTOMATED DIFF    Collection Time: 12/15/18  4:20 AM   Result Value Ref Range    WBC 8.5 4.6 - 13.2 K/uL    RBC 2.64 (L) 4.70 - 5.50 M/uL    HGB 7.4 (L) 13.0 - 16.0 g/dL    HCT 24.2 (L) 36.0 - 48.0 %    MCV 91.7 74.0 - 97.0 FL    MCH 28.0 24.0 - 34.0 PG    MCHC 30.6 (L) 31.0 - 37.0 g/dL    RDW 17.0 (H) 11.6 - 14.5 %    PLATELET 745 406 - 609 K/uL    MPV 9.9 9.2 - 11.8 FL    NEUTROPHILS 77 (H) 40 - 73 %    LYMPHOCYTES 12 (L) 21 - 52 %    MONOCYTES 10 3 - 10 %    EOSINOPHILS 1 0 - 5 %    BASOPHILS 0 0 - 2 %    ABS. NEUTROPHILS 6.6 1.8 - 8.0 K/UL    ABS. LYMPHOCYTES 1.0 0.9 - 3.6 K/UL    ABS. MONOCYTES 0.9 0.05 - 1.2 K/UL    ABS. EOSINOPHILS 0.1 0.0 - 0.4 K/UL    ABS.  BASOPHILS 0.0 0.0 - 0.1 K/UL    DF AUTOMATED     GLUCOSE, POC    Collection Time: 12/15/18  8:23 AM   Result Value Ref Range    Glucose (POC) 157 (H) 70 - 110 mg/dL   GLUCOSE, POC Collection Time: 12/15/18 12:17 PM   Result Value Ref Range    Glucose (POC) 175 (H) 70 - 110 mg/dL     Additional Data Reviewed:      Signed By: Yohan Rodriguez MD     December 15, 2018 5:10 PM
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
## 2018-12-15 NOTE — PROGRESS NOTES
No new surgical issues  AVSS  NGTO decreased    Abd - s/nd, hypoactive BS,  Incision c/d/i    WBC nl  H/H improved from last pm    Stable POD#2  Cont npo/ivf/NGT -- possible d/c ngt if output continues to decrease  OOB  Monitor H/H, electrolytes

## 2018-12-15 NOTE — PROGRESS NOTES
Bedside shift change report given to this RN (oncoming nurse) by Nyra Ganser, RN (offgoing nurse). Report included the following information SBAR, Kardex and Cardiac Rhythm A-FIB.

## 2018-12-15 NOTE — PROGRESS NOTES
NUTRITION    Nutrition Screen      RECOMMENDATIONS / PLAN:     - Recommend starting liquid diet if pt has minimal out from NGT and able to tolerate trial of clamping without increased abdominal pain, nausea/vomiting.  - Continue IV fluids until oral diet started and intake adequate. - Continue RD inpatient monitoring and evaluation. NUTRITION INTERVENTIONS & DIAGNOSIS:     [x] IV fluid: NS at 125 mL/hr    Nutrition Diagnosis: Inadequate oral intake related to altered GI function, s/p partial gastrectomy and mass resection as evidenced by pt NPO, with NGT to suction. ASSESSMENT:     12/15: Pt remains NPO with NGT to suction, output improving. 12/14: Pt had good appetite and meal intake PTA per family report. Had been tolerating po intake PTA. Was NPO, then on clear liquid diet x 2 days since admission. NPO yesterday for partial gastrectomy and mass resection. Pt remain NPO today with NGT to suction. Has some abdominal pain. Per chart review, pt has episodes of n/v and diarrhea PTA.      Average po intake adequate to meet patients estimated nutritional needs:   [] Yes     [x] No   [] Unable to determine at this time    Diet: DIET NPO      Food Allergies: NKFA  Current Appetite:   [] Good     [] Fair     [] Poor     [x] Other: NPO  Appetite/meal intake prior to admission:   [x] Good     [] Fair     [] Poor     [] Other:  Feeding Limitations:  [] Swallowing difficulty    [] Chewing difficulty    [] Other:  Current Meal Intake:   Patient Vitals for the past 100 hrs:   % Diet Eaten   12/14/18 0953 0 %   12/13/18 1746 0 %   12/13/18 1327 0 %   12/12/18 1749 75 %   12/12/18 1333 100 %   12/12/18 0934 100 %       NGT to continuous suction: 400 mL output in last 24 hours  BM: 12/13, loose  Skin Integrity: abdominal wound, right hand percutaneous wound/wire  Edema:   [] No     [x] Yes   Pertinent Medications: Reviewed: zofran    Recent Labs     12/15/18  0420 12/14/18  0533 12/13/18  0513    143 141   K 4.0 3.9 3.4*   * 111* 109*   CO2 22 23 24   * 133* 147*   BUN 15 14 23*   CREA 0.86 0.94 1.07   CA 8.7 8.6 8.6   MG  --  2.4  --        Intake/Output Summary (Last 24 hours) at 12/15/2018 1425  Last data filed at 12/15/2018 0825  Gross per 24 hour   Intake    Output 1150 ml   Net -1150 ml       Anthropometrics:  Ht Readings from Last 1 Encounters:   12/13/18 5' 11\" (1.803 m)     Last 3 Recorded Weights in this Encounter    12/09/18 2239 12/12/18 1333 12/13/18 0901   Weight: 122.5 kg (270 lb) 122.5 kg (270 lb) 122.5 kg (270 lb)     Body mass index is 37.66 kg/m². Obese, Class II    Weight History: no recent change in weight PTA per family report    Weight Metrics 12/13/2018 12/3/2018 11/28/2018 11/25/2018 11/15/2018 11/8/2018 10/1/2018   Weight 270 lb 272 lb 270 lb 3.2 oz 265 lb 271 lb 269 lb 12.8 oz 270 lb   BMI 37.66 kg/m2 37.94 kg/m2 37.69 kg/m2 36.96 kg/m2 37.8 kg/m2 37.63 kg/m2 37.66 kg/m2        Admitting Diagnosis: Acute GI bleeding  Pertinent PMHx:  COPD, DM, GERD, high cholesterol, HTN, pneumonia    Education Needs:        [x] None identified  [] Identified - Not appropriate at this time  []  Identified and addressed - refer to education log  Learning Limitations:   [x] None identified  [] Identified    Cultural, [employer] & ethnic food preferences:  [x] None identified    [] Identified and addressed     ESTIMATED NUTRITION NEEDS:     1000 kcal deficit for promotion of weight loss  Calories: 7577-8809 kcal (25-28 kcal/kg) based on  [x] Actual BW: 123 kg      [] IBW   Protein: 123-148 gm (1-1.2 gm/kg) based on  [x] Actual BW      [] IBW   Fluid: 1 mL/kcal     MONITORING & EVALUATION:     Nutrition Goal(s):   1. Po intake of meals will meet >75% of patient estimated nutritional needs within the next 7 days.   Outcome:  [] Met/Ongoing    [x]  Not Met    [] New/Initial Goal     Monitoring:   [] Food and beverage intake   [] Diet order   [x] Nutrition-focused physical findings   [x] Treatment/therapy [] Weight   [] Enteral nutrition intake        Previous Recommendations (for follow-up assessments only):     []   Implemented       []   Not Implemented (RD to address)      [] No Longer Appropriate     [x] No Recommendation Made     Discharge Planning: cardiac, diabetic diet as tolerated   [x] Participated in care planning, discharge planning, & interdisciplinary rounds as appropriate      Miriam Viera, [address]    Pager: [phone]

## 2018-12-16 LAB
GLUCOSE BLD STRIP.AUTO-MCNC: 169 MG/DL (ref 70–110)
GLUCOSE BLD STRIP.AUTO-MCNC: 172 MG/DL (ref 70–110)
GLUCOSE BLD STRIP.AUTO-MCNC: 175 MG/DL (ref 70–110)
GLUCOSE BLD STRIP.AUTO-MCNC: 197 MG/DL (ref 70–110)

## 2018-12-16 PROCEDURE — 82962 GLUCOSE BLOOD TEST: CPT

## 2018-12-16 PROCEDURE — 74011250636 HC RX REV CODE- 250/636: Performed by: INTERNAL MEDICINE

## 2018-12-16 PROCEDURE — 74011000250 HC RX REV CODE- 250: Performed by: INTERNAL MEDICINE

## 2018-12-16 PROCEDURE — 94760 N-INVAS EAR/PLS OXIMETRY 1: CPT

## 2018-12-16 PROCEDURE — 94640 AIRWAY INHALATION TREATMENT: CPT

## 2018-12-16 PROCEDURE — 74011250637 HC RX REV CODE- 250/637: Performed by: EMERGENCY MEDICINE

## 2018-12-16 PROCEDURE — 74011250636 HC RX REV CODE- 250/636: Performed by: FAMILY MEDICINE

## 2018-12-16 PROCEDURE — 74011250636 HC RX REV CODE- 250/636

## 2018-12-16 PROCEDURE — 74011250637 HC RX REV CODE- 250/637: Performed by: PHYSICIAN ASSISTANT

## 2018-12-16 PROCEDURE — 74011000250 HC RX REV CODE- 250: Performed by: PHYSICIAN ASSISTANT

## 2018-12-16 PROCEDURE — 74011636637 HC RX REV CODE- 636/637: Performed by: EMERGENCY MEDICINE

## 2018-12-16 PROCEDURE — C9113 INJ PANTOPRAZOLE SODIUM, VIA: HCPCS | Performed by: INTERNAL MEDICINE

## 2018-12-16 PROCEDURE — 74011250637 HC RX REV CODE- 250/637: Performed by: INTERNAL MEDICINE

## 2018-12-16 PROCEDURE — 65660000000 HC RM CCU STEPDOWN

## 2018-12-16 RX ADMIN — MORPHINE SULFATE 5 MG: 10 INJECTION INTRAMUSCULAR; INTRAVENOUS; SUBCUTANEOUS at 11:59

## 2018-12-16 RX ADMIN — MORPHINE SULFATE 5 MG: 10 INJECTION INTRAMUSCULAR; INTRAVENOUS; SUBCUTANEOUS at 21:56

## 2018-12-16 RX ADMIN — INSULIN GLARGINE 8 UNITS: 100 INJECTION, SOLUTION SUBCUTANEOUS at 21:55

## 2018-12-16 RX ADMIN — SODIUM CHLORIDE 125 ML/HR: 900 INJECTION, SOLUTION INTRAVENOUS at 11:41

## 2018-12-16 RX ADMIN — METOPROLOL TARTRATE 12.5 MG: 25 TABLET ORAL at 10:04

## 2018-12-16 RX ADMIN — DOCUSATE SODIUM 100 MG: 100 CAPSULE, LIQUID FILLED ORAL at 12:00

## 2018-12-16 RX ADMIN — INSULIN LISPRO 3 UNITS: 100 INJECTION, SOLUTION INTRAVENOUS; SUBCUTANEOUS at 21:55

## 2018-12-16 RX ADMIN — BUDESONIDE AND FORMOTEROL FUMARATE DIHYDRATE 2 PUFF: 160; 4.5 AEROSOL RESPIRATORY (INHALATION) at 08:57

## 2018-12-16 RX ADMIN — BUDESONIDE AND FORMOTEROL FUMARATE DIHYDRATE 2 PUFF: 160; 4.5 AEROSOL RESPIRATORY (INHALATION) at 21:32

## 2018-12-16 RX ADMIN — INSULIN LISPRO 3 UNITS: 100 INJECTION, SOLUTION INTRAVENOUS; SUBCUTANEOUS at 10:05

## 2018-12-16 RX ADMIN — GABAPENTIN 300 MG: 300 CAPSULE ORAL at 10:03

## 2018-12-16 RX ADMIN — SODIUM CHLORIDE 125 ML/HR: 900 INJECTION, SOLUTION INTRAVENOUS at 20:28

## 2018-12-16 RX ADMIN — MORPHINE SULFATE 5 MG: 10 INJECTION INTRAMUSCULAR; INTRAVENOUS; SUBCUTANEOUS at 16:56

## 2018-12-16 RX ADMIN — SODIUM CHLORIDE 40 MG: 9 INJECTION, SOLUTION INTRAMUSCULAR; INTRAVENOUS; SUBCUTANEOUS at 10:04

## 2018-12-16 RX ADMIN — METOPROLOL TARTRATE 12.5 MG: 25 TABLET ORAL at 18:00

## 2018-12-16 RX ADMIN — BUPROPION HYDROCHLORIDE 150 MG: 150 TABLET, EXTENDED RELEASE ORAL at 10:03

## 2018-12-16 RX ADMIN — IPRATROPIUM BROMIDE AND ALBUTEROL SULFATE 3 ML: .5; 3 SOLUTION RESPIRATORY (INHALATION) at 08:18

## 2018-12-16 RX ADMIN — GABAPENTIN 300 MG: 300 CAPSULE ORAL at 18:00

## 2018-12-16 RX ADMIN — SODIUM CHLORIDE 40 MG: 9 INJECTION, SOLUTION INTRAMUSCULAR; INTRAVENOUS; SUBCUTANEOUS at 20:30

## 2018-12-16 RX ADMIN — ONDANSETRON 4 MG: 2 INJECTION INTRAMUSCULAR; INTRAVENOUS at 16:56

## 2018-12-16 RX ADMIN — ONDANSETRON 4 MG: 2 INJECTION INTRAMUSCULAR; INTRAVENOUS at 10:04

## 2018-12-16 RX ADMIN — CLOTRIMAZOLE: 1 CREAM TOPICAL at 18:00

## 2018-12-16 RX ADMIN — MORPHINE SULFATE 5 MG: 10 INJECTION INTRAMUSCULAR; INTRAVENOUS; SUBCUTANEOUS at 03:54

## 2018-12-16 RX ADMIN — ROSUVASTATIN CALCIUM 20 MG: 20 TABLET, FILM COATED ORAL at 21:56

## 2018-12-16 RX ADMIN — INSULIN LISPRO 3 UNITS: 100 INJECTION, SOLUTION INTRAVENOUS; SUBCUTANEOUS at 12:41

## 2018-12-16 RX ADMIN — CLOTRIMAZOLE: 1 CREAM TOPICAL at 10:05

## 2018-12-16 RX ADMIN — INSULIN LISPRO 3 UNITS: 100 INJECTION, SOLUTION INTRAVENOUS; SUBCUTANEOUS at 16:47

## 2018-12-16 NOTE — PROGRESS NOTES
[facility]    Progress Note      Patient: Halima Armstrong Sex: male          DOA: 12/9/2018         YOB: 1960      Age:  60 y.o.        LOS:  LOS: 6 days         S/P  Procedure(s):  LAPAROTOMY EXPLORATORY               Subjective:     No complaints Tolerating diet Voiding q shift. He is doing ok today. His hand is not giving him problems today. He has been compliant with his right hand brace. Denies cp, sob, abd pain   Objective:      Blood pressure 144/78, pulse 85, temperature 97.6 °F (36.4 °C), resp. rate 19, height 5' 11\" (1.803 m), weight 264 lb 8 oz (120 kg), SpO2 96 %.    Well developed, Well Nourished alert, cooperative, no distress  Incision clean, dry, no drainage, dressing in place  no deformity noted in right upper extremity (hand)  Sensory is intact   Motor is intact  nv intact  2+distal pulses  Neg calf tenderness  Neg for facial asymmetry     Labs:  CBC  @  CBC:   Lab Results   Component Value Date/Time    WBC 8.5 12/15/2018 04:20 AM    RBC 2.64 (L) 12/15/2018 04:20 AM    HGB 7.4 (L) 12/15/2018 04:20 AM    HCT 24.2 (L) 12/15/2018 04:20 AM    PLATELET 258 08/61/6283 04:20 AM     BMP:   Lab Results   Component Value Date/Time    Glucose 154 (H) 12/15/2018 04:20 AM    Sodium 143 12/15/2018 04:20 AM    Potassium 4.0 12/15/2018 04:20 AM    Chloride 112 (H) 12/15/2018 04:20 AM    CO2 22 12/15/2018 04:20 AM    BUN 15 12/15/2018 04:20 AM    Creatinine 0.86 12/15/2018 04:20 AM    Calcium 8.7 12/15/2018 04:20 AM   @  Coagulation  Lab Results   Component Value Date    INR 1.2 12/14/2018    APTT 24.1 12/10/2018      Basic Metabolic Profile  Lab Results   Component Value Date     12/15/2018    CO2 22 12/15/2018    BUN 15 12/15/2018       Medications Reviewed      Assessment/Plan     Principal Problem:    Acute GI bleeding (12/10/2018)    Active Problems:    Essential hypertension (12/7/2016)      COPD (chronic obstructive pulmonary disease) (6/9/2017)      Symptomatic anemia (12/10/2018)      Chronic anticoagulation (12/10/2018)      Permanent atrial fibrillation ([facility] [address] (12/10/2018)        S/P Right closed reduction and percutaneous pinning of the 5th carpometacarpal joint     1. PT and/or OT Consults: NO      2. Incision Care:  Routine Incision Care and Dressing Changes as necessary: no dressing changes to right hand  3. Pain control  4. DVT Prophylaxis - SCD in place  5.  Keep brace in place            Josselyn Rodríguez PA-C  [facility]  [phone]

## 2018-12-16 NOTE — PROGRESS NOTES
[facility]    Progress Note      Patient: Naomie Burns. Sex: male          DOA: 12/9/2018         YOB: 1960      Age:  60 y.o.        LOS:  LOS: 6 days         S/P  Right closed reduction and percutaneous pinning of the 5th carpometacarpal joint             Subjective:     No complaints Tolerating diet Voiding q shift. He is doing ok today. His hand is not giving him problems today. He has been compliant with his right hand brace. He had questions about when his pins would be pulled. Denies cp, sob, abd pain   Objective:      Blood pressure 144/78, pulse 85, temperature 97.6 °F (36.4 °C), resp. rate 19, height 5' 11\" (1.803 m), weight 264 lb 8 oz (120 kg), SpO2 96 %.    Well developed, Well Nourished alert, cooperative, no distress  Incision clean, dry, no drainage, dressing in place  no deformity noted in right upper extremity (hand)  Sensory is intact   Motor is intact  nv intact  2+distal pulses  Neg calf tenderness  Neg for facial asymmetry     Labs:  CBC  @  CBC:   Lab Results   Component Value Date/Time    WBC 8.5 12/15/2018 04:20 AM    RBC 2.64 (L) 12/15/2018 04:20 AM    HGB 7.4 (L) 12/15/2018 04:20 AM    HCT 24.2 (L) 12/15/2018 04:20 AM    PLATELET 663 96/91/1656 04:20 AM     BMP:   Lab Results   Component Value Date/Time    Glucose 154 (H) 12/15/2018 04:20 AM    Sodium 143 12/15/2018 04:20 AM    Potassium 4.0 12/15/2018 04:20 AM    Chloride 112 (H) 12/15/2018 04:20 AM    CO2 22 12/15/2018 04:20 AM    BUN 15 12/15/2018 04:20 AM    Creatinine 0.86 12/15/2018 04:20 AM    Calcium 8.7 12/15/2018 04:20 AM   @  Coagulation  Lab Results   Component Value Date    INR 1.2 12/14/2018    APTT 24.1 12/10/2018      Basic Metabolic Profile  Lab Results   Component Value Date     12/15/2018    CO2 22 12/15/2018    BUN 15 12/15/2018       Medications Reviewed      Assessment/Plan     Principal Problem:    Acute GI bleeding (12/10/2018)    Active Problems:    Essential hypertension (12/7/2016)      COPD (chronic obstructive pulmonary disease)  (6/9/2017)      Symptomatic anemia (12/10/2018)      Chronic anticoagulation (12/10/2018)      Permanent atrial fibrillation (HCC) (12/10/2018)        S/P Right closed reduction and percutaneous pinning of the 5th carpometacarpal joint     1. PT and/or OT Consults: NO      2. Incision Care:  Routine Incision Care and Dressing Changes as necessary: no dressing changes to right hand  3. Pain control  4. DVT Prophylaxis - SCD in place  5.  Keep brace in place          [facility]  [phone]

## 2018-12-16 NOTE — PROGRESS NOTES
No new surgical issues,  Small amt flatus, no BM today  AVSS   overnight (pt admits he may have had too many ice chips)     Abd - s/nd, hypoactive BS,  Incision c/d/i     WBC nl  H/H stable     Stable POD#3  Cont npo/ivf/NGT -- NGT clamping trial tonight, possible d/c in am  OOB (is pt only allowed OOB w PT?)  Monitor electrolytes

## 2018-12-16 NOTE — ROUTINE PROCESS
Bedside and Verbal shift change report given to Arianne Rapp (oncoming nurse) by Sarah Molina (offgoing nurse). Report included the following information SBAR, Kardex, Intake/Output, Recent Results and Cardiac Rhythm controlled atrial fib.

## 2018-12-16 NOTE — PROGRESS NOTES
[facility]  Progress Note    Patient: Mario Sanford. Age: 60 y.o. : 1960 MR#: [id number] SSN: [id number]  Date/Time: 2018    Subjective:     Seen with mother in room. No F/C, N/V, CP, SOB. Feels better overall. +Flatus, +BM. Assessment/Plan:   1. UGIBleed with GIST, acute blood loss anemia - s/p partial gastrectomy and mass resxn. Path report reviewed, results pasted below. Appears hemodynamically stable. Will f/u surgery recs, aim for d/c of NGTube. 2. Chronic AFib - Xarelto resumption once NGTube out. Rate remains controlled. On BBlocker. 3. HTN - BPs acceptable. Will d/c IVFluids once NGTube out. Continue current med tx. 4. DM2 - SSI. 5. Dyslipidemia - statin. 6. Recent R hand fx - splinted. Appreciate ortho eval. No new issues. 7. FITO hx - no acute. 8. COPD hx - no acute. 9. 12/10 EGD path report: benign gastric mucosa with mild chronic active gastritis. PPI BID. Additional Notes:     Path report:      Case discussed with:  [x]Patient  [x]Family  []Nursing  []Case Management  DVT Prophylaxis:  []Lovenox  []Hep SQ  [x]SCDs  []Coumadin   []On Heparin gtt    Objective:   VS:   Visit Vitals  /77 (BP 1 Location: Right arm, BP Patient Position: At rest)   Pulse 84   Temp 97.2 °F (36.2 °C)   Resp 18   Ht 5' 11\" (1.803 m)   Wt 120 kg (264 lb 8 oz)   SpO2 97%   BMI 36.89 kg/m²      Tmax/24hrs: Temp (24hrs), Av.1 °F (36.7 °C), Min:97 °F (36.1 °C), Max:99.4 °F (37.4 °C)    Input/Output:     Intake/Output Summary (Last 24 hours) at 2018 1553  Last data filed at 2018 0852  Gross per 24 hour   Intake 1308.75 ml   Output 2350 ml   Net -1041.25 ml       General:  Awake, alert, NAD. Cardiovascular:  RRR. Pulmonary:  CTA B.  GI:  Soft, NT/ND, NABS. Extremities:  No CT or edema.    Additional:      Labs:    Recent Results (from the past 24 hour(s))   GLUCOSE, POC    Collection Time: 12/15/18  4:10 PM   Result Value Ref Range    Glucose (POC) 156 (H) 70 - 110 mg/dL   GLUCOSE, POC    Collection Time: 12/15/18 10:23 PM   Result Value Ref Range    Glucose (POC) 146 (H) 70 - 110 mg/dL   GLUCOSE, POC    Collection Time: 12/16/18  7:47 AM   Result Value Ref Range    Glucose (POC) 172 (H) 70 - 110 mg/dL   GLUCOSE, POC    Collection Time: 12/16/18 11:41 AM   Result Value Ref Range    Glucose (POC) 197 (H) 70 - 110 mg/dL     Additional Data Reviewed:      Signed By: Evelyn Berry MD     December 16, 2018 5:10 PM

## 2018-12-17 LAB
ANION GAP SERPL CALC-SCNC: 7 MMOL/L (ref 3–18)
BASOPHILS # BLD: 0 K/UL (ref 0–0.1)
BASOPHILS NFR BLD: 0 % (ref 0–2)
BUN SERPL-MCNC: 15 MG/DL (ref 7–18)
BUN/CREAT SERPL: 19 (ref 12–20)
CALCIUM SERPL-MCNC: 8.5 MG/DL (ref 8.5–10.1)
CHLORIDE SERPL-SCNC: 114 MMOL/L (ref 100–108)
CO2 SERPL-SCNC: 23 MMOL/L (ref 21–32)
CREAT SERPL-MCNC: 0.81 MG/DL (ref 0.6–1.3)
DIFFERENTIAL METHOD BLD: ABNORMAL
EOSINOPHIL # BLD: 0.1 K/UL (ref 0–0.4)
EOSINOPHIL NFR BLD: 2 % (ref 0–5)
ERYTHROCYTE [DISTWIDTH] IN BLOOD BY AUTOMATED COUNT: 16.6 % (ref 11.6–14.5)
GLUCOSE BLD STRIP.AUTO-MCNC: 166 MG/DL (ref 70–110)
GLUCOSE BLD STRIP.AUTO-MCNC: 190 MG/DL (ref 70–110)
GLUCOSE BLD STRIP.AUTO-MCNC: 210 MG/DL (ref 70–110)
GLUCOSE BLD STRIP.AUTO-MCNC: 249 MG/DL (ref 70–110)
GLUCOSE SERPL-MCNC: 141 MG/DL (ref 74–99)
HCT VFR BLD AUTO: 23.3 % (ref 36–48)
HGB BLD-MCNC: 7.2 G/DL (ref 13–16)
LYMPHOCYTES # BLD: 0.9 K/UL (ref 0.9–3.6)
LYMPHOCYTES NFR BLD: 15 % (ref 21–52)
MCH RBC QN AUTO: 27.9 PG (ref 24–34)
MCHC RBC AUTO-ENTMCNC: 30.9 G/DL (ref 31–37)
MCV RBC AUTO: 90.3 FL (ref 74–97)
MONOCYTES # BLD: 0.8 K/UL (ref 0.05–1.2)
MONOCYTES NFR BLD: 13 % (ref 3–10)
NEUTS SEG # BLD: 4.3 K/UL (ref 1.8–8)
NEUTS SEG NFR BLD: 70 % (ref 40–73)
PLATELET # BLD AUTO: 216 K/UL (ref 135–420)
PMV BLD AUTO: 9.6 FL (ref 9.2–11.8)
POTASSIUM SERPL-SCNC: 3.8 MMOL/L (ref 3.5–5.5)
RBC # BLD AUTO: 2.58 M/UL (ref 4.7–5.5)
SODIUM SERPL-SCNC: 144 MMOL/L (ref 136–145)
WBC # BLD AUTO: 6.1 K/UL (ref 4.6–13.2)

## 2018-12-17 PROCEDURE — 85025 COMPLETE CBC W/AUTO DIFF WBC: CPT

## 2018-12-17 PROCEDURE — 74011250637 HC RX REV CODE- 250/637: Performed by: PHYSICIAN ASSISTANT

## 2018-12-17 PROCEDURE — 82962 GLUCOSE BLOOD TEST: CPT

## 2018-12-17 PROCEDURE — 74011000250 HC RX REV CODE- 250: Performed by: INTERNAL MEDICINE

## 2018-12-17 PROCEDURE — 74011636637 HC RX REV CODE- 636/637: Performed by: EMERGENCY MEDICINE

## 2018-12-17 PROCEDURE — 94640 AIRWAY INHALATION TREATMENT: CPT

## 2018-12-17 PROCEDURE — 74011250636 HC RX REV CODE- 250/636

## 2018-12-17 PROCEDURE — 74011250637 HC RX REV CODE- 250/637: Performed by: FAMILY MEDICINE

## 2018-12-17 PROCEDURE — 74011250637 HC RX REV CODE- 250/637: Performed by: EMERGENCY MEDICINE

## 2018-12-17 PROCEDURE — C9113 INJ PANTOPRAZOLE SODIUM, VIA: HCPCS | Performed by: INTERNAL MEDICINE

## 2018-12-17 PROCEDURE — 80048 BASIC METABOLIC PNL TOTAL CA: CPT

## 2018-12-17 PROCEDURE — 74011250637 HC RX REV CODE- 250/637: Performed by: INTERNAL MEDICINE

## 2018-12-17 PROCEDURE — 65660000000 HC RM CCU STEPDOWN

## 2018-12-17 PROCEDURE — 74011250636 HC RX REV CODE- 250/636: Performed by: INTERNAL MEDICINE

## 2018-12-17 PROCEDURE — 36415 COLL VENOUS BLD VENIPUNCTURE: CPT

## 2018-12-17 PROCEDURE — 74011250636 HC RX REV CODE- 250/636: Performed by: FAMILY MEDICINE

## 2018-12-17 RX ORDER — SODIUM CHLORIDE 9 MG/ML
1000 INJECTION, SOLUTION INTRAVENOUS ONCE
Status: COMPLETED | OUTPATIENT
Start: 2018-12-18 | End: 2018-12-18

## 2018-12-17 RX ORDER — LOSARTAN POTASSIUM 25 MG/1
25 TABLET ORAL DAILY
Status: DISCONTINUED | OUTPATIENT
Start: 2018-12-17 | End: 2018-12-20

## 2018-12-17 RX ORDER — LOSARTAN POTASSIUM 25 MG/1
25 TABLET ORAL DAILY
Qty: 30 TAB | Refills: 1 | Status: SHIPPED | OUTPATIENT
Start: 2018-12-17 | End: 2018-12-21 | Stop reason: SDUPTHER

## 2018-12-17 RX ORDER — PANTOPRAZOLE SODIUM 40 MG/1
40 TABLET, DELAYED RELEASE ORAL 2 TIMES DAILY
Qty: 60 TAB | Refills: 1 | Status: SHIPPED | OUTPATIENT
Start: 2018-12-17 | End: 2019-02-08 | Stop reason: SDUPTHER

## 2018-12-17 RX ADMIN — ACETAMINOPHEN 650 MG: 325 TABLET ORAL at 20:31

## 2018-12-17 RX ADMIN — SODIUM CHLORIDE 125 ML/HR: 900 INJECTION, SOLUTION INTRAVENOUS at 03:58

## 2018-12-17 RX ADMIN — GABAPENTIN 300 MG: 300 CAPSULE ORAL at 08:34

## 2018-12-17 RX ADMIN — INSULIN LISPRO 6 UNITS: 100 INJECTION, SOLUTION INTRAVENOUS; SUBCUTANEOUS at 12:58

## 2018-12-17 RX ADMIN — INSULIN GLARGINE 8 UNITS: 100 INJECTION, SOLUTION SUBCUTANEOUS at 23:17

## 2018-12-17 RX ADMIN — DOCUSATE SODIUM 100 MG: 100 CAPSULE, LIQUID FILLED ORAL at 08:34

## 2018-12-17 RX ADMIN — SODIUM CHLORIDE 40 MG: 9 INJECTION, SOLUTION INTRAMUSCULAR; INTRAVENOUS; SUBCUTANEOUS at 08:34

## 2018-12-17 RX ADMIN — MORPHINE SULFATE 5 MG: 10 INJECTION INTRAMUSCULAR; INTRAVENOUS; SUBCUTANEOUS at 08:27

## 2018-12-17 RX ADMIN — DOCUSATE SODIUM 100 MG: 100 CAPSULE, LIQUID FILLED ORAL at 18:41

## 2018-12-17 RX ADMIN — METOPROLOL TARTRATE 12.5 MG: 25 TABLET ORAL at 18:41

## 2018-12-17 RX ADMIN — INSULIN LISPRO 3 UNITS: 100 INJECTION, SOLUTION INTRAVENOUS; SUBCUTANEOUS at 08:47

## 2018-12-17 RX ADMIN — CLOTRIMAZOLE: 1 CREAM TOPICAL at 08:45

## 2018-12-17 RX ADMIN — BUPROPION HYDROCHLORIDE 150 MG: 150 TABLET, EXTENDED RELEASE ORAL at 08:34

## 2018-12-17 RX ADMIN — MORPHINE SULFATE 5 MG: 10 INJECTION INTRAMUSCULAR; INTRAVENOUS; SUBCUTANEOUS at 14:55

## 2018-12-17 RX ADMIN — MORPHINE SULFATE 5 MG: 10 INJECTION INTRAMUSCULAR; INTRAVENOUS; SUBCUTANEOUS at 03:26

## 2018-12-17 RX ADMIN — GABAPENTIN 300 MG: 300 CAPSULE ORAL at 18:40

## 2018-12-17 RX ADMIN — INSULIN LISPRO 6 UNITS: 100 INJECTION, SOLUTION INTRAVENOUS; SUBCUTANEOUS at 18:42

## 2018-12-17 RX ADMIN — ROSUVASTATIN CALCIUM 20 MG: 20 TABLET, FILM COATED ORAL at 23:18

## 2018-12-17 RX ADMIN — SODIUM CHLORIDE 40 MG: 9 INJECTION, SOLUTION INTRAMUSCULAR; INTRAVENOUS; SUBCUTANEOUS at 20:32

## 2018-12-17 RX ADMIN — MORPHINE SULFATE 5 MG: 10 INJECTION INTRAMUSCULAR; INTRAVENOUS; SUBCUTANEOUS at 19:12

## 2018-12-17 RX ADMIN — BUDESONIDE AND FORMOTEROL FUMARATE DIHYDRATE 2 PUFF: 160; 4.5 AEROSOL RESPIRATORY (INHALATION) at 21:05

## 2018-12-17 RX ADMIN — INSULIN LISPRO 3 UNITS: 100 INJECTION, SOLUTION INTRAVENOUS; SUBCUTANEOUS at 23:07

## 2018-12-17 RX ADMIN — LOSARTAN POTASSIUM 25 MG: 25 TABLET, FILM COATED ORAL at 18:40

## 2018-12-17 RX ADMIN — METOPROLOL TARTRATE 12.5 MG: 25 TABLET ORAL at 08:35

## 2018-12-17 RX ADMIN — BUDESONIDE AND FORMOTEROL FUMARATE DIHYDRATE 2 PUFF: 160; 4.5 AEROSOL RESPIRATORY (INHALATION) at 08:43

## 2018-12-17 NOTE — OP NOTES
[facility]  OPERATIVE REPORT    Name:IRENE JIANG  MR#: [id number]  : 1960  ACCOUNT #: [de-identified]   DATE OF SERVICE: 2018    PREOPERATIVE DIAGNOSIS:  Gastric mass. POSTOPERATIVE DIAGNOSIS:  Gastric mass. PROCEDURE PERFORMED:  Partial gastrectomy, resection of gastric mass. SURGEON:  Beverley Locke MD    ASSISTANT:  .    ESTIMATED BLOOD LOSS:  Minimal.    COMPLICATIONS:  None. ANESTHESIA:  General.    SPECIMENS REMOVED:  Gastric mass. IMPLANTS:  None. The patient is a 78-year-old gentleman with a history of an upper GI bleed. He was worked up for this using endoscopy and about a 4 cm gastric mass was noted in his anterior gastric wall. He was on anticoagulation, which was stopped, and his bleeding was stopped with the scope. He is being brought to the operating room for resection of the gastric mass. DESCRIPTION OF PROCEDURE:  After appropriate antibiotics and sequential compression stockings, the patient was taken to the operating room and placed in a supine position on the OR table. After adequate general anesthesia, his abdomen was prepped and draped to CDC standard. A vertical midline incision was created and Bovie electrocautery was used to take down the subcutaneous tissue to the fascia. The fascia was opened sharply with a knife and the Metzenbaum scissors were used to open the peritoneum. The Bovie was then used to complete the incision. The gastric mass was on the anterior gastric wall easily palpable through this relatively small incision and it was brought into the wound. Using the Bovie electrocautery, the anterior wall of the stomach including the mass was resected. Hemostasis was good. The gastric mucosa and wall were then closed with 3-0 Vicryl. An NG tube was placed and confirmed in good position by palpation. Then 3-0 silk lemberted sutures were used to create a second layer of closure.   A small piece of omentum was then placed over the gastric wall and sutured into position with 3-0 silk as an onlay June Dudley patch. Copious irrigation was carried out and suctioned free and hemostasis appeared good. The wound was then closed with interrupted #1 PDS. The skin was closed with 3-0 Vicryl and 4-0 Monocryl. Steri-Strips and sterile dressings were applied. Then 0.25% Marcaine with epinephrine was used around the wound. He tolerated the procedure well and was taken to recovery in stable condition. There were no palpable lymph nodes or liver lesions felt during the exploration.       MD OTILIA Reese / ANSHUL  D: 12/17/2018 07:46     T: 12/17/2018 08:31  JOB #: [id number]

## 2018-12-17 NOTE — PROGRESS NOTES
Pt was reported (by [facility]) to have five  PVCs. MD (Dr Esteban Higginbotham) was notified. No new orders received. Will continue to monitor.

## 2018-12-17 NOTE — DISCHARGE SUMMARY
[address]  MR#: [id number]  : 1960  ACCOUNT #: [de-identified]   ADMIT DATE: 2018  DISCHARGE DATE: 2018    Please note this is a preliminary summary dictated for continuity, as the patient will be undergoing change of service, and I anticipate the patient to be discharged in the next 1 or 2 days. DISCHARGE DIAGNOSES:  1. Upper gastrointestinal bleed with gastric tumor, acute blood-loss anemia, status post partial gastrectomy and mass resection. 2.  Chronic atrial fibrillation. 3.  Hypertension. 4.  Diabetes mellitus type 2.  5.  Dyslipidemia. 6.  Recent right hand fracture. 7.  History of obstructive sleep apnea. 8.  History of chronic obstructive pulmonary disease. 9.  Mild chronic active gastritis. SERVICE:  The patient was admitted to the Hospitalist Service. CONSULTS:    1.  Dr. Narayan Maya was consulted for Pulmonology, and the Intensivist Service. 2.  Dr. Baljit Solomon for Gastroenterology. 3.  Dr. Martin Cox for Cardiology. 4.  Dr. Chi Melendez for Surgery. PROCEDURES:  On 2018, the patient underwent partial gastrectomy and resection of gastric mass. The pathology report came back for this showing consistent with lipoma with fat necrosis, negative for malignancy. Chronic gastritis. On 12/10, the patient had an upper endoscopy, reports shows benign gastric mucosa with mild chronic active gastritis. HISTORY OF PRESENT ILLNESS:  Please refer to admission H and P.    Briefly, the patient is a 62-year-old gentleman with a past medical history significant for atrial fibrillation, on Xarelto, COPD, hypertension, as well as a recent fall, suffering from a right radial fracture, status post ORIF. Recently, he has started taking extra doses of Naprosyn for pain. He developed abdominal pain prior to presentation, along with some hematemesis and coffee-ground emesis.   He had several diarrhea-like episodes of tarry black stools mixed with melena. He developed generalized weakness and then came to the emergency department for further evaluation, where he had another episode of hematemesis and diarrhea. His H and H initially was 7.1/22.5, a repeat was performed about 12 hours later, showing H and H of 6.8/21. 3. For this reason, the patient was admitted to our service to the ICU. When we saw him, vital signs were stable and normal, he appeared to be in no distress, he had a regular heart, clear lungs, soft, mildly tender abdomen, with normal bowel sounds. No edema.    ___ CBC was normal, with a BUN and creatinine of 92/1.99, and a glucose of 355. He had CBC with a white count of 11.7, H and H of 8.9/28.8, which fell to 7.6/24.4 five hours later. PT/INR was 16.7/1.4, aPTT of 30.9. Chest x-ray was negative for acute. CT of the abdomen and pelvis with contrast showed no clearly acute findings. A few scattered colonic diverticula. Rounded fatty lesion seen again in the stomach, likely a pedunculated lipoma. Left renal cyst.  Small fat-containing inguinal hernias. Atherosclerosis. Bony degenerative changes. HOSPITAL COURSE BY PROBLEM:  1. Upper GI bleed with a GI tumor: Followed his H and H closely, he was typed and transfused packed red cell products, and has been hemodynamically stable since then, today measuring with an H and H of 7.2/23. 3. He underwent partial gastrectomy and mass resection, pathology report showed that the gastric tumor was consistent with lipoma with fat necrosis, negative for malignancy. He has had an NG tube in place. This has fallen out on its own. We are advancing his diet now. The plan is to resume his Xarelto in a couple of days with the assurance that he is taking p.o. intake well, without any issues. Maintained on PPI b.i.d. for his gastritis, see below. 2.  Chronic atrial fibrillation:  Rate controlled on beta-blocker therapy.   Holding Xarelto while here, plan to resume when tolerating p.o. intake without issues. 3.  Hypertension:  Pressures have been acceptable while here. We decreased his beta-blocker to 12.5 mg b.i.d. dose, pressures have been a little bit on the high side, as he has been receiving some IV fluids as well. Would start clear liquids, I will be stopping his IV fluids. Additionally, given his history of diabetes, I am going to go ahead and start his Cozaar again at lower dose of 25 mg. We will follow pressure results tomorrow. Presently, at the time of dictation, the plan is to maintain the patient on his Toprol-XL 50 mg daily, which he already takes, with his lower dose of Cozaar at 25 mg daily. I am going to go ahead and discontinue his Norvasc 10 mg and hydrochlorothiazide 12.5 mg, owing to his renal function that was seen on admission. 4.  Diabetes mellitus type 2:  Cover with sliding scale insulin regimen plus some Lantus. His hemoglobin A1c has measured 7.4% here. Resume outpatient medicines as listed below. 5.  Dyslipidemia:  On statin therapy. 6.  Recent right hand fracture, status post repair:  Splinted. Seen by Orthopedics here kindly, no dressing changes are necessary at the current time. Keep brace in place. Will arrange for outpatient followup with Orthopedics in the coming weeks. 7.  He has a history of obstructive sleep apnea:  No acute issues. 8.  History of COPD:  No acute issues. 9.  He has mild chronic active gastritis and benign gastric mucosa otherwise on his upper endoscopy done on the 10th. He will be on PPI b.i.d. until followup. 10.  Again, refer to my written progress note for today's evaluation and assessment. This is dictated for continuity, as the patient will be undergoing change of service, and I anticipate the patient to be going home in the next day or two. DISPOSITION:  Home. HOME MEDICATIONS:    New medicines as follows:  1.   Protonix 40 mg p.o. b.i.d., to take for a month, and then follow with Gastroenterology for any taper. 2.  Cozaar 25 mg p.o. every day. Otherwise, resume the followin. Albuterol MDI 2 puffs every 6 hours as needed for wheezing or shortness of breath. 2.  BiPAP machine at night. 3.  Symbicort 160/4.5 mcg 2 puffs b.i.d.  4.  Wellbutrin- mg daily. 5.  Vitamin D3 1000 units 2 caps daily. 6.  Vitamin B12 1000 mcg daily. 7.  Flexeril 10 mg nightly as needed. 8.  Diclofenac sodium (Pen[place]aid), 1 pump externally b.i.d. to affected areas. 9.  TriCor 145 mg daily. 10.  Neurontin 300 mg b.i.d.  11.  NovoLog per sliding scale q.a.c. and at bedtime. 12.  Levemir 28 units subcu daily. 13.  Janumet  mg b.i.d. with meals. 14.  Toprol-XL 50 mg daily. 15.  Multivitamin daily. 16.  Omega 3 supplement b.i.d. 17.  Xarelto 20 mg daily. 18.  Crestor 40 mg nightly. 19.  Ultram 50 mg, 1-2 tabs every day to twice daily as needed for severe pain. 20.  Levitra 20 mg as needed. 21.  Viberzi 75 mg twice daily with meals. Discontinue Norvasc 10 mg, hydrochlorothiazide 12.5 mg. We will assess pain regimen at the time of discharge. FOLLOWUP:  With his PCP, Dr. Sugar Lowe, in 7-10 days. Orthopedics in 10-14 days. With Surgery in 2-3 weeks. TIME SPENT:  Total time of discharge greater than 30 minutes. ADDENDUM    DISCHARGE SUMMARY:   Please refer to my progress notes for today's evaluation and assessment.         MD NIEVES GilesP/JENNY  D: 2018 16:02     T: 2018 16:49  JOB #: [id number]  CC: Davina Walsh MD

## 2018-12-17 NOTE — PROGRESS NOTES
Noted that pt's NG tube came out but pt stated he did not want it to be reinserted. No nausea, bloating, or any discomfort stated at this time. Will continue to monitor.

## 2018-12-17 NOTE — DISCHARGE SUMMARY
Preliminary d/c summary dictated. Meds reconciled: stopped Norvasc, HCTZ. Will keep Toprol XL 50mg going, and dropped Cozaar dose to 25mg daily. Pain meds will be address at time of discharge. Hopefully home in 1-2 days. ID#: [id number]. Addendum dictated. ID#: [id number].

## 2018-12-17 NOTE — PROGRESS NOTES
Surgery  POD4 s/p partial gastrectomy  AF VSS  Wound fine  Path shows benign lipoma  Labs reviewed  Would hold anticoags another few days  Advanced diet ,OOB

## 2018-12-17 NOTE — PROGRESS NOTES
NUTRITION    Nutrition Screen      RECOMMENDATIONS / PLAN:     - Recommend advancing po diet as medically appropriate, as pt tolerates. - Add supplement: Ensure Clear once daily, Gelatein BID   - Continue IV fluids until po intake adequate. - Continue RD inpatient monitoring and evaluation. NUTRITION INTERVENTIONS & DIAGNOSIS:     [x] IV fluid: NS at 125 mL/hr  [x] Meals/snacks: modified composition  [x] Medical food supplement therapy: initaite    Nutrition Diagnosis: Inadequate oral intake related to altered GI function, s/p partial gastrectomy and mass resection as evidenced by pt NPO, with NGT to suction. ASSESSMENT:     12/17: NGT clamped on 12/16 and removed this morning. Pt feeling better; denied having abdominal pain, nausea/vomiting. Started on clear liquid diet today; tolerated broth this morning. Agreeable to trying Ensure Clear, but only once per day, and Gelatein. 12/15: Pt remains NPO with NGT to suction, output improving. 12/14: Pt had good appetite and meal intake PTA per family report. Had been tolerating po intake PTA. Was NPO, then on clear liquid diet x 2 days since admission. NPO yesterday for partial gastrectomy and mass resection. Pt remain NPO today with NGT to suction. Has some abdominal pain. Per chart review, pt has episodes of n/v and diarrhea PTA.      Average po intake adequate to meet patients estimated nutritional needs:   [] Yes     [x] No   [] Unable to determine at this time    Diet: DIET CLEAR LIQUID      Food Allergies: NKFA  Current Appetite:   [] Good     [x] Fair     [] Poor     [] Other: NPO  Appetite/meal intake prior to admission:   [x] Good     [] Fair     [] Poor     [] Other:  Feeding Limitations:  [] Swallowing difficulty    [] Chewing difficulty    [] Other:  Current Meal Intake:   Patient Vitals for the past 100 hrs:   % Diet Eaten   12/14/18 0953 0 %   12/13/18 1746 0 %   12/13/18 1327 0 %       NGT to continuous suction:  Clamped 12/16 and removed 12/17  BM: 12/16  Skin Integrity: abdominal wound, right hand percutaneous wound/wire  Edema:   [] No     [x] Yes   Pertinent Medications: Reviewed: zofran    Recent Labs     12/17/18  0226 12/15/18  0420    143   K 3.8 4.0   * 112*   CO2 23 22   * 154*   BUN 15 15   CREA 0.81 0.86   CA 8.5 8.7       Intake/Output Summary (Last 24 hours) at 12/17/2018 1243  Last data filed at 12/17/2018 0910  Gross per 24 hour   Intake 1326.67 ml   Output 300 ml   Net 1026.67 ml       Anthropometrics:  Ht Readings from Last 1 Encounters:   12/13/18 5' 11\" (1.803 m)     Last 3 Recorded Weights in this Encounter    12/12/18 1333 12/13/18 0901 12/16/18 0532   Weight: 122.5 kg (270 lb) 122.5 kg (270 lb) 120 kg (264 lb 8 oz)     Body mass index is 36.89 kg/m². Obese, Class II    Weight History: no recent change in weight PTA per family report    Weight Metrics 12/16/2018 12/3/2018 11/28/2018 11/25/2018 11/15/2018 11/8/2018 10/1/2018   Weight 264 lb 8 oz 272 lb 270 lb 3.2 oz 265 lb 271 lb 269 lb 12.8 oz 270 lb   BMI 36.89 kg/m2 37.94 kg/m2 37.69 kg/m2 36.96 kg/m2 37.8 kg/m2 37.63 kg/m2 37.66 kg/m2        Admitting Diagnosis: Acute GI bleeding  Pertinent PMHx:  COPD, DM, GERD, high cholesterol, HTN, pneumonia    Education Needs:        [x] None identified  [] Identified - Not appropriate at this time  []  Identified and addressed - refer to education log  Learning Limitations:   [x] None identified  [] Identified    Cultural, Islam & ethnic food preferences:  [x] None identified    [] Identified and addressed     ESTIMATED NUTRITION NEEDS:     1000 kcal deficit for promotion of weight loss  Calories: 6142-5311 kcal (25-28 kcal/kg) based on  [x] Actual BW: 123 kg      [] IBW   Protein: 123-148 gm (1-1.2 gm/kg) based on  [x] Actual BW      [] IBW   Fluid: 1 mL/kcal     MONITORING & EVALUATION:     Nutrition Goal(s):   1. Po intake of meals will meet >75% of patient estimated nutritional needs within the next 7 days. Outcome:  [] Met/Ongoing    [x]  Not Met    [] New/Initial Goal     Monitoring:   [] Food and beverage intake   [] Diet order   [x] Nutrition-focused physical findings   [x] Treatment/therapy   [] Weight   [] Enteral nutrition intake        Previous Recommendations (for follow-up assessments only):     [x]   Implemented       []   Not Implemented (RD to address)      [] No Longer Appropriate     [] No Recommendation Made     Discharge Planning: cardiac, diabetic diet as tolerated   [x] Participated in care planning, discharge planning, & interdisciplinary rounds as appropriate      Gillian Boudreaux, [address] [place] [address]    Pager: [phone]
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
## 2018-12-17 NOTE — PROGRESS NOTES
[facility] Hospitalist Group  Progress Note    Patient: Lena Gastelum. Age: 60 y.o. : 1960 MR#: [id number] SSN: [id number]  Date/Time: 2018    Subjective:     Seen with mother present. NGTube had fallen out on its own late last night. Pt did not want it re-inserted. Denies F/C, N/V, CP, SOB. Very mild pain, controlled. +Flatus, +BM yesterday. Assessment/Plan:   1. UGIBleed with GIST, acute blood loss anemia - s/p partial gastrectomy and mass resxn. Path report reviewed, results pasted below. Appears hemodynamically stable. NGTube is out. Advancing diet. Planned resumption of Xarelto in a couple of days with clearance from surgery-- will want to ensure pt tolerating PO intake with issues. 2. Chronic AFib - Xarelto resumption plan as above. Rate remains controlled. On BBlocker. 3. HTN - BPs acceptable. Will d/c IVFluids. 4. DM2 - SSI. 5. Dyslipidemia - statin. 6. Recent R hand fx - splinted. Appreciate ortho eval. No new issues. 7. FITO hx - no acute. 8. COPD hx - no acute. 9. 12/10 EGD path report: benign gastric mucosa with mild chronic active gastritis. PPI BID. 10. Anticipate d/c home in 1-2 days. Additional Notes:     Path report:      Case discussed with:  [x]Patient  [x]Family  []Nursing  []Case Management  DVT Prophylaxis:  []Lovenox  []Hep SQ  [x]SCDs  []Coumadin   []On Heparin gtt    Objective:   VS:   Visit Vitals  /84 (BP 1 Location: Right arm, BP Patient Position: At rest)   Pulse 96   Temp 98.8 °F (37.1 °C)   Resp 17   Ht 5' 11\" (1.803 m)   Wt 120 kg (264 lb 8 oz)   SpO2 96%   BMI 36.89 kg/m²      Tmax/24hrs: Temp (24hrs), Av.4 °F (36.9 °C), Min:97.5 °F (36.4 °C), Max:99.1 °F (37.3 °C)    Input/Output:     Intake/Output Summary (Last 24 hours) at 2018 1542  Last data filed at 2018 0910  Gross per 24 hour   Intake 1326.67 ml   Output 300 ml   Net 1026.67 ml       General:  Awake, alert, NAD.    Cardiovascular: RRR.  Pulmonary:  CTA B.  GI:  Soft, NT/ND, NABS. Extremities:  No CT or edema. Additional:      Labs:    Recent Results (from the past 24 hour(s))   GLUCOSE, POC    Collection Time: 12/16/18  4:39 PM   Result Value Ref Range    Glucose (POC) 175 (H) 70 - 110 mg/dL   GLUCOSE, POC    Collection Time: 12/16/18  9:51 PM   Result Value Ref Range    Glucose (POC) 169 (H) 70 - 441 mg/dL   METABOLIC PANEL, BASIC    Collection Time: 12/17/18  2:26 AM   Result Value Ref Range    Sodium 144 136 - 145 mmol/L    Potassium 3.8 3.5 - 5.5 mmol/L    Chloride 114 (H) 100 - 108 mmol/L    CO2 23 21 - 32 mmol/L    Anion gap 7 3.0 - 18 mmol/L    Glucose 141 (H) 74 - 99 mg/dL    BUN 15 7.0 - 18 MG/DL    Creatinine 0.81 0.6 - 1.3 MG/DL    BUN/Creatinine ratio 19 12 - 20      GFR est AA >60 >60 ml/min/1.73m2    GFR est non-AA >60 >60 ml/min/1.73m2    Calcium 8.5 8.5 - 10.1 MG/DL   CBC WITH AUTOMATED DIFF    Collection Time: 12/17/18  2:26 AM   Result Value Ref Range    WBC 6.1 4.6 - 13.2 K/uL    RBC 2.58 (L) 4.70 - 5.50 M/uL    HGB 7.2 (L) 13.0 - 16.0 g/dL    HCT 23.3 (L) 36.0 - 48.0 %    MCV 90.3 74.0 - 97.0 FL    MCH 27.9 24.0 - 34.0 PG    MCHC 30.9 (L) 31.0 - 37.0 g/dL    RDW 16.6 (H) 11.6 - 14.5 %    PLATELET 263 657 - 471 K/uL    MPV 9.6 9.2 - 11.8 FL    NEUTROPHILS 70 40 - 73 %    LYMPHOCYTES 15 (L) 21 - 52 %    MONOCYTES 13 (H) 3 - 10 %    EOSINOPHILS 2 0 - 5 %    BASOPHILS 0 0 - 2 %    ABS. NEUTROPHILS 4.3 1.8 - 8.0 K/UL    ABS. LYMPHOCYTES 0.9 0.9 - 3.6 K/UL    ABS. MONOCYTES 0.8 0.05 - 1.2 K/UL    ABS. EOSINOPHILS 0.1 0.0 - 0.4 K/UL    ABS.  BASOPHILS 0.0 0.0 - 0.1 K/UL    DF AUTOMATED     GLUCOSE, POC    Collection Time: 12/17/18  7:50 AM   Result Value Ref Range    Glucose (POC) 166 (H) 70 - 110 mg/dL   GLUCOSE, POC    Collection Time: 12/17/18 11:57 AM   Result Value Ref Range    Glucose (POC) 210 (H) 70 - 110 mg/dL     Additional Data Reviewed:      Signed By: Osmani Krueger MD     December 17, 2018 5:10 PM

## 2018-12-17 NOTE — PROGRESS NOTES
Problem: Pressure Injury - Risk of  Goal: *Prevention of pressure injury  Document Gavino Scale and appropriate interventions in the flowsheet.   Outcome: Progressing Towards Goal  Pressure Injury Interventions:       Moisture Interventions: Maintain skin hydration (lotion/cream)    Activity Interventions: Pressure redistribution bed/mattress(bed type)    Mobility Interventions: HOB 30 degrees or less    Nutrition Interventions: Document food/fluid/supplement intake

## 2018-12-17 NOTE — ROUTINE PROCESS
Bedside and Verbal shift change report given to Braulio Becerra (oncoming nurse) by Roopa Tracy RN (offgoing nurse). Report included the following information SBAR, Kardex, Intake/Output, Recent Results and Cardiac Rhythm controlled atrial fib.

## 2018-12-17 NOTE — PROGRESS NOTES
conducted a Follow up consultation and Spiritual Assessment for Jessica Gracia, who is a 60 y.o.,male. The  provided the following Interventions:  Continued the relationship of care and support. Listened empathically. Offered assurance of continued prayer on patients behalf. Chart reviewed. The following outcomes were achieved:  Patient expressed gratitude for 's visit. Assessment:  There are no further spiritual or Mosque issues which require Spiritual Care Services interventions at this time. Plan:  Chaplains will continue to follow and will provide pastoral care on an as needed/requested basis.  recommends bedside caregivers page  on duty if patient shows signs of acute spiritual or emotional distress.        [facility]   [phone]

## 2018-12-18 ENCOUNTER — APPOINTMENT (OUTPATIENT)
Dept: CT IMAGING | Age: 58
DRG: 326 | End: 2018-12-18
Attending: INTERNAL MEDICINE
Payer: COMMERCIAL

## 2018-12-18 LAB
ANION GAP SERPL CALC-SCNC: 8 MMOL/L (ref 3–18)
BASOPHILS # BLD: 0 K/UL (ref 0–0.1)
BASOPHILS # BLD: 0 K/UL (ref 0–0.1)
BASOPHILS NFR BLD: 0 % (ref 0–2)
BASOPHILS NFR BLD: 0 % (ref 0–2)
BUN SERPL-MCNC: 17 MG/DL (ref 7–18)
BUN/CREAT SERPL: 15 (ref 12–20)
CALCIUM SERPL-MCNC: 8.1 MG/DL (ref 8.5–10.1)
CHLORIDE SERPL-SCNC: 109 MMOL/L (ref 100–108)
CO2 SERPL-SCNC: 23 MMOL/L (ref 21–32)
CREAT SERPL-MCNC: 1.15 MG/DL (ref 0.6–1.3)
DIFFERENTIAL METHOD BLD: ABNORMAL
DIFFERENTIAL METHOD BLD: ABNORMAL
EOSINOPHIL # BLD: 0.1 K/UL (ref 0–0.4)
EOSINOPHIL # BLD: 0.1 K/UL (ref 0–0.4)
EOSINOPHIL NFR BLD: 1 % (ref 0–5)
EOSINOPHIL NFR BLD: 1 % (ref 0–5)
ERYTHROCYTE [DISTWIDTH] IN BLOOD BY AUTOMATED COUNT: 16.1 % (ref 11.6–14.5)
ERYTHROCYTE [DISTWIDTH] IN BLOOD BY AUTOMATED COUNT: 16.4 % (ref 11.6–14.5)
GLUCOSE BLD STRIP.AUTO-MCNC: 180 MG/DL (ref 70–110)
GLUCOSE BLD STRIP.AUTO-MCNC: 208 MG/DL (ref 70–110)
GLUCOSE BLD STRIP.AUTO-MCNC: 238 MG/DL (ref 70–110)
GLUCOSE BLD STRIP.AUTO-MCNC: 246 MG/DL (ref 70–110)
GLUCOSE BLD STRIP.AUTO-MCNC: 69 MG/DL (ref 70–110)
GLUCOSE SERPL-MCNC: 170 MG/DL (ref 74–99)
HCT VFR BLD AUTO: 21.4 % (ref 36–48)
HCT VFR BLD AUTO: 22.7 % (ref 36–48)
HCT VFR BLD AUTO: 24.1 % (ref 36–48)
HGB BLD-MCNC: 6.7 G/DL (ref 13–16)
HGB BLD-MCNC: 6.8 G/DL (ref 13–16)
HGB BLD-MCNC: 7.3 G/DL (ref 13–16)
LACTATE SERPL-SCNC: 1.1 MMOL/L (ref 0.4–2)
LYMPHOCYTES # BLD: 0.9 K/UL (ref 0.9–3.6)
LYMPHOCYTES # BLD: 1.1 K/UL (ref 0.9–3.6)
LYMPHOCYTES NFR BLD: 11 % (ref 21–52)
LYMPHOCYTES NFR BLD: 14 % (ref 21–52)
MCH RBC QN AUTO: 26.7 PG (ref 24–34)
MCH RBC QN AUTO: 26.9 PG (ref 24–34)
MCHC RBC AUTO-ENTMCNC: 30 G/DL (ref 31–37)
MCHC RBC AUTO-ENTMCNC: 30.3 G/DL (ref 31–37)
MCV RBC AUTO: 88.9 FL (ref 74–97)
MCV RBC AUTO: 89 FL (ref 74–97)
MONOCYTES # BLD: 0.9 K/UL (ref 0.05–1.2)
MONOCYTES # BLD: 1.2 K/UL (ref 0.05–1.2)
MONOCYTES NFR BLD: 11 % (ref 3–10)
MONOCYTES NFR BLD: 15 % (ref 3–10)
NEUTS SEG # BLD: 5.8 K/UL (ref 1.8–8)
NEUTS SEG # BLD: 6.2 K/UL (ref 1.8–8)
NEUTS SEG NFR BLD: 73 % (ref 40–73)
NEUTS SEG NFR BLD: 74 % (ref 40–73)
PLATELET # BLD AUTO: 236 K/UL (ref 135–420)
PLATELET # BLD AUTO: 245 K/UL (ref 135–420)
PMV BLD AUTO: 9.8 FL (ref 9.2–11.8)
PMV BLD AUTO: 9.9 FL (ref 9.2–11.8)
POTASSIUM SERPL-SCNC: 3.6 MMOL/L (ref 3.5–5.5)
RBC # BLD AUTO: 2.55 M/UL (ref 4.7–5.5)
RBC # BLD AUTO: 2.71 M/UL (ref 4.7–5.5)
SODIUM SERPL-SCNC: 140 MMOL/L (ref 136–145)
WBC # BLD AUTO: 8 K/UL (ref 4.6–13.2)
WBC # BLD AUTO: 8.4 K/UL (ref 4.6–13.2)

## 2018-12-18 PROCEDURE — C9113 INJ PANTOPRAZOLE SODIUM, VIA: HCPCS | Performed by: INTERNAL MEDICINE

## 2018-12-18 PROCEDURE — 87186 SC STD MICRODIL/AGAR DIL: CPT

## 2018-12-18 PROCEDURE — 65660000000 HC RM CCU STEPDOWN

## 2018-12-18 PROCEDURE — 74011000258 HC RX REV CODE- 258: Performed by: INTERNAL MEDICINE

## 2018-12-18 PROCEDURE — 74011250637 HC RX REV CODE- 250/637: Performed by: INTERNAL MEDICINE

## 2018-12-18 PROCEDURE — 74011636637 HC RX REV CODE- 636/637: Performed by: EMERGENCY MEDICINE

## 2018-12-18 PROCEDURE — 74011636320 HC RX REV CODE- 636/320: Performed by: FAMILY MEDICINE

## 2018-12-18 PROCEDURE — 74176 CT ABD & PELVIS W/O CONTRAST: CPT

## 2018-12-18 PROCEDURE — 80048 BASIC METABOLIC PNL TOTAL CA: CPT

## 2018-12-18 PROCEDURE — 74011250636 HC RX REV CODE- 250/636: Performed by: INTERNAL MEDICINE

## 2018-12-18 PROCEDURE — 83605 ASSAY OF LACTIC ACID: CPT

## 2018-12-18 PROCEDURE — 36415 COLL VENOUS BLD VENIPUNCTURE: CPT

## 2018-12-18 PROCEDURE — 87077 CULTURE AEROBIC IDENTIFY: CPT

## 2018-12-18 PROCEDURE — 36430 TRANSFUSION BLD/BLD COMPNT: CPT

## 2018-12-18 PROCEDURE — 82962 GLUCOSE BLOOD TEST: CPT

## 2018-12-18 PROCEDURE — 86920 COMPATIBILITY TEST SPIN: CPT

## 2018-12-18 PROCEDURE — 85025 COMPLETE CBC W/AUTO DIFF WBC: CPT

## 2018-12-18 PROCEDURE — 87205 SMEAR GRAM STAIN: CPT

## 2018-12-18 PROCEDURE — 94640 AIRWAY INHALATION TREATMENT: CPT

## 2018-12-18 PROCEDURE — 94761 N-INVAS EAR/PLS OXIMETRY MLT: CPT

## 2018-12-18 PROCEDURE — 74011250637 HC RX REV CODE- 250/637: Performed by: EMERGENCY MEDICINE

## 2018-12-18 PROCEDURE — 74011250636 HC RX REV CODE- 250/636

## 2018-12-18 PROCEDURE — 87040 BLOOD CULTURE FOR BACTERIA: CPT

## 2018-12-18 PROCEDURE — 86901 BLOOD TYPING SEROLOGIC RH(D): CPT

## 2018-12-18 PROCEDURE — 30233N1 TRANSFUSION OF NONAUTOLOGOUS RED BLOOD CELLS INTO PERIPHERAL VEIN, PERCUTANEOUS APPROACH: ICD-10-PCS | Performed by: INTERNAL MEDICINE

## 2018-12-18 PROCEDURE — 74011000250 HC RX REV CODE- 250: Performed by: INTERNAL MEDICINE

## 2018-12-18 PROCEDURE — 85014 HEMATOCRIT: CPT

## 2018-12-18 PROCEDURE — P9016 RBC LEUKOCYTES REDUCED: HCPCS

## 2018-12-18 RX ORDER — SODIUM CHLORIDE 9 MG/ML
250 INJECTION, SOLUTION INTRAVENOUS AS NEEDED
Status: DISCONTINUED | OUTPATIENT
Start: 2018-12-18 | End: 2018-12-21 | Stop reason: HOSPADM

## 2018-12-18 RX ORDER — VANCOMYCIN/0.9 % SOD CHLORIDE 1 G/100 ML
1000 PLASTIC BAG, INJECTION (ML) INTRAVENOUS EVERY 12 HOURS
Status: DISCONTINUED | OUTPATIENT
Start: 2018-12-19 | End: 2018-12-19

## 2018-12-18 RX ADMIN — ACETAMINOPHEN 650 MG: 325 TABLET ORAL at 18:11

## 2018-12-18 RX ADMIN — DIATRIZOATE MEGLUMINE AND DIATRIZOATE SODIUM 30 ML: 660; 100 LIQUID ORAL; RECTAL at 08:38

## 2018-12-18 RX ADMIN — SODIUM CHLORIDE 40 MG: 9 INJECTION, SOLUTION INTRAMUSCULAR; INTRAVENOUS; SUBCUTANEOUS at 21:45

## 2018-12-18 RX ADMIN — GABAPENTIN 300 MG: 300 CAPSULE ORAL at 08:39

## 2018-12-18 RX ADMIN — PIPERACILLIN SODIUM AND TAZOBACTAM SODIUM 3.38 G: 3; .375 INJECTION, POWDER, LYOPHILIZED, FOR SOLUTION INTRAVENOUS at 12:49

## 2018-12-18 RX ADMIN — SODIUM CHLORIDE 1000 ML/HR: 900 INJECTION, SOLUTION INTRAVENOUS at 00:28

## 2018-12-18 RX ADMIN — GABAPENTIN 300 MG: 300 CAPSULE ORAL at 18:18

## 2018-12-18 RX ADMIN — PIPERACILLIN SODIUM AND TAZOBACTAM SODIUM 3.38 G: 3; .375 INJECTION, POWDER, LYOPHILIZED, FOR SOLUTION INTRAVENOUS at 00:28

## 2018-12-18 RX ADMIN — INSULIN GLARGINE 8 UNITS: 100 INJECTION, SOLUTION SUBCUTANEOUS at 22:10

## 2018-12-18 RX ADMIN — PIPERACILLIN SODIUM AND TAZOBACTAM SODIUM 3.38 G: 3; .375 INJECTION, POWDER, LYOPHILIZED, FOR SOLUTION INTRAVENOUS at 21:38

## 2018-12-18 RX ADMIN — INSULIN LISPRO 6 UNITS: 100 INJECTION, SOLUTION INTRAVENOUS; SUBCUTANEOUS at 22:08

## 2018-12-18 RX ADMIN — SODIUM CHLORIDE 40 MG: 9 INJECTION, SOLUTION INTRAMUSCULAR; INTRAVENOUS; SUBCUTANEOUS at 08:40

## 2018-12-18 RX ADMIN — VANCOMYCIN HYDROCHLORIDE 2500 MG: 10 INJECTION, POWDER, LYOPHILIZED, FOR SOLUTION INTRAVENOUS at 22:26

## 2018-12-18 RX ADMIN — LOSARTAN POTASSIUM 25 MG: 25 TABLET, FILM COATED ORAL at 08:57

## 2018-12-18 RX ADMIN — INSULIN LISPRO 6 UNITS: 100 INJECTION, SOLUTION INTRAVENOUS; SUBCUTANEOUS at 12:48

## 2018-12-18 RX ADMIN — MORPHINE SULFATE 5 MG: 10 INJECTION INTRAMUSCULAR; INTRAVENOUS; SUBCUTANEOUS at 12:48

## 2018-12-18 RX ADMIN — METOPROLOL TARTRATE 12.5 MG: 25 TABLET ORAL at 08:57

## 2018-12-18 RX ADMIN — METOPROLOL TARTRATE 12.5 MG: 25 TABLET ORAL at 18:18

## 2018-12-18 RX ADMIN — BUPROPION HYDROCHLORIDE 150 MG: 150 TABLET, EXTENDED RELEASE ORAL at 08:39

## 2018-12-18 RX ADMIN — MORPHINE SULFATE 5 MG: 10 INJECTION INTRAMUSCULAR; INTRAVENOUS; SUBCUTANEOUS at 18:42

## 2018-12-18 RX ADMIN — BUDESONIDE AND FORMOTEROL FUMARATE DIHYDRATE 2 PUFF: 160; 4.5 AEROSOL RESPIRATORY (INHALATION) at 08:17

## 2018-12-18 RX ADMIN — INSULIN LISPRO 3 UNITS: 100 INJECTION, SOLUTION INTRAVENOUS; SUBCUTANEOUS at 18:42

## 2018-12-18 RX ADMIN — ROSUVASTATIN CALCIUM 20 MG: 20 TABLET, FILM COATED ORAL at 22:36

## 2018-12-18 RX ADMIN — PIPERACILLIN SODIUM AND TAZOBACTAM SODIUM 3.38 G: 3; .375 INJECTION, POWDER, LYOPHILIZED, FOR SOLUTION INTRAVENOUS at 08:40

## 2018-12-18 NOTE — PROGRESS NOTES
Lavella Manual made aware that pt is allergic to PCN (hives) and verified with him if ok to give Zosyn still as ordered. Per Lavella Manual, ok to give Zosyn.

## 2018-12-18 NOTE — PROGRESS NOTES
[facility] Hospitalist Group  Progress Note    Patient: Mary Courtney. Age: 60 y.o. : 1960 MR#: [id number] SSN: [id number]  Date/Time: 2018    Subjective:     Alert and awake and oriented     No chills - had chills yesterday and fever     S/p opening of wound and draining PUS - since then no Chills     No NVD  No SOB/ No Cough         Assessment/Plan:       1A :  - S/p Partial gastrectomy, resection of gastric mass. by surgery   - Wound infection - s/p cleaning   - Follow surgical cultures   - Continue Zosyn ( Patient says he is allergic to PCN - Hives , but tolerating Zosyn) continue same   Add vanco for broad coverage. 1. UGIBleed with GIST, acute blood loss anemia    - low H/H noted - confirmed with rpt H/H   - Transfuse 1 unit PRBC   - monitor H/h - Unlikely overt bleed - received IV fluid for his hypotension yesterday ? Dilutional effect       2. Chronic AFib - Xarelto resumption - when OK with Surgery     3. HTN - Episode of Hypotension - improving BP now     4. DM2 - SSI. 5. Dyslipidemia - statin. 6. Recent R hand fx - splinted. Appreciate ortho eval. No new issues. 7. FITO hx - no acute. 8. COPD hx - no acute. 9. 12/10 EGD path report: benign gastric mucosa with mild chronic active gastritis. PPI BID.     10 Body mass index is 36.89 kg/m².   - Morbid obesity     Additional Notes:     Path report:      Case discussed with:  [x]Patient  [x]Family  []Nursing  []Case Management  DVT Prophylaxis:  []Lovenox  []Hep SQ  [x]SCDs  []Coumadin   []On Heparin gtt    Objective:   VS:   Visit Vitals  /78 (BP 1 Location: Right arm, BP Patient Position: At rest)   Pulse 98   Temp 98.8 °F (37.1 °C)   Resp 17   Ht 5' 11\" (1.803 m)   Wt 120 kg (264 lb 8 oz)   SpO2 99%   BMI 36.89 kg/m²      Tmax/24hrs: Temp (24hrs), Av.4 °F (37.4 °C), Min:97.9 °F (36.6 °C), Max:101.9 °F (38.8 °C)    Input/Output:     Intake/Output Summary (Last 24 hours) at 12/18/2018 0939  Last data filed at 12/18/2018 0028  Gross per 24 hour   Intake 1620 ml   Output    Net 1620 ml       General:  Awake, alert, NAD. Cardiovascular:  RRR. Pulmonary:  CTA B.  GI:  Soft, NT/ND, NABS. Abd wound dressed - dry dressing   Extremities:  No CT or edema. Additional:      Labs:    Recent Results (from the past 24 hour(s))   GLUCOSE, POC    Collection Time: 12/17/18 11:57 AM   Result Value Ref Range    Glucose (POC) 210 (H) 70 - 110 mg/dL   GLUCOSE, POC    Collection Time: 12/17/18  4:29 PM   Result Value Ref Range    Glucose (POC) 249 (H) 70 - 110 mg/dL   GLUCOSE, POC    Collection Time: 12/17/18 10:26 PM   Result Value Ref Range    Glucose (POC) 190 (H) 70 - 110 mg/dL   CBC WITH AUTOMATED DIFF    Collection Time: 12/18/18 12:27 AM   Result Value Ref Range    WBC 8.4 4.6 - 13.2 K/uL    RBC 2.71 (L) 4.70 - 5.50 M/uL    HGB 7.3 (L) 13.0 - 16.0 g/dL    HCT 24.1 (L) 36.0 - 48.0 %    MCV 88.9 74.0 - 97.0 FL    MCH 26.9 24.0 - 34.0 PG    MCHC 30.3 (L) 31.0 - 37.0 g/dL    RDW 16.1 (H) 11.6 - 14.5 %    PLATELET 523 851 - 019 K/uL    MPV 9.8 9.2 - 11.8 FL    NEUTROPHILS 74 (H) 40 - 73 %    LYMPHOCYTES 14 (L) 21 - 52 %    MONOCYTES 11 (H) 3 - 10 %    EOSINOPHILS 1 0 - 5 %    BASOPHILS 0 0 - 2 %    ABS. NEUTROPHILS 6.2 1.8 - 8.0 K/UL    ABS. LYMPHOCYTES 1.1 0.9 - 3.6 K/UL    ABS. MONOCYTES 0.9 0.05 - 1.2 K/UL    ABS. EOSINOPHILS 0.1 0.0 - 0.4 K/UL    ABS.  BASOPHILS 0.0 0.0 - 0.1 K/UL    DF AUTOMATED     CULTURE, BLOOD    Collection Time: 12/18/18 12:27 AM   Result Value Ref Range    Special Requests: NO SPECIAL REQUESTS      Culture result: NO GROWTH AFTER 5 HOURS     CULTURE, BLOOD    Collection Time: 12/18/18 12:38 AM   Result Value Ref Range    Special Requests: NO SPECIAL REQUESTS      Culture result: NO GROWTH AFTER 5 HOURS     CBC WITH AUTOMATED DIFF    Collection Time: 12/18/18  4:00 AM   Result Value Ref Range    WBC 8.0 4.6 - 13.2 K/uL    RBC 2.55 (L) 4.70 - 5.50 M/uL    HGB 6.8 (L) 13.0 - 16.0 g/dL    HCT 22.7 (L) 36.0 - 48.0 %    MCV 89.0 74.0 - 97.0 FL    MCH 26.7 24.0 - 34.0 PG    MCHC 30.0 (L) 31.0 - 37.0 g/dL    RDW 16.4 (H) 11.6 - 14.5 %    PLATELET 028 617 - 558 K/uL    MPV 9.9 9.2 - 11.8 FL    NEUTROPHILS 73 40 - 73 %    LYMPHOCYTES 11 (L) 21 - 52 %    MONOCYTES 15 (H) 3 - 10 %    EOSINOPHILS 1 0 - 5 %    BASOPHILS 0 0 - 2 %    ABS. NEUTROPHILS 5.8 1.8 - 8.0 K/UL    ABS. LYMPHOCYTES 0.9 0.9 - 3.6 K/UL    ABS. MONOCYTES 1.2 0.05 - 1.2 K/UL    ABS. EOSINOPHILS 0.1 0.0 - 0.4 K/UL    ABS.  BASOPHILS 0.0 0.0 - 0.1 K/UL    DF AUTOMATED     METABOLIC PANEL, BASIC    Collection Time: 12/18/18  4:00 AM   Result Value Ref Range    Sodium 140 136 - 145 mmol/L    Potassium 3.6 3.5 - 5.5 mmol/L    Chloride 109 (H) 100 - 108 mmol/L    CO2 23 21 - 32 mmol/L    Anion gap 8 3.0 - 18 mmol/L    Glucose 170 (H) 74 - 99 mg/dL    BUN 17 7.0 - 18 MG/DL    Creatinine 1.15 0.6 - 1.3 MG/DL    BUN/Creatinine ratio 15 12 - 20      GFR est AA >60 >60 ml/min/1.73m2    GFR est non-AA >60 >60 ml/min/1.73m2    Calcium 8.1 (L) 8.5 - 10.1 MG/DL   LACTIC ACID    Collection Time: 12/18/18  5:00 AM   Result Value Ref Range    Lactic acid 1.1 0.4 - 2.0 MMOL/L   GLUCOSE, POC    Collection Time: 12/18/18  8:28 AM   Result Value Ref Range    Glucose (POC) 69 (L) 70 - 110 mg/dL   GLUCOSE, POC    Collection Time: 12/18/18  8:58 AM   Result Value Ref Range    Glucose (POC) 208 (H) 70 - 110 mg/dL     Additional Data Reviewed:      Signed By: Delilah Low MD     December 18, 2018 5:10 PM

## 2018-12-18 NOTE — ROUTINE PROCESS
Bedside and Verbal shift change report given to Omer Peguero RN (oncoming nurse) by Reshma Burt RN (offgoing nurse). Report included the following information SBAR, Kardex and MAR. Patient had no further events overnight. Currently resting in NAD. No express needs reported at this time.

## 2018-12-18 NOTE — PROGRESS NOTES
New onset fever and hypotensive  Surgery on 13th for  Blood Cx  UA  IVF   BP meds with holding parameters   Zosyn  Hold tylenol for now

## 2018-12-18 NOTE — ROUTINE PROCESS
Bedside and Verbal shift change report given to TAYLOR Peña (oncoming nurse) by Blaze cleary. Report included the following information SBAR, Kardex and MAR.

## 2018-12-18 NOTE — PROGRESS NOTES
Surgery  Feels ok but wound red  Tm 101.8 VSS  abd soft nontender  Wound with erythema  WBC 8 k    Imp wound infection. Wound opened and large amount of purulence drained. Cultures obtained  Wound packed. Start dressing changes.  Continue abx for now  CT neg   Tolerating po  Advance diet, OOB dressings

## 2018-12-18 NOTE — PROGRESS NOTES
2045 - Pt with temp 101.9, tylenol 650mg po given. LCTA bilaterally. Encouraged use of incentive spirometer x10 reps every hr while awake. Explained to pt this may help bring fever down and prevent pneumonia while in hospital. Pt verbalized understanding. Also encouraged pt to start ambulating in hallway at least tid in addition to getting out of bed to chair daily. 2300 - Temp now down to 98.8,  BP 97/63.  notified and made aware of new onset fever and decreased BP. New orders received.

## 2018-12-18 NOTE — PROGRESS NOTES
Problem: Falls - Risk of  Goal: *Absence of Falls  Document Danielle Fall Risk and appropriate interventions in the flowsheet. Outcome: Progressing Towards Goal  Fall Risk Interventions:  Mobility Interventions: Patient to call before getting OOB, Utilize walker, cane, or other assistive device         Medication Interventions: Patient to call before getting OOB, Teach patient to arise slowly    Elimination Interventions: Call light in reach, Patient to call for help with toileting needs, Urinal in reach    History of Falls Interventions: Door open when patient unattended, Evaluate medications/consider consulting pharmacy, Investigate reason for fall        Problem: Pressure Injury - Risk of  Goal: *Prevention of pressure injury  Document Gavino Scale and appropriate interventions in the flowsheet.   Outcome: Progressing Towards Goal  Pressure Injury Interventions:       Moisture Interventions: Maintain skin hydration (lotion/cream)    Activity Interventions: Increase time out of bed, Pressure redistribution bed/mattress(bed type)    Mobility Interventions: HOB 30 degrees or less, Pressure redistribution bed/mattress (bed type)    Nutrition Interventions: Document food/fluid/supplement intake, Offer support with meals,snacks and hydration

## 2018-12-18 NOTE — ROUTINE PROCESS
Bedside shift change report given to Julio Petersen (oncoming nurse) by Tonia Richard RN (offgoing nurse). Report included the following information SBAR, Kardex, Intake/Output, MAR and Recent Results.

## 2018-12-18 NOTE — PROGRESS NOTES
NUTRITION    Nutrition Consult: General Nutrition Management and Supplements     RECOMMENDATIONS / PLAN:     - Modify diet; change to Diabetic diet due to pt with episodes of hyperglycemia  - Modify nutrition supplement: discontinue Ensure Clear and Gelatein supplements; add Glucerna Shake BID  - Continue RD inpatient monitoring and evaluation. NUTRITION INTERVENTIONS & DIAGNOSIS:     [x] Meals/snacks: modify composition  [x] Medical food supplement therapy: Ensure Clear once daily, Gelatein BID; modify     Nutrition Diagnosis: Inadequate oral intake related to altered GI function, s/p partial gastrectomy and mass resection as evidenced by pt having been NPO/clear liquid diet x 7-8 days since admission. Excessive nutrient intake (CHO) related to history of diabetes as evidenced by pt with episodes of hyperglycemia, blood glucose levels ranging from 170-249 mg/dL x past 24 hours    ASSESSMENT:     12/18: Pt reported tolerating clear liquid diet. Poor po at breakfast today due to having to become NPO for test. Diet resumed afterwards, good intake of lunch. Likes  Gelatein supplement; did not try Ensure Clear due to supplement starting at dinner meal today. Advanced to solid foods. On regular diet; plan to change to diabetic diet. Blood glucose levels ranging from 170-249 mg/dL in past 24 hours. Offered to provide diabetic diet education; pt declined. Stated he is already familiar with dietary considerations and received information from [facility]; information on outpatient classes provided    12/17: NGT clamped on 12/16 and removed this morning. Pt feeling better; denied having abdominal pain, nausea/vomiting. Started on clear liquid diet today; tolerated broth this morning. Agreeable to trying Ensure Clear, but only once per day, and Gelatein. 12/15: Pt remains NPO with NGT to suction, output improving. 12/14: Pt had good appetite and meal intake PTA per family report.  Had been tolerating po intake PTA. Was NPO, then on clear liquid diet x 2 days since admission. NPO yesterday for partial gastrectomy and mass resection. Pt remain NPO today with NGT to suction. Has some abdominal pain. Per chart review, pt has episodes of n/v and diarrhea PTA. Average po intake adequate to meet patients estimated nutritional needs:   [] Yes     [x] No   [] Unable to determine at this time    Diet: DIET NUTRITIONAL SUPPLEMENTS Breakfast, Lunch; GELATEIN 20  DIET NUTRITIONAL SUPPLEMENTS Dinner; ENSURE CLEAR  DIET NPO  DIET REGULAR      Food Allergies: NKFA  Current Appetite:   [] Good     [x] Fair     [] Poor     [] Other: NPO  Appetite/meal intake prior to admission:   [x] Good     [] Fair     [] Poor     [] Other:  Feeding Limitations:  [] Swallowing difficulty    [] Chewing difficulty    [] Other:  Current Meal Intake:   Patient Vitals for the past 100 hrs:   % Diet Eaten   12/17/18 1730 100 %   12/17/18 1258 100 %       NGT to continuous suction:  Clamped 12/16 and removed 12/17  BM: 12/17 - soft  Skin Integrity: abdominal wound, right hand percutaneous wound/wire  Edema:   [] No     [x] Yes   Pertinent Medications: Reviewed: zofran    Recent Labs     12/18/18  0400 12/17/18  0226    144   K 3.6 3.8   * 114*   CO2 23 23   * 141*   BUN 17 15   CREA 1.15 0.81   CA 8.1* 8.5       Intake/Output Summary (Last 24 hours) at 12/18/2018 1356  Last data filed at 12/18/2018 0840  Gross per 24 hour   Intake 1100 ml   Output    Net 1100 ml       Anthropometrics:  Ht Readings from Last 1 Encounters:   12/13/18 5' 11\" (1.803 m)     Last 3 Recorded Weights in this Encounter    12/12/18 1333 12/13/18 0901 12/16/18 0532   Weight: 122.5 kg (270 lb) 122.5 kg (270 lb) 120 kg (264 lb 8 oz)     Body mass index is 36.89 kg/m².  Obese, Class II    Weight History: no recent change in weight PTA per family report    Weight Metrics 12/16/2018 12/3/2018 11/28/2018 11/25/2018 11/15/2018 11/8/2018 10/1/2018   Weight 264 lb 8 oz 272 lb 270 lb 3.2 oz 265 lb 271 lb 269 lb 12.8 oz 270 lb   BMI 36.89 kg/m2 37.94 kg/m2 37.69 kg/m2 36.96 kg/m2 37.8 kg/m2 37.63 kg/m2 37.66 kg/m2        Admitting Diagnosis: Acute GI bleeding  Pertinent PMHx:  COPD, DM, GERD, high cholesterol, HTN, pneumonia    Education Needs:        [x] None identified  [] Identified - Not appropriate at this time  []  Identified and addressed - refer to education log  Learning Limitations:   [x] None identified  [] Identified    Cultural, Yazidi & ethnic food preferences:  [x] None identified    [] Identified and addressed     ESTIMATED NUTRITION NEEDS:     1000 kcal deficit for promotion of weight loss  Calories: 4960-5115 kcal (25-28 kcal/kg) based on  [x] Actual BW: 123 kg      [] IBW   CHO: 259-306 gm (50% kcal)  Protein: 123-148 gm (1-1.2 gm/kg) based on  [x] Actual BW      [] IBW   Fluid: 1 mL/kcal     MONITORING & EVALUATION:     Nutrition Goal(s):   1. Po intake of meals will meet >75% of patient estimated nutritional needs within the next 7 days.   Outcome:  [] Met/Ongoing    [x]  Not Met    [] New/Initial Goal     Monitoring:   [] Food and beverage intake   [] Diet order   [x] Nutrition-focused physical findings   [x] Treatment/therapy   [] Weight   [] Enteral nutrition intake        Previous Recommendations (for follow-up assessments only):     [x]   Implemented       []   Not Implemented (RD to address)      [] No Longer Appropriate     [] No Recommendation Made     Discharge Planning: cardiac, diabetic diet as tolerated   [x] Participated in care planning, discharge planning, & interdisciplinary rounds as appropriate      Fern Adames, [address] [place] [address]    Pager: [phone]

## 2018-12-19 LAB
ABO + RH BLD: NORMAL
ANION GAP SERPL CALC-SCNC: 8 MMOL/L (ref 3–18)
BACTERIA SPEC CULT: ABNORMAL
BASOPHILS # BLD: 0 K/UL (ref 0–0.1)
BASOPHILS NFR BLD: 0 % (ref 0–2)
BLD PROD TYP BPU: NORMAL
BLOOD GROUP ANTIBODIES SERPL: NORMAL
BPU ID: NORMAL
BUN SERPL-MCNC: 23 MG/DL (ref 7–18)
BUN/CREAT SERPL: 14 (ref 12–20)
CALCIUM SERPL-MCNC: 8.2 MG/DL (ref 8.5–10.1)
CALLED TO:,BCALL1: NORMAL
CHLORIDE SERPL-SCNC: 111 MMOL/L (ref 100–108)
CO2 SERPL-SCNC: 24 MMOL/L (ref 21–32)
CREAT SERPL-MCNC: 1.68 MG/DL (ref 0.6–1.3)
CROSSMATCH RESULT,%XM: NORMAL
DIFFERENTIAL METHOD BLD: ABNORMAL
EOSINOPHIL # BLD: 0.1 K/UL (ref 0–0.4)
EOSINOPHIL NFR BLD: 3 % (ref 0–5)
ERYTHROCYTE [DISTWIDTH] IN BLOOD BY AUTOMATED COUNT: 16.3 % (ref 11.6–14.5)
GLUCOSE BLD STRIP.AUTO-MCNC: 199 MG/DL (ref 70–110)
GLUCOSE BLD STRIP.AUTO-MCNC: 240 MG/DL (ref 70–110)
GLUCOSE BLD STRIP.AUTO-MCNC: 244 MG/DL (ref 70–110)
GLUCOSE BLD STRIP.AUTO-MCNC: 255 MG/DL (ref 70–110)
GLUCOSE SERPL-MCNC: 238 MG/DL (ref 74–99)
GRAM STN SPEC: ABNORMAL
HCT VFR BLD AUTO: 24.2 % (ref 36–48)
HGB BLD-MCNC: 7.7 G/DL (ref 13–16)
LYMPHOCYTES # BLD: 0.6 K/UL (ref 0.9–3.6)
LYMPHOCYTES NFR BLD: 11 % (ref 21–52)
MCH RBC QN AUTO: 27.9 PG (ref 24–34)
MCHC RBC AUTO-ENTMCNC: 31.8 G/DL (ref 31–37)
MCV RBC AUTO: 87.7 FL (ref 74–97)
MONOCYTES # BLD: 0.8 K/UL (ref 0.05–1.2)
MONOCYTES NFR BLD: 15 % (ref 3–10)
NEUTS SEG # BLD: 3.8 K/UL (ref 1.8–8)
NEUTS SEG NFR BLD: 71 % (ref 40–73)
PLATELET # BLD AUTO: 210 K/UL (ref 135–420)
PMV BLD AUTO: 10.1 FL (ref 9.2–11.8)
POTASSIUM SERPL-SCNC: 3.7 MMOL/L (ref 3.5–5.5)
RBC # BLD AUTO: 2.76 M/UL (ref 4.7–5.5)
SERVICE CMNT-IMP: ABNORMAL
SODIUM SERPL-SCNC: 143 MMOL/L (ref 136–145)
SPECIMEN EXP DATE BLD: NORMAL
STATUS OF UNIT,%ST: NORMAL
UNIT DIVISION, %UDIV: 0
WBC # BLD AUTO: 5.3 K/UL (ref 4.6–13.2)

## 2018-12-19 PROCEDURE — 74011636637 HC RX REV CODE- 636/637: Performed by: EMERGENCY MEDICINE

## 2018-12-19 PROCEDURE — 74011636637 HC RX REV CODE- 636/637: Performed by: INTERNAL MEDICINE

## 2018-12-19 PROCEDURE — 74011250637 HC RX REV CODE- 250/637: Performed by: PHYSICIAN ASSISTANT

## 2018-12-19 PROCEDURE — 74011250636 HC RX REV CODE- 250/636: Performed by: INTERNAL MEDICINE

## 2018-12-19 PROCEDURE — 74011250637 HC RX REV CODE- 250/637: Performed by: INTERNAL MEDICINE

## 2018-12-19 PROCEDURE — 74011000258 HC RX REV CODE- 258: Performed by: INTERNAL MEDICINE

## 2018-12-19 PROCEDURE — C9113 INJ PANTOPRAZOLE SODIUM, VIA: HCPCS | Performed by: INTERNAL MEDICINE

## 2018-12-19 PROCEDURE — 82962 GLUCOSE BLOOD TEST: CPT

## 2018-12-19 PROCEDURE — 74011000250 HC RX REV CODE- 250: Performed by: INTERNAL MEDICINE

## 2018-12-19 PROCEDURE — 74011250636 HC RX REV CODE- 250/636

## 2018-12-19 PROCEDURE — 36415 COLL VENOUS BLD VENIPUNCTURE: CPT

## 2018-12-19 PROCEDURE — 85025 COMPLETE CBC W/AUTO DIFF WBC: CPT

## 2018-12-19 PROCEDURE — 74011250637 HC RX REV CODE- 250/637: Performed by: EMERGENCY MEDICINE

## 2018-12-19 PROCEDURE — 65660000000 HC RM CCU STEPDOWN

## 2018-12-19 PROCEDURE — 80048 BASIC METABOLIC PNL TOTAL CA: CPT

## 2018-12-19 RX ORDER — INSULIN GLARGINE 100 [IU]/ML
12 INJECTION, SOLUTION SUBCUTANEOUS
Status: DISCONTINUED | OUTPATIENT
Start: 2018-12-19 | End: 2018-12-20

## 2018-12-19 RX ORDER — SODIUM CHLORIDE 900 MG/100ML
INJECTION INTRAVENOUS
Status: DISPENSED
Start: 2018-12-19 | End: 2018-12-20

## 2018-12-19 RX ADMIN — PIPERACILLIN SODIUM AND TAZOBACTAM SODIUM 3.38 G: 3; .375 INJECTION, POWDER, LYOPHILIZED, FOR SOLUTION INTRAVENOUS at 09:38

## 2018-12-19 RX ADMIN — GABAPENTIN 300 MG: 300 CAPSULE ORAL at 09:39

## 2018-12-19 RX ADMIN — PIPERACILLIN SODIUM AND TAZOBACTAM SODIUM 3.38 G: 3; .375 INJECTION, POWDER, LYOPHILIZED, FOR SOLUTION INTRAVENOUS at 18:21

## 2018-12-19 RX ADMIN — BUDESONIDE AND FORMOTEROL FUMARATE DIHYDRATE 2 PUFF: 160; 4.5 AEROSOL RESPIRATORY (INHALATION) at 01:38

## 2018-12-19 RX ADMIN — ROSUVASTATIN CALCIUM 20 MG: 20 TABLET, FILM COATED ORAL at 22:23

## 2018-12-19 RX ADMIN — MORPHINE SULFATE 5 MG: 10 INJECTION INTRAMUSCULAR; INTRAVENOUS; SUBCUTANEOUS at 13:23

## 2018-12-19 RX ADMIN — SODIUM CHLORIDE 40 MG: 9 INJECTION, SOLUTION INTRAMUSCULAR; INTRAVENOUS; SUBCUTANEOUS at 22:23

## 2018-12-19 RX ADMIN — INSULIN LISPRO 6 UNITS: 100 INJECTION, SOLUTION INTRAVENOUS; SUBCUTANEOUS at 16:30

## 2018-12-19 RX ADMIN — LIDOCAINE HYDROCHLORIDE: 10 INJECTION, SOLUTION EPIDURAL; INFILTRATION; INTRACAUDAL; PERINEURAL at 02:50

## 2018-12-19 RX ADMIN — GABAPENTIN 300 MG: 300 CAPSULE ORAL at 18:20

## 2018-12-19 RX ADMIN — INSULIN LISPRO 6 UNITS: 100 INJECTION, SOLUTION INTRAVENOUS; SUBCUTANEOUS at 22:21

## 2018-12-19 RX ADMIN — INSULIN LISPRO 9 UNITS: 100 INJECTION, SOLUTION INTRAVENOUS; SUBCUTANEOUS at 13:12

## 2018-12-19 RX ADMIN — MORPHINE SULFATE 5 MG: 10 INJECTION INTRAMUSCULAR; INTRAVENOUS; SUBCUTANEOUS at 19:17

## 2018-12-19 RX ADMIN — INSULIN LISPRO 3 UNITS: 100 INJECTION, SOLUTION INTRAVENOUS; SUBCUTANEOUS at 09:38

## 2018-12-19 RX ADMIN — INSULIN GLARGINE 12 UNITS: 100 INJECTION, SOLUTION SUBCUTANEOUS at 22:20

## 2018-12-19 RX ADMIN — SODIUM CHLORIDE 40 MG: 9 INJECTION, SOLUTION INTRAMUSCULAR; INTRAVENOUS; SUBCUTANEOUS at 09:38

## 2018-12-19 RX ADMIN — LIDOCAINE HYDROCHLORIDE: 10 INJECTION, SOLUTION EPIDURAL; INFILTRATION; INTRACAUDAL; PERINEURAL at 01:46

## 2018-12-19 RX ADMIN — LOSARTAN POTASSIUM 25 MG: 25 TABLET, FILM COATED ORAL at 09:39

## 2018-12-19 RX ADMIN — METOPROLOL TARTRATE 12.5 MG: 25 TABLET ORAL at 09:39

## 2018-12-19 RX ADMIN — PIPERACILLIN SODIUM AND TAZOBACTAM SODIUM 3.38 G: 3; .375 INJECTION, POWDER, LYOPHILIZED, FOR SOLUTION INTRAVENOUS at 22:22

## 2018-12-19 RX ADMIN — MORPHINE SULFATE 5 MG: 10 INJECTION INTRAMUSCULAR; INTRAVENOUS; SUBCUTANEOUS at 01:53

## 2018-12-19 RX ADMIN — BUPROPION HYDROCHLORIDE 150 MG: 150 TABLET, EXTENDED RELEASE ORAL at 09:39

## 2018-12-19 RX ADMIN — PIPERACILLIN SODIUM AND TAZOBACTAM SODIUM 3.38 G: 3; .375 INJECTION, POWDER, LYOPHILIZED, FOR SOLUTION INTRAVENOUS at 03:56

## 2018-12-19 NOTE — PROGRESS NOTES
[facility] Hospitalist Group  Progress Note    Patient: Devon Sutton. Age: 60 y.o. : 1960 MR#: [id number] SSN: [id number]  Date/Time: 2018    Subjective:     Alert and awake and oriented     No chills - had chills yesterday and fever     S/p opening of wound and draining PUS - since then no Chills     No NVD  No SOB/ No Cough         Assessment/Plan:       1A :  - S/p Partial gastrectomy, resection of gastric mass. by surgery   - Improved fever and BP   - No Chills , patient tolerating po diet and comfortable   - for Wound infection - Strep viridans - continue Zobettye , KIRA Vanco   - Follow till final culture results   - 14 days of antibiotics - from 2018     1. UGIBleed with GIST, acute blood loss anemia    - s/p Transfuse 1 unit PRBC   - Post transfusion H/H stable so far     2. Chronic AFib - Xarelto resumption - when OK with Surgery     3. HTN - Episode of Hypotension - improving BP now     4. DM2 - SSI. 5. Dyslipidemia - statin. 6. Recent R hand fx - splinted. Appreciate ortho eval. No new issues. 7. FITO hx - no acute. 8. COPD hx - no acute. 9. 12/10 EGD path report: benign gastric mucosa with mild chronic active gastritis. PPI BID.     10 Body mass index is 36.89 kg/m².   - Morbid obesity     Additional Notes:     Path report:      Case discussed with:  [x]Patient  [x]Family  []Nursing  []Case Management  DVT Prophylaxis:  []Lovenox  []Hep SQ  [x]SCDs  []Coumadin   []On Heparin gtt    Objective:   VS:   Visit Vitals  /69 (BP 1 Location: Left arm, BP Patient Position: Sitting)   Pulse 86   Temp 97.8 °F (36.6 °C)   Resp 20   Ht 5' 11\" (1.803 m)   Wt 120 kg (264 lb 8 oz)   SpO2 97%   BMI 36.89 kg/m²      Tmax/24hrs: Temp (24hrs), Av.8 °F (37.1 °C), Min:97.1 °F (36.2 °C), Max:101.8 °F (38.8 °C)    Input/Output:     Intake/Output Summary (Last 24 hours) at 2018 1304  Last data filed at 2018 1036  Gross per 24 hour   Intake 790 ml   Output 750 ml   Net 40 ml       General:  Awake, alert, NAD. Cardiovascular:  RRR. Pulmonary:  CTA B.  GI:  Soft, NT/ND, NABS. Abd wound dressed - dry dressing   Extremities:  No CT or edema. Additional:      Labs:    Recent Results (from the past 24 hour(s))   HGB & HCT    Collection Time: 12/18/18  1:24 PM   Result Value Ref Range    HGB 6.7 (L) 13.0 - 16.0 g/dL    HCT 21.4 (L) 36.0 - 48.0 %   TYPE + CROSSMATCH    Collection Time: 12/18/18  1:24 PM   Result Value Ref Range    Crossmatch Expiration 12/21/2018     ABO/Rh(D) B POSITIVE     Antibody screen NEG     CALLED TO: Kathryn VERAS, AT 15:04 ON 12/18/2018 BY [facility]     Unit number [id number]     Blood component type RC LR     Unit division 00     Status of unit TRANSFUSED     Crossmatch result Compatible    GLUCOSE, POC    Collection Time: 12/18/18  5:01 PM   Result Value Ref Range    Glucose (POC) 180 (H) 70 - 110 mg/dL   GLUCOSE, POC    Collection Time: 12/18/18  9:16 PM   Result Value Ref Range    Glucose (POC) 238 (H) 70 - 110 mg/dL   GLUCOSE, POC    Collection Time: 12/19/18  8:03 AM   Result Value Ref Range    Glucose (POC) 199 (H) 70 - 110 mg/dL   CBC WITH AUTOMATED DIFF    Collection Time: 12/19/18 10:33 AM   Result Value Ref Range    WBC 5.3 4.6 - 13.2 K/uL    RBC 2.76 (L) 4.70 - 5.50 M/uL    HGB 7.7 (L) 13.0 - 16.0 g/dL    HCT 24.2 (L) 36.0 - 48.0 %    MCV 87.7 74.0 - 97.0 FL    MCH 27.9 24.0 - 34.0 PG    MCHC 31.8 31.0 - 37.0 g/dL    RDW 16.3 (H) 11.6 - 14.5 %    PLATELET 460 963 - 732 K/uL    MPV 10.1 9.2 - 11.8 FL    NEUTROPHILS 71 40 - 73 %    LYMPHOCYTES 11 (L) 21 - 52 %    MONOCYTES 15 (H) 3 - 10 %    EOSINOPHILS 3 0 - 5 %    BASOPHILS 0 0 - 2 %    ABS. NEUTROPHILS 3.8 1.8 - 8.0 K/UL    ABS. LYMPHOCYTES 0.6 (L) 0.9 - 3.6 K/UL    ABS. MONOCYTES 0.8 0.05 - 1.2 K/UL    ABS. EOSINOPHILS 0.1 0.0 - 0.4 K/UL    ABS.  BASOPHILS 0.0 0.0 - 0.1 K/UL    DF AUTOMATED     METABOLIC PANEL, BASIC    Collection Time: 12/19/18 10:33 AM   Result Value Ref Range    Sodium 143 136 - 145 mmol/L    Potassium 3.7 3.5 - 5.5 mmol/L    Chloride 111 (H) 100 - 108 mmol/L    CO2 24 21 - 32 mmol/L    Anion gap 8 3.0 - 18 mmol/L    Glucose 238 (H) 74 - 99 mg/dL    BUN 23 (H) 7.0 - 18 MG/DL    Creatinine 1.68 (H) 0.6 - 1.3 MG/DL    BUN/Creatinine ratio 14 12 - 20      GFR est AA 51 (L) >60 ml/min/1.73m2    GFR est non-AA 42 (L) >60 ml/min/1.73m2    Calcium 8.2 (L) 8.5 - 10.1 MG/DL   GLUCOSE, POC    Collection Time: 12/19/18 11:38 AM   Result Value Ref Range    Glucose (POC) 255 (H) 70 - 110 mg/dL     Additional Data Reviewed:      Signed By: Laureen Ortiz MD     December 19, 2018 5:10 PM

## 2018-12-19 NOTE — PROGRESS NOTES
Dr. Mic Riddle was informed of patient put on NPO but no procedure is scheduled for today. He said to put patient on diet that patient was ordered to have before the order of NPO was written.

## 2018-12-19 NOTE — PROGRESS NOTES
Problem: Falls - Risk of  Goal: *Absence of Falls  Document Danielle Fall Risk and appropriate interventions in the flowsheet. Outcome: Progressing Towards Goal  Fall Risk Interventions:  Mobility Interventions: Bed/chair exit alarm, Patient to call before getting OOB, Utilize walker, cane, or other assistive device         Medication Interventions: Bed/chair exit alarm, Patient to call before getting OOB, Teach patient to arise slowly    Elimination Interventions: Bed/chair exit alarm, Call light in reach, Patient to call for help with toileting needs, Toilet paper/wipes in reach, Urinal in reach, Elevated toilet seat    History of Falls Interventions: Bed/chair exit alarm, Door open when patient unattended, Room close to nurse's station        Problem: Pressure Injury - Risk of  Goal: *Prevention of pressure injury  Document Gavino Scale and appropriate interventions in the flowsheet.   Outcome: Progressing Towards Goal  Pressure Injury Interventions:       Moisture Interventions: Absorbent underpads, Minimize layers    Activity Interventions: Increase time out of bed    Mobility Interventions: Float heels, HOB 30 degrees or less, Pressure redistribution bed/mattress (bed type)    Nutrition Interventions: Document food/fluid/supplement intake
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
## 2018-12-19 NOTE — PROGRESS NOTES
S:  Pt doing well regarding his Right hand 16 days s/p 5th Carpometacarpal closed Reduction percutaneous pinning. He reports some soreness today but overall doing well    O:  R Hand:  Dressing removed and pin sites appear to have a thin layer of hard yellow exudate. There is no gross purulence. There is erythema about the pin sites Proximal>distal.  The exudate was removed with alcohol swabs and the track appears clean without evidence of infection. A:  16 days s/p R 5th [facility] CRPP with possible early pin track infection    P:  Pulled pins today and cleaned sites with betadine and alcohol. Bandaids placed over the pin sites    Change bandaid every 1-2 days. Leave brace on at all times except for checking wound. F/U with me next week.

## 2018-12-19 NOTE — PROGRESS NOTES
Had spoken to Dr. Heath Early regarding Dr. Overton Smart order of Potassium Chloride ivpb x 2 doses when patient's serum Potassium level -s 3.6 mmol/L. He said it is alright to administer the ordered med.

## 2018-12-19 NOTE — PROGRESS NOTES
Bedside shift change report given to Mojgan Luu (oncoming nurse) by Martir Holt (offgoing nurse). Report included the following information SBAR, Kardex, MAR, Recent Results and Cardiac Rhythm Afib.

## 2018-12-19 NOTE — DIABETES MGMT
Glycemic Control Plan of Care    T2DM with current A1c of 7.4% (12/10/2018). See separate notes, 12/11/2018, for assessment of home diabetes management and education. Home diabetes meds: Levemir insulin 28 units daily every morning, novolog sliding scale insulin, and Janumet  mg twice daily with meals. POC BG rang on 12/18/2018:  mg/dL. Did not see documentation for treatment of hypoglycemia of 69 at 8:28 AM but did see f/u BG of 208 at 8:58 AM.    POC BG report on 12/19/2018 at time of review: 199, 255 mg/dL. Patient reported this afternoon that he had low blood sugar yesterday because he was NPO but he's been eating since supper yesterday without any problem with very good appetite. Recommendation(s):  1.) Consider increasing basal lantus insulin dose from 8 to 12 units daily at bedtime. Obtained verbal order from Dr. Mary Ellen Greenwood. 2.) Continue monitoring and evaluate need for further increase of lantus insulin dose if BG remain above target range. Assessment:  Patient is 60year old with past medical history including type 2 diabetes mellitus with polyneuropathy, COPD, atrial fibrillation, dyslipidemia, obesity, and recent ORIF right hand. He took extra naprosyn instead of narcotic - was admitted on 12/09/2018 with report of vomiting, diarrhea, abdominal pain, and hemetemesis. Noted:  S/P partial gastrectomy, resection of gatric mass. UGI bleed with GIST. Status post blood transfusion. T2DM with current A1c of 7.4% (12/102018). Most recent blood glucose values:    Results for Abad Miller (MRN [id number]) as of 12/11/2018 11:40   Ref. Range 12/10/2018 05:43 12/10/2018 11:29 12/10/2018 12:11 12/10/2018 18:36 12/10/2018 21:13   GLUCOSE,FAST - POC Latest Ref Range: 70 - 110 mg/dL 349 (H) 219 (H) 259 (H) 322 (H) 166 (H)     Results for Abad Miller (MRN [id number]) as of 12/11/2018 11:40   Ref.  Range 12/11/2018 07:57 12/11/2018 11:36   GLUCOSE,FAST - POC Latest Ref Range: 70 - 110 mg/dL 258 (H) 209 (H)     Current A1C: 7.4% (12/10/2018) which is equivalent to estimated average blood glucose of 166 mg/dL during the past 2-3 months. Current hospital diabetes medications:  Basal lantus insulin 12 units daily at bedtime. Correctional lispro insulin ACHS. Very resistant dose. Total daily dose insulin requirement previous day: 12/18/2018  Lantus: 8 units  Lispro: 15 units (correctional). TDD: 23 units of insulin    Home diabetes medications: Reported by patient on 12/10/2018:  Levemir insulin 28 units daily every morning. Humalog sliding scale insulin. Janumet  mg twice daily with meals. Diet: Diabetic consistent carb regular. Goals:  Blood glucose will be within target range of  mg/dL by 12/22/2018.     Education:  _X__  Refer to Diabetes Education Record: 12/11/2018             ___  Education not indicated at this time    Jackalyn Galeazzi, RN [facility]  Pager: [phone]

## 2018-12-20 LAB
ANION GAP SERPL CALC-SCNC: 6 MMOL/L (ref 3–18)
APPEARANCE UR: CLEAR
BACTERIA URNS QL MICRO: ABNORMAL /HPF
BILIRUB UR QL: NEGATIVE
BUN SERPL-MCNC: 22 MG/DL (ref 7–18)
BUN/CREAT SERPL: 14 (ref 12–20)
CALCIUM SERPL-MCNC: 7.9 MG/DL (ref 8.5–10.1)
CHLORIDE SERPL-SCNC: 111 MMOL/L (ref 100–108)
CO2 SERPL-SCNC: 24 MMOL/L (ref 21–32)
COLOR UR: YELLOW
CREAT SERPL-MCNC: 1.53 MG/DL (ref 0.6–1.3)
EPITH CASTS URNS QL MICRO: ABNORMAL /LPF (ref 0–5)
GLUCOSE BLD STRIP.AUTO-MCNC: 174 MG/DL (ref 70–110)
GLUCOSE BLD STRIP.AUTO-MCNC: 217 MG/DL (ref 70–110)
GLUCOSE BLD STRIP.AUTO-MCNC: 233 MG/DL (ref 70–110)
GLUCOSE BLD STRIP.AUTO-MCNC: 267 MG/DL (ref 70–110)
GLUCOSE SERPL-MCNC: 194 MG/DL (ref 74–99)
GLUCOSE UR STRIP.AUTO-MCNC: NEGATIVE MG/DL
HGB UR QL STRIP: NEGATIVE
KETONES UR QL STRIP.AUTO: NEGATIVE MG/DL
LEUKOCYTE ESTERASE UR QL STRIP.AUTO: NEGATIVE
NITRITE UR QL STRIP.AUTO: NEGATIVE
PH UR STRIP: 5.5 [PH] (ref 5–8)
POTASSIUM SERPL-SCNC: 3.6 MMOL/L (ref 3.5–5.5)
PROT UR STRIP-MCNC: NEGATIVE MG/DL
SODIUM SERPL-SCNC: 141 MMOL/L (ref 136–145)
SP GR UR REFRACTOMETRY: 1.02 (ref 1–1.03)
URATE CRY URNS QL MICRO: ABNORMAL
UROBILINOGEN UR QL STRIP.AUTO: 1 EU/DL (ref 0.2–1)
WBC URNS QL MICRO: ABNORMAL /HPF (ref 0–4)

## 2018-12-20 PROCEDURE — 82962 GLUCOSE BLOOD TEST: CPT

## 2018-12-20 PROCEDURE — 74011636637 HC RX REV CODE- 636/637: Performed by: EMERGENCY MEDICINE

## 2018-12-20 PROCEDURE — 81001 URINALYSIS AUTO W/SCOPE: CPT

## 2018-12-20 PROCEDURE — 74011250637 HC RX REV CODE- 250/637: Performed by: INTERNAL MEDICINE

## 2018-12-20 PROCEDURE — 74011000258 HC RX REV CODE- 258: Performed by: INTERNAL MEDICINE

## 2018-12-20 PROCEDURE — 36415 COLL VENOUS BLD VENIPUNCTURE: CPT

## 2018-12-20 PROCEDURE — 74011250636 HC RX REV CODE- 250/636: Performed by: INTERNAL MEDICINE

## 2018-12-20 PROCEDURE — C9113 INJ PANTOPRAZOLE SODIUM, VIA: HCPCS | Performed by: INTERNAL MEDICINE

## 2018-12-20 PROCEDURE — 74011636637 HC RX REV CODE- 636/637: Performed by: INTERNAL MEDICINE

## 2018-12-20 PROCEDURE — 74011000250 HC RX REV CODE- 250: Performed by: INTERNAL MEDICINE

## 2018-12-20 PROCEDURE — 94640 AIRWAY INHALATION TREATMENT: CPT

## 2018-12-20 PROCEDURE — 80048 BASIC METABOLIC PNL TOTAL CA: CPT

## 2018-12-20 PROCEDURE — 74011250637 HC RX REV CODE- 250/637: Performed by: EMERGENCY MEDICINE

## 2018-12-20 PROCEDURE — 74011250636 HC RX REV CODE- 250/636

## 2018-12-20 PROCEDURE — 65660000000 HC RM CCU STEPDOWN

## 2018-12-20 RX ORDER — AMOXICILLIN AND CLAVULANATE POTASSIUM 500; 125 MG/1; MG/1
1 TABLET, FILM COATED ORAL EVERY 12 HOURS
Status: DISCONTINUED | OUTPATIENT
Start: 2018-12-20 | End: 2018-12-21 | Stop reason: HOSPADM

## 2018-12-20 RX ORDER — INSULIN GLARGINE 100 [IU]/ML
18 INJECTION, SOLUTION SUBCUTANEOUS
Status: DISCONTINUED | OUTPATIENT
Start: 2018-12-20 | End: 2018-12-21 | Stop reason: HOSPADM

## 2018-12-20 RX ORDER — SAME BUTANEDISULFONATE/BETAINE 400-600 MG
250 POWDER IN PACKET (EA) ORAL 2 TIMES DAILY
Status: DISCONTINUED | OUTPATIENT
Start: 2018-12-20 | End: 2018-12-21 | Stop reason: HOSPADM

## 2018-12-20 RX ADMIN — INSULIN LISPRO 3 UNITS: 100 INJECTION, SOLUTION INTRAVENOUS; SUBCUTANEOUS at 09:38

## 2018-12-20 RX ADMIN — MORPHINE SULFATE 5 MG: 10 INJECTION INTRAMUSCULAR; INTRAVENOUS; SUBCUTANEOUS at 22:48

## 2018-12-20 RX ADMIN — AMOXICILLIN AND CLAVULANATE POTASSIUM 1 TABLET: 500; 125 TABLET, FILM COATED ORAL at 14:22

## 2018-12-20 RX ADMIN — METOPROLOL TARTRATE 12.5 MG: 25 TABLET ORAL at 09:42

## 2018-12-20 RX ADMIN — LOSARTAN POTASSIUM 25 MG: 25 TABLET, FILM COATED ORAL at 09:41

## 2018-12-20 RX ADMIN — BUDESONIDE AND FORMOTEROL FUMARATE DIHYDRATE 2 PUFF: 160; 4.5 AEROSOL RESPIRATORY (INHALATION) at 23:46

## 2018-12-20 RX ADMIN — INSULIN LISPRO 9 UNITS: 100 INJECTION, SOLUTION INTRAVENOUS; SUBCUTANEOUS at 13:04

## 2018-12-20 RX ADMIN — Medication 250 MG: at 17:14

## 2018-12-20 RX ADMIN — PIPERACILLIN SODIUM AND TAZOBACTAM SODIUM 3.38 G: 3; .375 INJECTION, POWDER, LYOPHILIZED, FOR SOLUTION INTRAVENOUS at 06:03

## 2018-12-20 RX ADMIN — INSULIN LISPRO 6 UNITS: 100 INJECTION, SOLUTION INTRAVENOUS; SUBCUTANEOUS at 22:50

## 2018-12-20 RX ADMIN — PIPERACILLIN SODIUM AND TAZOBACTAM SODIUM 3.38 G: 3; .375 INJECTION, POWDER, LYOPHILIZED, FOR SOLUTION INTRAVENOUS at 09:44

## 2018-12-20 RX ADMIN — MORPHINE SULFATE 5 MG: 10 INJECTION INTRAMUSCULAR; INTRAVENOUS; SUBCUTANEOUS at 14:45

## 2018-12-20 RX ADMIN — MORPHINE SULFATE 5 MG: 10 INJECTION INTRAMUSCULAR; INTRAVENOUS; SUBCUTANEOUS at 06:03

## 2018-12-20 RX ADMIN — AMOXICILLIN AND CLAVULANATE POTASSIUM 1 TABLET: 500; 125 TABLET, FILM COATED ORAL at 22:47

## 2018-12-20 RX ADMIN — MORPHINE SULFATE 5 MG: 10 INJECTION INTRAMUSCULAR; INTRAVENOUS; SUBCUTANEOUS at 09:37

## 2018-12-20 RX ADMIN — MORPHINE SULFATE 5 MG: 10 INJECTION INTRAMUSCULAR; INTRAVENOUS; SUBCUTANEOUS at 00:09

## 2018-12-20 RX ADMIN — SODIUM CHLORIDE 40 MG: 9 INJECTION, SOLUTION INTRAMUSCULAR; INTRAVENOUS; SUBCUTANEOUS at 22:48

## 2018-12-20 RX ADMIN — SODIUM CHLORIDE 40 MG: 9 INJECTION, SOLUTION INTRAMUSCULAR; INTRAVENOUS; SUBCUTANEOUS at 09:41

## 2018-12-20 RX ADMIN — METOPROLOL TARTRATE 12.5 MG: 25 TABLET ORAL at 17:14

## 2018-12-20 RX ADMIN — DOCUSATE SODIUM 100 MG: 100 CAPSULE, LIQUID FILLED ORAL at 14:27

## 2018-12-20 RX ADMIN — GABAPENTIN 300 MG: 300 CAPSULE ORAL at 09:41

## 2018-12-20 RX ADMIN — INSULIN LISPRO 6 UNITS: 100 INJECTION, SOLUTION INTRAVENOUS; SUBCUTANEOUS at 17:11

## 2018-12-20 RX ADMIN — GABAPENTIN 300 MG: 300 CAPSULE ORAL at 17:16

## 2018-12-20 RX ADMIN — INSULIN GLARGINE 18 UNITS: 100 INJECTION, SOLUTION SUBCUTANEOUS at 22:49

## 2018-12-20 RX ADMIN — BUDESONIDE AND FORMOTEROL FUMARATE DIHYDRATE 2 PUFF: 160; 4.5 AEROSOL RESPIRATORY (INHALATION) at 15:47

## 2018-12-20 RX ADMIN — APIXABAN 5 MG: 5 TABLET, FILM COATED ORAL at 15:40

## 2018-12-20 RX ADMIN — BUPROPION HYDROCHLORIDE 150 MG: 150 TABLET, EXTENDED RELEASE ORAL at 09:43

## 2018-12-20 RX ADMIN — ROSUVASTATIN CALCIUM 20 MG: 20 TABLET, FILM COATED ORAL at 22:47

## 2018-12-20 NOTE — PROGRESS NOTES
Change antibiotics to po   Hold ARB - mild renal insufficiency   Once improved renal function restart Cozaar

## 2018-12-20 NOTE — PROGRESS NOTES
[facility] Hospitalist Group  Progress Note    Patient: Vincenzo Dutta. Age: 60 y.o. : 1960 MR#: [id number] SSN: [id number]  Date/Time: 2018    Subjective:     Feels bater   No Cp  No SOB  No NVD      Assessment/Plan:       1A :  - S/p Partial gastrectomy, resection of gastric mass. by surgery   - Place patient on Augmentin - will observe his first dose in hospital ,likely DC in AM ,add Florastor   -  for dressing     1. UGIBleed with GIST, acute blood loss anemia    - s/p Transfuse 1 unit PRBC   - Post transfusion H/H stable so far     2. Chronic AFib - Xarelto resumption -Restart his Eliquis instead of Xarelto ( mild renal insufficiency )     3. HTN - Episode of Hypotension - improving BP now - Co lobo on hold , follow renal functions     4. DM2 - SSI. 5. Dyslipidemia - statin. 6. Recent R hand fx - splinted. Appreciate ortho eval. No new issues. 7. FITO hx - no acute. 8. COPD hx - no acute. 9. 12/10 EGD path report: benign gastric mucosa with mild chronic active gastritis. PPI BID.     10 Body mass index is 36.89 kg/m². - Morbid obesity     Additional Notes:     Path report:      Case discussed with:  [x]Patient  [x]Family  []Nursing  []Case Management  DVT Prophylaxis:  []Lovenox  []Hep SQ  [x]SCDs  []Coumadin   []On Heparin gtt    Objective:   VS:   Visit Vitals  /72 (BP 1 Location: Left arm, BP Patient Position: At rest)   Pulse 93   Temp 97.5 °F (36.4 °C)   Resp 20   Ht 5' 11\" (1.803 m)   Wt 120 kg (264 lb 8 oz)   SpO2 98%   BMI 36.89 kg/m²      Tmax/24hrs: Temp (24hrs), Av.7 °F (36.5 °C), Min:97.2 °F (36.2 °C), Max:98.1 °F (36.7 °C)    Input/Output:     Intake/Output Summary (Last 24 hours) at 2018 1401  Last data filed at 2018 1252  Gross per 24 hour   Intake 1540 ml   Output 750 ml   Net 790 ml       General:  Awake, alert, NAD. Cardiovascular:  RRR. Pulmonary:  CTA B.  GI:  Soft, NT/ND, NABS.  Abd wound dressed - dry dressing   Extremities:  No CT or edema.    Additional:      Labs:    Recent Results (from the past 24 hour(s))   GLUCOSE, POC    Collection Time: 12/19/18  3:53 PM   Result Value Ref Range    Glucose (POC) 240 (H) 70 - 110 mg/dL   GLUCOSE, POC    Collection Time: 12/19/18  9:52 PM   Result Value Ref Range    Glucose (POC) 244 (H) 70 - 849 mg/dL   METABOLIC PANEL, BASIC    Collection Time: 12/20/18  1:12 AM   Result Value Ref Range    Sodium 141 136 - 145 mmol/L    Potassium 3.6 3.5 - 5.5 mmol/L    Chloride 111 (H) 100 - 108 mmol/L    CO2 24 21 - 32 mmol/L    Anion gap 6 3.0 - 18 mmol/L    Glucose 194 (H) 74 - 99 mg/dL    BUN 22 (H) 7.0 - 18 MG/DL    Creatinine 1.53 (H) 0.6 - 1.3 MG/DL    BUN/Creatinine ratio 14 12 - 20      GFR est AA 57 (L) >60 ml/min/1.73m2    GFR est non-AA 47 (L) >60 ml/min/1.73m2    Calcium 7.9 (L) 8.5 - 10.1 MG/DL   GLUCOSE, POC    Collection Time: 12/20/18  7:58 AM   Result Value Ref Range    Glucose (POC) 174 (H) 70 - 110 mg/dL   URINALYSIS W/MICROSCOPIC    Collection Time: 12/20/18  9:00 AM   Result Value Ref Range    Color YELLOW      Appearance CLEAR      Specific gravity 1.018 1.005 - 1.030      pH (UA) 5.5 5.0 - 8.0      Protein NEGATIVE  NEG mg/dL    Glucose NEGATIVE  NEG mg/dL    Ketone NEGATIVE  NEG mg/dL    Bilirubin NEGATIVE  NEG      Blood NEGATIVE  NEG      Urobilinogen 1.0 0.2 - 1.0 EU/dL    Nitrites NEGATIVE  NEG      Leukocyte Esterase NEGATIVE  NEG      WBC 0 to 3 0 - 4 /hpf    Epithelial cells FEW 0 - 5 /lpf    Bacteria FEW (A) NEG /hpf    Uric acid crystals 2+ (A) NEG   GLUCOSE, POC    Collection Time: 12/20/18 11:55 AM   Result Value Ref Range    Glucose (POC) 267 (H) 70 - 110 mg/dL     Additional Data Reviewed:      Signed By: Javier Ro MD     December 20, 2018 5:10 PM

## 2018-12-20 NOTE — DIABETES MGMT
Glycemic Control Plan of Care    T2DM with current A1c of 7.4% (12/10/2018). See separate notes, 12/11/2018, for assessment of home diabetes management and education. Home diabetes meds: Levemir insulin 28 units daily every morning, novolog sliding scale insulin, and Janumet  mg twice daily with meals. POC BG rang on 12/19/2018: 199-255 mg/dL. Increased basal lantus insulin dose from 8 to 12 units. POC BG report on 12/20/2018 at time of review: 174, 267 mg/dL. Seen patient this afternoon regarding continued BG monitoring and insulin dose adjustment. Recommendation(s):  1.) Consider increasing basal lantus insulin dose from 12 to 18 units daily at bedtime. Obtained verbal order from Dr. Ary Zhong. 2.) Continue monitoring and evaluate need for further increase of lantus insulin dose if BG remain above target range. Assessment:  Patient is 60year old with past medical history including type 2 diabetes mellitus with polyneuropathy, COPD, atrial fibrillation, dyslipidemia, obesity, and recent ORIF right hand. He took extra naprosyn instead of narcotic - was admitted on 12/09/2018 with report of vomiting, diarrhea, abdominal pain, and hemetemesis. Noted:  S/P partial gastrectomy, resection of gatric mass. UGI bleed with GIST. Status post blood transfusion. T2DM with current A1c of 7.4% (12/102018). Most recent blood glucose values:    Results for Doroteo Capellan (MRN [id number]) as of 12/20/2018 14:13   Ref. Range 12/19/2018 08:03 12/19/2018 11:38 12/19/2018 15:53 12/19/2018 21:52   GLUCOSE,FAST - POC Latest Ref Range: 70 - 110 mg/dL 199 (H) 255 (H) 240 (H) 244 (H)     Results for Doroteo Capellan (MRN [id number]) as of 12/20/2018 14:13   Ref. Range 12/20/2018 07:58 12/20/2018 11:55   GLUCOSE,FAST - POC Latest Ref Range: 70 - 110 mg/dL 174 (H) 267 (H)     Current A1C: 7.4% (12/10/2018) which is equivalent to estimated average blood glucose of 166 mg/dL during the past 2-3 months.     Current hospital diabetes medications:  Basal lantus insulin 18 units daily at bedtime. Correctional lispro insulin ACHS. Very resistant dose. Total daily dose insulin requirement previous day: 12/19/2018  Lantus: 12 units  Lispro: 24 units (correctional). TDD: 36 units of insulin    Home diabetes medications: Reported by patient on 12/10/2018:  Levemir insulin 28 units daily every morning. Humalog sliding scale insulin. Janumet  mg twice daily with meals. Diet: Diabetic consistent carb regular; nutr suppl: glucerna shake with breakfast and dinner. Goals:  Blood glucose will be within target range of  mg/dL by 12/23/2018.     Education:  _X__  Refer to Diabetes Education Record: 12/11/2018             ___  Education not indicated at this time    Deena Amin RN [facility]  Pager: [phone]

## 2018-12-21 ENCOUNTER — TELEPHONE (OUTPATIENT)
Dept: INTERNAL MEDICINE CLINIC | Age: 58
End: 2018-12-21

## 2018-12-21 VITALS
BODY MASS INDEX: 37.03 KG/M2 | HEIGHT: 71 IN | OXYGEN SATURATION: 97 % | HEART RATE: 73 BPM | SYSTOLIC BLOOD PRESSURE: 133 MMHG | WEIGHT: 264.5 LBS | TEMPERATURE: 97.4 F | RESPIRATION RATE: 18 BRPM | DIASTOLIC BLOOD PRESSURE: 79 MMHG

## 2018-12-21 LAB
ANION GAP SERPL CALC-SCNC: 9 MMOL/L (ref 3–18)
BASOPHILS # BLD: 0 K/UL (ref 0–0.1)
BASOPHILS NFR BLD: 0 % (ref 0–2)
BUN SERPL-MCNC: 20 MG/DL (ref 7–18)
BUN/CREAT SERPL: 14 (ref 12–20)
CALCIUM SERPL-MCNC: 8.7 MG/DL (ref 8.5–10.1)
CHLORIDE SERPL-SCNC: 110 MMOL/L (ref 100–108)
CO2 SERPL-SCNC: 23 MMOL/L (ref 21–32)
CREAT SERPL-MCNC: 1.38 MG/DL (ref 0.6–1.3)
DIFFERENTIAL METHOD BLD: ABNORMAL
EOSINOPHIL # BLD: 0.3 K/UL (ref 0–0.4)
EOSINOPHIL NFR BLD: 6 % (ref 0–5)
ERYTHROCYTE [DISTWIDTH] IN BLOOD BY AUTOMATED COUNT: 16.5 % (ref 11.6–14.5)
GLUCOSE BLD STRIP.AUTO-MCNC: 163 MG/DL (ref 70–110)
GLUCOSE BLD STRIP.AUTO-MCNC: 237 MG/DL (ref 70–110)
GLUCOSE SERPL-MCNC: 171 MG/DL (ref 74–99)
HCT VFR BLD AUTO: 23 % (ref 36–48)
HGB BLD-MCNC: 7.3 G/DL (ref 13–16)
LYMPHOCYTES # BLD: 1.5 K/UL (ref 0.9–3.6)
LYMPHOCYTES NFR BLD: 25 % (ref 21–52)
MCH RBC QN AUTO: 27.9 PG (ref 24–34)
MCHC RBC AUTO-ENTMCNC: 31.7 G/DL (ref 31–37)
MCV RBC AUTO: 87.8 FL (ref 74–97)
MONOCYTES # BLD: 0.7 K/UL (ref 0.05–1.2)
MONOCYTES NFR BLD: 12 % (ref 3–10)
NEUTS SEG # BLD: 3.4 K/UL (ref 1.8–8)
NEUTS SEG NFR BLD: 57 % (ref 40–73)
PLATELET # BLD AUTO: 235 K/UL (ref 135–420)
PMV BLD AUTO: 10.5 FL (ref 9.2–11.8)
POTASSIUM SERPL-SCNC: 3.4 MMOL/L (ref 3.5–5.5)
RBC # BLD AUTO: 2.62 M/UL (ref 4.7–5.5)
SODIUM SERPL-SCNC: 142 MMOL/L (ref 136–145)
WBC # BLD AUTO: 5.9 K/UL (ref 4.6–13.2)

## 2018-12-21 PROCEDURE — 74011250637 HC RX REV CODE- 250/637: Performed by: EMERGENCY MEDICINE

## 2018-12-21 PROCEDURE — 74011636637 HC RX REV CODE- 636/637: Performed by: EMERGENCY MEDICINE

## 2018-12-21 PROCEDURE — 74011250636 HC RX REV CODE- 250/636

## 2018-12-21 PROCEDURE — 36415 COLL VENOUS BLD VENIPUNCTURE: CPT

## 2018-12-21 PROCEDURE — C9113 INJ PANTOPRAZOLE SODIUM, VIA: HCPCS | Performed by: INTERNAL MEDICINE

## 2018-12-21 PROCEDURE — 74011250637 HC RX REV CODE- 250/637: Performed by: INTERNAL MEDICINE

## 2018-12-21 PROCEDURE — 85025 COMPLETE CBC W/AUTO DIFF WBC: CPT

## 2018-12-21 PROCEDURE — 80048 BASIC METABOLIC PNL TOTAL CA: CPT

## 2018-12-21 PROCEDURE — 77030018836 HC SOL IRR NACL ICUM -A

## 2018-12-21 PROCEDURE — 74011250636 HC RX REV CODE- 250/636: Performed by: INTERNAL MEDICINE

## 2018-12-21 PROCEDURE — 82962 GLUCOSE BLOOD TEST: CPT

## 2018-12-21 PROCEDURE — 94640 AIRWAY INHALATION TREATMENT: CPT

## 2018-12-21 RX ORDER — OXYCODONE AND ACETAMINOPHEN 5; 325 MG/1; MG/1
1 TABLET ORAL
Qty: 18 TAB | Refills: 0 | Status: SHIPPED | OUTPATIENT
Start: 2018-12-21 | End: 2019-07-16 | Stop reason: ALTCHOICE

## 2018-12-21 RX ORDER — PANTOPRAZOLE SODIUM 40 MG/1
40 TABLET, DELAYED RELEASE ORAL
Status: DISCONTINUED | OUTPATIENT
Start: 2018-12-21 | End: 2018-12-21

## 2018-12-21 RX ORDER — SAME BUTANEDISULFONATE/BETAINE 400-600 MG
250 POWDER IN PACKET (EA) ORAL 2 TIMES DAILY
Qty: 14 CAP | Refills: 0 | Status: SHIPPED | OUTPATIENT
Start: 2018-12-21 | End: 2018-12-24 | Stop reason: ALTCHOICE

## 2018-12-21 RX ORDER — AMOXICILLIN AND CLAVULANATE POTASSIUM 500; 125 MG/1; MG/1
1 TABLET, FILM COATED ORAL EVERY 12 HOURS
Qty: 16 TAB | Refills: 0 | Status: SHIPPED | OUTPATIENT
Start: 2018-12-21 | End: 2018-12-29

## 2018-12-21 RX ORDER — LOSARTAN POTASSIUM 25 MG/1
25 TABLET ORAL DAILY
Qty: 30 TAB | Refills: 1 | Status: SHIPPED | OUTPATIENT
Start: 2018-12-21 | End: 2019-02-08 | Stop reason: SDUPTHER

## 2018-12-21 RX ORDER — AMLODIPINE BESYLATE 10 MG/1
5 TABLET ORAL DAILY
Qty: 30 TAB | Refills: 0 | Status: SHIPPED | OUTPATIENT
Start: 2018-12-21 | End: 2019-01-16 | Stop reason: SDUPTHER

## 2018-12-21 RX ADMIN — BUPROPION HYDROCHLORIDE 150 MG: 150 TABLET, EXTENDED RELEASE ORAL at 09:07

## 2018-12-21 RX ADMIN — AMOXICILLIN AND CLAVULANATE POTASSIUM 1 TABLET: 500; 125 TABLET, FILM COATED ORAL at 09:08

## 2018-12-21 RX ADMIN — GABAPENTIN 300 MG: 300 CAPSULE ORAL at 09:07

## 2018-12-21 RX ADMIN — INSULIN LISPRO 3 UNITS: 100 INJECTION, SOLUTION INTRAVENOUS; SUBCUTANEOUS at 09:15

## 2018-12-21 RX ADMIN — MORPHINE SULFATE 5 MG: 10 INJECTION INTRAMUSCULAR; INTRAVENOUS; SUBCUTANEOUS at 05:10

## 2018-12-21 RX ADMIN — BUDESONIDE AND FORMOTEROL FUMARATE DIHYDRATE 2 PUFF: 160; 4.5 AEROSOL RESPIRATORY (INHALATION) at 08:00

## 2018-12-21 RX ADMIN — INSULIN LISPRO 6 UNITS: 100 INJECTION, SOLUTION INTRAVENOUS; SUBCUTANEOUS at 12:34

## 2018-12-21 RX ADMIN — Medication 250 MG: at 09:08

## 2018-12-21 RX ADMIN — DOCUSATE SODIUM 100 MG: 100 CAPSULE, LIQUID FILLED ORAL at 09:10

## 2018-12-21 RX ADMIN — METOPROLOL TARTRATE 12.5 MG: 25 TABLET ORAL at 09:09

## 2018-12-21 RX ADMIN — MORPHINE SULFATE 5 MG: 10 INJECTION INTRAMUSCULAR; INTRAVENOUS; SUBCUTANEOUS at 11:16

## 2018-12-21 RX ADMIN — APIXABAN 5 MG: 5 TABLET, FILM COATED ORAL at 09:07

## 2018-12-21 RX ADMIN — SODIUM CHLORIDE 40 MG: 9 INJECTION, SOLUTION INTRAMUSCULAR; INTRAVENOUS; SUBCUTANEOUS at 09:10

## 2018-12-21 NOTE — PROGRESS NOTES
Discharge Planning:  Pt provided [address]   pt chose [id number] [facility]  Pt's daughter Veda oJhnson signed [address] for this pt.  Per the pt(father's) request  Pt will be transported home by his family  [facility] request placed in [facility] for services

## 2018-12-21 NOTE — DISCHARGE INSTRUCTIONS
Patient armband removed and shredded       Anemia: Care Instructions  Your Care Instructions    Anemia is a low level of red blood cells, which carry oxygen throughout your body. Many things can cause anemia. Lack of iron is one of the most common causes. Your body needs iron to make hemoglobin, a substance in red blood cells that carries oxygen from the lungs to your body's cells. Without enough iron, the body produces fewer and smaller red blood cells. As a result, your body's cells do not get enough oxygen, and you feel tired and weak. And you may have trouble concentrating. Bleeding is the most common cause of a lack of iron. You may have heavy menstrual bleeding or bleeding caused by conditions such as ulcers, hemorrhoids, or cancer. Regular use of aspirin or other anti-inflammatory medicines (such as ibuprofen) also can cause bleeding in some people. A lack of iron in your diet also can cause anemia, especially at times when the body needs more iron, such as during pregnancy, infancy, and the teen years. Your doctor may have prescribed iron pills. It may take several months of treatment for your iron levels to return to normal. Your doctor also may suggest that you eat foods that are rich in iron, such as meat and beans. There are many other causes of anemia. It is not always due to a lack of iron. Finding the specific cause of your anemia will help your doctor find the right treatment for you. Follow-up care is a key part of your treatment and safety. Be sure to make and go to all appointments, and call your doctor if you are having problems. It's also a good idea to know your test results and keep a list of the medicines you take. How can you care for yourself at home? · Take your medicines exactly as prescribed. Call your doctor if you think you are having a problem with your medicine.   · If your doctor recommends iron pills, take them as directed:  ? Try to take the pills on an empty stomach about 1 hour before or 2 hours after meals. But you may need to take iron with food to avoid an upset stomach. ? Do not take antacids or drink milk or caffeine drinks (such as coffee, tea, or cola) at the same time or within 2 hours of the time that you take your iron. They can make it hard for your body to absorb the iron. ? Vitamin C (from food or supplements) helps your body absorb iron. Try taking iron pills with a glass of orange juice or some other food that is high in vitamin C, such as citrus fruits. ? Iron pills may cause stomach problems, such as heartburn, nausea, diarrhea, constipation, and cramps. Be sure to drink plenty of fluids, and include fruits, vegetables, and fiber in your diet each day. Iron pills often make your bowel movements dark or green. ? If you forget to take an iron pill, do not take a double dose of iron the next time you take a pill. ? Keep iron pills out of the reach of small children. An overdose of iron can be very dangerous. · Follow your doctor's advice about eating iron-rich foods. These include red meat, shellfish, poultry, eggs, beans, raisins, whole-grain bread, and leafy green vegetables. · Steam vegetables to help them keep their iron content. When should you call for help? Call 911 anytime you think you may need emergency care. For example, call if:    · You have symptoms of a heart attack. These may include:  ? Chest pain or pressure, or a strange feeling in the chest.  ? Sweating. ? Shortness of breath. ? Nausea or vomiting. ? Pain, pressure, or a strange feeling in the back, neck, jaw, or upper belly or in one or both shoulders or arms. ? Lightheadedness or sudden weakness. ? A fast or irregular heartbeat. After you call 911, the  may tell you to chew 1 adult-strength or 2 to 4 low-dose aspirin. Wait for an ambulance.  Do not try to drive yourself.     · You passed out (lost consciousness).    Call your doctor now or seek immediate medical care if:    · You have new or increased shortness of breath.     · You are dizzy or lightheaded, or you feel like you may faint.     · Your fatigue and weakness continue or get worse.     · You have any abnormal bleeding, such as:  ? Nosebleeds. ? Vaginal bleeding that is different (heavier, more frequent, at a different time of the month) than what you are used to.  ? Bloody or black stools, or rectal bleeding. ? Bloody or pink urine.    Watch closely for changes in your health, and be sure to contact your doctor if:    · You do not get better as expected. Where can you learn more? Go to http://vasile-fito.info/. Enter R301 in the search box to learn more about \"Anemia: Care Instructions. \"  Current as of: May 7, 2018  Content Version: 11.8  © 8371-8519 [employer]. Care instructions adapted under license by [employer] (which disclaims liability or warranty for this information). If you have questions about a medical condition or this instruction, always ask your healthcare professional. [place] [address] any warranty or liability for your use of this information. DISCHARGE SUMMARY from Nurse    PATIENT INSTRUCTIONS:    After general anesthesia or intravenous sedation, for 24 hours or while taking prescription Narcotics:  · Limit your activities  · Do not drive and operate hazardous machinery  · Do not make important personal or business decisions  · Do  not drink alcoholic beverages  · If you have not urinated within 8 hours after discharge, please contact your surgeon on call.     Report the following to your surgeon:  · Excessive pain, swelling, redness or odor of or around the surgical area  · Temperature over 100.5  · Nausea and vomiting lasting longer than 4 hours or if unable to take medications  · Any signs of decreased circulation or nerve impairment to extremity: change in color, persistent  numbness, tingling, coldness or increase pain  · Any questions    What to do at Home:  Recommended activity: Activity as tolerated,     If you experience any of the following symptoms persistent fever, nausea, vomiting, diarrhea, dizziness, please follow up with [facility] nurse/primary care doctor. *  Please give a list of your current medications to your Primary Care Provider. *  Please update this list whenever your medications are discontinued, doses are      changed, or new medications (including over-the-counter products) are added. *  Please carry medication information at all times in case of emergency situations. These are general instructions for a healthy lifestyle:    No smoking/ No tobacco products/ Avoid exposure to second hand smoke  Surgeon General's Warning:  Quitting smoking now greatly reduces serious risk to your health. Obesity, smoking, and sedentary lifestyle greatly increases your risk for illness    A healthy diet, regular physical exercise & weight monitoring are important for maintaining a healthy lifestyle    You may be retaining fluid if you have a history of heart failure or if you experience any of the following symptoms:  Weight gain of 3 pounds or more overnight or 5 pounds in a week, increased swelling in our hands or feet or shortness of breath while lying flat in bed. Please call your doctor as soon as you notice any of these symptoms; do not wait until your next office visit. Recognize signs and symptoms of STROKE:    F-face looks uneven    A-arms unable to move or move unevenly    S-speech slurred or non-existent    T-time-call 911 as soon as signs and symptoms begin-DO NOT go       Back to bed or wait to see if you get better-TIME IS BRAIN. Warning Signs of HEART ATTACK     Call 911 if you have these symptoms:   Chest discomfort. Most heart attacks involve discomfort in the center of the chest that lasts more than a few minutes, or that goes away and comes back.  It can feel like uncomfortable pressure, squeezing, fullness, or pain.  Discomfort in other areas of the upper body. Symptoms can include pain or discomfort in one or both arms, the back, neck, jaw, or stomach.  Shortness of breath with or without chest discomfort.  Other signs may include breaking out in a cold sweat, nausea, or lightheadedness. Don't wait more than five minutes to call 911 - MINUTES MATTER! Fast action can save your life. Calling 911 is almost always the fastest way to get lifesaving treatment. Emergency Medical Services staff can begin treatment when they arrive -- up to an hour sooner than if someone gets to the hospital by car. The discharge information has been reviewed with the patient. The patient verbalized understanding. Discharge medications reviewed with the patient and appropriate educational materials and side effects teaching were provided.   ___________________________________________________________________________________________________________________________________

## 2018-12-21 NOTE — TELEPHONE ENCOUNTER
PT has an appt on 12/24 at 8:30- said he can't make it that early and wants a later appt- PT was advised you have nothing available and we close early. HE jennifer you usually squeeze him in.  Please advise

## 2018-12-21 NOTE — PROGRESS NOTES
Spoke with Dr. Raimundo Falcon, will give patient percocet 5-325 mg in discussion with patient whom is recent post op.      Signed By: Aga Julien NP     December 21, 2018

## 2018-12-21 NOTE — DISCHARGE SUMMARY
Discharge Summary     Patient ID:  Jesse Merino  [id number]  60 y.o.  1960  Body mass index is 36.89 kg/m². PCP on record: Jolene Bloch, MD    Admit date: 12/9/2018  Discharge date and time: 12/21/2018    Discharge Diagnoses:      1 Abdominal wall cellulitis - Surgical wound infection   2 Sepsis   3 S/p partial gastrectomy   4 Anemia acute blood loss - GI bleed   5 Chronic afib - on LTAC   6 morbid obesity - Body mass index is 36.89 kg/m². 7 HTN  8 DM2   9 HLD   10 COPD with out exacerbation   11 H/o FITO   12 Hypokalemia             Consults: GI and General Surgery Dr Jayla Roberts by problems:    Admitted with [place] emesis , S/p EGD -                                            1 Abdominal wall cellulitis - Surgical wound infection   - PUS /surgical site growth - strep viridans - was on zosyn now changed to PO Augmentin to finish course   - Patient post op 3-4 th day developed severe chills / hypotension - suggestive of developing sepsis - hypotension improved with IVF , eventually patient got better and now asymptomatic   - home health for wet dry dressing TID - Follow up with Dr Jason Smith 2 weeks       2 Sepsis   3 S/p partial gastrectomy - for mass   - s/p EGD - 12/10 EGD path report: benign gastric mucosa with mild chronic active gastritis. -  PPI BID.     4 Anemia acute blood loss - GI bleed   - Due to above   - now improved     5 Chronic afib - on LTAC     - was on Xarelto , Changed to Eliquis due to renal insufficiency     6 morbid obesity - Body mass index is 36.89 kg/m². - weight reduction with life style modification     7 HTN  - Cozar on hold due to renal insufficiency , now improving so out patient recheck on renal function and restart Co ozar - Defer to PCP     8 DM2 - continue current management     9 HLD   10 COPD with out exacerbation   11 H/o FITO   12 Hypokalemia   13 H/o recent fall with Rt radial Fx s/p ORIF.       Patient seen and examined by me on discharge day. Pertinent Findings:  Patient is Alert Awake and oriented   HEENT - NAD    RS - Clear , no rales no rhonchi   CVS - regular rhythm and rate acceptable    abd - benign, BS present , no Distension   EXT - no edema , no calf tenderness   Neuro - alert and awake , grossly motor and sensory intact       Significant Diagnostic Studies:  EGD       Pertinent Lab Data:  Recent Labs     12/21/18  0325 12/19/18  1033 12/18/18  1324   WBC 5.9 5.3  --    HGB 7.3* 7.7* 6.7*   HCT 23.0* 24.2* 21.4*    210  --      Recent Labs     12/21/18  0325 12/20/18  0112 12/19/18  1033    141 143   K 3.4* 3.6 3.7   * 111* 111*   CO2 23 24 24   * 194* 238*   BUN 20* 22* 23*   CREA 1.38* 1.53* 1.68*   CA 8.7 7.9* 8.2*       DISCHARGE MEDICATIONS:   @  Current Discharge Medication List      START taking these medications    Details   amoxicillin-clavulanate (AUGMENTIN) 500-125 mg per tablet Take 1 Tab by mouth every twelve (12) hours for 8 days. Qty: 16 Tab, Refills: 0      apixaban (ELIQUIS) 5 mg tablet Take 1 Tab by mouth two (2) times a day. Qty: 60 Tab, Refills: 0      Saccharomyces boulardii (FLORASTOR) 250 mg capsule Take 1 Cap by mouth two (2) times a day for 7 days. Qty: 14 Cap, Refills: 0      pantoprazole (PROTONIX) 40 mg tablet Take 1 Tab by mouth two (2) times a day. Qty: 60 Tab, Refills: 1         CONTINUE these medications which have CHANGED    Details   amLODIPine (NORVASC) 10 mg tablet Take 0.5 Tabs by mouth daily. Qty: 30 Tab, Refills: 0      losartan (COZAAR) 25 mg tablet Take 1 Tab by mouth daily. Start after confirming with PCP  Qty: 30 Tab, Refills: 1    Associated Diagnoses: Essential hypertension         CONTINUE these medications which have NOT CHANGED    Details   rivaroxaban (XARELTO) 20 mg tab tablet Take 20 mg by mouth daily. cyclobenzaprine (FLEXERIL) 10 mg tablet Take 1 Tab by mouth nightly.   Qty: 30 Tab, Refills: 0      buPROPion SR (WELLBUTRIN SR) 150 mg SR tablet TAKE 1 TABLET BY MOUTH DAILY  Qty: 30 Tab, Refills: 6    Associated Diagnoses: Depression, unspecified depression type      insulin detemir U-100 (LEVEMIR FLEXTOUCH U-100 INSULN) 100 unit/mL (3 mL) inpn 28 Units by SubCUTAneous route daily. Qty: 15 mL, Refills: 6    Associated Diagnoses: Well controlled diabetes mellitus ([address]      fenofibrate nanocrystallized (TRICOR) 145 mg tablet Take 1 Tab by mouth daily. Qty: 90 Tab, Refills: 3    Associated Diagnoses: Well controlled diabetes mellitus ([address] Severe obesity (BMI 35.0-39. 9) with comorbidity ([facility] [address] Dyslipidemia      JANUMET 50-1,000 mg per tablet TAKE 1 TABLET BY MOUTH TWICE DAILY WITH MEALS  Qty: 180 Tab, Refills: 1      gabapentin (NEURONTIN) 300 mg capsule TAKE 1 CAPSULE BY MOUTH TWICE DAILY  Qty: 180 Cap, Refills: 1      metoprolol succinate (TOPROL-XL) 50 mg XL tablet TAKE 1 TABLET BY MOUTH DAILY  Qty: 90 Tab, Refills: 1      VIBERZI 75 mg tablet TAKE 1 TABLET BY MOUTH TWICE DAILY WITH MEALS  Qty: 60 Tab, Refills: 5    Associated Diagnoses: Irritable bowel syndrome with diarrhea      budesonide-formoterol (SYMBICORT) 160-4.5 mcg/actuation HFAA TAKE 2 PUFFS BY MOUTH TWICE DAILY  Qty: 1 Inhaler, Refills: 6      albuterol (PROVENTIL HFA, VENTOLIN HFA, PROAIR HFA) 90 mcg/actuation inhaler Take 2 Puffs by inhalation every six (6) hours as needed for Wheezing. Qty: 1 Inhaler, Refills: 6      insulin aspart (NOVOLOG) 100 unit/mL inpn by SubCUTAneous route. Sliding scale:   101-150 4 units  151-200 6 units  201-250  8 units  251-300 10 units  Call if blood sugar is greater than 300      cholecalciferol (VITAMIN D3) 1,000 unit cap Take 2 Caps by mouth daily. Qty: 60 Cap, Refills: 5      Omega-3-DHA-EPA-Fish Oil 1,000 mg (120 mg-180 mg) cap Take  by mouth two (2) times a day. MV,MINERALS/FA/LYCOPENE/GINKGO (ONE-A-DAY MEN'S 50+ ADVANTAGE PO) Take  by mouth daily. bipap machine kit by Does Not Apply route.  BiPAP at 23/18 cm with heated humidifier. Mask: Simplus FF, medium size or mask of choice. Length of need 99 months. Replace mask and accessories as needed times 12 months. Please download data after 30 days and fax at [phone]. Dx: Severe FITO, Obesity, snoring. Qty: 1 Kit, Refills: 0    Associated Diagnoses: FITO (obstructive sleep apnea); Obesity; Acid reflux; Hypertension      cyanocobalamin (VITAMIN B-12) 1,000 mcg tablet Take 1,000 mcg by mouth daily. traMADol (ULTRAM) 50 mg tablet Take 1 to 2 tabs po every day to BID as needed severe pain  Qty: 28 Tab, Refills: 0    Associated Diagnoses: Spondylolisthesis of lumbar region      rosuvastatin (CRESTOR) 40 mg tablet Take 1 Tab by mouth nightly. Qty: 90 Tab, Refills: 3    Associated Diagnoses: Well controlled diabetes mellitus ([address] Essential hypertension; Need for hepatitis C screening test; Prostate cancer screening      vardenafil (LEVITRA) 20 mg tablet Take 20 mg by mouth as needed. Indications: Erectile Dysfunction  Qty: 2 Tab, Refills: 11    Associated Diagnoses: Corporo-venous occlusive erectile dysfunction         STOP taking these medications       HYDROcodone-acetaminophen (NORCO) 5-325 mg per tablet Comments:   Reason for Stopping:         diclofenac sodium (PENNSAID) 20 mg/gram /actuation(2 %) sopm Comments:   Reason for Stopping:         hydroCHLOROthiazide (MICROZIDE) 12.5 mg capsule Comments:   Reason for Stopping:                 My Recommended Diet, Activity, Wound Care, and follow-up labs are listed in the patient's Discharge Insturctions which I have personally completed and reviewed. Disposition:     [x] Home with family     [] [facility] PT/RN   [] SNF/NH   [] Inpatient Rehab/ARCELIA  Condition at Discharge:  Stable    Follow up with:   PCP : Arlean Baumgarten, MD      Please follow-up tests/labs that are still pendin.  None  2.    >30 minutes spent coordinating this discharge (review instructions/follow-up, prescriptions, preparing report for sign off)    Signed:  Genaro Gimenez MD  12/21/2018  12:04 PM

## 2018-12-21 NOTE — PROGRESS NOTES
Problem: Falls - Risk of  Goal: *Absence of Falls  Document Danielle Fall Risk and appropriate interventions in the flowsheet. Outcome: Progressing Towards Goal  Fall Risk Interventions:  Mobility Interventions: Bed/chair exit alarm         Medication Interventions: Bed/chair exit alarm    Elimination Interventions: Call light in reach    History of Falls Interventions: Bed/chair exit alarm, Door open when patient unattended, Evaluate medications/consider consulting pharmacy, Investigate reason for fall, Room close to nurse's station, Consult care management for discharge planning        Problem: Pressure Injury - Risk of  Goal: *Prevention of pressure injury  Document Gavino Scale and appropriate interventions in the flowsheet.   Outcome: Progressing Towards Goal  Pressure Injury Interventions:       Moisture Interventions: Absorbent underpads    Activity Interventions: Increase time out of bed    Mobility Interventions: HOB 30 degrees or less    Nutrition Interventions: Document food/fluid/supplement intake

## 2018-12-21 NOTE — PROGRESS NOTES
conducted a Follow up consultation and Spiritual Assessment for Shiva Rodriguez., who is a 60 y.o.,male. The  provided the following Interventions:  Continued the relationship of care and support with patient and family. Listened empathically. Offered prayer and assurance of continued prayer on patients behalf. Chart reviewed. The following outcomes were achieved:  Patient expressed gratitude for 's visit. Assessment:  There are no further spiritual or Anabaptism issues which require Spiritual Care Services interventions at this time. Plan:  Chaplains will continue to follow and will provide pastoral care on an as needed/requested basis.  recommends bedside caregivers page  on duty if patient shows signs of acute spiritual or emotional distress.        Chaplain Resident [facility]   [phone]

## 2018-12-21 NOTE — ROUTINE PROCESS
Bedside shift change report given to Nga RN (oncoming nurse) by Cade Hoskins RN (offgoing nurse). Report included the following information SBAR, Kardex, Intake/Output, MAR and Recent Results.

## 2018-12-21 NOTE — ROUTINE PROCESS
Bedside shift change report given to Gabbie RN (oncoming nurse) by  Carla Lockett RN (offgoing nurse). Report included the following information SBAR, Kardex, ED Summary and Recent Results.

## 2018-12-21 NOTE — PROGRESS NOTES
Surgery  Looks and feels well  AF VSS  Soft abd wound improving  mony po  Can go home from surgery  F/U 2 weeks in office

## 2018-12-21 NOTE — DIABETES MGMT
Glycemic Control Plan of Care    T2DM with current A1c of 7.4% (12/10/2018). See separate notes, 12/11/2018, for assessment of home diabetes management and education. Home diabetes meds: Levemir insulin 28 units daily every morning, novolog sliding scale insulin, and Janumet  mg twice daily with meals. POC BG range on 12/20/2018: 174-267 mg/dL. Increased basal lantus insulin dose from 12 to 18 units. POC BG report on 12/21/2018 at time of review: 163, 237 mg/dL. See notes below. Seen patient this afternoon and reported (showed me) that he did not notice the syrup given to him for Frisian toast that he had for breakfast which he used was regular syrup, not sugar free. He is also drinking fruit juice. Recommendation(s):  1.) Continue inpatient inpatient glycemic monitoring and current insulin orders: lantus and correctional.  2.) Modified diabetic diet by including no concentrated sweets. Assessment:  Patient is 60year old with past medical history including type 2 diabetes mellitus with polyneuropathy, COPD, atrial fibrillation, dyslipidemia, obesity, and recent ORIF right hand. He took extra naprosyn instead of narcotic - was admitted on 12/09/2018 with report of vomiting, diarrhea, abdominal pain, and hemetemesis. Noted:  S/P partial gastrectomy, resection of gatric mass. UGI bleed with GIST. Status post blood transfusion. T2DM with current A1c of 7.4% (12/102018). Most recent blood glucose values:    Results for Nahed Whitmore (MRN [id number]) as of 12/21/2018 12:05   Ref. Range 12/20/2018 07:58 12/20/2018 11:55 12/20/2018 15:56 12/20/2018 22:45   GLUCOSE,FAST - POC Latest Ref Range: 70 - 110 mg/dL 174 (H) 267 (H) 233 (H) 217 (H)     Results for Nahed Whitmore (MRN [id number]) as of 12/21/2018 12:05   Ref.  Range 12/21/2018 08:23 12/21/2018 11:47   GLUCOSE,FAST - POC Latest Ref Range: 70 - 110 mg/dL 163 (H) 237 (H)     Current A1C: 7.4% (12/10/2018) which is equivalent to estimated average blood glucose of 166 mg/dL during the past 2-3 months. Current hospital diabetes medications:  Basal lantus insulin 18 units daily at bedtime. Correctional lispro insulin ACHS. Very resistant dose. Total daily dose insulin requirement previous day: 12/20/2018  Lantus: 18 units  Lispro: 24 units (correctional). TDD: 42 units of insulin    Home diabetes medications: Reported by patient on 12/10/2018:  Levemir insulin 28 units daily every morning. Humalog sliding scale insulin. Janumet  mg twice daily with meals. Diet: Diabetic consistent carb regular; nutr suppl: glucerna shake with breakfast and dinner; no concentrated sweets. Goals:  Blood glucose will be within target range of  mg/dL by 12/24/2018.     Education:  _X__  Refer to Diabetes Education Record: 12/11/2018             ___  Education not indicated at this time    Leena Villa RN [facility]  Pager: [phone]

## 2018-12-22 LAB
BACTERIA SPEC CULT: ABNORMAL
GRAM STN SPEC: ABNORMAL
GRAM STN SPEC: ABNORMAL
SERVICE CMNT-IMP: ABNORMAL

## 2018-12-24 ENCOUNTER — HOME HEALTH ADMISSION (OUTPATIENT)
Dept: HOME HEALTH SERVICES | Facility: HOME HEALTH | Age: 58
End: 2018-12-24
Payer: COMMERCIAL

## 2018-12-24 ENCOUNTER — TELEPHONE (OUTPATIENT)
Dept: INTERNAL MEDICINE CLINIC | Age: 58
End: 2018-12-24

## 2018-12-24 ENCOUNTER — OFFICE VISIT (OUTPATIENT)
Dept: INTERNAL MEDICINE CLINIC | Age: 58
End: 2018-12-24

## 2018-12-24 VITALS
TEMPERATURE: 97.6 F | SYSTOLIC BLOOD PRESSURE: 143 MMHG | DIASTOLIC BLOOD PRESSURE: 78 MMHG | HEART RATE: 85 BPM | HEIGHT: 71 IN | RESPIRATION RATE: 20 BRPM | BODY MASS INDEX: 39.06 KG/M2 | OXYGEN SATURATION: 96 % | WEIGHT: 279 LBS

## 2018-12-24 DIAGNOSIS — N17.9 AKI (ACUTE KIDNEY INJURY) (HCC): ICD-10-CM

## 2018-12-24 DIAGNOSIS — T81.31XA DEHISCENCE OF OPERATIVE WOUND, INITIAL ENCOUNTER: ICD-10-CM

## 2018-12-24 DIAGNOSIS — K92.2 ACUTE GI BLEEDING: Primary | ICD-10-CM

## 2018-12-24 DIAGNOSIS — Z90.3 HISTORY OF GASTRECTOMY: ICD-10-CM

## 2018-12-24 DIAGNOSIS — L97.522 TOE ULCER, LEFT, WITH FAT LAYER EXPOSED (HCC): ICD-10-CM

## 2018-12-24 DIAGNOSIS — L03.311 ABDOMINAL WALL CELLULITIS: ICD-10-CM

## 2018-12-24 LAB
BACTERIA SPEC CULT: NORMAL
BACTERIA SPEC CULT: NORMAL
SERVICE CMNT-IMP: NORMAL
SERVICE CMNT-IMP: NORMAL

## 2018-12-24 RX ORDER — HYDROCHLOROTHIAZIDE 12.5 MG/1
CAPSULE ORAL
Refills: 6 | COMMUNITY
Start: 2018-11-21 | End: 2019-07-16 | Stop reason: SDUPTHER

## 2018-12-24 NOTE — TELEPHONE ENCOUNTER
Otilia called from [facility] in regards to patient family wants to switch to [address].   Please up date [place] paper work and fax it over to [phone]

## 2018-12-24 NOTE — PATIENT INSTRUCTIONS
Gastrointestinal Bleeding: Care Instructions  Your Care Instructions    The digestive or gastrointestinal tract goes from the mouth to the anus. It is often called the GI tract. Bleeding can happen anywhere in the GI tract. It may be caused by an ulcer, an infection, or cancer. It may also be caused by medicines such as aspirin or ibuprofen. Light bleeding may not cause any symptoms at first. But if you continue to bleed for a while, you may feel very weak or tired. Sudden, heavy bleeding means you need to see a doctor right away. This kind of bleeding can be very dangerous. But it can usually be cured or controlled. The doctor may do some tests to find the cause of your bleeding. Follow-up care is a key part of your treatment and safety. Be sure to make and go to all appointments, and call your doctor if you are having problems. It's also a good idea to know your test results and keep a list of the medicines you take. How can you care for yourself at home? · Be safe with medicines. Take your medicines exactly as prescribed. Call your doctor if you think you are having a problem with your medicine. You will get more details on the specific medicines your doctor prescribes. · Do not take aspirin or other anti-inflammatory medicines, such as naproxen (Aleve) or ibuprofen (Advil, Motrin), without talking to your doctor first. Ask your doctor if it is okay to use acetaminophen (Tylenol). · Do not drink alcohol. · The bleeding may make you lose iron. So it's important to eat foods that have a lot of iron. These include red meat, shellfish, poultry, and eggs. They also include beans, raisins, whole-grain breads, and leafy green vegetables. If you want help planning meals, you can make an appointment with a dietitian. When should you call for help? Call 911 anytime you think you may need emergency care.  For example, call if:    · You have sudden, severe belly pain.     · You vomit blood or what looks like coffee grounds.     · You passed out (lost consciousness).     · Your stools are maroon or very bloody.    Call your doctor now or seek immediate medical care if:    · You are dizzy or lightheaded, or you feel like you may faint.     · Your stools are black and look like tar, or they have streaks of blood.     · You have belly pain.     · You vomit or have nausea.     · You have trouble swallowing, or it hurts when you swallow.    Watch closely for changes in your health, and be sure to contact your doctor if:    · You do not get better as expected. Where can you learn more? Go to http://vasile-fito.info/. Enter H462 in the search box to learn more about \"Gastrointestinal Bleeding: Care Instructions. \"  Current as of: November 20, 2017  Content Version: 11.8  © 3575-0563 [employer]. Care instructions adapted under license by [employer] (which disclaims liability or warranty for this information). If you have questions about a medical condition or this instruction, always ask your healthcare professional. [place] [address] any warranty or liability for your use of this information. Patient was given a copy of the Advanced Medical Directive Form, and understands to bring it in once completed.   Health Maintenance Due   Topic Date Due    Shingrix Vaccine Age 49> (1 of 2) 10/11/2009

## 2018-12-24 NOTE — TELEPHONE ENCOUNTER
Chief Complaint   Patient presents with    Referral Follow Up     to [facility] per Dr David Dee     Left a voice message for the patient's Sister Ms Mirta Cote to return my call.

## 2018-12-24 NOTE — PROGRESS NOTES
Chief Complaint   Patient presents with   [facility] Follow Up     [facility] admission 12-09-18 for Gi Bleed and discharged on 12-21-18        1. Have you been to the ER, urgent care clinic since your last visit? Hospitalized since your last visit? Yes When: 12-09-18 Where: [facility] Reason: GI Bleed    2. Have you seen or consulted any other health care providers outside of the [address] [place] [address] since your last visit? Include any pap smears or colon screening. No    Patient was given a copy of the Advanced Directive and understands to bring it in once completed.   Health Maintenance Due   Topic Date Due    Shingrix Vaccine Age 49> (1 of 2) 10/11/2009

## 2018-12-24 NOTE — TELEPHONE ENCOUNTER
Please let [facility] team know that the order has been addended to include [facility] team instead of [facility].     Dr. Johny Canchola  [facility], [address] [place], [place] [address]s, [address]  Phone: [phone]  Fax: [phone]

## 2018-12-24 NOTE — TELEPHONE ENCOUNTER
Please call Ramana Salazar (sister) who was with patient today at his visit. Needs to discuss referral to home health. Call her at [phone].

## 2018-12-26 ENCOUNTER — TELEPHONE (OUTPATIENT)
Dept: INTERNAL MEDICINE CLINIC | Age: 58
End: 2018-12-26

## 2018-12-26 NOTE — TELEPHONE ENCOUNTER
Chief Complaint   Patient presents with    Labs     done 12-24-18 per Dr Radha Ellis     Patient reached and informed of his lab results, he understands all. The patient did state he will be seeing [facility] representative in his home for the first time tomorrow 12-27-18.

## 2018-12-26 NOTE — TELEPHONE ENCOUNTER
Called and spoke to patient about GI appointment that I was able to get scheduled on 12/27/2018 at the [facility] office at 1:20pm.  Patient stated that he doesn't want to see GI yet because he has already been in touch with their office and that Dr. Regan Barragan was referring him to see Dr. Hari Thurman first.  Patient stated that it is too soon to see Dr. Do gary because he just had the scope not that long ago. Patient stated that he is going to cancel the GI appointment tomorrow.   Dr. Regan Barragan do you still want to keep this referral?

## 2018-12-26 NOTE — TELEPHONE ENCOUNTER
----- Message from Pernell Bojorquez MD sent at 12/26/2018  1:23 PM EST -----  Regarding: Lab results  Please let him know that his labs show improvement in his anemia. His hemoglobin is 8.  His renal function is normal.     Dr. Vladimir Thapa  [facility] [address], [address] [place] [address] Str.  Phone: [phone]  Fax: [phone]

## 2018-12-26 NOTE — TELEPHONE ENCOUNTER
Yes - please keep his referral active but let him schedule with them. Please have him schedule with GI within the next 4-6 weeks on his own since he wants to wait until he sees Marcus Sheppard.      Dr. Harvinder Khalil  [facility], [address] [facility], [address] [place] [address] Str.  Phone: [phone]  Fax: [phone]

## 2018-12-27 ENCOUNTER — HOME CARE VISIT (OUTPATIENT)
Dept: SCHEDULING | Facility: HOME HEALTH | Age: 58
End: 2018-12-27
Payer: COMMERCIAL

## 2018-12-27 ENCOUNTER — OFFICE VISIT (OUTPATIENT)
Dept: ORTHOPEDIC SURGERY | Age: 58
End: 2018-12-27

## 2018-12-27 ENCOUNTER — HOME CARE VISIT (OUTPATIENT)
Dept: HOME HEALTH SERVICES | Facility: HOME HEALTH | Age: 58
End: 2018-12-27

## 2018-12-27 VITALS
DIASTOLIC BLOOD PRESSURE: 77 MMHG | WEIGHT: 274 LBS | OXYGEN SATURATION: 100 % | SYSTOLIC BLOOD PRESSURE: 125 MMHG | HEART RATE: 76 BPM | BODY MASS INDEX: 38.36 KG/M2 | HEIGHT: 71 IN | TEMPERATURE: 96.6 F

## 2018-12-27 DIAGNOSIS — S63.054D DISLOCATION OF CARPOMETACARPAL JOINT OF RIGHT HAND, SUBSEQUENT ENCOUNTER: Primary | ICD-10-CM

## 2018-12-27 PROCEDURE — 400013 HH SOC

## 2018-12-27 PROCEDURE — G0299 HHS/HOSPICE OF RN EA 15 MIN: HCPCS
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
## 2018-12-28 ENCOUNTER — OFFICE VISIT (OUTPATIENT)
Dept: SURGERY | Age: 58
End: 2018-12-28

## 2018-12-28 ENCOUNTER — HOME CARE VISIT (OUTPATIENT)
Dept: HOME HEALTH SERVICES | Facility: HOME HEALTH | Age: 58
End: 2018-12-28
Payer: COMMERCIAL

## 2018-12-28 VITALS
BODY MASS INDEX: 38.08 KG/M2 | SYSTOLIC BLOOD PRESSURE: 132 MMHG | OXYGEN SATURATION: 100 % | DIASTOLIC BLOOD PRESSURE: 75 MMHG | HEART RATE: 66 BPM | RESPIRATION RATE: 20 BRPM | WEIGHT: 272 LBS | HEIGHT: 71 IN

## 2018-12-28 VITALS
RESPIRATION RATE: 20 BRPM | TEMPERATURE: 97.6 F | SYSTOLIC BLOOD PRESSURE: 100 MMHG | OXYGEN SATURATION: 97 % | HEART RATE: 68 BPM | DIASTOLIC BLOOD PRESSURE: 64 MMHG

## 2018-12-28 DIAGNOSIS — Z86.018: ICD-10-CM

## 2018-12-28 DIAGNOSIS — K92.2 ACUTE GI BLEEDING: Primary | ICD-10-CM

## 2018-12-28 NOTE — PROGRESS NOTES
Chief Complaint   Patient presents with    Post OP Follow Up     Gastric mass- partial gastrectomy.
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
## 2018-12-31 ENCOUNTER — HOME CARE VISIT (OUTPATIENT)
Dept: SCHEDULING | Facility: HOME HEALTH | Age: 58
End: 2018-12-31
Payer: COMMERCIAL

## 2018-12-31 VITALS
TEMPERATURE: 98.5 F | DIASTOLIC BLOOD PRESSURE: 70 MMHG | HEART RATE: 69 BPM | SYSTOLIC BLOOD PRESSURE: 118 MMHG | OXYGEN SATURATION: 98 %

## 2018-12-31 DIAGNOSIS — S63.054A CLOSED DISLOCATION OF CARPOMETACARPAL JOINT OF RIGHT WRIST, INITIAL ENCOUNTER: Primary | ICD-10-CM

## 2018-12-31 DIAGNOSIS — S68.119A TRAUMATIC AMPUTATION OF FINGERTIP, INITIAL ENCOUNTER: Primary | ICD-10-CM

## 2018-12-31 PROCEDURE — G0300 HHS/HOSPICE OF LPN EA 15 MIN: HCPCS

## 2018-12-31 PROCEDURE — A4452 WATERPROOF TAPE: HCPCS

## 2018-12-31 PROCEDURE — A4216 STERILE WATER/SALINE, 10 ML: HCPCS

## 2019-01-02 ENCOUNTER — HOME CARE VISIT (OUTPATIENT)
Dept: SCHEDULING | Facility: HOME HEALTH | Age: 59
End: 2019-01-02
Payer: COMMERCIAL

## 2019-01-02 ENCOUNTER — HOME CARE VISIT (OUTPATIENT)
Dept: HOME HEALTH SERVICES | Facility: HOME HEALTH | Age: 59
End: 2019-01-02
Payer: COMMERCIAL

## 2019-01-02 PROCEDURE — G0152 HHCP-SERV OF OT,EA 15 MIN: HCPCS

## 2019-01-02 PROCEDURE — G0300 HHS/HOSPICE OF LPN EA 15 MIN: HCPCS

## 2019-01-03 ENCOUNTER — HOME CARE VISIT (OUTPATIENT)
Dept: SCHEDULING | Facility: HOME HEALTH | Age: 59
End: 2019-01-03
Payer: COMMERCIAL

## 2019-01-03 PROCEDURE — G0152 HHCP-SERV OF OT,EA 15 MIN: HCPCS

## 2019-01-04 ENCOUNTER — HOME CARE VISIT (OUTPATIENT)
Dept: SCHEDULING | Facility: HOME HEALTH | Age: 59
End: 2019-01-04
Payer: COMMERCIAL

## 2019-01-04 VITALS
OXYGEN SATURATION: 98 % | SYSTOLIC BLOOD PRESSURE: 120 MMHG | HEART RATE: 72 BPM | TEMPERATURE: 98 F | DIASTOLIC BLOOD PRESSURE: 80 MMHG

## 2019-01-04 PROCEDURE — G0300 HHS/HOSPICE OF LPN EA 15 MIN: HCPCS

## 2019-01-07 ENCOUNTER — HOME CARE VISIT (OUTPATIENT)
Dept: SCHEDULING | Facility: HOME HEALTH | Age: 59
End: 2019-01-07
Payer: COMMERCIAL

## 2019-01-07 PROCEDURE — G0300 HHS/HOSPICE OF LPN EA 15 MIN: HCPCS

## 2019-01-07 PROCEDURE — G0152 HHCP-SERV OF OT,EA 15 MIN: HCPCS

## 2019-01-09 ENCOUNTER — HOME CARE VISIT (OUTPATIENT)
Dept: SCHEDULING | Facility: HOME HEALTH | Age: 59
End: 2019-01-09
Payer: COMMERCIAL

## 2019-01-09 VITALS
SYSTOLIC BLOOD PRESSURE: 122 MMHG | OXYGEN SATURATION: 98 % | DIASTOLIC BLOOD PRESSURE: 70 MMHG | TEMPERATURE: 98.9 F | HEART RATE: 74 BPM

## 2019-01-09 PROCEDURE — G0300 HHS/HOSPICE OF LPN EA 15 MIN: HCPCS

## 2019-01-11 ENCOUNTER — HOME CARE VISIT (OUTPATIENT)
Dept: SCHEDULING | Facility: HOME HEALTH | Age: 59
End: 2019-01-11
Payer: COMMERCIAL

## 2019-01-11 ENCOUNTER — OFFICE VISIT (OUTPATIENT)
Dept: SURGERY | Age: 59
End: 2019-01-11

## 2019-01-11 VITALS — OXYGEN SATURATION: 100 % | RESPIRATION RATE: 16 BRPM | HEIGHT: 71 IN | WEIGHT: 272 LBS | BODY MASS INDEX: 38.08 KG/M2

## 2019-01-11 DIAGNOSIS — K92.2 ACUTE GI BLEEDING: Primary | ICD-10-CM

## 2019-01-11 PROCEDURE — G0152 HHCP-SERV OF OT,EA 15 MIN: HCPCS
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
## 2019-01-14 ENCOUNTER — HOME CARE VISIT (OUTPATIENT)
Dept: SCHEDULING | Facility: HOME HEALTH | Age: 59
End: 2019-01-14
Payer: COMMERCIAL

## 2019-01-14 PROCEDURE — G0300 HHS/HOSPICE OF LPN EA 15 MIN: HCPCS

## 2019-01-15 ENCOUNTER — HOME CARE VISIT (OUTPATIENT)
Dept: HOME HEALTH SERVICES | Facility: HOME HEALTH | Age: 59
End: 2019-01-15
Payer: COMMERCIAL

## 2019-01-15 ENCOUNTER — HOME CARE VISIT (OUTPATIENT)
Dept: SCHEDULING | Facility: HOME HEALTH | Age: 59
End: 2019-01-15
Payer: COMMERCIAL

## 2019-01-15 PROCEDURE — A4216 STERILE WATER/SALINE, 10 ML: HCPCS

## 2019-01-15 PROCEDURE — G0152 HHCP-SERV OF OT,EA 15 MIN: HCPCS

## 2019-01-16 ENCOUNTER — HOME CARE VISIT (OUTPATIENT)
Dept: SCHEDULING | Facility: HOME HEALTH | Age: 59
End: 2019-01-16
Payer: COMMERCIAL

## 2019-01-16 VITALS
SYSTOLIC BLOOD PRESSURE: 134 MMHG | HEART RATE: 85 BPM | OXYGEN SATURATION: 98 % | TEMPERATURE: 99 F | DIASTOLIC BLOOD PRESSURE: 70 MMHG

## 2019-01-16 PROCEDURE — G0300 HHS/HOSPICE OF LPN EA 15 MIN: HCPCS

## 2019-01-16 NOTE — TELEPHONE ENCOUNTER
Last Visit: 12/24  Next Appt: 1/17  Previous Refill Encounter: 12/21-30+0    Requested Prescriptions     Pending Prescriptions Disp Refills    amLODIPine (NORVASC) 10 mg tablet 30 Tab 0     Sig: Take 0.5 Tabs by mouth daily.  apixaban (ELIQUIS) 5 mg tablet 60 Tab 0     Sig: Take 1 Tab by mouth two (2) times a day.

## 2019-01-17 RX ORDER — AMLODIPINE BESYLATE 10 MG/1
5 TABLET ORAL DAILY
Qty: 30 TAB | Refills: 6 | Status: SHIPPED | OUTPATIENT
Start: 2019-01-17 | End: 2019-02-08 | Stop reason: SDUPTHER

## 2019-01-18 ENCOUNTER — HOME CARE VISIT (OUTPATIENT)
Dept: SCHEDULING | Facility: HOME HEALTH | Age: 59
End: 2019-01-18
Payer: COMMERCIAL

## 2019-01-18 PROCEDURE — G0300 HHS/HOSPICE OF LPN EA 15 MIN: HCPCS

## 2019-01-18 PROCEDURE — G0152 HHCP-SERV OF OT,EA 15 MIN: HCPCS

## 2019-01-20 VITALS
SYSTOLIC BLOOD PRESSURE: 136 MMHG | DIASTOLIC BLOOD PRESSURE: 84 MMHG | OXYGEN SATURATION: 98 % | TEMPERATURE: 98 F | HEART RATE: 68 BPM

## 2019-01-21 ENCOUNTER — HOME CARE VISIT (OUTPATIENT)
Dept: SCHEDULING | Facility: HOME HEALTH | Age: 59
End: 2019-01-21
Payer: COMMERCIAL

## 2019-01-21 PROCEDURE — G0300 HHS/HOSPICE OF LPN EA 15 MIN: HCPCS

## 2019-01-22 VITALS
HEART RATE: 71 BPM | DIASTOLIC BLOOD PRESSURE: 60 MMHG | TEMPERATURE: 98.8 F | OXYGEN SATURATION: 98 % | SYSTOLIC BLOOD PRESSURE: 122 MMHG

## 2019-01-22 PROCEDURE — A4452 WATERPROOF TAPE: HCPCS

## 2019-01-22 PROCEDURE — A4216 STERILE WATER/SALINE, 10 ML: HCPCS

## 2019-01-24 ENCOUNTER — OFFICE VISIT (OUTPATIENT)
Dept: ORTHOPEDIC SURGERY | Age: 59
End: 2019-01-24

## 2019-01-24 VITALS
DIASTOLIC BLOOD PRESSURE: 77 MMHG | BODY MASS INDEX: 34.83 KG/M2 | HEIGHT: 71 IN | OXYGEN SATURATION: 98 % | SYSTOLIC BLOOD PRESSURE: 137 MMHG | HEART RATE: 75 BPM | RESPIRATION RATE: 14 BRPM | TEMPERATURE: 97.3 F | WEIGHT: 248.8 LBS

## 2019-01-24 DIAGNOSIS — S63.054D DISLOCATION OF CARPOMETACARPAL JOINT OF RIGHT HAND, SUBSEQUENT ENCOUNTER: Primary | ICD-10-CM

## 2019-01-24 DIAGNOSIS — M79.641 RIGHT HAND PAIN: ICD-10-CM

## 2019-01-24 NOTE — PROGRESS NOTES
1. Have you been to the ER, urgent care clinic since your last visit? Hospitalized since your last visit? No    2. Have you seen or consulted any other health care providers outside of the [address] [place] [address] since your last visit? Include any pap smears or colon screening.  No
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
## 2019-01-25 ENCOUNTER — HOME CARE VISIT (OUTPATIENT)
Dept: SCHEDULING | Facility: HOME HEALTH | Age: 59
End: 2019-01-25
Payer: COMMERCIAL

## 2019-01-25 PROCEDURE — G0300 HHS/HOSPICE OF LPN EA 15 MIN: HCPCS

## 2019-01-28 ENCOUNTER — HOME CARE VISIT (OUTPATIENT)
Dept: SCHEDULING | Facility: HOME HEALTH | Age: 59
End: 2019-01-28
Payer: COMMERCIAL

## 2019-01-28 PROCEDURE — G0300 HHS/HOSPICE OF LPN EA 15 MIN: HCPCS

## 2019-01-30 ENCOUNTER — OFFICE VISIT (OUTPATIENT)
Dept: SURGERY | Age: 59
End: 2019-01-30

## 2019-01-30 VITALS
WEIGHT: 248 LBS | RESPIRATION RATE: 16 BRPM | SYSTOLIC BLOOD PRESSURE: 138 MMHG | HEART RATE: 77 BPM | BODY MASS INDEX: 34.72 KG/M2 | OXYGEN SATURATION: 98 % | HEIGHT: 71 IN | DIASTOLIC BLOOD PRESSURE: 76 MMHG | TEMPERATURE: 97 F

## 2019-01-30 VITALS
TEMPERATURE: 98.6 F | OXYGEN SATURATION: 98 % | HEART RATE: 78 BPM | SYSTOLIC BLOOD PRESSURE: 118 MMHG | DIASTOLIC BLOOD PRESSURE: 80 MMHG

## 2019-01-30 DIAGNOSIS — Z86.018: Primary | ICD-10-CM

## 2019-01-30 RX ORDER — GABAPENTIN 300 MG/1
CAPSULE ORAL
Qty: 180 CAP | Refills: 1 | Status: CANCELLED | OUTPATIENT
Start: 2019-01-30

## 2019-01-30 NOTE — PROGRESS NOTES
1. Have you been to the ER, urgent care clinic since your last visit? Hospitalized since your last visit? No    2. Have you seen or consulted any other health care providers outside of the [address] [place] [address] since your last visit? Include any pap smears or colon screening.  No

## 2019-01-30 NOTE — LETTER
NOTIFICATION OF RETURN TO WORK / SCHOOL 
 
1/30/2019 3:09 PM 
 
Mr. Maury Shah. [address] [id number] [place] To Whom It May Concern: 
 
Maury Shah. was under the care of [address] [place] [address]. He will be able to return to work/school on 02/19/2019 with no heavy lifting greater than 20lbs until march 4,2019. If there are questions or concerns please have the patient contact our office.  
 
Sincerely, 
 
 
 
 
 
Jake Hanks MD
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
## 2019-02-01 ENCOUNTER — HOME CARE VISIT (OUTPATIENT)
Dept: SCHEDULING | Facility: HOME HEALTH | Age: 59
End: 2019-02-01
Payer: COMMERCIAL

## 2019-02-01 PROCEDURE — G0300 HHS/HOSPICE OF LPN EA 15 MIN: HCPCS

## 2019-02-04 ENCOUNTER — HOME CARE VISIT (OUTPATIENT)
Dept: SCHEDULING | Facility: HOME HEALTH | Age: 59
End: 2019-02-04
Payer: COMMERCIAL

## 2019-02-04 VITALS
HEART RATE: 80 BPM | TEMPERATURE: 98.4 F | DIASTOLIC BLOOD PRESSURE: 76 MMHG | SYSTOLIC BLOOD PRESSURE: 116 MMHG | OXYGEN SATURATION: 98 %

## 2019-02-04 PROCEDURE — G0300 HHS/HOSPICE OF LPN EA 15 MIN: HCPCS

## 2019-02-05 VITALS
SYSTOLIC BLOOD PRESSURE: 142 MMHG | TEMPERATURE: 98.6 F | OXYGEN SATURATION: 98 % | DIASTOLIC BLOOD PRESSURE: 82 MMHG | HEART RATE: 71 BPM

## 2019-02-06 ENCOUNTER — OFFICE VISIT (OUTPATIENT)
Dept: ORTHOPEDIC SURGERY | Facility: CLINIC | Age: 59
End: 2019-02-06

## 2019-02-06 VITALS
WEIGHT: 247.8 LBS | RESPIRATION RATE: 18 BRPM | HEIGHT: 71 IN | DIASTOLIC BLOOD PRESSURE: 82 MMHG | BODY MASS INDEX: 34.69 KG/M2 | SYSTOLIC BLOOD PRESSURE: 143 MMHG | TEMPERATURE: 96.1 F | HEART RATE: 72 BPM | OXYGEN SATURATION: 100 %

## 2019-02-06 DIAGNOSIS — M25.562 CHRONIC PAIN OF LEFT KNEE: ICD-10-CM

## 2019-02-06 DIAGNOSIS — G89.29 CHRONIC PAIN OF LEFT KNEE: ICD-10-CM

## 2019-02-06 DIAGNOSIS — G89.29 CHRONIC PAIN OF RIGHT KNEE: ICD-10-CM

## 2019-02-06 DIAGNOSIS — M25.561 CHRONIC PAIN OF RIGHT KNEE: ICD-10-CM

## 2019-02-06 DIAGNOSIS — Z96.651 HISTORY OF TOTAL KNEE REPLACEMENT, RIGHT: Primary | ICD-10-CM

## 2019-02-06 RX ORDER — TRAMADOL HYDROCHLORIDE 50 MG/1
50 TABLET ORAL
Qty: 21 TAB | Refills: 0 | Status: SHIPPED | OUTPATIENT
Start: 2019-02-06 | End: 2019-07-17 | Stop reason: SDUPTHER
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
## 2019-02-08 ENCOUNTER — HOME CARE VISIT (OUTPATIENT)
Dept: SCHEDULING | Facility: HOME HEALTH | Age: 59
End: 2019-02-08
Payer: COMMERCIAL

## 2019-02-08 DIAGNOSIS — I10 ESSENTIAL HYPERTENSION: ICD-10-CM

## 2019-02-08 PROCEDURE — G0300 HHS/HOSPICE OF LPN EA 15 MIN: HCPCS

## 2019-02-08 NOTE — TELEPHONE ENCOUNTER
Insurance requires a minimum fill for 90 days. If appropriate, please sign pended medication order. If not, please notify me. Last Visit: 12/24/2018 with MD Ivan Kowalski  Next Appointment: 06/07/2019 with MD Ivan Kowalski    Requested Prescriptions     Pending Prescriptions Disp Refills    amLODIPine (NORVASC) 10 mg tablet 45 Tab 1     Sig: Take 0.5 Tabs by mouth daily.  losartan (COZAAR) 25 mg tablet 90 Tab 1     Sig: Take 1 Tab by mouth daily.  pantoprazole (PROTONIX) 40 mg tablet 180 Tab 1     Sig: Take 1 Tab by mouth two (2) times a day.

## 2019-02-11 ENCOUNTER — HOME CARE VISIT (OUTPATIENT)
Dept: SCHEDULING | Facility: HOME HEALTH | Age: 59
End: 2019-02-11
Payer: COMMERCIAL

## 2019-02-11 PROCEDURE — G0300 HHS/HOSPICE OF LPN EA 15 MIN: HCPCS

## 2019-02-11 RX ORDER — LOSARTAN POTASSIUM 25 MG/1
25 TABLET ORAL DAILY
Qty: 90 TAB | Refills: 1 | Status: SHIPPED | OUTPATIENT
Start: 2019-02-11 | End: 2019-06-18 | Stop reason: SDUPTHER

## 2019-02-11 RX ORDER — PANTOPRAZOLE SODIUM 40 MG/1
40 TABLET, DELAYED RELEASE ORAL 2 TIMES DAILY
Qty: 180 TAB | Refills: 1 | Status: SHIPPED | OUTPATIENT
Start: 2019-02-11 | End: 2019-07-16 | Stop reason: ALTCHOICE

## 2019-02-11 RX ORDER — AMLODIPINE BESYLATE 10 MG/1
5 TABLET ORAL DAILY
Qty: 135 TAB | Refills: 1 | Status: SHIPPED | OUTPATIENT
Start: 2019-02-11 | End: 2020-05-04

## 2019-02-13 VITALS
SYSTOLIC BLOOD PRESSURE: 122 MMHG | DIASTOLIC BLOOD PRESSURE: 78 MMHG | OXYGEN SATURATION: 98 % | HEART RATE: 70 BPM | TEMPERATURE: 98.6 F

## 2019-02-15 ENCOUNTER — HOME CARE VISIT (OUTPATIENT)
Dept: SCHEDULING | Facility: HOME HEALTH | Age: 59
End: 2019-02-15
Payer: COMMERCIAL

## 2019-02-15 PROCEDURE — G0300 HHS/HOSPICE OF LPN EA 15 MIN: HCPCS

## 2019-02-15 NOTE — TELEPHONE ENCOUNTER
Chief Complaint   Patient presents with    Medication Refill     NOVOLOG not covered     Patients novolog is not covered, per Shade Aldana the [facility] is covered. Please advise if we can send refill in for the Humalog kwik pen.

## 2019-02-18 RX ORDER — INSULIN LISPRO 100 [IU]/ML
INJECTION, SOLUTION INTRAVENOUS; SUBCUTANEOUS
Qty: 1 PACKAGE | Refills: 11 | Status: SHIPPED | OUTPATIENT
Start: 2019-02-18 | End: 2020-03-09

## 2019-02-18 NOTE — TELEPHONE ENCOUNTER
Please send refill for the humalog quick pen - covered by his insurance, with the same instructions as the original novolog prescription.     Dr. Morteza Brown  [facility], [address] [place] [address], [address] [place] [address] Str.  Phone: [phone]  Fax: [phone]

## 2019-02-19 ENCOUNTER — HOME CARE VISIT (OUTPATIENT)
Dept: SCHEDULING | Facility: HOME HEALTH | Age: 59
End: 2019-02-19
Payer: COMMERCIAL

## 2019-02-19 ENCOUNTER — OFFICE VISIT (OUTPATIENT)
Dept: ORTHOPEDIC SURGERY | Facility: CLINIC | Age: 59
End: 2019-02-19

## 2019-02-19 VITALS
RESPIRATION RATE: 16 BRPM | SYSTOLIC BLOOD PRESSURE: 124 MMHG | HEIGHT: 71 IN | DIASTOLIC BLOOD PRESSURE: 69 MMHG | WEIGHT: 246 LBS | HEART RATE: 71 BPM | BODY MASS INDEX: 34.44 KG/M2 | TEMPERATURE: 96.6 F | OXYGEN SATURATION: 100 %

## 2019-02-19 DIAGNOSIS — S63.054S DISLOCATION OF CARPOMETACARPAL JOINT OF RIGHT HAND, SEQUELA: Primary | ICD-10-CM

## 2019-02-19 PROCEDURE — G0300 HHS/HOSPICE OF LPN EA 15 MIN: HCPCS
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
## 2019-02-21 VITALS
DIASTOLIC BLOOD PRESSURE: 76 MMHG | OXYGEN SATURATION: 98 % | HEART RATE: 82 BPM | SYSTOLIC BLOOD PRESSURE: 122 MMHG | TEMPERATURE: 98 F

## 2019-02-21 RX ORDER — INSULIN LISPRO 100 [IU]/ML
INJECTION, SOLUTION INTRAVENOUS; SUBCUTANEOUS
Qty: 1 PACKAGE | Refills: 11 | Status: SHIPPED | OUTPATIENT
Start: 2019-02-21 | End: 2019-07-16 | Stop reason: ALTCHOICE

## 2019-02-22 ENCOUNTER — HOME CARE VISIT (OUTPATIENT)
Dept: HOME HEALTH SERVICES | Facility: HOME HEALTH | Age: 59
End: 2019-02-22
Payer: COMMERCIAL

## 2019-02-22 PROCEDURE — G0299 HHS/HOSPICE OF RN EA 15 MIN: HCPCS

## 2019-02-25 ENCOUNTER — OFFICE VISIT (OUTPATIENT)
Dept: CARDIOLOGY CLINIC | Age: 59
End: 2019-02-25

## 2019-02-25 VITALS
DIASTOLIC BLOOD PRESSURE: 80 MMHG | HEIGHT: 71 IN | HEART RATE: 70 BPM | OXYGEN SATURATION: 97 % | SYSTOLIC BLOOD PRESSURE: 128 MMHG | WEIGHT: 246 LBS | BODY MASS INDEX: 34.44 KG/M2

## 2019-02-25 VITALS
RESPIRATION RATE: 20 BRPM | SYSTOLIC BLOOD PRESSURE: 116 MMHG | DIASTOLIC BLOOD PRESSURE: 80 MMHG | OXYGEN SATURATION: 98 % | TEMPERATURE: 97.1 F | HEART RATE: 90 BPM

## 2019-02-25 DIAGNOSIS — I48.19 PERSISTENT ATRIAL FIBRILLATION (HCC): Primary | ICD-10-CM

## 2019-02-25 DIAGNOSIS — I10 ESSENTIAL HYPERTENSION: ICD-10-CM

## 2019-02-25 DIAGNOSIS — E78.5 DYSLIPIDEMIA: ICD-10-CM

## 2019-02-25 DIAGNOSIS — E11.9 WELL CONTROLLED DIABETES MELLITUS (HCC): ICD-10-CM
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
## 2019-02-25 NOTE — PROGRESS NOTES
Bobbi Bowman. presents today for   Chief Complaint   Patient presents with    Irregular Heart Beat     6 month follow up     Leg Swelling     mild       Bobbi Bowman. preferred language for health care discussion is english/other. Is someone accompanying this pt? mother    Is the patient using any DME equipment during [address]? no    Depression Screening:  3 most recent PHQ Screens 2/19/2019   PHQ Not Done -   Little interest or pleasure in doing things Not at all   Feeling down, depressed, irritable, or hopeless Not at all   Total Score PHQ 2 0   Trouble falling or staying asleep, or sleeping too much -   Feeling tired or having little energy -   Poor appetite, weight loss, or overeating -   Feeling bad about yourself - or that you are a failure or have let yourself or your family down -   Trouble concentrating on things such as school, work, reading, or watching TV -   Moving or speaking so slowly that other people could have noticed; or the opposite being so fidgety that others notice -   Thoughts of being better off dead, or hurting yourself in some way -   PHQ 9 Score -   How difficult have these problems made it for you to do your work, take care of your home and get along with others -       Learning Assessment:  Learning Assessment 11/15/2018   PRIMARY LEARNER Patient   HIGHEST LEVEL OF EDUCATION - PRIMARY LEARNER  [id number] PRIMARY LEARNER NONE   CO-LEARNER CAREGIVER No   PRIMARY LANGUAGE ENGLISH   LEARNER PREFERENCE PRIMARY DEMONSTRATION     -   ANSWERED BY patient     -   RELATIONSHIP SELF   ASSESSMENT COMMENT -       Abuse Screening:  Abuse Screening Questionnaire 11/15/2018   Do you ever feel afraid of your partner? N   Are you in a relationship with someone who physically or mentally threatens you? N   Is it safe for you to go home? Y       Fall Risk  No flowsheet data found. Pt currently taking Anticoagulant therapy? Eliquis     Coordination of Care:  1.  Have you been to the ER, urgent care clinic since your last visit? Hospitalized since your last visit? 12/9 - 12/21 for sepsis    2. Have you seen or consulted any other health care providers outside of the [address] [place] [address] since your last visit? Include any pap smears or colon screening.  no

## 2019-02-27 ENCOUNTER — OFFICE VISIT (OUTPATIENT)
Dept: SURGERY | Age: 59
End: 2019-02-27

## 2019-02-27 VITALS
TEMPERATURE: 97.6 F | WEIGHT: 246 LBS | BODY MASS INDEX: 34.44 KG/M2 | DIASTOLIC BLOOD PRESSURE: 77 MMHG | OXYGEN SATURATION: 98 % | HEIGHT: 71 IN | SYSTOLIC BLOOD PRESSURE: 138 MMHG | RESPIRATION RATE: 16 BRPM | HEART RATE: 80 BPM

## 2019-02-27 DIAGNOSIS — Z86.018: Primary | ICD-10-CM

## 2019-02-27 NOTE — PROGRESS NOTES
Chief Complaint   Patient presents with    Follow-up     abdominal wound check     1. Have you been to the ER, urgent care clinic since your last visit? Hospitalized since your last visit? No    2. Have you seen or consulted any other health care providers outside of the [address] [place] [address] since your last visit? Include any pap smears or colon screening.  No
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
## 2019-03-20 ENCOUNTER — OFFICE VISIT (OUTPATIENT)
Dept: ORTHOPEDIC SURGERY | Facility: CLINIC | Age: 59
End: 2019-03-20

## 2019-03-20 VITALS
WEIGHT: 254.4 LBS | TEMPERATURE: 98.2 F | OXYGEN SATURATION: 97 % | HEART RATE: 66 BPM | RESPIRATION RATE: 16 BRPM | HEIGHT: 71 IN | SYSTOLIC BLOOD PRESSURE: 137 MMHG | DIASTOLIC BLOOD PRESSURE: 71 MMHG | BODY MASS INDEX: 35.61 KG/M2

## 2019-03-20 DIAGNOSIS — M17.12 PRIMARY OSTEOARTHRITIS OF LEFT KNEE: ICD-10-CM

## 2019-03-20 DIAGNOSIS — G89.29 CHRONIC PAIN OF BOTH KNEES: ICD-10-CM

## 2019-03-20 DIAGNOSIS — M25.561 CHRONIC PAIN OF BOTH KNEES: ICD-10-CM

## 2019-03-20 DIAGNOSIS — M25.562 CHRONIC PAIN OF BOTH KNEES: ICD-10-CM

## 2019-03-20 DIAGNOSIS — M70.51 PES ANSERINUS BURSITIS OF RIGHT KNEE: Primary | ICD-10-CM

## 2019-03-20 DIAGNOSIS — Z96.651 HISTORY OF TOTAL KNEE REPLACEMENT, RIGHT: ICD-10-CM

## 2019-03-20 RX ORDER — TRIAMCINOLONE ACETONIDE 40 MG/ML
40 INJECTION, SUSPENSION INTRA-ARTICULAR; INTRAMUSCULAR ONCE
Qty: 1 ML | Refills: 0
Start: 2019-03-20 | End: 2019-03-20
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
## 2019-03-20 NOTE — PROGRESS NOTES
HISTORY:  Jairon Vidal returns in followup with regards to recurrent pes anserinus bursitis involving the right total knee, which he describes as mild to moderate discomfort. He has known end-staged arthritis of the left knee as well. The pain is moderate. It is worse with stairs, kneeling, and walking. He had an abdominal infection, which has healed up nicely. Apparently, he had some recurrent subluxation involving his hand. He has been living with it currently. He has had no specific problems. He denies fevers, chills, night sweats, or weight loss. PHYSICAL EXAMINATION:  On examination today, there is no start-up pain. He walks with a good gait pattern. Both feet are warm and well perfused. There is no cyanosis or clubbing. There is mild evidence of neuropathy distally. The right knee is in neutral.  The hips rotate well. The left knee is in varus and a couple degrees fixed flexion deformity. It bends quite nicely to about 120°. The calf is nontender. Tariq's sign is negative. Again, there is evidence of neuropathy with sharp testing. There is no foot drop. The knee replacement shows no effusion and is very stable. RADIOGRAPHS:  X-rays confirm the knee replacement is in excellent position and alignment. On AP, tunnel, lateral, and skyline views, the left knee has end-staged arthritis. PROCEDURE:  Under aseptic conditions and after informed, written consent with a time out, the right pes anserinus was injected with one-half Kenalog preparation, and after a separate time out, the left knee was injected intraarticularly with 1 mL of Kenalog at 40 mg per mL, which was well tolerated. PLAN:  He will return to see us in about four months time for followup assessment. He will need eventual knee replacement, but he has had enough surgery for a couple of years. Hopefully, we can keep him comfortable on the left knee. We will plan on regular injections for the left knee. cc: Svetlana Stevens M.D.
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
## 2019-04-05 DIAGNOSIS — F32.A DEPRESSION, UNSPECIFIED DEPRESSION TYPE: ICD-10-CM

## 2019-04-09 RX ORDER — BUPROPION HYDROCHLORIDE 150 MG/1
TABLET, EXTENDED RELEASE ORAL
Qty: 90 TAB | Refills: 3 | Status: SHIPPED | OUTPATIENT
Start: 2019-04-09 | End: 2019-11-25 | Stop reason: SDUPTHER

## 2019-04-09 NOTE — TELEPHONE ENCOUNTER
Last Visit: 12-24-18 with MD oDv Lindo  Next Appointment: 6-7-19 with MD Dov Lindo  Previous Refill Encounter(s): 8-26-18 #30 with 6 refills    Requested Prescriptions     Pending Prescriptions Disp Refills    buPROPion SR (WELLBUTRIN SR) 150 mg SR tablet [Pharmacy Med Name: BUPROPION SR 150MG TABLETS (12 H)] 90 Tab 3     Sig: TAKE 1 TABLET BY MOUTH EVERY DAY

## 2019-06-03 NOTE — PROGRESS NOTES
Izzy Gruber. presents today for No chief complaint on file. DM  HTN           Depression Screening:  3 most recent PHQ Screens 3/20/2019   PHQ Not Done -   Little interest or pleasure in doing things Not at all   Feeling down, depressed, irritable, or hopeless Not at all   Total Score PHQ 2 0   Trouble falling or staying asleep, or sleeping too much -   Feeling tired or having little energy -   Poor appetite, weight loss, or overeating -   Feeling bad about yourself - or that you are a failure or have let yourself or your family down -   Trouble concentrating on things such as school, work, reading, or watching TV -   Moving or speaking so slowly that other people could have noticed; or the opposite being so fidgety that others notice -   Thoughts of being better off dead, or hurting yourself in some way -   PHQ 9 Score -   How difficult have these problems made it for you to do your work, take care of your home and get along with others -       Learning Assessment:  Learning Assessment 11/15/2018   PRIMARY LEARNER Patient   HIGHEST LEVEL OF EDUCATION - PRIMARY LEARNER  [id number] [address] PRIMARY LEARNER NONE   CO-LEARNER CAREGIVER No   PRIMARY LANGUAGE ENGLISH   LEARNER PREFERENCE PRIMARY DEMONSTRATION     -   ANSWERED BY patient     -   RELATIONSHIP SELF   ASSESSMENT COMMENT -       Abuse Screening:  Abuse Screening Questionnaire 11/15/2018   Do you ever feel afraid of your partner? N   Are you in a relationship with someone who physically or mentally threatens you? N   Is it safe for you to go home? Y       Fall Risk  No flowsheet data found. Coordination of Care:  1. Have you been to the ER, urgent care clinic since your last visit? Hospitalized since your last visit? NO    2. Have you seen or consulted any other health care providers outside of the [address] [place] [address] since your last visit? Include any pap smears or colon screening. YES      Advance Directive:  1.  Do you have an advance directive in place? Patient Reply:NO    2. If not, would you like material regarding how to put one in place?  Patient Reply: Yaneth Sanchez

## 2019-06-07 ENCOUNTER — OFFICE VISIT (OUTPATIENT)
Dept: INTERNAL MEDICINE CLINIC | Age: 59
End: 2019-06-07

## 2019-06-07 VITALS
DIASTOLIC BLOOD PRESSURE: 79 MMHG | SYSTOLIC BLOOD PRESSURE: 119 MMHG | HEART RATE: 88 BPM | TEMPERATURE: 97.7 F | WEIGHT: 272 LBS | BODY MASS INDEX: 38.08 KG/M2 | RESPIRATION RATE: 16 BRPM | OXYGEN SATURATION: 97 % | HEIGHT: 71 IN

## 2019-06-07 DIAGNOSIS — K75.81 NASH (NONALCOHOLIC STEATOHEPATITIS): ICD-10-CM

## 2019-06-07 DIAGNOSIS — I48.19 PERSISTENT ATRIAL FIBRILLATION (HCC): ICD-10-CM

## 2019-06-07 DIAGNOSIS — E11.42 DIABETIC POLYNEUROPATHY ASSOCIATED WITH TYPE 2 DIABETES MELLITUS (HCC): ICD-10-CM

## 2019-06-07 DIAGNOSIS — I10 ESSENTIAL HYPERTENSION: Primary | ICD-10-CM

## 2019-06-07 DIAGNOSIS — J41.8 MIXED SIMPLE AND MUCOPURULENT CHRONIC BRONCHITIS (HCC): ICD-10-CM

## 2019-06-07 DIAGNOSIS — D64.9 ANEMIA, UNSPECIFIED TYPE: ICD-10-CM

## 2019-06-07 DIAGNOSIS — G47.33 OSA TREATED WITH BIPAP: ICD-10-CM

## 2019-06-07 PROBLEM — E11.21 TYPE 2 DIABETES WITH NEPHROPATHY (HCC): Status: ACTIVE | Noted: 2019-06-07

## 2019-06-07 LAB
A-G RATIO,AGRAT: 1.9 RATIO (ref 1.1–2.6)
ABSOLUTE LYMPHOCYTE COUNT, 10803: 1.4 K/UL (ref 1–4.8)
ALBUMIN SERPL-MCNC: 4.4 G/DL (ref 3.5–5)
ALP SERPL-CCNC: 40 U/L (ref 25–115)
ALT SERPL-CCNC: 11 U/L (ref 5–40)
ANION GAP SERPL CALC-SCNC: 16 MMOL/L
ANISOCYTOSIS: ABNORMAL
AST SERPL W P-5'-P-CCNC: 17 U/L (ref 10–37)
AVG GLU, 10930: 134 MG/DL (ref 91–123)
BASOPHILS # BLD: 0 K/UL (ref 0–0.2)
BASOPHILS NFR BLD: 0 % (ref 0–2)
BILIRUB SERPL-MCNC: 0.4 MG/DL (ref 0.2–1.2)
BUN SERPL-MCNC: 20 MG/DL (ref 6–22)
CALCIUM SERPL-MCNC: 9.8 MG/DL (ref 8.4–10.4)
CHLORIDE SERPL-SCNC: 102 MMOL/L (ref 98–110)
CHOLEST SERPL-MCNC: 79 MG/DL (ref 110–200)
CO2 SERPL-SCNC: 24 MMOL/L (ref 20–32)
CREAT SERPL-MCNC: 1.1 MG/DL (ref 0.5–1.2)
EOSINOPHIL # BLD: 0.3 K/UL (ref 0–0.5)
EOSINOPHIL NFR BLD: 4 % (ref 0–6)
ERYTHROCYTE [DISTWIDTH] IN BLOOD BY AUTOMATED COUNT: 17.7 % (ref 10–15.5)
FE % SATURATION,PSAT: 5 % (ref 20–50)
FERRITIN SERPL-MCNC: 13 NG/ML (ref 22–322)
GFRAA, 66117: >60
GFRNA, 66118: >60
GLOBULIN,GLOB: 2.3 G/DL (ref 2–4)
GLUCOSE SERPL-MCNC: 124 MG/DL (ref 70–99)
GRANULOCYTES,GRANS: 60 % (ref 40–75)
HBA1C MFR BLD HPLC: 6.3 % (ref 4.8–5.9)
HCT VFR BLD AUTO: 33.2 % (ref 39.3–51.6)
HDLC SERPL-MCNC: 2.6 MG/DL (ref 0–5)
HDLC SERPL-MCNC: 30 MG/DL (ref 40–59)
HGB BLD-MCNC: 8.5 G/DL (ref 13.1–17.2)
IRON,IRN: 28 MCG/DL (ref 45–160)
LDLC SERPL CALC-MCNC: 28 MG/DL (ref 50–99)
LYMPHOCYTES, LYMLT: 21 % (ref 20–45)
MCH RBC QN AUTO: 20 PG (ref 26–34)
MCHC RBC AUTO-ENTMCNC: 26 G/DL (ref 31–36)
MCV RBC AUTO: 78 FL (ref 80–95)
MICROCYTES, 12013: ABNORMAL
MONOCYTES # BLD: 0.9 K/UL (ref 0.1–1)
MONOCYTES NFR BLD: 15 % (ref 3–12)
NEUTROPHILS # BLD AUTO: 3.9 K/UL (ref 1.8–7.7)
OVALOCYTES, 1119: ABNORMAL
PLATELET # BLD AUTO: 217 K/UL (ref 140–440)
PMV BLD AUTO: 9.8 FL (ref 9–13)
POLYCHROMASIA,POLYCM: ABNORMAL
POTASSIUM SERPL-SCNC: 4.1 MMOL/L (ref 3.5–5.5)
PROT SERPL-MCNC: 6.7 G/DL (ref 6.4–8.3)
RBC # BLD AUTO: 4.27 M/UL (ref 3.8–5.8)
SMEAR EVAL, 1131: ABNORMAL
SODIUM SERPL-SCNC: 142 MMOL/L (ref 133–145)
TIBC,TIBC: 517 MCG/DL (ref 228–428)
TRIGL SERPL-MCNC: 103 MG/DL (ref 40–149)
UIBC SERPL-MCNC: 489 MCG/DL (ref 110–370)
VLDLC SERPL CALC-MCNC: 21 MG/DL (ref 8–30)
WBC # BLD AUTO: 6.5 K/UL (ref 4–11)

## 2019-06-07 RX ORDER — BUDESONIDE AND FORMOTEROL FUMARATE DIHYDRATE 160; 4.5 UG/1; UG/1
AEROSOL RESPIRATORY (INHALATION)
Qty: 1 INHALER | Refills: 11 | Status: SHIPPED | OUTPATIENT
Start: 2019-06-07 | End: 2020-10-07 | Stop reason: SDUPTHER

## 2019-06-07 RX ORDER — GABAPENTIN 300 MG/1
300 CAPSULE ORAL 3 TIMES DAILY
Qty: 270 CAP | Refills: 1 | Status: SHIPPED | OUTPATIENT
Start: 2019-06-07 | End: 2019-07-17 | Stop reason: SDUPTHER

## 2019-06-07 NOTE — PROGRESS NOTES
Mani Henderson. presents today for   Chief Complaint   Patient presents with    Follow-up    Hypertension    Diabetes              Depression Screening:  3 most recent PHQ Screens 3/20/2019   PHQ Not Done -   Little interest or pleasure in doing things Not at all   Feeling down, depressed, irritable, or hopeless Not at all   Total Score PHQ 2 0   Trouble falling or staying asleep, or sleeping too much -   Feeling tired or having little energy -   Poor appetite, weight loss, or overeating -   Feeling bad about yourself - or that you are a failure or have let yourself or your family down -   Trouble concentrating on things such as school, work, reading, or watching TV -   Moving or speaking so slowly that other people could have noticed; or the opposite being so fidgety that others notice -   Thoughts of being better off dead, or hurting yourself in some way -   PHQ 9 Score -   How difficult have these problems made it for you to do your work, take care of your home and get along with others -       Learning Assessment:  Learning Assessment 11/15/2018   PRIMARY LEARNER Patient   HIGHEST LEVEL OF EDUCATION - PRIMARY LEARNER  [id number] [address] PRIMARY LEARNER NONE   CO-LEARNER CAREGIVER No   PRIMARY LANGUAGE ENGLISH   LEARNER PREFERENCE PRIMARY DEMONSTRATION     -   ANSWERED BY patient     -   RELATIONSHIP SELF   ASSESSMENT COMMENT -       Abuse Screening:  Abuse Screening Questionnaire 6/7/2019   Do you ever feel afraid of your partner? N   Are you in a relationship with someone who physically or mentally threatens you? N   Is it safe for you to go home? Y       Fall Risk  Fall Risk Assessment, last 12 mths 6/7/2019   Able to walk? Yes   Fall in past 12 months? No           Coordination of Care:  1. Have you been to the ER, urgent care clinic since your last visit? Hospitalized since your last visit? no    2.  Have you seen or consulted any other health care providers outside of the [facility] since your last visit? Include any pap smears or colon screening. yes      Advance Directive:  1. Do you have an advance directive in place? Patient Reply:no    2. If not, would you like material regarding how to put one in place?  Patient Reply: no
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
## 2019-06-13 DIAGNOSIS — D50.9 IRON DEFICIENCY ANEMIA, UNSPECIFIED IRON DEFICIENCY ANEMIA TYPE: Primary | ICD-10-CM

## 2019-06-13 RX ORDER — FERROUS SULFATE 325(65) MG
325 TABLET, DELAYED RELEASE (ENTERIC COATED) ORAL 2 TIMES DAILY
Qty: 60 TAB | Refills: 6 | Status: SHIPPED | OUTPATIENT
Start: 2019-06-13 | End: 2020-02-10

## 2019-06-13 RX ORDER — DOCUSATE SODIUM 100 MG/1
100 CAPSULE, LIQUID FILLED ORAL 2 TIMES DAILY
Qty: 60 CAP | Refills: 6 | Status: SHIPPED | OUTPATIENT
Start: 2019-06-13 | End: 2020-02-10

## 2019-06-13 NOTE — PROGRESS NOTES
Please let him know that his labs show that he is iron deficient. I am prescribing iron Rx for him to take. Docusate is ordered to prevent constipation from iron rx. Meanwhile, his renal function and liver function labs are unremarkable. His most recent labs show that his total cholesterol is 79. His HDL is 30. His triglycerides are 103. His LDL was 28. His A1c is much better measuring 6.3. It was 7.4 six months ago.   He should continue taking all medications as prescribed    Dr. Vinnie Gerardo  [facility] [address], [address] [place] [address] Str.  Phone: [phone]  Fax: [phone]

## 2019-06-14 ENCOUNTER — TELEPHONE (OUTPATIENT)
Dept: INTERNAL MEDICINE CLINIC | Age: 59
End: 2019-06-14

## 2019-06-14 NOTE — TELEPHONE ENCOUNTER
Patient contacted, patient identified with two identifiers (Name & ). Patient aware of results per DR. Gomez and verbalizes understanding.

## 2019-06-14 NOTE — TELEPHONE ENCOUNTER
----- Message from Louis Ibrahim MD sent at 6/13/2019  2:43 PM EDT -----  Please let him know that his labs show that he is iron deficient. I am prescribing iron Rx for him to take. Docusate is ordered to prevent constipation from iron rx. Meanwhile, his renal function and liver function labs are unremarkable. His most recent labs show that his total cholesterol is 79. His HDL is 30. His triglycerides are 103. His LDL was 28. His A1c is much better measuring 6.3. It was 7.4 six months ago.   He should continue taking all medications as prescribed    Dr. Shelby Méndez  [facility], [facility], [address] [place] [address] Str.  Phone: [phone]  Fax: [phone]

## 2019-06-25 ENCOUNTER — OFFICE VISIT (OUTPATIENT)
Dept: PULMONOLOGY | Age: 59
End: 2019-06-25

## 2019-06-25 VITALS
WEIGHT: 270 LBS | HEIGHT: 71 IN | DIASTOLIC BLOOD PRESSURE: 78 MMHG | BODY MASS INDEX: 37.8 KG/M2 | OXYGEN SATURATION: 96 % | RESPIRATION RATE: 20 BRPM | SYSTOLIC BLOOD PRESSURE: 160 MMHG | HEART RATE: 55 BPM | TEMPERATURE: 98.1 F

## 2019-06-25 DIAGNOSIS — J41.8 MIXED SIMPLE AND MUCOPURULENT CHRONIC BRONCHITIS (HCC): ICD-10-CM

## 2019-06-25 DIAGNOSIS — J44.9 COPD, GROUP B, BY GOLD 2017 CLASSIFICATION (HCC): Primary | ICD-10-CM

## 2019-06-25 DIAGNOSIS — I48.19 PERSISTENT ATRIAL FIBRILLATION (HCC): ICD-10-CM

## 2019-06-25 DIAGNOSIS — G47.33 OSA TREATED WITH BIPAP: ICD-10-CM

## 2019-06-25 DIAGNOSIS — K21.9 GASTROESOPHAGEAL REFLUX DISEASE WITHOUT ESOPHAGITIS: ICD-10-CM

## 2019-06-25 RX ORDER — CYCLOSPORINE 0.5 MG/ML
EMULSION OPHTHALMIC
Refills: 3 | COMMUNITY
Start: 2019-03-29

## 2019-06-25 RX ORDER — LANOLIN ALCOHOL/MO/W.PET/CERES
CREAM (GRAM) TOPICAL
Refills: 6 | COMMUNITY
Start: 2019-06-13 | End: 2020-02-11 | Stop reason: SDUPTHER

## 2019-06-25 RX ORDER — PEN NEEDLE, DIABETIC 32GX 5/32"
NEEDLE, DISPOSABLE MISCELLANEOUS
Refills: 11 | COMMUNITY
Start: 2019-03-31 | End: 2020-04-20

## 2019-06-25 NOTE — PROGRESS NOTES
[facility]  Pulmonary, Critical Care, and Sleep Medicine      Pulmonary Office Visit    Name: Nathen Griggs. : 1960     Date: 2019        Subjective:     Patient is a 60 y.o. male is here for follow up- chronic bronchitis. 19     Overall doing well  Has had hospitalization for hand surgery and subsequently developed GI bleeding for which she needed gastric surgery with removal of a benign tumor. This was further complicated by wound infection. Eventually everything has healed. He had initially lost 20 pounds but has gained it back now and notices some increase in dyspnea on exertion. Denies any chronic persistent cough, wheezing or chest pain. Using Symbicort daily. Has only used rescue medication once or twice  he is trying to walk more and exercise. States that his daughter has set up a gym in his garage and he is planning to use it regularly    No need for ER visits or prednisone taper over past year. Denies fever, chills, night sweats. Admits to weight gain. Had recent labs drawn by primary care physician noted to be anemic secondary to iron deficiency and has now been started on iron  Uses BiPAP at night for FITO    BiPAP download reviewed available data from 2019 through 2019  Use 800% for average of 7 hours 29 minutes  On the settings of IPAP 23 EPAP 18  Has AHI of 10.9  Average leak of 4 hours 17 minutes    Last PFTs with FEV1 of 56% predicted, FEF 25-75 percentage is 36% predicted  Bronchodilator response noted    HPI:  Patient initially seen for frequent episodes of persistent and difficult to clear cough over past 2-3 years. Had 2 episode last year and 3-4 flareup's with visits to [facility], ER and need for antibiotics on 3 occasions since spring. Eventually had CT scan and referred to pulmonary. Patient admited to some nasal congestion, postnasal drip and history of chronic sinus problems with previous sinus surgery.   Had reflux and had difficulty sleeping with head elevated  Past Surgical History:   Procedure Laterality Date    EXCIS STOMACH ULCER,LESN;LOCAL N/A 12/13/2018    Dr. Yeimi Bryant HAND/FINGER SURGERY UNLISTED      HX APPENDECTOMY      HX COLONOSCOPY  6/24/2010    tubular adenoma, Dr. Sher Wooten HX GASTRECTOMY  12/10/2018    Partial plus GI tumor    HX HEENT      sinus surgery    HX HERNIA REPAIR      HX KNEE ARTHROSCOPY      HX KNEE REPLACEMENT Right 12/2014    HX TONSILLECTOMY      HX WISDOM TEETH EXTRACTION      x4     Family History   Problem Relation Age of Onset    Other Father         Knee replacement    Elevated Lipids Mother     Glaucoma Maternal Grandmother     No Known Problems Maternal Grandfather     No Known Problems Paternal Grandmother     No Known Problems Paternal Grandfather     Arthritis-osteo Other     Hypertension Other      . Current Outpatient Medications   Medication Sig Dispense Refill    RESTASIS 0.05 % dpet INT 1 GTT IN OU BID  3    TRISTIN PEN NEEDLE 32 gauge x 5/32\" ndle USE TO TEST BLOOD SUGAR TID  PLUS SLIDING SCALE  11    ferrous sulfate 325 mg (65 mg iron) tablet TK 1 T PO BID  6    losartan (COZAAR) 25 mg tablet TAKE 1 TABLET BY MOUTH DAILY 90 Tab 1    hydroCHLOROthiazide (MICROZIDE) 12.5 mg capsule TAKE ONE CAPSULE BY MOUTH EVERY MORNING FOR HIGH BLOOD PRESSURE TAKE WITH BANANAS OR ORANGE JUICE 30 Cap 6    ferrous sulfate (IRON) 325 mg (65 mg iron) EC tablet Take 1 Tab by mouth two (2) times a day. 60 Tab 6    docusate sodium (COLACE) 100 mg capsule Take 1 Cap by mouth two (2) times a day. 60 Cap 6    budesonide-formoterol (SYMBICORT) 160-4.5 mcg/actuation HFAA TAKE 2 PUFFS BY MOUTH TWICE DAILY 1 Inhaler 11    gabapentin (NEURONTIN) 300 mg capsule Take 1 Cap by mouth three (3) times daily. 270 Cap 1    rosuvastatin (CRESTOR) 40 mg tablet TAKE 1 TABLET BY MOUTH NIGHTLY.  90 Tab 3    buPROPion SR (WELLBUTRIN SR) 150 mg SR tablet TAKE 1 TABLET BY MOUTH EVERY DAY 90 Tab 3  LEVEMIR FLEXTOUCH U-100 INSULN 100 unit/mL (3 mL) inpn INJECT 25 UNITS BENEATH THE SKIN EVERY MORNING 15 mL 6    JANUMET 50-1,000 mg per tablet TAKE 1 TABLET BY MOUTH TWICE DAILY WITH MEALS 180 Tab 1    insulin lispro (HUMALOG) 100 unit/mL kwikpen Inject 6 units under the skin 3 times daily 1 Package 11    insulin lispro (HUMALOG KWIKPEN INSULIN) 100 unit/mL kwikpen by SubCUTAneous route. Sliding scale:   101-150 4 units   151-200 6 units   201-250  8 units   251-300 10 units   Call if blood sugar is greater than 300 1 Package 11    metoprolol succinate (TOPROL-XL) 50 mg XL tablet TAKE 1 TABLET BY MOUTH DAILY 90 Tab 1    amLODIPine (NORVASC) 10 mg tablet Take 0.5 Tabs by mouth daily. 135 Tab 1    pantoprazole (PROTONIX) 40 mg tablet Take 1 Tab by mouth two (2) times a day. 180 Tab 1    traMADol (ULTRAM) 50 mg tablet Take 1 Tab by mouth every eight (8) hours as needed for Pain. Max Daily Amount: 150 mg. 21 Tab 0    apixaban (ELIQUIS) 5 mg tablet Take 1 Tab by mouth two (2) times a day. 60 Tab 6    hydroCHLOROthiazide (MICROZIDE) 12.5 mg capsule TK ONE C PO QAM FOR HIGH BLOOD PRESSURE TK WITH BANANAS OR ORANGE JUICE  6    oxyCODONE-acetaminophen (PERCOCET) 5-325 mg per tablet Take 1 Tab by mouth every four (4) hours as needed for Pain. Max Daily Amount: 6 Tabs. (Patient taking differently: Take 1 Tab by mouth every four (4) hours as needed for Pain. Max Daily Amount: 6 Tabs.) 18 Tab 0    traMADol (ULTRAM) 50 mg tablet Take 1 to 2 tabs po every day to BID as needed severe pain 28 Tab 0    cyclobenzaprine (FLEXERIL) 10 mg tablet Take 1 Tab by mouth nightly. 30 Tab 0    insulin detemir U-100 (LEVEMIR FLEXTOUCH U-100 INSULN) 100 unit/mL (3 mL) inpn 28 Units by SubCUTAneous route daily. 15 mL 6    fenofibrate nanocrystallized (TRICOR) 145 mg tablet Take 1 Tab by mouth daily.  90 Tab 3    VIBERZI 75 mg tablet TAKE 1 TABLET BY MOUTH TWICE DAILY WITH MEALS 60 Tab 5    albuterol (PROVENTIL HFA, VENTOLIN HFA, PROAIR HFA) 90 mcg/actuation inhaler Take 2 Puffs by inhalation every six (6) hours as needed for Wheezing. (Patient taking differently: Take 2 Puffs by inhalation every six (6) hours as needed for Wheezing.) 1 Inhaler 6    vardenafil (LEVITRA) 20 mg tablet Take 20 mg by mouth as needed. Indications: Erectile Dysfunction (Patient taking differently: Take 20 mg by mouth as needed. Indications: Erectile Dysfunction) 2 Tab 11    insulin aspart (NOVOLOG) 100 unit/mL inpn by SubCUTAneous route. Sliding scale:   101-150 4 units  151-200 6 units  201-250  8 units  251-300 10 units  Call if blood sugar is greater than 300      cholecalciferol (VITAMIN D3) 1,000 unit cap Take 2 Caps by mouth daily. 60 Cap 5    Omega-3-DHA-EPA-Fish Oil 1,000 mg (120 mg-180 mg) cap Take  by mouth two (2) times a day.  MV,MINERALS/FA/LYCOPENE/GINKGO (ONE-A-DAY MEN'S 50+ ADVANTAGE PO) Take  by mouth daily.  bipap machine kit by Does Not Apply route. BiPAP at 23/18 cm with heated humidifier. Mask: Simplus FF, medium size or mask of choice. Length of need 99 months. Replace mask and accessories as needed times 12 months. Please download data after 30 days and fax at [phone]. Dx: Severe FITO, Obesity, snoring. 1 Kit 0    cyanocobalamin (VITAMIN B-12) 1,000 mcg tablet Take 1,000 mcg by mouth daily.        Review of Systems:  HEENT: No epistaxis, no nasal drainage, no difficulty in swallowing, no redness in eyes  Respiratory: as above  Cardiovascular: no chest pain, no palpitations, no chronic leg edema, no syncope  Gastrointestinal: no abd pain, no vomiting, no diarrhea, no bleeding symptoms  Genitourinary: No urinary symptoms or hematuria  Integument/breast: No ulcers or rashes  Musculoskeletal:Neg  Neurological: No focal weakness, no seizures, no headaches  Behvioral/Psych: No anxiety, no depression  Constitutional: No fever, no chills, no weight loss, no night sweats     Objective:     Visit Vitals  /78 (BP 1 Location: Left arm, BP Patient Position: Sitting)   Pulse (!) 55   Temp 98.1 °F (36.7 °C)   Resp 20   Ht 5' 11\" (1.803 m)   Wt 122.5 kg (270 lb)   SpO2 96%   BMI 37.66 kg/m²        Physical Exam:   General: comfortable, no acute distress  HEENT: pupils reactive, sclera anicteric, EOM intact  Neck: No adenopathy or thyroid swelling, no lymphadenopathy or JVD, supple  CVS: S1S2 no murmurs  RS: Mod AE bilaterally, no tactile fremitus or egophony, no accessory muscle use  Abd: soft, non tender, no hepatosplenomegaly  Neuro: non focal, awake, alert  Extrm: no leg edema, clubbing or cyanosis  Skin: no rash    Data review:   Sleep Study: 7/23/2014:  CPAP was applied at 5 cm and titrated up to 20 cm. At the setting  of 16 and 17 cm, sleep efficiency was 100%, AHI was 0, but REM or  REM supine was not seen and the lowest oxygen saturation was 90  and 88% respectively. At the setting of 20 cm, sleep efficiency  was 100%, AHI was 0, but REM or REM supine was not seen and the  lowest oxygen saturation was 90%. Mask leak, arousal and snoring  indices were tolerable. BiPAP was applied at 21/17 cm and titrated up to 23/18 cm. At the  setting of 23/18 cm, sleep efficiency was 98%, AHI was 1.7 with 1  obstructive hypopnea, REM was seen, REM supine was not seen and  the lowest oxygen saturation was 85% during hypopnea for 0.2 min  but mean oxygen saturation was 92%.  Mask leak was tolerable, and  arousal and snoring were low    PFT's:  06/14/18    Patient effort:   Good  Meets ATS criteria for interpretation    Flows:  Maximal Mid Expiratory Flow rate is reduced to 36 % predicted  Forced Expiratory Volume in one second is reduced to 56 % predicted  FEV 1% is reduced    Flow Volume Loop:  Nonspecific obstructive pattern in Flow Volume Loop    Bronchodilator:  Significant partial  improvement with bronchodilator    Impression:  Moderate obstructive defect, Mild restrictive ventilatory defect    Comment:  Reduced Vital Capacity may be due to airflow obstruction or restrictive defect. Obtain lung volumes if clinically indicated, See technicians comments. 4/14/2016:  Maximal Mid Expiratory Flow rate is reduced to 41 % predicted  Forced Expiratory Volume in one second is reduced to 62 %  predicted  FEV 1% is reduced  Volumes:  Vital Capacity is reduced, Residual Volume is elevated and Total  Lung Capacity is normal  Flow Volume Loop:  Nonspecific obstructive pattern in Flow Volume Loop  Bronchodilator:  Significant improvement with bronchodilator  Diffusion:  Abnormal Diffusion Capacity reduced to 68 % predicted  Impression:  Mild to moderate obstructive defect, predominately small airways,  Air trapping, Reduced diffusion capacity indicating a decrease in  alveolar surface area for gas exchange      Imaging:  I have personally reviewed the patients radiographs and have reviewed the reports:  CXR-8/2014:  A single view of the chest demonstrates clear lungs. Atherosclerosis   of the aorta. Borderline heart size. Thoracic spondylosis. IMPRESSION: No acute cardiopulmonary disease. March 2013-FINDINGS: New peripheral predominantly alveolar opacities are observed in the   right midlung zone, presumably involving the inferior lateral aspect of the   right upper lobe. Additional new increased streaky densities are also observed   in the right lung base region. The cardiac silhouette appears mild to   moderately enlarged. No significant pleural effusion. No pneumothorax. IMPRESSION:   Right upper lobe and right middle lobe with new opacities as described above. Suggestive of developing multilobar pneumonia. CXR- April 2013 and June 2013- clear  Ct scan chest 6/13:  Acute or chronic bronchitis. Minimal infiltrate or scarring in the medial   bases. .   Gastric lipoma. Fatty infiltration of the liver.    Prominent coronary artery calcifications    IMPRESSION:   · Asthma/COPD- significantly improved control however needs reenforcement for continuation of maintenance therapy  · Recurrent lower respiratory infections- acute obstructive and chronic obstructive bronchitis with episode of pneumonia-alveolitis. PFT's with obstructive and restrictive impairment with bronchodilator response. Has multiple triggers- chronic sinusitis and GERD as cause for recurrent insult. Has as a consequence developed reactive airways dysfunction- cough variant asthma. Evaluation for sinopulmonary syndrome /acquired hypogammaglobulinema is not diagnostic . · FITO- needs continued treatment optimization. BiPAP and settings noted. Still has residual AHI of 10.6 with mask leak  · DM  · Obesity  · GERD  · Anemia- iron deficiency h/o GI bleed. H/O benign gastric mass, tubular adenoma- colon        RECOMMENDATIONS:     · Continue Symbicort 160/4.5 and nasonex  · Albuterol prn for rescue  · GERD instructions and strict control  · Bronchial hygiene and rescue inhaler methods reviewed. · Treat FITO- BiPAP use discussed . Instructed to better fit the mask. We will recheck download at next visit and adjust settings if needed  · Patient states that the machine is old-will consider prescribing a new machine  · Encouraged incremental exercise, weight loss  · Consider pulmonary rehab.-Discussed indications. He wants to try exercising at home first  · Discussed PFT's. · Follow up in 4 months. · Will obtain pulmonary function at next follow-up visit  · Questions concerns addressed     Discussed d/d and treatment rationale with patient.      Shagufta Dominguez MD

## 2019-06-25 NOTE — PROGRESS NOTES
The pt. Doesn't have resp. C/os. He fell had a fall in 11/2018. Followed by GI bleed and discovery of a GI tumor. Surgery done. He has an older Bipap machine. Chip downloaded.

## 2019-06-25 NOTE — PATIENT INSTRUCTIONS
Please bring BiPAP chip for review at next visit  Tighten the mask seal on the BiPAP  Follow-up in 4 months-will do pulmonary function test at next visit

## 2019-07-16 ENCOUNTER — OFFICE VISIT (OUTPATIENT)
Dept: INTERNAL MEDICINE CLINIC | Age: 59
End: 2019-07-16

## 2019-07-16 VITALS
SYSTOLIC BLOOD PRESSURE: 132 MMHG | WEIGHT: 265 LBS | HEIGHT: 71 IN | OXYGEN SATURATION: 96 % | BODY MASS INDEX: 37.1 KG/M2 | DIASTOLIC BLOOD PRESSURE: 71 MMHG | RESPIRATION RATE: 14 BRPM | HEART RATE: 72 BPM | TEMPERATURE: 98.5 F

## 2019-07-16 DIAGNOSIS — E78.5 DYSLIPIDEMIA: ICD-10-CM

## 2019-07-16 DIAGNOSIS — E11.21 TYPE 2 DIABETES WITH NEPHROPATHY (HCC): ICD-10-CM

## 2019-07-16 DIAGNOSIS — M54.41 CHRONIC MIDLINE LOW BACK PAIN WITH BILATERAL SCIATICA: Primary | ICD-10-CM

## 2019-07-16 DIAGNOSIS — I48.19 PERSISTENT ATRIAL FIBRILLATION (HCC): ICD-10-CM

## 2019-07-16 DIAGNOSIS — I10 ESSENTIAL HYPERTENSION: ICD-10-CM

## 2019-07-16 DIAGNOSIS — E66.01 SEVERE OBESITY (BMI 35.0-39.9) WITH COMORBIDITY (HCC): ICD-10-CM

## 2019-07-16 DIAGNOSIS — K75.81 NASH (NONALCOHOLIC STEATOHEPATITIS): ICD-10-CM

## 2019-07-16 DIAGNOSIS — M54.42 CHRONIC MIDLINE LOW BACK PAIN WITH BILATERAL SCIATICA: Primary | ICD-10-CM

## 2019-07-16 DIAGNOSIS — G47.33 OSA TREATED WITH BIPAP: ICD-10-CM

## 2019-07-16 DIAGNOSIS — D50.9 IRON DEFICIENCY ANEMIA, UNSPECIFIED IRON DEFICIENCY ANEMIA TYPE: ICD-10-CM

## 2019-07-16 DIAGNOSIS — G89.29 CHRONIC MIDLINE LOW BACK PAIN WITH BILATERAL SCIATICA: Primary | ICD-10-CM

## 2019-07-16 DIAGNOSIS — Z51.81 MEDICATION MONITORING ENCOUNTER: ICD-10-CM

## 2019-07-16 DIAGNOSIS — Z87.19 HISTORY OF GI BLEED: ICD-10-CM

## 2019-07-16 LAB
CREATININE, URINE: 83 MG/DL
MICROALB/CREAT RATIO, 140286: 244.8 MCG/MG OF CREATININE (ref 0–30)
MICROALBUMIN,URINE RANDOM 140054: 203.2 MCG/ML (ref 0.1–17)

## 2019-07-16 RX ORDER — PANTOPRAZOLE SODIUM 20 MG/1
20 TABLET, DELAYED RELEASE ORAL DAILY
Qty: 90 TAB | Refills: 3 | Status: SHIPPED | OUTPATIENT
Start: 2019-07-16 | End: 2020-07-17

## 2019-07-16 NOTE — PROGRESS NOTES
Chief Complaint   Patient presents with    Hypertension     follow up ROOM 2    Diabetes     follow up    Labs     done 6-07-19       1. Have you been to the ER, urgent care clinic since your last visit? Hospitalized since your last visit? No    2. Have you seen or consulted any other health care providers outside of the [address] [place] [address] since your last visit? Include any pap smears or colon screening. No    Patient was given a copy of the Advanced Directive and understands to bring it in once completed.   Health Maintenance Due   Topic Date Due    Shingrix Vaccine Age 50> (1 of 2) 10/11/2009    FOOT EXAM Q1  05/03/2019    MICROALBUMIN Q1  08/16/2019
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
## 2019-07-16 NOTE — PATIENT INSTRUCTIONS
Health Maintenance Due   Topic Date Due    Shingrix Vaccine Age 50> (1 of 2) 10/11/2009    FOOT EXAM Q1  05/03/2019    MICROALBUMIN Q1  08/16/2019     Pantoprazole (By mouth)   Pantoprazole (pan-TOE-pra-zole)  Treats gastroesophageal reflux disease (GERD), a damaged esophagus, and high levels of stomach acid. This medicine is a proton pump inhibitor (PPI). Brand Name(s): Protonix   There may be other brand names for this medicine. When This Medicine Should Not Be Used: This medicine is not right for everyone. Do not use it if you had an allergic reaction to pantoprazole or similar medicines. How to Use This Medicine:   Packet, Tablet, Delayed Release Tablet, Long Acting Tablet  · Your doctor will tell you how much medicine to use. Do not use more than directed. Take the medicine at least 30 minutes before a meal.  · Delayed-release tablet: Swallow the tablet whole. Do not crush, break, or chew it. · Delayed-release packet:   ¨ To prepare with applesauce:   § Mix the packet contents with 1 teaspoon of applesauce. Do not mix with water, or other liquids or food. Do not divide the packet contents to make smaller doses. § Swallow the mixture within 10 minutes after you mix it. Do not chew or crush the granules. § Sip some water after you take the mixture to make sure you swallow all of the medicine. ¨ To prepare with apple juice:   § Mix the packet contents with 1 teaspoon of apple juice in a small cup. Do not divide the packet contents to make smaller doses. § Stir for 5 seconds and drink the mixture immediately. Do not chew or crush the granules. § To make sure you get all of the medicine, add more apple juice to the cup. Drink it immediately. ¨ To prepare for a feeding tube:   § Pour the packet contents in a 2-ounce (60 milliliter [mL]) catheter-tip syringe. § Add 10 mL of apple juice to the syringe. Add the mixture to the tube. Gently tap or shake the barrel of the syringe to help empty it.   § Add 10 mL of apple juice to the syringe and put it in the tube. Do this at least 2 times. There should be no granules left in the syringe. · This medicine should come with a Medication Guide. Ask your pharmacist for a copy if you do not have one. · Missed dose: Take a dose as soon as you remember. If it is almost time for your next dose, wait until then and take a regular dose. Do not take extra medicine to make up for a missed dose. · Store the medicine in a closed container at room temperature, away from heat, moisture, and direct light. Drugs and Foods to Avoid:   Ask your doctor or pharmacist before using any other medicine, including over-the-counter medicines, vitamins, and herbal products. · Some foods and medicines can affect how pantoprazole works. Tell your doctor if you are using any of the following:   ¨ Ampicillin, atazanavir, digoxin, erlotinib, ketoconazole, methotrexate, mycophenolate mofetil, nelfinavir  ¨ Blood thinner (including warfarin)  ¨ Diuretic (water pill)  ¨ Iron supplements  Warnings While Using This Medicine:   · Tell your doctor if you are pregnant or breastfeeding, or if you have liver disease, lupus, or osteoporosis. · This medicine may cause the following problems:  ¨ Kidney problems  ¨ Low vitamin B12 or magnesium levels  ¨ Increased risk of broken bones in the hip, wrist, or spine  · This medicine can cause diarrhea. Call your doctor if the diarrhea becomes severe, does not stop, or is bloody. Do not take any medicine to stop diarrhea until you have talked to your doctor. Diarrhea can occur 2 months or more after you stop taking this medicine. · Tell any doctor or dentist who treats you that you are using this medicine. This medicine may affect certain medical test results. · Your doctor will check your progress and the effects of this medicine at regular visits. Keep all appointments. · Keep all medicine out of the reach of children.  Never share your medicine with anyone. Possible Side Effects While Using This Medicine:   Call your doctor right away if you notice any of these side effects:  · Allergic reaction: Itching or hives, swelling in your face or hands, swelling or tingling in your mouth or throat, chest tightness, trouble breathing  · Blistering, peeling, red skin rash  · Fever, joint pain, swelling in your body, unusual weight gain, change in how much or how often you urinate  · Joint pain, rash on your cheeks or arms that gets worse in the sun  · Seizures, dizziness, uneven heartbeat, muscle cramps or twitching  · Severe diarrhea, stomach cramps, fever  · Stomach pain, nausea, vomiting, weight loss  If you notice other side effects that you think are caused by this medicine, tell your doctor. Call your doctor for medical advice about side effects. You may report side effects to FDA at 6-622-FDA-0691  © 2017 [facility] Information is for End User's use only and may not be sold, redistributed or otherwise used for commercial purposes. The above information is an  only. It is not intended as medical advice for individual conditions or treatments. Talk to your doctor, nurse or pharmacist before following any medical regimen to see if it is safe and effective for you.

## 2019-07-17 ENCOUNTER — OFFICE VISIT (OUTPATIENT)
Dept: ORTHOPEDIC SURGERY | Facility: CLINIC | Age: 59
End: 2019-07-17

## 2019-07-17 VITALS
RESPIRATION RATE: 16 BRPM | HEART RATE: 74 BPM | SYSTOLIC BLOOD PRESSURE: 146 MMHG | DIASTOLIC BLOOD PRESSURE: 83 MMHG | WEIGHT: 264 LBS | BODY MASS INDEX: 36.96 KG/M2 | TEMPERATURE: 98.4 F | OXYGEN SATURATION: 97 % | HEIGHT: 71 IN

## 2019-07-17 DIAGNOSIS — E78.5 DYSLIPIDEMIA: ICD-10-CM

## 2019-07-17 DIAGNOSIS — E11.42 DIABETIC POLYNEUROPATHY ASSOCIATED WITH TYPE 2 DIABETES MELLITUS (HCC): ICD-10-CM

## 2019-07-17 DIAGNOSIS — I10 ESSENTIAL HYPERTENSION: ICD-10-CM

## 2019-07-17 DIAGNOSIS — M25.562 PAIN IN BOTH KNEES, UNSPECIFIED CHRONICITY: ICD-10-CM

## 2019-07-17 DIAGNOSIS — M17.12 OSTEOARTHRITIS OF LEFT KNEE, UNSPECIFIED OSTEOARTHRITIS TYPE: Primary | ICD-10-CM

## 2019-07-17 DIAGNOSIS — E11.9 WELL CONTROLLED DIABETES MELLITUS (HCC): ICD-10-CM

## 2019-07-17 DIAGNOSIS — E66.01 SEVERE OBESITY (BMI 35.0-39.9) WITH COMORBIDITY (HCC): ICD-10-CM

## 2019-07-17 DIAGNOSIS — M70.51 PES ANSERINUS BURSITIS OF RIGHT KNEE: ICD-10-CM

## 2019-07-17 DIAGNOSIS — M25.561 PAIN IN BOTH KNEES, UNSPECIFIED CHRONICITY: ICD-10-CM

## 2019-07-17 RX ORDER — CYCLOBENZAPRINE HCL 10 MG
10 TABLET ORAL
Qty: 30 TAB | Refills: 0 | Status: SHIPPED | OUTPATIENT
Start: 2019-07-17 | End: 2019-08-25 | Stop reason: SDUPTHER

## 2019-07-17 RX ORDER — TRAMADOL HYDROCHLORIDE 50 MG/1
50 TABLET ORAL
Qty: 21 TAB | Refills: 0 | Status: SHIPPED | OUTPATIENT
Start: 2019-07-17 | End: 2019-07-24

## 2019-07-17 RX ORDER — LOSARTAN POTASSIUM 25 MG/1
25 TABLET ORAL DAILY
Qty: 90 TAB | Refills: 3 | Status: SHIPPED | OUTPATIENT
Start: 2019-07-17 | End: 2020-08-19

## 2019-07-17 RX ORDER — BETAMETHASONE SODIUM PHOSPHATE AND BETAMETHASONE ACETATE 3; 3 MG/ML; MG/ML
6 INJECTION, SUSPENSION INTRA-ARTICULAR; INTRALESIONAL; INTRAMUSCULAR; SOFT TISSUE ONCE
Qty: 1 ML | Refills: 0
Start: 2019-07-17 | End: 2019-07-17

## 2019-07-17 RX ORDER — GABAPENTIN 300 MG/1
300 CAPSULE ORAL 3 TIMES DAILY
Qty: 270 CAP | Refills: 1 | OUTPATIENT
Start: 2019-07-17 | End: 2020-02-24

## 2019-07-17 RX ORDER — FENOFIBRATE 145 MG/1
145 TABLET, COATED ORAL DAILY
Qty: 90 TAB | Refills: 3 | Status: SHIPPED | OUTPATIENT
Start: 2019-07-17 | End: 2020-09-09 | Stop reason: SDUPTHER

## 2019-07-17 NOTE — PROGRESS NOTES
1. Have you been to the ER, urgent care clinic since your last visit? Hospitalized since your last visit? NO    2. Have you seen or consulted any other health care providers outside of the [address] [place] [address] since your last visit? Include any pap smears or colon screening.  NO  '
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
## 2019-07-17 NOTE — TELEPHONE ENCOUNTER
Patient is requesting a new Rx for Neurontin due to drug schedule change    Flexeril previously prescribed by [address] [place] [address]. Refills denied by their office stating to request from PCP. Please sign if appropriate. Last Visit: 7/16/19 with MD Deysi Whitaker  Next Appointment: 11/18/19 with MD Deysi Whitaker  Previous Refill Encounter(s): 6/7/19 Neurontin #270 with 1 refill, 8/6/18 Tricor #90 with 3 refills, 6/18/19 Cozaar #90 with 1 refill, 10/1/18 Flexeril #30 per Dr. Daniela Lima    Requested Prescriptions     Pending Prescriptions Disp Refills    fenofibrate nanocrystallized (TRICOR) 145 mg tablet 90 Tab 3     Sig: Take 1 Tab by mouth daily.  gabapentin (NEURONTIN) 300 mg capsule 270 Cap 1     Sig: Take 1 Cap by mouth three (3) times daily.  losartan (COZAAR) 25 mg tablet 90 Tab 3     Sig: Take 1 Tab by mouth daily.  cyclobenzaprine (FLEXERIL) 10 mg tablet 30 Tab 0     Sig: Take 1 Tab by mouth nightly.

## 2019-07-18 LAB
AMPHETAMINE, 737686: NEGATIVE NG/ML
BARBITURATES: NEGATIVE NG/ML
BENZODIAZEPINES, R9BE1T: NEGATIVE NG/ML
BUPRENORPHINE, URINE: NEGATIVE NG/ML
CANNABINOID, DR86L: NEGATIVE NG/ML
COCAINE/METABOLITES, MDS2T: NEGATIVE NG/ML
CREATININE URINE,3297223: 87.1 MG/DL (ref 20–300)
METHADONE, 791633: NEGATIVE NG/ML
OPIATE, DR88L: NEGATIVE NG/ML
OXYCODONE/OXYMORPHONE-PM: NEGATIVE NG/ML
PH UR STRIP: 5.8 [PH] (ref 4.5–8.9)
PHENCYCLIDINE: NEGATIVE NG/ML
PLEASE NOTE, 62788: NORMAL
PROPOXYPHENE, 791635: NEGATIVE NG/ML

## 2019-07-19 ENCOUNTER — TELEPHONE (OUTPATIENT)
Dept: INTERNAL MEDICINE CLINIC | Age: 59
End: 2019-07-19

## 2019-07-19 NOTE — TELEPHONE ENCOUNTER
Pt is reviewing his recent lab results in his MyChart and has some questions about them, said some of them don't look very good. Please advise him on his cell at [phone].

## 2019-07-24 NOTE — TELEPHONE ENCOUNTER
I already addressed this with a phone call to him.     Dr. Matt Villa  [facility], [address] [facility], [address] [place] [address] Str.  Phone: [phone]  Fax: [phone]

## 2019-08-08 ENCOUNTER — OFFICE VISIT (OUTPATIENT)
Dept: ORTHOPEDIC SURGERY | Facility: CLINIC | Age: 59
End: 2019-08-08

## 2019-08-08 VITALS
HEIGHT: 71 IN | DIASTOLIC BLOOD PRESSURE: 77 MMHG | TEMPERATURE: 96.6 F | RESPIRATION RATE: 16 BRPM | SYSTOLIC BLOOD PRESSURE: 145 MMHG | BODY MASS INDEX: 37.94 KG/M2 | HEART RATE: 73 BPM | WEIGHT: 271 LBS | OXYGEN SATURATION: 94 %

## 2019-08-08 DIAGNOSIS — M70.51 PES ANSERINUS BURSITIS OF RIGHT KNEE: Primary | ICD-10-CM

## 2019-08-08 RX ORDER — BETAMETHASONE SODIUM PHOSPHATE AND BETAMETHASONE ACETATE 3; 3 MG/ML; MG/ML
6 INJECTION, SUSPENSION INTRA-ARTICULAR; INTRALESIONAL; INTRAMUSCULAR; SOFT TISSUE ONCE
Qty: 1 ML | Refills: 0
Start: 2019-08-08 | End: 2019-08-08

## 2019-08-08 NOTE — PROGRESS NOTES
1. Have you been to the ER, urgent care clinic since your last visit? Hospitalized since your last visit? No    2. Have you seen or consulted any other health care providers outside of the [address] [place] [address] since your last visit? Include any pap smears or colon screening.  No
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
## 2019-08-19 ENCOUNTER — TELEPHONE (OUTPATIENT)
Dept: INTERNAL MEDICINE CLINIC | Age: 59
End: 2019-08-19

## 2019-08-19 NOTE — TELEPHONE ENCOUNTER
Pt can take both rx as prescribed.     Dr. Isaac Risk  [facility], [address] [place] [address], [address] [place] [address] Str.  Phone: [phone]  Fax: [phone]

## 2019-08-19 NOTE — TELEPHONE ENCOUNTER
Pt called asked about his medication CRESTOR 40mg and FLEXERIL 10mg  Is he suppose to take them both  Please advise

## 2019-11-13 ENCOUNTER — OFFICE VISIT (OUTPATIENT)
Dept: ORTHOPEDIC SURGERY | Facility: CLINIC | Age: 59
End: 2019-11-13

## 2019-11-13 VITALS
RESPIRATION RATE: 18 BRPM | DIASTOLIC BLOOD PRESSURE: 78 MMHG | HEIGHT: 71 IN | BODY MASS INDEX: 38.02 KG/M2 | OXYGEN SATURATION: 96 % | WEIGHT: 271.6 LBS | TEMPERATURE: 97.1 F | HEART RATE: 76 BPM | SYSTOLIC BLOOD PRESSURE: 139 MMHG

## 2019-11-13 DIAGNOSIS — G57.93 NEUROPATHY INVOLVING BOTH LOWER EXTREMITIES: ICD-10-CM

## 2019-11-13 DIAGNOSIS — Z96.651 HISTORY OF TOTAL KNEE REPLACEMENT, RIGHT: ICD-10-CM

## 2019-11-13 DIAGNOSIS — M17.12 PRIMARY OSTEOARTHRITIS OF LEFT KNEE: Primary | ICD-10-CM

## 2019-11-13 DIAGNOSIS — M25.562 LEFT KNEE PAIN, UNSPECIFIED CHRONICITY: ICD-10-CM

## 2019-11-13 RX ORDER — BETAMETHASONE SODIUM PHOSPHATE AND BETAMETHASONE ACETATE 3; 3 MG/ML; MG/ML
6 INJECTION, SUSPENSION INTRA-ARTICULAR; INTRALESIONAL; INTRAMUSCULAR; SOFT TISSUE ONCE
Qty: 1 ML | Refills: 0
Start: 2019-11-13 | End: 2019-11-13
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
## 2019-11-18 NOTE — PATIENT INSTRUCTIONS

## 2019-12-17 ENCOUNTER — OFFICE VISIT (OUTPATIENT)
Dept: ORTHOPEDIC SURGERY | Age: 59
End: 2019-12-17

## 2019-12-17 VITALS
RESPIRATION RATE: 16 BRPM | WEIGHT: 277 LBS | OXYGEN SATURATION: 95 % | SYSTOLIC BLOOD PRESSURE: 161 MMHG | HEART RATE: 81 BPM | DIASTOLIC BLOOD PRESSURE: 100 MMHG | HEIGHT: 71 IN | BODY MASS INDEX: 38.78 KG/M2 | TEMPERATURE: 98.2 F

## 2019-12-17 DIAGNOSIS — M54.41 ACUTE BILATERAL LOW BACK PAIN WITH RIGHT-SIDED SCIATICA: Primary | ICD-10-CM

## 2019-12-17 RX ORDER — METHYLPREDNISOLONE 4 MG/1
TABLET ORAL
Qty: 1 DOSE PACK | Refills: 0 | Status: SHIPPED | OUTPATIENT
Start: 2019-12-17 | End: 2020-01-30 | Stop reason: ALTCHOICE
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
## 2019-12-17 NOTE — PROGRESS NOTES
1. Have you been to the ER, urgent care clinic since your last visit? Hospitalized since your last visit? No    2. Have you seen or consulted any other health care providers outside of the [address] [place] [address] since your last visit? Include any pap smears or colon screening.  No

## 2020-01-07 ENCOUNTER — DOCUMENTATION ONLY (OUTPATIENT)
Dept: INTERNAL MEDICINE CLINIC | Age: 60
End: 2020-01-07

## 2020-01-30 ENCOUNTER — OFFICE VISIT (OUTPATIENT)
Dept: INTERNAL MEDICINE CLINIC | Age: 60
End: 2020-01-30

## 2020-01-30 VITALS
DIASTOLIC BLOOD PRESSURE: 83 MMHG | RESPIRATION RATE: 16 BRPM | WEIGHT: 268.2 LBS | OXYGEN SATURATION: 98 % | BODY MASS INDEX: 37.55 KG/M2 | HEART RATE: 67 BPM | TEMPERATURE: 98.4 F | SYSTOLIC BLOOD PRESSURE: 140 MMHG | HEIGHT: 71 IN

## 2020-01-30 DIAGNOSIS — K75.81 NASH (NONALCOHOLIC STEATOHEPATITIS): ICD-10-CM

## 2020-01-30 DIAGNOSIS — E78.5 DYSLIPIDEMIA: ICD-10-CM

## 2020-01-30 DIAGNOSIS — I48.19 PERSISTENT ATRIAL FIBRILLATION (HCC): ICD-10-CM

## 2020-01-30 DIAGNOSIS — I10 ESSENTIAL HYPERTENSION: ICD-10-CM

## 2020-01-30 DIAGNOSIS — G47.33 OSA TREATED WITH BIPAP: ICD-10-CM

## 2020-01-30 DIAGNOSIS — N40.0 BENIGN PROSTATIC HYPERPLASIA, UNSPECIFIED WHETHER LOWER URINARY TRACT SYMPTOMS PRESENT: ICD-10-CM

## 2020-01-30 DIAGNOSIS — E11.21 TYPE 2 DIABETES WITH NEPHROPATHY (HCC): Primary | ICD-10-CM

## 2020-01-30 DIAGNOSIS — J18.9 PNEUMONIA DUE TO INFECTIOUS ORGANISM, UNSPECIFIED LATERALITY, UNSPECIFIED PART OF LUNG: ICD-10-CM

## 2020-01-30 RX ORDER — SULFAMETHOXAZOLE AND TRIMETHOPRIM 800; 160 MG/1; MG/1
TABLET ORAL
Refills: 0 | COMMUNITY
Start: 2019-11-09 | End: 2020-01-30 | Stop reason: ALTCHOICE

## 2020-01-30 RX ORDER — DOXYCYCLINE 100 MG/1
CAPSULE ORAL
COMMUNITY
Start: 2020-01-14 | End: 2020-01-30

## 2020-01-30 RX ORDER — BENZONATATE 100 MG/1
CAPSULE ORAL
COMMUNITY
Start: 2020-01-14 | End: 2020-01-30

## 2020-01-30 NOTE — PATIENT INSTRUCTIONS
Health Maintenance Due   Topic Date Due    Shingrix Vaccine Age 49> (1 of 2) 10/11/2009    HEMOGLOBIN A1C Q6M  12/07/2019          Learning About Meal Planning for Diabetes  Why plan your meals? Meal planning can be a key part of managing diabetes. Planning meals and snacks with the right balance of carbohydrate, protein, and fat can help you keep your blood sugar at the target level you set with your doctor. You don't have to eat special foods. You can eat what your family eats, including sweets once in a while. But you do have to pay attention to how often you eat and how much you eat of certain foods. You may want to work with a dietitian or a certified diabetes educator. He or she can give you tips and meal ideas and can answer your questions about meal planning. This health professional can also help you reach a healthy weight if that is one of your goals. What plan is right for you? Your dietitian or diabetes educator may suggest that you start with the plate format or carbohydrate counting. The plate format  The plate format is a simple way to help you manage how you eat. You plan meals by learning how much space each food should take on a plate. Using the plate format helps you spread carbohydrate throughout the day. It can make it easier to keep your blood sugar level within your target range. It also helps you see if you're eating healthy portion sizes. To use the plate format, you put non-starchy vegetables on half your plate. Add meat or meat substitutes on one-quarter of the plate. Put a grain or starchy vegetable (such as brown rice or a potato) on the final quarter of the plate. You can add a small piece of fruit and some low-fat or fat-free milk or yogurt, depending on your carbohydrate goal for each meal.  Here are some tips for using the plate format:  · Make sure that you are not using an oversized plate. A 9-inch plate is best. Many restaurants use larger plates.   · Get used to using the plate format at home. Then you can use it when you eat out. · Write down your questions about using the plate format. Talk to your doctor, a dietitian, or a diabetes educator about your concerns. Carbohydrate counting  With carbohydrate counting, you plan meals based on the amount of carbohydrate in each food. Carbohydrate raises blood sugar higher and more quickly than any other nutrient. It is found in desserts, breads and cereals, and fruit. It's also found in starchy vegetables such as potatoes and corn, grains such as rice and pasta, and milk and yogurt. Spreading carbohydrate throughout the day helps keep your blood sugar levels within your target range. Your daily amount depends on several things, including your weight, how active you are, which diabetes medicines you take, and what your goals are for your blood sugar levels. A registered dietitian or diabetes educator can help you plan how much carbohydrate to include in each meal and snack. A guideline for your daily amount of carbohydrate is:  · 45 to 60 grams at each meal. That's about the same as 3 to 4 carbohydrate servings. · 15 to 20 grams at each snack. That's about the same as 1 carbohydrate serving. The Nutrition Facts label on packaged foods tells you how much carbohydrate is in a serving of the food. First, look at the serving size on the food label. Is that the amount you eat in a serving? All of the nutrition information on a food label is based on that serving size. So if you eat more or less than that, you'll need to adjust the other numbers. Total carbohydrate is the next thing you need to look for on the label. If you count carbohydrate servings, one serving of carbohydrate is 15 grams. For foods that don't come with labels, such as fresh fruits and vegetables, you'll need a guide that lists carbohydrate in these foods. Ask your doctor, dietitian, or diabetes educator about books or other nutrition guides you can use.   If you take insulin, you need to know how many grams of carbohydrate are in a meal. This lets you know how much rapid-acting insulin to take before you eat. If you use an insulin pump, you get a constant rate of insulin during the day. So the pump must be programmed at meals to give you extra insulin to cover the rise in blood sugar after meals. When you know how much carbohydrate you will eat, you can take the right amount of insulin. Or, if you always use the same amount of insulin, you need to make sure that you eat the same amount of carbohydrate at meals. If you need more help to understand carbohydrate counting and food labels, ask your doctor, dietitian, or diabetes educator. How do you get started with meal planning? Here are some tips to get started:  · Plan your meals a week at a time. Don't forget to include snacks too. · Use cookbooks or online recipes to plan several main meals. Plan some quick meals for busy nights. You also can double some recipes that freeze well. Then you can save half for other busy nights when you don't have time to cook. · Make sure you have the ingredients you need for your recipes. If you're running low on basic items, put these items on your shopping list too. · List foods that you use to make breakfasts, lunches, and snacks. List plenty of fruits and vegetables. · Post this list on the refrigerator. Add to it as you think of more things you need. · Take the list to the store to do your weekly shopping. Follow-up care is a key part of your treatment and safety. Be sure to make and go to all appointments, and call your doctor if you are having problems. It's also a good idea to know your test results and keep a list of the medicines you take. Where can you learn more? Go to http://vasile-fito.info/. Nayla Shipley in the search box to learn more about \"Learning About Meal Planning for Diabetes. \"  Current as of: April 16, 2019  Content Version: 12.2  © 4588-2094 [employer]. Care instructions adapted under license by [employer] (which disclaims liability or warranty for this information). If you have questions about a medical condition or this instruction, always ask your healthcare professional. [address] any warranty or liability for your use of this information.

## 2020-01-30 NOTE — PROGRESS NOTES
Shannon Lugo. presents today for   Chief Complaint   Patient presents with    Pneumonia     per the patient he has been on medication for 10 days, and needs to have his Lungs checked     ROOM  12     Patient complains of coughing up green phlegm. Is someone accompanying this pt? no    Is the patient using any DME equipment during [address]? no    Depression Screening:  3 most recent PHQ Screens 1/30/2020   PHQ Not Done -   Little interest or pleasure in doing things Not at all   Feeling down, depressed, irritable, or hopeless Not at all   Total Score PHQ 2 0   Trouble falling or staying asleep, or sleeping too much -   Feeling tired or having little energy -   Poor appetite, weight loss, or overeating -   Feeling bad about yourself - or that you are a failure or have let yourself or your family down -   Trouble concentrating on things such as school, work, reading, or watching TV -   Moving or speaking so slowly that other people could have noticed; or the opposite being so fidgety that others notice -   Thoughts of being better off dead, or hurting yourself in some way -   PHQ 9 Score -   How difficult have these problems made it for you to do your work, take care of your home and get along with others -       Learning Assessment:  Learning Assessment 1/30/2020   PRIMARY LEARNER Patient   HIGHEST LEVEL OF EDUCATION - PRIMARY LEARNER  SOME COLLEGE   BARRIERS PRIMARY LEARNER NONE   CO-LEARNER CAREGIVER No   PRIMARY LANGUAGE ENGLISH   LEARNER PREFERENCE PRIMARY DEMONSTRATION     -   ANSWERED BY patient     -   RELATIONSHIP SELF   ASSESSMENT COMMENT -       Abuse Screening:  Abuse Screening Questionnaire 1/30/2020   Do you ever feel afraid of your partner? N   Are you in a relationship with someone who physically or mentally threatens you? N   Is it safe for you to go home? Y       Fall Risk  Fall Risk Assessment, last 12 mths 6/7/2019   Able to walk? Yes   Fall in past 12 months?  No       Health Maintenance reviewed and discussed and ordered per Provider. Health Maintenance Due   Topic Date Due    Shingrix Vaccine Age 50> (1 of 2) 10/11/2009    HEMOGLOBIN A1C Q6M  12/07/2019         Coordination of Care:  1. Have you been to the ER, urgent care clinic since your last visit? Hospitalized since your last visit? Yes When: 01- Where: [facility] Reason: Pneumonia and treated for 10 days. 2. Have you seen or consulted any other health care providers outside of the [address] [place] [address] since your last visit? Include any pap smears or colon screening.  no
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
## 2020-01-31 LAB
A-G RATIO,AGRAT: 2.4 RATIO (ref 1.1–2.6)
ABSOLUTE LYMPHOCYTE COUNT, 10803: 1.3 K/UL (ref 1–4.8)
ALBUMIN SERPL-MCNC: 4.4 G/DL (ref 3.5–5)
ALP SERPL-CCNC: 72 U/L (ref 40–125)
ALT SERPL-CCNC: 23 U/L (ref 5–40)
ANION GAP SERPL CALC-SCNC: 17 MMOL/L
AST SERPL W P-5'-P-CCNC: 27 U/L (ref 10–37)
AVG GLU, 10930: 266 MG/DL (ref 91–123)
BASOPHILS # BLD: 0 K/UL (ref 0–0.2)
BASOPHILS NFR BLD: 0 % (ref 0–2)
BILIRUB SERPL-MCNC: 0.4 MG/DL (ref 0.2–1.2)
BUN SERPL-MCNC: 17 MG/DL (ref 6–22)
CALCIUM SERPL-MCNC: 9.6 MG/DL (ref 8.4–10.5)
CHLORIDE SERPL-SCNC: 99 MMOL/L (ref 98–110)
CHOLEST SERPL-MCNC: 108 MG/DL (ref 110–200)
CO2 SERPL-SCNC: 24 MMOL/L (ref 20–32)
CREAT SERPL-MCNC: 1 MG/DL (ref 0.8–1.6)
CREATININE, URINE: 91 MG/DL
EOSINOPHIL # BLD: 0.2 K/UL (ref 0–0.5)
EOSINOPHIL NFR BLD: 3 % (ref 0–6)
ERYTHROCYTE [DISTWIDTH] IN BLOOD BY AUTOMATED COUNT: 14.6 % (ref 10–15.5)
GFRAA, 66117: >60
GFRNA, 66118: >60
GLOBULIN,GLOB: 1.8 G/DL (ref 2–4)
GLUCOSE SERPL-MCNC: 344 MG/DL (ref 70–99)
GRANULOCYTES,GRANS: 66 % (ref 40–75)
HBA1C MFR BLD HPLC: 10.9 % (ref 4.8–5.6)
HCT VFR BLD AUTO: 47 % (ref 39.3–51.6)
HDLC SERPL-MCNC: 26 MG/DL
HDLC SERPL-MCNC: 4.2 MG/DL (ref 0–5)
HGB BLD-MCNC: 14.6 G/DL (ref 13.1–17.2)
LDL/HDL RATIO,LDHD: 0.6
LDLC SERPL CALC-MCNC: 16 MG/DL (ref 50–99)
LYMPHOCYTES, LYMLT: 19 % (ref 20–45)
MCH RBC QN AUTO: 28 PG (ref 26–34)
MCHC RBC AUTO-ENTMCNC: 31 G/DL (ref 31–36)
MCV RBC AUTO: 91 FL (ref 80–95)
MICROALB/CREAT RATIO, 140286: 1858.4 (ref 0–30)
MICROALBUMIN,URINE RANDOM 140054: 1691.1 MG/L (ref 0.1–17)
MONOCYTES # BLD: 0.7 K/UL (ref 0.1–1)
MONOCYTES NFR BLD: 10 % (ref 3–12)
NEUTROPHILS # BLD AUTO: 4.5 K/UL (ref 1.8–7.7)
NON-HDL CHOLESTEROL, 011976: 82 MG/DL
PLATELET # BLD AUTO: 222 K/UL (ref 140–440)
PMV BLD AUTO: 10.8 FL (ref 9–13)
POTASSIUM SERPL-SCNC: 4.2 MMOL/L (ref 3.5–5.5)
PROT SERPL-MCNC: 6.2 G/DL (ref 6.2–8.1)
RBC # BLD AUTO: 5.16 M/UL (ref 3.8–5.8)
SODIUM SERPL-SCNC: 140 MMOL/L (ref 133–145)
TRIGL SERPL-MCNC: 330 MG/DL (ref 40–149)
VLDLC SERPL CALC-MCNC: 66 MG/DL (ref 8–30)
WBC # BLD AUTO: 6.8 K/UL (ref 4–11)

## 2020-02-01 LAB
PSA FREE MFR SERPL: 29 %
PSA FREE SERPL-MCNC: 0.29 NG/ML
PSA SERPL-MCNC: 1 NG/ML (ref 0–4)

## 2020-02-11 ENCOUNTER — OFFICE VISIT (OUTPATIENT)
Dept: INTERNAL MEDICINE CLINIC | Age: 60
End: 2020-02-11

## 2020-02-11 DIAGNOSIS — E11.21 TYPE 2 DIABETES WITH NEPHROPATHY (HCC): Primary | ICD-10-CM

## 2020-02-11 RX ORDER — METFORMIN HYDROCHLORIDE 1000 MG/1
1000 TABLET ORAL 2 TIMES DAILY WITH MEALS
Qty: 60 TAB | Refills: 3 | Status: SHIPPED | OUTPATIENT
Start: 2020-02-11 | End: 2020-07-01

## 2020-02-11 NOTE — Clinical Note
Hi Dr. Olivia Betnley have attached my note for Mr. Elías Shepard. I started him on Trulicity as we discussed. I instructed him to call me once he gets Trulicity from his pharmacy. I will then have him stop his Janumet and start Trulicity and metformin 1,000 mg BID. I continued his Levemir 28 units and mealtime sliding scale for now. I am seeing him in 2 weeks to followup. Please let me know if you have any additional recommendations for him. Anaya Hernandez, PharmDClinical Pharmacist Specialist
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
## 2020-02-11 NOTE — PATIENT INSTRUCTIONS
Your Visit Summary:     1.) Please bring your medications and blood sugar monitor to your next appointment. 2.) A prescription for Trulicity was sent to your [facility] pharmacy. Please let me know when you get this. Once you get Trulicity, we will stop Janumet and have you start Trulicity 0.79 mg once every 7 days and metformin 1,000 mg 1 tablet twice daily. 3.) Continue Levemir 28 units daily and Humalog sliding scale insulin for now.    4.) Continue to check your blood sugar 2-3 times per day. Check and document your blood sugar first thing in the morning (fasting), before a meal, and before bedtime. Bring your meter/log to all future visits. Your blood sugar goals:  - Fasting (first thing in the morning)  blood sugar: 80 - 130   - 1 to 2 hours after a meal: less than 180     When you experience symptoms of low blood sugar (example: less than 70):  - Confirm low reading by checking your blood sugar.   - Then treat with 15 grams of carbohydrates (one-half cup of juice or regular soda, or 4-5 glucose tablets). - Wait 15 minutes to recheck blood sugar.   - Then eat a protein containing meal/snack to prevent another low blood sugar episode. (example: peanut butter + crackers)    Other recommendations:  - Schedule an annual eye exam.  - Check your feet daily for any signs of open wounds, cuts, or sores. - Given your risk factors, the following vaccines are recommended: annual flu shot, age-based pneumococcal vaccines (Pneumovax, Prevnar 13). In addition to taking your medications as directed, improving your blood sugar involves modifying your nutrition and maximizing the amount of physical activity. Nutrition:  - When reviewing a nutrition label, focus on the serving size, total calories, fat (and type of fats), total carbohydrates, sugar (and amount of added sugar), amount of fiber (good for your digestive), and amount of protein.   Refer to your nutrition label guide for more information.  - For a meal : max 45 - 60 grams of carbohydrates  - For a snack: max 15 grams of carbohydrates  - Reduce amount of saturated and trans fat. Consider more unsaturated fat options as they are better for your heart health. - Have at least 1 serving of lean fat protein with each meal.    - Increase fiber intake slowly to prevent constipation.   - Substitute fruit juices for the whole fruit    Physical Activity:  - Aim for 30 minutes of consistent, moderately intensive, physical activity a day for 5 days or an average of 150 minutes per week. - Start slow, increase as tolerated. For example: Walk every day, working up to 30 minutes of brisk walking, 5 days a week--or split the 30 minutes into two 15-minute or three 10-minute walks. - If you sit for a long time, get up and move/stretch every 90 minutes. Please call [phone] for any medication or diabetes questions.     Mirtha Mendez, Clinical Pharmacist

## 2020-02-17 ENCOUNTER — TELEPHONE (OUTPATIENT)
Dept: INTERNAL MEDICINE CLINIC | Age: 60
End: 2020-02-17

## 2020-02-17 NOTE — PROGRESS NOTES
Please let him know that his PSA is at his baseline of 1. His triglycerides are elevated at 330. HDL is 26. His total cholesterol is 108. His LDL is 16. His liver function labs are unremarkable. His CBC is unremarkable. He is starting to show worsening microscopic hematuria and straining his kidneys from poorly controlled type 2 diabetes. His A1c jumped to 6.3 eight months ago to 10.9. This needs to be around 7. Please have him take all medications as prescribed. He will need to follow-up with our clinical pharmacist in addition to myself for additional diabetes medication recommendations.     Dr. Beatriz Baptiste  [facility], [address] [place] [address], [address] [place] [address] Str.  Phone: [phone]  Fax: [phone]

## 2020-02-17 NOTE — TELEPHONE ENCOUNTER
----- Message from Jose Huang MD sent at 2/17/2020  8:21 AM EST -----  Please let him know that his PSA is at his baseline of 1. His triglycerides are elevated at 330. HDL is 26. His total cholesterol is 108. His LDL is 16. His liver function labs are unremarkable. His CBC is unremarkable. He is starting to show worsening microscopic hematuria and straining his kidneys from poorly controlled type 2 diabetes. His A1c jumped to 6.3 eight months ago to 10.9. This needs to be around 7. Please have him take all medications as prescribed. He will need to follow-up with our clinical pharmacist in addition to myself for additional diabetes medication recommendations.     Dr. Gena Buchanan  [facility] [address]  Phone: [phone]  Fax: [phone]

## 2020-02-21 ENCOUNTER — TELEPHONE (OUTPATIENT)
Dept: INTERNAL MEDICINE CLINIC | Age: 60
End: 2020-02-21

## 2020-02-21 NOTE — TELEPHONE ENCOUNTER
Called patient to follow up on diabetes and BG. States he started Trulicity and takes this on Saturdays - this coming Saturday is his second dose. States he stopped the Janumet as instructed. Confirmed he is taking metformin 1,000 mg BID, Levemir 28 units daily, and Humalog sliding scale. States home BG have been varied, but he hasn't had any BG <70. States he doesn't have BG monitor with him currently to read off BG numbers. Instructed patient to continue current regimen and bring medications, BG monitor, and BG log to his upcoming PharmD appointment. Patient expressed understanding and had no further questions. Thank you for allowing me to participate in the care of this patient.     Ted Shaver, PharmD  Clinical Pharmacist Specialist

## 2020-02-25 ENCOUNTER — TELEPHONE (OUTPATIENT)
Dept: INTERNAL MEDICINE CLINIC | Age: 60
End: 2020-02-25

## 2020-02-25 ENCOUNTER — OFFICE VISIT (OUTPATIENT)
Dept: INTERNAL MEDICINE CLINIC | Age: 60
End: 2020-02-25

## 2020-02-25 DIAGNOSIS — E11.21 TYPE 2 DIABETES WITH NEPHROPATHY (HCC): Primary | ICD-10-CM

## 2020-02-25 NOTE — Clinical Note
Hi Dr. Laura Ang. Elías Macklexi said he is doing well so far with the Trulicity. He states he has been cutting down on sweets and meal portions. He forgot his BG monitor but states BG during the day are around 100s-200s. Since he has been doing well on Trulicity 9.80 mg once weekly, I will have him complete the remaining 2 Trulicity pens he has then increase to Trulicity 1.5 mg once weekly. I continued metformin, Levemir 28 units daily, and mealtime Humalog. I instructed him to keep a close log of how much Humalog he is using with meals. I am hopeful we can have him stop the humalog as his BG improve on Trulicity. I am following up with him in 3 weeks. Please let me know if you have any additional recommendations for him. Anaya Hernandez, PharmDClinical Pharmacist Specialist

## 2020-02-25 NOTE — TELEPHONE ENCOUNTER
Returned call to patient. [facility] he called diabetic testing supply company ([address]) to find out about getting test strips and lancets for his one touch ultra blue. [facility] he is checking BG around 6-8 times per day. [facility] he was given 2 fax numbers for prescriptions for BG testing supplies to be sent to in order to be filled by [address] - [phone] or [phone]. Will discuss with PCP Dr. Otto Mcdowell per patient's request.    Thank you for allowing me to participate in the care of this patient.     Freddy Oconnor, PharmD  Clinical Pharmacist Specialist

## 2020-02-25 NOTE — PATIENT INSTRUCTIONS
Your Visit Summary:     1.) Finish up your remaining Trulicity pens then increase Trulicity to 1.5 Hmg once weekly on Saturdays. 2.) Continue your Levemir 28 units daily and metformin. Continue your Humalog mealtime insulin - keep track of how much insulin you are giving with meals. 3.) Continue checking BG 2-3 times per day. Check and document your blood sugar first thing in the morning (fasting), 2 hours after a meal, and before bedtime. Bring your meter/log to all future visits. Call if your blood sugars are around 90s-low 100s.    4.) Walk at least 10 minutes every day. Your blood sugar goals:  - Fasting (first thing in the morning)  blood sugar: 80 - 130   - 1 to 2 hours after a meal: less than 180     When you experience symptoms of low blood sugar (example: less than 70):  - Confirm low reading by checking your blood sugar.   - Then treat with 15 grams of carbohydrates (one-half cup of juice or regular soda, or 4-5 glucose tablets). - Wait 15 minutes to recheck blood sugar.   - Then eat a protein containing meal/snack to prevent another low blood sugar episode. (example: peanut butter + crackers)    Other recommendations:  - Schedule an annual eye exam.  - Check your feet daily for any signs of open wounds, cuts, or sores. - Given your risk factors, the following vaccines are recommended: annual flu shot, age-based pneumococcal vaccines (Pneumovax, Prevnar 13). In addition to taking your medications as directed, improving your blood sugar involves modifying your nutrition and maximizing the amount of physical activity. Nutrition:  - When reviewing a nutrition label, focus on the serving size, total calories, fat (and type of fats), total carbohydrates, sugar (and amount of added sugar), amount of fiber (good for your digestive), and amount of protein.   Refer to your nutrition label guide for more information.  - For a meal : max 45 - 60 grams of carbohydrates  - For a snack: max 15 grams of carbohydrates  - Reduce amount of saturated and trans fat. Consider more unsaturated fat options as they are better for your heart health. - Have at least 1 serving of lean fat protein with each meal.    - Increase fiber intake slowly to prevent constipation.   - Substitute fruit juices for the whole fruit    Physical Activity:  - Aim for 30 minutes of consistent, moderately intensive, physical activity a day for 5 days or an average of 150 minutes per week. - Start slow, increase as tolerated. For example: Walk every day, working up to 30 minutes of brisk walking, 5 days a week--or split the 30 minutes into two 15-minute or three 10-minute walks. - If you sit for a long time, get up and move/stretch every 90 minutes. Please call [phone] for any medication or diabetes questions.     Lyudmila Pham, Clinical Pharmacist

## 2020-03-09 RX ORDER — INSULIN LISPRO 100 [IU]/ML
INJECTION, SOLUTION INTRAVENOUS; SUBCUTANEOUS
Qty: 15 ML | Refills: 11 | Status: SHIPPED | OUTPATIENT
Start: 2020-03-09 | End: 2020-08-31

## 2020-03-09 NOTE — TELEPHONE ENCOUNTER
Last Visit: 1/30/20 with MD Darrian Lobo  Next Appointment: 4/30/20 with MD Darrian Lobo  Previous Refill Encounter(s): 2/18/19 #1 with 11 refills    Requested Prescriptions     Pending Prescriptions Disp Refills    insulin lispro (HUMALOG KWIKPEN INSULIN) 100 unit/mL kwikpen 15 mL 11     Sig: INJECT UNDER THE SKIN FOR BLOOD SUGAR; 101-150;4UNITS, 151-200;6UNITS, 201-250;8UNITS, 251-300;10UNITS,>300 CALL MD

## 2020-03-12 RX ORDER — METOPROLOL SUCCINATE 50 MG/1
TABLET, EXTENDED RELEASE ORAL
Qty: 90 TAB | Refills: 1 | Status: SHIPPED | OUTPATIENT
Start: 2020-03-12 | End: 2020-09-27

## 2020-03-12 RX ORDER — SITAGLIPTIN AND METFORMIN HYDROCHLORIDE 50; 1000 MG/1; MG/1
TABLET, FILM COATED ORAL
Qty: 180 TAB | Refills: 1 | Status: SHIPPED | OUTPATIENT
Start: 2020-03-12 | End: 2020-03-12

## 2020-03-16 ENCOUNTER — TELEPHONE (OUTPATIENT)
Dept: INTERNAL MEDICINE CLINIC | Age: 60
End: 2020-03-16

## 2020-03-16 NOTE — TELEPHONE ENCOUNTER
Called patient about upcoming appointment with PharmD tomorrow. No answer, unable to leave message since voicemailbox full. Will continue to attempt to contact patient.     Estevan Nageotte, Hortencia  Clinical Pharmacist Specialist

## 2020-04-10 RX ORDER — CYCLOBENZAPRINE HCL 10 MG
TABLET ORAL
Qty: 30 TAB | Refills: 5 | Status: SHIPPED | OUTPATIENT
Start: 2020-04-10 | End: 2020-09-16 | Stop reason: DRUGHIGH

## 2020-04-20 RX ORDER — PEN NEEDLE, DIABETIC 32GX 5/32"
NEEDLE, DISPOSABLE MISCELLANEOUS
Qty: 100 PEN NEEDLE | Refills: 11 | Status: SHIPPED | OUTPATIENT
Start: 2020-04-20 | End: 2021-05-04

## 2020-04-21 ENCOUNTER — TELEPHONE (OUTPATIENT)
Dept: INTERNAL MEDICINE CLINIC | Age: 60
End: 2020-04-21

## 2020-04-21 NOTE — TELEPHONE ENCOUNTER
----- Message from Olivia Brar MD sent at 4/21/2020  7:58 AM EDT -----  Regarding: Check up apt needed  Please schedule a follow up apt with me - 30 min some time within the next 2 weeks.     Thanks,  Dr. Anne Marie Anthony  [facility], [address] [place] [address], [address] [place] [address].  Phone: [phone]  Fax: [phone]

## 2020-04-22 ENCOUNTER — VIRTUAL VISIT (OUTPATIENT)
Dept: INTERNAL MEDICINE CLINIC | Age: 60
End: 2020-04-22

## 2020-04-22 DIAGNOSIS — N40.0 BENIGN PROSTATIC HYPERPLASIA, UNSPECIFIED WHETHER LOWER URINARY TRACT SYMPTOMS PRESENT: ICD-10-CM

## 2020-04-22 DIAGNOSIS — G47.33 OSA TREATED WITH BIPAP: ICD-10-CM

## 2020-04-22 DIAGNOSIS — I10 ESSENTIAL HYPERTENSION: ICD-10-CM

## 2020-04-22 DIAGNOSIS — E66.01 SEVERE OBESITY (BMI 35.0-39.9) WITH COMORBIDITY (HCC): ICD-10-CM

## 2020-04-22 DIAGNOSIS — E11.21 TYPE 2 DIABETES WITH NEPHROPATHY (HCC): Primary | ICD-10-CM

## 2020-04-22 PROBLEM — I48.21 PERMANENT ATRIAL FIBRILLATION (HCC): Status: RESOLVED | Noted: 2018-12-10 | Resolved: 2020-04-22

## 2020-04-22 PROBLEM — M75.81 RIGHT ROTATOR CUFF TENDINITIS: Status: RESOLVED | Noted: 2017-06-09 | Resolved: 2020-04-22

## 2020-04-22 PROBLEM — Z87.19 HISTORY OF GI BLEED: Status: ACTIVE | Noted: 2018-12-10

## 2020-04-22 NOTE — PROGRESS NOTES
[address] presents today for   Chief Complaint   Patient presents with    Follow-up    Diabetes                Coordination of Care:  1. Have you been to the ER, urgent care clinic since your last visit? Hospitalized since your last visit? no    2. Have you seen or consulted any other health care providers outside of the [address] [place] [address] since your last visit? Include any pap smears or colon screening. no      Advance Directive:  1. Do you have an advance directive in place? Patient Reply:no    2. If not, would you like material regarding how to put one in place?  Patient Reply: no
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
## 2020-05-04 ENCOUNTER — TELEPHONE (OUTPATIENT)
Dept: INTERNAL MEDICINE CLINIC | Age: 60
End: 2020-05-04

## 2020-05-04 RX ORDER — AMLODIPINE BESYLATE 10 MG/1
TABLET ORAL
Qty: 135 TAB | Refills: 1 | Status: SHIPPED | OUTPATIENT
Start: 2020-05-04 | End: 2021-05-20

## 2020-05-04 NOTE — TELEPHONE ENCOUNTER
Ernie Mancini MD  [facility] Front Office             Please schedule him to meet with me for a 30 min apt in July 2020 for DM.      Thanks,   Dr. Emi Amaya   [facility], [address] [place] [address], [address] [place] [address] Str.   Phone: [phone]   Fax: [phone]

## 2020-05-18 RX ORDER — APIXABAN 5 MG/1
TABLET, FILM COATED ORAL
Qty: 60 TAB | Refills: 6 | Status: SHIPPED | OUTPATIENT
Start: 2020-05-18 | End: 2020-12-16

## 2020-05-19 ENCOUNTER — TELEPHONE (OUTPATIENT)
Dept: INTERNAL MEDICINE CLINIC | Age: 60
End: 2020-05-19

## 2020-05-19 NOTE — TELEPHONE ENCOUNTER
Called patient to schedule appointment for PharmD diabetes followup per referral from Dr. Thanh Mata. Patient states he is driving currently, but he will check his schedule and call PharmD back.     Ratna Nelson, PharmD  Clinical Pharmacist Specialist

## 2020-06-02 DIAGNOSIS — I10 ESSENTIAL HYPERTENSION: ICD-10-CM

## 2020-06-02 DIAGNOSIS — E11.9 WELL CONTROLLED DIABETES MELLITUS (HCC): ICD-10-CM

## 2020-06-02 DIAGNOSIS — Z12.5 PROSTATE CANCER SCREENING: ICD-10-CM

## 2020-06-02 DIAGNOSIS — Z11.59 NEED FOR HEPATITIS C SCREENING TEST: ICD-10-CM

## 2020-06-02 RX ORDER — ROSUVASTATIN CALCIUM 40 MG/1
TABLET, COATED ORAL
Qty: 90 TAB | Refills: 3 | Status: SHIPPED | OUTPATIENT
Start: 2020-06-02 | End: 2021-07-20

## 2020-07-01 DIAGNOSIS — E11.21 TYPE 2 DIABETES WITH NEPHROPATHY (HCC): ICD-10-CM

## 2020-07-01 RX ORDER — METFORMIN HYDROCHLORIDE 1000 MG/1
TABLET ORAL
Qty: 60 TAB | Refills: 3 | Status: SHIPPED | OUTPATIENT
Start: 2020-07-01 | End: 2020-10-19

## 2020-07-01 RX ORDER — DULAGLUTIDE 1.5 MG/.5ML
INJECTION, SOLUTION SUBCUTANEOUS
Qty: 4 ML | Refills: 3 | Status: SHIPPED | OUTPATIENT
Start: 2020-07-01 | End: 2020-12-21

## 2020-07-17 DIAGNOSIS — Z87.19 HISTORY OF GI BLEED: ICD-10-CM

## 2020-07-17 DIAGNOSIS — D50.9 IRON DEFICIENCY ANEMIA, UNSPECIFIED IRON DEFICIENCY ANEMIA TYPE: ICD-10-CM

## 2020-07-17 RX ORDER — PANTOPRAZOLE SODIUM 20 MG/1
20 TABLET, DELAYED RELEASE ORAL
Qty: 90 TAB | Refills: 3 | Status: SHIPPED | OUTPATIENT
Start: 2020-07-17 | End: 2021-07-17

## 2020-07-20 DIAGNOSIS — R80.9 MICROALBUMINURIA: ICD-10-CM

## 2020-07-20 DIAGNOSIS — E11.21 TYPE 2 DIABETES WITH NEPHROPATHY (HCC): Primary | ICD-10-CM

## 2020-07-22 ENCOUNTER — APPOINTMENT (OUTPATIENT)
Dept: INTERNAL MEDICINE CLINIC | Age: 60
End: 2020-07-22

## 2020-07-22 DIAGNOSIS — R80.9 MICROALBUMINURIA: ICD-10-CM

## 2020-07-22 DIAGNOSIS — E11.21 TYPE 2 DIABETES WITH NEPHROPATHY (HCC): ICD-10-CM

## 2020-07-23 ENCOUNTER — TELEPHONE (OUTPATIENT)
Dept: INTERNAL MEDICINE CLINIC | Age: 60
End: 2020-07-23

## 2020-07-23 DIAGNOSIS — E11.9 WELL CONTROLLED DIABETES MELLITUS (HCC): ICD-10-CM

## 2020-07-23 LAB
ANION GAP SERPL CALC-SCNC: 16 MMOL/L (ref 3–15)
AVG GLU, 10930: 277 MG/DL (ref 91–123)
BUN SERPL-MCNC: 18 MG/DL (ref 6–22)
CALCIUM SERPL-MCNC: 9.5 MG/DL (ref 8.4–10.5)
CHLORIDE SERPL-SCNC: 97 MMOL/L (ref 98–110)
CO2 SERPL-SCNC: 26 MMOL/L (ref 20–32)
CREAT SERPL-MCNC: 1 MG/DL (ref 0.8–1.6)
GFRAA, 66117: >60
GFRNA, 66118: >60
GLUCOSE SERPL-MCNC: 262 MG/DL (ref 70–99)
HBA1C MFR BLD HPLC: 11.3 % (ref 4.8–5.6)
POTASSIUM SERPL-SCNC: 4.2 MMOL/L (ref 3.5–5.5)
SODIUM SERPL-SCNC: 139 MMOL/L (ref 133–145)

## 2020-07-23 RX ORDER — INSULIN DETEMIR 100 [IU]/ML
33 INJECTION, SOLUTION SUBCUTANEOUS DAILY
Qty: 4 ADJUSTABLE DOSE PRE-FILLED PEN SYRINGE | Refills: 3 | Status: SHIPPED | OUTPATIENT
Start: 2020-07-23 | End: 2020-08-31

## 2020-07-23 NOTE — TELEPHONE ENCOUNTER
----- Message from Fatoumata Canada MD sent at 7/23/2020  3:14 PM EDT -----  Please  let him know that his A1c is worse and measures 11.3. His creatinine is within normal limits. Please have him increase his Levemir from 25 units daily to 33 units daily. Have him monitor his fasting blood sugar before breakfast and his blood sugar 2 hours after eating. We will review these at his upcoming appointment. I will adjust his insulin regimen even further once I review these readings.     Dr. Aminata López  [facility] [address], [address] [place] [address].  Phone: [phone]  Fax: [phone]

## 2020-07-23 NOTE — TELEPHONE ENCOUNTER
Patient reached and aware of results. Patient also aware to increase his Levemir to 33 units daily per DR. Leiva [facility]. Patient states that he was injecting 28 units daily. Patient will record his glucose readings and have those ready for his virtual visit.

## 2020-07-23 NOTE — PROGRESS NOTES
Please  let him know that his A1c is worse and measures 11.3. His creatinine is within normal limits. Please have him increase his Levemir from 25 units daily to 33 units daily. Have him monitor his fasting blood sugar before breakfast and his blood sugar 2 hours after eating. We will review these at his upcoming appointment. I will adjust his insulin regimen even further once I review these readings.     Dr. Walter Stephens  [facility] [address]  Phone: [phone]  Fax: [phone]

## 2020-08-12 ENCOUNTER — OFFICE VISIT (OUTPATIENT)
Dept: ORTHOPEDIC SURGERY | Age: 60
End: 2020-08-12

## 2020-08-12 VITALS
BODY MASS INDEX: 37.69 KG/M2 | TEMPERATURE: 98.1 F | RESPIRATION RATE: 16 BRPM | HEART RATE: 77 BPM | SYSTOLIC BLOOD PRESSURE: 165 MMHG | DIASTOLIC BLOOD PRESSURE: 83 MMHG | WEIGHT: 270.2 LBS

## 2020-08-12 DIAGNOSIS — M47.816 LUMBAR SPONDYLOSIS: ICD-10-CM

## 2020-08-12 DIAGNOSIS — M47.816 LUMBAR FACET ARTHROPATHY: Primary | ICD-10-CM

## 2020-08-12 NOTE — PATIENT INSTRUCTIONS
Learning About Medial Branch Block and Neurotomy  What are medial branch block and neurotomy? Facet joints connect your vertebrae to each other. Problems in these joints can cause chronic (long-term) pain in the neck or back. Medial branch nerves are the nerves that carry many of the pain messages from your facet joints. Radiofrequency medial branch neurotomy is a type of medial branch neurotomy that is used to relieve arthritis pain. It uses radio waves to damage nerves in your neck or back so that they can no longer send pain messages to your brain. Before your doctor knows if a neurotomy will help you, he or she will do a medial branch block to find out if certain nerves are the ones that are a source of your pain. You will need two separate visits to the outpatient center or hospital to have both procedures. How is a medial branch block done? The doctor will use a tiny needle to numb the skin where you will get the block. Then he or she puts the block needle into the numbed area. You may feel some pressure, but you should not feel pain. Using fluoroscopy (live X-ray) to guide the needle, the doctor injects medicine onto one or more nerves to make them numb. If you get relief from your pain in the next 4 to 6 hours, it's a sign that those nerves may be contributing to your pain. The relief will last only a short time. You may then have a medial branch neurotomy at a later visit to try to get longer relief. It takes 20 to 30 minutes to get the block. You can go home after the doctor watches you for about an hour. You will get instructions on how to report how much pain you have when you are at home. You will need someone to drive you home. How is medial branch neurotomy done? The doctor will use a tiny needle to numb the skin where you will get the neurotomy. Then he or she puts the neurotomy needle into the numbed area. You may feel some pressure.  Using fluoroscopy (live X-ray) to guide the needle, the doctor sends radio waves through the needle to the nerve for 60 to 90 seconds. The radio waves heat the nerve, which damages it. The doctor may do this several times. And he or she may treat more than one nerve. It takes 45 to 90 minutes to get a neurotomy, depending on how many nerves are heated. You will probably go home 30 to 60 minutes later. You will need someone to drive you home. What can you expect after a neurotomy? You may feel a little sore or tender at the injection site at first. But after a successful neurotomy, most people have pain relief right away. It often lasts for several months, but your pain may return. If your pain does come back, it may mean that the damaged nerve has healed and can send pain messages again. Or it can mean that a different nerve is causing pain. Your doctor will discuss your options with you. Follow-up care is a key part of your treatment and safety. Be sure to make and go to all appointments, and call your doctor if you are having problems. It's also a good idea to know your test results and keep a list of the medicines you take. Where can you learn more? Go to http://vasile-fito.info/  Enter T494 in the search box to learn more about \"Learning About Medial Branch Block and Neurotomy. \"  Current as of: November 20, 2019               Content Version: 12.5  © 3975-1330 [employer]. Care instructions adapted under license by [employer] (which disclaims liability or warranty for this information). If you have questions about a medical condition or this instruction, always ask your healthcare professional. [place] [address] any warranty or liability for your use of this information. Low Back Pain: Exercises  Introduction  Here are some examples of exercises for you to try. The exercises may be suggested for a condition or for rehabilitation. Start each exercise slowly.  Ease off the exercises if you start to have pain. You will be told when to start these exercises and which ones will work best for you. How to do the exercises  Press-up   1. Lie on your stomach, supporting your body with your forearms. 2. Press your elbows down into the floor to raise your upper back. As you do this, relax your stomach muscles and allow your back to arch without using your back muscles. As your press up, do not let your hips or pelvis come off the floor. 3. Hold for 15 to 30 seconds, then relax. 4. Repeat 2 to 4 times. Alternate arm and leg (bird dog) exercise   Do this exercise slowly. Try to keep your body straight at all times, and do not let one hip drop lower than the other. 1. Start on the floor, on your hands and knees. 2. Tighten your belly muscles. 3. Raise one leg off the floor, and hold it straight out behind you. Be careful not to let your hip drop down, because that will twist your trunk. 4. Hold for about 6 seconds, then lower your leg and switch to the other leg. 5. Repeat 8 to 12 times on each leg. 6. Over time, work up to holding for 10 to 30 seconds each time. 7. If you feel stable and secure with your leg raised, try raising the opposite arm straight out in front of you at the same time. Knee-to-chest exercise   1. Lie on your back with your knees bent and your feet flat on the floor. 2. Bring one knee to your chest, keeping the other foot flat on the floor (or keeping the other leg straight, whichever feels better on your lower back). 3. Keep your lower back pressed to the floor. Hold for at least 15 to 30 seconds. 4. Relax, and lower the knee to the starting position. 5. Repeat with the other leg. Repeat 2 to 4 times with each leg. 6. To get more stretch, put your other leg flat on the floor while pulling your knee to your chest.    Curl-ups   1. Lie on the floor on your back with your knees bent at a 90-degree angle.  Your feet should be flat on the floor, about 12 inches from your buttocks. 2. Cross your arms over your chest. If this bothers your neck, try putting your hands behind your neck (not your head), with your elbows spread apart. 3. Slowly tighten your belly muscles and raise your shoulder blades off the floor. 4. Keep your head in line with your body, and do not press your chin to your chest.  5. Hold this position for 1 or 2 seconds, then slowly lower yourself back down to the floor. 6. Repeat 8 to 12 times. Pelvic tilt exercise   1. Lie on your back with your knees bent. 2. \"Brace\" your stomach. This means to tighten your muscles by pulling in and imagining your belly button moving toward your spine. You should feel like your back is pressing to the floor and your hips and pelvis are rocking back. 3. Hold for about 6 seconds while you breathe smoothly. 4. Repeat 8 to 12 times. Heel dig bridging   1. Lie on your back with both knees bent and your ankles bent so that only your heels are digging into the floor. Your knees should be bent about 90 degrees. 2. Then push your heels into the floor, squeeze your buttocks, and lift your hips off the floor until your shoulders, hips, and knees are all in a straight line. 3. Hold for about 6 seconds as you continue to breathe normally, and then slowly lower your hips back down to the floor and rest for up to 10 seconds. 4. Do 8 to 12 repetitions. Hamstring stretch in doorway   1. Lie on your back in a doorway, with one leg through the open door. 2. Slide your leg up the wall to straighten your knee. You should feel a gentle stretch down the back of your leg. 3. Hold the stretch for at least 15 to 30 seconds. Do not arch your back, point your toes, or bend either knee. Keep one heel touching the floor and the other heel touching the wall. 4. Repeat with your other leg. 5. Do 2 to 4 times for each leg. Hip flexor stretch   1. Kneel on the floor with one knee bent and one leg behind you.  Place your forward knee over your foot. Keep your other knee touching the floor. 2. Slowly push your hips forward until you feel a stretch in the upper thigh of your rear leg. 3. Hold the stretch for at least 15 to 30 seconds. Repeat with your other leg. 4. Do 2 to 4 times on each side. Wall sit   1. Stand with your back 10 to 12 inches away from a wall. 2. Lean into the wall until your back is flat against it. 3. Slowly slide down until your knees are slightly bent, pressing your lower back into the wall. 4. Hold for about 6 seconds, then slide back up the wall. 5. Repeat 8 to 12 times. Follow-up care is a key part of your treatment and safety. Be sure to make and go to all appointments, and call your doctor if you are having problems. It's also a good idea to know your test results and keep a list of the medicines you take. Where can you learn more? Go to http://vasile-fito.info/  Enter Y358 in the search box to learn more about \"Low Back Pain: Exercises. \"  Current as of: March 2, 2020               Content Version: 12.5  © 7460-6913 [employer]wise, Inc[employer]. Care instructions adapted under license by [employer] (which disclaims liability or warranty for this information). If you have questions about a medical condition or this instruction, always ask your healthcare professional. [address] any warranty or liability for your use of this information.

## 2020-08-12 NOTE — PROGRESS NOTES
Arlene Ahmadi. presents today for   Chief Complaint   Patient presents with    Back Pain     fu       Is someone accompanying this pt? NO    Is the patient using any DME equipment during OV? NO    Depression Screening:  3 most recent PHQ Screens 8/12/2020   PHQ Not Done -   Little interest or pleasure in doing things Not at all   Feeling down, depressed, irritable, or hopeless Not at all   Total Score PHQ 2 0   Trouble falling or staying asleep, or sleeping too much -   Feeling tired or having little energy -   Poor appetite, weight loss, or overeating -   Feeling bad about yourself - or that you are a failure or have let yourself or your family down -   Trouble concentrating on things such as school, work, reading, or watching TV -   Moving or speaking so slowly that other people could have noticed; or the opposite being so fidgety that others notice -   Thoughts of being better off dead, or hurting yourself in some way -   PHQ 9 Score -   How difficult have these problems made it for you to do your work, take care of your home and get along with others -       Learning Assessment:  Learning Assessment 1/30/2020   PRIMARY LEARNER Patient   HIGHEST LEVEL OF EDUCATION - PRIMARY LEARNER  SOME COLLEGE   BARRIERS PRIMARY LEARNER NONE   CO-LEARNER CAREGIVER No   PRIMARY LANGUAGE ENGLISH   LEARNER PREFERENCE PRIMARY DEMONSTRATION     -   ANSWERED BY patient     -   RELATIONSHIP SELF   ASSESSMENT COMMENT -       Abuse Screening:  Abuse Screening Questionnaire 1/30/2020   Do you ever feel afraid of your partner? N   Are you in a relationship with someone who physically or mentally threatens you? N   Is it safe for you to go home? Y       Fall Risk  Fall Risk Assessment, last 12 mths 6/7/2019   Able to walk? Yes   Fall in past 12 months? No         Coordination of Care:  1. Have you been to the ER, urgent care clinic since your last visit? NO  Hospitalized since your last visit? NO    2.  Have you seen or consulted any other health care providers outside of the [address] [place] [address] since your last visit? NO Include any pap smears or colon screening.  NO    Last  Checked 8/12/2020
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
## 2020-08-14 ENCOUNTER — TELEPHONE (OUTPATIENT)
Dept: INTERNAL MEDICINE CLINIC | Age: 60
End: 2020-08-14

## 2020-08-14 NOTE — TELEPHONE ENCOUNTER
----- Message from Bonifacio Kumar MD sent at 8/13/2020  9:38 AM EDT -----  Regarding: F/u apt needed within the next 1-2 wks  Please schedule the patient for a follow-up visit with me-30 minutes.     Thanks,  Dr. Cabezas Inch  [facility], [address] [place] [address], [address] [place] [address] Str.  Phone: [phone]  Fax: [phone]

## 2020-08-19 ENCOUNTER — TELEPHONE (OUTPATIENT)
Dept: ORTHOPEDIC SURGERY | Age: 60
End: 2020-08-19

## 2020-08-19 DIAGNOSIS — M54.41 ACUTE BILATERAL LOW BACK PAIN WITH RIGHT-SIDED SCIATICA: ICD-10-CM

## 2020-08-19 DIAGNOSIS — M47.816 LUMBAR SPONDYLOSIS: ICD-10-CM

## 2020-08-19 DIAGNOSIS — M47.816 LUMBAR FACET ARTHROPATHY: Primary | ICD-10-CM

## 2020-08-19 NOTE — TELEPHONE ENCOUNTER
VO for MRI L-Spine sent to [facility] and patient notified , appointment for follow up made for Dr. Kolby Martino

## 2020-08-19 NOTE — TELEPHONE ENCOUNTER
Patient is ready to schedule his MRI, but I did not see an order yet. Can we please start the process for his MRI to get scheduled and notify patient at [phone].

## 2020-08-19 NOTE — TELEPHONE ENCOUNTER
VO for MRI lumbar spine wo contrast    Per her note, Pt may call and be scheduled for an MRI is considering RFA

## 2020-08-20 DIAGNOSIS — I10 ESSENTIAL HYPERTENSION: ICD-10-CM

## 2020-08-20 RX ORDER — LOSARTAN POTASSIUM 25 MG/1
TABLET ORAL
Qty: 90 TAB | Refills: 0 | Status: SHIPPED | OUTPATIENT
Start: 2020-08-20 | End: 2020-08-31 | Stop reason: SDUPTHER

## 2020-08-31 ENCOUNTER — VIRTUAL VISIT (OUTPATIENT)
Dept: INTERNAL MEDICINE CLINIC | Age: 60
End: 2020-08-31

## 2020-08-31 DIAGNOSIS — E11.21 TYPE 2 DIABETES WITH NEPHROPATHY (HCC): Primary | ICD-10-CM

## 2020-08-31 DIAGNOSIS — E11.9 WELL CONTROLLED DIABETES MELLITUS (HCC): ICD-10-CM

## 2020-08-31 DIAGNOSIS — I10 ESSENTIAL HYPERTENSION: ICD-10-CM

## 2020-08-31 RX ORDER — INSULIN LISPRO 100 [IU]/ML
INJECTION, SOLUTION INTRAVENOUS; SUBCUTANEOUS
Qty: 15 ML | Refills: 11 | Status: SHIPPED | OUTPATIENT
Start: 2020-08-31 | End: 2020-09-17

## 2020-08-31 RX ORDER — INSULIN DETEMIR 100 [IU]/ML
37 INJECTION, SOLUTION SUBCUTANEOUS DAILY
Qty: 4 ADJUSTABLE DOSE PRE-FILLED PEN SYRINGE | Refills: 3
Start: 2020-08-31 | End: 2020-09-17

## 2020-08-31 RX ORDER — LOSARTAN POTASSIUM 50 MG/1
50 TABLET ORAL DAILY
Qty: 90 TAB | Refills: 3 | Status: SHIPPED | OUTPATIENT
Start: 2020-08-31 | End: 2020-09-17

## 2020-08-31 NOTE — TELEPHONE ENCOUNTER
----- Message from Michell López MD sent at 2020  9:33 AM EDT -----  Regardin Jad Valentin  Please place orders for the Dexcom continuous glucose meter for this patient, for me to sign.     Thanks,  Dr. Jack Mauro  [facility], [address] [place] [address], [address] [place] [address] Str.  Phone: [phone]  Fax: [phone]
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
## 2020-09-04 ENCOUNTER — HOSPITAL ENCOUNTER (OUTPATIENT)
Age: 60
Discharge: HOME | End: 2020-09-04
Attending: PHYSICAL MEDICINE & REHABILITATION
Payer: COMMERCIAL

## 2020-09-04 ENCOUNTER — TELEPHONE (OUTPATIENT)
Dept: INTERNAL MEDICINE CLINIC | Age: 60
End: 2020-09-04

## 2020-09-04 DIAGNOSIS — M47.816 LUMBAR FACET ARTHROPATHY: ICD-10-CM

## 2020-09-04 DIAGNOSIS — M54.41 ACUTE BILATERAL LOW BACK PAIN WITH RIGHT-SIDED SCIATICA: ICD-10-CM

## 2020-09-04 DIAGNOSIS — M47.816 LUMBAR SPONDYLOSIS: ICD-10-CM

## 2020-09-04 PROCEDURE — 72148 MRI LUMBAR SPINE W/O DYE: CPT

## 2020-09-04 NOTE — TELEPHONE ENCOUNTER
----- Message from Yusef Noonan MD sent at 2020  4:30 PM EDT -----  Regarding: RE: F/u apt needed  Please schedule for 20 at 2:30pm for a virtual apt if this is ok with the pt. Thanks,  Dr. Kori Man  [facility] [address]  Phone: [phone]  Fax: [phone]  ----- Message -----  From: Mojgan Rodriguez: 2020   2:12 PM EDT  To: Yusef Noonan MD  Subject: RE: F/u apt needed                               No 30 min spot until 10/1. Please advise.  ----- Message -----  From: Chloe العلي MD  Sent: 2020   9:33 AM EDT  To: [facility] Front Office  Subject: F/u apt needed                                   Please schedule him for a follow-up 30-minute virtual appointment with me in 2 weeks.     Thanks,  Dr. Kori Man  [facility] [address]  Phone: [phone]  Fax: [phone]

## 2020-09-04 NOTE — TELEPHONE ENCOUNTER
appt slot for 2:30 on sept 14 has been taken, I offered sept 10 at 11:00 pt can not do that day is there another day you can see him ?

## 2020-09-04 NOTE — TELEPHONE ENCOUNTER
----- Message from Liz Garduno MD sent at 9/2/2020  4:30 PM EDT -----  Regarding: RE: F/u apt needed  Please schedule for 9/14/20 at 2:30pm for a virtual apt if this is ok with the pt. Thanks,  Dr. Kat Yeung  [facility] [address]  Phone: [phone]  Fax: [phone]  ----- Message -----  From: Lew Dykes: 9/2/2020   2:12 PM EDT  To: Liz Garduno MD  Subject: RE: F/u apt needed                               No 30 min spot until 10/1. Please advise.  ----- Message -----  From: Cathy Olson MD  Sent: 8/31/2020   9:33 AM EDT  To: [facility] Front Office  Subject: F/u apt needed                                   Please schedule him for a follow-up 30-minute virtual appointment with me in 2 weeks.     Thanks,  Dr. Kat Yeung  [facility], [address] [place] [address] Str[address]  Phone: [phone]  Fax: [phone]

## 2020-09-08 RX ORDER — BLOOD-GLUCOSE SENSOR
EACH MISCELLANEOUS
Qty: 3 DEVICE | Refills: 5 | Status: SHIPPED | OUTPATIENT
Start: 2020-09-08 | End: 2021-05-18

## 2020-09-08 RX ORDER — BLOOD-GLUCOSE TRANSMITTER
EACH MISCELLANEOUS
Qty: 3 DEVICE | Refills: 5 | Status: SHIPPED | OUTPATIENT
Start: 2020-09-08 | End: 2021-10-31

## 2020-09-08 RX ORDER — BLOOD-GLUCOSE,RECEIVER,CONT
EACH MISCELLANEOUS
Qty: 3 EACH | Refills: 5 | Status: SHIPPED | OUTPATIENT
Start: 2020-09-08 | End: 2021-12-03 | Stop reason: SDUPTHER

## 2020-09-09 DIAGNOSIS — E66.01 SEVERE OBESITY (BMI 35.0-39.9) WITH COMORBIDITY (HCC): ICD-10-CM

## 2020-09-09 DIAGNOSIS — E11.9 WELL CONTROLLED DIABETES MELLITUS (HCC): ICD-10-CM

## 2020-09-09 DIAGNOSIS — E78.5 DYSLIPIDEMIA: ICD-10-CM

## 2020-09-09 RX ORDER — FENOFIBRATE 145 MG/1
145 TABLET, COATED ORAL DAILY
Qty: 90 TAB | Refills: 3 | Status: SHIPPED | OUTPATIENT
Start: 2020-09-09 | End: 2021-10-03

## 2020-09-09 NOTE — TELEPHONE ENCOUNTER
Last Visit: 8/31/20 with MD Dinesh Burleson  Next Appointment: 9/17/20 with MD Dinesh Burleson  Previous Refill Encounter(s): 7/17/19 #90 with 3 refills    Requested Prescriptions     Pending Prescriptions Disp Refills    fenofibrate nanocrystallized (TRICOR) 145 mg tablet 90 Tab 3     Sig: Take 1 Tab by mouth daily.

## 2020-09-16 ENCOUNTER — OFFICE VISIT (OUTPATIENT)
Dept: ORTHOPEDIC SURGERY | Age: 60
End: 2020-09-16

## 2020-09-16 VITALS
HEART RATE: 77 BPM | BODY MASS INDEX: 37.52 KG/M2 | DIASTOLIC BLOOD PRESSURE: 90 MMHG | SYSTOLIC BLOOD PRESSURE: 150 MMHG | TEMPERATURE: 97.9 F | RESPIRATION RATE: 15 BRPM | WEIGHT: 269 LBS

## 2020-09-16 DIAGNOSIS — M47.816 LUMBAR FACET ARTHROPATHY: Primary | ICD-10-CM

## 2020-09-16 DIAGNOSIS — G62.9 NEUROPATHY: ICD-10-CM

## 2020-09-16 DIAGNOSIS — M47.816 LUMBAR SPONDYLOSIS: ICD-10-CM

## 2020-09-16 RX ORDER — GABAPENTIN 400 MG/1
400 CAPSULE ORAL 3 TIMES DAILY
Qty: 270 CAP | Refills: 1 | Status: SHIPPED | OUTPATIENT
Start: 2020-09-16 | End: 2021-02-17 | Stop reason: SDUPTHER

## 2020-09-16 RX ORDER — CYCLOBENZAPRINE HCL 5 MG
5-10 TABLET ORAL
Qty: 60 TAB | Refills: 2 | Status: SHIPPED | OUTPATIENT
Start: 2020-09-16 | End: 2021-02-17 | Stop reason: SDUPTHER

## 2020-09-16 NOTE — PROGRESS NOTES
Vladimir Saucedo. presents today for   Chief Complaint   Patient presents with    Back Pain     MRI fu       Is someone accompanying this pt? NO    Is the patient using any DME equipment during OV? NO    Depression Screening:  3 most recent PHQ Screens 9/16/2020   PHQ Not Done -   Little interest or pleasure in doing things Not at all   Feeling down, depressed, irritable, or hopeless Not at all   Total Score PHQ 2 0   Trouble falling or staying asleep, or sleeping too much -   Feeling tired or having little energy -   Poor appetite, weight loss, or overeating -   Feeling bad about yourself - or that you are a failure or have let yourself or your family down -   Trouble concentrating on things such as school, work, reading, or watching TV -   Moving or speaking so slowly that other people could have noticed; or the opposite being so fidgety that others notice -   Thoughts of being better off dead, or hurting yourself in some way -   PHQ 9 Score -   How difficult have these problems made it for you to do your work, take care of your home and get along with others -       Learning Assessment:  Learning Assessment 1/30/2020   PRIMARY LEARNER Patient   HIGHEST LEVEL OF EDUCATION - PRIMARY LEARNER  SOME COLLEGE   BARRIERS PRIMARY LEARNER NONE   CO-LEARNER CAREGIVER No   PRIMARY LANGUAGE ENGLISH   LEARNER PREFERENCE PRIMARY DEMONSTRATION     -   ANSWERED BY patient     -   RELATIONSHIP SELF   ASSESSMENT COMMENT -       Abuse Screening:  Abuse Screening Questionnaire 1/30/2020   Do you ever feel afraid of your partner? N   Are you in a relationship with someone who physically or mentally threatens you? N   Is it safe for you to go home? Y       Fall Risk  Fall Risk Assessment, last 12 mths 6/7/2019   Able to walk? Yes   Fall in past 12 months? No         Coordination of Care:  1. Have you been to the ER, urgent care clinic since your last visit? NO  Hospitalized since your last visit? NO    2.  Have you seen or consulted any other health care providers outside of the [address] [place] [address] since your last visit? NO Include any pap smears or colon screening.  NO    Last  Checked 9/16/2020
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
## 2020-09-17 ENCOUNTER — VIRTUAL VISIT (OUTPATIENT)
Dept: INTERNAL MEDICINE CLINIC | Age: 60
End: 2020-09-17

## 2020-09-17 DIAGNOSIS — E11.9 WELL CONTROLLED DIABETES MELLITUS (HCC): Primary | ICD-10-CM

## 2020-09-17 DIAGNOSIS — I10 ESSENTIAL HYPERTENSION: ICD-10-CM

## 2020-09-17 RX ORDER — LOSARTAN POTASSIUM 100 MG/1
100 TABLET ORAL DAILY
Qty: 90 TAB | Refills: 3 | Status: SHIPPED | OUTPATIENT
Start: 2020-09-17 | End: 2021-02-19 | Stop reason: SDUPTHER

## 2020-09-17 RX ORDER — INSULIN DETEMIR 100 [IU]/ML
39 INJECTION, SOLUTION SUBCUTANEOUS DAILY
Qty: 4 ADJUSTABLE DOSE PRE-FILLED PEN SYRINGE | Refills: 3 | Status: SHIPPED | OUTPATIENT
Start: 2020-09-17 | End: 2020-10-01 | Stop reason: SDUPTHER

## 2020-09-17 RX ORDER — INSULIN LISPRO 100 [IU]/ML
INJECTION, SOLUTION INTRAVENOUS; SUBCUTANEOUS
Qty: 15 ML | Refills: 11 | Status: SHIPPED | OUTPATIENT
Start: 2020-09-17 | End: 2020-10-01
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
## 2020-09-18 ENCOUNTER — TELEPHONE (OUTPATIENT)
Dept: INTERNAL MEDICINE CLINIC | Age: 60
End: 2020-09-18

## 2020-09-18 NOTE — TELEPHONE ENCOUNTER
----- Message from Jaciel Blackmon MD sent at 9/17/2020 12:56 PM EDT -----  Regarding: F/u apt needed  Please schedule a 30 min virtual apt with me in 2 wks (Oct 1, at 2pm) if this is ok with the pt.     Thanks,  Dr. Singh   [facility], [address] [place] [address], [address] [place] [address] Str.  Phone: [phone]  Fax: [phone]

## 2020-09-18 NOTE — TELEPHONE ENCOUNTER
I spoke to the pharmacist and clarified the pt's Dexcom needs. The sensors need to be replaced every 10 days but the transmitter is good for 90 days at a time before needing to be replaced. The  can be used in place of the pt's cell phone. If the pt does not want to use his cell phone, only 1  is necessary with 1 refill in case it breaks.     Dr. Francisco Harry  [facility] [address], [address] [place] [address] Str.  Phone: [phone]  Fax: [phone]

## 2020-09-18 NOTE — TELEPHONE ENCOUNTER
Pt said his pharmacy didn't have everything he needs for his dex com - he said thry will have his transmitter on Monday

## 2020-09-18 NOTE — TELEPHONE ENCOUNTER
Pt pharmacy called asking about prescription for Dexcom, she said she has a question about how often  needs to be replaced   Please call [facility]  [phone]

## 2020-09-22 DIAGNOSIS — D50.9 IRON DEFICIENCY ANEMIA, UNSPECIFIED IRON DEFICIENCY ANEMIA TYPE: ICD-10-CM

## 2020-09-22 RX ORDER — HYDROCHLOROTHIAZIDE 12.5 MG/1
CAPSULE ORAL
Qty: 30 CAP | Refills: 6 | Status: SHIPPED | OUTPATIENT
Start: 2020-09-22 | End: 2020-10-01

## 2020-09-22 RX ORDER — LANOLIN ALCOHOL/MO/W.PET/CERES
CREAM (GRAM) TOPICAL
Qty: 60 TAB | Refills: 5 | Status: SHIPPED | OUTPATIENT
Start: 2020-09-22 | End: 2021-06-03

## 2020-09-27 DIAGNOSIS — F32.A DEPRESSION, UNSPECIFIED DEPRESSION TYPE: ICD-10-CM

## 2020-09-27 RX ORDER — BUPROPION HYDROCHLORIDE 150 MG/1
TABLET, EXTENDED RELEASE ORAL
Qty: 90 TAB | Refills: 1 | Status: SHIPPED | OUTPATIENT
Start: 2020-09-27 | End: 2021-03-26

## 2020-09-27 RX ORDER — METOPROLOL SUCCINATE 50 MG/1
TABLET, EXTENDED RELEASE ORAL
Qty: 90 TAB | Refills: 1 | Status: SHIPPED | OUTPATIENT
Start: 2020-09-27 | End: 2021-03-26

## 2020-09-30 DIAGNOSIS — E11.42 DIABETIC POLYNEUROPATHY ASSOCIATED WITH TYPE 2 DIABETES MELLITUS (HCC): ICD-10-CM

## 2020-09-30 RX ORDER — GABAPENTIN 300 MG/1
CAPSULE ORAL
Qty: 270 CAP | OUTPATIENT
Start: 2020-09-30

## 2020-10-01 ENCOUNTER — VIRTUAL VISIT (OUTPATIENT)
Dept: INTERNAL MEDICINE CLINIC | Age: 60
End: 2020-10-01
Payer: COMMERCIAL

## 2020-10-01 DIAGNOSIS — E11.9 WELL CONTROLLED DIABETES MELLITUS (HCC): ICD-10-CM

## 2020-10-01 DIAGNOSIS — I10 ESSENTIAL HYPERTENSION: ICD-10-CM

## 2020-10-01 DIAGNOSIS — L03.032 INFECTION OF NAIL BED OF TOE OF LEFT FOOT: ICD-10-CM

## 2020-10-01 DIAGNOSIS — E11.9 WELL CONTROLLED DIABETES MELLITUS (HCC): Primary | ICD-10-CM

## 2020-10-01 PROCEDURE — 99214 OFFICE O/P EST MOD 30 MIN: CPT | Performed by: INTERNAL MEDICINE

## 2020-10-01 RX ORDER — HYDROCHLOROTHIAZIDE 25 MG/1
25 TABLET ORAL DAILY
Qty: 90 TAB | Refills: 2 | Status: SHIPPED | OUTPATIENT
Start: 2020-10-01 | End: 2021-10-07

## 2020-10-01 RX ORDER — INSULIN DETEMIR 100 [IU]/ML
44 INJECTION, SOLUTION SUBCUTANEOUS DAILY
Qty: 4 ADJUSTABLE DOSE PRE-FILLED PEN SYRINGE | Refills: 3 | Status: SHIPPED | OUTPATIENT
Start: 2020-10-01 | End: 2020-10-22

## 2020-10-01 RX ORDER — INSULIN LISPRO 100 [IU]/ML
INJECTION, SOLUTION INTRAVENOUS; SUBCUTANEOUS
Qty: 15 ML | Refills: 11 | Status: SHIPPED | OUTPATIENT
Start: 2020-10-01 | End: 2020-10-22

## 2020-10-01 RX ORDER — DOXYCYCLINE 100 MG/1
100 TABLET ORAL 2 TIMES DAILY
Qty: 14 TAB | Refills: 0 | Status: SHIPPED | OUTPATIENT
Start: 2020-10-01 | End: 2020-10-08
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
## 2020-10-07 ENCOUNTER — OFFICE VISIT (OUTPATIENT)
Dept: PULMONOLOGY | Age: 60
End: 2020-10-07
Payer: COMMERCIAL

## 2020-10-07 VITALS
HEART RATE: 84 BPM | WEIGHT: 267 LBS | DIASTOLIC BLOOD PRESSURE: 74 MMHG | HEIGHT: 71 IN | RESPIRATION RATE: 18 BRPM | TEMPERATURE: 98.3 F | OXYGEN SATURATION: 96 % | BODY MASS INDEX: 37.38 KG/M2 | SYSTOLIC BLOOD PRESSURE: 146 MMHG

## 2020-10-07 DIAGNOSIS — J41.8 MIXED SIMPLE AND MUCOPURULENT CHRONIC BRONCHITIS (HCC): ICD-10-CM

## 2020-10-07 DIAGNOSIS — J44.9 COPD, GROUP B, BY GOLD 2017 CLASSIFICATION (HCC): Primary | ICD-10-CM

## 2020-10-07 DIAGNOSIS — G47.33 OSA TREATED WITH BIPAP: ICD-10-CM

## 2020-10-07 DIAGNOSIS — E66.01 SEVERE OBESITY (BMI 35.0-39.9) WITH COMORBIDITY (HCC): ICD-10-CM

## 2020-10-07 PROCEDURE — 99214 OFFICE O/P EST MOD 30 MIN: CPT | Performed by: INTERNAL MEDICINE

## 2020-10-07 RX ORDER — BUDESONIDE AND FORMOTEROL FUMARATE DIHYDRATE 160; 4.5 UG/1; UG/1
AEROSOL RESPIRATORY (INHALATION)
Qty: 1 INHALER | Refills: 11 | Status: ON HOLD | OUTPATIENT
Start: 2020-10-07 | End: 2020-11-17

## 2020-10-07 RX ORDER — ALBUTEROL SULFATE 90 UG/1
2 AEROSOL, METERED RESPIRATORY (INHALATION)
Qty: 1 INHALER | Refills: 6 | Status: SHIPPED | OUTPATIENT
Start: 2020-10-07

## 2020-10-07 NOTE — PROGRESS NOTES
Rhodagabrielle Formanaurora. presents today for   Chief Complaint   Patient presents with    COPD       Is someone accompanying this pt? No    Is the patient using any DME equipment during OV? No    -DME Company NA    Depression Screening:  3 most recent PHQ Screens 9/16/2020   PHQ Not Done -   Little interest or pleasure in doing things Not at all   Feeling down, depressed, irritable, or hopeless Not at all   Total Score PHQ 2 0   Trouble falling or staying asleep, or sleeping too much -   Feeling tired or having little energy -   Poor appetite, weight loss, or overeating -   Feeling bad about yourself - or that you are a failure or have let yourself or your family down -   Trouble concentrating on things such as school, work, reading, or watching TV -   Moving or speaking so slowly that other people could have noticed; or the opposite being so fidgety that others notice -   Thoughts of being better off dead, or hurting yourself in some way -   PHQ 9 Score -   How difficult have these problems made it for you to do your work, take care of your home and get along with others -       Learning Assessment:  Learning Assessment 1/30/2020   PRIMARY LEARNER Patient   HIGHEST LEVEL OF EDUCATION - PRIMARY LEARNER  SOME COLLEGE   BARRIERS PRIMARY LEARNER NONE   CO-LEARNER CAREGIVER No   PRIMARY LANGUAGE ENGLISH   LEARNER PREFERENCE PRIMARY DEMONSTRATION     -   ANSWERED BY patient     -   RELATIONSHIP SELF   ASSESSMENT COMMENT -       Abuse Screening:  Abuse Screening Questionnaire 1/30/2020   Do you ever feel afraid of your partner? N   Are you in a relationship with someone who physically or mentally threatens you? N   Is it safe for you to go home? Y       Fall Risk  Fall Risk Assessment, last 12 mths 6/7/2019   Able to walk? Yes   Fall in past 12 months? No         Coordination of Care:  1. Have you been to the ER, urgent care clinic since your last visit? Hospitalized since your last visit? No    2.  Have you seen or consulted any other health care providers outside of the [address] [place] [address] since your last visit? Include any pap smears or colon screening.  No.

## 2020-10-07 NOTE — PROGRESS NOTES
[facility]  Pulmonary, Critical Care, and Sleep Medicine      Pulmonary Office Visit    Name: Joe Pham. : 1960     Date: 10/7/2020        Subjective:     Patient is a 60 y.o. male is here for follow up-asthma COPD , FITO on BiPAP ,chronic bronchitis. 10/07/20     Overall doing well  Had some increase in symptoms of breathing difficulty with the hot humid weather but feels back to baseline now that the weather has cooled down. Denies increase in cough, wheezing or chest tightness  Occasionally has some productive mucus  He has been wearing his BiPAP but feels the machine is getting old and needs replacement including the supplies  Using Symbicort daily. Has only used rescue medication once or twice  He is trying to walk more and exercise but being limited by back pain and is contemplating intervention at the spine center    No need for ER visits or prednisone taper over past year. Denies fever, chills, night sweats. Admits to weight gain. BiPAP download reviewed available data from 2019 through 2019  Use 100% for average of 7 hours 29 minutes  On the settings of IPAP 23 EPAP 18  Has AHI of 10.9  Average leak of 4 hours 17 minutes    Last PFTs with FEV1 of 56% predicted, FEF 25-75 percentage is 36% predicted  Bronchodilator response noted    HPI:  Patient initially seen for frequent episodes of persistent and difficult to clear cough over past 2-3 years. Had 2 episode last year and 3-4 flareup's with [facility] ER and need for antibiotics on 3 occasions since spring. Eventually had CT scan and referred to pulmonary. Patient admited to some nasal congestion, postnasal drip and history of chronic sinus problems with previous sinus surgery.   Had reflux and had difficulty sleeping with head elevated  Past Surgical History:   Procedure Laterality Date    EXCIS STOMACH ULCER,LESN;LOCAL N/A 2018    Dr. Elsy Pompaoter  HX APPENDECTOMY      HX COLONOSCOPY  6/24/2010    tubular adenoma, Dr. Amanda Acevedo    HX GASTRECTOMY  12/10/2018    Partial plus GI tumor    HX HEENT      sinus surgery    HX HERNIA REPAIR      HX KNEE ARTHROSCOPY      HX KNEE REPLACEMENT Right 12/2014    HX TONSILLECTOMY      HX WISDOM TEETH EXTRACTION      x4     Family History   Problem Relation Age of Onset    Other Father         Knee replacement    Elevated Lipids Mother     Glaucoma Maternal Grandmother     No Known Problems Maternal Grandfather     No Known Problems Paternal Grandmother     No Known Problems Paternal Grandfather     Arthritis-osteo Other     Hypertension Other      . Current Outpatient Medications   Medication Sig Dispense Refill    albuterol (PROVENTIL HFA, VENTOLIN HFA, PROAIR HFA) 90 mcg/actuation inhaler Take 2 Puffs by inhalation every six (6) hours as needed for Wheezing. 1 Inhaler 6    budesonide-formoteroL (Symbicort) 160-4.5 mcg/actuation HFAA TAKE 2 PUFFS BY MOUTH TWICE DAILY 1 Inhaler 11    hydroCHLOROthiazide (HYDRODIURIL) 25 mg tablet Take 1 Tab by mouth daily. 90 Tab 2    insulin detemir U-100 (Levemir FlexTouch U-100 Insuln) 100 unit/mL (3 mL) inpn 44 Units by SubCUTAneous route daily. 4 Adjustable Dose Pre-filled Pen Syringe 3    doxycycline (ADOXA) 100 mg tablet Take 1 Tab by mouth two (2) times a day for 7 days. 14 Tab 0    insulin lispro (HumaLOG KwikPen Insulin) 100 unit/mL kwikpen INJECT UNDER THE SKIN  18 units with meals 15 mL 11    metoprolol succinate (TOPROL-XL) 50 mg XL tablet TAKE 1 TABLET BY MOUTH DAILY 90 Tab 1    buPROPion SR (WELLBUTRIN SR) 150 mg SR tablet TAKE 1 TABLET BY MOUTH EVERY DAY 90 Tab 1    ferrous sulfate 325 mg (65 mg iron) tablet TAKE 1 TABLET BY MOUTH TWICE DAILY 60 Tab 5    losartan (COZAAR) 100 mg tablet Take 1 Tab by mouth daily. 90 Tab 3    cyclobenzaprine (FLEXERIL) 5 mg tablet Take 1-2 Tabs by mouth nightly as needed for Muscle Spasm(s).  60 Tab 2    gabapentin (NEURONTIN) 400 mg capsule Take 1 Cap by mouth three (3) times daily. Max Daily Amount: 1,200 mg. 270 Cap 1    fenofibrate nanocrystallized (TRICOR) 145 mg tablet Take 1 Tab by mouth daily. 90 Tab 3    Blood-Glucose Sensor (Dexcom G6 Sensor) jia Use with the Dexcom meter and transmitter device to monitor your blood glucose continuously, removing/replacing this meter every 10 days. 3 Device 5    Blood-Glucose Meter,Continuous (Dexcom G6 ) misc Use with the Dexcom sensor and transmitter device to monitor your blood glucose continuously, removing/replacing this meter every 10 days. 3 Each 5    Blood-Glucose Transmitter (Dexcom G6 Transmitter) jia Use with the Dexcom sensor and meter device to monitor your blood glucose continuously, removing/replacing this meter every 10 days. 3 Device 5    pantoprazole (PROTONIX) 20 mg tablet Take 1 Tab by mouth daily as needed (heartburn). 90 Tab 3    Trulicity 1.5 FQ/7.6 mL sub-q pen ADMINISTER 0.5 ML UNDER THE SKIN EVERY 7 DAYS 4 mL 3    metFORMIN (GLUCOPHAGE) 1,000 mg tablet TAKE 1 TABLET BY MOUTH TWICE DAILY WITH MEALS 60 Tab 3    rosuvastatin (CRESTOR) 40 mg tablet TAKE 1 TABLET BY MOUTH NIGHTLY. 90 Tab 3    Eliquis 5 mg tablet TAKE 1 TABLET BY MOUTH TWICE DAILY 60 Tab 6    amLODIPine (NORVASC) 10 mg tablet TAKE 1/2 TABLET BY MOUTH DAILY 135 Tab 1    Vianney Pen Needle 32 gauge x 5/32\" ndle USE TO TEST BLOOD SUGAR THREE TIMES DAILY PLUS SLIDING SCALE 100 Pen Needle 11    sildenafiL, pulm. hypertension, (Revatio) 20 mg tablet Take 1-5 po as needed. 90 Tab 3    ascorbic acid (VITAMIN C PO) Take 1 Tab by mouth daily.   mg capsule TAKE 1 CAPSULE BY MOUTH TWICE DAILY 60 Cap 6    RESTASIS 0.05 % dpet Takes 1 drop in each eye twice a day. 3    cholecalciferol (VITAMIN D3) 1,000 unit cap Take 2 Caps by mouth daily.  (Patient taking differently: Take 1,000 Units by mouth two (2) times a day.) 60 Cap 5    Omega-3-DHA-EPA-Fish Oil 1,000 mg (120 mg-180 mg) cap Take 1 Cap by mouth two (2) times a day.  MV,MINERALS/FA/LYCOPENE/GINKGO (ONE-A-DAY MEN'S 50+ ADVANTAGE PO) Take 1 Tab by mouth daily.  bipap machine kit by Does Not Apply route. BiPAP at 23/18 cm with heated humidifier. Mask: Simplus FF, medium size or mask of choice. Length of need 99 months. Replace mask and accessories as needed times 12 months. Please download data after 30 days and fax at [phone]. Dx: Severe FITO, Obesity, snoring. (Patient taking differently: by Does Not Apply route. BiPAP at 23/18 cm with heated humidifier. Mask: Simplus FF, medium size or mask of choice. Length of need 99 months. Replace mask and accessories as needed times 12 months. Please download data after 30 days and fax at [phone]. Dx: Severe FITO, Obesity, snoring. AeroCare) 1 Kit 0    cyanocobalamin (VITAMIN B-12) 1,000 mcg tablet Take 1,000 mcg by mouth two (2) times a day.        Review of Systems:  HEENT: No epistaxis, no nasal drainage, no difficulty in swallowing, no redness in eyes  Respiratory: as above  Cardiovascular: no chest pain, no palpitations, no chronic leg edema, no syncope  Gastrointestinal: no abd pain, no vomiting, no diarrhea, no bleeding symptoms  Genitourinary: No urinary symptoms or hematuria  Integument/breast: No ulcers or rashes  Musculoskeletal:Neg  Neurological: No focal weakness, no seizures, no headaches  Behvioral/Psych: No anxiety, no depression  Constitutional: No fever, no chills, no weight loss, no night sweats     Objective:     Visit Vitals  BP (!) 146/74 (BP 1 Location: Right arm, BP Patient Position: Sitting)   Pulse 84   Temp 98.3 °F (36.8 °C) (Oral)   Resp 18   Ht 5' 11\" (1.803 m)   Wt 121.1 kg (267 lb)   SpO2 96%   BMI 37.24 kg/m²        Physical Exam:   General: comfortable, no acute distress  HEENT: pupils reactive, sclera anicteric, EOM intact  Neck: No adenopathy or thyroid swelling, no lymphadenopathy or JVD, supple  CVS: S1S2 no murmurs  RS: Mod AE bilaterally, no tactile fremitus or egophony, no accessory muscle use  Abd: soft, non tender, no hepatosplenomegaly  Neuro: non focal, awake, alert  Extrm: no leg edema, clubbing or cyanosis  Skin: no rash    Data review:   Sleep Study: 7/23/2014:  CPAP was applied at 5 cm and titrated up to 20 cm. At the setting  of 16 and 17 cm, sleep efficiency was 100%, AHI was 0, but REM or  REM supine was not seen and the lowest oxygen saturation was 90  and 88% respectively. At the setting of 20 cm, sleep efficiency  was 100%, AHI was 0, but REM or REM supine was not seen and the  lowest oxygen saturation was 90%. Mask leak, arousal and snoring  indices were tolerable. BiPAP was applied at 21/17 cm and titrated up to 23/18 cm. At the  setting of 23/18 cm, sleep efficiency was 98%, AHI was 1.7 with 1  obstructive hypopnea, REM was seen, REM supine was not seen and  the lowest oxygen saturation was 85% during hypopnea for 0.2 min  but mean oxygen saturation was 92%. Mask leak was tolerable, and  arousal and snoring were low    PFT's:  06/14/18    Patient effort:   Good  Meets ATS criteria for interpretation    Flows:  Maximal Mid Expiratory Flow rate is reduced to 36 % predicted  Forced Expiratory Volume in one second is reduced to 56 % predicted  FEV 1% is reduced    Flow Volume Loop:  Nonspecific obstructive pattern in Flow Volume Loop    Bronchodilator:  Significant partial  improvement with bronchodilator    Impression:  Moderate obstructive defect, Mild restrictive ventilatory defect    Comment:  Reduced Vital Capacity may be due to airflow obstruction or restrictive defect. Obtain lung volumes if clinically indicated, See technicians comments.       4/14/2016:  Maximal Mid Expiratory Flow rate is reduced to 41 % predicted  Forced Expiratory Volume in one second is reduced to 62 %  predicted  FEV 1% is reduced  Volumes:  Vital Capacity is reduced, Residual Volume is elevated and Total  Lung Capacity is normal  Flow Volume Loop:  Nonspecific obstructive pattern in Flow Volume Loop  Bronchodilator:  Significant improvement with bronchodilator  Diffusion:  Abnormal Diffusion Capacity reduced to 68 % predicted  Impression:  Mild to moderate obstructive defect, predominately small airways,  Air trapping, Reduced diffusion capacity indicating a decrease in  alveolar surface area for gas exchange      Imaging:  I have personally reviewed the patients radiographs and have reviewed the reports:  CXR-8/2014:  A single view of the chest demonstrates clear lungs. Atherosclerosis   of the aorta. Borderline heart size. Thoracic spondylosis. IMPRESSION: No acute cardiopulmonary disease. March 2013-FINDINGS: New peripheral predominantly alveolar opacities are observed in the   right midlung zone, presumably involving the inferior lateral aspect of the   right upper lobe. Additional new increased streaky densities are also observed   in the right lung base region. The cardiac silhouette appears mild to   moderately enlarged. No significant pleural effusion. No pneumothorax. IMPRESSION:   Right upper lobe and right middle lobe with new opacities as described above. Suggestive of developing multilobar pneumonia. CXR- April 2013 and June 2013- clear  Ct scan chest 6/13:  Acute or chronic bronchitis. Minimal infiltrate or scarring in the medial   bases. .   Gastric lipoma. Fatty infiltration of the liver. Prominent coronary artery calcifications    IMPRESSION:   · Asthma/COPD- significantly improved control however needs reenforcement for continuation of maintenance therapy with Symbicort  · Recurrent lower respiratory infections- acute obstructive and chronic obstructive bronchitis with episode of pneumonia-alveolitis. PFT's with obstructive and restrictive impairment with bronchodilator response. Has multiple triggers- chronic sinusitis and GERD as cause for recurrent insult.  Has as a consequence developed reactive airways dysfunction- cough variant asthma. Evaluation for sinopulmonary syndrome /acquired hypogammaglobulinema is not diagnostic . · FITO- needs continued treatment optimization. BiPAP and settings noted. · DM  · Obesity  · GERD  · Anemia- iron deficiency h/o GI bleed. H/O benign gastric mass, tubular adenoma- colon        RECOMMENDATIONS:     · Continue Symbicort -80/4.5 refills provided  · Albuterol prn for rescue-refills provided  · GERD instructions and strict control  · Bronchial hygiene and rescue inhaler methods reviewed. · Treat FITO- BiPAP use discussed . New DME orders placed for a new machine and supplies  · Encouraged incremental exercise, weight loss  · Consider pulmonary rehab  · Preventive wqvoorapyckg-xj-yh-date  · Follow up in 6 months. · Questions concerns addressed     Discussed d/d and treatment rationale with patient.      Javon Garcia MD

## 2020-10-07 NOTE — PATIENT INSTRUCTIONS
COPD and Asthma: Care Instructions  Your Care Instructions     Some people who have chronic obstructive pulmonary disease (COPD) also have asthma. Both of these problems can damage your lungs. This makes it very important to control them. Asthma causes the airways that lead to the lungs to swell and become narrow. This makes it hard to breathe. You may wheeze or cough. If you have a bad attack, you may need emergency care. There are two parts to treating asthma. · Controlling asthma over the long term. · Treating attacks when they occur. You and your doctor can make an asthma treatment plan that will help. This plan tells you the medicines you take every day to reduce the swelling in your airways and prevent attacks. It also tells you what to do if you have an asthma attack. Follow-up care is a key part of your treatment and safety. Be sure to make and go to all appointments, and call your doctor if you are having problems. It's also a good idea to know your test results and keep a list of the medicines you take. How can you care for yourself at home? To control asthma over the long term  Medicines   Controller medicines reduce swelling in your lungs. They also prevent asthma attacks. Take your controller medicine exactly as prescribed. Talk to your doctor if you have any problems with your medicine. · Inhaled corticosteroid is a common and effective controller medicine. Using it the right way can prevent or reduce most side effects. · Take your controller medicine every day, not just when you have symptoms. This helps prevent problems before they occur. · Always bring your asthma medicine with you when you travel. · Your doctor may prescribe long-acting medicine that combines a corticosteroid with a beta2-agonist. Follow your doctor's instructions exactly about how to take a long-acting medicine. Examples include:  ? Fluticasone and salmeterol (Advair).   ? Budesonide and formoterol (Symbicort). · Do not depend on your controller medicines to stop an asthma attack that has already started. They do not work fast enough to help. · Your doctor may also prescribe anticholinergic inhalers. These include ipratropium (Atrovent) and tiotropium (Spiriva). Education   · Learn what sets off an asthma attack. Avoid these triggers when you can. Common triggers include smoke, air pollution, pollen, animal dander, colds, stress, and cold air. · Do not smoke. Smoking can make COPD and asthma worse. If you need help quitting, talk to your doctor about stop-smoking programs and medicines. These can increase your chances of quitting for good. · Check yourself for symptoms to know which step to follow in your action plan. Watch for things like being short of breath, having chest tightness, coughing, and wheezing. Also notice if symptoms wake you up at night or if you get tired quickly when you exercise. · You may want to learn how to use a peak flow meter. This measures how open your airways are. It may help you know when you will have an asthma attack. To treat attacks when they occur  Use your asthma action plan when you have an attack. Your quick-relief medicine, such as albuterol, will stop an asthma attack. It relaxes the muscles that get tight around the airways. · Take your quick-relief medicine exactly as prescribed. Talk with your doctor if you have any problems with your medicine. · Keep this medicine with you at all times. · You may need to use this medicine before you exercise. If your doctor prescribed corticosteroid pills to use during an attack, take them as directed. They may take hours to work, but they may shorten the attack and help you breathe better. When should you call for help? Call 911 anytime you think you may need emergency care. For example, call if:    · You have severe trouble breathing.    Call your doctor now or seek immediate medical care if:    · You have new or worse shortness of breath.     · You are coughing more deeply or more often, especially if you notice more mucus or a change in the color of your mucus.     · You cough up blood.     · You have new or increased swelling in your legs or belly.     · You have a fever.     · You have used your quick-relief medicine but you are still short of breath. Watch closely for changes in your health, and be sure to contact your doctor if you have any problems. Where can you learn more? Go to http://www.gray.com/  Enter A350 in the search box to learn more about \"COPD and Asthma: Care Instructions. \"  Current as of: February 24, 2020               Content Version: 12.6  © 5862-7941 [employer]. Care instructions adapted under license by [employer] (which disclaims liability or warranty for this information). If you have questions about a medical condition or this instruction, always ask your healthcare professional. [address] any warranty or liability for your use of this information.

## 2020-10-15 NOTE — TELEPHONE ENCOUNTER
----- Message from Arabella Rangel MD sent at 10/1/2020  4:28 PM EDT -----  Regarding: F/u apts needed  Please schedule for a 30 min virtual apt with me on 10/22/20 at 3pm if he is ok with this time. Please schedule him for fasting labs between now and his follow up apt.      Thanks,  Dr. Vicki Alcantar  [facility], [address] [place] [address], [address] [place] [address] Str.  Phone: [phone]  Fax: [phone]

## 2020-10-17 ENCOUNTER — HOSPITAL ENCOUNTER (OUTPATIENT)
Dept: LAB | Age: 60
Discharge: HOME | End: 2020-10-17

## 2020-10-17 LAB
A-G RATIO,AGRAT: 2.8 RATIO (ref 1.1–2.6)
ALBUMIN SERPL-MCNC: 4.7 G/DL (ref 3.5–5)
ALP SERPL-CCNC: 49 U/L (ref 40–125)
ALT SERPL-CCNC: 29 U/L (ref 5–40)
ANION GAP SERPL CALC-SCNC: 12.4 MMOL/L (ref 3–15)
AST SERPL W P-5'-P-CCNC: 33 U/L (ref 10–37)
AVG GLU, 10930: 233 MG/DL (ref 91–123)
BILIRUB SERPL-MCNC: 0.4 MG/DL (ref 0.2–1.2)
BUN SERPL-MCNC: 21 MG/DL (ref 6–22)
CALCIUM SERPL-MCNC: 10.2 MG/DL (ref 8.4–10.5)
CHLORIDE SERPL-SCNC: 101 MMOL/L (ref 98–110)
CHOLEST SERPL-MCNC: 148 MG/DL (ref 110–200)
CO2 SERPL-SCNC: 27 MMOL/L (ref 20–32)
CREAT SERPL-MCNC: 1.1 MG/DL (ref 0.8–1.6)
CREATININE, URINE: 56 MG/DL
GFRAA, 66117: >60
GFRNA, 66118: >60
GLOBULIN,GLOB: 1.7 G/DL (ref 2–4)
GLUCOSE SERPL-MCNC: 228 MG/DL (ref 70–99)
HBA1C MFR BLD HPLC: 9.7 % (ref 4.8–5.6)
HDLC SERPL-MCNC: 30 MG/DL
HDLC SERPL-MCNC: 4.9 MG/DL (ref 0–5)
LDL/HDL RATIO,LDHD: 2.1
LDLC SERPL CALC-MCNC: 63 MG/DL (ref 50–99)
MICROALB/CREAT RATIO, 140286: 1989.3 (ref 0–30)
MICROALBUMIN,URINE RANDOM 140054: 1114 MG/L (ref 0.1–17)
NON-HDL CHOLESTEROL, 011976: 118 MG/DL
POTASSIUM SERPL-SCNC: 4.1 MMOL/L (ref 3.5–5.5)
PROT SERPL-MCNC: 6.4 G/DL (ref 6.2–8.1)
SENTARA SPECIMEN COL,SENBCF: NORMAL
SODIUM SERPL-SCNC: 140 MMOL/L (ref 133–145)
TRIGL SERPL-MCNC: 274 MG/DL (ref 40–149)
VLDLC SERPL CALC-MCNC: 55 MG/DL (ref 8–30)

## 2020-10-17 PROCEDURE — 99001 SPECIMEN HANDLING PT-LAB: CPT

## 2020-10-21 NOTE — PROGRESS NOTES
I will discuss his results with him at his upcoming apt. His CMP shows normal LFTs and renal function. His microalbuminuria is worse. His total cholesterol is 148. HDL is 30. Triglycerides are 274. LDL is 63. His A1C is down to 9.7 from 11. 3!     Dr. Jer Rodrigues  [facility], [address] [place] [address], [address] [place] [address] Str.  Phone: [phone]  Fax: [phone]

## 2020-10-22 DIAGNOSIS — E11.21 TYPE 2 DIABETES WITH NEPHROPATHY (HCC): ICD-10-CM

## 2020-10-22 DIAGNOSIS — I10 ESSENTIAL HYPERTENSION: ICD-10-CM

## 2020-10-26 ENCOUNTER — TELEPHONE (OUTPATIENT)
Dept: PULMONOLOGY | Age: 60
End: 2020-10-26

## 2020-10-26 NOTE — TELEPHONE ENCOUNTER
Pt [facility]([phone]). Has not yet received his bipap machine and Dr Abraham Clemente told him to call if he had not received it by 10/23/20. Please check and call him back.

## 2020-10-28 ENCOUNTER — OFFICE VISIT (OUTPATIENT)
Dept: ORTHOPEDIC SURGERY | Age: 60
End: 2020-10-28
Payer: COMMERCIAL

## 2020-10-28 VITALS
HEART RATE: 75 BPM | BODY MASS INDEX: 36.73 KG/M2 | OXYGEN SATURATION: 96 % | WEIGHT: 262.4 LBS | HEIGHT: 71 IN | TEMPERATURE: 97.5 F | DIASTOLIC BLOOD PRESSURE: 77 MMHG | SYSTOLIC BLOOD PRESSURE: 133 MMHG

## 2020-10-28 DIAGNOSIS — G62.9 NEUROPATHY: ICD-10-CM

## 2020-10-28 DIAGNOSIS — M47.816 LUMBAR SPONDYLOSIS: ICD-10-CM

## 2020-10-28 DIAGNOSIS — M47.816 LUMBAR FACET ARTHROPATHY: Primary | ICD-10-CM

## 2020-10-28 PROCEDURE — 99213 OFFICE O/P EST LOW 20 MIN: CPT | Performed by: PHYSICAL MEDICINE & REHABILITATION

## 2020-10-28 RX ORDER — TRAMADOL HYDROCHLORIDE 50 MG/1
25-50 TABLET ORAL
Qty: 21 TAB | Refills: 0 | Status: SHIPPED | OUTPATIENT
Start: 2020-10-28 | End: 2020-11-04
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
## 2020-10-28 NOTE — PATIENT INSTRUCTIONS
Learning About Medial Branch Block and Neurotomy What are medial branch block and neurotomy? Facet joints connect your vertebrae to each other. Problems in these joints can cause chronic (long-term) pain in the neck or back. Medial branch nerves are the nerves that carry many of the pain messages from your facet joints. Radiofrequency medial branch neurotomy is a type of medial branch neurotomy that is used to relieve arthritis pain. It uses radio waves to damage nerves in your neck or back so that they can no longer send pain messages to your brain. Before your doctor knows if a neurotomy will help you, you will get a medial branch block to find out if certain nerves are the ones that are a source of your pain. You will need two separate visits to the outpatient center or hospital to have both procedures. You will need someone to drive you home. How is a medial branch block done? The doctor will use a tiny needle to numb the skin where you will get the block. Then the doctor puts the block needle into the numbed area. You may feel some pressure, but you should not feel pain. Using fluoroscopy (live X-ray) to guide the needle, the doctor injects medicine onto one or more nerves to make them numb. If you get relief from your pain in the next 4 to 6 hours, it's a sign that those nerves may be contributing to your pain. The relief will last only a short time. You may then have a medial branch neurotomy at a later visit to try to get longer relief. How is a medial branch neurotomy done? The doctor will use a tiny needle to numb the skin where you will get the neurotomy. Then the doctor puts the neurotomy needle into the numbed area. You may feel some pressure. Using fluoroscopy (live X-ray) to guide the needle, the doctor sends radio waves through the needle to the nerve for 60 to 90 seconds. The radio waves heat the nerve, which damages it. The doctor may do this several times. And more than one nerve may be treated. How long do medial branch block and neurotomy take? It takes 20 to 30 minutes to get the block. You can go home after the doctor watches you for about an hour. It takes 45 to 90 minutes to get a neurotomy, depending on how many nerves are heated. You will probably go home 30 to 60 minutes after the procedure. What can you expect after a neurotomy? You will get instructions on how to report how much pain you have when you are at home. You may feel a little sore or tender at the injection site at first. But after a successful neurotomy, most people have pain relief right away. It often lasts for several months, but your pain may come back. If your pain does come back, it may mean that the damaged nerve has healed and can send pain messages again. Or it can mean that a different nerve is causing pain. Your doctor will discuss your options with you. Follow-up care is a key part of your treatment and safety. Be sure to make and go to all appointments, and call your doctor if you are having problems. It's also a good idea to know your test results and keep a list of the medicines you take. Where can you learn more? Go to http://www.gray.com/ Enter U957 in the search box to learn more about \"Learning About Medial Branch Block and Neurotomy. \" Current as of: November 20, 2019               Content Version: 12.6 © 3101-2627 [employer]. Care instructions adapted under license by [employer] (which disclaims liability or warranty for this information). If you have questions about a medical condition or this instruction, always ask your healthcare professional. [address] any warranty or liability for your use of this information.

## 2020-10-30 ENCOUNTER — TELEPHONE (OUTPATIENT)
Dept: PULMONOLOGY | Age: 60
End: 2020-10-30

## 2020-10-30 NOTE — TELEPHONE ENCOUNTER
Yuri MCKEON([phone]). States it has been 3 weeks and he has still not heard from [facility]. Please call him back.

## 2020-10-30 NOTE — TELEPHONE ENCOUNTER
Spoke with Julieth Mason with [facility]. She does not see the order from 10/7 but is calling the patient now.    The order and notes where re faxed now to [facility]

## 2020-11-02 ENCOUNTER — TELEPHONE (OUTPATIENT)
Dept: CARDIOLOGY CLINIC | Age: 60
End: 2020-11-02

## 2020-11-02 NOTE — TELEPHONE ENCOUNTER
Dr Gee Seals office requesting instructions for this patient who is having several spinal injections     Medial branch block on 11/17/2020 and 12/8/2020  RFA on 01/05/21, and 01/19/21    Verbal order and read back per Martha Frank MD  Patient will need to hold eliquis 48 hours prior to each procedure and restart after procedure.      Letter faxed to Dr Gee Seals office

## 2020-11-02 NOTE — LETTER
11/2/2020 12:37 PM 
 
Mr. Pamela Smith. [address] [id number] Pamela Smith. was seen in our office on 02/25/2019 for cardiac evaluation. From a cardiac standpoint he can hold eliquis 48 hours prior to each procedure with Dr Brian Libman. He will need restart after procedure when safe to do so. Please feel free to contact our office if you have any questions regarding this patient. Sincerely, 
 
     
ALLEY Cortes MD

## 2020-11-03 ENCOUNTER — TELEPHONE (OUTPATIENT)
Dept: INTERNAL MEDICINE CLINIC | Age: 60
End: 2020-11-03

## 2020-11-03 NOTE — TELEPHONE ENCOUNTER
Pt was unable to get his labs done today , he was not feeling well, he has a VV for 11/05- and needs to r/s I don't see any 30 minute openings

## 2020-11-05 ENCOUNTER — VIRTUAL VISIT (OUTPATIENT)
Dept: INTERNAL MEDICINE CLINIC | Age: 60
End: 2020-11-05
Payer: COMMERCIAL

## 2020-11-05 DIAGNOSIS — I10 ESSENTIAL HYPERTENSION: ICD-10-CM

## 2020-11-05 DIAGNOSIS — E11.21 TYPE 2 DIABETES WITH NEPHROPATHY (HCC): Primary | ICD-10-CM

## 2020-11-05 DIAGNOSIS — G47.33 OSA TREATED WITH BIPAP: ICD-10-CM

## 2020-11-05 PROCEDURE — 99213 OFFICE O/P EST LOW 20 MIN: CPT | Performed by: INTERNAL MEDICINE
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
## 2020-11-17 ENCOUNTER — APPOINTMENT (OUTPATIENT)
Dept: GENERAL RADIOLOGY | Age: 60
End: 2020-11-17
Attending: PHYSICAL MEDICINE & REHABILITATION
Payer: COMMERCIAL

## 2020-11-17 ENCOUNTER — HOSPITAL ENCOUNTER (OUTPATIENT)
Age: 60
Setting detail: OUTPATIENT SURGERY
Discharge: HOME | End: 2020-11-17
Attending: PHYSICAL MEDICINE & REHABILITATION | Admitting: PHYSICAL MEDICINE & REHABILITATION
Payer: COMMERCIAL

## 2020-11-17 ENCOUNTER — TELEPHONE (OUTPATIENT)
Dept: INTERNAL MEDICINE CLINIC | Age: 60
End: 2020-11-17

## 2020-11-17 VITALS
DIASTOLIC BLOOD PRESSURE: 80 MMHG | TEMPERATURE: 98 F | RESPIRATION RATE: 20 BRPM | OXYGEN SATURATION: 98 % | SYSTOLIC BLOOD PRESSURE: 128 MMHG | HEART RATE: 80 BPM

## 2020-11-17 LAB — GLUCOSE BLD STRIP.AUTO-MCNC: 116 MG/DL (ref 70–110)

## 2020-11-17 PROCEDURE — 74011250637 HC RX REV CODE- 250/637: Performed by: PHYSICAL MEDICINE & REHABILITATION

## 2020-11-17 PROCEDURE — 77030003672 HC NDL SPN HALY -A: Performed by: PHYSICAL MEDICINE & REHABILITATION

## 2020-11-17 PROCEDURE — 74011250636 HC RX REV CODE- 250/636: Performed by: PHYSICAL MEDICINE & REHABILITATION

## 2020-11-17 PROCEDURE — 82962 GLUCOSE BLOOD TEST: CPT

## 2020-11-17 PROCEDURE — 76010000009 HC PAIN MGT 0 TO 30 MIN PROC: Performed by: PHYSICAL MEDICINE & REHABILITATION

## 2020-11-17 PROCEDURE — 2709999900 HC NON-CHARGEABLE SUPPLY: Performed by: PHYSICAL MEDICINE & REHABILITATION

## 2020-11-17 PROCEDURE — 76010000093 HC SPECIAL PROCEDURE: Performed by: PHYSICAL MEDICINE & REHABILITATION

## 2020-11-17 RX ORDER — DIAZEPAM 5 MG/1
5-20 TABLET ORAL ONCE
Status: COMPLETED | OUTPATIENT
Start: 2020-11-17 | End: 2020-11-17

## 2020-11-17 RX ORDER — ROPIVACAINE HYDROCHLORIDE 2 MG/ML
INJECTION, SOLUTION EPIDURAL; INFILTRATION; PERINEURAL AS NEEDED
Status: DISCONTINUED | OUTPATIENT
Start: 2020-11-17 | End: 2020-11-17 | Stop reason: HOSPADM

## 2020-11-17 RX ADMIN — DIAZEPAM 10 MG: 5 TABLET ORAL at 13:47

## 2020-11-17 NOTE — PROCEDURES
[facility]    DIAGNOTIC LUMBAR MEDIAL BRANCH BLOCKS-TRIAL #1  PROCEDURE REPORT      PATIENT:  Terence Kam. YOB: 1960  DATE OF SERVICE:  11/17/2020  [facility]    PRE-PROCEDURE DIAGNOSIS:  Lumbar Facet Arthopathy    POST-PROCEDURE DIAGNOSIS:  Same                PROCEDURE:    bilateral   diagnostic lumbar medial branch blocks via   L3/L4,  L4/L5, and  L5 dorsal ramus block for suspected facet mediated pain from L4/5 and L5/S1. PRE-PROCEDURE NOTE:  The details of the procedure have been discussed with the patient, including the risks, benefits and alternative options and an informed consent was obtained. The availability of a responsible adult to escort the patient following the procedure were confirmed. The patient was counseled at length about the risks of michael Covid-19 during their perioperative period and any recovery window from their procedure. The patient was made aware that michael Covid-19  may worsen their prognosis for recovering from their procedure and lend to a higher morbidity and/or mortality risk. All material risks, benefits, and reasonable alternatives including postponing the procedure were discussed. The patient does  wish to proceed with the procedure at this time. PROCEDURE NOTE:  The patient was brought to the procedure suite and positioned on the fluoroscopy table in the prone position. Physiologic monitors were applied. The skin was prepped in the standard surgical fashion and sterile drapes were applied over the procedure site. Under AP fluoroscopic guidance a 25-gauge, #6 short bevel spinal needle was advanced to the junction of the superior articular process and transverse process of L4 and L5. A needle was also placed along the sacral ala to block the L5 dorsal ramus.    After negative vascular aspiration at each site, then  0.5 mL of 0.2% ropivacaine was injected at each location. The procedure was performed on the contralateral side in the same fashion. The needles were removed intact. The area was thoroughly cleaned and sterile bandages applied as necessary. The patient tolerated the procedure well. Patient will be called to assess the degree of pain relief and the ability to do functional activities.       Sintia Logan MD 11/17/2020 2:06 PM

## 2020-11-17 NOTE — INTERVAL H&P NOTE
Update History & Physical    The Patient's History and Physical of October 28, 2020 was reviewed. There was no change. The surgical site was confirmed by the patient and me. Plan:  The risk, benefits, expected outcome, and alternative to the recommended procedure have been discussed with the patient. Patient understands and wants to proceed with the procedure.     Electronically signed by Sancho Trevino MD on 11/17/2020 at 2:05 PM

## 2020-11-17 NOTE — TELEPHONE ENCOUNTER
----- Message from Saúl Mcclelland MD sent at 11/16/2020  5:01 PM EST -----  Regarding: F/u apts needed in March  Please schedule the pt for an in office apt (30 min) in March. Please schedule for labs 1 wk before his apt.     Thanks,  Dr. Richy López  [facility] [address], [address] [place] [address] Str.  Phone: [phone]  Fax: [phone]

## 2020-11-17 NOTE — DISCHARGE INSTRUCTIONS
[facility]   (HOOD)  Dr. Aquiles Khan, Dr. Nandini Mehta, Dr. Bailey Guillen Spinal Procedure (Block) Instructions    * Do not drive a car, operate heavy machinery or dangerous equipment, or make important decisions for 12-24 hours. * Light activity as tolerated; may rest for the remainder of the day. * Resume pre-block medications including those from your other doctors. * Do not drink alcoholic beverages for 24 hours. Alcohol and the medications you have received may interact and cause an adverse reaction. * You may feel better this evening and worse tomorrow, as the numbing medications wears off and the steroid has yet to begin to work. After 48-72 hrs the steroid should begin to release bringing you relief. If you had a medial branch block, no steroids were used. The medial branch block is a test to see if you are a candidate for radiofrequency ablation (RFA). The anesthetic (numbing medicine)  will wear off by the next day. * You may shower this evening and remove any bandages. * Avoid hot tubs/pools/tub soaks and heating pads for 24 hours. You may use cold packs on the procedure site as tolerated for the first 24 hours. * If a headache develops, drink plenty of fluids and rest.  Take over the counter medications for headache if needed. If the headache continues longer than 24 hours, call MD at the [address] [place] [address] Avenue. [phone]    * Continue taking pain medications as needed. * You may resume your regular diet if tolerated. Otherwise, start with sips of water and advance slowly. * If Diabetic: check your blood sugar three times a day for the next 3 days. If your sugar is greater than 300 call your family doctor. If your sugar is greater than 400, have someone transport you to the nearest Emergency Room. * If you experience any of the following problems, Please Call the [address] [place] [address] Avenue at [phone].         * Excessive pain, swelling, redness or odor at or around the surgical area    * Fever of 101 or higher    * Nausea / Vomiting lasting longer than 4 hours or if unable to take medications. * Severe Headache    * Weakness or numbness in arms or legs that is not      resolving   * Any NEW signs of decreased circulation or nerve impairment in leg: change in color, swelling, persistent numbness, tingling                    * Prolonged increase in pain greater than 4 days      PATIENT INSTRUCTIONS:    After oral sedation, for 12-24 hours or while taking prescription Narcotics:  · Limit your activities  · Do not drive and operate hazardous machinery  · Do not make important personal or business decisions  · Do  not drink alcoholic beverages  · If you have not urinated within 8 hours after discharge, please contact your surgeon on call. *  Please give a list of your current medications to your Primary Care Provider. *  Please update this list whenever your medications are discontinued, doses are      changed, or new medications (including over-the-counter products) are added. *  Please carry medication information at all times in case of emergency situations. These are general instructions for a healthy lifestyle:    No smoking/ No tobacco products/ Avoid exposure to second hand smoke    Surgeon General's Warning:  Quitting smoking now greatly reduces serious risk to your health. Obesity, smoking, and sedentary lifestyle greatly increases your risk for illness    A healthy diet, regular physical exercise & weight monitoring are important for maintaining a healthy lifestyle    You may be retaining fluid if you have a history of heart failure or if you experience any of the following symptoms:  Weight gain of 3 pounds or more overnight or 5 pounds in a week, increased swelling in our hands or feet or shortness of breath while lying flat in bed.   Please call your doctor as soon as you notice any of these symptoms; do not wait until your next office visit. Recognize signs and symptoms of STROKE:    F-face looks uneven    A-arms unable to move or move unevenly    S-speech slurred or non-existent    T-time-call 911 as soon as signs and symptoms begin-DO NOT go       Back to bed or wait to see if you get better-TIME IS BRAIN.

## 2020-11-23 ENCOUNTER — DOCUMENTATION ONLY (OUTPATIENT)
Dept: ORTHOPEDIC SURGERY | Age: 60
End: 2020-11-23

## 2020-11-23 NOTE — PROGRESS NOTES
Joyce Do. Trial #1 11/17/20  Medial Branch Block Bilateral L3/4, L4/5, L5/DR    11/17/20 Pre-Procedure Pain Level: 8  11/18/20 Post-Procedure Pain Level 2 ,The percent of pain relief 75%    Activities Performed: Activities Performed: reaching up, reaching down, getting up and down out of chair, standing, increased walking, lifing, bending, kitchen chores    Trial #2  12/8/20 12/8 Pre-Procedure Pain Level: 8  12/9 Post Procedure Pain Level 1 ,  The Percent of pain relief 88%    Activities Performed: reaching up, reaching down, getting up and down out of chair, standing, increased walking, lifing, bending, kitchen chores    RFA Scheduled 1/5/21, patient wishes to proceed with RFA.

## 2020-12-08 ENCOUNTER — APPOINTMENT (OUTPATIENT)
Dept: GENERAL RADIOLOGY | Age: 60
End: 2020-12-08
Attending: PHYSICAL MEDICINE & REHABILITATION
Payer: COMMERCIAL

## 2020-12-08 ENCOUNTER — HOSPITAL ENCOUNTER (OUTPATIENT)
Age: 60
Setting detail: OUTPATIENT SURGERY
Discharge: HOME | End: 2020-12-08
Attending: PHYSICAL MEDICINE & REHABILITATION | Admitting: PHYSICAL MEDICINE & REHABILITATION
Payer: COMMERCIAL

## 2020-12-08 VITALS
DIASTOLIC BLOOD PRESSURE: 88 MMHG | HEART RATE: 69 BPM | OXYGEN SATURATION: 99 % | SYSTOLIC BLOOD PRESSURE: 128 MMHG | TEMPERATURE: 98.2 F | RESPIRATION RATE: 18 BRPM

## 2020-12-08 LAB — GLUCOSE BLD STRIP.AUTO-MCNC: 139 MG/DL (ref 70–110)

## 2020-12-08 PROCEDURE — 64493 INJ PARAVERT F JNT L/S 1 LEV: CPT | Performed by: PHYSICAL MEDICINE & REHABILITATION

## 2020-12-08 PROCEDURE — 77030003672 HC NDL SPN HALY -A: Performed by: PHYSICAL MEDICINE & REHABILITATION

## 2020-12-08 PROCEDURE — 64494 INJ PARAVERT F JNT L/S 2 LEV: CPT | Performed by: PHYSICAL MEDICINE & REHABILITATION

## 2020-12-08 PROCEDURE — 74011250637 HC RX REV CODE- 250/637: Performed by: PHYSICAL MEDICINE & REHABILITATION

## 2020-12-08 PROCEDURE — 82962 GLUCOSE BLOOD TEST: CPT

## 2020-12-08 PROCEDURE — 2709999900 HC NON-CHARGEABLE SUPPLY: Performed by: PHYSICAL MEDICINE & REHABILITATION

## 2020-12-08 PROCEDURE — 74011250636 HC RX REV CODE- 250/636: Performed by: PHYSICAL MEDICINE & REHABILITATION

## 2020-12-08 PROCEDURE — 64495 INJ PARAVERT F JNT L/S 3 LEV: CPT | Performed by: PHYSICAL MEDICINE & REHABILITATION

## 2020-12-08 PROCEDURE — 76010000009 HC PAIN MGT 0 TO 30 MIN PROC: Performed by: PHYSICAL MEDICINE & REHABILITATION

## 2020-12-08 RX ORDER — DIAZEPAM 5 MG/1
5-20 TABLET ORAL ONCE
Status: COMPLETED | OUTPATIENT
Start: 2020-12-08 | End: 2020-12-08

## 2020-12-08 RX ORDER — ROPIVACAINE HYDROCHLORIDE 2 MG/ML
INJECTION, SOLUTION EPIDURAL; INFILTRATION; PERINEURAL AS NEEDED
Status: DISCONTINUED | OUTPATIENT
Start: 2020-12-08 | End: 2020-12-08 | Stop reason: HOSPADM

## 2020-12-08 RX ADMIN — DIAZEPAM 10 MG: 5 TABLET ORAL at 09:26

## 2020-12-08 NOTE — DISCHARGE INSTRUCTIONS
[facility]   (HOOD)  Dr. Mckayla Reed, Dr. Deanna Lam, Dr. Fede Daly Spinal Procedure (Block) Instructions    * Do not drive a car, operate heavy machinery or dangerous equipment, or make important decisions for 12-24 hours. * Light activity as tolerated; may rest for the remainder of the day. * Resume pre-block medications including those from your other doctors. * Do not drink alcoholic beverages for 24 hours. Alcohol and the medications you have received may interact and cause an adverse reaction. * You may feel better this evening and worse tomorrow, as the numbing medications wears off and the steroid has yet to begin to work. After 48-72 hrs the steroid should begin to release bringing you relief. If you had a medial branch block, no steroids were used. The medial branch block is a test to see if you are a candidate for radiofrequency ablation (RFA). The anesthetic (numbing medicine)  will wear off by the next day. * You may shower this evening and remove any bandages. * Avoid hot tubs/pools/tub soaks and heating pads for 24 hours. You may use cold packs on the procedure site as tolerated for the first 24 hours. * If a headache develops, drink plenty of fluids and rest.  Take over the counter medications for headache if needed. If the headache continues longer than 24 hours, call MD at the [address] [place] [address] Avenue. [phone]    * Continue taking pain medications as needed. * You may resume your regular diet if tolerated. Otherwise, start with sips of water and advance slowly. * If Diabetic: check your blood sugar three times a day for the next 3 days. If your sugar is greater than 300 call your family doctor. If your sugar is greater than 400, have someone transport you to the nearest Emergency Room. * If you experience any of the following problems, Please Call the [address] [place] [address] Avenue at [phone].         * Excessive pain, swelling, redness or odor at or around the surgical area    * Fever of 101 or higher    * Nausea / Vomiting lasting longer than 4 hours or if unable to take medications. * Severe Headache    * Weakness or numbness in arms or legs that is not      resolving   * Any NEW signs of decreased circulation or nerve impairment in leg: change in color, swelling, persistent numbness, tingling                    * Prolonged increase in pain greater than 4 days      PATIENT INSTRUCTIONS:    After oral sedation, for 12-24 hours or while taking prescription Narcotics:  · Limit your activities  · Do not drive and operate hazardous machinery  · Do not make important personal or business decisions  · Do  not drink alcoholic beverages  · If you have not urinated within 8 hours after discharge, please contact your surgeon on call. *  Please give a list of your current medications to your Primary Care Provider. *  Please update this list whenever your medications are discontinued, doses are      changed, or new medications (including over-the-counter products) are added. *  Please carry medication information at all times in case of emergency situations. These are general instructions for a healthy lifestyle:    No smoking/ No tobacco products/ Avoid exposure to second hand smoke    Surgeon General's Warning:  Quitting smoking now greatly reduces serious risk to your health. Obesity, smoking, and sedentary lifestyle greatly increases your risk for illness    A healthy diet, regular physical exercise & weight monitoring are important for maintaining a healthy lifestyle    You may be retaining fluid if you have a history of heart failure or if you experience any of the following symptoms:  Weight gain of 3 pounds or more overnight or 5 pounds in a week, increased swelling in our hands or feet or shortness of breath while lying flat in bed.   Please call your doctor as soon as you notice any of these symptoms; do not wait until your next office visit. Recognize signs and symptoms of STROKE:    F-face looks uneven    A-arms unable to move or move unevenly    S-speech slurred or non-existent    T-time-call 911 as soon as signs and symptoms begin-DO NOT go       Back to bed or wait to see if you get better-TIME IS BRAIN.
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
## 2020-12-08 NOTE — PROCEDURES
[facility]    DIAGNOTIC LUMBAR MEDIAL BRANCH BLOCKS  PROCEDURE REPORT, TRIAL#2      PATIENT:  Irma Guzman. YOB: 1960  DATE OF SERVICE:  12/8/2020  [facility]    PRE-PROCEDURE DIAGNOSIS:  Lumbar Facet Arthopathy    POST-PROCEDURE DIAGNOSIS:  Same                PROCEDURE:    bilateral   diagnostic lumbar medial branch blocks via   L3/L4,  L4/L5, and  L5 dorsal ramus block for suspected facet mediated pain from L4/5 and L5/S1. PRE-PROCEDURE NOTE:  The details of the procedure have been discussed with the patient, including the risks, benefits and alternative options and an informed consent was obtained. The availability of a responsible adult to escort the patient following the procedure were confirmed. The patient was counseled at length about the risks of michael Covid-19 during their perioperative period and any recovery window from their procedure. The patient was made aware that michael Covid-19  may worsen their prognosis for recovering from their procedure and lend to a higher morbidity and/or mortality risk. All material risks, benefits, and reasonable alternatives including postponing the procedure were discussed. The patient does  wish to proceed with the procedure at this time. PROCEDURE NOTE:  The patient was brought to the procedure suite and positioned on the fluoroscopy table in the prone position. Physiologic monitors were applied. The skin was prepped in the standard surgical fashion and sterile drapes were applied over the procedure site. Under AP fluoroscopic guidance a 25-gauge, 3-1/2 inch short bevel spinal needle was advanced to the junction of the superior articular process and transverse process of L4 and L5. A needle was also placed along the sacral ala to block the L5 dorsal ramus.    After negative vascular aspiration at each site, then  0.5 mL of 0.2% ropivacaine was injected at each location. The procedure was performed on the contralateral side in the same fashion. The needles were removed intact. The area was thoroughly cleaned and sterile bandages applied as necessary. The patient tolerated the procedure well. Patient will be called to assess the degree of pain relief and the ability to do functional activities.       Gamaliel Gorman MD 12/8/2020 10:45 AM

## 2020-12-16 RX ORDER — APIXABAN 5 MG/1
TABLET, FILM COATED ORAL
Qty: 60 TAB | Refills: 6 | Status: SHIPPED | OUTPATIENT
Start: 2020-12-16 | End: 2021-09-19

## 2020-12-19 DIAGNOSIS — E11.21 TYPE 2 DIABETES WITH NEPHROPATHY (HCC): ICD-10-CM

## 2020-12-21 RX ORDER — DULAGLUTIDE 1.5 MG/.5ML
INJECTION, SOLUTION SUBCUTANEOUS
Qty: 4 ML | Refills: 3 | Status: SHIPPED | OUTPATIENT
Start: 2020-12-21 | End: 2021-08-15

## 2020-12-30 DIAGNOSIS — D50.9 IRON DEFICIENCY ANEMIA, UNSPECIFIED IRON DEFICIENCY ANEMIA TYPE: ICD-10-CM

## 2020-12-31 RX ORDER — DOCUSATE SODIUM 100 MG/1
CAPSULE, LIQUID FILLED ORAL
Qty: 60 CAP | Refills: 6 | Status: SHIPPED | OUTPATIENT
Start: 2020-12-31 | End: 2021-08-04

## 2021-01-05 ENCOUNTER — HOSPITAL ENCOUNTER (OUTPATIENT)
Age: 61
Setting detail: OUTPATIENT SURGERY
Discharge: HOME | End: 2021-01-05
Attending: PHYSICAL MEDICINE & REHABILITATION | Admitting: PHYSICAL MEDICINE & REHABILITATION
Payer: COMMERCIAL

## 2021-01-05 ENCOUNTER — APPOINTMENT (OUTPATIENT)
Dept: GENERAL RADIOLOGY | Age: 61
End: 2021-01-05
Attending: PHYSICAL MEDICINE & REHABILITATION
Payer: COMMERCIAL

## 2021-01-05 VITALS
DIASTOLIC BLOOD PRESSURE: 82 MMHG | TEMPERATURE: 98.6 F | HEART RATE: 70 BPM | RESPIRATION RATE: 18 BRPM | OXYGEN SATURATION: 98 % | SYSTOLIC BLOOD PRESSURE: 162 MMHG

## 2021-01-05 LAB — GLUCOSE BLD STRIP.AUTO-MCNC: 82 MG/DL (ref 70–110)

## 2021-01-05 PROCEDURE — 74011000250 HC RX REV CODE- 250: Performed by: PHYSICAL MEDICINE & REHABILITATION

## 2021-01-05 PROCEDURE — 82962 GLUCOSE BLOOD TEST: CPT

## 2021-01-05 PROCEDURE — 64635 DESTROY LUMB/SAC FACET JNT: CPT | Performed by: PHYSICAL MEDICINE & REHABILITATION

## 2021-01-05 PROCEDURE — 77030037530: Performed by: PHYSICAL MEDICINE & REHABILITATION

## 2021-01-05 PROCEDURE — 74011250637 HC RX REV CODE- 250/637: Performed by: PHYSICAL MEDICINE & REHABILITATION

## 2021-01-05 PROCEDURE — 77030037529: Performed by: PHYSICAL MEDICINE & REHABILITATION

## 2021-01-05 PROCEDURE — 76010000010 HC PAIN MGT 31 TO 60 MIN PROC: Performed by: PHYSICAL MEDICINE & REHABILITATION

## 2021-01-05 PROCEDURE — 74011250636 HC RX REV CODE- 250/636: Performed by: PHYSICAL MEDICINE & REHABILITATION

## 2021-01-05 PROCEDURE — 64636 DESTROY L/S FACET JNT ADDL: CPT | Performed by: PHYSICAL MEDICINE & REHABILITATION

## 2021-01-05 PROCEDURE — 2709999900 HC NON-CHARGEABLE SUPPLY: Performed by: PHYSICAL MEDICINE & REHABILITATION

## 2021-01-05 RX ORDER — DIAZEPAM 5 MG/1
5-20 TABLET ORAL ONCE
Status: COMPLETED | OUTPATIENT
Start: 2021-01-05 | End: 2021-01-05

## 2021-01-05 RX ORDER — DEXAMETHASONE SODIUM PHOSPHATE 100 MG/10ML
INJECTION INTRAMUSCULAR; INTRAVENOUS AS NEEDED
Status: DISCONTINUED | OUTPATIENT
Start: 2021-01-05 | End: 2021-01-05 | Stop reason: HOSPADM

## 2021-01-05 RX ORDER — LIDOCAINE HYDROCHLORIDE 10 MG/ML
INJECTION, SOLUTION EPIDURAL; INFILTRATION; INTRACAUDAL; PERINEURAL AS NEEDED
Status: DISCONTINUED | OUTPATIENT
Start: 2021-01-05 | End: 2021-01-05 | Stop reason: HOSPADM

## 2021-01-05 RX ADMIN — DIAZEPAM 10 MG: 5 TABLET ORAL at 09:29

## 2021-01-05 NOTE — DISCHARGE INSTRUCTIONS
[facility]   (HOOD)  Dr. Kevin Bowman, Dr. Lee Ann Segovia, Dr. Ade Roberto Spinal Procedure (Block) Instructions    * Do not drive a car, operate heavy machinery or dangerous equipment, or make important decisions for 12-24 hours. * Light activity as tolerated; may rest for the remainder of the day. * Resume pre-block medications including those from your other doctors. * Do not drink alcoholic beverages for 24 hours. Alcohol and the medications you have received may interact and cause an adverse reaction. * You may feel better this evening and worse tomorrow, as the numbing medications wears off and the steroid has yet to begin to work. After 48-72 hrs the steroid should begin to release bringing you relief. If you had a medial branch block, no steroids were used. The medial branch block is a test to see if you are a candidate for radiofrequency ablation (RFA). The anesthetic (numbing medicine)  will wear off by the next day. * You may shower this evening and remove any bandages. * Avoid hot tubs/pools/tub soaks and heating pads for 24 hours. You may use cold packs on the procedure site as tolerated for the first 24 hours. * If a headache develops, drink plenty of fluids and rest.  Take over the counter medications for headache if needed. If the headache continues longer than 24 hours, call MD at the [address] [place] [address] Avenue. [phone]    * Continue taking pain medications as needed. * You may resume your regular diet if tolerated. Otherwise, start with sips of water and advance slowly. * If Diabetic: check your blood sugar three times a day for the next 3 days. If your sugar is greater than 300 call your family doctor. If your sugar is greater than 400, have someone transport you to the nearest Emergency Room. * If you experience any of the following problems, Please Call the [address] [place] [address] Avenue at [phone].         * Excessive pain, swelling, redness or odor at or around the surgical area    * Fever of 101 or higher    * Nausea / Vomiting lasting longer than 4 hours or if unable to take medications. * Severe Headache    * Weakness or numbness in arms or legs that is not      resolving   * Any NEW signs of decreased circulation or nerve impairment in leg: change in color, swelling, persistent numbness, tingling                    * Prolonged increase in pain greater than 4 days      PATIENT INSTRUCTIONS:    After oral sedation, for 12-24 hours or while taking prescription Narcotics:  · Limit your activities  · Do not drive and operate hazardous machinery  · Do not make important personal or business decisions  · Do  not drink alcoholic beverages  · If you have not urinated within 8 hours after discharge, please contact your surgeon on call. *  Please give a list of your current medications to your Primary Care Provider. *  Please update this list whenever your medications are discontinued, doses are      changed, or new medications (including over-the-counter products) are added. *  Please carry medication information at all times in case of emergency situations. These are general instructions for a healthy lifestyle:    No smoking/ No tobacco products/ Avoid exposure to second hand smoke    Surgeon General's Warning:  Quitting smoking now greatly reduces serious risk to your health. Obesity, smoking, and sedentary lifestyle greatly increases your risk for illness    A healthy diet, regular physical exercise & weight monitoring are important for maintaining a healthy lifestyle    You may be retaining fluid if you have a history of heart failure or if you experience any of the following symptoms:  Weight gain of 3 pounds or more overnight or 5 pounds in a week, increased swelling in our hands or feet or shortness of breath while lying flat in bed.   Please call your doctor as soon as you notice any of these symptoms; do not wait until your next office visit. Recognize signs and symptoms of STROKE:    F-face looks uneven    A-arms unable to move or move unevenly    S-speech slurred or non-existent    T-time-call 911 as soon as signs and symptoms begin-DO NOT go       Back to bed or wait to see if you get better-TIME IS BRAIN.

## 2021-01-05 NOTE — PROCEDURES
[facility]    LUMBAR RADIOFREQUENCY THERMOCOAGULATION   PROCEDURE REPORT      PATIENT:  Satya Gu  YOB: 1960  DATE OF SERVICE:  1/5/2021  [facility]  [place] [place]    PRE-PROCEDURE DIAGNOSIS:  Lumbar Facet Arthropathy, Lumbar Spondylosis  POST-PROCEDURE DIAGNOSIS:  Same  PROCEDURE: left radiofrequency thermocoagulation of lumbar medial branch nerves at L3/L4,  L4/L5 and the L5 dorsal ramus  for treatment of L4/5 and L5/S1  presumed lumbar facet joint mediated pain using the [employer] Coolief system    ANESTHESIA:   Local with or with out oral sedation. See Medication Administration Record for specific medications and dosage. PHYSICIAN:  Bhavya Finney MD    PRE-PROCEDURE NOTE:  Pre-procedural assessment of the patient was performed. The patient has had greater than 50% improvement of pain score and functional abilities with medial branch blocks and is considered an appropriate candidate for RFA. A full description of the procedure was provided including the risks, benefits, possible complications, and alternative options. Informed consent was given and signed. The availability of a responsible adult to escort the patient following the procedure was confirmed. The patient was counseled at length about the risks of michael Covid-19 during their perioperative period and any recovery window from their procedure. The patient was made aware that michael Covid-19  may worsen their prognosis for recovering from their procedure and lend to a higher morbidity and/or mortality risk. All material risks, benefits, and reasonable alternatives including postponing the procedure were discussed. The patient does  wish to proceed with the procedure at this time. PROCEDURE NOTE:  The patient was brought to the fluoroscopy suite and positioned on the fluoroscopy table in the prone position.  A grounding pad was applied to the lower extremity. Physiologic monitors were applied. The lumbar skin  was widely prepped, allowed to air dry, and draped in standard sterile surgical fashion. 1% Lidocaine was utilized via 25g needle for local anesthesia Please refer to the Flowsheet for documentation of the patients medications and vital signs. .    Under ipsilateral oblique fluoroscopic guidance a  17gauge 100mm radiofrequency introducer needle was placed and slowly advanced. The planned anatomic targets were approached in a perpendicular fashion to  the junction of the superior articular processes and the transverse processes of  left L4 and L5 . For the L5 dorsal ramus, the needle was placed at  the S1 superior articular process at the base of the sacral ala. Needle tip position was verified with additional lateral and AP views. After each individual needle was placed and stylets removed, a radiofrequency probe with a 4mm active tip was inserted. At each site,  motor testing at 2 Hz to a maximum of 2 volts was performed. The patient was awake and responsive during this portion of the procedure. Patient denied any complaints into the buttocks/leg. There was no evidence of motor stimulation in the ipsilateral gluteal muscles or extremity. After the negative aspiration of blood, air or CSF, each target was then anesthetized with 1-2 mL of lidocaine 2% . Each target was lesioned at 80 degrees Celsius for a total cycle of 2 minutes and 30 seconds. Tissue impedence remained below 500 Ohms. A mixture of 2mL lidocaine 1%  with dexamethasone 10mg [10mg/ml] was then injected through each radiofrequency needle . All needles and electrodes were removed intact. The area was thoroughly cleaned and sterile bandages applied as necessary. Fluoroscopic images were digitally archived. The patient tolerated the procedure well without complication and the vital signs remained stable throughout the procedure.     POST-PROCEDURE COURSE :  The patient was escorted from the procedure suite in satisfactory condition. The patient did not experience any adverse events and remained hemodynamically stable during the post-procedure period. Dressing C/D/I. LLE intact strength, SLR negative. Appropriate post-procedure instructions were provided  The patient is aware that their pain may flare and that 4-6 weeks may be required prior to the onset of pain relief.                   Lázaro Kumar MD 1/5/2021 10:17 AM

## 2021-01-05 NOTE — INTERVAL H&P NOTE
Update History & Physical 
 
The Patient's History and Physical of December 8, 2020 was reviewed. There was no change. The surgical site was confirmed by the patient and me. Plan:  The risk, benefits, expected outcome, and alternative to the recommended procedure have been discussed with the patient. Patient understands and wants to proceed with the procedure.  
 
Electronically signed by Addi Slaughter MD on 1/5/2021 at 10:16 AM

## 2021-01-06 ENCOUNTER — DOCUMENTATION ONLY (OUTPATIENT)
Dept: INTERNAL MEDICINE CLINIC | Age: 61
End: 2021-01-06

## 2021-01-06 NOTE — PROGRESS NOTES
Kashif CROWLEY Case ID: [id number] Need help? Call us at [phone]   Outcome   Approvedtoday   CaseId:[id number];Status:Approved; Review Type:Prior Auth; Coverage Start Date:12/07/2020; Coverage End Date:01/06/2022;   DrugEliquis 5MG tablets   Form[employer] Electronic [place] Form

## 2021-01-19 ENCOUNTER — APPOINTMENT (OUTPATIENT)
Dept: GENERAL RADIOLOGY | Age: 61
End: 2021-01-19
Attending: PHYSICAL MEDICINE & REHABILITATION
Payer: COMMERCIAL

## 2021-01-19 ENCOUNTER — HOSPITAL ENCOUNTER (OUTPATIENT)
Age: 61
Setting detail: OUTPATIENT SURGERY
Discharge: HOME | End: 2021-01-19
Attending: PHYSICAL MEDICINE & REHABILITATION | Admitting: PHYSICAL MEDICINE & REHABILITATION
Payer: COMMERCIAL

## 2021-01-19 VITALS
DIASTOLIC BLOOD PRESSURE: 92 MMHG | TEMPERATURE: 98.5 F | SYSTOLIC BLOOD PRESSURE: 110 MMHG | OXYGEN SATURATION: 99 % | RESPIRATION RATE: 18 BRPM | HEART RATE: 80 BPM

## 2021-01-19 LAB — GLUCOSE BLD STRIP.AUTO-MCNC: 144 MG/DL (ref 70–110)

## 2021-01-19 PROCEDURE — 74011250636 HC RX REV CODE- 250/636: Performed by: PHYSICAL MEDICINE & REHABILITATION

## 2021-01-19 PROCEDURE — 64636 DESTROY L/S FACET JNT ADDL: CPT | Performed by: PHYSICAL MEDICINE & REHABILITATION

## 2021-01-19 PROCEDURE — 82962 GLUCOSE BLOOD TEST: CPT

## 2021-01-19 PROCEDURE — 76010000010 HC PAIN MGT 31 TO 60 MIN PROC: Performed by: PHYSICAL MEDICINE & REHABILITATION

## 2021-01-19 PROCEDURE — 64635 DESTROY LUMB/SAC FACET JNT: CPT | Performed by: PHYSICAL MEDICINE & REHABILITATION

## 2021-01-19 PROCEDURE — 74011250637 HC RX REV CODE- 250/637: Performed by: PHYSICAL MEDICINE & REHABILITATION

## 2021-01-19 PROCEDURE — 2709999900 HC NON-CHARGEABLE SUPPLY: Performed by: PHYSICAL MEDICINE & REHABILITATION

## 2021-01-19 PROCEDURE — 74011000250 HC RX REV CODE- 250: Performed by: PHYSICAL MEDICINE & REHABILITATION

## 2021-01-19 RX ORDER — LIDOCAINE HYDROCHLORIDE 10 MG/ML
INJECTION, SOLUTION EPIDURAL; INFILTRATION; INTRACAUDAL; PERINEURAL AS NEEDED
Status: DISCONTINUED | OUTPATIENT
Start: 2021-01-19 | End: 2021-01-19 | Stop reason: HOSPADM

## 2021-01-19 RX ORDER — DEXAMETHASONE SODIUM PHOSPHATE 100 MG/10ML
INJECTION INTRAMUSCULAR; INTRAVENOUS AS NEEDED
Status: DISCONTINUED | OUTPATIENT
Start: 2021-01-19 | End: 2021-01-19 | Stop reason: HOSPADM

## 2021-01-19 RX ORDER — DIAZEPAM 5 MG/1
5-20 TABLET ORAL ONCE
Status: COMPLETED | OUTPATIENT
Start: 2021-01-19 | End: 2021-01-19

## 2021-01-19 RX ADMIN — DIAZEPAM 10 MG: 5 TABLET ORAL at 08:54

## 2021-01-19 NOTE — PROCEDURES
[facility]    LUMBAR RADIOFREQUENCY THERMOCOAGULATION   PROCEDURE REPORT      PATIENT:  Satya Gu  YOB: 1960  DATE OF SERVICE:  1/19/2021  [facility]  [place], [place]    PRE-PROCEDURE DIAGNOSIS:  Lumbar Facet Arthropathy, Lumbar Spondylosis  POST-PROCEDURE DIAGNOSIS:  Same  PROCEDURE: right radiofrequency thermocoagulation of lumbar medial branch nerves at L3/L4,  L4/L5 and the L5 dorsal ramus  for treatment of L4/5 and L5/S1  presumed lumbar facet joint mediated pain using the [employer] Coolief system    ANESTHESIA:   Local with or with out oral sedation. See Medication Administration Record for specific medications and dosage. PHYSICIAN:  Bhavya Finney MD    PRE-PROCEDURE NOTE:  Pre-procedural assessment of the patient was performed. The patient has had greater than 50% improvement of pain score and functional abilities with medial branch blocks and is considered an appropriate candidate for RFA. A full description of the procedure was provided including the risks, benefits, possible complications, and alternative options. Informed consent was given and signed. The availability of a responsible adult to escort the patient following the procedure was confirmed. The patient was counseled at length about the risks of michael Covid-19 during their perioperative period and any recovery window from their procedure. The patient was made aware that michael Covid-19  may worsen their prognosis for recovering from their procedure and lend to a higher morbidity and/or mortality risk. All material risks, benefits, and reasonable alternatives including postponing the procedure were discussed. The patient does  wish to proceed with the procedure at this time. PROCEDURE NOTE:  The patient was brought to the fluoroscopy suite and positioned on the fluoroscopy table in the prone position.  A grounding pad was applied to the lower extremity. Physiologic monitors were applied. The lumbar skin  was widely prepped, allowed to air dry, and draped in standard sterile surgical fashion. 1% Lidocaine was utilized via 25g needle for local anesthesia Please refer to the Flowsheet for documentation of the patients medications and vital signs. .    Under ipsilateral oblique fluoroscopic guidance a  17gauge 100mm radiofrequency introducer needle was placed and slowly advanced. The planned anatomic targets were approached in a perpendicular fashion to  the junction of the superior articular processes and the transverse processes of  right L4 and L5 . For the L5 dorsal ramus, the needle was placed at  the S1 superior articular process at the base of the sacral ala. Needle tip position was verified with additional lateral and AP views. After each individual needle was placed and stylets removed, a radiofrequency probe with a 4mm active tip was inserted. At each site,  motor testing at 2 Hz to a maximum of 2 volts was performed. The patient was awake and responsive during this portion of the procedure. Patient denied any complaints into the buttocks/leg. There was no evidence of motor stimulation in the ipsilateral gluteal muscles or extremity. After the negative aspiration of blood, air or CSF, each target was then anesthetized with 1-2 mL of lidocaine 2% . Each target was lesioned at 80 degrees Celsius for a total cycle of 2 minutes and 30 seconds. Tissue impedence remained below 500 Ohms. A mixture of 2mL lidocaine 1%  with dexamethasone 10mg [10mg/ml] was then injected through each radiofrequency needle . All needles and electrodes were removed intact. The area was thoroughly cleaned and sterile bandages applied as necessary. Fluoroscopic images were digitally archived. The patient tolerated the procedure well without complication and the vital signs remained stable throughout the procedure. POST-PROCEDURE COURSE :  The patient was escorted from the procedure suite in satisfactory condition. The patient did not experience any adverse events and remained hemodynamically stable during the post-procedure period. Patient was observed for at least 10 minutes post-procedure. No increase in back or leg symptoms. Dressing clean, dry, and intact. LE strength intact. Appropriate post-procedure instructions were provided  The patient is aware that their pain may flare and that 4-6 weeks may be required prior to the onset of pain relief.                   Ulises Callejas MD 1/19/2021 10:13 AM

## 2021-01-19 NOTE — H&P
Admission  Discharged     1/5/2021   [facility]   Referred by Larisa Dominguez MD  Reason for Visit        Procedures  Larisa Dominguez MD (Physician)  Physical Medicine and Rehabilitation      [place] [address] THERM[address]   PROCEDURE REPORT   PATIENT: En Tidwell   YOB: 1960   DATE OF SERVICE: 1/5/2021   [facility] [address] [place] [place]   PRE-PROCEDURE DIAGNOSIS: Lumbar Facet Arthropathy, Lumbar Spondylosis   POST-PROCEDURE DIAGNOSIS: Same   PROCEDURE: left radiofrequency thermocoagulation of lumbar medial branch nerves at L3/L4, L4/L5 and the L5 dorsal ramus for treatment of L4/5 and L5/S1 presumed lumbar facet joint mediated pain using the [employer] system   ANESTHESIA: Local with or with out oral sedation. See Medication Administration Record for specific medications and dosage. PHYSICIAN: Bhavya Bowman MD   PRE-PROCEDURE NOTE: Pre-procedural assessment of the patient was performed. The patient has had greater than 50% improvement of pain score and functional abilities with medial branch blocks and is considered an appropriate candidate for RFA. A full description of the procedure was provided including the risks, benefits, possible complications, and alternative options. Informed consent was given and signed. The availability of a responsible adult to escort the patient following the procedure was confirmed. The patient was counseled at length about the risks of michael Covid-19 during their perioperative period and any recovery window from their procedure. The patient was made aware that michael Covid-19 may worsen their prognosis for recovering from their procedure and lend to a higher morbidity and/or mortality risk. All material risks, benefits, and reasonable alternatives including postponing the procedure were discussed.  The patient does wish to proceed with the procedure at this time. PROCEDURE NOTE: The patient was brought to the fluoroscopy suite and positioned on the fluoroscopy table in the prone position. A grounding pad was applied to the lower extremity. Physiologic monitors were applied. The lumbar skin was widely prepped, allowed to air dry, and draped in standard sterile surgical fashion. 1% Lidocaine was utilized via 25g needle for local anesthesia Please refer to the Flowsheet for documentation of the patients medications and vital signs. .   Under ipsilateral oblique fluoroscopic guidance a 17gauge 100mm radiofrequency introducer needle was placed and slowly advanced. The planned anatomic targets were approached in a perpendicular fashion to the junction of the superior articular processes and the transverse processes of left L4 and L5 . For the L5 dorsal ramus, the needle was placed at the S1 superior articular process at the base of the sacral ala. Needle tip position was verified with additional lateral and AP views. After each individual needle was placed and stylets removed, a radiofrequency probe with a 4mm active tip was inserted. At each site, motor testing at 2 Hz to a maximum of 2 volts was performed. The patient was awake and responsive during this portion of the procedure. Patient denied any complaints into the buttocks/leg. There was no evidence of motor stimulation in the ipsilateral gluteal muscles or extremity. After the negative aspiration of blood, air or CSF, each target was then anesthetized with 1-2 mL of lidocaine 2% . Each target was lesioned at 80 degrees Celsius for a total cycle of 2 minutes and 30 seconds. Tissue impedence remained below 500 Ohms. A mixture of 2mL lidocaine 1% with dexamethasone 10mg [10mg/ml] was then injected through each radiofrequency needle . All needles and electrodes were removed intact. The area was thoroughly cleaned and sterile bandages applied as necessary.  Fluoroscopic images were digitally archived. The patient tolerated the procedure well without complication and the vital signs remained stable throughout the procedure. POST-PROCEDURE COURSE : The patient was escorted from the procedure suite in satisfactory condition. The patient did not experience any adverse events and remained hemodynamically stable during the post-procedure period. Dressing C/D/I. LLE intact strength, SLR negative. Appropriate post-procedure instructions were provided The patient is aware that their pain may flare and that 4-6 weeks may be required prior to the onset of pain relief. Miriam Nolasco MD 1/5/2021 10:17 AM   Interval H&P Note  Rhiannon Wakefield MD (Physician)  Physical Medicine and Rehabilitation      Update History & Physical   The Patient's History and Physical of December 8, 2020 was reviewed. There was no change. The surgical site was confirmed by the patient and me. Plan: The risk, benefits, expected outcome, and alternative to the recommended procedure have been discussed with the patient. Patient understands and wants to proceed with the procedure. Electronically signed by Miriam Nolasco MD on 1/5/2021 at 10:16 AM   Source Note                 H&P Deniz Mcintyre)  Rhiannon Wakefield MD (Physician)  Physical Medicine and Rehabilitation      [facility]. Trial #1 11/17/20 Medial Branch Block Bilateral L3/4, L4/5, L5/DR   11/17/20 Pre-Procedure Pain Level: 8   11/18/20 Post-Procedure Pain Level 2 ,The percent of pain relief 75%   Activities Performed:  Activities Performed: reaching up, reaching down, getting up and down out of chair, standing, increased walking, lifing, bending, kitchen chores   Other Notes  All notes     Periop Notes from [address] [place] [address] After Visit Summary (E-Signed 1/5/2021)               Additional Documentation  Vitals:  /82(BP 1 Location: Left arm, BP Patient Position: At rest;Prone) Pulse 70   Temp 98.6 °F (37 °C)   Resp 18   SpO2 98%   More Vitals     Flowsheets:  Pain Vitals,   Pre-Procedure Checklist,   DISCHARGE CHECKLIST,   Procedure Verification      Encounter Info:  Billing Info,   History,   Allergies,   Detailed Report                New Media  Scan on 1/7/2021 1420 by Ana Singh: Consents/Legal   Scan on 1/7/2021 1420 by Ana Singh: Orders             Orders Placed    GLUCOSE, POC No remaining occurrences     NC XR TECHNOLOGIST SERVICE ONE TIME     All Encounter Results        Medication List at Discharge  albuterol sulfate 90 mcg/actuation 2 Puffs Inhalation EVERY 6 HOURS AS NEEDED   amlodipine besylate 10 mg TAKE 1/2 TABLET BY MOUTH DAILY   apixaban 5 mg TAKE 1 TABLET BY MOUTH TWICE DAILY     ascorbic acid,vit C/ascorbate calcium,sodium 1 Tab Oral DAILY   bipap machine Does Not Apply, BiPAP at 23/18 cm with heated humidifier. Mask: Simplus FF, medium size or mask of choice. Length of need 99 months. Replace mask and accessories as needed times 12 months. Please download data after 30 days and fax at [phone]. Dx: Severe FITO, Obesity, snoring. Patient taking differently: by Does Not Apply route. BiPAP at 23/18 cm with heated humidifier. Mask: Simplus FF, medium size or mask of choice. Length of need 99 months. Replace mask and accessories as needed times 12 months. Please download data after 30 days and fax at [phone]. Dx: Severe FITO, Obesity, snoring. [employer]   blood-glucose meter,continuous Use with the Dexcom sensor and transmitter device to monitor your blood glucose continuously, removing/replacing this meter every 10 days. blood-glucose sensor Use with the Dexcom meter and transmitter device to monitor your blood glucose continuously, removing/replacing this meter every 10 days. blood-glucose transmitter Use with the Dexcom sensor and meter device to monitor your blood glucose continuously, removing/replacing this meter every 10 days.    bupropion HCl 150 mg TAKE 1 TABLET BY MOUTH EVERY DAY   cholecalciferol (vitamin D3) 2,000 Units Oral DAILY   Patient taking differently: Take 1,000 Units by mouth two (2) times a day. cyanocobalamin (vitamin B-12) 1,000 mcg Oral 2 TIMES DAILY   cyclobenzaprine HCl 5-10 mg Oral BEDTIME PRN     cyclosporine 0.05 % Takes 1 drop in each eye twice a day. docusate sodium 100 mg TAKE 1 CAPSULE BY MOUTH TWICE DAILY   dulaglutide 1.5 mg/0.5 mL ADMINISTER 0.5 ML UNDER THE SKIN EVERY 7 DAYS   fenofibrate nanocrystallized 145 mg Oral DAILY   ferrous sulfate 325 mg (65 mg iron) TAKE 1 TABLET BY MOUTH TWICE DAILY   furosemide 20 mg Oral DAILY   gabapentin 400 mg Oral 3 TIMES DAILY   hydrochlorothiazide 25 mg Oral DAILY   insulin detemir 47 Units SubCUTAneous DAILY   insulin lispro 100 unit/mL INJECT UNDER THE SKIN 22 units with meals   losartan potassium 100 mg Oral DAILY   metformin HCl 1,000 mg TAKE 1 TABLET BY MOUTH TWICE DAILY WITH MEALS   metoprolol succinate 50 mg TAKE 1 TABLET BY MOUTH DAILY     mv-mins/folic/lycopene/ginkgo,multivit-min/folic/v. .. 1 Tab Oral DAILY     omega-3/dha/epa/fish oil 1,000 mg (120 mg-180 mg) 1 Cap Oral 2 TIMES DAILY   pantoprazole sodium 20 mg Oral DAILY AS NEEDED   pen needle, diabetic 32 gauge x 5/32\" USE TO TEST BLOOD SUGAR THREE TIMES DAILY PLUS SLIDING SCALE   rosuvastatin calcium 40 mg TAKE 1 TABLET BY MOUTH NIGHTLY.   sildenafil citrate 20 mg Take 1-5 po as needed.      Medications Administered  diazepam 10 mg     Visit Diagnoses  None     Problem List

## 2021-01-19 NOTE — PERIOP NOTES
Patient tolerated procedure well. No complications noted. VSS. No redness, swelling, or bleeding from injection site. Dressing dry and intact. Armband removed and shredded. Patient taken in a wheelchair to main entrance and discharged alive and well, in stable condition.

## 2021-01-19 NOTE — DISCHARGE INSTRUCTIONS
[facility]   (HOOD)  Dr. Lin Loja, Dr. ZAPATA [facility], Dr. Shoemaker Care Spinal Procedure (Block) Instructions    * Do not drive a car, operate heavy machinery or dangerous equipment, or make important decisions for 12-24 hours. * Light activity as tolerated; may rest for the remainder of the day. * Resume pre-block medications including those from your other doctors. * Do not drink alcoholic beverages for 24 hours. Alcohol and the medications you have received may interact and cause an adverse reaction. * You may feel better this evening and worse tomorrow, as the numbing medications wears off and the steroid has yet to begin to work. After 48-72 hrs the steroid should begin to release bringing you relief. If you had a medial branch block, no steroids were used. The medial branch block is a test to see if you are a candidate for radiofrequency ablation (RFA). The anesthetic (numbing medicine)  will wear off by the next day. * You may shower this evening and remove any bandages. * Avoid hot tubs/pools/tub soaks and heating pads for 24 hours. You may use cold packs on the procedure site as tolerated for the first 24 hours. * If a headache develops, drink plenty of fluids and rest.  Take over the counter medications for headache if needed. If the headache continues longer than 24 hours, call MD at the [address] [place] [address] Avenue. [phone]    * Continue taking pain medications as needed. * You may resume your regular diet if tolerated. Otherwise, start with sips of water and advance slowly. * If Diabetic: check your blood sugar three times a day for the next 3 days. If your sugar is greater than 300 call your family doctor. If your sugar is greater than 400, have someone transport you to the nearest Emergency Room. * If you experience any of the following problems, Please Call the [address] [place] [address] Avenue at [phone].         * Excessive pain, swelling, redness or odor at or around the surgical area    * Fever of 101 or higher    * Nausea / Vomiting lasting longer than 4 hours or if unable to take medications. * Severe Headache    * Weakness or numbness in arms or legs that is not      resolving   * Any NEW signs of decreased circulation or nerve impairment in leg: change in color, swelling, persistent numbness, tingling                    * Prolonged increase in pain greater than 4 days      PATIENT INSTRUCTIONS:    After oral sedation, for 12-24 hours or while taking prescription Narcotics:  · Limit your activities  · Do not drive and operate hazardous machinery  · Do not make important personal or business decisions  · Do  not drink alcoholic beverages  · If you have not urinated within 8 hours after discharge, please contact your surgeon on call. *  Please give a list of your current medications to your Primary Care Provider. *  Please update this list whenever your medications are discontinued, doses are      changed, or new medications (including over-the-counter products) are added. *  Please carry medication information at all times in case of emergency situations. These are general instructions for a healthy lifestyle:    No smoking/ No tobacco products/ Avoid exposure to second hand smoke    Surgeon General's Warning:  Quitting smoking now greatly reduces serious risk to your health. Obesity, smoking, and sedentary lifestyle greatly increases your risk for illness    A healthy diet, regular physical exercise & weight monitoring are important for maintaining a healthy lifestyle    You may be retaining fluid if you have a history of heart failure or if you experience any of the following symptoms:  Weight gain of 3 pounds or more overnight or 5 pounds in a week, increased swelling in our hands or feet or shortness of breath while lying flat in bed.   Please call your doctor as soon as you notice any of these symptoms; do not wait until your next office visit. Recognize signs and symptoms of STROKE:    F-face looks uneven    A-arms unable to move or move unevenly    S-speech slurred or non-existent    T-time-call 911 as soon as signs and symptoms begin-DO NOT go       Back to bed or wait to see if you get better-TIME IS BRAIN.

## 2021-01-19 NOTE — INTERVAL H&P NOTE
Update History & Physical 
 
The Patient's History and Physical of January 5, 2021 was reviewed. There was no change. The surgical site was confirmed by the patient and me. Plan:  The risk, benefits, expected outcome, and alternative to the recommended procedure have been discussed with the patient. Patient understands and wants to proceed with the procedure.  
 
Electronically signed by Tyler Carreon MD on 1/19/2021 at 10:12 AM

## 2021-02-16 DIAGNOSIS — E11.21 TYPE 2 DIABETES WITH NEPHROPATHY (HCC): ICD-10-CM

## 2021-02-16 DIAGNOSIS — I10 ESSENTIAL HYPERTENSION: ICD-10-CM

## 2021-02-17 ENCOUNTER — OFFICE VISIT (OUTPATIENT)
Dept: ORTHOPEDIC SURGERY | Age: 61
End: 2021-02-17
Payer: COMMERCIAL

## 2021-02-17 VITALS
OXYGEN SATURATION: 97 % | BODY MASS INDEX: 36.96 KG/M2 | WEIGHT: 264 LBS | HEART RATE: 82 BPM | TEMPERATURE: 97.3 F | HEIGHT: 71 IN | SYSTOLIC BLOOD PRESSURE: 150 MMHG | DIASTOLIC BLOOD PRESSURE: 85 MMHG

## 2021-02-17 DIAGNOSIS — G62.9 NEUROPATHY: ICD-10-CM

## 2021-02-17 DIAGNOSIS — M47.816 LUMBAR FACET ARTHROPATHY: ICD-10-CM

## 2021-02-17 PROCEDURE — 99214 OFFICE O/P EST MOD 30 MIN: CPT | Performed by: PHYSICAL MEDICINE & REHABILITATION

## 2021-02-17 RX ORDER — GABAPENTIN 400 MG/1
400 CAPSULE ORAL 3 TIMES DAILY
Qty: 270 CAP | Refills: 1 | Status: SHIPPED | OUTPATIENT
Start: 2021-02-17 | End: 2021-07-22 | Stop reason: SDUPTHER

## 2021-02-17 RX ORDER — CYCLOBENZAPRINE HCL 5 MG
5-10 TABLET ORAL
Qty: 60 TAB | Refills: 2 | Status: SHIPPED | OUTPATIENT
Start: 2021-02-17 | End: 2021-10-20 | Stop reason: ALTCHOICE
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
## 2021-02-17 NOTE — PATIENT INSTRUCTIONS
Learning About How to Have a Healthy Back  What causes back pain? Back pain is often caused by overuse, strain, or injury. For example, people often hurt their backs playing sports or working in the yard, being jolted in a car accident, or lifting something too heavy. Aging plays a part too. Your bones and muscles tend to lose strength as you age, which makes injury more likely. The spongy discs between the bones of the spine (vertebrae) may suffer from wear and tear and no longer provide enough cushion between the bones. A disc that bulges or breaks open (herniated disc) can press on nerves, causing back pain. In some people, back pain is the result of arthritis, broken vertebrae caused by bone loss (osteoporosis), illness, or a spine problem. Although most people have back pain at one time or another, there are steps you can take to make it less likely. How can you have a healthy back? Reduce stress on your back through good posture   Slumping or slouching alone may not cause low back pain. But after the back has been strained or injured, bad posture can make pain worse. · Sleep in a position that maintains your back's normal curves and on a mattress that feels comfortable. Sleep on your side with a pillow between your knees, or sleep on your back with a pillow under your knees. These positions can reduce strain on your back. · Stand and sit up straight. \"Good posture\" generally means your ears, shoulders, and hips are in a straight line. · If you must stand for a long time, put one foot on a stool, ledge, or box. Switch feet every now and then. · Sit in a chair that is low enough to let you place both feet flat on the floor with both knees nearly level with your hips. If your chair or desk is too high, use a footrest to raise your knees. Place a small pillow, a rolled-up towel, or a lumbar roll in the curve of your back if you need extra support.   · Try a kneeling chair, which helps tilt your hips forward. This takes pressure off your lower back. · Try sitting on an exercise ball. It can rock from side to side, which helps keep your back loose. · When driving, keep your knees nearly level with your hips. Sit straight, and drive with both hands on the steering wheel. Your arms should be in a slightly bent position. Reduce stress on your back through careful lifting   · Squat down, bending at the hips and knees only. If you need to, put one knee to the floor and extend your other knee in front of you, bent at a right angle (half kneeling). · Press your chest straight forward. This helps keep your upper back straight while keeping a slight arch in your low back. · Hold the load as close to your body as possible, at the level of your belly button (navel). · Use your feet to change direction, taking small steps. · Lead with your hips as you change direction. Keep your shoulders in line with your hips as you move. · Set down your load carefully, squatting with your knees and hips only. Exercise and stretch your back   · Do some exercise on most days of the week, if your doctor says it is okay. You can walk, run, swim, or cycle. · Stretch your back muscles. Here are a few exercises to try:  ? Lie on your back, and gently pull one bent knee to your chest. Put that foot back on the floor, and then pull the other knee to your chest.  ? Do pelvic tilts. Lie on your back with your knees bent. Tighten your stomach muscles. Pull your belly button (navel) in and up toward your ribs. You should feel like your back is pressing to the floor and your hips and pelvis are slightly lifting off the floor. Hold for 6 seconds while breathing smoothly. ? Sit with your back flat against a wall. · Keep your core muscles strong. The muscles of your back, belly (abdomen), and buttocks support your spine. ? Pull in your belly and imagine pulling your navel toward your spine. Hold this for 6 seconds, then relax.  Remember to keep breathing normally as you tense your muscles. ? Do curl-ups. Always do them with your knees bent. Keep your low back on the floor, and curl your shoulders toward your knees using a smooth, slow motion. Keep your arms folded across your chest. If this bothers your neck, try putting your hands behind your neck (not your head), with your elbows spread apart. ? Lie on your back with your knees bent and your feet flat on the floor. Tighten your belly muscles, and then push with your feet and raise your buttocks up a few inches. Hold this position 6 seconds as you continue to breathe normally, then lower yourself slowly to the floor. Repeat 8 to 12 times. ? If you like group exercise, try Pilates or yoga. These classes have poses that strengthen the core muscles. Lead a healthy lifestyle   · Stay at a healthy weight to avoid strain on your back. · Do not smoke. Smoking increases the risk of osteoporosis, which weakens the spine. If you need help quitting, talk to your doctor about stop-smoking programs and medicines. These can increase your chances of quitting for good. Where can you learn more? Go to http://www.[employer].com/  Enter L315 in the search box to learn more about \"Learning About How to Have a Healthy Back. \"  Current as of: March 2, 2020               Content Version: 12.6  © 6481-1305 [employer]. Care instructions adapted under license by [employer] (which disclaims liability or warranty for this information). If you have questions about a medical condition or this instruction, always ask your healthcare professional. [place] [address] any warranty or liability for your use of this information.

## 2021-02-18 DIAGNOSIS — I10 ESSENTIAL HYPERTENSION: ICD-10-CM

## 2021-02-18 RX ORDER — LOSARTAN POTASSIUM 25 MG/1
TABLET ORAL
Qty: 90 TAB | Refills: 0 | OUTPATIENT
Start: 2021-02-18

## 2021-02-18 NOTE — TELEPHONE ENCOUNTER
Please call him and confirm the actual dose of losartan he is taking. Meanwhile, please schedule him for a 30-minute in office or virtual visit per his preference in March or April. Please schedule for labs a week before his appointment.     Dr. Garret Gallego  [facility], [address] [place] [address], [address] [place] [address] Str.  Phone: [phone]  Fax: [phone]

## 2021-02-19 RX ORDER — LOSARTAN POTASSIUM 100 MG/1
100 TABLET ORAL DAILY
Qty: 90 TAB | Refills: 1 | Status: SHIPPED | OUTPATIENT
Start: 2021-02-19 | End: 2021-11-04

## 2021-02-19 NOTE — TELEPHONE ENCOUNTER
Per pt he is currently takin 100 mg of losartan.    He said he does still have on hand some 25 mg and 50 mg.     appt scheduled for 04/22/2021 lab and virtual appt 04/29/2021

## 2021-03-26 DIAGNOSIS — F32.A DEPRESSION, UNSPECIFIED DEPRESSION TYPE: ICD-10-CM

## 2021-03-26 RX ORDER — BUPROPION HYDROCHLORIDE 150 MG/1
TABLET, EXTENDED RELEASE ORAL
Qty: 90 TAB | Refills: 1 | Status: SHIPPED | OUTPATIENT
Start: 2021-03-26 | End: 2021-09-25

## 2021-03-26 RX ORDER — METOPROLOL SUCCINATE 50 MG/1
TABLET, EXTENDED RELEASE ORAL
Qty: 90 TAB | Refills: 1 | Status: SHIPPED | OUTPATIENT
Start: 2021-03-26 | End: 2021-09-25

## 2021-04-20 DIAGNOSIS — E11.21 TYPE 2 DIABETES WITH NEPHROPATHY (HCC): ICD-10-CM

## 2021-04-20 RX ORDER — METFORMIN HYDROCHLORIDE 1000 MG/1
TABLET ORAL
Qty: 60 TAB | Refills: 5 | Status: SHIPPED | OUTPATIENT
Start: 2021-04-20 | End: 2021-12-13

## 2021-04-22 ENCOUNTER — APPOINTMENT (OUTPATIENT)
Dept: INTERNAL MEDICINE CLINIC | Age: 61
End: 2021-04-22

## 2021-04-23 LAB
A-G RATIO,AGRAT: 2 RATIO (ref 1.1–2.6)
ALBUMIN SERPL-MCNC: 4.2 G/DL (ref 3.5–5)
ALP SERPL-CCNC: 64 U/L (ref 40–125)
ALT SERPL-CCNC: 19 U/L (ref 5–40)
ANION GAP SERPL CALC-SCNC: 15 MMOL/L (ref 3–15)
AST SERPL W P-5'-P-CCNC: 21 U/L (ref 10–37)
BILIRUB SERPL-MCNC: 0.3 MG/DL (ref 0.2–1.2)
BUN SERPL-MCNC: 42 MG/DL (ref 6–22)
CALCIUM SERPL-MCNC: 10.2 MG/DL (ref 8.4–10.5)
CHLORIDE SERPL-SCNC: 96 MMOL/L (ref 98–110)
CHOLEST SERPL-MCNC: 106 MG/DL (ref 110–200)
CO2 SERPL-SCNC: 28 MMOL/L (ref 20–32)
CREAT SERPL-MCNC: 1.9 MG/DL (ref 0.8–1.6)
CREATININE, URINE: 118 MG/DL
GFRAA, 66117: 43.4
GFRNA, 66118: 35.8
GLOBULIN,GLOB: 2.1 G/DL (ref 2–4)
GLUCOSE SERPL-MCNC: 234 MG/DL (ref 70–99)
HDLC SERPL-MCNC: 23 MG/DL
HDLC SERPL-MCNC: 4.6 MG/DL (ref 0–5)
LDL, DIRECT,DLDL: 22 MG/DL (ref 50–99)
LDL/HDL RATIO,LDHD: 1
LDLC SERPL CALC-MCNC: ABNORMAL MG/DL
MICROALB/CREAT RATIO, 140286: 563.6 (ref 0–30)
MICROALBUMIN,URINE RANDOM 140054: 665 MG/L (ref 0.1–17)
NON-HDL CHOLESTEROL, 011976: 83 MG/DL
POTASSIUM SERPL-SCNC: 3.7 MMOL/L (ref 3.5–5.5)
PROT SERPL-MCNC: 6.3 G/DL (ref 6.2–8.1)
SODIUM SERPL-SCNC: 139 MMOL/L (ref 133–145)
TRIGL SERPL-MCNC: 469 MG/DL (ref 40–149)
VLDLC SERPL CALC-MCNC: 94 MG/DL (ref 8–30)

## 2021-04-25 NOTE — PROGRESS NOTES
I will discuss his results with him at his upcoming apt. His total cholesterol is 106. LDL is 83. HDL is 23. Triglycerides are 469. His creatinine is 1.9, above his 1.1 baseline. BUN is very high at 42. This is highly suggestive of underlying LINDSEY from dehydration. His microalbuminuria has improved.     Dr. Artemio Alvarez  [facility], [address] [place] [address], [address] [place] [address] Str.  Phone: [phone]  Fax: [phone]

## 2021-04-29 ENCOUNTER — VIRTUAL VISIT (OUTPATIENT)
Dept: INTERNAL MEDICINE CLINIC | Age: 61
End: 2021-04-29
Payer: COMMERCIAL

## 2021-04-29 DIAGNOSIS — G47.33 OSA TREATED WITH BIPAP: ICD-10-CM

## 2021-04-29 DIAGNOSIS — N17.9 AKI (ACUTE KIDNEY INJURY) (HCC): ICD-10-CM

## 2021-04-29 DIAGNOSIS — E11.21 TYPE 2 DIABETES WITH NEPHROPATHY (HCC): ICD-10-CM

## 2021-04-29 DIAGNOSIS — K75.81 NASH (NONALCOHOLIC STEATOHEPATITIS): ICD-10-CM

## 2021-04-29 DIAGNOSIS — I10 ESSENTIAL HYPERTENSION: ICD-10-CM

## 2021-04-29 DIAGNOSIS — I48.19 PERSISTENT ATRIAL FIBRILLATION (HCC): ICD-10-CM

## 2021-04-29 DIAGNOSIS — E78.5 DYSLIPIDEMIA: Primary | ICD-10-CM

## 2021-04-29 DIAGNOSIS — J44.9 COPD, GROUP B, BY GOLD 2017 CLASSIFICATION (HCC): ICD-10-CM

## 2021-04-29 PROCEDURE — 99214 OFFICE O/P EST MOD 30 MIN: CPT | Performed by: INTERNAL MEDICINE

## 2021-04-29 RX ORDER — OMEGA-3-ACID ETHYL ESTERS 1 G/1
2 CAPSULE, LIQUID FILLED ORAL 2 TIMES DAILY WITH MEALS
Qty: 120 CAP | Refills: 5 | Status: SHIPPED | OUTPATIENT
Start: 2021-04-29 | End: 2021-11-11
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
## 2021-05-04 RX ORDER — PEN NEEDLE, DIABETIC 32GX 5/32"
NEEDLE, DISPOSABLE MISCELLANEOUS
Qty: 200 PEN NEEDLE | Refills: 5 | Status: SHIPPED | OUTPATIENT
Start: 2021-05-04 | End: 2022-04-21

## 2021-05-05 ENCOUNTER — DOCUMENTATION ONLY (OUTPATIENT)
Dept: INTERNAL MEDICINE CLINIC | Age: 61
End: 2021-05-05

## 2021-05-05 NOTE — PROGRESS NOTES
Ash CROWLEY Case ID: [id number] - Rx #: [id number] help? Call us at [phone]  Outcome  Approvedtoday  JNIVTR:[id number];RSUTAW:[id number]; Review Type:Prior Auth; Coverage Start Date:04/05/2021; Coverage End Date:05/04/2024;  Drug  Omega-3-acid Ethyl Esters 1GM capsules  Form  [employer] Electronic PA Form (2017 NCPDP)  Original Claim Info  75 CALL HELP DESKFOR EMRG OVD, SCC=13 PER [facility] DISCRETION  Faxed to pharmacy.

## 2021-05-08 DIAGNOSIS — E11.9 WELL CONTROLLED DIABETES MELLITUS (HCC): ICD-10-CM

## 2021-05-10 RX ORDER — INSULIN DETEMIR 100 [IU]/ML
INJECTION, SOLUTION SUBCUTANEOUS
Qty: 12 ML | Refills: 5 | Status: SHIPPED | OUTPATIENT
Start: 2021-05-10 | End: 2021-05-26 | Stop reason: SDUPTHER

## 2021-05-18 DIAGNOSIS — E11.21 TYPE 2 DIABETES WITH NEPHROPATHY (HCC): ICD-10-CM

## 2021-05-18 RX ORDER — BLOOD-GLUCOSE SENSOR
EACH MISCELLANEOUS
Qty: 3 DEVICE | Refills: 5 | Status: SHIPPED | OUTPATIENT
Start: 2021-05-18 | End: 2021-12-02 | Stop reason: SDUPTHER

## 2021-05-20 RX ORDER — AMLODIPINE BESYLATE 10 MG/1
TABLET ORAL
Qty: 135 TABLET | Refills: 1 | Status: SHIPPED | OUTPATIENT
Start: 2021-05-20 | End: 2022-07-05

## 2021-05-26 DIAGNOSIS — E11.9 WELL CONTROLLED DIABETES MELLITUS (HCC): ICD-10-CM

## 2021-05-26 RX ORDER — INSULIN DETEMIR 100 [IU]/ML
INJECTION, SOLUTION SUBCUTANEOUS
Qty: 12 ML | Refills: 5 | Status: SHIPPED | OUTPATIENT
Start: 2021-05-26 | End: 2021-10-04 | Stop reason: ALTCHOICE

## 2021-05-26 NOTE — TELEPHONE ENCOUNTER
Venkat Lopez is calling from [facility] stating they accidentally deleted the rx for [address]. Asking for that to be resent.

## 2021-06-03 DIAGNOSIS — D50.9 IRON DEFICIENCY ANEMIA, UNSPECIFIED IRON DEFICIENCY ANEMIA TYPE: ICD-10-CM

## 2021-06-03 RX ORDER — FERROUS SULFATE TAB 325 MG (65 MG ELEMENTAL FE) 325 (65 FE) MG
TAB ORAL
Qty: 60 TABLET | Refills: 5 | Status: SHIPPED | OUTPATIENT
Start: 2021-06-03 | End: 2021-12-13

## 2021-07-01 DIAGNOSIS — I10 ESSENTIAL HYPERTENSION: ICD-10-CM

## 2021-07-02 RX ORDER — FUROSEMIDE 20 MG/1
TABLET ORAL
Qty: 30 TABLET | Refills: 5 | Status: SHIPPED | OUTPATIENT
Start: 2021-07-02 | End: 2022-02-09

## 2021-07-17 DIAGNOSIS — D50.9 IRON DEFICIENCY ANEMIA, UNSPECIFIED IRON DEFICIENCY ANEMIA TYPE: ICD-10-CM

## 2021-07-17 DIAGNOSIS — Z87.19 HISTORY OF GI BLEED: ICD-10-CM

## 2021-07-17 RX ORDER — PANTOPRAZOLE SODIUM 20 MG/1
TABLET, DELAYED RELEASE ORAL
Qty: 90 TABLET | Refills: 3 | Status: SHIPPED | OUTPATIENT
Start: 2021-07-17 | End: 2022-07-26

## 2021-07-19 DIAGNOSIS — Z12.5 PROSTATE CANCER SCREENING: ICD-10-CM

## 2021-07-19 DIAGNOSIS — I10 ESSENTIAL HYPERTENSION: ICD-10-CM

## 2021-07-19 DIAGNOSIS — E11.9 WELL CONTROLLED DIABETES MELLITUS (HCC): ICD-10-CM

## 2021-07-19 DIAGNOSIS — Z11.59 NEED FOR HEPATITIS C SCREENING TEST: ICD-10-CM

## 2021-07-20 RX ORDER — ROSUVASTATIN CALCIUM 40 MG/1
TABLET, COATED ORAL
Qty: 90 TABLET | Refills: 3 | Status: SHIPPED | OUTPATIENT
Start: 2021-07-20 | End: 2022-07-26

## 2021-07-22 ENCOUNTER — OFFICE VISIT (OUTPATIENT)
Dept: ORTHOPEDIC SURGERY | Age: 61
End: 2021-07-22
Payer: COMMERCIAL

## 2021-07-22 VITALS
WEIGHT: 267.8 LBS | HEART RATE: 61 BPM | BODY MASS INDEX: 37.49 KG/M2 | HEIGHT: 71 IN | TEMPERATURE: 97.3 F | OXYGEN SATURATION: 100 % | RESPIRATION RATE: 20 BRPM

## 2021-07-22 DIAGNOSIS — M47.816 LUMBAR SPONDYLOSIS: Primary | ICD-10-CM

## 2021-07-22 DIAGNOSIS — G62.9 NEUROPATHY: ICD-10-CM

## 2021-07-22 PROCEDURE — 99214 OFFICE O/P EST MOD 30 MIN: CPT | Performed by: PHYSICAL MEDICINE & REHABILITATION

## 2021-07-22 RX ORDER — GABAPENTIN 400 MG/1
400 CAPSULE ORAL 3 TIMES DAILY
Qty: 270 CAPSULE | Refills: 1 | Status: SHIPPED | OUTPATIENT
Start: 2021-07-22 | End: 2022-01-20 | Stop reason: SDUPTHER

## 2021-07-22 NOTE — H&P (VIEW-ONLY)
Sebas Mendoza [facility] [address]  [place] [address]  Phone: [phone]  Fax: [phone]  : 1960  PCP: Candi An MD    PROGRESS NOTE    Diagnoses and all orders for this visit:    1. Lumbar spondylosis  -     SCHEDULE SURGERY    2. Neuropathy  -     gabapentin (NEURONTIN) 400 mg capsule; Take 1 Capsule by mouth three (3) times daily. Max Daily Amount: 1,200 mg.         1. Deepthi Johnson is a 63 y.o. male with recurrent progressive axial low back pain. He remains neurologically intact. He has pain with facet loading. He wishes to have the RF repeated, last ablation was over 6 months ago. 2. Refill gabapentin  3. Continue home exercise  4. Schedule for radiofrequency ablation bilateral L4/L5 and L5/S1. Needs clearance from Dr. Bill Mccormack cardiology regarding Eliquis         Pain Assessment  2021   Location of Pain Back   Pain Location Comment -   Location Modifiers -   Severity of Pain 7   Quality of Pain Sharp; Aching   Quality of Pain Comment -   Duration of Pain Persistent   Frequency of Pain Constant   Date Pain First Started -   Aggravating Factors Bending;Walking   Aggravating Factors Comment -   Limiting Behavior Some   Relieving Factors Other (Comment)   Relieving Factors Comment procedure   Result of Injury -   Work-Related Injury -   Type of Injury -       History of Present Illness:  Deepthi Johnson is a  63 y.o.  male returns today for low back pain. This has been getting steadily worse. He reports good benefit from RF. Denies any sciatic pains. He has his usual numbness in his feet. He takes gabapentin 400 mg 3 times daily with benefit. Interim medical history is otherwise unchanged. He is on chronic Eliquis due to his atrial fibrillation reports that his heart and diabetes have been stable.   Takes Flexeril only as needed      Physical Exam:  NAD  BMI 37  TTP bilateral L4/L5, L5/S1  Good forward flexion  Pain with facet loading, extension and rotation  Lower extremity strength intact  Straight leg raise negative  1+ pitting edema distal lower extremities  Ambulation nonantalgic without assistive device      Visit Vitals  Pulse 61   Temp 97.3 °F (36.3 °C) (Temporal)   Resp 20   Ht 5' 11\" (1.803 m)   Wt 267 lb 12.8 oz (121.5 kg)   SpO2 100%   BMI 37.35 kg/m²           Pertinent past spine history:  Physical therapy:Yes (years ago) little relief  Doing HEP: Sometimes, stationary bike  Beneficial medications: Gabapentin 400mg TID. Flexeril 5 mg 1-2 tab QHS. Eliquis (Dr. Hirsch). Previously Tramadol 50 mg PRN.   Failed medications: Unable to take NSAIDs. Topamax  Spinal injections: 1/2021 Dr. Wandy Morse B/L L3/4 L4/5 L5/DR MANDY w/ benefit. 1/2014 B/L L5/S1. 9/2018 B/L L5/S1 facet joint injection- partial temporary benefit     Spinal surgery- No.      L MRI 9/2020: spondylolisthesis at L5-S1 with facet hypertrophy and lateral recess stenosis. Severe FA at L4-5  L MRI 2013: advanced FA at L5-S1      PMHx of A-fib, R knee replacement, end-stage OA of L knee. He is a retired . Pt reports his daughter has connected him to an jarad to help him watch what he eats to diet and have accountability with her and his son. Mother passed 6/2021. We have informed Lorean Dakins to notify us for immediate appointment if he has any worsening neurogical symptoms or if an emergency situation presents, then call 911. Unintended errors may be present due to dragon medical dictation software.

## 2021-07-22 NOTE — PROGRESS NOTES
Sebas Mendoza [facility] [address], [address]  [place] [address]  Phone: [phone]  Fax: [phone]  : 1960  PCP: Johnnie Siddiqui MD    PROGRESS NOTE    Diagnoses and all orders for this visit:    1. Lumbar spondylosis  -     SCHEDULE SURGERY    2. Neuropathy  -     gabapentin (NEURONTIN) 400 mg capsule; Take 1 Capsule by mouth three (3) times daily. Max Daily Amount: 1,200 mg.         1. [place] is a 63 y.o. male with recurrent progressive axial low back pain. He remains neurologically intact. He has pain with facet loading. He wishes to have the RF repeated, last ablation was over 6 months ago. 2. Refill gabapentin  3. Continue home exercise  4. Schedule for radiofrequency ablation bilateral L4/L5 and L5/S1. Needs clearance from Dr. Rafael Baires cardiology regarding Eliquis         Pain Assessment  2021   Location of Pain Back   Pain Location Comment -   Location Modifiers -   Severity of Pain 7   Quality of Pain Sharp; Aching   Quality of Pain Comment -   Duration of Pain Persistent   Frequency of Pain Constant   Date Pain First Started -   Aggravating Factors Bending;Walking   Aggravating Factors Comment -   Limiting Behavior Some   Relieving Factors Other (Comment)   Relieving Factors Comment procedure   Result of Injury -   Work-Related Injury -   Type of Injury -       History of Present Illness:  [place] is a  63 y.o.  male returns today for low back pain. This has been getting steadily worse. He reports good benefit from RF. Denies any sciatic pains. He has his usual numbness in his feet. He takes gabapentin 400 mg 3 times daily with benefit. Interim medical history is otherwise unchanged. He is on chronic Eliquis due to his atrial fibrillation reports that his heart and diabetes have been stable.   Takes Flexeril only as needed      Physical Exam:  NAD  BMI 37  TTP bilateral L4/L5, L5/S1  Good forward flexion  Pain with facet loading, extension and rotation  Lower extremity strength intact  Straight leg raise negative  1+ pitting edema distal lower extremities  Ambulation nonantalgic without assistive device      Visit Vitals  Pulse 61   Temp 97.3 °F (36.3 °C) (Temporal)   Resp 20   Ht 5' 11\" (1.803 m)   Wt 267 lb 12.8 oz (121.5 kg)   SpO2 100%   BMI 37.35 kg/m²           Pertinent past spine history:  Physical therapy:Yes (years ago) little relief  Doing HEP: Sometimes, stationary bike  Beneficial medications: Gabapentin 400mg TID. Flexeril 5 mg 1-2 tab QHS. Eliquis (Dr. Hirsch). Previously Tramadol 50 mg PRN.   Failed medications: Unable to take NSAIDs. Topamax  Spinal injections: 1/2021 Dr. Popeye Patel B/L L3/4 L4/5 L5/DR MANDY w/ benefit. 1/2014 B/L L5/S1. 9/2018 B/L L5/S1 facet joint injection- partial temporary benefit     Spinal surgery- No.      L MRI 9/2020: spondylolisthesis at L5-S1 with facet hypertrophy and lateral recess stenosis. Severe FA at L4-5  L MRI 2013: advanced FA at L5-S1      PMHx of A-fib, R knee replacement, end-stage OA of L knee. He is a retired . Pt reports his daughter has connected him to an jarad to help him watch what he eats to diet and have accountability with her and his son. Mother passed 6/2021. We have informed Omer Pham to notify us for immediate appointment if he has any worsening neurogical symptoms or if an emergency situation presents, then call 911. Unintended errors may be present due to dragon medical dictation software.

## 2021-07-22 NOTE — PROGRESS NOTES
1. Have you been to an emergency room or urgent care clinic since your last visit? No     Hospitalized since your last visit? If yes, where, when, and reason for visit? No     2. Have you seen or consulted any other health care providers outside of the [facility] since your last visit including any procedures, health maintenance items. If yes, where, when and reason for visit?  Yes; ENT ,          3 most recent PHQ Screens 7/22/2021   PHQ Not Done -   Little interest or pleasure in doing things Not at all   Feeling down, depressed, irritable, or hopeless Not at all   Total Score PHQ 2 0   Trouble falling or staying asleep, or sleeping too much -   Feeling tired or having little energy -   Poor appetite, weight loss, or overeating -   Feeling bad about yourself - or that you are a failure or have let yourself or your family down -   Trouble concentrating on things such as school, work, reading, or watching TV -   Moving or speaking so slowly that other people could have noticed; or the opposite being so fidgety that others notice -   Thoughts of being better off dead, or hurting yourself in some way -   PHQ 9 Score -   How difficult have these problems made it for you to do your work, take care of your home and get along with others -

## 2021-07-28 ENCOUNTER — DOCUMENTATION ONLY (OUTPATIENT)
Dept: CARDIOLOGY CLINIC | Age: 61
End: 2021-07-28

## 2021-07-28 NOTE — PROGRESS NOTES
Received clearance request form from [facility] for bilateral radio frequency ablation by Dr. Jewel Staton. Record reviewed by Dr. Kvng Zhong. Patient cleared for procedure at a low cardiac risk. Patient may stop his Eliquis for 2-3 days prior to procedure. Clearance letter sent to [facility]. Marlon Wilson, surgery scheduler.

## 2021-07-28 NOTE — LETTER
7/28/2021 9:18 AM    Mr. Biju Bell  [address] [place] [address] [address]  [place] [address]                Dr. Ros Reyna,    Biju Bell is a patient at [facility]. From a cardiac standpoint he is cleared for procedure at a low cardiac risk. He may stop his Eliquis for 2-3 days prior to procedure. Please feel free to contact our office if you have any questions regarding this patient.                    Sincerely,           Chavo Cheung MD

## 2021-07-29 DIAGNOSIS — E11.21 TYPE 2 DIABETES WITH NEPHROPATHY (HCC): ICD-10-CM

## 2021-07-29 DIAGNOSIS — E78.5 DYSLIPIDEMIA: ICD-10-CM

## 2021-08-04 DIAGNOSIS — D50.9 IRON DEFICIENCY ANEMIA, UNSPECIFIED IRON DEFICIENCY ANEMIA TYPE: ICD-10-CM

## 2021-08-04 RX ORDER — DOCUSATE SODIUM 100 MG/1
CAPSULE, LIQUID FILLED ORAL
Qty: 60 CAPSULE | Refills: 6 | Status: SHIPPED | OUTPATIENT
Start: 2021-08-04 | End: 2022-09-14

## 2021-08-10 ENCOUNTER — HOSPITAL ENCOUNTER (OUTPATIENT)
Age: 61
Setting detail: OUTPATIENT SURGERY
Discharge: HOME | End: 2021-08-10
Attending: PHYSICAL MEDICINE & REHABILITATION | Admitting: PHYSICAL MEDICINE & REHABILITATION
Payer: COMMERCIAL

## 2021-08-10 ENCOUNTER — APPOINTMENT (OUTPATIENT)
Dept: GENERAL RADIOLOGY | Age: 61
End: 2021-08-10
Attending: PHYSICAL MEDICINE & REHABILITATION
Payer: COMMERCIAL

## 2021-08-10 VITALS
HEART RATE: 77 BPM | RESPIRATION RATE: 20 BRPM | TEMPERATURE: 98.3 F | DIASTOLIC BLOOD PRESSURE: 83 MMHG | OXYGEN SATURATION: 95 % | SYSTOLIC BLOOD PRESSURE: 159 MMHG

## 2021-08-10 LAB — GLUCOSE BLD STRIP.AUTO-MCNC: 85 MG/DL (ref 70–110)

## 2021-08-10 PROCEDURE — 2709999900 HC NON-CHARGEABLE SUPPLY: Performed by: PHYSICAL MEDICINE & REHABILITATION

## 2021-08-10 PROCEDURE — 64635 DESTROY LUMB/SAC FACET JNT: CPT | Performed by: PHYSICAL MEDICINE & REHABILITATION

## 2021-08-10 PROCEDURE — 74011000250 HC RX REV CODE- 250: Performed by: PHYSICAL MEDICINE & REHABILITATION

## 2021-08-10 PROCEDURE — 76010000010 HC PAIN MGT 31 TO 60 MIN PROC: Performed by: PHYSICAL MEDICINE & REHABILITATION

## 2021-08-10 PROCEDURE — 82962 GLUCOSE BLOOD TEST: CPT

## 2021-08-10 PROCEDURE — 64636 DESTROY L/S FACET JNT ADDL: CPT | Performed by: PHYSICAL MEDICINE & REHABILITATION

## 2021-08-10 PROCEDURE — 74011250636 HC RX REV CODE- 250/636: Performed by: PHYSICAL MEDICINE & REHABILITATION

## 2021-08-10 PROCEDURE — 74011250637 HC RX REV CODE- 250/637: Performed by: PHYSICAL MEDICINE & REHABILITATION

## 2021-08-10 RX ORDER — LIDOCAINE HYDROCHLORIDE 20 MG/ML
INJECTION, SOLUTION EPIDURAL; INFILTRATION; INTRACAUDAL; PERINEURAL AS NEEDED
Status: DISCONTINUED | OUTPATIENT
Start: 2021-08-10 | End: 2021-08-10 | Stop reason: HOSPADM

## 2021-08-10 RX ORDER — DIAZEPAM 5 MG/1
5-20 TABLET ORAL ONCE
Status: COMPLETED | OUTPATIENT
Start: 2021-08-10 | End: 2021-08-10

## 2021-08-10 RX ORDER — DEXAMETHASONE SODIUM PHOSPHATE 100 MG/10ML
INJECTION INTRAMUSCULAR; INTRAVENOUS AS NEEDED
Status: DISCONTINUED | OUTPATIENT
Start: 2021-08-10 | End: 2021-08-10 | Stop reason: HOSPADM

## 2021-08-10 RX ORDER — LIDOCAINE HYDROCHLORIDE 10 MG/ML
INJECTION, SOLUTION EPIDURAL; INFILTRATION; INTRACAUDAL; PERINEURAL AS NEEDED
Status: DISCONTINUED | OUTPATIENT
Start: 2021-08-10 | End: 2021-08-10 | Stop reason: HOSPADM

## 2021-08-10 RX ADMIN — DIAZEPAM 10 MG: 5 TABLET ORAL at 13:20

## 2021-08-10 NOTE — DISCHARGE INSTRUCTIONS
[facility]   (HOOD)  Dr. Sawyer Langston, Dr. Cohen, Dr. Stefanie Allen Spinal Procedure (Block) Instructions    * Do not drive a car, operate heavy machinery or dangerous equipment, or make important decisions for 12-24 hours. * Light activity as tolerated; may rest for the remainder of the day. * Resume pre-block medications including those from your other doctors. * Do not drink alcoholic beverages for 24 hours. Alcohol and the medications you have received may interact and cause an adverse reaction. * You may feel better this evening and worse tomorrow, as the numbing medications wears off and the steroid has yet to begin to work. After 48-72 hrs the steroid should begin to release bringing you relief. If you had a medial branch block, no steroids were used. The medial branch block is a test to see if you are a candidate for radiofrequency ablation (RFA). The anesthetic (numbing medicine)  will wear off by the next day. * You may shower this evening and remove any bandages. * Avoid hot tubs/pools/tub soaks and heating pads for 24 hours. You may use cold packs on the procedure site as tolerated for the first 24 hours. * If a headache develops, drink plenty of fluids and rest.  Take over the counter medications for headache if needed. If the headache continues longer than 24 hours, call MD at the [address] [place] [address] Avenue. [phone]    * Continue taking pain medications as needed. * You may resume your regular diet if tolerated. Otherwise, start with sips of water and advance slowly. * If Diabetic: check your blood sugar three times a day for the next 3 days. If your sugar is greater than 300 call your family doctor. If your sugar is greater than 400, have someone transport you to the nearest Emergency Room. * If you experience any of the following problems, Please Call the [address] [place] [address] Avenue at [phone].         * Excessive pain, swelling, redness or odor at or around the surgical area    * Fever of 101 or higher    * Nausea / Vomiting lasting longer than 4 hours or if unable to take medications. * Severe Headache    * Weakness or numbness in arms or legs that is not      resolving   * Any NEW signs of decreased circulation or nerve impairment in leg: change in color, swelling, persistent numbness, tingling                    * Prolonged increase in pain greater than 4 days      PATIENT INSTRUCTIONS:    After oral sedation, for 12-24 hours or while taking prescription Narcotics:  · Limit your activities  · Do not drive and operate hazardous machinery  · Do not make important personal or business decisions  · Do  not drink alcoholic beverages  · If you have not urinated within 8 hours after discharge, please contact your surgeon on call. *  Please give a list of your current medications to your Primary Care Provider. *  Please update this list whenever your medications are discontinued, doses are      changed, or new medications (including over-the-counter products) are added. *  Please carry medication information at all times in case of emergency situations. These are general instructions for a healthy lifestyle:    No smoking/ No tobacco products/ Avoid exposure to second hand smoke    Surgeon General's Warning:  Quitting smoking now greatly reduces serious risk to your health. Obesity, smoking, and sedentary lifestyle greatly increases your risk for illness    A healthy diet, regular physical exercise & weight monitoring are important for maintaining a healthy lifestyle    You may be retaining fluid if you have a history of heart failure or if you experience any of the following symptoms:  Weight gain of 3 pounds or more overnight or 5 pounds in a week, increased swelling in our hands or feet or shortness of breath while lying flat in bed.   Please call your doctor as soon as you notice any of these symptoms; do not wait until your next office visit. Recognize signs and symptoms of STROKE:    F-face looks uneven    A-arms unable to move or move unevenly    S-speech slurred or non-existent    T-time-call 911 as soon as signs and symptoms begin-DO NOT go       Back to bed or wait to see if you get better-TIME IS BRAIN.

## 2021-08-10 NOTE — PROCEDURES
[facility]    LUMBAR RADIOFREQUENCY THERMOCOAGULATION   PROCEDURE REPORT      PATIENT:  Gayle Lexington  YOB: 1960  DATE OF SERVICE:  8/10/2021  SITE:  [address] [place] [address]    PRE-PROCEDURE DIAGNOSIS:  Lumbar Facet Arthropathy, Lumbar Spondylosis  POST-PROCEDURE DIAGNOSIS:  Same  PROCEDURE: bilateral  radiofrequency thermocoagulation of lumbar medial branch nerves at L3/L4,  L4/L5 and the L5 dorsal ramus  for treatment of L4/5 and L5/S1  presumed lumbar facet joint mediated pain using the [employer] system    ANESTHESIA:   Local with or with out oral sedation. See Medication Administration Record for specific medications and dosage. PHYSICIAN:  Bhavya Mar MD    PRE-PROCEDURE NOTE:  Pre-procedural assessment of the patient was performed. The patient has had greater than 50% improvement of pain score and functional abilities with medial branch blocks and is considered an appropriate candidate for RFA. A full description of the procedure was provided including the risks, benefits, possible complications, and alternative options. Informed consent was given and signed. The availability of a responsible adult to escort the patient following the procedure was confirmed. PROCEDURE NOTE:  The patient was brought to the fluoroscopy suite and positioned on the fluoroscopy table in the prone position. A grounding pad was applied to the lower extremity. Physiologic monitors were applied. The lumbar skin  was widely prepped, allowed to air dry, and draped in standard sterile surgical fashion. 1% Lidocaine was utilized via 25g needle for local anesthesia Please refer to the Flowsheet for documentation of the patients medications and vital signs. .    Under ipsilateral oblique fluoroscopic guidance a  17gauge 100mm radiofrequency introducer needle was placed and slowly advanced.  The planned anatomic targets were approached in a perpendicular fashion to  the junction of the superior articular processes and the transverse processes of  bilateral  L4 and L5 . For the L5 dorsal ramus, the needle was placed at  the S1 superior articular process at the base of the sacral ala. Needle tip position was verified with additional lateral and AP views. After each individual needle was placed and stylets removed, a radiofrequency probe with a 4mm active tip was inserted. At each site,  motor testing at 2 Hz to a maximum of 2 volts was performed. The patient was awake and responsive during this portion of the procedure. Patient denied any complaints into the buttocks/leg. There was no evidence of motor stimulation in the ipsilateral gluteal muscles or extremity. After the negative aspiration of blood, air or CSF, each target was then anesthetized with 1-2 mL of lidocaine 2% . Each target was lesioned at 80 degrees Celsius for a total cycle of 2 minutes and 30 seconds. Tissue impedence remained below 500 Ohms. A mixture of 2mL lidocaine 1%  with dexamethasone 10mg [10mg/ml] was then injected through each radiofrequency needle . All needles and electrodes were removed intact. The area was thoroughly cleaned and sterile bandages applied as necessary. Fluoroscopic images were digitally archived. The patient tolerated the procedure well without complication and the vital signs remained stable throughout the procedure. POST-PROCEDURE COURSE :  The patient was escorted from the procedure suite in satisfactory condition. The patient did not experience any adverse events and remained hemodynamically stable during the post-procedure period. Patient was observed for at least 10 minutes post-procedure. No increase in back or leg symptoms. Dressing clean, dry, and intact. LE strength intact. Appropriate post-procedure instructions were provided  The patient is aware that their pain may flare and that 4-6 weeks may be required prior to the onset of pain relief. Patient reported bola-procedural pain on Visual Analog Scale:  pre-9; post-5.                   Sean Cunningham MD 8/10/2021 1:41 PM

## 2021-08-10 NOTE — INTERVAL H&P NOTE
Update History & Physical    The Patient's History and Physical of July 22, 2021 was reviewed. There was no change. The surgical site was confirmed by the patient and me. Off Eliquis for procedure, can resume tonight if no excessive bleeding. Plan:  The risk, benefits, expected outcome, and alternative to the recommended procedure have been discussed with the patient. Patient understands and wants to proceed with the procedure.     Electronically signed by Kavya Trivedi MD on 8/10/2021 at 1:38 PM

## 2021-08-14 DIAGNOSIS — E11.21 TYPE 2 DIABETES WITH NEPHROPATHY (HCC): ICD-10-CM

## 2021-08-15 RX ORDER — DULAGLUTIDE 1.5 MG/.5ML
INJECTION, SOLUTION SUBCUTANEOUS
Qty: 4 ML | Refills: 3 | Status: SHIPPED | OUTPATIENT
Start: 2021-08-15 | End: 2022-04-08

## 2021-09-02 ENCOUNTER — APPOINTMENT (OUTPATIENT)
Dept: INTERNAL MEDICINE CLINIC | Age: 61
End: 2021-09-02

## 2021-09-03 LAB
A-G RATIO,AGRAT: 1.9 RATIO (ref 1.1–2.6)
ALBUMIN SERPL-MCNC: 4.3 G/DL (ref 3.5–5)
ALP SERPL-CCNC: 61 U/L (ref 40–125)
ALT SERPL-CCNC: 18 U/L (ref 5–40)
ANION GAP SERPL CALC-SCNC: 16 MMOL/L (ref 3–15)
AST SERPL W P-5'-P-CCNC: 25 U/L (ref 10–37)
AVG GLU, 10930: 205 MG/DL (ref 91–123)
BILIRUB SERPL-MCNC: 0.2 MG/DL (ref 0.2–1.2)
BILIRUB UR QL: NEGATIVE
BUN SERPL-MCNC: 27 MG/DL (ref 6–22)
CALCIUM SERPL-MCNC: 10.4 MG/DL (ref 8.4–10.5)
CHLORIDE SERPL-SCNC: 99 MMOL/L (ref 98–110)
CHOLEST SERPL-MCNC: 113 MG/DL (ref 110–200)
CLARITY: CLEAR
CO2 SERPL-SCNC: 26 MMOL/L (ref 20–32)
COLOR UR: YELLOW
CREAT SERPL-MCNC: 1.5 MG/DL (ref 0.8–1.6)
GFRAA, 66117: 56.4
GFRNA, 66118: 46.5
GLOBULIN,GLOB: 2.3 G/DL (ref 2–4)
GLUCOSE SERPL-MCNC: 184 MG/DL (ref 70–99)
GLUCOSE UR QL: ABNORMAL MG/DL
HBA1C MFR BLD HPLC: 8.8 % (ref 4.8–5.6)
HDLC SERPL-MCNC: 25 MG/DL
HDLC SERPL-MCNC: 4.5 MG/DL (ref 0–5)
HGB UR QL STRIP: NEGATIVE
KETONES UR QL STRIP.AUTO: NEGATIVE MG/DL
LDL/HDL RATIO,LDHD: 0.7
LDLC SERPL CALC-MCNC: 18 MG/DL (ref 50–99)
LEUKOCYTE ESTERASE: NEGATIVE
NITRITE UR QL STRIP.AUTO: NEGATIVE
NON-HDL CHOLESTEROL, 011976: 88 MG/DL
PH UR STRIP: 5 PH (ref 5–8)
POTASSIUM SERPL-SCNC: 3.2 MMOL/L (ref 3.5–5.5)
PROT SERPL-MCNC: 6.6 G/DL (ref 6.2–8.1)
PROT UR QL STRIP: ABNORMAL MG/DL
RBC #/AREA URNS HPF: ABNORMAL /HPF
SODIUM SERPL-SCNC: 141 MMOL/L (ref 133–145)
SP GR UR: 1.02 (ref 1–1.03)
TRIGL SERPL-MCNC: 351 MG/DL (ref 40–149)
URINE ASCORBIC ACID: NEGATIVE MG/DL
UROBILINOGEN UR STRIP-MCNC: <2 MG/DL
VLDLC SERPL CALC-MCNC: 70 MG/DL (ref 8–30)
WBC URNS QL MICRO: ABNORMAL /HPF

## 2021-09-06 NOTE — PROGRESS NOTES
I will discuss his results with him at his upcoming appointment. His triglycerides are down from 469 to 351. HDL is 25. Cholesterol is 113. His LDL is 82. His A1c is down from 9.7 in October to 8.8. His potassium is low at 3.2. His creatinine is 1.5 but within normal limits. BUN is elevated at 27.     Dr. Lis Lugo  [facility] [address], [facility]  Phone: [phone]  Fax: [phone]

## 2021-09-14 NOTE — PROGRESS NOTES
[place] presents today for   Chief Complaint   Patient presents with    Follow-up    Labs     9-2-21              Coordination of Care:  1. Have you been to the ER, urgent care clinic since your last visit? Hospitalized since your last visit? yes    2. Have you seen or consulted any other health care providers outside of the [address] [place] [address] since your last visit? Include any pap smears or colon screening.  yes

## 2021-09-15 ENCOUNTER — OFFICE VISIT (OUTPATIENT)
Dept: INTERNAL MEDICINE CLINIC | Age: 61
End: 2021-09-15
Payer: COMMERCIAL

## 2021-09-15 VITALS
TEMPERATURE: 97.8 F | OXYGEN SATURATION: 99 % | WEIGHT: 275 LBS | HEIGHT: 71 IN | RESPIRATION RATE: 18 BRPM | BODY MASS INDEX: 38.5 KG/M2 | HEART RATE: 60 BPM | DIASTOLIC BLOOD PRESSURE: 74 MMHG | SYSTOLIC BLOOD PRESSURE: 128 MMHG

## 2021-09-15 DIAGNOSIS — E11.21 TYPE 2 DIABETES WITH NEPHROPATHY (HCC): ICD-10-CM

## 2021-09-15 DIAGNOSIS — E78.5 DYSLIPIDEMIA: ICD-10-CM

## 2021-09-15 DIAGNOSIS — G47.33 OSA TREATED WITH BIPAP: ICD-10-CM

## 2021-09-15 DIAGNOSIS — R42 DIZZINESS: ICD-10-CM

## 2021-09-15 DIAGNOSIS — I10 ESSENTIAL HYPERTENSION: Primary | ICD-10-CM

## 2021-09-15 PROCEDURE — 99214 OFFICE O/P EST MOD 30 MIN: CPT | Performed by: INTERNAL MEDICINE

## 2021-09-15 PROCEDURE — 3052F HG A1C>EQUAL 8.0%<EQUAL 9.0%: CPT | Performed by: INTERNAL MEDICINE

## 2021-09-15 RX ORDER — POTASSIUM CHLORIDE 20 MEQ/1
20 TABLET, EXTENDED RELEASE ORAL 2 TIMES DAILY
Qty: 180 TABLET | Refills: 3 | Status: SHIPPED | OUTPATIENT
Start: 2021-09-15 | End: 2022-07-26

## 2021-09-15 NOTE — PATIENT INSTRUCTIONS
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
## 2021-09-19 RX ORDER — APIXABAN 5 MG/1
TABLET, FILM COATED ORAL
Qty: 60 TABLET | Refills: 6 | Status: SHIPPED | OUTPATIENT
Start: 2021-09-19 | End: 2022-04-10

## 2021-09-25 DIAGNOSIS — F32.A DEPRESSION, UNSPECIFIED DEPRESSION TYPE: ICD-10-CM

## 2021-09-25 RX ORDER — BUPROPION HYDROCHLORIDE 150 MG/1
TABLET, EXTENDED RELEASE ORAL
Qty: 90 TABLET | Refills: 1 | Status: SHIPPED | OUTPATIENT
Start: 2021-09-25 | End: 2022-03-11

## 2021-09-25 RX ORDER — METOPROLOL SUCCINATE 50 MG/1
TABLET, EXTENDED RELEASE ORAL
Qty: 90 TABLET | Refills: 1 | Status: SHIPPED | OUTPATIENT
Start: 2021-09-25 | End: 2022-03-11

## 2021-10-03 DIAGNOSIS — E78.5 DYSLIPIDEMIA: ICD-10-CM

## 2021-10-03 DIAGNOSIS — E11.9 WELL CONTROLLED DIABETES MELLITUS (HCC): ICD-10-CM

## 2021-10-03 DIAGNOSIS — E66.01 SEVERE OBESITY (BMI 35.0-39.9) WITH COMORBIDITY (HCC): ICD-10-CM

## 2021-10-03 RX ORDER — FENOFIBRATE 145 MG/1
TABLET, COATED ORAL
Qty: 90 TABLET | Refills: 3 | Status: SHIPPED | OUTPATIENT
Start: 2021-10-03 | End: 2022-09-15

## 2021-10-04 ENCOUNTER — OFFICE VISIT (OUTPATIENT)
Dept: INTERNAL MEDICINE CLINIC | Age: 61
End: 2021-10-04

## 2021-10-04 DIAGNOSIS — Z79.4 TYPE 2 DIABETES MELLITUS WITH HYPERGLYCEMIA, WITH LONG-TERM CURRENT USE OF INSULIN (HCC): Primary | ICD-10-CM

## 2021-10-04 DIAGNOSIS — E11.65 TYPE 2 DIABETES MELLITUS WITH HYPERGLYCEMIA, WITH LONG-TERM CURRENT USE OF INSULIN (HCC): Primary | ICD-10-CM

## 2021-10-04 RX ORDER — INSULIN DEGLUDEC INJECTION 100 U/ML
34 INJECTION, SOLUTION SUBCUTANEOUS DAILY
Qty: 5 PEN | Refills: 2 | Status: SHIPPED | OUTPATIENT
Start: 2021-10-04 | End: 2021-12-22
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
## 2021-10-04 NOTE — PATIENT INSTRUCTIONS
Your Visit Summary:     Plan:  - Stop Levemir  - Start Tresiba at 34 units once daily. Increase dose by 2 units every other day, until fasting blood sugar is less than 150  - Decrease Humalog to 24 units with meals. If blood sugar is less than 150 before a meal, decrease dose to 20 units   - Continue metformin and Trulicity       Call me with any questions or concerns  Yessi Reginaldo [phone]    Check and document your blood sugar first thing in the morning (fasting), 1-2 hours after a meal, and/or before bedtime. Bring your meter/log to all future visits. Your blood sugar goals:  - Fasting (first thing in the morning)  blood sugar: 80 - 130   - 1 to 2 hours after a meal: less than 180     When you experience symptoms of low blood sugar (example: less than 70):  - Confirm low reading by checking your blood sugar.   - Then treat with 15 grams of carbohydrates (one-half cup of juice or regular soda, or 4-5 glucose tablets). - Wait 15 minutes to recheck blood sugar.   - Then eat a protein containing meal/snack to prevent another low blood sugar episode. (example: peanut butter + crackers)    Nutrition:  - When reviewing a nutrition label, focus on the serving size, total calories, fat (and type of fats), total carbohydrates, sugar (and amount of added sugar), amount of fiber (good for your digestive), and amount of protein. Refer to your nutrition label guide for more information.  - For a meal : max 45 - 60 grams of carbohydrates  - For a snack: max 15 grams of carbohydrates  - Reduce amount of saturated and trans fat. Consider more unsaturated fat options as they are better for your heart health.    - Have at least 1 serving of lean fat protein with each meal.    - Increase fiber intake slowly to prevent constipation.   - Substitute fruit juices for the whole fruit    Low carb snack ideas (15 grams total carb or less):     String cheese or babybel with 6 crackers   4 peanut butter crackers   3 cups of popcorn   1 cup raw vegetables with hummus or ranch dip (just need to watch how much dip you use)   Nuts   2 rice cakes   Celery with peanut butter or cream cheese   String cheese with 1 serving of fruit   Greek yogurt (look at label to make sure < 15 gram carb)   Plain greek yogurt with fresh berries added   Nature valley protein bar   Whisps parmesan cheese crisps   Hard boiled egg   Cottage cheese   Tuna salad lettuce roll-ups   Deli meat roll-ups with slice of cheese   Sugar Free Jello   Glucerna shake (16 grams)    Glucerna hunger smart shake (16 grams)   Ensure protein max shake   Fruit (1 serving/15 grams)   1/2 banana, yosef, or grapefruit    1/3 melon (small cantaloupe)   1 slice or 1 cup of honeydew melon   1 slice or 1 and 1/4 cups of watermelon    1 small apple, peach, orange or pear   2 small tangerines   1 cup of raspberries   3/4 cup of blackberries, blueberries or pineapples   1/2 cup of fruit juice, pears, applesauce, or mangos   17 small grapes   12 cherries    Be careful with the glucerna products as they differ in the total carbs depending on the product (some are intended as meal replacements not snacks). Make sure you look at the total carbs on the label as products can differ. Physical Activity:  - Aim for 30 minutes of consistent, moderately intensive, physical activity a day for 5 days or an average of 150 minutes per week. - Start slow, increase as tolerated. For example: Walk every day, working up to 30 minutes of brisk walking, 5 days a weekor split the 30 minutes into two 15-minute or three 10-minute walks. - If you sit for a long time, get up and move/stretch every 90 minutes. Other recommendations:  - Schedule an annual eye exam.  - Check your feet daily for any signs of open wounds, cuts, or sores.     - Given your risk factors, the following vaccines are recommended: annual flu shot, age-based pneumococcal vaccines (Pneumovax, Prevnar 13).    In addition to taking your medications as directed, improving your blood sugar involves modifying your nutrition and maximizing the amount of physical activity.

## 2021-10-07 DIAGNOSIS — I10 ESSENTIAL HYPERTENSION: ICD-10-CM

## 2021-10-07 RX ORDER — HYDROCHLOROTHIAZIDE 25 MG/1
TABLET ORAL
Qty: 90 TABLET | Refills: 2 | Status: SHIPPED | OUTPATIENT
Start: 2021-10-07

## 2021-10-18 ENCOUNTER — VIRTUAL VISIT (OUTPATIENT)
Dept: INTERNAL MEDICINE CLINIC | Age: 61
End: 2021-10-18

## 2021-10-18 DIAGNOSIS — Z79.4 TYPE 2 DIABETES MELLITUS WITH HYPERGLYCEMIA, WITH LONG-TERM CURRENT USE OF INSULIN (HCC): Primary | ICD-10-CM

## 2021-10-18 DIAGNOSIS — E11.65 TYPE 2 DIABETES MELLITUS WITH HYPERGLYCEMIA, WITH LONG-TERM CURRENT USE OF INSULIN (HCC): Primary | ICD-10-CM
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
## 2021-10-20 ENCOUNTER — OFFICE VISIT (OUTPATIENT)
Dept: ORTHOPEDIC SURGERY | Age: 61
End: 2021-10-20
Payer: COMMERCIAL

## 2021-10-20 VITALS
TEMPERATURE: 97.8 F | RESPIRATION RATE: 20 BRPM | SYSTOLIC BLOOD PRESSURE: 134 MMHG | OXYGEN SATURATION: 96 % | BODY MASS INDEX: 38.35 KG/M2 | HEART RATE: 68 BPM | DIASTOLIC BLOOD PRESSURE: 70 MMHG | HEIGHT: 71 IN

## 2021-10-20 DIAGNOSIS — G62.9 NEUROPATHY: ICD-10-CM

## 2021-10-20 DIAGNOSIS — M43.16 SPONDYLOLISTHESIS OF LUMBAR REGION: ICD-10-CM

## 2021-10-20 DIAGNOSIS — M47.816 LUMBAR SPONDYLOSIS: Primary | ICD-10-CM

## 2021-10-20 PROCEDURE — 99214 OFFICE O/P EST MOD 30 MIN: CPT | Performed by: PHYSICAL MEDICINE & REHABILITATION

## 2021-10-20 RX ORDER — BACLOFEN 10 MG/1
5-10 TABLET ORAL
Qty: 60 TABLET | Refills: 2 | Status: SHIPPED | OUTPATIENT
Start: 2021-10-20 | End: 2022-07-05

## 2021-10-20 NOTE — PROGRESS NOTES
[address]  [address], [address]  [place]  Phone: [phone]  Fax: [phone]  : 1960  PCP: Davin Crespo MD    PROGRESS NOTE      ASSESSMENT AND PLAN    Diagnoses and all orders for this visit:    1. Lumbar spondylosis  -     baclofen (LIORESAL) 10 mg tablet; Take 0.5-1 Tablets by mouth three (3) times daily as needed for Pain. Indications: for acute/episodic pain  -     REFERRAL TO ORTHOPEDICS    2. Neuropathy    3. Spondylolisthesis of lumbar region        1. Lorena Craig is a 57 y.o. male with chronic axial low back pain. Unfortunately he had no sustained benefit with his second lumbar facet radiofrequency. Did well with first RF. 2. Trial of Baclofen, 5-10 mg as needed during the day, 10-20 mg at bedtime as needed  3. DC Flexeril  4. Discussed spinal cord stimulator as a treatment option . 5. Given information on spinal cord stimulators  6. Referral to Dr. Derrick Eckert for SCS eval    Follow-up and Dispositions    · Return in about 3 months (around 2022). HISTORY OF PRESENT ILLNESS      Lorena Craig is a 57 y.o. male presents for follow up of back pain. Patient received RFA B/L 2021 with temporary benefit. He reports that his back pain is constant. His pain is exacerbated with weather changes. Denies sciatica. Neuropathy unchanged. He notes a 15 minute walking tolerance. Positive shopping cart sign. He takes Tramadol PRN for severe pain with benefit. He also takes Gabapentin 400 TID for neuropathy. Flexeril just makes him sleepy. He does have good days and bad days. Sometimes he is unable to get out of bed due to severe back pain.     Pain Assessment  10/20/2021   Location of Pain Back   Pain Location Comment -   Location Modifiers -   Severity of Pain 5   Quality of Pain -   Quality of Pain Comment -   Duration of Pain Persistent   Frequency of Pain Constant   Date Pain First Started - Aggravating Factors Other (Comment)   Aggravating Factors Comment -   Limiting Behavior Some   Relieving Factors Other (Comment); Rest   Relieving Factors Comment leaning on something   Result of Injury -   Work-Related Injury -   Type of Injury -         Pertinent past spine history:  Physical therapy:Yes (years ago) little relief  Doing HEP: Sometimes, stationary bike  Beneficial medications: Gabapentin 400mg TID. Flexeril 5 mg 1-2 tab QHS. Eliquis (Dr. Hirsch). Previously Tramadol 50 mg PRN.   Failed medications: Unable to take NSAIDs. Topamax  Spinal injections: 1/2021 Dr. Lin Loja B/L L3/4 L4/5 L5/ RF w/ benefit. 1/2014 B/L L5/S1. 9/2018 B/L L5/S1 facet joint injection- partial temporary benefit, RFA B/L 9/2021 - temporary benefit     Spinal surgery- No.      L MRI 9/2020: spondylolisthesis at L5-S1 with facet hypertrophy and lateral recess stenosis. Severe FA at L4-5  L MRI 2013: advanced FA at L5-S1      PMHx of A-fib, R knee replacement, end-stage OA of L knee. He is a retired . Pt reports his daughter has connected him to an jarad to help him watch what he eats to diet and have accountability with her and his son. Mother passed 6/2021.     PHYSICAL EXAMINATION    Visit Vitals  /70 (BP 1 Location: Right arm, BP Patient Position: Sitting)   Pulse 68   Temp 97.8 °F (36.6 °C) (Temporal)   Resp 20   Ht 5' 11\" (1.803 m)   SpO2 96%   BMI 38.35 kg/m²       Lumbar flexion fingertips to shins, pain with extension  LE strength intact  Straight leg raise negative  Lumbar skin intact      Written by Jahaira Callahan, as dictated by Frandy Laughlin MD.

## 2021-10-20 NOTE — LETTER
10/20/2021    Patient: Rakesh Corona   YOB: 1960   Date of Visit: 10/20/2021     Felicitas Patti, [address]  [address] [address]  Via In Basket    Dear Felicitas Armstrong MD,      Thank you for referring Mr. Rakesh Corona to [address] [place] [address] for evaluation. My notes for this consultation are attached. If you have questions, please do not hesitate to call me. I look forward to following your patient along with you.       Sincerely,    Juan M Power MD

## 2021-10-21 ENCOUNTER — VIRTUAL VISIT (OUTPATIENT)
Dept: INTERNAL MEDICINE CLINIC | Age: 61
End: 2021-10-21
Payer: COMMERCIAL

## 2021-10-21 DIAGNOSIS — M51.9 LUMBAR DISC DISEASE: ICD-10-CM

## 2021-10-21 DIAGNOSIS — I10 ESSENTIAL HYPERTENSION: ICD-10-CM

## 2021-10-21 DIAGNOSIS — E11.21 TYPE 2 DIABETES WITH NEPHROPATHY (HCC): Primary | ICD-10-CM

## 2021-10-21 PROCEDURE — 99213 OFFICE O/P EST LOW 20 MIN: CPT | Performed by: INTERNAL MEDICINE

## 2021-10-21 PROCEDURE — 3052F HG A1C>EQUAL 8.0%<EQUAL 9.0%: CPT | Performed by: INTERNAL MEDICINE
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
## 2021-10-25 ENCOUNTER — OFFICE VISIT (OUTPATIENT)
Dept: ORTHOPEDIC SURGERY | Age: 61
End: 2021-10-25
Payer: COMMERCIAL

## 2021-10-25 DIAGNOSIS — M47.817 LUMBOSACRAL SPONDYLOSIS WITHOUT MYELOPATHY: ICD-10-CM

## 2021-10-25 DIAGNOSIS — M43.10 SPONDYLOLISTHESIS, ACQUIRED: ICD-10-CM

## 2021-10-25 DIAGNOSIS — M54.50 LUMBAR PAIN: Primary | ICD-10-CM

## 2021-10-25 DIAGNOSIS — M51.36 DDD (DEGENERATIVE DISC DISEASE), LUMBAR: ICD-10-CM

## 2021-10-25 PROCEDURE — 99215 OFFICE O/P EST HI 40 MIN: CPT | Performed by: PHYSICAL MEDICINE & REHABILITATION

## 2021-10-25 NOTE — LETTER
10/25/2021    Patient: Elizabeth Douglas   YOB: 1960   Date of Visit: 10/25/2021     Jeannine Lombard, [address]  [address] [address]  Via In Basket    Dear Jeannine Lombard, MD,      Thank you for referring Mr. Elizabeth Douglas to [address] for evaluation. My notes for this consultation are attached. If you have questions, please do not hesitate to call me. I look forward to following your patient along with you.       Sincerely,    Kin Chance MD

## 2021-10-28 ENCOUNTER — TELEPHONE (OUTPATIENT)
Dept: INTERNAL MEDICINE CLINIC | Age: 61
End: 2021-10-28

## 2021-10-28 VITALS
WEIGHT: 280 LBS | BODY MASS INDEX: 39.2 KG/M2 | HEIGHT: 71 IN | HEART RATE: 77 BPM | OXYGEN SATURATION: 97 % | TEMPERATURE: 97.8 F

## 2021-10-28 NOTE — TELEPHONE ENCOUNTER
----- Message from Mara Ojeda MD sent at 10/27/2021  9:25 PM EDT -----  Regarding: F/u apts needed  Please schedule a follow up 30 min apt with me (Virtual) in 4 months. Please schedule him for labs 1 wk before his apt.     Thanks,  Dr. Garret Gallego  [facility], [address] [place] [address], [address] [place] [address] Str.  Phone: [phone]  Fax: [phone]

## 2021-10-28 NOTE — TELEPHONE ENCOUNTER
----- Message from Kelly Lang MD sent at 10/27/2021  9:25 PM EDT -----  Regarding: F/u apts needed  Please schedule a follow up 30 min apt with me (Virtual) in 4 months. Please schedule him for labs 1 wk before his apt.     Thanks,  Dr. Norberto Hoffmann  [facility], [address] [place] [address], [address] [place] [address] Str.  Phone: [phone]  Fax: [phone]

## 2021-10-30 ENCOUNTER — HOSPITAL ENCOUNTER (OUTPATIENT)
Dept: CT IMAGING | Age: 61
Discharge: HOME | End: 2021-10-30
Attending: PHYSICAL MEDICINE & REHABILITATION
Payer: COMMERCIAL

## 2021-10-30 DIAGNOSIS — E11.21 TYPE 2 DIABETES WITH NEPHROPATHY (HCC): ICD-10-CM

## 2021-10-30 PROCEDURE — 72131 CT LUMBAR SPINE W/O DYE: CPT

## 2021-10-31 RX ORDER — BLOOD-GLUCOSE TRANSMITTER
EACH MISCELLANEOUS
Qty: 3 EACH | Refills: 5 | Status: SHIPPED | OUTPATIENT
Start: 2021-10-31

## 2021-11-01 DIAGNOSIS — M54.50 LUMBAR PAIN: ICD-10-CM

## 2021-11-01 DIAGNOSIS — M51.36 DDD (DEGENERATIVE DISC DISEASE), LUMBAR: ICD-10-CM

## 2021-11-01 DIAGNOSIS — M43.10 SPONDYLOLISTHESIS, ACQUIRED: ICD-10-CM

## 2021-11-01 DIAGNOSIS — M47.817 LUMBOSACRAL SPONDYLOSIS WITHOUT MYELOPATHY: ICD-10-CM

## 2021-11-04 DIAGNOSIS — I10 ESSENTIAL HYPERTENSION: ICD-10-CM

## 2021-11-04 RX ORDER — LOSARTAN POTASSIUM 100 MG/1
TABLET ORAL
Qty: 90 TABLET | Refills: 1 | Status: SHIPPED | OUTPATIENT
Start: 2021-11-04 | End: 2022-05-10

## 2021-11-08 ENCOUNTER — OFFICE VISIT (OUTPATIENT)
Dept: INTERNAL MEDICINE CLINIC | Age: 61
End: 2021-11-08

## 2021-11-08 DIAGNOSIS — E11.21 TYPE 2 DIABETES WITH NEPHROPATHY (HCC): Primary | ICD-10-CM

## 2021-11-08 NOTE — PATIENT INSTRUCTIONS
Your Visit Summary:     Plan:  - Increase Tresiba to 42 units once daily. In 2-3 weeks, if morning blood sugars are still above 130 consistently. Increase Tresiba to 44 units    - Continue Humalog, Trulicity and metformin   - Follow up in 6 weeks         Call me with any questions or concerns  Finn Mcdonald [phone]    Check and document your blood sugar first thing in the morning (fasting), 1-2 hours after a meal, and/or before bedtime. Bring your meter/log to all future visits. Your blood sugar goals:  - Fasting (first thing in the morning)  blood sugar: 80 - 130   - 1 to 2 hours after a meal: less than 180     When you experience symptoms of low blood sugar (example: less than 70):  - Confirm low reading by checking your blood sugar.   - Then treat with 15 grams of carbohydrates (one-half cup of juice or regular soda, or 4-5 glucose tablets). - Wait 15 minutes to recheck blood sugar.   - Then eat a protein containing meal/snack to prevent another low blood sugar episode. (example: peanut butter + crackers)    Nutrition:  - When reviewing a nutrition label, focus on the serving size, total calories, fat (and type of fats), total carbohydrates, sugar (and amount of added sugar), amount of fiber (good for your digestive), and amount of protein. Refer to your nutrition label guide for more information.  - For a meal : max 45 - 60 grams of carbohydrates  - For a snack: max 15 grams of carbohydrates  - Reduce amount of saturated and trans fat. Consider more unsaturated fat options as they are better for your heart health.    - Have at least 1 serving of lean fat protein with each meal.    - Increase fiber intake slowly to prevent constipation.   - Substitute fruit juices for the whole fruit    Low carb snack ideas (15 grams total carb or less):     String cheese or babybel with 6 crackers   4 peanut butter crackers   3 cups of popcorn   1 cup raw vegetables with hummus or ranch dip (just need to watch how much dip you use)   Nuts   2 rice cakes   Celery with peanut butter or cream cheese   String cheese with 1 serving of fruit   Greek yogurt (look at label to make sure < 15 gram carb)   Plain greek yogurt with fresh berries added   Nature valley protein bar   Whisps parmesan cheese crisps   Hard boiled egg   Cottage cheese   Tuna salad lettuce roll-ups   Deli meat roll-ups with slice of cheese   Sugar Free Jello   Glucerna shake (16 grams)    Glucerna hunger smart shake (16 grams)   Ensure protein max shake   Fruit (1 serving/15 grams)   1/2 banana, yosef, or grapefruit    1/3 melon (small cantaloupe)   1 slice or 1 cup of honeydew melon   1 slice or 1 and 1/4 cups of watermelon    1 small apple, peach, orange or pear   2 small tangerines   1 cup of raspberries   3/4 cup of blackberries, blueberries or pineapples   1/2 cup of fruit juice, pears, applesauce, or mangos   17 small grapes   12 cherries    Be careful with the glucerna products as they differ in the total carbs depending on the product (some are intended as meal replacements not snacks). Make sure you look at the total carbs on the label as products can differ. Physical Activity:  - Aim for 30 minutes of consistent, moderately intensive, physical activity a day for 5 days or an average of 150 minutes per week. - Start slow, increase as tolerated. For example: Walk every day, working up to 30 minutes of brisk walking, 5 days a weekor split the 30 minutes into two 15-minute or three 10-minute walks. - If you sit for a long time, get up and move/stretch every 90 minutes. Other recommendations:  - Schedule an annual eye exam.  - Check your feet daily for any signs of open wounds, cuts, or sores. - Given your risk factors, the following vaccines are recommended: annual flu shot, age-based pneumococcal vaccines (Pneumovax, Prevnar 13).     In addition to taking your medications as directed, improving your blood sugar involves modifying your nutrition and maximizing the amount of physical activity.
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
## 2021-11-11 DIAGNOSIS — E78.5 DYSLIPIDEMIA: ICD-10-CM

## 2021-11-11 RX ORDER — OMEGA-3-ACID ETHYL ESTERS 1 G/1
CAPSULE, LIQUID FILLED ORAL
Qty: 120 CAPSULE | Refills: 5 | Status: SHIPPED | OUTPATIENT
Start: 2021-11-11 | End: 2022-07-26

## 2021-11-12 ENCOUNTER — TELEPHONE (OUTPATIENT)
Dept: INTERNAL MEDICINE CLINIC | Age: 61
End: 2021-11-12

## 2021-11-12 NOTE — TELEPHONE ENCOUNTER
----- Message from Seth Carrera MD sent at 11/11/2021 12:48 PM EST -----  Regarding: F/u apts needed  Please schedule a 30 min in office or virtual apt with me in March. Have him scheduled for labs 1 wk before his apt.     Thanks,  Dr. Maite Lucia  [facility] [address], [address] [place] [address] Str.  Phone: [phone]  Fax: [phone]

## 2021-11-15 RX ORDER — INSULIN LISPRO 100 [IU]/ML
INJECTION, SOLUTION INTRAVENOUS; SUBCUTANEOUS
Qty: 15 ML | Refills: 11 | Status: SHIPPED | OUTPATIENT
Start: 2021-11-15 | End: 2021-12-22
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
## 2021-11-17 ENCOUNTER — OFFICE VISIT (OUTPATIENT)
Dept: ORTHOPEDIC SURGERY | Age: 61
End: 2021-11-17
Payer: COMMERCIAL

## 2021-11-17 VITALS
WEIGHT: 284 LBS | TEMPERATURE: 98.4 F | BODY MASS INDEX: 39.76 KG/M2 | OXYGEN SATURATION: 99 % | HEART RATE: 82 BPM | HEIGHT: 71 IN | RESPIRATION RATE: 15 BRPM

## 2021-11-17 DIAGNOSIS — M54.50 LUMBAR PAIN: Primary | ICD-10-CM

## 2021-11-17 DIAGNOSIS — M51.36 DDD (DEGENERATIVE DISC DISEASE), LUMBAR: ICD-10-CM

## 2021-11-17 DIAGNOSIS — M47.816 LUMBAR FACET ARTHROPATHY: ICD-10-CM

## 2021-11-17 DIAGNOSIS — M47.817 LUMBOSACRAL SPONDYLOSIS WITHOUT MYELOPATHY: ICD-10-CM

## 2021-11-17 DIAGNOSIS — M43.10 SPONDYLOLISTHESIS, ACQUIRED: ICD-10-CM

## 2021-11-17 PROCEDURE — 99213 OFFICE O/P EST LOW 20 MIN: CPT | Performed by: PHYSICAL MEDICINE & REHABILITATION

## 2021-11-17 NOTE — LETTER
Name:Ric Lindsey  :1960   MR #:[id number]   [facility]   Page 1 of 5        CONTROLLED SUBSTANCE AGREEMENT     I may be prescribed medications that are controlled substances as part  of my treatment plan for management of my medical condition(s). The goal of my treatment plan is to maintain and/or improve my health and wellbeing. Because controlled substances have an increased risk of abuse or harm, continual re-evaluation is needed determine if the goals of my treatment plan are being met for my safety and the safety of others. Kenny Regalado  am entering into this Controlled Substance Agreement with my provider, __________________________________ at  [facility] [address] . I understand that successful treatment requires mutual trust and honesty between me and my provider. I understand that there are state and federal laws and regulations which apply to the medications that my provider may prescribe that must be followed. I understand there are risks and benefits ts of taking the medicines that my provider may prescribe. I understand and agree that following this Agreement is necessary in continuing my provider-patient relationship and success of my treatment plan. As a part of my treatment plan, I agree to the following:    COMMUNICATION:    1. I will communicate fully with my provider about my medical condition(s), including the effect on my daily life and how well my medications are helping. I will tell my provider all of the medications that I take for any reason, including medications I receive from another health care provider, and will notify my provider about all issues, problems or concerns, including any side effects, which may be related to my medications. I understand that this information allows my provider to adjust my treatment plan to help manage my medical condition.  I understand that this information will become part of my permanent medical record. 2. I will notify my provider if I have a history of alcohol/drug misuse/addiction or if I have had treatment for alcohol/drug addiction in the past, or if I have a new problem with or concern about alcohol/drug use/addiction, because this increases the likelihood of high risk behaviors and may lead to serious medical conditions. 3. Females Only: I will notify my provider if I am or become pregnant, or if I intend to become pregnant, or if I intend to breastfeed. I understand that communication of these issues with my provider is important, due to possible effects my medication could have on an unborn fetus or breastfeeding child. Initials_____      Name:Ric Lindsey   :1960   MR #:[id number]   Office[facility]   Page 2 of 5       MISUSE OF MEDICATIONS / DRUGS:    1. I agree to take all controlled substances as prescribed, and will not misuse or abuse any controlled substances prescribed by my provider. For my safety, I will not increase the amount of medicine I take without first talking with and getting permission from my provider. 2. If I have a medical emergency, another health care provider may prescribe me medication. If I seek emergency treatment, I will notify my provider within seventy-two (72) hours. 3. I understand that my provider may discuss my use and/or possible misuse/abuse of controlled substances and alcohol, as appropriate, with any health care provider involved in my care, pharmacist or legal authority. ILLEGAL DRUGS:    1. I will not use illegal drugs of any kind, including but not limited to marijuana, heroin, cocaine, or any prescription drug which is not prescribed to me. DRUG DIVERSION / PRESCRIPTION FRAUD:    1. I will not share, sell, trade, give away, or otherwise misuse my prescriptions or medications.     2. I will not alter any prescriptions provided to me by my provider. SINGLE PROVIDER:    1. I agree that all controlled substances that I take will be prescribed only by my provider (or his/her covering provider) under this Agreement. This agreement does not prevent me from seeking emergency medical treatment or receiving pain management related to a surgery. PROTECTING MEDICATIONS:    1. I am responsible for keeping my prescriptions and medications in a safe and secure place including safeguarding them from loss or theft. I understand that lost, stolen or damaged/destroyed prescriptions or medications will not be replaced. Initials____          Name:Ric Lindsey   :1960   MR #:[id number]   [facility]   Page 3 of 5   PRESCRIPTION RENEWALS/REFILLS:    1. I will follow my controlled substance medication schedule as prescribed by my provider. 2. I understand and agree that I will make any requests for renewals or refills of my prescriptions only at the time of an office visit or during my providers regular office hours subject to the prescription refill requirements of the individual practice. 3. I understand that my provider may not call in prescriptions for controlled substances to my pharmacy. 4. I understand that my provider may adjust or discontinue these medications as deemed appropriate for my medical treatment plan. This Agreement does not guarantee the prescription of controlled medications. 5. I agree that if my medications are adjusted or discontinued, I will properly dispose of any remaining medications. I understand that I will be required to dispose of any remaining controlled medications prior to being provided with any prescriptions for other controlled medications. 6. I understand that the renewal of my prescription depends on my medical condition, my consistent participation, and my adherence with my treatment plan and this Agreement.     7. I understand that if I do not keep an appointment with my provider, I may not receive a renewal or refill for my controlled substance medication. PRESCRIPTION MONITORING / DRUG TESTIN. I understand that my provider may require me to provide urine, saliva or blood for testing at any time. I understand that this testing will be used to monitor for safety and adherence with my treatment plan and this Agreement. 2. I understand that my provider may ask me to provide an observed urine specimen, which means that a nurse or other health care provider may watch me provide urine, and I agree to cooperate if I am asked to provide an observed specimen. 3. I understand that if I do not provide urine, saliva or blood samples within two (2) hours of my providers request, or other timeframe decided by my provider, my treatment plan could be changed, or my prescriptions and medications may be changed or ended. 4. I understand that urine, saliva and blood test results will be a part of my permanent medical record. Initials_____        Name:Ric Lindsey   :1960   MR #:[id number]   [address] [facility] [address]   Page 4 of 5    5. I understand that my provider is required to obtain a copy of my State Prescription Monitoring Program () Report at any time in order to safely prescribe medications. 6. I will bring all of my prescribed controlled substance medications in their original bottles to all of my scheduled appointments. 7. I understand that my provider may ask me to come to the practice with all of my prescribed medications for a random pill count at any time. I agree to cooperate if I am asked to come in for a random pill count. I understand that if I do not arrive in the timeframe decided by my provider, my treatment plan could be changed, or my prescriptions and medications may be changed or ended.     COOPERATION WITH INVESTIGATIONS:    1. I authorize my provider and my pharmacy to cooperate fully with any local, state, or federal law enforcement agency in the investigation of any possible misuse, sale, or other diversion of my controlled substance prescriptions or medications. RISKS:    1. I understand that my level of consciousness may be affected from the use of controlled substances, and I understand that there are risks, benefits, effects and potential alternatives (including no treatment) to the medications that my provider has prescribed. 2. I understand that I may become drowsy, tired, dizzy, constipated, and sick to my stomach, or have changes in my mood or in my sleep while taking my medications. I have talked with my provider about these possible side effects, risks, benefits, and alternative treatments, and my provider has answered all of my questions. 3. I understand that I should not suddenly stop taking my medications without first speaking with my provider. I understand that if I suddenly stop taking my medications, I may experience nausea, vomiting, sweating,anxiety, sleeplessness, itching or other uncomfortable feelings. 4. I will not take my medications with alcohol of any kind, including beer, wine or liquor. I understand that drinking alcohol with my medications increases the chances of side effects, including breathing problems or even death. 5. I understand that if I have a history of alcoholism or other drug addiction I may be at increased risk of addiction to my medications. Signs of addiction might include craving, compulsive use, and continued use despite harm. Since addiction is a disease, I understand my provider may decide to change my medications and refer me to appropriate treatment services. I understand that this information would become part of my permanent medical record.     Initials_____        Name:Ric Lindsey   :1960   MR #:[id number]   [address] [address] Page 5 of 5       6. Females only: Children born to women who regularly take controlled substances are likely to have physical problems and suffer withdrawal symptoms at birth. If I am of child-bearing age, I understand that I should use safe and effective birth control while taking any controlled substances to avoid the impact of medications on an unborn fetus or  child. I agree to notify my provider immediately if I should become pregnant so that my treatment plan can be adjusted. 7. Males only: I understand that chronic use of controlled substances has been associated with low testosterone levels in males which may affect my mood, stamina, sexual desire, and general health. I understand that my provider may order the appropriate laboratory test to determine my testosterone level,and I agree to this testing. ADHERENCE:    1. I understand that if I do not adhere to this Agreement in any way, my provider may change my prescriptions, stop prescribing controlled substances or end our provider-patient relationship. 2. If my provider decides to stop prescribing medication, or decides to end our provider-patient relationship,my provider may require that I taper my medications slowly. If necessary, my provider may also provide a prescription for other medications to treat my withdrawal symptoms. UNDERSTANDING THIS AGREEMENT:    I understand that my provider may adjust or stop my prescriptions for controlled substances based on my medical condition and my treatment plan. I understand that this Agreement does not guarantee that I will be prescribed medications or controlled substances. I understand that controlled substances may be just one part  of my treatment plan. My initial on each page and my signature below shows that I have read each page of this Agreement, I have had an opportunity to ask questions, and all of my questions have been answered to my satisfaction by my provider.     By signing below, I agree to comply with this Agreement, and I understand that if I do not follow the Agreements listed above, my provider may stop    _________________________________________  Date/Time 11/17/2021 4:13 PM                 (Patient Signature)    ________________________________________    Date/Time 11/17/2021 4:13 PM   (Parent or Guardian Signature if <18 yrs)    _________________________________________ Date/Time 11/17/2021 4:13 PM   (Provider Signature)

## 2021-11-17 NOTE — LETTER
11/17/2021    Patient: Augusto Khan   YOB: 1960   Date of Visit: 11/17/2021     Carlos Enrique Brian, [address]  [address] [address]  Via In Basket    Dear Carlos Enrique Brian MD,      Thank you for referring Mr. Augusto Khan to [facility] [address] for evaluation. My notes for this consultation are attached. If you have questions, please do not hesitate to call me. I look forward to following your patient along with you.       Sincerely,    Neha Gilliland MD

## 2021-12-02 DIAGNOSIS — E11.21 TYPE 2 DIABETES WITH NEPHROPATHY (HCC): ICD-10-CM

## 2021-12-02 RX ORDER — BLOOD-GLUCOSE SENSOR
EACH MISCELLANEOUS
Qty: 3 EACH | Refills: 5 | Status: SHIPPED | OUTPATIENT
Start: 2021-12-02 | End: 2022-02-14

## 2021-12-02 NOTE — TELEPHONE ENCOUNTER
Last Visit: 10/21/21 with MD Natanael Blood, 11/8/21 with Maciej Palmer  Next Appointment: 12/20/21 with Maciej Palmer, 4/4/22 with MD Natanael Blood  Previous Refill Encounter(s): 5/18/21 #3 with 5 refills    Requested Prescriptions     Pending Prescriptions Disp Refills    Blood-Glucose Sensor (Dexcom G6 Sensor) jia 3 Each 5     Sig: USE WITH DEXCOM METER AND TRANSMITTER DEVICE TO MONITOR YOUR BLOOD GLUCOSE CONTINUOUSLY. REMOVING/REPLACING THE METER EVERY 10 DAYS

## 2021-12-03 RX ORDER — BLOOD-GLUCOSE,RECEIVER,CONT
EACH MISCELLANEOUS
Qty: 3 EACH | Refills: 5 | Status: SHIPPED | OUTPATIENT
Start: 2021-12-03

## 2021-12-03 NOTE — TELEPHONE ENCOUNTER
Pharmacy also requesting the     Requested Prescriptions     Pending Prescriptions Disp Refills    Blood-Glucose Meter,Continuous (Dexcom G6 ) misc 3 Each 5     Sig: Use with the Dexcom sensor and transmitter device to monitor your blood glucose continuously, removing/replacing this meter every 10 days. Signed Prescriptions Disp Refills    Blood-Glucose Sensor (Dexcom G6 Sensor) jia 3 Each 5     Sig: USE WITH DEXCOM METER AND TRANSMITTER DEVICE TO MONITOR YOUR BLOOD GLUCOSE CONTINUOUSLY. REMOVING/REPLACING THE METER EVERY 10 DAYS     Authorizing Provider: Birdie Driver

## 2021-12-11 DIAGNOSIS — E11.21 TYPE 2 DIABETES WITH NEPHROPATHY (HCC): ICD-10-CM

## 2021-12-11 DIAGNOSIS — D50.9 IRON DEFICIENCY ANEMIA, UNSPECIFIED IRON DEFICIENCY ANEMIA TYPE: ICD-10-CM

## 2021-12-13 RX ORDER — METFORMIN HYDROCHLORIDE 1000 MG/1
TABLET ORAL
Qty: 60 TABLET | Refills: 5 | Status: SHIPPED | OUTPATIENT
Start: 2021-12-13 | End: 2022-06-09

## 2021-12-13 RX ORDER — FERROUS SULFATE TAB 325 MG (65 MG ELEMENTAL FE) 325 (65 FE) MG
TAB ORAL
Qty: 60 TABLET | Refills: 5 | Status: SHIPPED | OUTPATIENT
Start: 2021-12-13 | End: 2022-06-09

## 2021-12-20 ENCOUNTER — OFFICE VISIT (OUTPATIENT)
Dept: INTERNAL MEDICINE CLINIC | Age: 61
End: 2021-12-20

## 2021-12-20 DIAGNOSIS — E11.21 TYPE 2 DIABETES WITH NEPHROPATHY (HCC): Primary | ICD-10-CM

## 2021-12-20 DIAGNOSIS — E11.65 TYPE 2 DIABETES MELLITUS WITH HYPERGLYCEMIA, WITH LONG-TERM CURRENT USE OF INSULIN (HCC): ICD-10-CM

## 2021-12-20 DIAGNOSIS — Z79.4 TYPE 2 DIABETES MELLITUS WITH HYPERGLYCEMIA, WITH LONG-TERM CURRENT USE OF INSULIN (HCC): ICD-10-CM

## 2021-12-20 NOTE — PATIENT INSTRUCTIONS
Your Visit Summary:     Plan:  - Continue Tresiba at 42 units once daily  - Continue Humalog 24 units 3 times daily before meals  - Continue metformin and Trulicity           Call me with any questions or concerns  Silvio Castellanos [phone]    Check and document your blood sugar first thing in the morning (fasting), 1-2 hours after a meal, and/or before bedtime. Bring your meter/log to all future visits. Your blood sugar goals:  - Fasting (first thing in the morning)  blood sugar: 80 - 130   - 1 to 2 hours after a meal: less than 180     When you experience symptoms of low blood sugar (example: less than 70):  - Confirm low reading by checking your blood sugar.   - Then treat with 15 grams of carbohydrates (one-half cup of juice or regular soda, or 4-5 glucose tablets). - Wait 15 minutes to recheck blood sugar.   - Then eat a protein containing meal/snack to prevent another low blood sugar episode. (example: peanut butter + crackers)    Nutrition:  - When reviewing a nutrition label, focus on the serving size, total calories, fat (and type of fats), total carbohydrates, sugar (and amount of added sugar), amount of fiber (good for your digestive), and amount of protein. Refer to your nutrition label guide for more information.  - For a meal : max 45 - 60 grams of carbohydrates  - For a snack: max 15 grams of carbohydrates  - Reduce amount of saturated and trans fat. Consider more unsaturated fat options as they are better for your heart health.    - Have at least 1 serving of lean fat protein with each meal.    - Increase fiber intake slowly to prevent constipation.   - Substitute fruit juices for the whole fruit    Low carb snack ideas (15 grams total carb or less):     String cheese or babybel with 6 crackers   4 peanut butter crackers   3 cups of popcorn   1 cup raw vegetables with hummus or ranch dip (just need to watch how much dip you use)   Nuts   2 rice cakes   Celery with peanut butter or cream cheese   String cheese with 1 serving of fruit   Greek yogurt (look at label to make sure < 15 gram carb)   Plain greek yogurt with fresh berries added   Nature valley protein bar   Whisps parmesan cheese crisps   Hard boiled egg   Cottage cheese   Tuna salad lettuce roll-ups   Deli meat roll-ups with slice of cheese   Sugar Free Jello   Glucerna shake (16 grams)    Glucerna hunger smart shake (16 grams)   Ensure protein max shake   Fruit (1 serving/15 grams)   1/2 banana, yosef, or grapefruit    1/3 melon (small cantaloupe)   1 slice or 1 cup of honeydew melon   1 slice or 1 and 1/4 cups of watermelon    1 small apple, peach, orange or pear   2 small tangerines   1 cup of raspberries   3/4 cup of blackberries, blueberries or pineapples   1/2 cup of fruit juice, pears, applesauce, or mangos   17 small grapes   12 cherries    Be careful with the glucerna products as they differ in the total carbs depending on the product (some are intended as meal replacements not snacks). Make sure you look at the total carbs on the label as products can differ. Physical Activity:  - Aim for 30 minutes of consistent, moderately intensive, physical activity a day for 5 days or an average of 150 minutes per week. - Start slow, increase as tolerated. For example: Walk every day, working up to 30 minutes of brisk walking, 5 days a week--or split the 30 minutes into two 15-minute or three 10-minute walks. - If you sit for a long time, get up and move/stretch every 90 minutes. Other recommendations:  - Schedule an annual eye exam.  - Check your feet daily for any signs of open wounds, cuts, or sores. - Given your risk factors, the following vaccines are recommended: annual flu shot, age-based pneumococcal vaccines (Pneumovax, Prevnar 13).     In addition to taking your medications as directed, improving your blood sugar involves modifying your nutrition and maximizing the amount of physical activity.
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
## 2021-12-22 RX ORDER — INSULIN DEGLUDEC INJECTION 100 U/ML
42 INJECTION, SOLUTION SUBCUTANEOUS DAILY
Qty: 10 PEN | Refills: 2 | Status: SHIPPED | OUTPATIENT
Start: 2021-12-22 | End: 2022-02-06

## 2021-12-22 RX ORDER — INSULIN LISPRO 100 [IU]/ML
INJECTION, SOLUTION INTRAVENOUS; SUBCUTANEOUS
Qty: 15 ML | Refills: 11 | Status: SHIPPED | OUTPATIENT
Start: 2021-12-22

## 2021-12-23 ENCOUNTER — DOCUMENTATION ONLY (OUTPATIENT)
Dept: INTERNAL MEDICINE CLINIC | Age: 61
End: 2021-12-23

## 2021-12-23 NOTE — PROGRESS NOTES
Prior Auth for Eliquis :Approved; Review Type:Prior Auth; Coverage Start Date:11/23/2021; Coverage End Date:12/23/2022;

## 2022-01-20 ENCOUNTER — VIRTUAL VISIT (OUTPATIENT)
Dept: ORTHOPEDIC SURGERY | Age: 62
End: 2022-01-20
Payer: COMMERCIAL

## 2022-01-20 DIAGNOSIS — G62.9 NEUROPATHY: ICD-10-CM

## 2022-01-20 DIAGNOSIS — M51.36 DDD (DEGENERATIVE DISC DISEASE), LUMBAR: ICD-10-CM

## 2022-01-20 DIAGNOSIS — M43.10 SPONDYLOLISTHESIS, ACQUIRED: Primary | ICD-10-CM

## 2022-01-20 PROCEDURE — 99442 PR PHYS/QHP TELEPHONE EVALUATION 11-20 MIN: CPT | Performed by: PHYSICAL MEDICINE & REHABILITATION

## 2022-01-20 RX ORDER — GABAPENTIN 400 MG/1
400 CAPSULE ORAL 3 TIMES DAILY
Qty: 270 CAPSULE | Refills: 1 | Status: SHIPPED | OUTPATIENT
Start: 2022-01-20 | End: 2022-09-14 | Stop reason: DRUGHIGH

## 2022-01-20 NOTE — PROGRESS NOTES
[address]  [address], [address]  [facility], [address]  Phone: [phone]  Fax: [phone]      Malcolm Lopez is a 57 y.o. male evaluated via patient initiated telephone call on 1/20/2022. ASSESSMENT AND PLAN    Diagnoses and all orders for this visit:    1. Spondylolisthesis, acquired    2. DDD (degenerative disc disease), lumbar    3. Neuropathy  -     gabapentin (NEURONTIN) 400 mg capsule; Take 1 Capsule by mouth three (3) times daily. Max Daily Amount: 1,200 mg.         1. Malcolm Lopez is a 57 y.o. male with chronic lumbosacral pain. He has been recently evaluated by Nabila Connolly and [employer]. Spinal cord stimulator trial and permanent placement are being considered. He does have surgical risk factors, he has been advised to improve his hemoglobin A1c.  2. I refilled his gabapentin. 3. He was given baclofen when I last saw him, he appears to have this at home but has not tried it. He understands that this is instead of Flexeril. Follow-up and Dispositions    · Return for SCS trial eval with Dr. Bertha Connolly. HISTORY OF PRESENT ILLNESS  Malcolm Lopez is a 57 y.o. male. He continues to have chronic daily axial low back pain. He reports very occasional stress sciatic pain. He was evaluated by Dr. Bertha Connolly who obtained a new CAT scan showing a grade 1 listhesis and severe lumbosacral DDD. He was subsequently referred to Dr. [employer] for surgical evaluation. Dr. [employer] per patient report felt that a spinal cord stimulator would be a reasonable option rather than a fusion type surgery. Patient denies any new weakness, neurogenic bowel or bladder changes. He denies any unsteadiness with the use of gabapentin. This keeps his neuropathy under control as well. He has not tried baclofen although it was prescribed last visit.     Pain Assessment  1/20/2022   Location of Pain Back   Pain Location Comment -   Location Modifiers -   Severity of Pain 5   Quality of Pain Aching   Quality of Pain Comment hurting   Duration of Pain Persistent   Frequency of Pain Constant   Date Pain First Started -   Aggravating Factors (No Data)   Aggravating Factors Comment everything you are supposed to do to lose weight   Limiting Behavior Yes   Relieving Factors (No Data)   Relieving Factors Comment tramadol, muscle relaxers    Result of Injury -   Work-Related Injury -   Type of Injury -       rtinent past spine history:  Physical therapy:Yes (years ago) little relief  Doing HEP: Sometimes, stationary bike  Beneficial medications: Gabapentin 400mg TID. Flexeril (helps at night with sleep)QHSprn. Previously Tramadol 50 mg PRN.   Failed medications: Unable to take NSAIDs. Topamax,   Spinal injections: 1/2021 Dr. Luis Bowles B/L L3/4 L4/5 L5/DR MANDY w/ benefit. 1/2014 B/L L5/S1. 9/2018 B/L L5/S1 facet joint injection- partial temporary benefit, RFA B/L 9/2021 - temporary benefit     Spine Sx Eval:  SCS eval Dr[facility] 11/2021. Dr. Kym Garcia, decrease A1C   Spinal surgery- No.      CT [id number]: Grade 1 listhesis L5/S1, advanced DDD and moderate stenosis   L MRI 9/2020: spondylolisthesis at L5-S1 with facet hypertrophy and lateral recess stenosis. Severe FA at L4-5  L MRI 2013: advanced FA at L5-S1      PMHx of A-fib Eliquis (Dr. Love Orta). , R knee replacement, end-stage OA of L knee. DM    Work status: He is a retired . Pt reports his daughter has connected him to an jarad to help him watch what he eats to diet and have accountability with her and his son. Mother passed 6/2021. Indy Crespo, who was evaluated through a synchronous (real-time) virtual platform audio-only encounter, and/or his healthcare decision maker, is aware that it is a billable service, which includes applicable co-pays, with coverage as determined by his insurance carrier. He provided verbal consent to proceed and patient identification was verified.  This visit was conducted pursuant to the emergency declaration under the [facility], [facility] authority and the [employer] and [employer] General Act. A caregiver was present when appropriate. Ability to conduct physical exam was limited. The patient was located at home in a state where the provider was licensed to provide care. --Luna Chang MD on 1/20/2022       I affirm this is a Patient Initiated Episode with an Established Patient who has not had a related appointment within my department in the past 7 days or scheduled within the next 24 hours. Total Time: minutes: 11-20 minutes   Portions of this document were created using Dragon voice recognition software. Unintended errors jose be present.   Luna Chang MD

## 2022-01-20 NOTE — PROGRESS NOTES
Álvaro Kee presents today for   Chief Complaint   Patient presents with    Back Pain       Is someone accompanying this pt? no    Is the patient using any DME equipment during OV? no    Depression Screening:  3 most recent PHQ Screens 7/22/2021   PHQ Not Done -   Little interest or pleasure in doing things Not at all   Feeling down, depressed, irritable, or hopeless Not at all   Total Score PHQ 2 0   Trouble falling or staying asleep, or sleeping too much -   Feeling tired or having little energy -   Poor appetite, weight loss, or overeating -   Feeling bad about yourself - or that you are a failure or have let yourself or your family down -   Trouble concentrating on things such as school, work, reading, or watching TV -   Moving or speaking so slowly that other people could have noticed; or the opposite being so fidgety that others notice -   Thoughts of being better off dead, or hurting yourself in some way -   PHQ 9 Score -   How difficult have these problems made it for you to do your work, take care of your home and get along with others -       Learning Assessment:  Learning Assessment 1/30/2020   PRIMARY LEARNER Patient   HIGHEST LEVEL OF EDUCATION - PRIMARY LEARNER  SOME COLLEGE   BARRIERS PRIMARY LEARNER NONE   CO-LEARNER CAREGIVER No   PRIMARY LANGUAGE ENGLISH   LEARNER PREFERENCE PRIMARY DEMONSTRATION     -   ANSWERED BY patient     -   RELATIONSHIP SELF   ASSESSMENT COMMENT -       Abuse Screening:  Abuse Screening Questionnaire 9/15/2021   Do you ever feel afraid of your partner? N   Are you in a relationship with someone who physically or mentally threatens you? N   Is it safe for you to go home? Y       Fall Risk  Fall Risk Assessment, last 12 mths 6/7/2019   Able to walk? Yes   Fall in past 12 months? No       Coordination of Care:  1. Have you been to the ER, urgent care clinic since your last visit? no  Hospitalized since your last visit? no    2.  Have you seen or consulted any other health care providers outside of the [address] [place] [address] since your last visit? Yes, Elpidio Pham. Include any pap smears or colon screening.  no

## 2022-01-27 DIAGNOSIS — E11.21 TYPE 2 DIABETES WITH NEPHROPATHY (HCC): ICD-10-CM

## 2022-02-06 DIAGNOSIS — E11.65 TYPE 2 DIABETES MELLITUS WITH HYPERGLYCEMIA, WITH LONG-TERM CURRENT USE OF INSULIN (HCC): ICD-10-CM

## 2022-02-06 DIAGNOSIS — Z79.4 TYPE 2 DIABETES MELLITUS WITH HYPERGLYCEMIA, WITH LONG-TERM CURRENT USE OF INSULIN (HCC): ICD-10-CM

## 2022-02-06 RX ORDER — INSULIN DEGLUDEC INJECTION 100 U/ML
INJECTION, SOLUTION SUBCUTANEOUS
Qty: 15 ML | Refills: 3 | Status: SHIPPED | OUTPATIENT
Start: 2022-02-06

## 2022-02-07 ENCOUNTER — OFFICE VISIT (OUTPATIENT)
Dept: INTERNAL MEDICINE CLINIC | Age: 62
End: 2022-02-07

## 2022-02-07 DIAGNOSIS — Z79.4 TYPE 2 DIABETES MELLITUS WITH HYPERGLYCEMIA, WITH LONG-TERM CURRENT USE OF INSULIN (HCC): Primary | ICD-10-CM

## 2022-02-07 DIAGNOSIS — E11.65 TYPE 2 DIABETES MELLITUS WITH HYPERGLYCEMIA, WITH LONG-TERM CURRENT USE OF INSULIN (HCC): Primary | ICD-10-CM

## 2022-02-07 NOTE — PATIENT INSTRUCTIONS
Your Visit Summary:     Plan:  - Continue current medications  - If blood sugars continue to be less than 70 during the day, cut Humalog down to 22 units with meals   - Will touch base after next A1c check in April   - Try to use exercise bike at least 2 days per week           Call me with any questions or concerns  Cordelia Parker [phone]    Check and document your blood sugar first thing in the morning (fasting), 1-2 hours after a meal, and/or before bedtime. Bring your meter/log to all future visits. Your blood sugar goals:  - Fasting (first thing in the morning)  blood sugar: 80 - 130   - 1 to 2 hours after a meal: less than 180     When you experience symptoms of low blood sugar (example: less than 70):  - Confirm low reading by checking your blood sugar.   - Then treat with 15 grams of carbohydrates (one-half cup of juice or regular soda, or 4-5 glucose tablets). - Wait 15 minutes to recheck blood sugar.   - Then eat a protein containing meal/snack to prevent another low blood sugar episode. (example: peanut butter + crackers)    Nutrition:  - When reviewing a nutrition label, focus on the serving size, total calories, fat (and type of fats), total carbohydrates, sugar (and amount of added sugar), amount of fiber (good for your digestive), and amount of protein. Refer to your nutrition label guide for more information.  - For a meal : max 30 - 45 grams of carbohydrates  - For a snack: max 15 grams of carbohydrates  - Reduce amount of saturated and trans fat. Consider more unsaturated fat options as they are better for your heart health.    - Have at least 1 serving of lean fat protein with each meal.    - Increase fiber intake slowly to prevent constipation.   - Substitute fruit juices for the whole fruit    Low carb snack ideas (15 grams total carb or less):     String cheese or babybel with 6 crackers   4 peanut butter crackers   3 cups of popcorn   1 cup raw vegetables with hummus or ranch dip (just need to watch how much dip you use)   Nuts   2 rice cakes   Celery with peanut butter or cream cheese   String cheese with 1 serving of fruit   Greek yogurt (look at label to make sure < 15 gram carb)   Plain greek yogurt with fresh berries added   Nature valley protein bar   Whisps parmesan cheese crisps   Hard boiled egg   Cottage cheese   Tuna salad lettuce roll-ups   Deli meat roll-ups with slice of cheese   Sugar Free Jello   Glucerna shake (16 grams)    Glucerna hunger smart shake (16 grams)   Ensure protein max shake   Fruit (1 serving/15 grams)   1/2 banana, yosef, or grapefruit    1/3 melon (small cantaloupe)   1 slice or 1 cup of honeydew melon   1 slice or 1 and 1/4 cups of watermelon    1 small apple, peach, orange or pear   2 small tangerines   1 cup of raspberries   3/4 cup of blackberries, blueberries or pineapples   1/2 cup of fruit juice, pears, applesauce, or mangos   17 small grapes   12 cherries    Be careful with the glucerna products as they differ in the total carbs depending on the product (some are intended as meal replacements not snacks). Make sure you look at the total carbs on the label as products can differ. Physical Activity:  - Aim for 30 minutes of consistent, moderately intensive, physical activity a day for 5 days or an average of 150 minutes per week. - Start slow, increase as tolerated. For example: Walk every day, working up to 30 minutes of brisk walking, 5 days a weekor split the 30 minutes into two 15-minute or three 10-minute walks. - If you sit for a long time, get up and move/stretch every 90 minutes. Other recommendations:  - Schedule an annual eye exam.  - Check your feet daily for any signs of open wounds, cuts, or sores. - Given your risk factors, the following vaccines are recommended: annual flu shot, age-based pneumococcal vaccines (Pneumovax, Prevnar 13).     In addition to taking your medications as directed, improving your blood sugar involves modifying your nutrition and maximizing the amount of physical activity.
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
## 2022-02-09 DIAGNOSIS — I10 ESSENTIAL HYPERTENSION: ICD-10-CM

## 2022-02-09 RX ORDER — FUROSEMIDE 20 MG/1
TABLET ORAL
Qty: 30 TABLET | Refills: 5 | Status: SHIPPED | OUTPATIENT
Start: 2022-02-09 | End: 2022-08-25
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
## 2022-02-13 DIAGNOSIS — E11.21 TYPE 2 DIABETES WITH NEPHROPATHY (HCC): ICD-10-CM

## 2022-02-14 ENCOUNTER — OFFICE VISIT (OUTPATIENT)
Dept: ORTHOPEDIC SURGERY | Age: 62
End: 2022-02-14
Payer: COMMERCIAL

## 2022-02-14 VITALS
BODY MASS INDEX: 39.62 KG/M2 | HEIGHT: 71 IN | OXYGEN SATURATION: 94 % | HEART RATE: 74 BPM | TEMPERATURE: 98.3 F | WEIGHT: 283 LBS

## 2022-02-14 DIAGNOSIS — M43.10 SPONDYLOLISTHESIS, ACQUIRED: Primary | ICD-10-CM

## 2022-02-14 DIAGNOSIS — M47.816 LUMBAR SPONDYLOSIS: ICD-10-CM

## 2022-02-14 DIAGNOSIS — M43.16 SPONDYLOLISTHESIS OF LUMBAR REGION: ICD-10-CM

## 2022-02-14 DIAGNOSIS — M47.816 LUMBAR FACET ARTHROPATHY: ICD-10-CM

## 2022-02-14 DIAGNOSIS — M47.817 LUMBOSACRAL SPONDYLOSIS WITHOUT MYELOPATHY: ICD-10-CM

## 2022-02-14 DIAGNOSIS — M51.36 DDD (DEGENERATIVE DISC DISEASE), LUMBAR: ICD-10-CM

## 2022-02-14 DIAGNOSIS — Z01.818 PRE-OP TESTING: ICD-10-CM

## 2022-02-14 DIAGNOSIS — M54.50 LUMBAR PAIN: ICD-10-CM

## 2022-02-14 DIAGNOSIS — G62.9 NEUROPATHY: ICD-10-CM

## 2022-02-14 DIAGNOSIS — Z01.811 PRE-OP CHEST EXAM: ICD-10-CM

## 2022-02-14 PROCEDURE — 99213 OFFICE O/P EST LOW 20 MIN: CPT | Performed by: PHYSICAL MEDICINE & REHABILITATION

## 2022-02-14 RX ORDER — BLOOD-GLUCOSE SENSOR
EACH MISCELLANEOUS
Qty: 3 EACH | Refills: 5 | Status: SHIPPED | OUTPATIENT
Start: 2022-02-14 | End: 2022-08-14

## 2022-02-14 NOTE — LETTER
2/14/2022    Patient: Rodger Vicente   YOB: 1960   Date of Visit: 2/14/2022     Nicole Guillen, [address]  [address] [address]  Via In Basket    Dear Nicole Guillen MD,      Thank you for referring Mr. Rodger Vicente to [address] for evaluation. My notes for this consultation are attached. If you have questions, please do not hesitate to call me. I look forward to following your patient along with you.       Sincerely,    Lata Briones MD

## 2022-02-28 DIAGNOSIS — Z01.818 PRE-OP TESTING: ICD-10-CM

## 2022-03-11 DIAGNOSIS — F32.A DEPRESSION, UNSPECIFIED DEPRESSION TYPE: ICD-10-CM

## 2022-03-11 RX ORDER — BUPROPION HYDROCHLORIDE 150 MG/1
TABLET, EXTENDED RELEASE ORAL
Qty: 90 TABLET | Refills: 1 | Status: SHIPPED | OUTPATIENT
Start: 2022-03-11 | End: 2022-09-15

## 2022-03-11 RX ORDER — METOPROLOL SUCCINATE 50 MG/1
TABLET, EXTENDED RELEASE ORAL
Qty: 90 TABLET | Refills: 1 | Status: SHIPPED | OUTPATIENT
Start: 2022-03-11 | End: 2022-09-15

## 2022-03-14 DIAGNOSIS — Z01.811 PRE-OP CHEST EXAM: ICD-10-CM

## 2022-03-14 DIAGNOSIS — Z01.818 PRE-OP TESTING: ICD-10-CM

## 2022-03-18 PROBLEM — N20.0 NEPHROLITHIASIS: Status: ACTIVE | Noted: 2017-06-09

## 2022-03-18 PROBLEM — N40.0 BENIGN PROSTATIC HYPERPLASIA: Status: ACTIVE | Noted: 2017-06-09

## 2022-03-18 PROBLEM — E11.42 DIABETIC POLYNEUROPATHY ASSOCIATED WITH TYPE 2 DIABETES MELLITUS (HCC): Status: ACTIVE | Noted: 2018-10-01

## 2022-03-18 PROBLEM — M43.16 SPONDYLOLISTHESIS OF LUMBAR REGION: Status: ACTIVE | Noted: 2018-06-11

## 2022-03-19 PROBLEM — D64.9 SYMPTOMATIC ANEMIA: Status: ACTIVE | Noted: 2018-12-10

## 2022-03-19 PROBLEM — K75.81 NASH (NONALCOHOLIC STEATOHEPATITIS): Status: ACTIVE | Noted: 2017-06-09

## 2022-03-19 PROBLEM — Z87.19 HISTORY OF GI BLEED: Status: ACTIVE | Noted: 2018-12-10

## 2022-03-19 PROBLEM — S63.056A CMC (CARPOMETACARPAL JOINT) DISLOCATION: Status: ACTIVE | Noted: 2018-12-03

## 2022-03-19 PROBLEM — M51.9 LUMBAR DISC DISEASE: Status: ACTIVE | Noted: 2017-11-08

## 2022-03-19 PROBLEM — D12.6 TUBULAR ADENOMA OF COLON: Status: ACTIVE | Noted: 2017-06-09

## 2022-03-19 PROBLEM — J44.9 COPD, GROUP B, BY GOLD 2017 CLASSIFICATION (HCC): Status: ACTIVE | Noted: 2017-06-09

## 2022-03-19 PROBLEM — N28.1 RENAL CYST: Status: ACTIVE | Noted: 2017-06-09

## 2022-03-19 PROBLEM — E11.21 TYPE 2 DIABETES WITH NEPHROPATHY (HCC): Status: ACTIVE | Noted: 2019-06-07

## 2022-03-20 PROBLEM — R16.1 SPLENOMEGALY: Status: ACTIVE | Noted: 2017-11-08

## 2022-03-20 PROBLEM — R80.9 MICROALBUMINURIA: Status: ACTIVE | Noted: 2017-06-09

## 2022-03-20 PROBLEM — Z79.01 CHRONIC ANTICOAGULATION: Status: ACTIVE | Noted: 2018-12-10

## 2022-03-20 PROBLEM — E66.01 SEVERE OBESITY (BMI 35.0-39.9) WITH COMORBIDITY (HCC): Status: ACTIVE | Noted: 2018-04-25

## 2022-03-20 PROBLEM — K57.90 DIVERTICULOSIS: Status: ACTIVE | Noted: 2017-06-09

## 2022-03-30 ENCOUNTER — APPOINTMENT (OUTPATIENT)
Dept: INTERNAL MEDICINE CLINIC | Age: 62
End: 2022-03-30

## 2022-03-31 LAB
AVG GLU, 10930: 135 MG/DL (ref 91–123)
HBA1C MFR BLD HPLC: 6.3 % (ref 4.8–5.6)

## 2022-03-31 NOTE — PROGRESS NOTES
I will discuss his A1C of 6.3, down from 8.8! !! at his upcoming apt.     Dr. Aron Hansen  [facility], [facility], [facility]  Phone: [phone]  Fax: [phone]

## 2022-04-08 DIAGNOSIS — E11.21 TYPE 2 DIABETES WITH NEPHROPATHY (HCC): ICD-10-CM

## 2022-04-08 RX ORDER — DULAGLUTIDE 1.5 MG/.5ML
INJECTION, SOLUTION SUBCUTANEOUS
Qty: 4 ML | Refills: 3 | Status: SHIPPED | OUTPATIENT
Start: 2022-04-08

## 2022-04-10 RX ORDER — APIXABAN 5 MG/1
TABLET, FILM COATED ORAL
Qty: 60 TABLET | Refills: 6 | Status: SHIPPED | OUTPATIENT
Start: 2022-04-10

## 2022-04-11 ENCOUNTER — HOSPITAL ENCOUNTER (OUTPATIENT)
Dept: GENERAL RADIOLOGY | Age: 62
Discharge: HOME | End: 2022-04-11
Payer: COMMERCIAL

## 2022-04-11 ENCOUNTER — TELEPHONE (OUTPATIENT)
Dept: ORTHOPEDIC SURGERY | Age: 62
End: 2022-04-11

## 2022-04-11 ENCOUNTER — HOSPITAL ENCOUNTER (OUTPATIENT)
Dept: LAB | Age: 62
Discharge: HOME | End: 2022-04-11

## 2022-04-11 LAB — SENTARA SPECIMEN COL,SENBCF: NORMAL

## 2022-04-11 PROCEDURE — 71046 X-RAY EXAM CHEST 2 VIEWS: CPT

## 2022-04-11 PROCEDURE — 99001 SPECIMEN HANDLING PT-LAB: CPT

## 2022-04-11 NOTE — TELEPHONE ENCOUNTER
Good afternoon,                       This patient has been scheduled for a Spinal Cord Stimulator Trial on 4/19. He was originally scheduled for 4/26 but the procedure has been moved up. I just have 2 questions for you    1.) Patient has an appt with you for cardiac clearance/EKG on 4/18- 1 day before the procedure. Will the EKG results be ready the same day of the appointment? As I will need to forward them to the servicing facility    2.)Request okay for patient to d/c his Eliquis 3 days prior to procedure, and restart day after procedure.     Thank You

## 2022-04-12 NOTE — TELEPHONE ENCOUNTER
Ekta'prisca voicemail from Dr Piedra Adjutant office stating that patients clx appt has been moved up to 4/15.  Faxed clearance form and Chest Xray to his office

## 2022-04-15 ENCOUNTER — TELEPHONE (OUTPATIENT)
Dept: ORTHOPEDIC SURGERY | Age: 62
End: 2022-04-15

## 2022-04-15 ENCOUNTER — OFFICE VISIT (OUTPATIENT)
Dept: INTERNAL MEDICINE CLINIC | Age: 62
End: 2022-04-15

## 2022-04-15 ENCOUNTER — TELEPHONE (OUTPATIENT)
Dept: INTERNAL MEDICINE CLINIC | Age: 62
End: 2022-04-15

## 2022-04-15 ENCOUNTER — OFFICE VISIT (OUTPATIENT)
Dept: CARDIOLOGY CLINIC | Age: 62
End: 2022-04-15
Payer: COMMERCIAL

## 2022-04-15 ENCOUNTER — APPOINTMENT (OUTPATIENT)
Dept: INTERNAL MEDICINE CLINIC | Age: 62
End: 2022-04-15

## 2022-04-15 VITALS
WEIGHT: 271 LBS | DIASTOLIC BLOOD PRESSURE: 82 MMHG | BODY MASS INDEX: 37.94 KG/M2 | OXYGEN SATURATION: 97 % | HEART RATE: 67 BPM | SYSTOLIC BLOOD PRESSURE: 136 MMHG | HEIGHT: 71 IN

## 2022-04-15 VITALS
SYSTOLIC BLOOD PRESSURE: 135 MMHG | DIASTOLIC BLOOD PRESSURE: 71 MMHG | RESPIRATION RATE: 16 BRPM | WEIGHT: 271 LBS | HEIGHT: 71 IN | HEART RATE: 68 BPM | OXYGEN SATURATION: 96 % | BODY MASS INDEX: 37.94 KG/M2 | TEMPERATURE: 97.7 F

## 2022-04-15 DIAGNOSIS — I48.19 PERSISTENT ATRIAL FIBRILLATION (HCC): ICD-10-CM

## 2022-04-15 DIAGNOSIS — E11.21 TYPE 2 DIABETES WITH NEPHROPATHY (HCC): ICD-10-CM

## 2022-04-15 DIAGNOSIS — I10 ESSENTIAL HYPERTENSION: ICD-10-CM

## 2022-04-15 DIAGNOSIS — E11.21 TYPE 2 DIABETES WITH NEPHROPATHY (HCC): Primary | ICD-10-CM

## 2022-04-15 DIAGNOSIS — E78.5 DYSLIPIDEMIA: ICD-10-CM

## 2022-04-15 DIAGNOSIS — I48.19 PERSISTENT ATRIAL FIBRILLATION (HCC): Primary | ICD-10-CM

## 2022-04-15 DIAGNOSIS — G47.33 OSA TREATED WITH BIPAP: ICD-10-CM

## 2022-04-15 DIAGNOSIS — M51.9 LUMBAR DISC DISEASE: ICD-10-CM

## 2022-04-15 DIAGNOSIS — E66.01 SEVERE OBESITY (BMI 35.0-39.9) WITH COMORBIDITY (HCC): ICD-10-CM

## 2022-04-15 PROCEDURE — 3044F HG A1C LEVEL LT 7.0%: CPT | Performed by: INTERNAL MEDICINE

## 2022-04-15 PROCEDURE — 93000 ELECTROCARDIOGRAM COMPLETE: CPT | Performed by: INTERNAL MEDICINE

## 2022-04-15 PROCEDURE — 99204 OFFICE O/P NEW MOD 45 MIN: CPT | Performed by: INTERNAL MEDICINE

## 2022-04-15 PROCEDURE — 99214 OFFICE O/P EST MOD 30 MIN: CPT | Performed by: INTERNAL MEDICINE

## 2022-04-15 NOTE — PROGRESS NOTES
Pastor Manning presents today for   Chief Complaint   Patient presents with    Pre-op Exam   4/19/2022 spinal stimulation     1. \"Have you been to the ER, urgent care clinic since your last visit? Hospitalized since your last visit? \" no    2. \"Have you seen or consulted any other health care providers outside of the [address] [place] [address] since your last visit? \" no     3. For patients aged 39-70: Has the patient had a colonoscopy / FIT/ Cologuard? Yes - no Care Gap present      If the patient is female:    4. For patients aged 41-77: Has the patient had a mammogram within the past 2 years? NA - based on age or sex  See top three    5. For patients aged 21-65: Has the patient had a pap smear?  NA - based on age or sex
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
## 2022-04-15 NOTE — TELEPHONE ENCOUNTER
Joelle Krabbe with [facility] called and said they need pre-op form and note faxed back today to send to facility

## 2022-04-15 NOTE — PATIENT INSTRUCTIONS
Learning About Meal Planning for Diabetes  Why plan your meals? Meal planning can be a key part of managing diabetes. Planning meals and snacks with the right balance of carbohydrate, protein, and fat can help you keep your blood sugar at the target level you set with your doctor. You don't have to eat special foods. You can eat what your family eats, including sweets once in a while. But you do have to pay attention to how often you eat and how much you eat of certain foods. You may want to work with a dietitian or a diabetes educator. They can give you tips and meal ideas and can answer your questions about meal planning. This health professional can also help you reach a healthy weight if that is one of your goals. What plan is right for you? Your dietitian or diabetes educator may suggest that you start with the plate format or carbohydrate counting. The plate format  The plate format is a simple way to help you manage how you eat. You plan meals by learning how much space each food should take on a plate. Using the plate format helps you manage the amount of carbohydrate you eat. It can make it easier to keep your blood sugar level within your target range. It also helps you see if you're eating healthy portion sizes. To use the plate format, you put non-starchy vegetables on half your plate. Add lean protein foods, such as fish, lean meats and poultry, or soy products, on one-quarter of the plate. Put a grain or starchy vegetable (such as brown rice or a potato) on the final quarter of the plate. You can add a small piece of fruit and some low-fat or fat-free milk or yogurt, depending on your carbohydrate goal for each meal.  Here are some tips for using the plate format:  · Make sure that you are not using an oversized plate. A 9-inch plate is best. Many restaurants use larger plates. · Get used to using the plate format at home. Then you can use it when you eat out.   · Write down your questions about using the plate format. Talk to your doctor, a dietitian, or a diabetes educator about your concerns. Carbohydrate counting  With carbohydrate counting, you plan meals based on the amount of carbohydrate in each food. Carbohydrate raises blood sugar higher and more quickly than any other nutrient. It is found in desserts, breads and cereals, and fruit. It's also found in starchy vegetables such as potatoes and corn, grains such as rice and pasta, and milk and yogurt. You can help keep your blood sugar levels within your target range by planning how much carbohydrate to have at meals and snacks. The amount you need depends on several things. These include your weight, how active you are, which diabetes medicines you take, and what your goals are for your blood sugar levels. A registered dietitian or diabetes educator can help you plan how much carbohydrate to include in each meal and snack. An example of a carbohydrate counting plan is:  · 45 to 60 grams at each meal. That's about the same as 3 to 4 carbohydrate servings. · 15 to 20 grams at each snack. That's about the same as 1 carbohydrate serving. The Nutrition Facts label on packaged foods tells you how much carbohydrate is in a serving of the food. First, look at the serving size on the food label. Is that the amount you eat in a serving? All of the nutrition information on a food label is based on that serving size. So if you eat more or less than that, you'll need to adjust the other numbers. Total carbohydrate is the next thing you need to look for on the label. If you count carbohydrate servings, one serving of carbohydrate is 15 grams. For foods that don't come with labels, such as fresh fruits and vegetables, you'll need a guide that lists carbohydrate in these foods. Ask your doctor, dietitian, or diabetes educator about books or other nutrition guides you can use.   If you take insulin, you need to know how many grams of carbohydrate are in a meal. This lets you know how much rapid-acting insulin to take before you eat. If you use an insulin pump, you get a constant rate of insulin during the day. So the pump must be programmed at meals to give you extra insulin to cover the rise in blood sugar after meals. When you know how much carbohydrate you will eat, you can take the right amount of insulin. Or, if you always use the same amount of insulin, you need to make sure that you eat the same amount of carbohydrate at meals. If you need more help to understand carbohydrate counting and food labels, ask your doctor, dietitian, or diabetes educator. How can you plan healthy meals? Here are some tips to get started:  · Plan your meals a week at a time. Don't forget to include snacks too. · Use cookbooks or online recipes to plan several main meals. Plan some quick meals for busy nights. You also can double some recipes that freeze well. Then you can save half for other busy nights when you don't have time to cook. · Make sure you have the ingredients you need for your recipes. If you're running low on basic items, put these items on your shopping list too. · List foods that you use to make breakfasts, lunches, and snacks. List plenty of fruits and vegetables. · Post this list on the refrigerator. Add to it as you think of more things you need. · Take the list to the store to do your weekly shopping. Follow-up care is a key part of your treatment and safety. Be sure to make and go to all appointments, and call your doctor if you are having problems. It's also a good idea to know your test results and keep a list of the medicines you take. Where can you learn more? Go to http://www.gray.com/  Enter M304 in the search box to learn more about \"Learning About Meal Planning for Diabetes. \"  Current as of: September 8, 2021               Content Version: 13.2  © 0294-9037 [employer]wise, [facility].    Care instructions adapted under license by [employer] (which disclaims liability or warranty for this information). If you have questions about a medical condition or this instruction, always ask your healthcare professional. [address] any warranty or liability for your use of this information.

## 2022-04-15 NOTE — TELEPHONE ENCOUNTER
We are able to see the office note. We actually need the clearance form that we faxed over to you when the clearance appt was made, It is titled \" Medical Clearance for surgery\".  Thank You

## 2022-04-15 NOTE — PROGRESS NOTES
[place] presents today for   Chief Complaint   Patient presents with    New Patient     Establish care, last seen 2/25/19     Surgical Clearance     Spinal Cord Stimulator Trial 4/19/22 @ [facility] by Dr. Geo Mendez preferred language for health care discussion is english/other. Is someone accompanying this pt? no    Is the patient using any DME equipment during [address]? no    Depression Screening:  3 most recent PHQ Screens 7/22/2021   PHQ Not Done -   Little interest or pleasure in doing things Not at all   Feeling down, depressed, irritable, or hopeless Not at all   Total Score PHQ 2 0   Trouble falling or staying asleep, or sleeping too much -   Feeling tired or having little energy -   Poor appetite, weight loss, or overeating -   Feeling bad about yourself - or that you are a failure or have let yourself or your family down -   Trouble concentrating on things such as school, work, reading, or watching TV -   Moving or speaking so slowly that other people could have noticed; or the opposite being so fidgety that others notice -   Thoughts of being better off dead, or hurting yourself in some way -   PHQ 9 Score -   How difficult have these problems made it for you to do your work, take care of your home and get along with others -       Learning Assessment:  Learning Assessment 1/30/2020   PRIMARY LEARNER Patient   HIGHEST LEVEL OF EDUCATION - PRIMARY LEARNER  SOME COLLEGE   BARRIERS PRIMARY LEARNER NONE   CO-LEARNER CAREGIVER No   PRIMARY LANGUAGE ENGLISH   LEARNER PREFERENCE PRIMARY DEMONSTRATION     -   ANSWERED BY patient     -   RELATIONSHIP SELF   ASSESSMENT COMMENT -       Abuse Screening:  Abuse Screening Questionnaire 9/15/2021   Do you ever feel afraid of your partner? N   Are you in a relationship with someone who physically or mentally threatens you? N   Is it safe for you to go home? Y       Fall Risk  Fall Risk Assessment, last 12 mths 6/7/2019   Able to walk?  Yes Fall in past 12 months? No       Pt currently taking Anticoagulant therapy? Eliquis 5mg twice a day     Coordination of Care:  1. Have you been to the ER, urgent care clinic since your last visit? Hospitalized since your last visit? no    2. Have you seen or consulted any other health care providers outside of the [address] [place] [address] since your last visit? Include any pap smears or colon screening.  no
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
## 2022-04-15 NOTE — TELEPHONE ENCOUNTER
Good Afternoon,                              Patient was seen this morning for Preop Clearance appt . Request for the Clearance form that was sent to your office be signed and sent back for procedure .  Austin Humphreys The fax  Phone #  [phone]

## 2022-04-15 NOTE — PATIENT INSTRUCTIONS
Follow up with Dr Elisha Rivera with EKG in 6 months  Cleared for stimulator trial. May hold Eliquis for 3 days prior

## 2022-04-16 LAB
CREATININE, URINE: 44 MG/DL
MICROALB/CREAT RATIO, 140286: 304.5 (ref 0–30)
MICROALBUMIN,URINE RANDOM 140054: 134 MG/L (ref 0.1–17)

## 2022-04-18 ENCOUNTER — VIRTUAL VISIT (OUTPATIENT)
Dept: ORTHOPEDIC SURGERY | Age: 62
End: 2022-04-18
Payer: COMMERCIAL

## 2022-04-18 DIAGNOSIS — M51.36 DDD (DEGENERATIVE DISC DISEASE), LUMBAR: ICD-10-CM

## 2022-04-18 DIAGNOSIS — M47.817 LUMBOSACRAL SPONDYLOSIS WITHOUT MYELOPATHY: ICD-10-CM

## 2022-04-18 DIAGNOSIS — M47.816 LUMBAR FACET ARTHROPATHY: ICD-10-CM

## 2022-04-18 DIAGNOSIS — M47.816 LUMBAR SPONDYLOSIS: ICD-10-CM

## 2022-04-18 DIAGNOSIS — M54.50 LUMBAR PAIN: Primary | ICD-10-CM

## 2022-04-18 DIAGNOSIS — M43.10 SPONDYLOLISTHESIS, ACQUIRED: ICD-10-CM

## 2022-04-18 DIAGNOSIS — G62.9 NEUROPATHY: ICD-10-CM

## 2022-04-18 PROCEDURE — 99442 PR PHYS/QHP TELEPHONE EVALUATION 11-20 MIN: CPT | Performed by: PHYSICAL MEDICINE & REHABILITATION

## 2022-04-18 NOTE — PROGRESS NOTES
I will let him know at his upcoming appointment that his microalbuminuria is improving.     Dr. Cristo Mejia  [facility]ists of [facility], [facility], [address] [place] [address] Str.  Phone: [phone]  Fax: [phone]

## 2022-04-20 ENCOUNTER — TELEPHONE (OUTPATIENT)
Dept: ORTHOPEDIC SURGERY | Age: 62
End: 2022-04-20

## 2022-04-20 NOTE — TELEPHONE ENCOUNTER
Patient had Spinal Cord Stimulator Trial procedure on 4/19/2022. Called to check on patient today and he is C/O  Left Foot to Buttocks Pain . Received clearance from Dr Lacie Catalan on 4/15 for pt to d/c his Eliquis 3 days prior to procedure yesterday. Patient sates that he stopped it actually on 4/15 because he had not taken it yet when he went for his clearance appt. Concerned that this could possible be a blood clot ? Patient states that he has circulation to his toes, no edema, no redness or warmth to the touch. Please call patient to advise on what he should do ASAP. Patient is not concerned that it is a blood clot but Dr Janis Ayon  wants to make sure that it is not.  I had to remind patient to restart his Eliquis  Thank You

## 2022-04-20 NOTE — TELEPHONE ENCOUNTER
Contacted patient regarding message from Dr. Amanda Crews office. Patient states no discoloration, no swelling, no pitting, no redness, no increase in warmth to touch. Patient resumed his Eliquis today. Discussed with Dr. Derick Ziegler, record reviewed and made aware of patient's status. Verbal order and read back per Meghana France MD  - continue Eliquis as ordered  - notify our office and Dr. Amanda Crews office  of any changes of left leg  Nadia Guerra at Dr. Amanda Crews office made aware of Dr. Gabe Bustos remarks. This has been fully explained to the patient, who indicates understanding.

## 2022-04-21 RX ORDER — PEN NEEDLE, DIABETIC 32GX 5/32"
NEEDLE, DISPOSABLE MISCELLANEOUS
Qty: 200 PEN NEEDLE | Refills: 5 | Status: SHIPPED | OUTPATIENT
Start: 2022-04-21

## 2022-04-22 ENCOUNTER — HOSPITAL ENCOUNTER (OUTPATIENT)
Dept: LAB | Age: 62
Discharge: HOME | End: 2022-04-22

## 2022-04-22 ENCOUNTER — OFFICE VISIT (OUTPATIENT)
Dept: ORTHOPEDIC SURGERY | Age: 62
End: 2022-04-22
Payer: COMMERCIAL

## 2022-04-22 VITALS
HEART RATE: 64 BPM | OXYGEN SATURATION: 98 % | TEMPERATURE: 97.9 F | BODY MASS INDEX: 38.08 KG/M2 | WEIGHT: 272 LBS | HEIGHT: 71 IN

## 2022-04-22 DIAGNOSIS — M54.50 LUMBAR PAIN: Primary | ICD-10-CM

## 2022-04-22 DIAGNOSIS — M79.605 LEFT LEG PAIN: ICD-10-CM

## 2022-04-22 DIAGNOSIS — M54.16 LUMBAR NEURITIS: ICD-10-CM

## 2022-04-22 DIAGNOSIS — I82.462 ACUTE DEEP VEIN THROMBOSIS (DVT) OF CALF MUSCLE VEIN OF LEFT LOWER EXTREMITY (HCC): ICD-10-CM

## 2022-04-22 DIAGNOSIS — M54.9 UPPER BACK PAIN: ICD-10-CM

## 2022-04-22 LAB — SENTARA SPECIMEN COL,SENBCF: NORMAL

## 2022-04-22 PROCEDURE — 99214 OFFICE O/P EST MOD 30 MIN: CPT | Performed by: PHYSICAL MEDICINE & REHABILITATION

## 2022-04-22 PROCEDURE — 99001 SPECIMEN HANDLING PT-LAB: CPT

## 2022-04-22 RX ORDER — METHYLPREDNISOLONE 4 MG/1
TABLET ORAL
Qty: 1 DOSE PACK | Refills: 0 | Status: SHIPPED | OUTPATIENT
Start: 2022-04-22 | End: 2022-06-15 | Stop reason: ALTCHOICE
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
## 2022-04-22 NOTE — LETTER
4/22/2022    Patient: Zechariah Whitaker   YOB: 1960   Date of Visit: 4/22/2022     Betty Lam, [address]  [address] [address]  Via In Basket    Dear Betty Lam MD,      Thank you for referring Mr. Zechariah Whitaker to [address] for evaluation. My notes for this consultation are attached. If you have questions, please do not hesitate to call me. I look forward to following your patient along with you.       Sincerely,    Kaila Melendez MD

## 2022-04-26 ENCOUNTER — VIRTUAL VISIT (OUTPATIENT)
Dept: INTERNAL MEDICINE CLINIC | Age: 62
End: 2022-04-26
Payer: COMMERCIAL

## 2022-04-26 ENCOUNTER — TELEPHONE (OUTPATIENT)
Dept: ORTHOPEDIC SURGERY | Age: 62
End: 2022-04-26

## 2022-04-26 DIAGNOSIS — E11.21 TYPE 2 DIABETES WITH NEPHROPATHY (HCC): ICD-10-CM

## 2022-04-26 DIAGNOSIS — M54.50 LOW BACK PAIN, UNSPECIFIED BACK PAIN LATERALITY, UNSPECIFIED CHRONICITY, UNSPECIFIED WHETHER SCIATICA PRESENT: Primary | ICD-10-CM

## 2022-04-26 DIAGNOSIS — M51.9 LUMBAR DISC DISEASE: ICD-10-CM

## 2022-04-26 DIAGNOSIS — E11.21 TYPE 2 DIABETES WITH NEPHROPATHY (HCC): Primary | ICD-10-CM

## 2022-04-26 DIAGNOSIS — K75.81 NASH (NONALCOHOLIC STEATOHEPATITIS): ICD-10-CM

## 2022-04-26 PROCEDURE — 99213 OFFICE O/P EST LOW 20 MIN: CPT | Performed by: INTERNAL MEDICINE

## 2022-04-26 PROCEDURE — 3044F HG A1C LEVEL LT 7.0%: CPT | Performed by: INTERNAL MEDICINE
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
## 2022-05-10 ENCOUNTER — TELEPHONE (OUTPATIENT)
Dept: INTERNAL MEDICINE CLINIC | Age: 62
End: 2022-05-10

## 2022-05-10 ENCOUNTER — HOSPITAL ENCOUNTER (OUTPATIENT)
Age: 62
Discharge: HOME | End: 2022-05-10
Attending: PHYSICAL MEDICINE & REHABILITATION
Payer: COMMERCIAL

## 2022-05-10 DIAGNOSIS — M54.50 LOW BACK PAIN, UNSPECIFIED BACK PAIN LATERALITY, UNSPECIFIED CHRONICITY, UNSPECIFIED WHETHER SCIATICA PRESENT: ICD-10-CM

## 2022-05-10 DIAGNOSIS — I10 ESSENTIAL HYPERTENSION: ICD-10-CM

## 2022-05-10 PROCEDURE — 72146 MRI CHEST SPINE W/O DYE: CPT

## 2022-05-10 PROCEDURE — 72148 MRI LUMBAR SPINE W/O DYE: CPT

## 2022-05-10 RX ORDER — LOSARTAN POTASSIUM 100 MG/1
TABLET ORAL
Qty: 90 TABLET | Refills: 1 | Status: SHIPPED | OUTPATIENT
Start: 2022-05-10

## 2022-05-10 NOTE — TELEPHONE ENCOUNTER
----- Message from Alise Nolasco MD sent at 5/10/2022  3:54 PM EDT -----  Regarding: F/u apts needed  Please schedule him for a follow-up appointment in July. Please have him scheduled for labs 1 week before his appointment in July. His lab appointment needs to also be in July.     Dr. Justino Jeans  [facility], [address] [place] [address], [address] [place] [address] Str.  Phone: [phone]  Fax: [phone]

## 2022-05-11 DIAGNOSIS — M54.9 UPPER BACK PAIN: ICD-10-CM
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
## 2022-05-13 ENCOUNTER — OFFICE VISIT (OUTPATIENT)
Dept: ORTHOPEDIC SURGERY | Age: 62
End: 2022-05-13
Payer: COMMERCIAL

## 2022-05-13 VITALS
OXYGEN SATURATION: 98 % | TEMPERATURE: 98.1 F | WEIGHT: 268.8 LBS | HEIGHT: 71 IN | HEART RATE: 53 BPM | BODY MASS INDEX: 37.63 KG/M2

## 2022-05-13 DIAGNOSIS — M51.36 DDD (DEGENERATIVE DISC DISEASE), LUMBAR: Primary | ICD-10-CM

## 2022-05-13 DIAGNOSIS — M43.16 SPONDYLOLISTHESIS OF LUMBAR REGION: ICD-10-CM

## 2022-05-13 DIAGNOSIS — M54.12 BRACHIAL NEURITIS: ICD-10-CM

## 2022-05-13 DIAGNOSIS — M47.817 LUMBOSACRAL SPONDYLOSIS WITHOUT MYELOPATHY: ICD-10-CM

## 2022-05-13 DIAGNOSIS — M47.816 LUMBAR SPONDYLOSIS: ICD-10-CM

## 2022-05-13 PROCEDURE — 99214 OFFICE O/P EST MOD 30 MIN: CPT | Performed by: PHYSICAL MEDICINE & REHABILITATION

## 2022-05-13 NOTE — ADDENDUM NOTE
Addended by: [facility] on: 5/13/2022 12:56 PM     Modules accepted: Level of Service

## 2022-06-09 ENCOUNTER — HOSPITAL ENCOUNTER (OUTPATIENT)
Dept: LAB | Age: 62
Discharge: HOME | End: 2022-06-09

## 2022-06-09 DIAGNOSIS — D50.9 IRON DEFICIENCY ANEMIA, UNSPECIFIED IRON DEFICIENCY ANEMIA TYPE: ICD-10-CM

## 2022-06-09 DIAGNOSIS — E11.21 TYPE 2 DIABETES WITH NEPHROPATHY (HCC): ICD-10-CM

## 2022-06-09 LAB — SENTARA SPECIMEN COL,SENBCF: NORMAL

## 2022-06-09 PROCEDURE — 99001 SPECIMEN HANDLING PT-LAB: CPT

## 2022-06-09 RX ORDER — FERROUS SULFATE TAB 325 MG (65 MG ELEMENTAL FE) 325 (65 FE) MG
TAB ORAL
Qty: 60 TABLET | Refills: 5 | Status: SHIPPED | OUTPATIENT
Start: 2022-06-09

## 2022-06-09 RX ORDER — METFORMIN HYDROCHLORIDE 1000 MG/1
TABLET ORAL
Qty: 60 TABLET | Refills: 5 | Status: SHIPPED | OUTPATIENT
Start: 2022-06-09

## 2022-06-10 LAB
A-G RATIO,AGRAT: 2.8 RATIO (ref 1.1–2.6)
ABSOLUTE LYMPHOCYTE COUNT, 10803: 1.6 K/UL (ref 1–4.8)
ALBUMIN SERPL-MCNC: 4.7 G/DL (ref 3.5–5)
ALP SERPL-CCNC: 48 U/L (ref 40–125)
ALT SERPL-CCNC: 18 U/L (ref 5–40)
ANION GAP SERPL CALC-SCNC: 12 MMOL/L (ref 3–15)
AST SERPL W P-5'-P-CCNC: 30 U/L (ref 10–37)
AVG GLU, 10930: 139 MG/DL (ref 91–123)
BASOPHILS # BLD: 0 K/UL (ref 0–0.2)
BASOPHILS NFR BLD: 0 % (ref 0–2)
BILIRUB SERPL-MCNC: 0.3 MG/DL (ref 0.2–1.2)
BUN SERPL-MCNC: 39 MG/DL (ref 6–22)
CALCIUM SERPL-MCNC: 9.9 MG/DL (ref 8.4–10.5)
CHLORIDE SERPL-SCNC: 104 MMOL/L (ref 98–110)
CHOLEST SERPL-MCNC: 115 MG/DL (ref 110–200)
CO2 SERPL-SCNC: 26 MMOL/L (ref 20–32)
CREAT SERPL-MCNC: 1.9 MG/DL (ref 0.8–1.6)
CREATININE, URINE: 137 MG/DL
EOSINOPHIL # BLD: 0.3 K/UL (ref 0–0.5)
EOSINOPHIL NFR BLD: 4 % (ref 0–6)
ERYTHROCYTE [DISTWIDTH] IN BLOOD BY AUTOMATED COUNT: 14.9 % (ref 10–15.5)
GLOBULIN,GLOB: 1.7 G/DL (ref 2–4)
GLOMERULAR FILTRATION RATE: 38.9 ML/MIN/1.73 SQ.M.
GLUCOSE SERPL-MCNC: 180 MG/DL (ref 70–99)
GRANULOCYTES,GRANS: 63 % (ref 40–75)
HBA1C MFR BLD HPLC: 6.5 % (ref 4.8–5.6)
HCT VFR BLD AUTO: 45 % (ref 39.3–51.6)
HDLC SERPL-MCNC: 3.7 MG/DL (ref 0–5)
HDLC SERPL-MCNC: 31 MG/DL
HGB BLD-MCNC: 13 G/DL (ref 13.1–17.2)
LDL/HDL RATIO,LDHD: 1.3
LDLC SERPL CALC-MCNC: 39 MG/DL (ref 50–99)
LYMPHOCYTES, LYMLT: 23 % (ref 20–45)
MCH RBC QN AUTO: 29 PG (ref 26–34)
MCHC RBC AUTO-ENTMCNC: 29 G/DL (ref 31–36)
MCV RBC AUTO: 99 FL (ref 80–95)
MICROALB/CREAT RATIO, 140286: 129.9 (ref 0–30)
MICROALBUMIN,URINE RANDOM 140054: 178 MG/L (ref 0.1–17)
MONOCYTES # BLD: 0.7 K/UL (ref 0.1–1)
MONOCYTES NFR BLD: 10 % (ref 3–12)
NEUTROPHILS # BLD AUTO: 4.2 K/UL (ref 1.8–7.7)
NON-HDL CHOLESTEROL, 011976: 84 MG/DL
PLATELET # BLD AUTO: 204 K/UL (ref 140–440)
PMV BLD AUTO: 10.5 FL (ref 9–13)
POTASSIUM SERPL-SCNC: 4.7 MMOL/L (ref 3.5–5.5)
PROT SERPL-MCNC: 6.4 G/DL (ref 6.2–8.1)
RBC # BLD AUTO: 4.53 M/UL (ref 3.8–5.8)
SODIUM SERPL-SCNC: 142 MMOL/L (ref 133–145)
TRIGL SERPL-MCNC: 227 MG/DL (ref 40–149)
VLDLC SERPL CALC-MCNC: 45 MG/DL (ref 8–30)
WBC # BLD AUTO: 6.7 K/UL (ref 4–11)

## 2022-06-12 NOTE — TELEPHONE ENCOUNTER
I will let him know his upcoming appointment that his A1c is up to 6.5 from 6.3 in March. His CBC shows no significant abnormalities. His triglycerides are 227, down from 351 in September. HDL is up to 31 from 25. Total cholesterol is 115. LDL is 39. His CMP shows that his creatinine is 1.9, up from 1.5 in September. His BUN is 39. His microalbuminuria improved from a year ago.     Dr. Matthew Nash  [facility] [address], [address] [place] [address] Str.  Phone: [phone]  Fax: [phone]

## 2022-06-15 ENCOUNTER — OFFICE VISIT (OUTPATIENT)
Dept: INTERNAL MEDICINE CLINIC | Age: 62
End: 2022-06-15
Payer: COMMERCIAL

## 2022-06-15 VITALS
OXYGEN SATURATION: 97 % | HEART RATE: 60 BPM | BODY MASS INDEX: 37.8 KG/M2 | RESPIRATION RATE: 18 BRPM | DIASTOLIC BLOOD PRESSURE: 66 MMHG | TEMPERATURE: 98 F | HEIGHT: 71 IN | WEIGHT: 270 LBS | SYSTOLIC BLOOD PRESSURE: 136 MMHG

## 2022-06-15 DIAGNOSIS — M54.40 CHRONIC LOW BACK PAIN WITH SCIATICA, SCIATICA LATERALITY UNSPECIFIED, UNSPECIFIED BACK PAIN LATERALITY: ICD-10-CM

## 2022-06-15 DIAGNOSIS — E11.21 TYPE 2 DIABETES WITH NEPHROPATHY (HCC): ICD-10-CM

## 2022-06-15 DIAGNOSIS — G47.33 OSA TREATED WITH BIPAP: ICD-10-CM

## 2022-06-15 DIAGNOSIS — I10 ESSENTIAL HYPERTENSION: ICD-10-CM

## 2022-06-15 DIAGNOSIS — K76.0 NAFLD (NONALCOHOLIC FATTY LIVER DISEASE): ICD-10-CM

## 2022-06-15 DIAGNOSIS — N17.9 AKI (ACUTE KIDNEY INJURY) (HCC): ICD-10-CM

## 2022-06-15 DIAGNOSIS — I48.19 PERSISTENT ATRIAL FIBRILLATION (HCC): ICD-10-CM

## 2022-06-15 DIAGNOSIS — G89.29 CHRONIC LOW BACK PAIN WITH SCIATICA, SCIATICA LATERALITY UNSPECIFIED, UNSPECIFIED BACK PAIN LATERALITY: ICD-10-CM

## 2022-06-15 DIAGNOSIS — J44.9 COPD, GROUP B, BY GOLD 2017 CLASSIFICATION (HCC): ICD-10-CM

## 2022-06-15 DIAGNOSIS — K76.0 NAFLD (NONALCOHOLIC FATTY LIVER DISEASE): Primary | ICD-10-CM

## 2022-06-15 PROCEDURE — 99214 OFFICE O/P EST MOD 30 MIN: CPT | Performed by: INTERNAL MEDICINE

## 2022-06-15 PROCEDURE — 3044F HG A1C LEVEL LT 7.0%: CPT | Performed by: INTERNAL MEDICINE

## 2022-06-15 NOTE — PROGRESS NOTES
Jovita Felix presents today for   Chief Complaint   Patient presents with    Pre-op Exam     PREOP scheduled 7-11-22 with DR. Tamara Pandey f# [phone] p# [phone]           1. \"Keithu been to the ER, urgent care clinic since your last visit? Hospitalized since your last visit? \" no    2. \"Have you seen or consulted any other health care providers outside of the [address] [place] [address] since your last visit? \" yes     3. For patients aged 39-70: Has the patient had a colonoscopy / FIT/ Cologuard? Yes - no Care Gap present      If the patient is female:    4. For patients aged 41-77: Has the patient had a mammogram within the past 2 years? NA - based on age or sex  See top three    5. For patients aged 21-65: Has the patient had a pap smear?  NA - based on age or sex
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
## 2022-06-16 ENCOUNTER — TELEPHONE (OUTPATIENT)
Dept: CARDIOLOGY CLINIC | Age: 62
End: 2022-06-16

## 2022-06-16 NOTE — TELEPHONE ENCOUNTER
Faxed clearance to Dr. Gladys Rider for Thoracic Laminectomy/C-ARM.       Amber Canales, NATO, CET, CPT    MA for [facility]

## 2022-06-22 DIAGNOSIS — N17.9 AKI (ACUTE KIDNEY INJURY) (HCC): ICD-10-CM

## 2022-06-23 ENCOUNTER — HOSPITAL ENCOUNTER (OUTPATIENT)
Dept: ULTRASOUND IMAGING | Age: 62
Discharge: HOME | End: 2022-06-23
Attending: INTERNAL MEDICINE
Payer: COMMERCIAL

## 2022-06-23 DIAGNOSIS — N17.9 AKI (ACUTE KIDNEY INJURY) (HCC): ICD-10-CM

## 2022-06-23 DIAGNOSIS — K76.0 NAFLD (NONALCOHOLIC FATTY LIVER DISEASE): ICD-10-CM

## 2022-06-23 PROCEDURE — 76700 US EXAM ABDOM COMPLETE: CPT

## 2022-06-27 NOTE — PROGRESS NOTES
Please let him know that his ultrasound shows that his liver and spleen are not enlarged. He has fatty liver disease. He needs to reduce his weight by maintaining a heart healthy low-carb diet in order to resolve this issue. I will discuss this more in depth with him at his upcoming appointment.     Dr. Cecelia Hilario  [facility] [address], [address] [place] [address].  Phone: [phone]  Fax: [phone]

## 2022-06-28 NOTE — PROGRESS NOTES
Patient reached and made aware of US results and message per Dr. Nicci Pulido. Pt verbalized understanding.

## 2022-07-05 RX ORDER — AMLODIPINE BESYLATE 5 MG/1
5 TABLET ORAL DAILY
COMMUNITY
End: 2022-07-26 | Stop reason: SDUPTHER

## 2022-07-05 NOTE — PERIOP NOTES
PRE-SURGICAL INSTRUCTIONS        Patient's Name:  Haim Castle      NEYJW'P Date:  7/5/2022            Covid Testing Date and Time:    Surgery Date:  7/11/2022                1. Do NOT eat or drink anything, including candy, gum, or ice chips after midnight on 07/10, unless you have specific instructions from your surgeon or anesthesia provider to do so.  2. You may brush your teeth before coming to the hospital.  3. No smoking 24 hours prior to the day of surgery. 4. No alcohol 24 hours prior to the day of surgery. 5. No recreational drugs for one week prior to the day of surgery. 6. Leave all valuables, including money/purse, at home. 7. Remove all jewelry, nail polish, acrylic nails, and makeup (including mascara); no lotions powders, deodorant, or perfume/cologne/after shave on the skin. 8. Follow instruction for Hibiclens washes and CHG wipes from surgeon's office. 9. Glasses/contact lenses and dentures may be worn to the hospital.  They will be removed prior to surgery. 10. Call your doctor if symptoms of a cold or illness develop within 24-48 hours prior to your surgery. 11.  If you are having an outpatient procedure, please make arrangements for a responsible ADULT TO [address] [place] [address] and stay with you for 24 hours after your surgery. 12. ONE VISITOR in the hospital at this time for outpatient procedures. Exceptions may be made for surgical admissions, per nursing unit guidelines      Special Instructions:      Bring COVID card. Bring list of CURRENT medications. Bring inhaler. Bring  BiPAP machine. Bring any pertinent legal medical records. Take these medications the morning of surgery with a sip of water: Metoprolol , Amlodipine and Losartan  Follow physician instructions about insulin. Take 1/2 dose Tresiba  morning of surgery. Follow physician instructions about stopping anticoagulants. Last dose to be taken 7/7.        On the day of surgery, come in the main entrance of [facility]. Let the  at the desk know you are there for surgery. A staff member will come escort you to the surgical area on the second floor. If you have any questions or concerns, please do not hesitate to call:     (Prior to the day of surgery) [facility] department:  [phone]   (Day of surgery) Pre-Op department:  [phone]    These surgical instructions were reviewed with Merritt during the [facility] phone call.
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
## 2022-07-08 ENCOUNTER — ANESTHESIA EVENT (OUTPATIENT)
Dept: SURGERY | Age: 62
End: 2022-07-08
Payer: COMMERCIAL

## 2022-07-08 NOTE — NURSE NAVIGATOR
Attempted to reach patient to provide preop education. VM left to return message to coordinator.  No PHI left

## 2022-07-11 ENCOUNTER — ANESTHESIA (OUTPATIENT)
Dept: SURGERY | Age: 62
End: 2022-07-11
Payer: COMMERCIAL

## 2022-07-11 ENCOUNTER — HOSPITAL ENCOUNTER (OUTPATIENT)
Age: 62
Setting detail: OUTPATIENT SURGERY
Discharge: HOME | End: 2022-07-11
Attending: ORTHOPAEDIC SURGERY | Admitting: ORTHOPAEDIC SURGERY
Payer: COMMERCIAL

## 2022-07-11 ENCOUNTER — APPOINTMENT (OUTPATIENT)
Dept: GENERAL RADIOLOGY | Age: 62
End: 2022-07-11
Attending: ORTHOPAEDIC SURGERY
Payer: COMMERCIAL

## 2022-07-11 VITALS
BODY MASS INDEX: 39 KG/M2 | OXYGEN SATURATION: 100 % | WEIGHT: 278.6 LBS | HEIGHT: 71 IN | TEMPERATURE: 97.1 F | RESPIRATION RATE: 16 BRPM | SYSTOLIC BLOOD PRESSURE: 163 MMHG | HEART RATE: 78 BPM | DIASTOLIC BLOOD PRESSURE: 94 MMHG

## 2022-07-11 DIAGNOSIS — Z96.89 S/P INSERTION OF SPINAL CORD STIMULATOR: Primary | ICD-10-CM

## 2022-07-11 LAB
GLUCOSE BLD STRIP.AUTO-MCNC: 107 MG/DL (ref 70–110)
GLUCOSE BLD STRIP.AUTO-MCNC: 115 MG/DL (ref 70–110)

## 2022-07-11 PROCEDURE — 76060000033 HC ANESTHESIA 1 TO 1.5 HR: Performed by: ORTHOPAEDIC SURGERY

## 2022-07-11 PROCEDURE — 76010000149 HC OR TIME 1 TO 1.5 HR: Performed by: ORTHOPAEDIC SURGERY

## 2022-07-11 PROCEDURE — 77030012406 HC DRN WND PENRS BARD -A: Performed by: ORTHOPAEDIC SURGERY

## 2022-07-11 PROCEDURE — 74011250636 HC RX REV CODE- 250/636: Performed by: ANESTHESIOLOGY

## 2022-07-11 PROCEDURE — 77030030163 HC BN WAX J&J -A: Performed by: ORTHOPAEDIC SURGERY

## 2022-07-11 PROCEDURE — 74011250636 HC RX REV CODE- 250/636: Performed by: NURSE ANESTHETIST, CERTIFIED REGISTERED

## 2022-07-11 PROCEDURE — 74011000250 HC RX REV CODE- 250: Performed by: ORTHOPAEDIC SURGERY

## 2022-07-11 PROCEDURE — 77030028990 HC ADH TISS DERMFLX CHMP -B: Performed by: ORTHOPAEDIC SURGERY

## 2022-07-11 PROCEDURE — C1767 GENERATOR, NEURO NON-RECHARG: HCPCS | Performed by: ORTHOPAEDIC SURGERY

## 2022-07-11 PROCEDURE — 77030012012 HC AIRWY OP SFT TELE -A: Performed by: ANESTHESIOLOGY

## 2022-07-11 PROCEDURE — 77030033138 HC SUT PGA STRATFX J&J -B: Performed by: ORTHOPAEDIC SURGERY

## 2022-07-11 PROCEDURE — 74011250636 HC RX REV CODE- 250/636: Performed by: ORTHOPAEDIC SURGERY

## 2022-07-11 PROCEDURE — 77030013567 HC DRN WND RESERV BARD -A: Performed by: ORTHOPAEDIC SURGERY

## 2022-07-11 PROCEDURE — 77030036974 HC DEV MOBILE DGTL IPOD TCH STJU -G: Performed by: ORTHOPAEDIC SURGERY

## 2022-07-11 PROCEDURE — 77030031359 HC BLD BN MILL DISP STRY -E: Performed by: ORTHOPAEDIC SURGERY

## 2022-07-11 PROCEDURE — 51798 US URINE CAPACITY MEASURE: CPT

## 2022-07-11 PROCEDURE — 77030031139 HC SUT VCRL2 J&J -A: Performed by: ORTHOPAEDIC SURGERY

## 2022-07-11 PROCEDURE — 77030012890: Performed by: ORTHOPAEDIC SURGERY

## 2022-07-11 PROCEDURE — 77030027138 HC INCENT SPIROMETER -A: Performed by: ORTHOPAEDIC SURGERY

## 2022-07-11 PROCEDURE — 77030012716 HC ELEV PASS NEURO BSC -B: Performed by: ORTHOPAEDIC SURGERY

## 2022-07-11 PROCEDURE — 74011250637 HC RX REV CODE- 250/637: Performed by: NURSE ANESTHETIST, CERTIFIED REGISTERED

## 2022-07-11 PROCEDURE — 77030002933 HC SUT MCRYL J&J -A: Performed by: ORTHOPAEDIC SURGERY

## 2022-07-11 PROCEDURE — 74011000272 HC RX REV CODE- 272: Performed by: ORTHOPAEDIC SURGERY

## 2022-07-11 PROCEDURE — 77030002996 HC SUT SLK J&J -A: Performed by: ORTHOPAEDIC SURGERY

## 2022-07-11 PROCEDURE — C1713 ANCHOR/SCREW BN/BN,TIS/BN: HCPCS | Performed by: ORTHOPAEDIC SURGERY

## 2022-07-11 PROCEDURE — 77030018836 HC SOL IRR NACL ICUM -A: Performed by: ORTHOPAEDIC SURGERY

## 2022-07-11 PROCEDURE — 76210000021 HC REC RM PH II 0.5 TO 1 HR: Performed by: ORTHOPAEDIC SURGERY

## 2022-07-11 PROCEDURE — 00620 ANES PX THRC SPINE&CORD NOS: CPT | Performed by: NURSE ANESTHETIST, CERTIFIED REGISTERED

## 2022-07-11 PROCEDURE — 77030020274 HC MISC IMPL ORTHOPEDIC: Performed by: ORTHOPAEDIC SURGERY

## 2022-07-11 PROCEDURE — 76210000017 HC OR PH I REC 1.5 TO 2 HR: Performed by: ORTHOPAEDIC SURGERY

## 2022-07-11 PROCEDURE — 00620 ANES PX THRC SPINE&CORD NOS: CPT | Performed by: ANESTHESIOLOGY

## 2022-07-11 PROCEDURE — 77030013079 HC BLNKT BAIR HGGR 3M -A: Performed by: ANESTHESIOLOGY

## 2022-07-11 PROCEDURE — C1778 LEAD, NEUROSTIMULATOR: HCPCS | Performed by: ORTHOPAEDIC SURGERY

## 2022-07-11 PROCEDURE — 77030008683 HC TU ET CUF COVD -A: Performed by: ANESTHESIOLOGY

## 2022-07-11 PROCEDURE — 77030040922 HC BLNKT HYPOTHRM STRY -A: Performed by: ORTHOPAEDIC SURGERY

## 2022-07-11 PROCEDURE — 77030003029 HC SUT VCRL J&J -B: Performed by: ORTHOPAEDIC SURGERY

## 2022-07-11 PROCEDURE — 77030026438 HC STYL ET INTUB CARD -A: Performed by: ANESTHESIOLOGY

## 2022-07-11 PROCEDURE — 77030040361 HC SLV COMPR DVT MDII -B: Performed by: ORTHOPAEDIC SURGERY

## 2022-07-11 PROCEDURE — 82962 GLUCOSE BLOOD TEST: CPT

## 2022-07-11 PROCEDURE — 77030034479 HC ADH SKN CLSR PRINEO J&J -B: Performed by: ORTHOPAEDIC SURGERY

## 2022-07-11 PROCEDURE — 77030011265 HC ELECTRD BLD HEX COVD -A: Performed by: ORTHOPAEDIC SURGERY

## 2022-07-11 PROCEDURE — 2709999900 HC NON-CHARGEABLE SUPPLY: Performed by: ORTHOPAEDIC SURGERY

## 2022-07-11 PROCEDURE — 77030035236 HC SUT PDS STRATFX BARB J&J -B: Performed by: ORTHOPAEDIC SURGERY

## 2022-07-11 PROCEDURE — 77030038692 HC WND DEB SYS IRMX -B: Performed by: ORTHOPAEDIC SURGERY

## 2022-07-11 PROCEDURE — 74011000250 HC RX REV CODE- 250: Performed by: NURSE ANESTHETIST, CERTIFIED REGISTERED

## 2022-07-11 PROCEDURE — 77030020268 HC MISC GENERAL SUPPLY: Performed by: ORTHOPAEDIC SURGERY

## 2022-07-11 DEVICE — LEAD NEUROSTIMULATOR L60CM MR CONDITIONAL PENTA: Type: IMPLANTABLE DEVICE | Site: SPINE LUMBAR | Status: FUNCTIONAL

## 2022-07-11 DEVICE — ANCHOR LD FOR SPNL CRD STIM CINCH: Type: IMPLANTABLE DEVICE | Site: SPINE LUMBAR | Status: FUNCTIONAL

## 2022-07-11 DEVICE — GEN IPG 5.3AH PROCLAIM ELITE 5 -- 3660 W/PAT CONTROLLER: Type: IMPLANTABLE DEVICE | Site: SPINE LUMBAR | Status: FUNCTIONAL

## 2022-07-11 RX ORDER — MAGNESIUM SULFATE 100 %
4 CRYSTALS MISCELLANEOUS AS NEEDED
Status: DISCONTINUED | OUTPATIENT
Start: 2022-07-11 | End: 2022-07-11 | Stop reason: HOSPADM

## 2022-07-11 RX ORDER — GLYCOPYRROLATE 0.2 MG/ML
INJECTION INTRAMUSCULAR; INTRAVENOUS AS NEEDED
Status: DISCONTINUED | OUTPATIENT
Start: 2022-07-11 | End: 2022-07-11 | Stop reason: HOSPADM

## 2022-07-11 RX ORDER — FENTANYL CITRATE 50 UG/ML
INJECTION, SOLUTION INTRAMUSCULAR; INTRAVENOUS AS NEEDED
Status: DISCONTINUED | OUTPATIENT
Start: 2022-07-11 | End: 2022-07-11 | Stop reason: HOSPADM

## 2022-07-11 RX ORDER — ONDANSETRON 2 MG/ML
INJECTION INTRAMUSCULAR; INTRAVENOUS AS NEEDED
Status: DISCONTINUED | OUTPATIENT
Start: 2022-07-11 | End: 2022-07-11 | Stop reason: HOSPADM

## 2022-07-11 RX ORDER — SODIUM CHLORIDE, SODIUM LACTATE, POTASSIUM CHLORIDE, CALCIUM CHLORIDE 600; 310; 30; 20 MG/100ML; MG/100ML; MG/100ML; MG/100ML
100 INJECTION, SOLUTION INTRAVENOUS CONTINUOUS
Status: DISCONTINUED | OUTPATIENT
Start: 2022-07-11 | End: 2022-07-11 | Stop reason: HOSPADM

## 2022-07-11 RX ORDER — LABETALOL HCL 20 MG/4 ML
20 SYRINGE (ML) INTRAVENOUS ONCE
Status: COMPLETED | OUTPATIENT
Start: 2022-07-11 | End: 2022-07-11

## 2022-07-11 RX ORDER — ROCURONIUM BROMIDE 10 MG/ML
INJECTION, SOLUTION INTRAVENOUS AS NEEDED
Status: DISCONTINUED | OUTPATIENT
Start: 2022-07-11 | End: 2022-07-11 | Stop reason: HOSPADM

## 2022-07-11 RX ORDER — HYDROMORPHONE HYDROCHLORIDE 2 MG/ML
0.2 INJECTION, SOLUTION INTRAMUSCULAR; INTRAVENOUS; SUBCUTANEOUS AS NEEDED
Status: DISCONTINUED | OUTPATIENT
Start: 2022-07-11 | End: 2022-07-11 | Stop reason: HOSPADM

## 2022-07-11 RX ORDER — MIDAZOLAM HYDROCHLORIDE 1 MG/ML
INJECTION, SOLUTION INTRAMUSCULAR; INTRAVENOUS AS NEEDED
Status: DISCONTINUED | OUTPATIENT
Start: 2022-07-11 | End: 2022-07-11 | Stop reason: HOSPADM

## 2022-07-11 RX ORDER — DIPHENHYDRAMINE HYDROCHLORIDE 50 MG/ML
12.5 INJECTION, SOLUTION INTRAMUSCULAR; INTRAVENOUS
Status: DISCONTINUED | OUTPATIENT
Start: 2022-07-11 | End: 2022-07-11 | Stop reason: HOSPADM

## 2022-07-11 RX ORDER — SODIUM CHLORIDE 0.9 % (FLUSH) 0.9 %
5-40 SYRINGE (ML) INJECTION AS NEEDED
Status: DISCONTINUED | OUTPATIENT
Start: 2022-07-11 | End: 2022-07-11 | Stop reason: HOSPADM

## 2022-07-11 RX ORDER — LIDOCAINE HYDROCHLORIDE 10 MG/ML
0.1 INJECTION, SOLUTION EPIDURAL; INFILTRATION; INTRACAUDAL; PERINEURAL AS NEEDED
Status: DISCONTINUED | OUTPATIENT
Start: 2022-07-11 | End: 2022-07-11 | Stop reason: HOSPADM

## 2022-07-11 RX ORDER — SODIUM CHLORIDE 0.9 % (FLUSH) 0.9 %
5-40 SYRINGE (ML) INJECTION EVERY 8 HOURS
Status: DISCONTINUED | OUTPATIENT
Start: 2022-07-11 | End: 2022-07-11 | Stop reason: HOSPADM

## 2022-07-11 RX ORDER — ROPIVACAINE HYDROCHLORIDE 5 MG/ML
INJECTION, SOLUTION EPIDURAL; INFILTRATION; PERINEURAL AS NEEDED
Status: DISCONTINUED | OUTPATIENT
Start: 2022-07-11 | End: 2022-07-11 | Stop reason: HOSPADM

## 2022-07-11 RX ORDER — LIDOCAINE HYDROCHLORIDE 20 MG/ML
INJECTION, SOLUTION EPIDURAL; INFILTRATION; INTRACAUDAL; PERINEURAL AS NEEDED
Status: DISCONTINUED | OUTPATIENT
Start: 2022-07-11 | End: 2022-07-11 | Stop reason: HOSPADM

## 2022-07-11 RX ORDER — SUCCINYLCHOLINE CHLORIDE 20 MG/ML
INJECTION INTRAMUSCULAR; INTRAVENOUS AS NEEDED
Status: DISCONTINUED | OUTPATIENT
Start: 2022-07-11 | End: 2022-07-11 | Stop reason: HOSPADM

## 2022-07-11 RX ORDER — PROPOFOL 10 MG/ML
INJECTION, EMULSION INTRAVENOUS AS NEEDED
Status: DISCONTINUED | OUTPATIENT
Start: 2022-07-11 | End: 2022-07-11 | Stop reason: HOSPADM

## 2022-07-11 RX ORDER — FAMOTIDINE 20 MG/1
20 TABLET, FILM COATED ORAL ONCE
Status: COMPLETED | OUTPATIENT
Start: 2022-07-11 | End: 2022-07-11

## 2022-07-11 RX ORDER — VANCOMYCIN HYDROCHLORIDE 1 G/20ML
INJECTION, POWDER, LYOPHILIZED, FOR SOLUTION INTRAVENOUS AS NEEDED
Status: DISCONTINUED | OUTPATIENT
Start: 2022-07-11 | End: 2022-07-11 | Stop reason: HOSPADM

## 2022-07-11 RX ORDER — OXYCODONE AND ACETAMINOPHEN 5; 325 MG/1; MG/1
1 TABLET ORAL AS NEEDED
Status: DISCONTINUED | OUTPATIENT
Start: 2022-07-11 | End: 2022-07-11 | Stop reason: HOSPADM

## 2022-07-11 RX ORDER — INSULIN LISPRO 100 [IU]/ML
INJECTION, SOLUTION INTRAVENOUS; SUBCUTANEOUS ONCE
Status: DISCONTINUED | OUTPATIENT
Start: 2022-07-11 | End: 2022-07-11 | Stop reason: HOSPADM

## 2022-07-11 RX ORDER — DEXTROSE MONOHYDRATE 100 MG/ML
0-250 INJECTION, SOLUTION INTRAVENOUS AS NEEDED
Status: DISCONTINUED | OUTPATIENT
Start: 2022-07-11 | End: 2022-07-11 | Stop reason: HOSPADM

## 2022-07-11 RX ORDER — OXYCODONE HYDROCHLORIDE 5 MG/1
5 TABLET ORAL
Qty: 28 TABLET | Refills: 0 | Status: SHIPPED | OUTPATIENT
Start: 2022-07-11 | End: 2022-07-18

## 2022-07-11 RX ORDER — ONDANSETRON 2 MG/ML
4 INJECTION INTRAMUSCULAR; INTRAVENOUS
Status: DISCONTINUED | OUTPATIENT
Start: 2022-07-11 | End: 2022-07-11 | Stop reason: HOSPADM

## 2022-07-11 RX ORDER — DEXAMETHASONE SODIUM PHOSPHATE 4 MG/ML
INJECTION, SOLUTION INTRA-ARTICULAR; INTRALESIONAL; INTRAMUSCULAR; INTRAVENOUS; SOFT TISSUE AS NEEDED
Status: DISCONTINUED | OUTPATIENT
Start: 2022-07-11 | End: 2022-07-11 | Stop reason: HOSPADM

## 2022-07-11 RX ORDER — KETAMINE HCL 50MG/ML(1)
SYRINGE (ML) INTRAVENOUS AS NEEDED
Status: DISCONTINUED | OUTPATIENT
Start: 2022-07-11 | End: 2022-07-11 | Stop reason: HOSPADM

## 2022-07-11 RX ORDER — HYDROMORPHONE HYDROCHLORIDE 2 MG/ML
0.5 INJECTION, SOLUTION INTRAMUSCULAR; INTRAVENOUS; SUBCUTANEOUS
Status: DISCONTINUED | OUTPATIENT
Start: 2022-07-11 | End: 2022-07-11 | Stop reason: HOSPADM

## 2022-07-11 RX ORDER — SODIUM CHLORIDE, SODIUM LACTATE, POTASSIUM CHLORIDE, CALCIUM CHLORIDE 600; 310; 30; 20 MG/100ML; MG/100ML; MG/100ML; MG/100ML
50 INJECTION, SOLUTION INTRAVENOUS CONTINUOUS
Status: DISCONTINUED | OUTPATIENT
Start: 2022-07-11 | End: 2022-07-11 | Stop reason: HOSPADM

## 2022-07-11 RX ADMIN — VANCOMYCIN HYDROCHLORIDE 1000 MG: 1 INJECTION, POWDER, LYOPHILIZED, FOR SOLUTION INTRAVENOUS at 07:24

## 2022-07-11 RX ADMIN — SUCCINYLCHOLINE CHLORIDE 160 MG: 20 INJECTION, SOLUTION INTRAMUSCULAR; INTRAVENOUS at 08:27

## 2022-07-11 RX ADMIN — ONDANSETRON 4 MG: 2 INJECTION INTRAMUSCULAR; INTRAVENOUS at 09:16

## 2022-07-11 RX ADMIN — MIDAZOLAM HYDROCHLORIDE 2 MG: 2 INJECTION, SOLUTION INTRAMUSCULAR; INTRAVENOUS at 08:21

## 2022-07-11 RX ADMIN — PROPOFOL 25 MG: 10 INJECTION, EMULSION INTRAVENOUS at 09:30

## 2022-07-11 RX ADMIN — PROPOFOL 200 MG: 10 INJECTION, EMULSION INTRAVENOUS at 08:27

## 2022-07-11 RX ADMIN — FAMOTIDINE 20 MG: 20 TABLET ORAL at 07:33

## 2022-07-11 RX ADMIN — GLYCOPYRROLATE 0.1 MG: 0.2 INJECTION INTRAMUSCULAR; INTRAVENOUS at 08:50

## 2022-07-11 RX ADMIN — PROPOFOL 25 MG: 10 INJECTION, EMULSION INTRAVENOUS at 09:36

## 2022-07-11 RX ADMIN — SODIUM CHLORIDE, POTASSIUM CHLORIDE, SODIUM LACTATE AND CALCIUM CHLORIDE 50 ML/HR: 600; 310; 30; 20 INJECTION, SOLUTION INTRAVENOUS at 07:23

## 2022-07-11 RX ADMIN — DEXAMETHASONE SODIUM PHOSPHATE 4 MG: 4 INJECTION, SOLUTION INTRAMUSCULAR; INTRAVENOUS at 08:45

## 2022-07-11 RX ADMIN — SUGAMMADEX 250 MG: 100 INJECTION, SOLUTION INTRAVENOUS at 09:40

## 2022-07-11 RX ADMIN — Medication 50 MG: at 08:49

## 2022-07-11 RX ADMIN — LIDOCAINE HYDROCHLORIDE 100 MG: 20 INJECTION, SOLUTION EPIDURAL; INFILTRATION; INTRACAUDAL; PERINEURAL at 08:27

## 2022-07-11 RX ADMIN — PROPOFOL 25 MG: 10 INJECTION, EMULSION INTRAVENOUS at 09:38

## 2022-07-11 RX ADMIN — PROPOFOL 25 MG: 10 INJECTION, EMULSION INTRAVENOUS at 09:33

## 2022-07-11 RX ADMIN — ROCURONIUM BROMIDE 10 MG: 50 INJECTION INTRAVENOUS at 08:27

## 2022-07-11 RX ADMIN — ROCURONIUM BROMIDE 20 MG: 50 INJECTION INTRAVENOUS at 08:32

## 2022-07-11 RX ADMIN — LABETALOL HYDROCHLORIDE 20 MG: 5 INJECTION, SOLUTION INTRAVENOUS at 10:59

## 2022-07-11 RX ADMIN — FENTANYL CITRATE 100 MCG: 50 INJECTION, SOLUTION INTRAMUSCULAR; INTRAVENOUS at 08:27

## 2022-07-11 NOTE — ANESTHESIA PREPROCEDURE EVALUATION
Relevant Problems   RESPIRATORY SYSTEM   (+) COPD, group B, by GOLD 2017 classification (HCC)   (+) FITO treated with BiPAP      CARDIOVASCULAR   (+) Essential hypertension   (+) Persistent atrial fibrillation (HCC)      GASTROINTESTINAL   (+) Acid reflux   (+) KILLIAN (nonalcoholic steatohepatitis)      RENAL FAILURE   (+) Nephrolithiasis   (+) Renal cyst      ENDOCRINE   (+) Severe obesity (BMI 35.0-39. 9) with comorbidity (HCC)   (+) Type 2 diabetes with nephropathy (HCC)      HEMATOLOGY   (+) Splenomegaly   (+) Symptomatic anemia       Anesthetic History   No history of anesthetic complications            Review of Systems / Medical History  Patient summary reviewed and pertinent labs reviewed    Pulmonary    COPD: moderate    Sleep apnea: BiPAP  Shortness of breath  Asthma        Neuro/Psych             Comments: Chronic pain  radiculopathy Cardiovascular    Hypertension        Dysrhythmias : atrial fibrillation      Exercise tolerance: <4 METS  Comments: EF 56%  2018   GI/Hepatic/Renal     GERD    Renal disease: CRI  Liver disease    Comments: KILLIAN Endo/Other    Diabetes: type 2    Obesity and arthritis     Other Findings              Physical Exam    Airway  Mallampati: III  TM Distance: > 6 cm  Neck ROM: normal range of motion   Mouth opening: Normal     Cardiovascular  Regular rate and rhythm,  S1 and S2 normal,  no murmur, click, rub, or gallop             Dental  No notable dental hx       Pulmonary      Decreased breath sounds: bilateral           Abdominal  GI exam deferred       Other Findings            Anesthetic Plan    ASA: 4  Anesthesia type: general          Induction: Intravenous  Anesthetic plan and risks discussed with: Patient

## 2022-07-11 NOTE — INTERVAL H&P NOTE
Update History & Physical    The Patient's History and Physical of July 5, 2022 was reviewed with the patient and I examined the patient. There was no change. The surgical site was confirmed by the patient and me. Plan:  The risk, benefits, expected outcome, and alternative to the recommended procedure have been discussed with the patient. Patient understands and wants to proceed with the procedure.     Electronically signed by Marti Camacho MD on 7/11/2022 at 6:58 AM

## 2022-07-11 NOTE — DISCHARGE INSTRUCTIONS
Patient Education        Lumbar Laminectomy: What to Expect at Home  Your Recovery  A lumbar laminectomy is surgery to ease pressure on the spinal cord and nerves of the lower spine. The doctor took out pieces of bone that were squeezing the spinal cord and nerves. You can expect your back to feel stiff or sore after surgery. This should improve in the weeks after surgery. You may have trouble sitting or standing in one position for very long and may need pain medicine in the weeks after your surgery. Your doctor may advise you to work with a physical therapist to strengthen the muscles around your spine and trunk. You will need to learn how to lift, twist, and bend so that you don't put too much strain on your back. This care sheet gives you a general idea about how long it will take for you to recover. But each person recovers at a different pace. Follow the steps below to get better as quickly as possible. How can you care for yourself at home? Activity    · Rest when you feel tired. Getting enough sleep will help you recover.     · Try to walk each day. Start by walking a little more than you did the day before. Bit by bit, increase the amount you walk. Walking boosts blood flow and helps prevent pneumonia and constipation. Walking may also decrease your muscle soreness after surgery.     · If advised by your doctor, you may need to avoid lifting anything that would cause excessive strain on your back. This may include a child, heavy grocery bags and milk containers, a heavy briefcase or backpack, cat litter or dog food bags, or a vacuum .     · Avoid strenuous activities, such as bicycle riding, jogging, weight lifting, or aerobic exercise, until your doctor says it is okay.     · Do not drive for 2 to 4 weeks after your surgery or until your doctor says it is okay.     · Avoid riding in a car for more than 30 minutes at a time for 2 to 4 weeks after surgery.  If you must ride in a car for a longer distance, stop often to walk and stretch your legs.     · Try to change your position about every 30 minutes while sitting or standing. This will help decrease your back pain while you are healing.     · You will probably need to take 4 to 6 weeks off from work. It depends on the type of work you do and how you feel.     · You may have sex as soon as you feel able, but avoid positions that put stress on your back or cause pain. Diet    · You can eat your normal diet. If your stomach is upset, try bland, low-fat foods like plain rice, broiled chicken, toast, and yogurt.     · Drink plenty of fluids (unless your doctor tells you not to).     · You may notice that your bowel movements are not regular right after your surgery. This is common. Try to avoid constipation and straining with bowel movements. You may want to take a fiber supplement every day. If you have not had a bowel movement after a couple of days, ask your doctor about taking a mild laxative. Medicines    · Your doctor will tell you if and when you can restart your medicines. He or she will also give you instructions about taking any new medicines.     · If you take aspirin or some other blood thinner, ask your doctor if and when to start taking it again. Make sure that you understand exactly what your doctor wants you to do.     · Take pain medicines exactly as directed. ? If the doctor gave you a prescription medicine for pain, take it as prescribed. ? If you are not taking a prescription pain medicine, ask your doctor if you can take an over-the-counter medicine.     · If your doctor prescribed antibiotics, take them as directed. Do not stop taking them just because you feel better. You need to take the full course of antibiotics.     · If you think your pain medicine is making you sick to your stomach:  ? Take your medicine after meals (unless your doctor has told you not to). ? Ask your doctor for a different pain medicine.    Incision care    · If you have strips of tape on the cut (incision) the doctor made, leave the tape on for a week or until it falls off.     · Wash the area daily with warm, soapy water and pat it dry.     · Keep the area clean and dry. You may cover it with a gauze bandage if it weeps or rubs against clothing. Change the bandage every day. Exercise    · Do back exercises as instructed by your doctor.     · Your doctor may advise you to work with a physical therapist to improve the strength and flexibility of your back. Other instructions    · To reduce stiffness and help sore muscles, use a warm water bottle, a heating pad set on low, or a warm cloth on your back. Do not put heat right over the incision. Do not go to sleep with a heating pad on your skin. Follow-up care is a key part of your treatment and safety. Be sure to make and go to all appointments, and call your doctor if you are having problems. It's also a good idea to know your test results and keep a list of the medicines you take. When should you call for help? Call 911 anytime you think you may need emergency care. For example, call if:    · You passed out (lost consciousness).     · You have sudden chest pain and shortness of breath, or you cough up blood.     · You are unable to move a leg at all. Call your doctor now or seek immediate medical care if:    · You have new or worse symptoms in your legs or buttocks. Symptoms may include:  ? Numbness or tingling. ? Weakness. ? Pain.     · You lose bladder or bowel control.     · You have loose stitches, or your incision comes open.     · You have blood or fluid draining from the incision.     · You have signs of infection, such as:  ? Increased pain, swelling, warmth, or redness. ? Pus draining from the incision. ? A fever. ? Red streaks leading from the incision.    Watch closely for changes in your health, and be sure to contact your doctor if:    · You do not have a bowel movement after taking a laxative.     · You are not getting better as expected. Where can you learn more? Go to http://www.gray.com/  Enter H036 in the search box to learn more about \"Lumbar Laminectomy: What to Expect at Home. \"  Current as of: July 1, 2021               Content Version: 13.2  © 2006-2022 [employer]. Care instructions adapted under license by [employer] (which disclaims liability or warranty for this information). If you have questions about a medical condition or this instruction, always ask your healthcare professional. [address] any warranty or liability for your use of this information. Patient Education        Learning About Spinal Cord Stimulation  What is it? Spinal cord stimulation is a treatment for chronic pain. It uses a mild electrical current. It's mostly used for low back pain, pain in the arms and legs, and pain in the trunk. A small generator is placed in your body. It sends electrical pulses to a tiny electrode near your spinal cord. You may feel a tingle from the pulses. The pulses can help relieve pain. Why is it done? This treatment may be done for people with severe, chronic pain who have:  · Had back surgery that didn't help their pain. · Pain from a nerve problem. · Pain that does not respond to other treatments. This includes complex regional pain syndrome. · Pain from severe peripheral vascular disease that the doctor feels cannot be treated with surgery. If this treatment is right for you, you may have a spinal cord stimulator implanted for long-term use. How is it done? Spinal cord stimulation is done in two steps. Your doctor will first insert a temporary electrode through your skin. It will stay there for about a week. This first step is to see if the treatment will help your pain. You and your doctor will test different stimulation settings and programs.  Your doctor will ask you how you feel at different settings. Let your doctor know if you feel any discomfort. You'll use a wireless remote control or other controller. If the test works, you may get a permanent stimulator. The electrode is implanted in your spine. A lead wire runs from your spine to a small generator. It can be under the skin in your lower or upper back, buttock area, chest, or belly area. You may get medicine that relaxes you or puts you in a light sleep. Some people may need to have general anesthesia. The areas being worked on will be numb. After the stimulator is placed, your doctor will show you how to care for the areas where you had surgery. What are the different types of spinal cord stimulators? This treatment targets the area of your body where you feel pain. There are different types of devices you may get. · The devices can have different power sources. ? Some generators have batteries that need to be replaced every 3 to 5 years. Some last longer. ? Some have rechargeable batteries. A special wireless  may come with your system. These last much longer. But they still may need to be replaced at some point. ? Some stimulators may have a power source outside your body. These are more often for short-term use. · The leads that carry the electrical current can be placed at different spots along the spinal cord. · You will have a controller to program the device. Your doctor will show you how to use it. What can you expect after getting a spinal cord stimulator? You may have a spinal cord stimulator for many years. It can help you live with much less pain, but you will have to learn how to use it. After the surgery, you and your doctor can figure out the best pulse strength. It may need to be adjusted a few times. Your doctor will show you how to use the stimulator at home. You may feel a tingle or some warmth while you use electrical nerve stimulation.   Your doctor will show you how to be safe with a stimulator. This may include trying not to lift, bend, stretch, or twist too much. Being too active could move or disconnect the leads. Light exercise, such as walking, is good. After a few weeks, you will be able to move more. You may get important instructions on driving and air travel, as well. Your device may set off metal detectors. And anti-theft devices in stores can cause a burst of stimulation. Be sure to tell other doctors about your stimulator before you have any other procedures or scans. Some scans and procedures can cause serious problems with your device. What are the risks? There are some risks to spinal cord stimulation. For example:  · Placing the stimulator requires surgery. Surgery has risks, such as the risk of bleeding, infection, or fluid buildup at the surgical site. Anesthesia also has some risks. · The stimulator device and wires can fail. The wires may also move, so the stimulator doesn't work as well as it should. · The tingling or warm feeling from the electrical current may bother you. You may need the device removed if it bothers you too much. · If you build up a tolerance to the stimulation, your doctor may need to change the amount of current or placement of the wires. · Your pain may come back, and the device may no longer work for you. Where can you learn more? Go to http://vasile-fito.info/  Enter S115 in the search box to learn more about \"Learning About Spinal Cord Stimulation. \"  Current as of: December 13, 2021               Content Version: 13.2  © 0579-0161 [employer]. Care instructions adapted under license by [employer] (which disclaims liability or warranty for this information). If you have questions about a medical condition or this instruction, always ask your healthcare professional. [place] [address] any warranty or liability for your use of this information.          DISCHARGE SUMMARY from Nurse    PATIENT INSTRUCTIONS:    After general anesthesia or intravenous sedation, for 24 hours or while taking prescription Narcotics:  · Limit your activities  · Do not drive and operate hazardous machinery  · Do not make important personal or business decisions  · Do  not drink alcoholic beverages  · If you have not urinated within 8 hours after discharge, please contact your surgeon on call. Report the following to your surgeon:  · Excessive pain, swelling, redness or odor of or around the surgical area  · Temperature over 100.5  · Nausea and vomiting lasting longer than 4 hours or if unable to take medications  · Any signs of decreased circulation or nerve impairment to extremity: change in color, persistent  numbness, tingling, coldness or increase pain  · Any questions    What to do at Home:      *  Please give a list of your current medications to your Primary Care Provider. *  Please update this list whenever your medications are discontinued, doses are      changed, or new medications (including over-the-counter products) are added. *  Please carry medication information at all times in case of emergency situations. These are general instructions for a healthy lifestyle:    No smoking/ No tobacco products/ Avoid exposure to second hand smoke  Surgeon General's Warning:  Quitting smoking now greatly reduces serious risk to your health. Obesity, smoking, and sedentary lifestyle greatly increases your risk for illness    A healthy diet, regular physical exercise & weight monitoring are important for maintaining a healthy lifestyle    You may be retaining fluid if you have a history of heart failure or if you experience any of the following symptoms:  Weight gain of 3 pounds or more overnight or 5 pounds in a week, increased swelling in our hands or feet or shortness of breath while lying flat in bed.   Please call your doctor as soon as you notice any of these symptoms; do not wait until your next office visit. The discharge information has been reviewed with the patient. The patient verbalized understanding. Discharge medications reviewed with the patient and appropriate educational materials and side effects teaching were provided.   ___________________________________________________________________________________________________________________________________

## 2022-07-11 NOTE — ANESTHESIA POSTPROCEDURE EVALUATION
Procedure(s):  THORACIC LAMINECTOMY/C-ARM  SPINAL CORD STIMULATOR IMPLANTATION/FRIED.    general    Anesthesia Post Evaluation      Multimodal analgesia: multimodal analgesia used between 6 hours prior to anesthesia start to PACU discharge  Patient location during evaluation: PACU  Patient participation: complete - patient participated  Level of consciousness: lethargic  Pain score: 3  Pain management: adequate  Airway patency: patent  Anesthetic complications: no  Cardiovascular status: acceptable  Respiratory status: acceptable  Hydration status: acceptable  Post anesthesia nausea and vomiting:  none  Final Post Anesthesia Temperature Assessment:  Normothermia (36.0-37.5 degrees C)      INITIAL Post-op Vital signs:   Vitals Value Taken Time   /123 07/11/22 1030   Temp 36.9 °C (98.5 °F) 07/11/22 0950   Pulse 85 07/11/22 1033   Resp 24 07/11/22 1033   SpO2 99 % 07/11/22 1033   Vitals shown include unvalidated device data.

## 2022-07-11 NOTE — OP NOTES
[address] [place] [address]   OPERATIVE REPORT    Name:  Kim Kaye  MR#:   [id number]  :  1960  ACCOUNT #:  [de-identified]  DATE OF SERVICE:  2022    PREOPERATIVE DIAGNOSIS:  Chronic pain. POSTOPERATIVE DIAGNOSIS:  Chronic pain. PROCEDURE PERFORMED:  T9 laminectomy, placement of dual lead paddle spinal cord stimulator, Abbott type, placement of generator. SURGEON:  Stew Johnston MD    ASSISTANT:  CARLINE Wayne    ANESTHESIA:  General endotracheal.    COMPLICATIONS:  None. SPECIMENS REMOVED:  None. IMPLANTS:  Abbott spinal cord stimulator dual lead paddle and generator. ESTIMATED BLOOD LOSS:  Minimal.    FINDINGS:  Paddle is in the midline covering T7-T8 like the patient's successful trial.  The patient has a scoliotic curvature with rotation between these two segments. Generator in the right buttock as the patient desires. PROCEDURE:  Following induction of general endotracheal anesthesia, the patient was turned in prone position on a spinal frame. The patient was prepped and draped in usual fashion. Midline incision was made. Paramedian incision was made in the thoracodorsal fascia and subperiosteal dissection done at T9. A T9 laminectomy was done. C-arm image confirmed our level. Interval ligamentum flavum resected. The dual lead paddle was advanced in the epidural space in the midline at T7-T8 and confirmed with fluoroscopic image. The tie-down device was utilized securing the lead to the interspinous ligament below with several nonresorbable sutures. A pocket was made in the right buttock. Tunneling device advanced the lead to the pocket. The lead attached to the generator and final continuity test done and confirmed. Final tightening and torquing done. Excess lead coiled beneath the generator in the pocket writing side out. There is no evident bleeding. Both incisions were closed in layers and the skin closed with subcuticular suture and Dermabond.   A sterile occlusive dressing placed upon the wound. All counts correct.       Keisha Torres MD      [id number]  D:  07/11/2022 9:31  T:  07/11/2022 11:11  JOB #:  [id number]

## 2022-07-11 NOTE — BRIEF OP NOTE
Brief Postoperative Note    Patient: Ubaldo Rosado  YOB: 1960  MRN: [id number]    Date of Procedure: 7/11/2022     Pre-Op Diagnosis: Other chronic pain [G89.29]    Post-Op Diagnosis: Same as preoperative diagnosis. Procedure(s):  THORACIC LAMINECTOMY t9/C-ARM  SPINAL CORD STIMULATOR IMPLANTATION/FRIED    Surgeon(s):  Alyssa Castellanos MD    Surgical Assistant: Physician Assistant: Kori Bustamante PA-C  Surg Asst-1: Lakeshia Hernandez    Anesthesia: General     Estimated Blood Loss (mL): Minimal    Complications: None    Specimens: * No specimens in log *     Implants:   Implant Name Type Inv.  Item Serial No.  Lot No. LRB No. Used Action   ANCHOR LD FOR SPNL CRD STIM CINCH - [id number]  ANCHOR LD FOR SPNL CRD STIM CINCH 1194 [id number] N/A 1 Implanted   ANCHOR LD FOR SPNL CRD STIM CINCH - [id number]  ANCHOR LD FOR SPNL CRD STIM CINCH 1194 [id number] N/A 1 Implanted   LEAD NEUROSTIMULATOR L60CM MR CONDITIONAL PENTA -   LEAD NEUROSTIMULATOR L60CM MR CONDITIONAL PENTA [address] [employer]_WD [id number] N/A 1 Implanted   GEN IPG 5.3AH PROCLAIM ELITE 5 -- 3660 W/PAT CONTROLLER - [id number]  GEN IPG 5.3AH PROCLAIM ELITE 5 -- 3660 W/PAT CONTROLLER [address] [employer] NEUOM[employer] DIV [id number] N/A 1 Implanted       Drains: * No LDAs found *    Findings: t8&7    Electronically Signed by Melinda Cortez MD on 7/11/2022 at 9:16 AM

## 2022-07-26 DIAGNOSIS — K75.81 NASH (NONALCOHOLIC STEATOHEPATITIS): ICD-10-CM

## 2022-07-26 DIAGNOSIS — E11.21 TYPE 2 DIABETES WITH NEPHROPATHY (HCC): ICD-10-CM

## 2022-07-26 RX ORDER — AMLODIPINE BESYLATE 5 MG/1
5 TABLET ORAL DAILY
Qty: 90 TABLET | Refills: 3 | Status: SHIPPED | OUTPATIENT
Start: 2022-07-26

## 2022-08-11 ENCOUNTER — HOSPITAL ENCOUNTER (OUTPATIENT)
Dept: LAB | Age: 62
Discharge: HOME | End: 2022-08-11

## 2022-08-11 LAB — SENTARA SPECIMEN COL,SENBCF: NORMAL

## 2022-08-11 PROCEDURE — 99001 SPECIMEN HANDLING PT-LAB: CPT

## 2022-08-12 LAB
A-G RATIO,AGRAT: 2.3 RATIO (ref 1.1–2.6)
ABSOLUTE LYMPHOCYTE COUNT, 10803: 1.1 K/UL (ref 1–4.8)
ALBUMIN SERPL-MCNC: 4.5 G/DL (ref 3.5–5)
ALBUMIN SERPL-MCNC: 4.5 G/DL (ref 3.5–5)
ALP SERPL-CCNC: 49 U/L (ref 40–125)
ALT SERPL-CCNC: 12 U/L (ref 5–40)
ANION GAP SERPL CALC-SCNC: 14 MMOL/L (ref 3–15)
ANION GAP SERPL CALC-SCNC: 14 MMOL/L (ref 3–15)
AST SERPL W P-5'-P-CCNC: 21 U/L (ref 10–37)
AVG GLU, 10930: 141 MG/DL (ref 91–123)
BASOPHILS # BLD: 0 K/UL (ref 0–0.2)
BASOPHILS NFR BLD: 0 % (ref 0–2)
BILIRUB SERPL-MCNC: 0.4 MG/DL (ref 0.2–1.2)
BUN SERPL-MCNC: 19 MG/DL (ref 6–22)
BUN SERPL-MCNC: 19 MG/DL (ref 6–22)
CALCIUM SERPL-MCNC: 10 MG/DL (ref 8.4–10.5)
CALCIUM SERPL-MCNC: 10 MG/DL (ref 8.4–10.5)
CHLORIDE SERPL-SCNC: 103 MMOL/L (ref 98–110)
CHLORIDE SERPL-SCNC: 103 MMOL/L (ref 98–110)
CHOLEST SERPL-MCNC: 127 MG/DL (ref 110–200)
CO2 SERPL-SCNC: 26 MMOL/L (ref 20–32)
CO2 SERPL-SCNC: 26 MMOL/L (ref 20–32)
CORRECTED SERUM CREATININE, 11222: 1.34 MG/DL
CREAT SERPL-MCNC: 1.3 MG/DL (ref 0.8–1.6)
CREAT SERPL-MCNC: 1.3 MG/DL (ref 0.8–1.6)
CREATININE, URINE: 74 MG/DL
EOSINOPHIL # BLD: 0.3 K/UL (ref 0–0.5)
EOSINOPHIL NFR BLD: 6 % (ref 0–6)
ERYTHROCYTE [DISTWIDTH] IN BLOOD BY AUTOMATED COUNT: 14.5 % (ref 10–15.5)
GLOBULIN,GLOB: 2 G/DL (ref 2–4)
GLOMERULAR FILTRATION RATE: 59.9 ML/MIN/1.73 SQ.M.
GLOMERULAR FILTRATION RATE: 59.9 ML/MIN/1.73 SQ.M.
GLUCOSE SERPL-MCNC: 155 MG/DL (ref 70–99)
GLUCOSE SERPL-MCNC: 155 MG/DL (ref 70–99)
GRANULOCYTES,GRANS: 61 % (ref 40–75)
HBA1C MFR BLD HPLC: 6.6 % (ref 4.8–5.6)
HCT VFR BLD AUTO: 46 % (ref 39.3–51.6)
HDLC SERPL-MCNC: 3.8 MG/DL (ref 0–5)
HDLC SERPL-MCNC: 33 MG/DL
HGB BLD-MCNC: 13.7 G/DL (ref 13.1–17.2)
LDL/HDL RATIO,LDHD: 1.9
LDLC SERPL CALC-MCNC: 63 MG/DL (ref 50–99)
LYMPHOCYTES, LYMLT: 22 % (ref 20–45)
MCH RBC QN AUTO: 28 PG (ref 26–34)
MCHC RBC AUTO-ENTMCNC: 30 G/DL (ref 31–36)
MCV RBC AUTO: 94 FL (ref 80–95)
MICROALB/CREAT RATIO, 140286: 2074.9 (ref 0–30)
MICROALBUMIN,URINE RANDOM 140054: 1535.4 MG/L (ref 0.1–17)
MONOCYTES # BLD: 0.5 K/UL (ref 0.1–1)
MONOCYTES NFR BLD: 10 % (ref 3–12)
NEUTROPHILS # BLD AUTO: 3.1 K/UL (ref 1.8–7.7)
NON-HDL CHOLESTEROL, 011976: 94 MG/DL
PHOSPHATE SERPL-MCNC: 3.3 MG/DL (ref 2.5–4.5)
PLATELET # BLD AUTO: 173 K/UL (ref 140–440)
PMV BLD AUTO: 11 FL (ref 9–13)
POTASSIUM SERPL-SCNC: 3.5 MMOL/L (ref 3.5–5.5)
POTASSIUM SERPL-SCNC: 3.5 MMOL/L (ref 3.5–5.5)
PROT SERPL-MCNC: 6.5 G/DL (ref 6.2–8.1)
RBC # BLD AUTO: 4.92 M/UL (ref 3.8–5.8)
SODIUM SERPL-SCNC: 143 MMOL/L (ref 133–145)
SODIUM SERPL-SCNC: 143 MMOL/L (ref 133–145)
TRIGL SERPL-MCNC: 155 MG/DL (ref 40–149)
VLDLC SERPL CALC-MCNC: 31 MG/DL (ref 8–30)
WBC # BLD AUTO: 5.1 K/UL (ref 4–11)

## 2022-08-12 NOTE — PROGRESS NOTES
His CMP does not show any significant abnormalities. His creatinine is 1.3 and within normal limits. His A1c is 6.6, up from 6.5 when previously checked. His triglycerides are down to 155 from 227. HDL is 33. His total cholesterol is 127. LDL is 63. His CBC is unremarkable.     Dr. Chowdhury Officer  [facility] [address], [facility]  Phone: [phone]  Fax: [phone]

## 2022-08-13 DIAGNOSIS — E11.21 TYPE 2 DIABETES WITH NEPHROPATHY (HCC): ICD-10-CM

## 2022-08-14 RX ORDER — BLOOD-GLUCOSE SENSOR
EACH MISCELLANEOUS
Qty: 3 EACH | Refills: 5 | Status: SHIPPED | OUTPATIENT
Start: 2022-08-14

## 2022-08-15 ENCOUNTER — OFFICE VISIT (OUTPATIENT)
Dept: INTERNAL MEDICINE CLINIC | Age: 62
End: 2022-08-15
Payer: COMMERCIAL

## 2022-08-15 VITALS
OXYGEN SATURATION: 95 % | SYSTOLIC BLOOD PRESSURE: 151 MMHG | TEMPERATURE: 97.4 F | WEIGHT: 277 LBS | DIASTOLIC BLOOD PRESSURE: 68 MMHG | HEIGHT: 71 IN | HEART RATE: 61 BPM | RESPIRATION RATE: 16 BRPM | BODY MASS INDEX: 38.78 KG/M2

## 2022-08-15 DIAGNOSIS — I10 ESSENTIAL HYPERTENSION: ICD-10-CM

## 2022-08-15 DIAGNOSIS — M54.40 CHRONIC LOW BACK PAIN WITH SCIATICA, SCIATICA LATERALITY UNSPECIFIED, UNSPECIFIED BACK PAIN LATERALITY: ICD-10-CM

## 2022-08-15 DIAGNOSIS — K76.0 NAFLD (NONALCOHOLIC FATTY LIVER DISEASE): ICD-10-CM

## 2022-08-15 DIAGNOSIS — E78.5 DYSLIPIDEMIA: ICD-10-CM

## 2022-08-15 DIAGNOSIS — E11.21 TYPE 2 DIABETES WITH NEPHROPATHY (HCC): Primary | ICD-10-CM

## 2022-08-15 DIAGNOSIS — G89.29 CHRONIC LOW BACK PAIN WITH SCIATICA, SCIATICA LATERALITY UNSPECIFIED, UNSPECIFIED BACK PAIN LATERALITY: ICD-10-CM

## 2022-08-15 DIAGNOSIS — G47.33 OSA TREATED WITH BIPAP: ICD-10-CM

## 2022-08-15 PROCEDURE — 3044F HG A1C LEVEL LT 7.0%: CPT | Performed by: INTERNAL MEDICINE

## 2022-08-15 PROCEDURE — 99214 OFFICE O/P EST MOD 30 MIN: CPT | Performed by: INTERNAL MEDICINE

## 2022-08-15 NOTE — PROGRESS NOTES
Fahad Prim presents today for   Chief Complaint   Patient presents with    Diabetes     Follow up on diabetes    Other     Pt saw at [facility] to have spinal cord stimulator inserted       Altemiase Prim preferred language for health care discussion is english/other. Is someone accompanying this pt? no    Is the patient using any DME equipment during [address]? no    Depression Screening:  3 most recent PHQ Screens 8/15/2022   PHQ Not Done -   Little interest or pleasure in doing things Not at all   Feeling down, depressed, irritable, or hopeless Not at all   Total Score PHQ 2 0   Trouble falling or staying asleep, or sleeping too much -   Feeling tired or having little energy -   Poor appetite, weight loss, or overeating -   Feeling bad about yourself - or that you are a failure or have let yourself or your family down -   Trouble concentrating on things such as school, work, reading, or watching TV -   Moving or speaking so slowly that other people could have noticed; or the opposite being so fidgety that others notice -   Thoughts of being better off dead, or hurting yourself in some way -   PHQ 9 Score -   How difficult have these problems made it for you to do your work, take care of your home and get along with others Not difficult at all       Learning Assessment:  Learning Assessment 1/30/2020   PRIMARY LEARNER Patient   HIGHEST LEVEL OF EDUCATION - PRIMARY LEARNER  SOME COLLEGE   BARRIERS PRIMARY LEARNER NONE   CO-LEARNER CAREGIVER No   PRIMARY LANGUAGE ENGLISH   LEARNER PREFERENCE PRIMARY DEMONSTRATION     -   ANSWERED BY patient     -   RELATIONSHIP SELF   ASSESSMENT COMMENT -       Abuse Screening:  Abuse Screening Questionnaire 4/15/2022   Do you ever feel afraid of your partner? N   Are you in a relationship with someone who physically or mentally threatens you? N   Is it safe for you to go home? Y       Generalized Anxiety  No flowsheet data found.       Health Maintenance Due   Topic Date Due    Foot Exam Q1 07/16/2020    COVID-19 Vaccine (4 - Booster for Pfizer series) 03/16/2022   . Health Maintenance reviewed and discussed and ordered per Provider. VACCINES DUE   SCREENINGS DUE       Aury Kinsey is updated on all HM    1. \"Have you been to the ER, urgent care clinic since your last visit? Hospitalized since your last visit? \"  Pt saw at [facility] on 7/11/22 to have spinal cord stimulator inserted    2. \"Have you seen or consulted any other health care providers outside of the [address] [place] [address] since your last visit? \" No     3. For patients aged 39-70: Has the patient had a colonoscopy / FIT/ Cologuard?  Yes - no Care Gap present

## 2022-08-15 NOTE — PATIENT INSTRUCTIONS
Learning About Tests When You Have Diabetes  Why do you need regular tests? Diabetes can lead to other health problems if it's not well controlled. You'll need tests to monitor how well your diabetes is controlled and to check for other things like high cholesterol or kidney problems. Having tests on a regular schedule can help your doctor find problems early, when it's easier to manage them. What tests do you need? These are the tests you may need and how often you should have them. A1c blood test.  This test shows the average level of blood sugar over the past 2 to 3 months. It helps your doctor see whether blood sugar levels have been staying within your target range. How often: Every 3 to 6 months  Goal: A blood sugar level in your target range  Blood pressure test.  This test measures the pressure of blood flow in the arteries. Controlling blood pressure can help prevent damage to nerves and blood vessels. How often: Every 3 to 6 months  Goal: A blood pressure level in your target range  Cholesterol test.  This test measures the amount of a type of fat in the blood. It is common for people with diabetes to also have high cholesterol. Too much cholesterol in the blood can build up inside the blood vessels and raise the risk for heart attack and stroke. How often: At the time of your diabetes diagnosis, and as often as your doctor recommends after that  Goal: A cholesterol level in your target range  Albumin-creatinine ratio test.  This test checks for kidney damage by looking for the protein albumin (say \"al-BYOO-иван\") in the urine. Albumin is normally found in the blood. Kidney damage can let small amounts of it (microalbumin) leak into the urine. How often: Once a year  Goal: No protein in the urine  Blood creatinine test/estimated glomerular filtration (eGFR). The blood creatinine (say \"yhby-XY-da-neen\") level shows how well your kidneys are working.  Creatinine is a waste product that muscles release into the blood. Blood creatinine is used to estimate the glomerular filtration rate. A high level of creatinine and/or a low eGFR may mean your kidneys are not working as well as they should. How often: Once a year  Goal: Normal level of creatinine in the blood. The eGFR goal is greater than 60 mL/min/1.73 m². Complete foot exam.  The doctor checks for foot sores and whether any sensation has been lost.  How often: Once a year  Goal: Healthy feet with no foot ulcers or loss of feeling  Dental exam and cleaning. The dentist checks for gum disease and tooth decay. People with high blood sugar are more likely to have these problems. How often: Every 6 months  Goal: Healthy teeth and gums  Complete eye exam.  High blood sugar levels can damage the eyes. This exam is done by an ophthalmologist or optometrist. It includes a dilated eye exam. The exam shows whether there's damage to the back of the eye (diabetic retinopathy). How often: Once a year. If you don't have any signs of diabetic retinopathy, your doctor may recommend an exam every 2 years. Goal: No damage to the back of the eye  Thyroid-stimulating hormone (TSH) blood test.  This test checks for thyroid disease. Too little thyroid hormone can cause some medicines (like insulin) to stay in the body longer. This can cause low blood sugar. You may be tested if you have high cholesterol or are a woman over 48years old. How often: As part of your diabetes diagnosis, and as often as your doctor recommends after that  Goal: Normal level of TSH in the blood  Follow-up care is a key part of your treatment and safety. Be sure to make and go to all appointments, and call your doctor if you are having problems. It's also a good idea to know your test results and keep a list of the medicines you take. Where can you learn more?   Go to http://vasile-fito.info/  Enter A243 in the search box to learn more about \"Learning About Tests When You Have Diabetes. \"  Current as of: July 28, 2021               Content Version: 13.2  © 6507-8518 [employer]. Care instructions adapted under license by [employer] (which disclaims liability or warranty for this information). If you have questions about a medical condition or this instruction, always ask your healthcare professional. [address] any warranty or liability for your use of this information. Learning About Meal Planning for Diabetes  Why plan your meals? Meal planning can be a key part of managing diabetes. Planning meals and snacks with the right balance of carbohydrate, protein, and fat can help you keep your blood sugar at the target level you set with your doctor. You don't have to eat special foods. You can eat what your family eats, including sweets once in a while. But you do have to pay attention to how often you eat and how much you eat of certain foods. You may want to work with a dietitian or a diabetes educator. They can give you tips and meal ideas and can answer your questions about meal planning. This health professional can also help you reach a healthy weight if that is one of your goals. What plan is right for you? Your dietitian or diabetes educator may suggest that you start with the plate format or carbohydrate counting. The plate format  The plate format is a simple way to help you manage how you eat. You plan meals by learning how much space each food should take on a plate. Using the plate format helps you manage the amount of carbohydrate you eat. It can make it easier to keep your blood sugar level within your target range. It also helps you see if you're eating healthy portion sizes. To use the plate format, you put non-starchy vegetables on half your plate. Add lean protein foods, such as fish, lean meats and poultry, or soy products, on one-quarter of the plate.  Put a grain or starchy vegetable (such as [employer] rice or a potato) on the final quarter of the plate. You can add a small piece of fruit and some low-fat or fat-free milk or yogurt, depending on your carbohydrate goal for each meal.  Here are some tips for using the plate format:  Make sure that you are not using an oversized plate. A 9-inch plate is best. Many restaurants use larger plates. Get used to using the plate format at home. Then you can use it when you eat out. Write down your questions about using the plate format. Talk to your doctor, a dietitian, or a diabetes educator about your concerns. Carbohydrate counting  With carbohydrate counting, you plan meals based on the amount of carbohydrate in each food. Carbohydrate raises blood sugar higher and more quickly than any other nutrient. It is found in desserts, breads and cereals, and fruit. It's also found in starchy vegetables such as potatoes and corn, grains such as rice and pasta, and milk and yogurt. You can help keep your blood sugar levels within your target range by planning how much carbohydrate to have at meals and snacks. The amount you need depends on several things. These include your weight, how active you are, which diabetes medicines you take, and what your goals are for your blood sugar levels. A registered dietitian or diabetes educator can help you plan how much carbohydrate to include in each meal and snack. An example of a carbohydrate counting plan is:  45 to 60 grams at each meal. That's about the same as 3 to 4 carbohydrate servings. 15 to 20 grams at each snack. That's about the same as 1 carbohydrate serving. The Nutrition Facts label on packaged foods tells you how much carbohydrate is in a serving of the food. First, look at the serving size on the food label. Is that the amount you eat in a serving? All of the nutrition information on a food label is based on that serving size. So if you eat more or less than that, you'll need to adjust the other numbers.  Total carbohydrate is the next thing you need to look for on the label. If you count carbohydrate servings, one serving of carbohydrate is 15 grams. For foods that don't come with labels, such as fresh fruits and vegetables, you'll need a guide that lists carbohydrate in these foods. Ask your doctor, dietitian, or diabetes educator about books or other nutrition guides you can use. If you take insulin, you need to know how many grams of carbohydrate are in a meal. This lets you know how much rapid-acting insulin to take before you eat. If you use an insulin pump, you get a constant rate of insulin during the day. So the pump must be programmed at meals to give you extra insulin to cover the rise in blood sugar after meals. When you know how much carbohydrate you will eat, you can take the right amount of insulin. Or, if you always use the same amount of insulin, you need to make sure that you eat the same amount of carbohydrate at meals. If you need more help to understand carbohydrate counting and food labels, ask your doctor, dietitian, or diabetes educator. How can you plan healthy meals? Here are some tips to get started:  Plan your meals a week at a time. Don't forget to include snacks too. Use cookbooks or online recipes to plan several main meals. Plan some quick meals for busy nights. You also can double some recipes that freeze well. Then you can save half for other busy nights when you don't have time to cook. Make sure you have the ingredients you need for your recipes. If you're running low on basic items, put these items on your shopping list too. List foods that you use to make breakfasts, lunches, and snacks. List plenty of fruits and vegetables. Post this list on the refrigerator. Add to it as you think of more things you need. Take the list to the store to do your weekly shopping. Follow-up care is a key part of your treatment and safety.  Be sure to make and go to all appointments, and call your doctor if you are having problems. It's also a good idea to know your test results and keep a list of the medicines you take. Where can you learn more? Go to http://www.gray.com/  Enter N651 in the search box to learn more about \"Learning About Meal Planning for Diabetes. \"  Current as of: September 8, 2021               Content Version: 13.2  © 2006-2022 [employer]. Care instructions adapted under license by [employer] (which disclaims liability or warranty for this information). If you have questions about a medical condition or this instruction, always ask your healthcare professional. [place] [address] any warranty or liability for your use of this information. Latest Reference Range & Units 10/17/20 10:45 4/22/21 09:44 4/15/22 10:00 6/9/22 10:03 8/11/22 11:25   Creatinine, urine mg/dL 56 118 44 137 74   Microalbumin, urine 0.1 - 17.0 mg/L 1,114.0 (H) 665.0 (H) 134.0 (H) 178.0 (H) 1,535.4 (H)   Microalbumin/Creat.  Ratio 0.0 - 30.0  1,989.3 (H) 563.6 (H) 304.5 (H) 129.9 (H) 2,074.9 (H)   (H): Data is abnormally high     Latest Reference Range & Units 4/22/21 09:44 9/2/21 08:20 6/9/22 10:03 8/11/22 11:25   Sodium 133 - 145 mmol/L  133 - 145 mmol/L 139 141 142 143  143   Potassium 3.5 - 5.5 mmol/L  3.5 - 5.5 mmol/L 3.7 3.2 (L) 4.7 3.5  3.5   Chloride 98 - 110 mmol/L  98 - 110 mmol/L 96 (L) 99 104 103  103   CO2 20 - 32 mmol/L  20 - 32 mmol/L 28 26 26 26  26   Anion gap 3.0 - 15.0 mmol/L  3.0 - 15.0 mmol/L 15.0 16.0 (H) 12.0 14.0  14.0   Glucose 70 - 99 mg/dL  70 - 99 mg/dL 234 (H) 184 (H) 180 (H) 155 (H)  155 (H)   BUN 6 - 22 mg/dL  6 - 22 mg/dL 42 (H) 27 (H) 39 (H) 19  19   Creatinine 0.8 - 1.6 mg/dL  0.8 - 1.6 mg/dL 1.9 (H) 1.5 1.9 (H) 1.3  1.3   Calcium 8.4 - 10.5 mg/dL  8.4 - 10.5 mg/dL 10.2 10.4 9.9 10.0  10.0   Phosphorus 2.5 - 4.5 mg/dL    3.3   (L): Data is abnormally low  (H): Data is abnormally high
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
## 2022-08-17 ENCOUNTER — HOSPITAL ENCOUNTER (OUTPATIENT)
Dept: LAB | Age: 62
Discharge: HOME | End: 2022-08-17

## 2022-08-17 LAB — SENTARA SPECIMEN COL,SENBCF: NORMAL

## 2022-08-17 PROCEDURE — 99001 SPECIMEN HANDLING PT-LAB: CPT

## 2022-08-25 DIAGNOSIS — I10 ESSENTIAL HYPERTENSION: ICD-10-CM

## 2022-08-25 RX ORDER — FUROSEMIDE 20 MG/1
TABLET ORAL
Qty: 30 TABLET | Refills: 5 | Status: SHIPPED | OUTPATIENT
Start: 2022-08-25

## 2022-09-14 ENCOUNTER — OFFICE VISIT (OUTPATIENT)
Dept: ORTHOPEDIC SURGERY | Age: 62
End: 2022-09-14
Payer: COMMERCIAL

## 2022-09-14 VITALS
TEMPERATURE: 98.2 F | BODY MASS INDEX: 38.64 KG/M2 | WEIGHT: 276 LBS | HEART RATE: 60 BPM | HEIGHT: 71 IN | OXYGEN SATURATION: 98 %

## 2022-09-14 DIAGNOSIS — G62.9 NEUROPATHY: ICD-10-CM

## 2022-09-14 DIAGNOSIS — D50.9 IRON DEFICIENCY ANEMIA, UNSPECIFIED IRON DEFICIENCY ANEMIA TYPE: ICD-10-CM

## 2022-09-14 DIAGNOSIS — M47.816 LUMBAR SPONDYLOSIS: ICD-10-CM

## 2022-09-14 DIAGNOSIS — G62.9 NEUROPATHY: Primary | ICD-10-CM

## 2022-09-14 PROCEDURE — 99214 OFFICE O/P EST MOD 30 MIN: CPT | Performed by: PHYSICAL MEDICINE & REHABILITATION

## 2022-09-14 RX ORDER — GABAPENTIN 600 MG/1
600 TABLET ORAL 2 TIMES DAILY
Qty: 180 TABLET | Refills: 1 | Status: SHIPPED | OUTPATIENT
Start: 2022-09-14

## 2022-09-14 RX ORDER — DOCUSATE SODIUM 100 MG/1
CAPSULE, LIQUID FILLED ORAL
Qty: 60 CAPSULE | Refills: 6 | Status: SHIPPED | OUTPATIENT
Start: 2022-09-14

## 2022-09-14 RX ORDER — GABAPENTIN 400 MG/1
CAPSULE ORAL
Qty: 270 CAPSULE | OUTPATIENT
Start: 2022-09-14

## 2022-09-14 NOTE — PROGRESS NOTES
[facility]ebas Mendoza [facility] [address]  [address], [address]  [place] [address]  Phone: [phone]  Fax: [phone]  : 1960  PCP: Lenny Johnson MD    PROGRESS NOTE      ASSESSMENT AND PLAN    Diagnoses and all orders for this visit:    1. Neuropathy  -     gabapentin (NEURONTIN) 600 mg tablet; Take 1 Tablet by mouth two (2) times a day. Max Daily Amount: 1,200 mg.    2. Lumbar spondylosis      David Martins is a 57 y.o. male who is back pain has improved with a spinal cord stimulator. However his neuropathy remains troublesome particularly distally. .   Dose adjustment. Patient will take Gabapentin 600 mg BID    Follow-up and Dispositions    Return in about 2 months (around 2022) for medication management. HISTORY OF PRESENT ILLNESS      David Martins is a 57 y.o. male presents for follow up of neuropathy. Since LV, he had a SCS placed 2022. Pt reports his low back pain has improved some, but the neuropathy in his feet remains unchanged. He describes burning and cold sensations that alternate in his feet, worse at night. Denies recent falls. Pt has difficulty sleeping due to his neuropathy. He takes Gabapentin 400 mg TID with little benefit. This dose was previously more beneficial.  Denies side effects. Pain Assessment  2022   Location of Pain Foot   Pain Location Comment -   Location Modifiers Right;Left   Severity of Pain 4   Quality of Pain Throbbing; Sharp;Dull;Aching;Burning   Quality of Pain Comment -   Duration of Pain Persistent   Frequency of Pain Intermittent   Date Pain First Started -   Aggravating Factors Other (Comment)   Aggravating Factors Comment lying down at night, cold   Limiting Behavior Yes   Relieving Factors Other (Comment)   Relieving Factors Comment gabapentin, 2 old goats topical   Result of Injury No   Work-Related Injury -   Type of Injury -     Investigations:  T MRI 2022: no stenosis  L MRI 5/2022: listhesis L5-S1, DDD L4-5 with mild stenosis  CT [id number]: Grade 1 listhesis L5/S1, advanced DDD and moderate stenosis   L MRI 9/2020: spondylolisthesis at L5-S1 with facet hypertrophy and lateral recess stenosis. Severe FA at L4-5  L MRI 2013: advanced FA at L5-S1    Treatments:  Physical therapy:Yes (years ago) little relief  Doing HEP: Sometimes, stationary bike  Beneficial medications: Gabapentin 400mg TID. Flexeril (helps at night with sleep)QHSprn. Previously Tramadol 50 mg PRN. Failed medications: Unable to take NSAIDs. Topamax,   Spinal injections: 1/2021 Dr. Britney Zambrano B/L L3/4 L4/5 L5/DR MANDY w/ benefit. 1/2014 B/L L5/S1. 9/2018 B/L L5/S1 facet joint injection- partial temporary benefit, RFA B/L 9/2021 - temporary benefit     Spinal surgery-permanent SCS placed 7/2022. Reports benefit    PMHx of A-fib Eliquis (Dr. Enrique Vogel). , R knee replacement, end-stage OA of L knee. DM, SCS 7/2022     Work status: He is a retired . Pt reports his daughter has connected him to an jarad to help him watch what he eats to diet and have accountability with her and his son. Mother passed 6/2021.     PHYSICAL EXAMINATION    Visit Vitals  Pulse 60   Temp 98.2 °F (36.8 °C) (Temporal)   Ht 5' 11\" (1.803 m)   Wt 276 lb (125.2 kg)   SpO2 98% Comment: RA   BMI 38.49 kg/m²       2+ pitting edema BLE  LE strength intact  SLR negative              Written by [employer], as dictated by Adali Crowell MD.

## 2022-09-14 NOTE — PROGRESS NOTES
Joey Castillo presents today for   Chief Complaint   Patient presents with    Foot Pain     Bilateral         Is someone accompanying this pt? no    Is the patient using any DME equipment during OV? no    Depression Screening:  3 most recent PHQ Screens 8/15/2022   PHQ Not Done -   Little interest or pleasure in doing things Not at all   Feeling down, depressed, irritable, or hopeless Not at all   Total Score PHQ 2 0   Trouble falling or staying asleep, or sleeping too much -   Feeling tired or having little energy -   Poor appetite, weight loss, or overeating -   Feeling bad about yourself - or that you are a failure or have let yourself or your family down -   Trouble concentrating on things such as school, work, reading, or watching TV -   Moving or speaking so slowly that other people could have noticed; or the opposite being so fidgety that others notice -   Thoughts of being better off dead, or hurting yourself in some way -   PHQ 9 Score -   How difficult have these problems made it for you to do your work, take care of your home and get along with others Not difficult at all       Learning Assessment:  Learning Assessment 1/30/2020   PRIMARY LEARNER Patient   HIGHEST LEVEL OF EDUCATION - PRIMARY LEARNER  SOME COLLEGE   BARRIERS PRIMARY LEARNER NONE   CO-LEARNER CAREGIVER No   PRIMARY LANGUAGE ENGLISH   LEARNER PREFERENCE PRIMARY DEMONSTRATION     -   ANSWERED BY patient     -   RELATIONSHIP SELF   ASSESSMENT COMMENT -       Abuse Screening:  Abuse Screening Questionnaire 4/15/2022   Do you ever feel afraid of your partner? N   Are you in a relationship with someone who physically or mentally threatens you? N   Is it safe for you to go home? Y       Fall Risk  Fall Risk Assessment, last 12 mths 9/14/2022   Able to walk? Yes   Fall in past 12 months? 0   Do you feel unsteady? 0   Are you worried about falling 0       Coordination of Care:  1.  Have you been to the ER, urgent care clinic since your last visit? no Hospitalized since your last visit? No, pt had the SCS placed    2. Have you seen or consulted any other health care providers outside of the [address] [place] [address] since your last visit? Yes,  and pcp Include any pap smears or colon screening.  no

## 2022-09-14 NOTE — LETTER
9/15/2022    Patient: Alla South   YOB: 1960   Date of Visit: 9/14/2022     William Resendiz, [address]  [address] [address]  Via In Basket    Dear William Resendiz MD,      Thank you for referring Mr. Alla South to [address] [place] [address] for evaluation. My notes for this consultation are attached. If you have questions, please do not hesitate to call me. I look forward to following your patient along with you.       Sincerely,    [place] MD

## 2022-09-15 DIAGNOSIS — E78.5 DYSLIPIDEMIA: ICD-10-CM

## 2022-09-15 DIAGNOSIS — F32.A DEPRESSION, UNSPECIFIED DEPRESSION TYPE: ICD-10-CM

## 2022-09-15 DIAGNOSIS — E66.01 SEVERE OBESITY (BMI 35.0-39.9) WITH COMORBIDITY (HCC): ICD-10-CM

## 2022-09-15 DIAGNOSIS — E11.9 WELL CONTROLLED DIABETES MELLITUS (HCC): ICD-10-CM

## 2022-09-15 RX ORDER — METOPROLOL SUCCINATE 50 MG/1
TABLET, EXTENDED RELEASE ORAL
Qty: 90 TABLET | Refills: 1 | Status: SHIPPED | OUTPATIENT
Start: 2022-09-15

## 2022-09-15 RX ORDER — FENOFIBRATE 145 MG/1
TABLET, COATED ORAL
Qty: 90 TABLET | Refills: 3 | Status: SHIPPED | OUTPATIENT
Start: 2022-09-15

## 2022-09-15 RX ORDER — BUPROPION HYDROCHLORIDE 150 MG/1
TABLET, EXTENDED RELEASE ORAL
Qty: 90 TABLET | Refills: 1 | Status: SHIPPED | OUTPATIENT
Start: 2022-09-15

## 2022-09-16 ENCOUNTER — TELEPHONE (OUTPATIENT)
Dept: INTERNAL MEDICINE CLINIC | Age: 62
End: 2022-09-16

## 2022-09-16 NOTE — TELEPHONE ENCOUNTER
Per pharmacy, administration of Fenofibrate 145mg is contraindicated in GFR less than 30. Since GFR less than 30 is related to GFR less than 15, same precaution may apply. Please advise        For Pharmacy [facility] in place:   Recommendation Provided To:    Intervention Detail: New Rx: 1, reason: Needs Additional Therapy  Gap Closed?:   Intervention Accepted By:   Time Spent (min): 5

## 2022-09-16 NOTE — TELEPHONE ENCOUNTER
Please let the pharmacy know that his latest labs as of August 11 showed that his creatinine is normal at 1.3 and his estimated GFR is 59.9. Please have him take all medications as prescribed. Please have him fill all of his medications as prescribed.     Dr. Daily Dangelo  [facility], [address] [place] [address], [address] [place] [address] Str.  Phone: [phone]  Fax: [phone]

## 2022-10-18 ENCOUNTER — OFFICE VISIT (OUTPATIENT)
Dept: CARDIOLOGY CLINIC | Age: 62
End: 2022-10-18
Payer: COMMERCIAL

## 2022-10-18 VITALS
OXYGEN SATURATION: 96 % | HEIGHT: 71 IN | HEART RATE: 61 BPM | DIASTOLIC BLOOD PRESSURE: 68 MMHG | SYSTOLIC BLOOD PRESSURE: 118 MMHG | BODY MASS INDEX: 38.78 KG/M2 | WEIGHT: 277 LBS

## 2022-10-18 DIAGNOSIS — E11.21 TYPE 2 DIABETES WITH NEPHROPATHY (HCC): ICD-10-CM

## 2022-10-18 DIAGNOSIS — E66.01 SEVERE OBESITY (BMI 35.0-39.9) WITH COMORBIDITY (HCC): ICD-10-CM

## 2022-10-18 DIAGNOSIS — I48.19 PERSISTENT ATRIAL FIBRILLATION (HCC): Primary | ICD-10-CM

## 2022-10-18 DIAGNOSIS — I10 ESSENTIAL HYPERTENSION: ICD-10-CM

## 2022-10-18 DIAGNOSIS — E78.5 DYSLIPIDEMIA: ICD-10-CM

## 2022-10-18 DIAGNOSIS — G47.33 OSA TREATED WITH BIPAP: ICD-10-CM

## 2022-10-18 PROCEDURE — 99214 OFFICE O/P EST MOD 30 MIN: CPT | Performed by: INTERNAL MEDICINE

## 2022-10-18 PROCEDURE — 93000 ELECTROCARDIOGRAM COMPLETE: CPT | Performed by: INTERNAL MEDICINE

## 2022-10-18 PROCEDURE — 3044F HG A1C LEVEL LT 7.0%: CPT | Performed by: INTERNAL MEDICINE

## 2022-10-18 NOTE — PROGRESS NOTES
Celestino Merrill presents today for   Chief Complaint   Patient presents with    Follow-up     6 month    Leg Swelling       Celestino Merrill preferred language for health care discussion is english/other. Is someone accompanying this pt? no    Is the patient using any DME equipment during [address]? no    Depression Screening:  3 most recent PHQ Screens 10/18/2022   PHQ Not Done -   Little interest or pleasure in doing things Not at all   Feeling down, depressed, irritable, or hopeless Not at all   Total Score PHQ 2 0   Trouble falling or staying asleep, or sleeping too much -   Feeling tired or having little energy -   Poor appetite, weight loss, or overeating -   Feeling bad about yourself - or that you are a failure or have let yourself or your family down -   Trouble concentrating on things such as school, work, reading, or watching TV -   Moving or speaking so slowly that other people could have noticed; or the opposite being so fidgety that others notice -   Thoughts of being better off dead, or hurting yourself in some way -   PHQ 9 Score -   How difficult have these problems made it for you to do your work, take care of your home and get along with others -       Learning Assessment:  Learning Assessment 10/18/2022   PRIMARY LEARNER Patient   HIGHEST LEVEL OF EDUCATION - PRIMARY LEARNER  -   BARRIERS PRIMARY LEARNER -   CO-LEARNER CAREGIVER -   PRIMARY LANGUAGE ENGLISH   LEARNER PREFERENCE PRIMARY DEMONSTRATION     -   ANSWERED BY patient     -   RELATIONSHIP SELF   ASSESSMENT COMMENT -       Abuse Screening:  Abuse Screening Questionnaire 10/18/2022   Do you ever feel afraid of your partner? N   Are you in a relationship with someone who physically or mentally threatens you? N   Is it safe for you to go home? Y       Fall Risk  Fall Risk Assessment, last 12 mths 9/14/2022   Able to walk? Yes   Fall in past 12 months? 0   Do you feel unsteady?  0   Are you worried about falling 0           Pt currently taking Anticoagulant therapy? Eliquis 5 mg bid     Pt currently taking Antiplatelet therapy ? no      Coordination of Care:  1. Have you been to the ER, urgent care clinic since your last visit? Hospitalized since your last visit? no    2. Have you seen or consulted any other health care providers outside of the [address] [place] [address] since your last visit? Include any pap smears or colon screening.  no
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
## 2022-11-14 DIAGNOSIS — E11.21 TYPE 2 DIABETES WITH NEPHROPATHY (HCC): ICD-10-CM

## 2022-11-14 RX ORDER — METFORMIN HYDROCHLORIDE 1000 MG/1
TABLET ORAL
Qty: 60 TABLET | Refills: 5 | Status: SHIPPED | OUTPATIENT
Start: 2022-11-14

## 2022-11-15 DIAGNOSIS — E11.21 TYPE 2 DIABETES WITH NEPHROPATHY (HCC): ICD-10-CM

## 2022-11-15 DIAGNOSIS — K76.0 NAFLD (NONALCOHOLIC FATTY LIVER DISEASE): ICD-10-CM

## 2022-11-16 ENCOUNTER — APPOINTMENT (OUTPATIENT)
Dept: INTERNAL MEDICINE CLINIC | Age: 62
End: 2022-11-16

## 2022-11-17 LAB
AVG GLU, 10930: 183 MG/DL (ref 91–123)
HBA1C MFR BLD HPLC: 8 % (ref 4.8–5.6)

## 2022-11-17 RX ORDER — INSULIN LISPRO 100 [IU]/ML
INJECTION, SOLUTION INTRAVENOUS; SUBCUTANEOUS
Qty: 15 ML | Refills: 11 | Status: SHIPPED | OUTPATIENT
Start: 2022-11-17

## 2022-11-19 LAB
ALPHA 2-MACROGLOBULINS QN (NASH), 13914: 235 MG/DL (ref 110–276)
ALT (SGPT)  (NASH), 13919: 20 IU/L (ref 0–55)
APOLIPOPROTEIN A-1 (NASH), 13916: 137 MG/DL (ref 101–178)
AST (SGOT)   (NASH), 13987: 26 IU/L (ref 0–40)
BILIRUBIN, TOTAL (NASH), 13917: 0.4 MG/DL (ref 0–1.2)
CHOLESTEROL, TOTAL  (NASH), 13988: 107 MG/DL (ref 100–199)
COMMENT: (NASH), 15998: ABNORMAL
FIBROSIS SCORE (NASH), 13906: 0.53 (ref 0–0.21)
FIBROSIS SCORING  (NASH), 13994: ABNORMAL
FIBROSIS STAGE (NASH), 13907: ABNORMAL
GGT (NASH), 13918: 182 IU/L (ref 0–65)
GLUCOSE, SERUM  (NASH), 13989: 185 MG/DL (ref 70–99)
HAPTOGLOBIN (NASH), 13915: 143 MG/DL (ref 32–363)
HEIGHT (NASH), 13912: 71 IN
LIMITATIONS  (NASH), 15997: ABNORMAL
Lab: 0.5
Lab: 0.8 (ref 0–0.3)
Lab: ABNORMAL
TRIGLYCERIDES (NASH), 13992: 206 MG/DL (ref 0–149)
WEIGHT (NASH): 277 LBS

## 2022-11-23 ENCOUNTER — OFFICE VISIT (OUTPATIENT)
Dept: INTERNAL MEDICINE CLINIC | Age: 62
End: 2022-11-23
Payer: COMMERCIAL

## 2022-11-23 VITALS
HEIGHT: 71 IN | RESPIRATION RATE: 18 BRPM | HEART RATE: 63 BPM | DIASTOLIC BLOOD PRESSURE: 89 MMHG | WEIGHT: 278 LBS | TEMPERATURE: 98 F | BODY MASS INDEX: 38.92 KG/M2 | SYSTOLIC BLOOD PRESSURE: 149 MMHG | OXYGEN SATURATION: 94 %

## 2022-11-23 DIAGNOSIS — E11.21 TYPE 2 DIABETES WITH NEPHROPATHY (HCC): Primary | ICD-10-CM

## 2022-11-23 DIAGNOSIS — K59.09 CHRONIC CONSTIPATION: ICD-10-CM

## 2022-11-23 DIAGNOSIS — I10 ESSENTIAL HYPERTENSION: ICD-10-CM

## 2022-11-23 DIAGNOSIS — I48.19 PERSISTENT ATRIAL FIBRILLATION (HCC): ICD-10-CM

## 2022-11-23 DIAGNOSIS — K76.0 NAFLD (NONALCOHOLIC FATTY LIVER DISEASE): ICD-10-CM

## 2022-11-23 PROCEDURE — 3074F SYST BP LT 130 MM HG: CPT | Performed by: INTERNAL MEDICINE

## 2022-11-23 PROCEDURE — 3078F DIAST BP <80 MM HG: CPT | Performed by: INTERNAL MEDICINE

## 2022-11-23 PROCEDURE — 3052F HG A1C>EQUAL 8.0%<EQUAL 9.0%: CPT | Performed by: INTERNAL MEDICINE

## 2022-11-23 PROCEDURE — 99214 OFFICE O/P EST MOD 30 MIN: CPT | Performed by: INTERNAL MEDICINE

## 2022-11-23 RX ORDER — DULAGLUTIDE 3 MG/.5ML
3 INJECTION, SOLUTION SUBCUTANEOUS
Qty: 4 EACH | Refills: 5 | Status: SHIPPED | OUTPATIENT
Start: 2022-11-23

## 2022-11-23 RX ORDER — DAPAGLIFLOZIN 5 MG/1
TABLET, FILM COATED ORAL
COMMUNITY
Start: 2022-11-14

## 2022-11-23 NOTE — PROGRESS NOTES
Elle Chacon presents today for   Chief Complaint   Patient presents with    Follow-up    Labs     11-16-22       1. \"Have you been to the ER, urgent care clinic since your last visit? Hospitalized since your last visit? \" no    2. \"Have you seen or consulted any other health care providers outside of the [address] [place] [address] since your last visit? \" yes     3. For patients aged 39-70: Has the patient had a colonoscopy / FIT/ Cologuard? Yes - no Care Gap present      If the patient is female:    4. For patients aged 41-77: Has the patient had a mammogram within the past 2 years? NA - based on age or sex  See top three    5. For patients aged 21-65: Has the patient had a pap smear?  NA - based on age or sex N/A
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
## 2022-12-14 ENCOUNTER — DOCUMENTATION ONLY (OUTPATIENT)
Dept: INTERNAL MEDICINE CLINIC | Age: 62
End: 2022-12-14

## 2022-12-14 DIAGNOSIS — D50.9 IRON DEFICIENCY ANEMIA, UNSPECIFIED IRON DEFICIENCY ANEMIA TYPE: ICD-10-CM

## 2022-12-14 DIAGNOSIS — I10 ESSENTIAL HYPERTENSION: ICD-10-CM

## 2022-12-14 RX ORDER — FERROUS SULFATE TAB 325 MG (65 MG ELEMENTAL FE) 325 (65 FE) MG
TAB ORAL
Qty: 60 TABLET | Refills: 5 | Status: SHIPPED | OUTPATIENT
Start: 2022-12-14

## 2022-12-14 RX ORDER — APIXABAN 5 MG/1
TABLET, FILM COATED ORAL
Qty: 60 TABLET | Refills: 6 | Status: SHIPPED | OUTPATIENT
Start: 2022-12-14

## 2022-12-14 RX ORDER — LOSARTAN POTASSIUM 100 MG/1
TABLET ORAL
Qty: 90 TABLET | Refills: 1 | Status: SHIPPED | OUTPATIENT
Start: 2022-12-14

## 2022-12-14 NOTE — PROGRESS NOTES
Outcome  Additional Information Required  This request has been approved using information available on the patient's profile. CaseId:[id number];Status:Approved; Review Type:Prior Auth; Coverage Start Date:11/14/2022; Coverage End Date:12/14/2023;  Drug  Eliquis 5MG tablets  Form  [employer] Electronic PA Form

## 2022-12-16 DIAGNOSIS — I10 ESSENTIAL HYPERTENSION: ICD-10-CM

## 2022-12-19 RX ORDER — HYDROCHLOROTHIAZIDE 25 MG/1
TABLET ORAL
Qty: 90 TABLET | Refills: 2 | Status: SHIPPED | OUTPATIENT
Start: 2022-12-19

## 2022-12-29 ENCOUNTER — TELEPHONE (OUTPATIENT)
Dept: INTERNAL MEDICINE CLINIC | Age: 62
End: 2022-12-29

## 2022-12-29 NOTE — TELEPHONE ENCOUNTER
No other options at this time. This class of medication is on back order everywhere. Would monitor BS and if they are elevating call back Dr Rekha Cook next week to find out how to adjust his current insulin.

## 2022-12-30 NOTE — TELEPHONE ENCOUNTER
Patient reached and given message. He advised to monitor glucose and diet until further instruction given by DR. Thelma Henley. Patient admits to not eating the best over the holidays but will work on his diet. Patient advised to call if glucose is >200.

## 2023-01-05 NOTE — TELEPHONE ENCOUNTER
Please let him know that I am ordering victoza (daily injection) in place of his trulicity (once weekly injection). Please have him contact us if he has issues picking this rx up, affording it, or side effects. Please have him contact us if his BGs are persistently greater than 200.      Dr. Nikki Rodriguez  [facility], [address] [facility], [address] [place] [address] Str.  Phone: [phone]  Fax: [phone]

## 2023-01-13 DIAGNOSIS — E78.5 DYSLIPIDEMIA: ICD-10-CM

## 2023-01-13 RX ORDER — OMEGA-3-ACID ETHYL ESTERS 1 G/1
CAPSULE, LIQUID FILLED ORAL
Qty: 120 CAPSULE | Refills: 5 | Status: SHIPPED | OUTPATIENT
Start: 2023-01-13

## 2023-01-24 NOTE — PROGRESS NOTES
Francisco Cook presents today for   Chief Complaint   Patient presents with    Follow-up    Medication Evaluation     Changing back to Trulicity and see if mail order has in stock. 1. \"Have you been to the ER, urgent care clinic since your last visit? Hospitalized since your last visit? \" no    2. \"Have you seen or consulted any other health care providers outside of the [address] [place] [address] since your last visit? \" yes     3. For patients aged 39-70: Has the patient had a colonoscopy / FIT/ Cologuard? Yes - no Care Gap present      If the patient is female:    4. For patients aged 41-77: Has the patient had a mammogram within the past 2 years? NA - based on age or sex  See top three    5. For patients aged 21-65: Has the patient had a pap smear?  NA - based on age or sex

## 2023-01-25 ENCOUNTER — OFFICE VISIT (OUTPATIENT)
Dept: INTERNAL MEDICINE CLINIC | Age: 63
End: 2023-01-25
Payer: COMMERCIAL

## 2023-01-25 VITALS
BODY MASS INDEX: 38.36 KG/M2 | HEART RATE: 68 BPM | WEIGHT: 274 LBS | RESPIRATION RATE: 16 BRPM | SYSTOLIC BLOOD PRESSURE: 132 MMHG | DIASTOLIC BLOOD PRESSURE: 78 MMHG | OXYGEN SATURATION: 96 % | HEIGHT: 71 IN | TEMPERATURE: 98.7 F

## 2023-01-25 DIAGNOSIS — E11.21 TYPE 2 DIABETES WITH NEPHROPATHY (HCC): Primary | ICD-10-CM

## 2023-01-25 DIAGNOSIS — I10 ESSENTIAL HYPERTENSION: ICD-10-CM

## 2023-01-25 DIAGNOSIS — G47.33 OSA TREATED WITH BIPAP: ICD-10-CM

## 2023-01-25 PROCEDURE — 3078F DIAST BP <80 MM HG: CPT | Performed by: INTERNAL MEDICINE

## 2023-01-25 PROCEDURE — 99214 OFFICE O/P EST MOD 30 MIN: CPT | Performed by: INTERNAL MEDICINE

## 2023-01-25 PROCEDURE — 3074F SYST BP LT 130 MM HG: CPT | Performed by: INTERNAL MEDICINE

## 2023-01-25 RX ORDER — DULAGLUTIDE 1.5 MG/.5ML
INJECTION, SOLUTION SUBCUTANEOUS
Qty: 4 ML | Refills: 3 | Status: CANCELLED | OUTPATIENT
Start: 2023-01-25

## 2023-01-25 RX ORDER — DULAGLUTIDE 3 MG/.5ML
3 INJECTION, SOLUTION SUBCUTANEOUS
Qty: 4 EACH | Refills: 5 | Status: SHIPPED | COMMUNITY
Start: 2023-01-25

## 2023-01-25 RX ORDER — DAPAGLIFLOZIN 10 MG/1
10 TABLET, FILM COATED ORAL DAILY
COMMUNITY
Start: 2022-12-21

## 2023-01-25 NOTE — PATIENT INSTRUCTIONS
Learning About Tests When You Have Diabetes  Why do you need regular tests? Diabetes can lead to other health problems if it's not well managed. You'll need tests to monitor how well your diabetes is managed and to check for other things like high cholesterol or kidney problems. Having tests on a regular schedule can help your doctor find problems early, when it's best to start treating them. What tests do you need? These are the tests you may need and how often you should have them. The tests may vary depending on whether you have type 1 or type 2 diabetes. A1c blood test.  This test shows the average level of blood sugar over the past 2 to 3 months. It helps your doctor see whether blood sugar levels have been staying within your target range. How often: Every 3 to 6 months  Goal: A blood sugar level in your target range  Blood pressure test.  This test measures the pressure of blood flow in the arteries. Controlling blood pressure can help prevent damage to nerves and blood vessels. How often: Every 3 to 6 months  Goal: A blood pressure level in your target range  Cholesterol test.  This test measures the amount of a type of fat in the blood. It is common for people with diabetes to also have high cholesterol. Too much cholesterol in the blood can build up inside the blood vessels and raise the risk for heart attack and stroke. How often: At the time of your diabetes diagnosis, and as often as your doctor recommends after that  Goal: A cholesterol level in your target range  Albumin-creatinine ratio test.  This test checks for kidney damage by looking for the protein albumin (say \"al-BYOO-иван\") in the urine. Albumin is normally found in the blood. Kidney damage can let small amounts of it (microalbumin) leak into the urine. How often: Once a year  Goal: No protein in the urine  Blood creatinine test/estimated glomerular filtration (eGFR).   The blood creatinine (say \"ybsr-XH-ve-neen\") level shows how well your kidneys are working. Creatinine is a waste product that muscles release into the blood. Blood creatinine is used to estimate the glomerular filtration rate. A high level of creatinine and/or a low eGFR may mean your kidneys are not working as well as they should. How often: Once a year  Goal: Normal level of creatinine in the blood. The eGFR goal is greater than 60 mL/min/1.73 m². Complete foot exam.  The doctor checks for foot sores and whether any sensation has been lost.  How often: Once a year  Goal: Healthy feet with no foot ulcers or loss of feeling  Dental exam and cleaning. The dentist checks for gum disease and tooth decay. People with high blood sugar are more likely to have these problems. How often: Every 6 months  Goal: Healthy teeth and gums  Complete eye exam.  High blood sugar levels can damage the eyes. This exam is done by an ophthalmologist or optometrist. It includes a dilated eye exam. The exam shows whether there's damage to the back of the eye (diabetic retinopathy). How often: Once a year. If you don't have any signs of diabetic retinopathy, your doctor may recommend an exam every 2 years. Goal: No damage to the back of the eye  Thyroid-stimulating hormone (TSH) blood test.  This test checks for thyroid disease, which is especially common for people with type 1 diabetes. Having too little or too much thyroid hormone can make it hard to manage your blood sugar. How often: As part of your diabetes diagnosis, and as often as your doctor recommends after that  Goal: Normal level of TSH in the blood  Follow-up care is a key part of your treatment and safety. Be sure to make and go to all appointments, and call your doctor if you are having problems. It's also a good idea to know your test results and keep a list of the medicines you take. Where can you learn more?   Go to http://www.[email or web address]/  Enter A243 in the search box to learn more about \"Learning About Tests When You Have Diabetes. \"  Current as of: April 13, 2022               Content Version: 13.4  © 2006-2022 Healthwise, [employer]. Care instructions adapted under license by [employer] (which disclaims liability or warranty for this information). If you have questions about a medical condition or this instruction, always ask your healthcare professional. [address] any warranty or liability for your use of this information. Learning About Meal Planning for Diabetes  Why plan your meals? Meal planning can be a key part of managing diabetes. Planning meals and snacks with the right balance of carbohydrate, protein, and fat can help you keep your blood sugar at the target level you set with your doctor. You don't have to eat special foods. You can eat what your family eats, including sweets once in a while. But you do have to pay attention to how often you eat and how much you eat of certain foods. You may want to work with a dietitian or a diabetes educator. They can give you tips and meal ideas and can answer your questions about meal planning. This health professional can also help you reach a healthy weight if that is one of your goals. What plan is right for you? Your dietitian or diabetes educator may suggest that you start with the plate format or carbohydrate counting. The plate format  The plate format is a simple way to help you manage how you eat. You plan meals by learning how much space each food should take on a plate. Using the plate format helps you manage the amount of carbohydrate you eat. It can make it easier to keep your blood sugar level within your target range. It also helps you see if you're eating healthy portion sizes. To use the plate format, you put non-starchy vegetables on half your plate. Add lean protein foods, such as fish, lean meats and poultry, or soy products, on one-quarter of the plate.  Put a grain or starchy vegetable (such as brown rice or a potato) on the final quarter of the plate. You can add a small piece of fruit and some low-fat or fat-free milk or yogurt, depending on your carbohydrate goal for each meal.  Here are some tips for using the plate format:  Make sure that you are not using an oversized plate. A 9-inch plate is best. Many restaurants use larger plates. Get used to using the plate format at home. Then you can use it when you eat out. Write down your questions about using the plate format. Talk to your doctor, a dietitian, or a diabetes educator about your concerns. Carbohydrate counting  With carbohydrate counting, you plan meals based on the amount of carbohydrate in each food. Carbohydrate raises blood sugar higher and more quickly than any other nutrient. It is found in desserts, breads and cereals, and fruit. It's also found in starchy vegetables such as potatoes and corn, grains such as rice and pasta, and milk and yogurt. You can help keep your blood sugar levels within your target range by planning how much carbohydrate to have at meals and snacks. The amount you need depends on several things. These include your weight, how active you are, which diabetes medicines you take, and what your goals are for your blood sugar levels. A registered dietitian or diabetes educator can help you plan how much carbohydrate to include in each meal and snack. An example of a carbohydrate counting plan is:  45 to 60 grams at each meal. That's about the same as 3 to 4 carbohydrate servings. 15 to 20 grams at each snack. That's about the same as 1 carbohydrate serving. The Nutrition Facts label on packaged foods tells you how much carbohydrate is in a serving of the food. First, look at the serving size on the food label. Is that the amount you eat in a serving? All of the nutrition information on a food label is based on that serving size.  So if you eat more or less than that, you'll need to adjust the other numbers. Total carbohydrate is the next thing you need to look for on the label. If you count carbohydrate servings, one serving of carbohydrate is 15 grams. For foods that don't come with labels, such as fresh fruits and vegetables, you'll need a guide that lists carbohydrate in these foods. Ask your doctor, dietitian, or diabetes educator about books or other nutrition guides you can use. If you take insulin, you need to know how many grams of carbohydrate are in a meal. This lets you know how much rapid-acting insulin to take before you eat. If you use an insulin pump, you get a constant rate of insulin during the day. So the pump must be programmed at meals to give you extra insulin to cover the rise in blood sugar after meals. When you know how much carbohydrate you will eat, you can take the right amount of insulin. Or, if you always use the same amount of insulin, you need to make sure that you eat the same amount of carbohydrate at meals. If you need more help to understand carbohydrate counting and food labels, ask your doctor, dietitian, or diabetes educator. How can you plan healthy meals? Here are some tips to get started:  Plan your meals a week at a time. Don't forget to include snacks too. Use cookbooks or online recipes to plan several main meals. Plan some quick meals for busy nights. You also can double some recipes that freeze well. Then you can save half for other busy nights when you don't have time to cook. Make sure you have the ingredients you need for your recipes. If you're running low on basic items, put these items on your shopping list too. List foods that you use to make breakfasts, lunches, and snacks. List plenty of fruits and vegetables. Post this list on the refrigerator. Add to it as you think of more things you need. Take the list to the store to do your weekly shopping. Follow-up care is a key part of your treatment and safety.  Be sure to make and go to all appointments, and call your doctor if you are having problems. It's also a good idea to know your test results and keep a list of the medicines you take. Where can you learn more? Go to http://www.[employer].com/  Enter Z834 in the search box to learn more about \"Learning About Meal Planning for Diabetes. \"  Current as of: October 6, 2021               Content Version: 13.4  © 2006-2022 Healthwise[employer]. Care instructions adapted under license by [employer] (which disclaims liability or warranty for this information). If you have questions about a medical condition or this instruction, always ask your healthcare professional. [address] any warranty or liability for your use of this information.
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
## 2023-02-06 DIAGNOSIS — E11.21 TYPE 2 DIABETES WITH NEPHROPATHY (HCC): Primary | ICD-10-CM

## 2023-02-06 RX ORDER — PIOGLITAZONEHYDROCHLORIDE 30 MG/1
30 TABLET ORAL DAILY
Qty: 30 TABLET | Refills: 5 | Status: SHIPPED | COMMUNITY
Start: 2023-02-06

## 2023-02-17 RX ORDER — BLOOD-GLUCOSE TRANSMITTER
EACH MISCELLANEOUS
Qty: 3 EACH | Refills: 5 | Status: SHIPPED | OUTPATIENT
Start: 2023-02-17

## 2023-02-20 RX ORDER — BLOOD-GLUCOSE SENSOR
EACH MISCELLANEOUS
Qty: 9 EACH | Refills: 5 | Status: SHIPPED | OUTPATIENT
Start: 2023-02-20

## 2023-02-23 ENCOUNTER — OFFICE VISIT (OUTPATIENT)
Age: 63
End: 2023-02-23
Payer: COMMERCIAL

## 2023-02-23 VITALS
HEART RATE: 69 BPM | SYSTOLIC BLOOD PRESSURE: 98 MMHG | WEIGHT: 274 LBS | OXYGEN SATURATION: 96 % | BODY MASS INDEX: 38.36 KG/M2 | TEMPERATURE: 98.1 F | RESPIRATION RATE: 16 BRPM | HEIGHT: 71 IN | DIASTOLIC BLOOD PRESSURE: 63 MMHG

## 2023-02-23 DIAGNOSIS — I10 ESSENTIAL (PRIMARY) HYPERTENSION: ICD-10-CM

## 2023-02-23 DIAGNOSIS — E11.21 TYPE 2 DIABETES MELLITUS WITH DIABETIC NEPHROPATHY, WITH LONG-TERM CURRENT USE OF INSULIN (HCC): Primary | ICD-10-CM

## 2023-02-23 DIAGNOSIS — Z79.4 TYPE 2 DIABETES MELLITUS WITH DIABETIC NEPHROPATHY, WITH LONG-TERM CURRENT USE OF INSULIN (HCC): Primary | ICD-10-CM

## 2023-02-23 PROCEDURE — 99213 OFFICE O/P EST LOW 20 MIN: CPT | Performed by: INTERNAL MEDICINE

## 2023-02-23 PROCEDURE — 3074F SYST BP LT 130 MM HG: CPT | Performed by: INTERNAL MEDICINE

## 2023-02-23 PROCEDURE — 3078F DIAST BP <80 MM HG: CPT | Performed by: INTERNAL MEDICINE

## 2023-02-23 RX ORDER — OMEGA-3-ACID ETHYL ESTERS 1 G/1
CAPSULE, LIQUID FILLED ORAL
COMMUNITY
Start: 2023-01-13

## 2023-02-23 RX ORDER — DAPAGLIFLOZIN 10 MG/1
TABLET, FILM COATED ORAL
COMMUNITY
Start: 2022-12-21

## 2023-02-23 RX ORDER — PIOGLITAZONEHYDROCHLORIDE 30 MG/1
TABLET ORAL
COMMUNITY
Start: 2023-02-06

## 2023-02-23 SDOH — ECONOMIC STABILITY: HOUSING INSECURITY
IN THE LAST 12 MONTHS, WAS THERE A TIME WHEN YOU DID NOT HAVE A STEADY PLACE TO SLEEP OR SLEPT IN A SHELTER (INCLUDING NOW)?: NO

## 2023-02-23 SDOH — ECONOMIC STABILITY: INCOME INSECURITY: HOW HARD IS IT FOR YOU TO PAY FOR THE VERY BASICS LIKE FOOD, HOUSING, MEDICAL CARE, AND HEATING?: NOT HARD AT ALL

## 2023-02-23 SDOH — ECONOMIC STABILITY: FOOD INSECURITY: WITHIN THE PAST 12 MONTHS, YOU WORRIED THAT YOUR FOOD WOULD RUN OUT BEFORE YOU GOT MONEY TO BUY MORE.: NEVER TRUE

## 2023-02-23 SDOH — ECONOMIC STABILITY: FOOD INSECURITY: WITHIN THE PAST 12 MONTHS, THE FOOD YOU BOUGHT JUST DIDN'T LAST AND YOU DIDN'T HAVE MONEY TO GET MORE.: NEVER TRUE

## 2023-02-23 ASSESSMENT — ENCOUNTER SYMPTOMS
BLOOD IN STOOL: 0
COUGH: 0
SHORTNESS OF BREATH: 0
SORE THROAT: 0
ANAL BLEEDING: 0
EYE PAIN: 0
ABDOMINAL PAIN: 0

## 2023-02-23 ASSESSMENT — PATIENT HEALTH QUESTIONNAIRE - PHQ9
SUM OF ALL RESPONSES TO PHQ9 QUESTIONS 1 & 2: 0
2. FEELING DOWN, DEPRESSED OR HOPELESS: 0
SUM OF ALL RESPONSES TO PHQ QUESTIONS 1-9: 0
1. LITTLE INTEREST OR PLEASURE IN DOING THINGS: 0
SUM OF ALL RESPONSES TO PHQ QUESTIONS 1-9: 0
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
## 2023-02-23 NOTE — PROGRESS NOTES
Sean Sherman presents today for   Chief Complaint   Patient presents with    Diabetes    Follow-up     Patient unable to verify Med list, he does not know the names of the medication              1. \"Have you been to the ER, urgent care clinic since your last visit? Hospitalized since your last visit? \" NO    2. \"Have you seen or consulted any other health care providers outside of the [address] [place] [address] since your last visit? \" YES     3. For patients aged 39-70: Has the patient had a colonoscopy / FIT/ Cologuard? Yes - no Care Gap present      If the patient is female:    4. For patients aged 41-77: Has the patient had a mammogram within the past 2 years? NA - based on age or sex      11. For patients aged 21-65: Has the patient had a pap smear?  NA - based on age or sex

## 2023-03-01 ENCOUNTER — HOSPITAL ENCOUNTER (OUTPATIENT)
Facility: HOSPITAL | Age: 63
Discharge: HOME | End: 2023-03-04

## 2023-03-01 LAB — SENTARA SPECIMEN COLLECTION: NORMAL

## 2023-03-01 PROCEDURE — 99001 SPECIMEN HANDLING PT-LAB: CPT

## 2023-03-05 RX ORDER — BLOOD-GLUCOSE SENSOR
EACH MISCELLANEOUS
Qty: 3 EACH | Refills: 11 | Status: SHIPPED | OUTPATIENT
Start: 2023-03-05

## 2023-03-05 RX ORDER — BLOOD-GLUCOSE SENSOR
EACH MISCELLANEOUS
Qty: 1 EACH | Refills: 1 | Status: SHIPPED | OUTPATIENT
Start: 2023-03-05

## 2023-03-05 ASSESSMENT — ENCOUNTER SYMPTOMS: BACK PAIN: 1

## 2023-03-09 ENCOUNTER — TELEPHONE (OUTPATIENT)
Age: 63
End: 2023-03-09

## 2023-03-09 NOTE — TELEPHONE ENCOUNTER
Patient states the pharmacy does not have the recent RX that was sent in for him for [facility] and Dexcom 7. Advised patient that the medication are probably requiring a prior authorization. Will follow up with pharmacy to see what is going on. Advised patient we will look into it and call him back with the outcome.

## 2023-03-17 RX ORDER — BUPROPION HYDROCHLORIDE 150 MG/1
TABLET, EXTENDED RELEASE ORAL
Qty: 90 TABLET | Refills: 2 | Status: SHIPPED | OUTPATIENT
Start: 2023-03-17

## 2023-03-17 RX ORDER — METOPROLOL SUCCINATE 50 MG/1
TABLET, EXTENDED RELEASE ORAL
Qty: 90 TABLET | Refills: 2 | Status: SHIPPED | OUTPATIENT
Start: 2023-03-17

## 2023-04-25 ENCOUNTER — OFFICE VISIT (OUTPATIENT)
Age: 63
End: 2023-04-25
Payer: COMMERCIAL

## 2023-04-25 VITALS
SYSTOLIC BLOOD PRESSURE: 128 MMHG | BODY MASS INDEX: 37.66 KG/M2 | WEIGHT: 269 LBS | OXYGEN SATURATION: 98 % | DIASTOLIC BLOOD PRESSURE: 70 MMHG | HEART RATE: 78 BPM | HEIGHT: 71 IN

## 2023-04-25 DIAGNOSIS — E11.21 TYPE 2 DIABETES WITH NEPHROPATHY (HCC): ICD-10-CM

## 2023-04-25 DIAGNOSIS — I10 ESSENTIAL HYPERTENSION: ICD-10-CM

## 2023-04-25 DIAGNOSIS — I48.19 ATRIAL FIBRILLATION, PERSISTENT (HCC): Primary | ICD-10-CM

## 2023-04-25 DIAGNOSIS — G47.33 OSA TREATED WITH BIPAP: ICD-10-CM

## 2023-04-25 DIAGNOSIS — E78.5 DYSLIPIDEMIA: ICD-10-CM

## 2023-04-25 DIAGNOSIS — E66.01 SEVERE OBESITY (BMI 35.0-39.9) WITH COMORBIDITY (HCC): ICD-10-CM

## 2023-04-25 PROBLEM — M67.449 MUCOUS CYST OF FINGER: Status: ACTIVE | Noted: 2017-03-14

## 2023-04-25 PROCEDURE — 3074F SYST BP LT 130 MM HG: CPT | Performed by: INTERNAL MEDICINE

## 2023-04-25 PROCEDURE — 99214 OFFICE O/P EST MOD 30 MIN: CPT | Performed by: INTERNAL MEDICINE

## 2023-04-25 PROCEDURE — 93000 ELECTROCARDIOGRAM COMPLETE: CPT | Performed by: INTERNAL MEDICINE

## 2023-04-25 PROCEDURE — 3078F DIAST BP <80 MM HG: CPT | Performed by: INTERNAL MEDICINE

## 2023-04-25 RX ORDER — PEN NEEDLE, DIABETIC 32GX 5/32"
NEEDLE, DISPOSABLE MISCELLANEOUS
COMMUNITY
Start: 2023-02-20

## 2023-04-25 ASSESSMENT — PATIENT HEALTH QUESTIONNAIRE - PHQ9
SUM OF ALL RESPONSES TO PHQ9 QUESTIONS 1 & 2: 0
2. FEELING DOWN, DEPRESSED OR HOPELESS: 0
SUM OF ALL RESPONSES TO PHQ QUESTIONS 1-9: 0
SUM OF ALL RESPONSES TO PHQ QUESTIONS 1-9: 0
1. LITTLE INTEREST OR PLEASURE IN DOING THINGS: 0
SUM OF ALL RESPONSES TO PHQ QUESTIONS 1-9: 0
SUM OF ALL RESPONSES TO PHQ QUESTIONS 1-9: 0

## 2023-04-25 ASSESSMENT — ANXIETY QUESTIONNAIRES
6. BECOMING EASILY ANNOYED OR IRRITABLE: 0
5. BEING SO RESTLESS THAT IT IS HARD TO SIT STILL: 0
4. TROUBLE RELAXING: 0
7. FEELING AFRAID AS IF SOMETHING AWFUL MIGHT HAPPEN: 0
1. FEELING NERVOUS, ANXIOUS, OR ON EDGE: 0
3. WORRYING TOO MUCH ABOUT DIFFERENT THINGS: 0
2. NOT BEING ABLE TO STOP OR CONTROL WORRYING: 0
GAD7 TOTAL SCORE: 0

## 2023-04-25 ASSESSMENT — ENCOUNTER SYMPTOMS
SORE THROAT: 0
SHORTNESS OF BREATH: 1
VOMITING: 0
ABDOMINAL PAIN: 0
NAUSEA: 0
COUGH: 0
ABDOMINAL DISTENTION: 0

## 2023-04-25 NOTE — PROGRESS NOTES
Aleks Archuleta presents today for   Chief Complaint   Patient presents with    Follow-up     6 month       Aleks Archuleta preferred language for health care discussion is english/other. Is someone accompanying this pt? no    Is the patient using any DME equipment during OV? no    Depression Screening:  Depression: Not at risk    PHQ-2 Score: 0        Learning Assessment:  Who is the primary learner? Patient    What is the preferred language for health care of the primary learner? ENGLISH    How does the primary learner prefer to learn new concepts? DEMONSTRATION    Answered By patient    Relationship to Learner SELF           Pt currently taking Anticoagulant therapy? Eliquis 5 mg bid    Pt currently taking Antiplatelet therapy ? no      Coordination of Care:  1. Have you been to the ER, urgent care clinic since your last visit? Hospitalized since your last visit? no    2. Have you seen or consulted any other health care providers outside of the [address] [place] [address] since your last visit? Include any pap smears or colon screening.  no
for rash. Neurological:  Negative for dizziness, syncope, weakness and headaches. Hematological:  Does not bruise/bleed easily. Psychiatric/Behavioral:  Negative for suicidal ideas. /70 (Site: Left Upper Arm, Position: Sitting, Cuff Size: Medium Adult)   Pulse 78   Ht 5' 11\" (1.803 m)   Wt 269 lb (122 kg)   SpO2 98%   BMI 37.52 kg/m²     Objective:   Physical Exam  Constitutional:       General: He is not in acute distress. HENT:      Head: Normocephalic. Neck:      Vascular: No carotid bruit or JVD. Cardiovascular:      Rate and Rhythm: Normal rate. Rhythm irregularly irregular. No extrasystoles are present. Heart sounds: No murmur heard. No gallop. Pulmonary:      Effort: Pulmonary effort is normal.      Breath sounds: No wheezing, rhonchi or rales. Abdominal:      General: Bowel sounds are normal. There is no distension. Palpations: Abdomen is soft. Tenderness: There is no abdominal tenderness. Musculoskeletal:         General: No swelling or deformity. Skin:     General: Skin is warm and dry. Findings: No rash. Neurological:      General: No focal deficit present. Mental Status: He is alert and oriented to person, place, and time. Psychiatric:         Mood and Affect: Mood normal.         Behavior: Behavior normal.       EKG: Underlying atrial fibrillation, controlled ventricular rate in the 70s, occasional PVCs, borderline low voltage, poor R wave progression, nonspecific T wave abnormality. No change compared to the previous EKG. Assessment / Plan:   Longstanding persistent atrial fibrillation. Initially diagnosed in March 2016. Patient He remains asymptomatic to the arrhythmia, so I would continue with rate control and  Eliquis for oral anticoagulation. He can hold this medication briefly if he needs to for surgical procedures. Hypertension.   This appears recently well controlled on his current regimen which includes metoprolol,

## 2023-04-27 DIAGNOSIS — E11.21 TYPE 2 DIABETES MELLITUS WITH DIABETIC NEPHROPATHY, WITH LONG-TERM CURRENT USE OF INSULIN (HCC): ICD-10-CM

## 2023-04-27 DIAGNOSIS — Z79.4 TYPE 2 DIABETES MELLITUS WITH DIABETIC NEPHROPATHY, WITH LONG-TERM CURRENT USE OF INSULIN (HCC): ICD-10-CM

## 2023-04-27 NOTE — TELEPHONE ENCOUNTER
Patient reached, he has been taking the [facility] and was about to  a refill at his pharmacy today. In the chart patient was seen by DR. Asuncion Faria on 4-25-23 and the nurse discontinued the mounjaro rx along with fish oil, and trulicity. Patient states that the provider did not discuss discontinue these at his appointment. Please resign for rx as the discontinued message was sent to the pharmacy.

## 2023-04-27 NOTE — TELEPHONE ENCOUNTER
----- Message from Angelica Solis MD sent at 4/27/2023 11:15 AM EDT -----  Regarding: FW: Dexcom and Mounjaro  Was he able to get his dexcom CGM and mounjaro? Please confirm prior to his apt. Dr. Angelica Solis  [facility]ists of [facility], [address] [place] [address], [address] [place] [address] Str.  Phone: [phone]  Fax: [phone]    ----- Message -----  From: Angelica Solis MD  Sent: 3/5/2023   2:09 PM EDT  To: Angelica Solis MD  Subject: Winston Mcdaniel and Aria Stiles about his mounjaro and dexcom 7.

## 2023-05-01 ENCOUNTER — OFFICE VISIT (OUTPATIENT)
Age: 63
End: 2023-05-01
Payer: COMMERCIAL

## 2023-05-01 VITALS
HEART RATE: 65 BPM | DIASTOLIC BLOOD PRESSURE: 67 MMHG | OXYGEN SATURATION: 99 % | SYSTOLIC BLOOD PRESSURE: 130 MMHG | WEIGHT: 270 LBS | RESPIRATION RATE: 16 BRPM | TEMPERATURE: 98.5 F | BODY MASS INDEX: 37.8 KG/M2 | HEIGHT: 71 IN

## 2023-05-01 DIAGNOSIS — Z79.4 TYPE 2 DIABETES MELLITUS WITH DIABETIC NEPHROPATHY, WITH LONG-TERM CURRENT USE OF INSULIN (HCC): Primary | ICD-10-CM

## 2023-05-01 DIAGNOSIS — I10 ESSENTIAL (PRIMARY) HYPERTENSION: ICD-10-CM

## 2023-05-01 DIAGNOSIS — Z51.81 ENCOUNTER FOR THERAPEUTIC DRUG LEVEL MONITORING: ICD-10-CM

## 2023-05-01 DIAGNOSIS — G89.29 OTHER CHRONIC PAIN: ICD-10-CM

## 2023-05-01 DIAGNOSIS — E11.21 TYPE 2 DIABETES MELLITUS WITH DIABETIC NEPHROPATHY, WITH LONG-TERM CURRENT USE OF INSULIN (HCC): Primary | ICD-10-CM

## 2023-05-01 PROCEDURE — 99214 OFFICE O/P EST MOD 30 MIN: CPT | Performed by: INTERNAL MEDICINE

## 2023-05-01 PROCEDURE — 3075F SYST BP GE 130 - 139MM HG: CPT | Performed by: INTERNAL MEDICINE

## 2023-05-01 PROCEDURE — 3078F DIAST BP <80 MM HG: CPT | Performed by: INTERNAL MEDICINE

## 2023-05-01 RX ORDER — TIRZEPATIDE 5 MG/.5ML
5 INJECTION, SOLUTION SUBCUTANEOUS WEEKLY
Qty: 4 ADJUSTABLE DOSE PRE-FILLED PEN SYRINGE | Refills: 5 | Status: SHIPPED | OUTPATIENT
Start: 2023-05-01

## 2023-05-01 RX ORDER — GABAPENTIN 300 MG/1
CAPSULE ORAL
Qty: 90 CAPSULE | Refills: 1 | Status: SHIPPED | OUTPATIENT
Start: 2023-05-01 | End: 2023-11-01

## 2023-05-01 ASSESSMENT — ENCOUNTER SYMPTOMS
ABDOMINAL PAIN: 0
SORE THROAT: 0
BLOOD IN STOOL: 0
BACK PAIN: 0
EYE PAIN: 0
SHORTNESS OF BREATH: 0
ANAL BLEEDING: 0
COUGH: 0

## 2023-05-01 NOTE — PROGRESS NOTES
INTERNISTS OF [facility]:  5/1/2023, MRN: [id number]      Maris Davies is a 60 y.o. male and presents to clinic for Follow-up      Subjective: The patient is a 28-year-old male with history of renal cyst and BPH (followed by urology team), COPD from secondhand smoke exposure for several yrs, tubular adenomatous colon polyp and June 2016, NAFLD, diverticulosis, right inferior cuff tendinitis, nephrolithiasis, hypertension, atrial fibrillation, lumbar disc disease, splenomegaly, GI bleed (2018, while on xarelto; he required PRBCs), s/p partial gastrectomy for removal of a benign gastric mass, chronic gastritis, type 2 diabetes, hyperlipidemia, IBS, osteoarthritis, obesity, recurrent pes anserinus bursitis involving the right total knee, obstructive sleep apnea (on BiPAP), and GERD. 1.  Type 2 diabetes: His last A1c was 8 in November. He is overdue for labs. He had significant reduction in his A1c with the Nutrisystem diet. At his last appointment, he just started his diet once again. He is using a new G7 CGM. He is injecting 24 units of Tresiba daily and 22 units of Humalog with meals. He is taking Farxiga 10 mg daily, metformin 1000 mg twice daily, Actos 30 mg daily (given underlying NAFLD), and Mounjaro 2.5 mg weekly. Today he reports: Hypoglycemia within the past month:yes - a couple of times - his CGM sensor goes off per his hx when his Bgs are low. His hypoglycemic episodes occurred when he forgot to eat. His FBGs are averaging 130s. Postprandial Bgs are in the 200s (early) daily. He is doing the Ludia. His weight is 270lbs. He ran out of mounjaro on Saturday. 2.  Hypertension/AF: BP today: 130/67. His blood pressure was 98/63 at his last visit. He is taking amlodipine 5 mg daily, Eliquis 5 mg twice daily, HCTZ 25 mg daily, losartan 100 mg daily, Lasix 20 mg daily, and 50 mg daily of metoprolol. 3.  Chronic pain: From diabetic neuropathy. Treated with gabapentin 600 mg twice daily.

## 2023-05-01 NOTE — PATIENT INSTRUCTIONS
Have Diabetes. \"  Current as of: April 13, 2022               Content Version: 13.5  © 2153-8891 [employer]. Care instructions adapted under license by [facility] ([facility]). If you have questions about a medical condition or this instruction, always ask your healthcare professional. [address] any warranty or liability for your use of this information.

## 2023-05-01 NOTE — PROGRESS NOTES
Ambrosio Freed presents today for   Chief Complaint   Patient presents with    Follow-up           1. \"Have you been to the ER, urgent care clinic since your last visit? Hospitalized since your last visit? \" NO    2. \"Have you seen or consulted any other health care providers outside of the [address] [place] [address] since your last visit? \" YES     3. For patients aged 39-70: Has the patient had a colonoscopy / FIT/ Cologuard? Yes - no Care Gap present      If the patient is female:    4. For patients aged 41-77: Has the patient had a mammogram within the past 2 years? NA - based on age or sex      11. For patients aged 21-65: Has the patient had a pap smear?  NA - based on age or sex

## 2023-05-24 RX ORDER — DOCUSATE SODIUM 100 MG/1
CAPSULE, LIQUID FILLED ORAL
Qty: 60 CAPSULE | Refills: 5 | Status: SHIPPED | OUTPATIENT
Start: 2023-05-24

## 2023-06-07 RX ORDER — PEN NEEDLE, DIABETIC 32GX 5/32"
NEEDLE, DISPOSABLE MISCELLANEOUS
Qty: 200 EACH | Refills: 5 | Status: SHIPPED | OUTPATIENT
Start: 2023-06-07

## 2023-06-07 NOTE — TELEPHONE ENCOUNTER
PCP: Reina Galeana MD    Last appt: [unfilled]  Future Appointments   Date Time Provider Speedy Luther   6/8/2023  1:10 PM [facility] LAB VISIT IO BS AMB   6/12/2023  9:20 AM Reina Galeana MD [facility] BS AMB   10/26/2023  9:00 AM Daphne Fall MD [facility] BS AMB       Requested Prescriptions     Pending Prescriptions Disp Refills    BD PEN NEEDLE DAYTON 2ND GEN 32G X 4 MM MISC [Pharmacy Med Name: B-D DAYTON 2ND GEN PEN NDL [id number]] 200 each      Sig: USE TO TEST BLOOD SUGAR THREE TIMES DAILY PLUS SLIDING SCALE

## 2023-06-09 LAB
A/G RATIO: 2.3 RATIO (ref 1.1–2.6)
ALBUMIN SERPL-MCNC: 4.6 G/DL (ref 3.5–5)
ALP BLD-CCNC: 44 U/L (ref 40–125)
ALT SERPL-CCNC: 18 U/L (ref 5–40)
ANION GAP SERPL CALCULATED.3IONS-SCNC: 12 MMOL/L (ref 3–15)
AST SERPL-CCNC: 25 U/L (ref 10–37)
AVERAGE GLUCOSE: 132 MG/DL (ref 91–123)
BILIRUB SERPL-MCNC: 0.4 MG/DL (ref 0.2–1.2)
BUN BLDV-MCNC: 28 MG/DL (ref 6–22)
CALCIUM SERPL-MCNC: 9.8 MG/DL (ref 8.4–10.5)
CHLORIDE BLD-SCNC: 102 MMOL/L (ref 98–110)
CHOLESTEROL/HDL RATIO: 3.7 (ref 0–5)
CHOLESTEROL: 122 MG/DL (ref 110–200)
CO2: 31 MMOL/L (ref 20–32)
CREAT SERPL-MCNC: 1.6 MG/DL (ref 0.8–1.6)
CREATININE URINE: 108 MG/DL
GLOBULIN: 2 G/DL (ref 2–4)
GLOMERULAR FILTRATION RATE: 47.1 ML/MIN/1.73 SQ.M.
GLUCOSE: 126 MG/DL (ref 70–99)
HBA1C MFR BLD: 6.2 % (ref 4.8–5.6)
HDLC SERPL-MCNC: 33 MG/DL
LDL CHOLESTEROL CALCULATED: 46 MG/DL (ref 50–99)
LDL/HDL RATIO: 1.4
MICROALB/CREAT RATIO (UG/MG CREAT.): 772 (ref 0–30)
MICROALBUMIN/CREAT 24H UR: 833.8 MG/L (ref 0.1–17)
NON-HDL CHOLESTEROL: 89 MG/DL
POTASSIUM SERPL-SCNC: 4.2 MMOL/L (ref 3.5–5.5)
SODIUM BLD-SCNC: 145 MMOL/L (ref 133–145)
TOTAL PROTEIN: 6.6 G/DL (ref 6.2–8.1)
TRIGL SERPL-MCNC: 216 MG/DL (ref 40–149)
VLDLC SERPL CALC-MCNC: 43 MG/DL (ref 8–30)

## 2023-06-15 RX ORDER — FERROUS SULFATE 325(65) MG
TABLET ORAL
Qty: 60 TABLET | Refills: 5 | Status: ON HOLD | OUTPATIENT
Start: 2023-06-15 | End: 2023-08-16 | Stop reason: SDUPTHER

## 2023-06-19 RX ORDER — LOSARTAN POTASSIUM 100 MG/1
TABLET ORAL
Qty: 90 TABLET | Refills: 1 | Status: ON HOLD | OUTPATIENT
Start: 2023-06-19 | End: 2023-08-16 | Stop reason: HOSPADM

## 2023-06-19 NOTE — TELEPHONE ENCOUNTER
Please refill if appropriate or refuse medication if not appropriate.     PCP: Radha Jacques MD    Last appt: 6/12/23    Last refill: 12/14/22    Next Appt: 8/3/23          Requested Prescriptions     Pending Prescriptions Disp Refills    losartan (COZAAR) 100 MG tablet [Pharmacy Med Name: LOSARTAN 100MG TABLETS] 90 tablet 2     Sig: TAKE 1 TABLET BY MOUTH DAILY

## 2023-07-16 NOTE — TELEPHONE ENCOUNTER
Please refill if appropriate or refuse medication if not appropriate.     PCP: Frank Thompson MD     Last appt: 6/12/23        Future Appointments   Date Time Provider [address]   8/3/2023 10:40 AM Frank Thompson MD [facility] BS AMB   10/26/2023  9:00 AM Robyn Alvarado MD [facility] BS AMB         Requested Prescriptions     Pending Prescriptions Disp Refills    rosuvastatin (CRESTOR) 40 MG tablet [Pharmacy Med Name: ROSUVASTATIN 40MG TABLETS] 90 tablet      Sig: TAKE 1 TABLET BY MOUTH EVERY NIGHT    ELIQUIS 5 MG TABS tablet [Pharmacy Med Name: ELIQUIS 5MG TABLETS] 60 tablet      Sig: TAKE 1 TABLET BY MOUTH TWICE DAILY    pantoprazole (PROTONIX) 20 MG tablet [Pharmacy Med Name: PANTOPRAZOLE 20MG TABLETS] 90 tablet      Sig: TAKE 1 TABLET BY MOUTH DAILY AS NEEDED FOR HEARTBURN    potassium chloride (KLOR-CON M) 20 MEQ extended release tablet [Pharmacy Med Name: POTASSIUM CL 20MEQ ER TABLETS] 180 tablet      Sig: TAKE 1 TABLET BY MOUTH TWICE DAILY    omega-3 acid ethyl esters (LOVAZA) 1 g capsule [Pharmacy Med Name: OMEGA-3-ACID 1GM CAPSULES (RX)] 120 capsule      Sig: TAKE 2 CAPSULES BY MOUTH TWICE DAILY WITH MEALS

## 2023-07-17 RX ORDER — PANTOPRAZOLE SODIUM 20 MG/1
TABLET, DELAYED RELEASE ORAL
Qty: 90 TABLET | Refills: 1 | Status: ON HOLD | OUTPATIENT
Start: 2023-07-17 | End: 2023-08-16 | Stop reason: HOSPADM

## 2023-07-17 RX ORDER — OMEGA-3-ACID ETHYL ESTERS 1 G/1
CAPSULE, LIQUID FILLED ORAL
Qty: 120 CAPSULE | Refills: 5 | Status: ON HOLD | OUTPATIENT
Start: 2023-07-17 | End: 2023-08-16 | Stop reason: SDUPTHER

## 2023-07-17 RX ORDER — APIXABAN 5 MG/1
TABLET, FILM COATED ORAL
Qty: 60 TABLET | Refills: 5 | Status: ON HOLD | OUTPATIENT
Start: 2023-07-17 | End: 2023-08-16 | Stop reason: SDUPTHER

## 2023-07-17 RX ORDER — ROSUVASTATIN CALCIUM 40 MG/1
TABLET, COATED ORAL
Qty: 90 TABLET | Refills: 1 | Status: ON HOLD | OUTPATIENT
Start: 2023-07-17 | End: 2023-08-16 | Stop reason: SDUPTHER

## 2023-07-17 RX ORDER — POTASSIUM CHLORIDE 20 MEQ/1
TABLET, EXTENDED RELEASE ORAL
Qty: 180 TABLET | Refills: 1 | Status: ON HOLD | OUTPATIENT
Start: 2023-07-17 | End: 2023-08-16 | Stop reason: SDUPTHER

## 2023-07-20 ENCOUNTER — TELEPHONE (OUTPATIENT)
Age: 63
End: 2023-07-20

## 2023-07-20 NOTE — TELEPHONE ENCOUNTER
Patient called and stated that he stepped on a dariela nail yesterday and it went through his shoe and caused the heel of his foot to bleed. Patient was offered an appointment but stated he would like to be prescribed some medication instead, he would also like to know if he should get a Tetanus Shot.  Please advise

## 2023-07-20 NOTE — TELEPHONE ENCOUNTER
Pt encouraged to go to an urgent care for an in person exam and Tdap booster. All questions were answered.     Dr. Antoni Archer  [facility] [address] [place] [address]  Phone: [phone]  Fax: [phone]

## 2023-07-31 RX ORDER — HYDROCHLOROTHIAZIDE 25 MG/1
TABLET ORAL
Qty: 90 TABLET | Refills: 2 | Status: ON HOLD | OUTPATIENT
Start: 2023-07-31

## 2023-08-04 PROBLEM — G93.40 ENCEPHALOPATHY ACUTE: Status: ACTIVE | Noted: 2023-08-04

## 2023-08-15 RX ORDER — FUROSEMIDE 20 MG/1
TABLET ORAL
Qty: 30 TABLET | Refills: 5 | Status: SHIPPED | OUTPATIENT
Start: 2023-08-15 | End: 2023-08-16 | Stop reason: HOSPADM

## 2023-08-17 ENCOUNTER — TELEPHONE (OUTPATIENT)
Age: 63
End: 2023-08-17

## 2023-08-17 RX ORDER — INSULIN LISPRO 100 [IU]/ML
INJECTION, SOLUTION INTRAVENOUS; SUBCUTANEOUS
Qty: 15 ML | Refills: 3 | Status: SHIPPED | OUTPATIENT
Start: 2023-08-17

## 2023-08-17 NOTE — TELEPHONE ENCOUNTER
Patients daughter called re patient leaving [facility] as inpatient and going to [facility]. She was asking if Dr. Morena Bang who he has not seen in almost 3 yrs can change his bipap settings.  Nurse at hospital reports to me she has already advised the daughter she called her and scheduled him an appt for this Monday at the [facility] and Dr. Morena Bang will address the bipap at that time

## 2023-08-17 NOTE — TELEPHONE ENCOUNTER
Daughter wants to know if we can remotely log into machine to change settings. He discharged today. His setting need to be changed. Call daughter, Tasha Pugasson at [phone].

## 2023-08-17 NOTE — TELEPHONE ENCOUNTER
Pt dtr stated that the pt bipap settings need to be increase  pt is currently in pulm rehab and pt dtr would like to know what she should do.  Please call [phone]

## 2023-08-21 ENCOUNTER — OFFICE VISIT (OUTPATIENT)
Age: 63
End: 2023-08-21
Payer: COMMERCIAL

## 2023-08-21 VITALS
WEIGHT: 275 LBS | SYSTOLIC BLOOD PRESSURE: 124 MMHG | OXYGEN SATURATION: 98 % | RESPIRATION RATE: 18 BRPM | DIASTOLIC BLOOD PRESSURE: 75 MMHG | HEART RATE: 63 BPM | BODY MASS INDEX: 38.5 KG/M2 | HEIGHT: 71 IN | TEMPERATURE: 98.2 F

## 2023-08-21 DIAGNOSIS — J44.9 COPD, GROUP B, BY GOLD 2017 CLASSIFICATION (HCC): ICD-10-CM

## 2023-08-21 DIAGNOSIS — E66.01 SEVERE OBESITY (BMI 35.0-39.9) WITH COMORBIDITY (HCC): ICD-10-CM

## 2023-08-21 DIAGNOSIS — K75.81 NASH (NONALCOHOLIC STEATOHEPATITIS): ICD-10-CM

## 2023-08-21 DIAGNOSIS — I48.19 PERSISTENT ATRIAL FIBRILLATION (HCC): ICD-10-CM

## 2023-08-21 DIAGNOSIS — G47.33 OSA TREATED WITH BIPAP: Primary | ICD-10-CM

## 2023-08-21 PROCEDURE — 99215 OFFICE O/P EST HI 40 MIN: CPT | Performed by: INTERNAL MEDICINE

## 2023-08-21 PROCEDURE — 3074F SYST BP LT 130 MM HG: CPT | Performed by: INTERNAL MEDICINE

## 2023-08-21 PROCEDURE — 3078F DIAST BP <80 MM HG: CPT | Performed by: INTERNAL MEDICINE

## 2023-08-21 NOTE — PROGRESS NOTES
[facility]   Pulmonary, Critical Care, and Sleep Medicine           Pulmonary Office Visit      Subjective:       Patient is a 60 y.o. male is here  for follow up-asthma COPD , CARLOTA on BiPAP ,chronic bronchitis. S/p recent hospitalization follow-up  Was hospitalized at [facility] from 8/3/2023 through 8/17/2023 after he presented with altered mental status. And had no recollection of what had happened and was found on the kitchen floor and subsequently brought to the hospital.  After evaluation was diagnosed with acute metabolic and toxic encephalopathy, sepsis secondary to scrotal abscess treated with vancomycin , cefepime and Flagyl. In addition he was noted to have acute on chronic respiratory failure, acute diastolic heart failure. He has been discharged to a skilled nursing facility and family requested him to be followed up due to concerns about his BiPAP settings. Hospital discharge summary reports as follows  Acute on chronic resp faulure-COPD on home bronchodilator  and spiriva  Obesity Hypoventilation Syndrome-has Trilogy at home. Match the current BiPap settings to the Trilogy on discharge. but patient is noncompliant with BiPAP. According to the family. Patient to get more confused the next day if not to use the BiPAP at night     08/21/23      Patient was last seen by me in October 2020 and then lost to follow-up.     Now returns with history of renal cyst and BPH (followed by urology team), COPD from secondhand smoke exposure for several yrs, tubular adenomatous colon polyp and June 2016, NAFLD, diverticulosis, right inferior cuff tendinitis, nephrolithiasis, hypertension, atrial fibrillation, lumbar disc disease, splenomegaly, GI bleed (2018, while on xarelto; he required PRBCs), s/p partial gastrectomy for removal of a benign gastric mass, chronic gastritis, type 2 diabetes, hyperlipidemia, IBS, osteoarthritis, obesity, recurrent pes anserinus bursitis involving

## 2023-08-21 NOTE — PROGRESS NOTES
Montserrat Carrillo presents today for   Chief Complaint   Patient presents with    Follow-Up from [facility]       Wheezing    Shortness of Breath     With activity     Sleep Apnea     BIPAP        Is someone accompanying this pt? Wife    Is the patient using any DME equipment during OV? Oxygen     -DME Company From [facility]     Depression Screening:    PHQ-9 Questionaire 6/12/2023   Little interest or pleasure in doing things 0   Feeling down, depressed, or hopeless 0   PHQ-9 Total Score 0       Learning Needs Questionnaire:     No question data found. Fall Risk:     Fall Risk 6/12/2023   2 or more falls in past year? no   Fall with injury in past year? no        Abuse Screening:     No flowsheet data found. Coordination of Care:    1. Have you been to the ER, urgent care clinic since your last visit? Hospitalized since your last visit? No    2. Have you seen or consulted any other health care providers outside of the [address] [place] [address] since your last visit? Include any pap smears or colon screening. No    Medication list has been update per patient.

## 2023-08-29 RX ORDER — PIOGLITAZONEHYDROCHLORIDE 30 MG/1
TABLET ORAL
Qty: 30 TABLET | Refills: 5 | Status: SHIPPED | OUTPATIENT
Start: 2023-08-29 | End: 2023-10-26 | Stop reason: ALTCHOICE

## 2023-09-14 RX ORDER — FENOFIBRATE 145 MG/1
145 TABLET, COATED ORAL DAILY
Qty: 90 TABLET | Refills: 1 | Status: SHIPPED | OUTPATIENT
Start: 2023-09-14 | End: 2023-11-10 | Stop reason: SDUPTHER

## 2023-09-22 ENCOUNTER — TELEMEDICINE (OUTPATIENT)
Age: 63
End: 2023-09-22
Payer: COMMERCIAL

## 2023-09-22 DIAGNOSIS — K85.90 ACUTE PANCREATITIS, UNSPECIFIED COMPLICATION STATUS, UNSPECIFIED PANCREATITIS TYPE: ICD-10-CM

## 2023-09-22 DIAGNOSIS — Z79.4 TYPE 2 DIABETES MELLITUS WITH DIABETIC NEPHROPATHY, WITH LONG-TERM CURRENT USE OF INSULIN (HCC): ICD-10-CM

## 2023-09-22 DIAGNOSIS — E11.42 DIABETIC POLYNEUROPATHY ASSOCIATED WITH TYPE 2 DIABETES MELLITUS (HCC): ICD-10-CM

## 2023-09-22 DIAGNOSIS — Z09 HOSPITAL DISCHARGE FOLLOW-UP: ICD-10-CM

## 2023-09-22 DIAGNOSIS — R74.01 TRANSAMINITIS: ICD-10-CM

## 2023-09-22 DIAGNOSIS — R31.29 MICROSCOPIC HEMATURIA: ICD-10-CM

## 2023-09-22 DIAGNOSIS — D64.9 ANEMIA, UNSPECIFIED TYPE: ICD-10-CM

## 2023-09-22 DIAGNOSIS — E11.21 TYPE 2 DIABETES MELLITUS WITH DIABETIC NEPHROPATHY, WITH LONG-TERM CURRENT USE OF INSULIN (HCC): ICD-10-CM

## 2023-09-22 DIAGNOSIS — A41.9 SEPSIS, DUE TO UNSPECIFIED ORGANISM, UNSPECIFIED WHETHER ACUTE ORGAN DYSFUNCTION PRESENT (HCC): ICD-10-CM

## 2023-09-22 DIAGNOSIS — D64.9 ANEMIA, UNSPECIFIED TYPE: Primary | ICD-10-CM

## 2023-09-22 DIAGNOSIS — N17.9 AKI (ACUTE KIDNEY INJURY) (HCC): ICD-10-CM

## 2023-09-22 DIAGNOSIS — J96.92 RESPIRATORY FAILURE WITH HYPERCAPNIA, UNSPECIFIED CHRONICITY (HCC): ICD-10-CM

## 2023-09-22 DIAGNOSIS — I48.19 OTHER PERSISTENT ATRIAL FIBRILLATION (HCC): ICD-10-CM

## 2023-09-22 DIAGNOSIS — K76.0 FATTY (CHANGE OF) LIVER, NOT ELSEWHERE CLASSIFIED: ICD-10-CM

## 2023-09-22 PROCEDURE — 99214 OFFICE O/P EST MOD 30 MIN: CPT | Performed by: INTERNAL MEDICINE

## 2023-09-22 PROCEDURE — 3044F HG A1C LEVEL LT 7.0%: CPT | Performed by: INTERNAL MEDICINE

## 2023-09-22 PROCEDURE — 1111F DSCHRG MED/CURRENT MED MERGE: CPT | Performed by: INTERNAL MEDICINE

## 2023-09-22 RX ORDER — OMEPRAZOLE 20 MG/1
20 CAPSULE, DELAYED RELEASE ORAL DAILY
COMMUNITY

## 2023-09-22 NOTE — PROGRESS NOTES
Richard Blount presents today for   Chief Complaint   Patient presents with    Follow-up     S/p hospitalization. There were no vitals taken for this visit. 1. \"Have you been to the ER, urgent care clinic since your last visit? Hospitalized since your last visit? \"Yes    2. \"Have you seen or consulted any other health care providers outside of the [address] [place] [address] since your last visit? \" no     3. For patients aged 43-73: Has the patient had a colonoscopy / FIT/ Cologuard?  Yes - no Care Gap present
PRAPARE - Transportation     Lack of Transportation (Non-Medical): No   Housing Stability: Unknown (6/12/2023)    Housing Stability Vital Sign     Unstable Housing in the Last Year: No       ROS:  Gen: No fever/chills  HEENT: No sore throat, eye pain, ear pain, or congestion. No HA  CV: No CP  Resp: No cough/SOB  GI: No abdominal pain  : No hematuria/dysuria  Derm: No rash  Neuro: No new paresthesias/weakness  Musc: No new myalgias/jt pain  Psych: No depression sx      Objective:     General: alert, cooperative, and no distress   Mental  status: mental status: alert, oriented to person, place, and time, normal mood, behavior, speech, dress, motor activity, and thought processes   Resp: Nonlabored breathing   Neuro: No focal neurologic deficits (new)   Skin: skin: no discoloration or lesions of concern on visible areas; his LLE wounds were not well visualized via his VV today     LABS:  Lab Results   Component Value Date/Time     08/17/2023 06:14 AM    K 4.2 08/17/2023 06:14 AM     08/17/2023 06:14 AM    CO2 30 08/17/2023 06:14 AM    BUN 16 08/17/2023 06:14 AM    GFRAA CANCELED 08/10/2023 01:46 AM    GFRAA >60 08/10/2023 01:46 AM       Lab Results   Component Value Date/Time    CHOL 122 06/08/2023 01:24 PM    CHOL 127 08/11/2022 11:25 AM    HDL 33 06/08/2023 01:24 PM       Lab Results   Component Value Date/Time    WBC 7.5 08/17/2023 06:14 AM    HGB 10.4 08/17/2023 06:14 AM    HCT 33.9 08/17/2023 06:14 AM     08/17/2023 06:14 AM    MCV 93.9 08/17/2023 06:14 AM       No results found for: \"HBA1C\", \"TIT6CFYF\"    No results found for: \"TSH\", \"TSH2\", \"TSH3\"        Due to this being a TeleHealth  evaluation, many elements of the physical examination are unable to be assessed.      The pt was seen by synchronous (real-time) audio-video technology, and/or her healthcare decision maker, is aware that this patient-initiated, Telehealth encounter is a billable service, with coverage as determined by her

## 2023-09-27 ENCOUNTER — TELEPHONE (OUTPATIENT)
Age: 63
End: 2023-09-27

## 2023-09-27 ENCOUNTER — CARE COORDINATION (OUTPATIENT)
Facility: CLINIC | Age: 63
End: 2023-09-27

## 2023-09-27 DIAGNOSIS — S81.802A WOUND OF LEFT LOWER EXTREMITY, INITIAL ENCOUNTER: Primary | ICD-10-CM

## 2023-09-27 NOTE — TELEPHONE ENCOUNTER
Puja Bueno is calling from [facility] stating patient was d/c from [facility] on 9/18. She is requesting orders for home health wound care and PT/OT be faxed to [facility].  Fax: [phone]

## 2023-09-27 NOTE — TELEPHONE ENCOUNTER
Patient is calling to get a refill. He did want to provide me with the info. He only wants to speak with a nurse.

## 2023-09-27 NOTE — CARE COORDINATION
Ambulatory Care Coordination Note  2023    Patient Current Location:  [place]    ACM contacted the family by telephone. Verified name and  with family as identifiers. Provided introduction to self, and explanation of the ACM role. ACM: Marco Antonio Shah RN    Challenges to be reviewed by the provider   Additional needs identified to be addressed with provider: No  none               Method of communication with provider: none. Hx of Gi bleed, OA, Asthma, BPH, COPD, CAD, DM2, neuropathy, HLD, HTN, ROJAS, CARLOTA on BiPAP, A-fib on chronic AC, Splenomegaly  CCM rec by PCP after recent admission for Acute resp failure. SNF visit after. Currently staying in [place], [place] w/ son Shawn Love. Some difficulties arranging HH. [facility] needs order to start SN/PT/OT/Woundcare. PCP office aware. They were called by [facility] CM today. Son has been paying for medical supplies out of pocket. Order placed by [facility]) on , but according to the medical supplier ([address] [place] [address]), there had been an issue w/ Prior Auth. They state issue is resolved from the order side. Relayed medical supply number to the pt's son. Son states patient is doing ok. No other questions/issues at this time. Offered patient enrollment in the Remote Patient Monitoring (RPM) program for in-home monitoring: Patient is not eligible for RPM program.    Ambulatory Care Coordination Assessment    Care Coordination Protocol  Referral from Primary Care Provider: No  Week 1 - Initial Assessment     Do you have all of your prescriptions and are they filled?: Yes  Barriers to medication adherence: None  Are you able to afford your medications?: Yes  How often do you have trouble taking your medications the way you have been told to take them?: I always take them as prescribed.      Do you have Home O2 Therapy?: No      Ability to seek help/take action for Emergent Urgent situations i.e. fire, crime, inclement weather or health

## 2023-09-28 ENCOUNTER — TELEPHONE (OUTPATIENT)
Age: 63
End: 2023-09-28

## 2023-09-28 NOTE — TELEPHONE ENCOUNTER
Alice from [facility] called requesting skilled nursing orders to be faxed to   FAX# [phone]

## 2023-10-02 ENCOUNTER — TELEPHONE (OUTPATIENT)
Age: 63
End: 2023-10-02

## 2023-10-02 ENCOUNTER — CARE COORDINATION (OUTPATIENT)
Facility: CLINIC | Age: 63
End: 2023-10-02

## 2023-10-02 DIAGNOSIS — S81.802A WOUND OF LEFT LOWER EXTREMITY, INITIAL ENCOUNTER: ICD-10-CM

## 2023-10-02 NOTE — CARE COORDINATION
Ambulatory Care Coordination Note  10/2/2023    Patient Current Location:  [place]    ACM contacted the patient and family by telephone. Verified name and  with patient and family as identifiers. Provided introduction to self, and explanation of the ACM role. ACM: Edenilson Kumar RN    Challenges to be reviewed by the provider   Additional needs identified to be addressed with provider: Yes  medications-Family and family requests assistance regarding medication requested by [address] [facility] wound care. State need for Santyl topical which was ordered, but sent to the wrong pharmacy. Needs to be sent to local specialty pharmacy. Also request update of pt's primary pharmacy. Request lab orders be faxed to Home health. Spoke to [employer] nurse, Johnathon Noguera who reordered meds and faxed lab orders. Method of communication with provider: phone. Hx of Gi bleed, OA, Asthma, BPH, COPD, CAD, DM2, neuropathy, HLD, HTN, ROJAS, CARLOTA on BiPAP, A-fib on chronic AC, Splenomegaly  CCM rec by PCP after recent admission for Acute resp failure. SNF visit after. Per family, needs as above. Relayed to PCP's nurse. [facility] HH w/ [facility] has started. [facility] medical supplies are being delivered. [facility] patient is doing ok. No other questions/issues at this time. Offered patient enrollment in the Remote Patient Monitoring (RPM) program for in-home monitoring: Patient is not eligible for RPM program.     Goals        Conditions and Symptoms      I will schedule office visits, as directed by my provider. I will keep my appointment or reschedule if I have to cancel. I will notify my provider of any barriers to my plan of care. I will notify my provider of any symptoms that indicate a worsening of my condition. Barriers: none  Plan for overcoming my barriers: N/A  Confidence: 10/10  Anticipated Goal Completion Date: 23         Medication Management      I will take my medication as directed.   I will

## 2023-10-02 NOTE — TELEPHONE ENCOUNTER
Jerry called and states they received a prescription for collagenase (SANTYL) 250 UNIT/GM ointment and they need to know the size of the wound. They are requesting a return call and can be reached at [phone]. Please advise, thank you.

## 2023-10-05 ENCOUNTER — TELEPHONE (OUTPATIENT)
Age: 63
End: 2023-10-05

## 2023-10-06 ENCOUNTER — TELEPHONE (OUTPATIENT)
Age: 63
End: 2023-10-06

## 2023-10-06 NOTE — TELEPHONE ENCOUNTER
Specialty contacted and made aware of wound measurements.  Medication filled per request.  Veraia Maria Elena made aware no further concerns at this time

## 2023-10-09 ENCOUNTER — TELEPHONE (OUTPATIENT)
Age: 63
End: 2023-10-09

## 2023-10-09 NOTE — TELEPHONE ENCOUNTER
Patient's daughter, Agnes Mayfield, is calling stating [facility] faxed us an order to be signed for supplies. They faxed it again today so I have placed that in Dr. Angelique Yañez. Also stating [facility] was supposed to have faxed something to us as well so the patient can get supplies. She doesn't know what supplies are needed. She just wants to follow up and be sure he has everything he needs.

## 2023-10-10 ENCOUNTER — TELEPHONE (OUTPATIENT)
Age: 63
End: 2023-10-10

## 2023-10-10 NOTE — TELEPHONE ENCOUNTER
Followed up with Aubrey Timmons regarding wound supplies.  She was made aware that documentation was sent over to [address] [place] [address] [address] verified no further concerns at this time

## 2023-10-10 NOTE — TELEPHONE ENCOUNTER
Ayanna a nurse  from [place] called and would like to speak to Dr. Marge Guerra nurse. She is following up on an order for wound care supplies that needs to be sent to [facility]ra [facility]. [phone]    She advised if this has not been sent yet to write on the order \"Urgent Request\" so that they can expedite this since patient is out of supplies. Her number is [phone]. Please advise.

## 2023-10-10 NOTE — TELEPHONE ENCOUNTER
Followed up with Ayanna/.  She was made aware that [employer] documentation for wound supplies was faxed as requested HIPAA verified no further concerns at this time

## 2023-10-13 ENCOUNTER — CARE COORDINATION (OUTPATIENT)
Facility: CLINIC | Age: 63
End: 2023-10-13

## 2023-10-13 ENCOUNTER — TELEPHONE (OUTPATIENT)
Age: 63
End: 2023-10-13

## 2023-10-13 NOTE — TELEPHONE ENCOUNTER
Cristiane conklin [facility] called and states they faxed over some orders for supplies on 10/10/2023 and were calling to verify it was received and to check on the status. She can be reached at [phone]. Please advise, thank you.

## 2023-10-13 NOTE — TELEPHONE ENCOUNTER
Colten Echeverria a Nurse from [facility] called and stated the patient needs lab work orders faxed to home health and it looks like these were faxed back on the 3rd of this month but were faxed to the wrong fax number. He states the correct fax number is [phone]     Orders are being printed and faxed now.

## 2023-10-13 NOTE — CARE COORDINATION
Ambulatory Care Coordination Note  10/13/2023    Patient Current Location:  [place]    ACM contacted the patient and family by telephone. Verified name and  with patient and family as identifiers. Provided introduction to self, and explanation of the ACM role. ACM: Baljeet Gardner RN    Challenges to be reviewed by the provider   Additional needs identified to be addressed with provider: Yes  labs-Pt w/ labs ordered by PCP on 23. Faxed to [facility] on 10/2/23, however, [facility] states labs have not been drawn by [facility]. Called [facility] at [phone], per them, they did not receive the order . Confirmed correct fax number to be [phone]. Notified PCP's office. Method of communication with provider: phone. Hx of Gi bleed, OA, Asthma, BPH, COPD, CAD, DM2, neuropathy, HLD, HTN, ROJAS, CARLOTA on BiPAP, A-fib on chronic AC, Splenomegaly  CCM rec by PCP after recent admission for Acute resp failure. SNF visit after. Per family, patient recently has developed a cough w/ cold sx. States tx w/ OTCs. Family reports HH has not drawn labs yet, called HH as above. Family report possibility of patient returning to home locally in the next 30 days. Will need new orders to resume [address] here at that time. Will assist as decision is made. No other questions/issues at this time. Offered patient enrollment in the Remote Patient Monitoring (RPM) program for in-home monitoring: Patient is not eligible for RPM program.     Goals        Conditions and Symptoms      I will schedule office visits, as directed by my provider. I will keep my appointment or reschedule if I have to cancel. I will notify my provider of any barriers to my plan of care. I will notify my provider of any symptoms that indicate a worsening of my condition.     Barriers: none  Plan for overcoming my barriers: N/A  Confidence: 10/10  Anticipated Goal Completion Date: 23         Medication Management      I will take my

## 2023-10-19 ENCOUNTER — CARE COORDINATION (OUTPATIENT)
Facility: CLINIC | Age: 63
End: 2023-10-19

## 2023-10-19 NOTE — CARE COORDINATION
Ambulatory Care Coordination Note  10/19/2023    Patient Current Location:  [place]    ACM contacted the patient and family by telephone. Verified name and  with patient and family as identifiers. Provided introduction to self, and explanation of the ACM role. ACM: Palma Orona RN    Challenges to be reviewed by the provider   Additional needs identified to be addressed with provider: Yes  SN-Per patient, [facility] wound care states possible need of wound care to be transitioned to a wound clinic. Notified PCP. Method of communication with provider: staff message. Hx of Gi bleed, OA, Asthma, BPH, COPD, CAD, DM2, neuropathy, HLD, HTN, ROJAS, CARLOTA on BiPAP, A-fib on chronic AC, Splenomegaly  CCM rec by PCP after recent admission for Acute resp failure. SNF visit after. Pt called regarding above yellowbox issue. States wound has been healing but slowly. Reports feeling well otherwise. No other questions/issues at this time. Offered patient enrollment in the Remote Patient Monitoring (RPM) program for in-home monitoring: Patient is not eligible for RPM program.     Goals        Conditions and Symptoms      I will schedule office visits, as directed by my provider. I will keep my appointment or reschedule if I have to cancel. I will notify my provider of any barriers to my plan of care. I will notify my provider of any symptoms that indicate a worsening of my condition. Barriers: none  Plan for overcoming my barriers: N/A  Confidence: 10/10  Anticipated Goal Completion Date: 23         Medication Management      I will take my medication as directed. I will notify my provider of any problems with medications, like adverse effects or side effects. I will notify my provider/Care Coordinator if I am unable to afford my medications. I will notify my provider for advice before I stop taking any of my medication.     Barriers: none  Plan for overcoming my barriers:

## 2023-10-24 ENCOUNTER — TELEPHONE (OUTPATIENT)
Age: 63
End: 2023-10-24

## 2023-10-26 ENCOUNTER — TELEMEDICINE (OUTPATIENT)
Age: 63
End: 2023-10-26
Payer: COMMERCIAL

## 2023-10-26 DIAGNOSIS — Z79.4 TYPE 2 DIABETES MELLITUS WITH DIABETIC NEPHROPATHY, WITH LONG-TERM CURRENT USE OF INSULIN (HCC): Primary | ICD-10-CM

## 2023-10-26 DIAGNOSIS — K86.1 CHRONIC PANCREATITIS, UNSPECIFIED PANCREATITIS TYPE (HCC): ICD-10-CM

## 2023-10-26 DIAGNOSIS — J96.92 RESPIRATORY FAILURE WITH HYPERCAPNIA, UNSPECIFIED CHRONICITY (HCC): ICD-10-CM

## 2023-10-26 DIAGNOSIS — Z87.828 HISTORY OF OPEN LEG WOUND: ICD-10-CM

## 2023-10-26 DIAGNOSIS — E11.21 TYPE 2 DIABETES MELLITUS WITH DIABETIC NEPHROPATHY, WITH LONG-TERM CURRENT USE OF INSULIN (HCC): Primary | ICD-10-CM

## 2023-10-26 DIAGNOSIS — K76.0 FATTY (CHANGE OF) LIVER, NOT ELSEWHERE CLASSIFIED: ICD-10-CM

## 2023-10-26 DIAGNOSIS — D64.9 ANEMIA, UNSPECIFIED TYPE: ICD-10-CM

## 2023-10-26 DIAGNOSIS — R31.29 MICROSCOPIC HEMATURIA: ICD-10-CM

## 2023-10-26 PROCEDURE — 99214 OFFICE O/P EST MOD 30 MIN: CPT | Performed by: INTERNAL MEDICINE

## 2023-10-26 PROCEDURE — 3044F HG A1C LEVEL LT 7.0%: CPT | Performed by: INTERNAL MEDICINE

## 2023-10-26 RX ORDER — PIOGLITAZONEHYDROCHLORIDE 15 MG/1
15 TABLET ORAL DAILY
Qty: 90 TABLET | Refills: 1 | Status: SHIPPED | OUTPATIENT
Start: 2023-10-26 | End: 2023-10-26

## 2023-10-26 RX ORDER — PIOGLITAZONEHYDROCHLORIDE 15 MG/1
15 TABLET ORAL DAILY
Qty: 90 TABLET | Refills: 1 | Status: SHIPPED | OUTPATIENT
Start: 2023-10-26 | End: 2023-10-30 | Stop reason: SDUPTHER
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
## 2023-10-30 ENCOUNTER — TELEPHONE (OUTPATIENT)
Age: 63
End: 2023-10-30

## 2023-10-30 ENCOUNTER — CARE COORDINATION (OUTPATIENT)
Facility: CLINIC | Age: 63
End: 2023-10-30

## 2023-10-30 DIAGNOSIS — Z79.4 TYPE 2 DIABETES MELLITUS WITH DIABETIC NEPHROPATHY, WITH LONG-TERM CURRENT USE OF INSULIN (HCC): ICD-10-CM

## 2023-10-30 DIAGNOSIS — E11.21 TYPE 2 DIABETES MELLITUS WITH DIABETIC NEPHROPATHY, WITH LONG-TERM CURRENT USE OF INSULIN (HCC): ICD-10-CM

## 2023-10-30 RX ORDER — PIOGLITAZONEHYDROCHLORIDE 15 MG/1
15 TABLET ORAL DAILY
Qty: 90 TABLET | Refills: 1 | Status: SHIPPED | OUTPATIENT
Start: 2023-10-30

## 2023-10-30 NOTE — TELEPHONE ENCOUNTER
[address] [facility] called in to see if there were any medication changes since pt had a virtual visit on 10/26/23. Informed that there were no changes at this time, however  some may be pending lab results. She also informed that pt will be discharged from there services to his home around November 10, 2023.

## 2023-10-30 NOTE — CARE COORDINATION
advice before I stop taking any of my medication. Barriers: none  Plan for overcoming my barriers: N/A  Confidence: 10/10  Anticipated Goal Completion Date: 12/27/23       Reduce Risk of Hospitalization      I will take appropriate steps to reduce my risk of Hospitalization and ED visits to include: Take all of medications as prescribed  Visit w/ all of my recommended specialists. Visit w/ my PCP as recommended. Avoid activities that can trigger my chronic conditions.     Barriers: none  Plan for overcoming my barriers: N/A  Confidence: 10/10  Anticipated Goal Completion Date: 12/27/23                    Future Appointments   Date Time Provider [address]   11/22/2023 10:00 AM Ashley Martin MD [facility] BS AMB   12/29/2023 10:00 AM Chauncey Joe MD [facility] BS AMB

## 2023-10-30 NOTE — TELEPHONE ENCOUNTER
Elina Macedo with [facility] is calling stating Mr. Lydia Mcelroy was prescribed Eliquis when he was in the hospital but was not given an refills. He is calling to see if Dr. Rosa Gaucher can send in any rx with refills. [facility] [address] [facility], [address] [facility] [phone] [facility] [phone]   [id number]  [facility] [address]     pioglitazone (ACTOS) 15 MG tablet 90 tablet 1 10/26/2023     Sig - Route:  Take 1 tablet by mouth daily - Oral

## 2023-11-01 PROBLEM — K76.0 FATTY (CHANGE OF) LIVER, NOT ELSEWHERE CLASSIFIED: Status: ACTIVE | Noted: 2023-11-01

## 2023-11-01 PROBLEM — G93.40 ENCEPHALOPATHY ACUTE: Status: RESOLVED | Noted: 2023-08-04 | Resolved: 2023-11-01

## 2023-11-01 PROBLEM — M43.16 SPONDYLOLISTHESIS OF LUMBAR REGION: Status: RESOLVED | Noted: 2018-06-11 | Resolved: 2023-11-01

## 2023-11-01 PROBLEM — K86.1 CHRONIC PANCREATITIS (HCC): Status: ACTIVE | Noted: 2023-11-01

## 2023-11-01 PROBLEM — S63.056A CMC (CARPOMETACARPAL JOINT) DISLOCATION: Status: RESOLVED | Noted: 2018-12-03 | Resolved: 2023-11-01

## 2023-11-01 PROBLEM — M67.449 MUCOUS CYST OF FINGER: Status: RESOLVED | Noted: 2017-03-14 | Resolved: 2023-11-01

## 2023-11-02 NOTE — ED NOTES
Attempted to call report; per accepting staff call back in 10 min. Attempted to start iv without success to left FA. Pt remains awake/alert/oriented; affect calm/conversant, respirations regular/non labored/skin warm/dry. No active vomiting; Zofran given per order. Rails up x 2 with call light in reach/pt daughter present. Deniz Marks,   Patient is establishing care at 19w0d, follows with OB already. She has a 1 year old daughter at home. Her family is unware of her pregnancy due to concerns for judgement. She has a therapist though; otherwise no other support. She screened positive for financial resource strain and stress. Would you be able to reach out to provide some financial support resources? What options exist for support groups for black, single moms?

## 2023-11-03 ENCOUNTER — TELEPHONE (OUTPATIENT)
Age: 63
End: 2023-11-03

## 2023-11-03 NOTE — TELEPHONE ENCOUNTER
Certificate of Medical Necessity has been completed [facility]. Confirmation received and scanned.

## 2023-11-07 ENCOUNTER — CARE COORDINATION (OUTPATIENT)
Facility: CLINIC | Age: 63
End: 2023-11-07

## 2023-11-07 DIAGNOSIS — Z79.4 TYPE 2 DIABETES MELLITUS WITH DIABETIC NEPHROPATHY, WITH LONG-TERM CURRENT USE OF INSULIN (HCC): ICD-10-CM

## 2023-11-07 DIAGNOSIS — Z87.828 HISTORY OF OPEN LEG WOUND: Primary | ICD-10-CM

## 2023-11-07 DIAGNOSIS — G62.9 NEUROPATHY: ICD-10-CM

## 2023-11-07 DIAGNOSIS — E11.21 TYPE 2 DIABETES MELLITUS WITH DIABETIC NEPHROPATHY, WITH LONG-TERM CURRENT USE OF INSULIN (HCC): ICD-10-CM

## 2023-11-07 NOTE — TELEPHONE ENCOUNTER
Pt needs refills   Amolodipine 10 mg   Atroastatin 80 mg   Fenofibrate 145 mg  Pantoprzole 20mg   Monteukast 10 mg  Furoseide 20 mg  Albuterol  Asorbic acid 250 mg  Bupropion 150 mg   Metoprlol 50 mg   Potassium chloride 20   [id number]  [facility]

## 2023-11-07 NOTE — CARE COORDINATION
Hospitalization      I will take appropriate steps to reduce my risk of Hospitalization and ED visits to include: Take all of medications as prescribed  Visit w/ all of my recommended specialists. Visit w/ my PCP as recommended. Avoid activities that can trigger my chronic conditions.     Barriers: none  Plan for overcoming my barriers: N/A  Confidence: 10/10  Anticipated Goal Completion Date: 12/27/23                    Future Appointments   Date Time Provider [address]   11/22/2023 10:00 AM Misty Sidhu MD [facility] BS AMB   12/29/2023 10:00 AM Barry Jolly MD [facility] BS AMB

## 2023-11-09 NOTE — TELEPHONE ENCOUNTER
Last OV: 6/12/2023  No future appointment  Last Refill: 8/16/2023        Please call patient to schedule an appointment.

## 2023-11-10 RX ORDER — PANTOPRAZOLE SODIUM 20 MG/1
20 TABLET, DELAYED RELEASE ORAL
Qty: 90 TABLET | Refills: 1 | Status: SHIPPED | OUTPATIENT
Start: 2023-11-10

## 2023-11-10 RX ORDER — GABAPENTIN 300 MG/1
300 CAPSULE ORAL 3 TIMES DAILY
Qty: 270 CAPSULE | Refills: 1 | Status: SHIPPED | OUTPATIENT
Start: 2023-11-10 | End: 2024-05-08

## 2023-11-10 RX ORDER — MONTELUKAST SODIUM 10 MG/1
10 TABLET ORAL NIGHTLY
Qty: 90 TABLET | Refills: 1 | Status: SHIPPED | OUTPATIENT
Start: 2023-11-10

## 2023-11-10 RX ORDER — AMLODIPINE BESYLATE 10 MG/1
10 TABLET ORAL DAILY
Qty: 90 TABLET | Refills: 1 | Status: SHIPPED | OUTPATIENT
Start: 2023-11-10

## 2023-11-10 RX ORDER — BUPROPION HYDROCHLORIDE 150 MG/1
150 TABLET, EXTENDED RELEASE ORAL DAILY
Qty: 90 TABLET | Refills: 1 | Status: SHIPPED | OUTPATIENT
Start: 2023-11-10

## 2023-11-10 RX ORDER — FENOFIBRATE 145 MG/1
145 TABLET, COATED ORAL DAILY
Qty: 90 TABLET | Refills: 1 | Status: SHIPPED | OUTPATIENT
Start: 2023-11-10

## 2023-11-10 RX ORDER — POTASSIUM CHLORIDE 20 MEQ/1
10 TABLET, EXTENDED RELEASE ORAL 2 TIMES DAILY
Qty: 180 TABLET | Refills: 1 | Status: SHIPPED | OUTPATIENT
Start: 2023-11-10

## 2023-11-10 RX ORDER — METOPROLOL SUCCINATE 50 MG/1
50 TABLET, EXTENDED RELEASE ORAL DAILY
Qty: 90 TABLET | Refills: 1 | Status: SHIPPED | OUTPATIENT
Start: 2023-11-10

## 2023-11-10 RX ORDER — FUROSEMIDE 20 MG/1
20 TABLET ORAL DAILY
Qty: 90 TABLET | Refills: 1 | Status: SHIPPED | OUTPATIENT
Start: 2023-11-10

## 2023-11-10 RX ORDER — MULTIVIT WITH MINERALS/LUTEIN
250 TABLET ORAL DAILY
Qty: 90 TABLET | Refills: 1 | Status: SHIPPED | OUTPATIENT
Start: 2023-11-10

## 2023-11-10 NOTE — TELEPHONE ENCOUNTER
Please fax his outpatient referral to be [facility] to see wound care clinic. Please let him know that I filled his rx and included a rx for gabapentin to treat his known diabetic neuropathy.      Dr. Melissa Hairston  [facility] of [address] [place] [address]  Phone: [phone]  Fax: [phone]

## 2023-11-13 ENCOUNTER — CARE COORDINATION (OUTPATIENT)
Facility: CLINIC | Age: 63
End: 2023-11-13

## 2023-11-13 ENCOUNTER — TELEPHONE (OUTPATIENT)
Age: 63
End: 2023-11-13

## 2023-11-13 DIAGNOSIS — S81.802S WOUND OF LEFT LEG, SEQUELA: Primary | ICD-10-CM

## 2023-11-13 NOTE — TELEPHONE ENCOUNTER
A referral for wound care was put in for pt , pt is asking to get wound are at [facility]

## 2023-11-13 NOTE — CARE COORDINATION
Ambulatory Care Coordination Note  2023    Patient Current Location:  [place]    ACM contacted the family by telephone. Verified name and  with family as identifiers. Provided introduction to self, and explanation of the ACM role. ACM: Magalys Bowens RN    Challenges to be reviewed by the provider   Additional needs identified to be addressed with provider: Yes  none               Method of communication with provider: none. Hx of Gi bleed, OA, Asthma, BPH, COPD, CAD, DM2, neuropathy, HLD, HTN, ROJAS, CARLOTA on BiPAP, A-fib on chronic AC, Splenomegaly  CCM rec by PCP after recent admission for Acute resp failure. SNF visit after. States returning to the area on 23. Confirms that [facility] has concluded, but wound care continues. Wishes to have wound clinic be [facility]. Notified PCP's office. Can see external referral placed. No other questions/issues at this time. Offered patient enrollment in the Remote Patient Monitoring (RPM) program for in-home monitoring: Patient is not eligible for RPM program.     Goals        Conditions and Symptoms      I will schedule office visits, as directed by my provider. I will keep my appointment or reschedule if I have to cancel. I will notify my provider of any barriers to my plan of care. I will notify my provider of any symptoms that indicate a worsening of my condition. Barriers: none  Plan for overcoming my barriers: N/A  Confidence: 10/10  Anticipated Goal Completion Date: 23         Medication Management      I will take my medication as directed. I will notify my provider of any problems with medications, like adverse effects or side effects. I will notify my provider/Care Coordinator if I am unable to afford my medications. I will notify my provider for advice before I stop taking any of my medication.     Barriers: none  Plan for overcoming my barriers: N/A  Confidence: 10/10  Anticipated Goal Completion

## 2023-11-15 ENCOUNTER — CARE COORDINATION (OUTPATIENT)
Facility: CLINIC | Age: 63
End: 2023-11-15

## 2023-11-15 NOTE — CARE COORDINATION
Ambulatory Care Coordination Note  11/15/2023    Patient Current Location:  [place]    ACM contacted the family by telephone. Verified name and  with family as identifiers. Provided introduction to self, and explanation of the ACM role. ACM: Jennifer Figueroa RN    Challenges to be reviewed by the provider   Additional needs identified to be addressed with provider: Yes  none               Method of communication with provider: none. Hx of Gi bleed, OA, Asthma, BPH, COPD, CAD, DM2, neuropathy, HLD, HTN, ROJAS, CARLOTA on BiPAP, A-fib on chronic AC, Splenomegaly  CCM rec by PCP after recent admission for Acute resp failure. SNF visit after. Pt called to verify wound care was set up. Relayed that order has been sent and to call me if he doesn't hear anything from them by Friday. Pt stated understanding. No other questions/issues at this time. Offered patient enrollment in the Remote Patient Monitoring (RPM) program for in-home monitoring: Patient is not eligible for RPM program.     Goals        Conditions and Symptoms      I will schedule office visits, as directed by my provider. I will keep my appointment or reschedule if I have to cancel. I will notify my provider of any barriers to my plan of care. I will notify my provider of any symptoms that indicate a worsening of my condition. Barriers: none  Plan for overcoming my barriers: N/A  Confidence: 10/10  Anticipated Goal Completion Date: 23         Medication Management      I will take my medication as directed. I will notify my provider of any problems with medications, like adverse effects or side effects. I will notify my provider/Care Coordinator if I am unable to afford my medications. I will notify my provider for advice before I stop taking any of my medication.     Barriers: none  Plan for overcoming my barriers: N/A  Confidence: 10/10  Anticipated Goal Completion Date: 23       Reduce Risk of Hospitalization      I

## 2023-11-16 ENCOUNTER — CARE COORDINATION (OUTPATIENT)
Facility: CLINIC | Age: 63
End: 2023-11-16

## 2023-11-16 NOTE — TELEPHONE ENCOUNTER
Pt is still in [address] [place] [address] - before he leaves on Saturday 11/18  he wants to get wound care  Friday 11/17   Please send order to [facility]   Fax# [phone]

## 2023-11-16 NOTE — CARE COORDINATION
Completion Date: 12/27/23       Reduce Risk of Hospitalization      I will take appropriate steps to reduce my risk of Hospitalization and ED visits to include: Take all of medications as prescribed  Visit w/ all of my recommended specialists. Visit w/ my PCP as recommended. Avoid activities that can trigger my chronic conditions.     Barriers: none  Plan for overcoming my barriers: N/A  Confidence: 10/10  Anticipated Goal Completion Date: 12/27/23                    Future Appointments   Date Time Provider [address]   11/22/2023 10:00 AM Jeffery Awan MD [facility] BS AMB   12/29/2023 10:00 AM Kavya Gloria MD [facility] BS AMB

## 2023-11-20 ENCOUNTER — TELEPHONE (OUTPATIENT)
Age: 63
End: 2023-11-20

## 2023-11-20 ENCOUNTER — CARE COORDINATION (OUTPATIENT)
Facility: CLINIC | Age: 63
End: 2023-11-20

## 2023-11-20 NOTE — TELEPHONE ENCOUNTER
Bernice Tabares called to schedule patient a follow up. No availability until Jan. Please advise. Call patient to sofia.

## 2023-11-20 NOTE — CARE COORDINATION
Ambulatory Care Coordination Note  2023    Patient Current Location:  [place]    ACM contacted the family by telephone. Verified name and  with family as identifiers. Provided introduction to self, and explanation of the ACM role. ACM: Scott Pope RN    Challenges to be reviewed by the provider   Additional needs identified to be addressed with provider: Yes  none               Method of communication with provider: none. Hx of Gi bleed, OA, Asthma, BPH, COPD, CAD, DM2, neuropathy, HLD, HTN, ROJAS, CARLOTA on BiPAP, A-fib on chronic AC, Splenomegaly  CCM rec by PCP after recent admission for Acute resp failure. SNF visit after. Pt states has not heard from [facility] care clinic. Called and they state they have not received referral. Notified PCP's office to resend. Verified Fax number as [phone]  Also assisted w/ scheduling next Pcp visit. No other questions/issues at this time. Offered patient enrollment in the Remote Patient Monitoring (RPM) program for in-home monitoring: Patient is not eligible for RPM program.     Goals        Conditions and Symptoms      I will schedule office visits, as directed by my provider. I will keep my appointment or reschedule if I have to cancel. I will notify my provider of any barriers to my plan of care. I will notify my provider of any symptoms that indicate a worsening of my condition. Barriers: none  Plan for overcoming my barriers: N/A  Confidence: 10/10  Anticipated Goal Completion Date: 23         Medication Management      I will take my medication as directed. I will notify my provider of any problems with medications, like adverse effects or side effects. I will notify my provider/Care Coordinator if I am unable to afford my medications. I will notify my provider for advice before I stop taking any of my medication.     Barriers: none  Plan for overcoming my barriers: N/A  Confidence: 10/10  Anticipated Goal Completion

## 2023-11-22 ENCOUNTER — OFFICE VISIT (OUTPATIENT)
Age: 63
End: 2023-11-22
Payer: COMMERCIAL

## 2023-11-22 VITALS
WEIGHT: 223 LBS | RESPIRATION RATE: 17 BRPM | TEMPERATURE: 98.1 F | HEART RATE: 86 BPM | OXYGEN SATURATION: 99 % | HEIGHT: 71 IN | DIASTOLIC BLOOD PRESSURE: 68 MMHG | SYSTOLIC BLOOD PRESSURE: 139 MMHG | BODY MASS INDEX: 31.22 KG/M2

## 2023-11-22 DIAGNOSIS — K86.1 CHRONIC PANCREATITIS, UNSPECIFIED PANCREATITIS TYPE (HCC): ICD-10-CM

## 2023-11-22 DIAGNOSIS — Z23 NEEDS FLU SHOT: ICD-10-CM

## 2023-11-22 DIAGNOSIS — J44.9 COPD, GROUP B, BY GOLD 2017 CLASSIFICATION (HCC): Primary | ICD-10-CM

## 2023-11-22 DIAGNOSIS — I48.19 PERSISTENT ATRIAL FIBRILLATION (HCC): ICD-10-CM

## 2023-11-22 DIAGNOSIS — G47.33 OSA TREATED WITH BIPAP: ICD-10-CM

## 2023-11-22 PROCEDURE — 99214 OFFICE O/P EST MOD 30 MIN: CPT | Performed by: INTERNAL MEDICINE

## 2023-11-22 PROCEDURE — 3078F DIAST BP <80 MM HG: CPT | Performed by: INTERNAL MEDICINE

## 2023-11-22 PROCEDURE — 3075F SYST BP GE 130 - 139MM HG: CPT | Performed by: INTERNAL MEDICINE

## 2023-11-22 PROCEDURE — 90674 CCIIV4 VAC NO PRSV 0.5 ML IM: CPT | Performed by: INTERNAL MEDICINE

## 2023-11-22 PROCEDURE — 90471 IMMUNIZATION ADMIN: CPT | Performed by: INTERNAL MEDICINE

## 2023-11-22 NOTE — PROGRESS NOTES
Susie Ramarachelsoco presents today for   Chief Complaint   Patient presents with    Follow-up    Asthma    COPD       Is someone accompanying this pt? No    Is the patient using any DME equipment during OV? No    -[employer]    Depression Screenin/12/2023     9:22 AM   PHQ-9 Questionaire   Little interest or pleasure in doing things 0   Feeling down, depressed, or hopeless 0   PHQ-9 Total Score 0       Learning Needs Questionnaire:     No question data found. Fall Risk:         2023     9:22 AM   Fall Risk   2 or more falls in past year? no   Fall with injury in past year? no        Abuse Screening:          No data to display                  Coordination of Care:    1. Have you been to the ER, urgent care clinic since your last visit? Hospitalized since your last visit? No    2. Have you seen or consulted any other health care providers outside of the [address] [place] [address] since your last visit? Include any pap smears or colon screening. No    Medication list has been update per patient.
[facility] diagnosed with -acute congestive heart failure with preserved ejection fraction  -CXR: Pulmonary vascular congestion  -NT proBNP 8796  Rhabdomyolysis  Type II NSTEMI-hs Troponin: 1909-> 1592.-8/4/2023 echocardiogram: Ejection fraction 50%, indeterminate diastolic function in setting of A-fib, mild LVH, mild RV dilation with mildly reduced RV function, trace pericardial effusion, unable to estimate RVSP  He was discharged to [facility] skilled nursing facility. Last PFTs with FEV1 of 56% predicted, FEF 25-75 percentage is 36% predicted   Bronchodilator response noted     Active Ambulatory Problems     Diagnosis Date Noted    Benign prostatic hyperplasia 06/09/2017    Nephrolithiasis 06/09/2017    Diabetic polyneuropathy associated with type 2 diabetes mellitus ([address]) 10/01/2018    Essential hypertension 12/07/2016    Tubular adenoma of colon 06/09/2017    ROJAS (nonalcoholic steatohepatitis) 06/09/2017    History of GI bleed 12/10/2018    Symptomatic anemia 12/10/2018    Acid reflux     CARLOTA treated with BiPAP     Lumbar disc disease 11/08/2017    Type 2 diabetes with nephropathy ([address]) 06/07/2019    COPD, group B, by GOLD 2017 classification ([address]) 06/09/2017    Renal cyst 06/09/2017    Persistent atrial fibrillation ([address]) 05/09/2016    Dyslipidemia 03/24/2016    Microalbuminuria 06/09/2017    Severe obesity (BMI 35.0-39. 9) with comorbidity ([address]) 04/25/2018    Diverticulosis 06/09/2017    Chronic anticoagulation 12/10/2018    Splenomegaly 11/08/2017    Chronic pancreatitis ([address]) 11/01/2023    Fatty (change of) liver, not elsewhere classified 11/01/2023     Resolved Ambulatory Problems     Diagnosis Date Noted    Spondylolisthesis of lumbar region 06/11/2018    CMC (carpometacarpal joint) dislocation 12/03/2018    Mucous cyst of finger 03/14/2017    Osteoarthrosis, unspecified whether generalized or localized, lower leg 12/15/2014    Encephalopathy acute 08/04/2023     Past Medical History:   Diagnosis Date

## 2023-11-27 ENCOUNTER — OFFICE VISIT (OUTPATIENT)
Age: 63
End: 2023-11-27
Payer: COMMERCIAL

## 2023-11-27 VITALS
RESPIRATION RATE: 16 BRPM | HEART RATE: 66 BPM | TEMPERATURE: 98 F | SYSTOLIC BLOOD PRESSURE: 144 MMHG | DIASTOLIC BLOOD PRESSURE: 74 MMHG | BODY MASS INDEX: 31.22 KG/M2 | HEIGHT: 71 IN | OXYGEN SATURATION: 98 % | WEIGHT: 223 LBS

## 2023-11-27 DIAGNOSIS — D64.9 ANEMIA, UNSPECIFIED TYPE: ICD-10-CM

## 2023-11-27 DIAGNOSIS — G62.9 NEUROPATHY: ICD-10-CM

## 2023-11-27 DIAGNOSIS — E11.21 TYPE 2 DIABETES MELLITUS WITH DIABETIC NEPHROPATHY, WITH LONG-TERM CURRENT USE OF INSULIN (HCC): Primary | ICD-10-CM

## 2023-11-27 DIAGNOSIS — S81.802S WOUND OF LEFT LEG, SEQUELA: ICD-10-CM

## 2023-11-27 DIAGNOSIS — Z79.4 TYPE 2 DIABETES MELLITUS WITH DIABETIC NEPHROPATHY, WITH LONG-TERM CURRENT USE OF INSULIN (HCC): Primary | ICD-10-CM

## 2023-11-27 DIAGNOSIS — L65.0 TELOGEN EFFLUVIUM: ICD-10-CM

## 2023-11-27 DIAGNOSIS — K86.1 CHRONIC PANCREATITIS, UNSPECIFIED PANCREATITIS TYPE (HCC): ICD-10-CM

## 2023-11-27 DIAGNOSIS — J42 CHRONIC BRONCHITIS, UNSPECIFIED CHRONIC BRONCHITIS TYPE (HCC): ICD-10-CM

## 2023-11-27 PROCEDURE — 3044F HG A1C LEVEL LT 7.0%: CPT | Performed by: INTERNAL MEDICINE

## 2023-11-27 PROCEDURE — 3078F DIAST BP <80 MM HG: CPT | Performed by: INTERNAL MEDICINE

## 2023-11-27 PROCEDURE — 3077F SYST BP >= 140 MM HG: CPT | Performed by: INTERNAL MEDICINE

## 2023-11-27 PROCEDURE — 99214 OFFICE O/P EST MOD 30 MIN: CPT | Performed by: INTERNAL MEDICINE

## 2023-11-27 ASSESSMENT — ENCOUNTER SYMPTOMS
EYE PAIN: 0
ABDOMINAL PAIN: 0
BLOOD IN STOOL: 0
SORE THROAT: 0
COUGH: 0
BACK PAIN: 1
SHORTNESS OF BREATH: 1
ANAL BLEEDING: 0
ROS SKIN COMMENTS: SEE HPI

## 2023-11-27 ASSESSMENT — PATIENT HEALTH QUESTIONNAIRE - PHQ9
SUM OF ALL RESPONSES TO PHQ QUESTIONS 1-9: 0
2. FEELING DOWN, DEPRESSED OR HOPELESS: 0
SUM OF ALL RESPONSES TO PHQ9 QUESTIONS 1 & 2: 0
SUM OF ALL RESPONSES TO PHQ QUESTIONS 1-9: 0
1. LITTLE INTEREST OR PLEASURE IN DOING THINGS: 0

## 2023-11-27 NOTE — PATIENT INSTRUCTIONS
Learning About Tests When You Have Diabetes  Why do you need regular tests? Diabetes can lead to other health problems if it's not well managed. You'll need tests to monitor how well your diabetes is managed and to check for other things like high cholesterol or kidney problems. Having tests on a regular schedule can help your doctor find problems early, when it's best to start treating them. What tests do you need? These are the tests you may need and how often you should have them. The tests may vary depending on whether you have type 1 or type 2 diabetes. A1c blood test.  This test shows the average level of blood sugar over the past 2 to 3 months. It helps your doctor see whether blood sugar levels have been staying within your target range. How often: Every 3 to 6 months  Goal: A blood sugar level in your target range  Blood pressure test.  This test measures the pressure of blood flow in the arteries. Controlling blood pressure can help prevent damage to nerves and blood vessels. How often: Every 3 to 6 months  Goal: A blood pressure level in your target range  Cholesterol test.  This test measures the amount of a type of fat in the blood. It is common for people with diabetes to also have high cholesterol. Too much cholesterol in the blood can build up inside the blood vessels and raise the risk for heart attack and stroke. How often: At the time of your diabetes diagnosis, and as often as your doctor recommends after that  Goal: A cholesterol level in your target range  Albumin-creatinine ratio test.  This test checks for kidney damage by looking for the protein albumin (say \"al-BYOO-tammi\") in the urine. Albumin is normally found in the blood. Kidney damage can let small amounts of it (microalbumin) leak into the urine. How often: Once a year  Goal: No protein in the urine  Blood creatinine test/estimated glomerular filtration (eGFR).   The blood creatinine (say \"pdxu-YV-na-neen\") level shows

## 2023-11-27 NOTE — PROGRESS NOTES
INTERNISTS OF [facility]:  11/27/2023, MRN: [id number]      Ava Baker is a 58 y.o. male and presents to clinic for Diabetes and Follow-up (Pt reports he has a little back pain )      Subjective: The patient is a 58-year-old male with history of renal cyst and BPH (followed by urology team), COPD from secondhand smoke exposure for several yrs, tubular adenomatous colon polyp and June 2016, NAFLD, diverticulosis, right inferior cuff tendinitis, nephrolithiasis, hypertension, atrial fibrillation, lumbar disc disease, splenomegaly, GI bleed (2018, while on xarelto; he required PRBCs), s/p partial gastrectomy for removal of a benign gastric mass, chronic gastritis, type 2 diabetes, hyperlipidemia, IBS, osteoarthritis, obesity, recurrent pes anserinus bursitis involving the right total knee, obstructive sleep apnea (on BiPAP), and GERD. 1.  Lower extremity wounds: Today he reports: he is scheduled with outpt Wound Care next month. He is doing his own wound care. Part of his wound care includes use of topical collagenase. He has pain with bumping these areas. Otherwise he has no pain due to his BLE neuropathy. 2.  Type 2 diabetes: Treated with metformin 1000 mg twice daily and a low-carb diet. He is also on Actos 15 mg daily. He rarely has hypoglycemia. He stopped his insulin and has not had any hypoglycemia. 3.  Diabetic neuropathy and Lower Back Pain: Treated with gabapentin 300mg tid. He has lower back pain. Radiation of lower back pain: none that he can feel. He has a history of lumbar disc disease. Pain is well controlled. +He admits to eating mashed potatoes. His A1C is 7.1 per the last 90 days of measurements. His Bgs are worsening. His blood sugar readings since returning home from his son's home, are quite high. His blood glucose in our office is in the 90s range. In the last 3 days, he has only been in the target range in the 20s range per review of his CGM measurements today.     4.  Anemia:

## 2023-12-06 ENCOUNTER — TELEPHONE (OUTPATIENT)
Age: 63
End: 2023-12-06

## 2023-12-06 ENCOUNTER — CARE COORDINATION (OUTPATIENT)
Facility: CLINIC | Age: 63
End: 2023-12-06

## 2023-12-06 DIAGNOSIS — J44.9 COPD, GROUP B, BY GOLD 2017 CLASSIFICATION (HCC): Primary | ICD-10-CM

## 2023-12-06 NOTE — TELEPHONE ENCOUNTER
Pt would like to speak with nurse in regards to getting supplies for nebulizer. Please advise [phone]

## 2023-12-11 ENCOUNTER — CARE COORDINATION (OUTPATIENT)
Facility: CLINIC | Age: 63
End: 2023-12-11

## 2023-12-11 NOTE — CARE COORDINATION
Ambulatory Care Coordination Note  2023    Patient Current Location:  [place]    ACM contacted the family by telephone. Verified name and  with family as identifiers. Provided introduction to self, and explanation of the ACM role. ACM: Madhu Kelly RN    Challenges to be reviewed by the provider   Additional needs identified to be addressed with provider: Yes  none               Method of communication with provider: none. Hx of Gi bleed, OA, Asthma, BPH, COPD, CAD, DM2, neuropathy, HLD, HTN, ROJAS, CARLOTA on BiPAP, A-fib on chronic AC, Splenomegaly  CCM rec by PCP after recent admission for Acute resp failure. SNF visit after. Reports doing well. No other questions/issues at this time. Offered patient enrollment in the Remote Patient Monitoring (RPM) program for in-home monitoring: Patient is not eligible for RPM program.     Goals        Conditions and Symptoms      I will schedule office visits, as directed by my provider. I will keep my appointment or reschedule if I have to cancel. I will notify my provider of any barriers to my plan of care. I will notify my provider of any symptoms that indicate a worsening of my condition. Barriers: none  Plan for overcoming my barriers: N/A  Confidence: 10/10  Anticipated Goal Completion Date: 23         Medication Management      I will take my medication as directed. I will notify my provider of any problems with medications, like adverse effects or side effects. I will notify my provider/Care Coordinator if I am unable to afford my medications. I will notify my provider for advice before I stop taking any of my medication. Barriers: none  Plan for overcoming my barriers: N/A  Confidence: 10/10  Anticipated Goal Completion Date: 23       Reduce Risk of Hospitalization      I will take appropriate steps to reduce my risk of Hospitalization and ED visits to include:   Take all of medications as prescribed  Visit w/ all of

## 2023-12-18 ENCOUNTER — TELEPHONE (OUTPATIENT)
Age: 63
End: 2023-12-18

## 2024-01-05 ENCOUNTER — CARE COORDINATION (OUTPATIENT)
Facility: CLINIC | Age: 64
End: 2024-01-05

## 2024-01-05 NOTE — CARE COORDINATION
Patient has graduated from the Complex Care program on 1/5/24.  Patient/family has the ability to self-manage at this time.  Care management goals have been completed. No further Ambulatory Care Manager follow up scheduled.     Goals Addressed                   This Visit's Progress     COMPLETED: Conditions and Symptoms        I will schedule office visits, as directed by my provider.  I will keep my appointment or reschedule if I have to cancel.  I will notify my provider of any barriers to my plan of care.  I will notify my provider of any symptoms that indicate a worsening of my condition.    Barriers: none  Plan for overcoming my barriers: N/A  Confidence: 10/10  Anticipated Goal Completion Date: 12/27/23         COMPLETED: Medication Management        I will take my medication as directed.  I will notify my provider of any problems with medications, like adverse effects or side effects.  I will notify my provider/Care Coordinator if I am unable to afford my medications.  I will notify my provider for advice before I stop taking any of my medication.    Barriers: none  Plan for overcoming my barriers: N/A  Confidence: 10/10  Anticipated Goal Completion Date: 12/27/23       COMPLETED: Reduce Risk of Hospitalization        I will take appropriate steps to reduce my risk of Hospitalization and ED visits to include:  Take all of medications as prescribed  Visit w/ all of my recommended specialists.  Visit w/ my PCP as recommended.  Avoid activities that can trigger my chronic conditions.    Barriers: none  Plan for overcoming my barriers: N/A  Confidence: 10/10  Anticipated Goal Completion Date: 12/27/23                Patient has Ambulatory Care Manager's contact information for any further questions, concerns, or needs.  Patients upcoming visits:    Future Appointments   Date Time Provider Department Center   1/11/2024  9:45 AM [facility] 1 [facility]   1/22/2024  9:00 AM Fuad Jane [facility] IO BS AMB

## 2024-01-09 ENCOUNTER — TELEPHONE (OUTPATIENT)
Age: 64
End: 2024-01-09

## 2024-01-09 NOTE — TELEPHONE ENCOUNTER
Call received from [facility].  Patient states that scrotum has swollen up to size of grapefruit.  This has happened the last day or two, doesn't remember injuring the area.  Painful when he bumps it.  Please advise.

## 2024-01-10 ENCOUNTER — CARE COORDINATION (OUTPATIENT)
Facility: CLINIC | Age: 64
End: 2024-01-10

## 2024-01-10 NOTE — CARE COORDINATION
Ambulatory Care Coordination Note  1/10/2024    Patient Current Location:  [place]    ACM contacted the patient by telephone. Verified name and  with patient as identifiers. Provided introduction to self, and explanation of the ACM role.     ACM: Gregory Aguilar RN    Challenges to be reviewed by the provider   Additional needs identified to be addressed with provider: Yes  Pt w/ recent testicular swelling. States has mostly resolved but still some discomfort. Did not go to ED as recommended.  Needs new referral to [facility] [place].  Fax number passed to office.               Method of communication with provider: none.    Hx of Gi bleed, OA, Asthma, BPH, COPD, CAD, DM2, neuropathy, HLD, HTN, ROJAS, CARLOTA on BiPAP, A-fib on chronic AC, Splenomegaly  CCM rec by PCP.  Pt as noted above in yellow box.  Request forwarded to PCP's office.  States doing ok otherwise.  No other questions/issues at this time.    Offered patient enrollment in the Remote Patient Monitoring (RPM) program for in-home monitoring: Patient is not eligible for RPM program.    Ambulatory Care Coordination Assessment    Care Coordination Protocol  Referral from Primary Care Provider: No  Week 1 - Initial Assessment     Do you have all of your prescriptions and are they filled?: Yes  Barriers to medication adherence: None  Are you able to afford your medications?: Yes  How often do you have trouble taking your medications the way you have been told to take them?: I always take them as prescribed.     Do you have Home O2 Therapy?: No      Ability to seek help/take action for Emergent Urgent situations i.e. fire, crime, inclement weather or health crisis.: Independent  Ability to ambulate to restroom: Independent  Ability handle personal hygeine needs (bathing/dressing/grooming): Independent  Ability to manage Medications: Independent  Ability to prepare Food Preparation: Independent  Ability to maintain home (clean home, laundry): Needs

## 2024-01-10 NOTE — TELEPHONE ENCOUNTER
Elder nurse navigator from [facility] called and stated that patient did not go to Er, but he is doing a little better. Patient mention that he would like a nee referral for urology.Please Advise    [facility] [place] fax number [phone]

## 2024-01-14 RX ORDER — INSULIN DEGLUDEC INJECTION 100 U/ML
INJECTION, SOLUTION SUBCUTANEOUS
Qty: 15 ML | Refills: 3 | Status: SHIPPED | OUTPATIENT
Start: 2024-01-14 | End: 2024-02-01 | Stop reason: ALTCHOICE

## 2024-01-14 RX ORDER — ROSUVASTATIN CALCIUM 40 MG/1
40 TABLET, COATED ORAL NIGHTLY
Qty: 90 TABLET | Refills: 1 | Status: SHIPPED | OUTPATIENT
Start: 2024-01-14

## 2024-01-15 DIAGNOSIS — N50.89 MASS OF SCROTUM: Primary | ICD-10-CM

## 2024-01-15 PROBLEM — I50.9 HEART FAILURE (HCC): Status: ACTIVE | Noted: 2024-01-15

## 2024-01-21 PROBLEM — I50.33 ACUTE ON CHRONIC DIASTOLIC HEART FAILURE (HCC): Status: ACTIVE | Noted: 2024-01-15

## 2024-01-22 PROBLEM — N18.32 STAGE 3B CHRONIC KIDNEY DISEASE (HCC): Status: ACTIVE | Noted: 2024-01-22

## 2024-01-26 ENCOUNTER — CARE COORDINATION (OUTPATIENT)
Facility: CLINIC | Age: 64
End: 2024-01-26

## 2024-01-26 NOTE — CARE COORDINATION
Ambulatory Care Coordination Note  2024    Patient Current Location:  [place]    ACM contacted the patient by telephone. Verified name and  with patient as identifiers. Provided introduction to self, and explanation of the ACM role.     ACM: Gregory Aguilar RN    Challenges to be reviewed by the provider   Additional needs identified to be addressed with provider: no                 Method of communication with provider: none.    Hx of Gi bleed, OA, Asthma, BPH, COPD, CAD, DM2, neuropathy, HLD, HTN, ROJAS, CARLOTA on BiPAP, A-fib on chronic AC, Splenomegaly  CCM rec by PCP.  Recent admission for CHF fluid over load  Assisted w/ scheduling sooner Card appt.  States doing ok otherwise.  No other questions/issues at this time.    Offered patient enrollment in the Remote Patient Monitoring (RPM) program for in-home monitoring: Patient is not eligible for RPM program.    Future Appointments   Date Time Provider Department Center   2024 11:40 AM Blanca Alvarado MD [facility] BS AMB   3/1/2024 10:00 AM Roland Campo MD [facility] BS AMB

## 2024-01-29 NOTE — PROGRESS NOTES
Vicenta Fontana presents today for a post-hospital follow-up of acute on chronic CHF.  He was hospitalized from 1/15/24 through 1/24/24 at [facility] for acute on chronic diastolic heart failure and persistent AFIB. He presented with complaints of progressive dyspnea, orthopnea, and severe lower extremity edema and scrotal edema (anasarca).  His NT pro-BNP was 665.  Chest x-ray showed pulmonary vascular congestion and bilateral pleural effusions.  He was started on IV diuretics which was transitioned to IV amantadine and diuresed about 21 kg.  An echo was done and it showed an EF of 43%.  His amlodipine and Actos were discontinued due to them possibly contributing to the edema.  Upon discharge, he was sent home on bumetanide 1mg BID, spironolactone, Elquis, metoprolol 75mg daily.   He states that he recently saw his PCP, Dr. Alvarado, and she started him on Jardiance which he has not started yet due to the pharmacy not having it in stock. However, he will be starting the medication soon.    He states that he is feeling much better since discharged.  His weight is down over 40 pounds when compared to pre-admission.  His shortness of breath is minimal if any, denies orthopnea, PND, chest pain.  Admits to occasional palpitations but no rapid heart beats.    He is a 63 year old male with history of persistent atrial fibrillation (2016), long-standing diabetes mellitus, type II, dyslipidemia, and hypertension.  He also has a history of fairly severe obstructive sleep apnea requiring BiPAP.  He underwent a cardiac catheterization in 2010, to evaluate symptoms of chest pain, but was found to have mild nonobstructive coronary artery disease.    In 2016, his echocardiogram was unremarkable, showing preserved LV systolic function and no significant valvular heart disease.  His Holter monitor showed a rate-controlled atrial fibrillation with an average heart rate in the 90s without any prolonged tachyarrhythmias or bradyarrhythmias.    The

## 2024-01-30 ENCOUNTER — CARE COORDINATION (OUTPATIENT)
Facility: CLINIC | Age: 64
End: 2024-01-30

## 2024-01-30 SDOH — HEALTH STABILITY: PHYSICAL HEALTH: ON AVERAGE, HOW MANY MINUTES DO YOU ENGAGE IN EXERCISE AT THIS LEVEL?: 0 MIN

## 2024-01-30 SDOH — HEALTH STABILITY: PHYSICAL HEALTH: ON AVERAGE, HOW MANY DAYS PER WEEK DO YOU ENGAGE IN MODERATE TO STRENUOUS EXERCISE (LIKE A BRISK WALK)?: 0 DAYS

## 2024-01-30 ASSESSMENT — SOCIAL DETERMINANTS OF HEALTH (SDOH)
WITHIN THE LAST YEAR, HAVE YOU BEEN HUMILIATED OR EMOTIONALLY ABUSED IN OTHER WAYS BY YOUR PARTNER OR EX-PARTNER?: NO
HOW OFTEN DO YOU GET TOGETHER WITH FRIENDS OR RELATIVES?: ONCE A WEEK
IN A TYPICAL WEEK, HOW MANY TIMES DO YOU TALK ON THE PHONE WITH FAMILY, FRIENDS, OR NEIGHBORS?: TWICE A WEEK
WITHIN THE LAST YEAR, HAVE YOU BEEN KICKED, HIT, SLAPPED, OR OTHERWISE PHYSICALLY HURT BY YOUR PARTNER OR EX-PARTNER?: NO
HOW OFTEN DO YOU ATTEND CHURCH OR RELIGIOUS SERVICES?: NEVER
HOW OFTEN DO YOU GET TOGETHER WITH FRIENDS OR RELATIVES?: ONCE A WEEK
WITHIN THE LAST YEAR, HAVE YOU BEEN AFRAID OF YOUR PARTNER OR EX-PARTNER?: NO
HOW OFTEN DO YOU ATTENT MEETINGS OF THE CLUB OR ORGANIZATION YOU BELONG TO?: NEVER
HOW OFTEN DO YOU ATTENT MEETINGS OF THE CLUB OR ORGANIZATION YOU BELONG TO?: NEVER
DO YOU BELONG TO ANY CLUBS OR ORGANIZATIONS SUCH AS CHURCH GROUPS UNIONS, FRATERNAL OR ATHLETIC GROUPS, OR SCHOOL GROUPS?: YES
DO YOU BELONG TO ANY CLUBS OR ORGANIZATIONS SUCH AS CHURCH GROUPS UNIONS, FRATERNAL OR ATHLETIC GROUPS, OR SCHOOL GROUPS?: NO
WITHIN THE LAST YEAR, HAVE TO BEEN RAPED OR FORCED TO HAVE ANY KIND OF SEXUAL ACTIVITY BY YOUR PARTNER OR EX-PARTNER?: NO
HOW OFTEN DO YOU ATTEND CHURCH OR RELIGIOUS SERVICES?: NEVER
IN A TYPICAL WEEK, HOW MANY TIMES DO YOU TALK ON THE PHONE WITH FAMILY, FRIENDS, OR NEIGHBORS?: THREE TIMES A WEEK

## 2024-01-30 ASSESSMENT — LIFESTYLE VARIABLES
HOW MANY STANDARD DRINKS CONTAINING ALCOHOL DO YOU HAVE ON A TYPICAL DAY: PATIENT DOES NOT DRINK
HOW OFTEN DO YOU HAVE A DRINK CONTAINING ALCOHOL: NEVER

## 2024-01-30 NOTE — CARE COORDINATION
Ambulatory Care Coordination Note  2024    Patient Current Location:  [place]    ACM contacted the patient by telephone. Verified name and  with patient as identifiers. Provided introduction to self, and explanation of the ACM role.     ACM: Gregory Aguilar RN    Challenges to be reviewed by the provider   Additional needs identified to be addressed with provider: no                 Method of communication with provider: none.    Hx of Gi bleed, OA, Asthma, BPH, COPD, CAD, DM2, neuropathy, HLD, HTN, ROJAS, CARLOTA on BiPAP, A-fib on chronic AC, Splenomegaly  CCM rec by PCP.  Recent admission for CHF fluid over load  Pt called to verify upcoming appts.  States doing ok otherwise.  No other questions/issues at this time.    Offered patient enrollment in the Remote Patient Monitoring (RPM) program for in-home monitoring: Patient is not eligible for RPM program.    Future Appointments   Date Time Provider Department Center   2024 11:20 AM Balnca Alvarado MD [facility] BS AMB   2024  9:00 AM Merry Prajapati APRN - BAN [facility] BS AMB   3/1/2024 10:00 AM Roland Campo MD [facility] BS AMB

## 2024-02-01 ENCOUNTER — OFFICE VISIT (OUTPATIENT)
Age: 64
End: 2024-02-01

## 2024-02-01 VITALS
WEIGHT: 214 LBS | TEMPERATURE: 98 F | SYSTOLIC BLOOD PRESSURE: 123 MMHG | DIASTOLIC BLOOD PRESSURE: 75 MMHG | BODY MASS INDEX: 30.64 KG/M2 | HEIGHT: 70 IN | HEART RATE: 60 BPM | OXYGEN SATURATION: 98 %

## 2024-02-01 DIAGNOSIS — L30.4 INTERTRIGO: ICD-10-CM

## 2024-02-01 DIAGNOSIS — N17.9 AKI (ACUTE KIDNEY INJURY) (HCC): Primary | ICD-10-CM

## 2024-02-01 DIAGNOSIS — K75.81 NASH (NONALCOHOLIC STEATOHEPATITIS): ICD-10-CM

## 2024-02-01 DIAGNOSIS — I50.9 ACUTE CONGESTIVE HEART FAILURE, UNSPECIFIED HEART FAILURE TYPE (HCC): ICD-10-CM

## 2024-02-01 DIAGNOSIS — R18.8 OTHER ASCITES: ICD-10-CM

## 2024-02-01 DIAGNOSIS — Z79.4 TYPE 2 DIABETES MELLITUS WITH DIABETIC NEPHROPATHY, WITH LONG-TERM CURRENT USE OF INSULIN (HCC): ICD-10-CM

## 2024-02-01 DIAGNOSIS — E11.21 TYPE 2 DIABETES MELLITUS WITH DIABETIC NEPHROPATHY, WITH LONG-TERM CURRENT USE OF INSULIN (HCC): ICD-10-CM

## 2024-02-01 RX ORDER — NYSTATIN 100000 [USP'U]/G
POWDER TOPICAL
Qty: 60 G | Refills: 5 | Status: SHIPPED | OUTPATIENT
Start: 2024-02-01

## 2024-02-01 ASSESSMENT — PATIENT HEALTH QUESTIONNAIRE - PHQ9
SUM OF ALL RESPONSES TO PHQ QUESTIONS 1-9: 0
SUM OF ALL RESPONSES TO PHQ9 QUESTIONS 1 & 2: 0
SUM OF ALL RESPONSES TO PHQ QUESTIONS 1-9: 0
2. FEELING DOWN, DEPRESSED OR HOPELESS: 0
1. LITTLE INTEREST OR PLEASURE IN DOING THINGS: 0

## 2024-02-01 NOTE — PROGRESS NOTES
Vicenta Fontana presents today for   Chief Complaint   Patient presents with    Follow-up                 1. \"Have you been to the ER, urgent care clinic since your last visit?  Hospitalized since your last visit?\" yes    2. \"Have you seen or consulted any other health care providers outside of the [facility] since your last visit?\" Yes      3. For patients aged 45-75: Has the patient had a colonoscopy / FIT/ Cologuard? No      If the patient is female:    4. For patients aged 40-74: Has the patient had a mammogram within the past 2 years? NA - based on age or sex      5. For patients aged 21-65: Has the patient had a pap smear? NA - based on age or sex   
diabetes mellitus ([facility]) 10/01/2018    Severe obesity (BMI 35.0-39.9) with comorbidity ([facility]) 04/25/2018    Lumbar disc disease 11/08/2017    Splenomegaly 11/08/2017    Benign prostatic hyperplasia 06/09/2017    Nephrolithiasis 06/09/2017    Tubular adenoma of colon 06/09/2017    ROJAS (nonalcoholic steatohepatitis) 06/09/2017    COPD, group B, by GOLD 2017 classification ([facility]) 06/09/2017    Renal cyst 06/09/2017    Microalbuminuria 06/09/2017    Diverticulosis 06/09/2017    Essential hypertension 12/07/2016    Persistent atrial fibrillation ([facility]) 03/25/2016    Dyslipidemia 03/24/2016       Current Outpatient Medications   Medication Sig Dispense Refill    empagliflozin (JARDIANCE) 25 MG tablet Take 1 tablet by mouth daily 30 tablet 5    nystatin (MYCOSTATIN) 091569 UNIT/GM powder Apply 3 times daily prn along b/l inguinal creases 60 g 5    metoprolol succinate (TOPROL XL) 50 MG extended release tablet Take 1.5 tablets by mouth daily 45 tablet 12    bumetanide (BUMEX) 1 MG tablet Take 1 tablet by mouth 2 times daily 60 tablet 12    spironolactone (ALDACTONE) 25 MG tablet Take 1 tablet by mouth daily 30 tablet 12    rosuvastatin (CRESTOR) 40 MG tablet TAKE 1 TABLET BY MOUTH EVERY NIGHT 90 tablet 1    fenofibrate (TRICOR) 145 MG tablet Take 1 tablet by mouth daily 90 tablet 1    metFORMIN (GLUCOPHAGE) 1000 MG tablet Take 1 tablet by mouth 2 times daily (with meals) 180 tablet 1    montelukast (SINGULAIR) 10 MG tablet Take 1 tablet by mouth nightly 90 tablet 1    potassium chloride (KLOR-CON M) 20 MEQ extended release tablet Take 0.5 tablets by mouth 2 times daily 180 tablet 1    buPROPion (WELLBUTRIN SR) 150 MG extended release tablet Take 1 tablet by mouth daily 90 tablet 1    Ascorbic Acid (VITAMIN C) 250 MG tablet Take 1 tablet by mouth daily 90 tablet 1    gabapentin (NEURONTIN) 300 MG capsule Take 1 capsule by mouth 3 times daily for 180 days. Intended supply: 90 days Max Daily Amount: 900 mg 270 capsule 1

## 2024-02-05 ENCOUNTER — OFFICE VISIT (OUTPATIENT)
Age: 64
End: 2024-02-05
Payer: COMMERCIAL

## 2024-02-05 VITALS
SYSTOLIC BLOOD PRESSURE: 124 MMHG | OXYGEN SATURATION: 100 % | HEIGHT: 70 IN | DIASTOLIC BLOOD PRESSURE: 72 MMHG | WEIGHT: 221 LBS | HEART RATE: 62 BPM | BODY MASS INDEX: 31.64 KG/M2

## 2024-02-05 DIAGNOSIS — I10 ESSENTIAL HYPERTENSION: ICD-10-CM

## 2024-02-05 DIAGNOSIS — E11.21 TYPE 2 DIABETES WITH NEPHROPATHY (HCC): ICD-10-CM

## 2024-02-05 DIAGNOSIS — I50.32 CHRONIC DIASTOLIC CONGESTIVE HEART FAILURE (HCC): ICD-10-CM

## 2024-02-05 DIAGNOSIS — E78.5 DYSLIPIDEMIA: ICD-10-CM

## 2024-02-05 DIAGNOSIS — I48.19 ATRIAL FIBRILLATION, PERSISTENT (HCC): Primary | ICD-10-CM

## 2024-02-05 DIAGNOSIS — I48.19 OTHER PERSISTENT ATRIAL FIBRILLATION (HCC): ICD-10-CM

## 2024-02-05 PROCEDURE — 3078F DIAST BP <80 MM HG: CPT | Performed by: NURSE PRACTITIONER

## 2024-02-05 PROCEDURE — 99215 OFFICE O/P EST HI 40 MIN: CPT | Performed by: NURSE PRACTITIONER

## 2024-02-05 PROCEDURE — 3074F SYST BP LT 130 MM HG: CPT | Performed by: NURSE PRACTITIONER

## 2024-02-05 PROCEDURE — 93000 ELECTROCARDIOGRAM COMPLETE: CPT | Performed by: NURSE PRACTITIONER

## 2024-02-05 PROCEDURE — 3044F HG A1C LEVEL LT 7.0%: CPT | Performed by: NURSE PRACTITIONER

## 2024-02-05 RX ORDER — DAPAGLIFLOZIN 10 MG/1
10 TABLET, FILM COATED ORAL DAILY
COMMUNITY
Start: 2024-01-17 | End: 2024-02-05

## 2024-02-05 ASSESSMENT — PATIENT HEALTH QUESTIONNAIRE - PHQ9
SUM OF ALL RESPONSES TO PHQ QUESTIONS 1-9: 0
SUM OF ALL RESPONSES TO PHQ QUESTIONS 1-9: 0
SUM OF ALL RESPONSES TO PHQ9 QUESTIONS 1 & 2: 0
1. LITTLE INTEREST OR PLEASURE IN DOING THINGS: 0
2. FEELING DOWN, DEPRESSED OR HOPELESS: 0
SUM OF ALL RESPONSES TO PHQ QUESTIONS 1-9: 0
SUM OF ALL RESPONSES TO PHQ QUESTIONS 1-9: 0

## 2024-02-05 ASSESSMENT — ENCOUNTER SYMPTOMS
CONSTIPATION: 0
DIARRHEA: 0
WHEEZING: 0
ABDOMINAL DISTENTION: 0
CHEST TIGHTNESS: 0
VOMITING: 0
COUGH: 0
BLOOD IN STOOL: 0
NAUSEA: 0
SHORTNESS OF BREATH: 1

## 2024-02-05 NOTE — PROGRESS NOTES
Vicenta Fontana presents today for   Chief Complaint   Patient presents with    Follow-up     Acute on chronic diastolic heart disease       Vicenta Fontana preferred language for health care discussion is english/other.    Is someone accompanying this pt? no    Is the patient using any DME equipment during OV? no    Depression Screening:  Depression: Not at risk (2/5/2024)    PHQ-2     PHQ-2 Score: 0        Learning Assessment:  Who is the primary learner? Patient    What is the preferred language for health care of the primary learner? ENGLISH    How does the primary learner prefer to learn new concepts? DEMONSTRATION    Answered By patient    Relationship to Learner SELF           Pt currently taking Anticoagulant therapy? no    Pt currently taking Antiplatelet therapy ? eliquis      Coordination of Care:  1. Have you been to the ER, urgent care clinic since your last visit? Hospitalized since your last visit? no    2. Have you seen or consulted any other health care providers outside of the [facility] since your last visit? Include any pap smears or colon screening. no

## 2024-02-05 NOTE — PATIENT INSTRUCTIONS
Continue present medication regimen  Labs per Dr. Alvarado on 2/8/24  Follow-up with Dr Campo as scheduled and as needed  Weigh daily and record  Limit sodium intake to 1500-2000mg per day  Limit fluid intake to no more than 6, eight ounce glasses of any type of fluids per day (total of 48 ounces per day)  Call if you notice sudden or progressive weight gain (3-5 pounds in 2-3 days), increasing shortness of breath, abdominal bloating, increasing lower extremity edema, inability to lie flat or on your normal number of pillows, having to sit up to catch your breath, fatigue, increased somnolence (sleeping more), poor appetite

## 2024-02-07 ASSESSMENT — ENCOUNTER SYMPTOMS
COUGH: 0
BLOOD IN STOOL: 0
EYE PAIN: 0
ABDOMINAL PAIN: 0
SORE THROAT: 0
SHORTNESS OF BREATH: 1
ANAL BLEEDING: 0

## 2024-02-09 LAB
A/G RATIO: 1.9 RATIO (ref 1.1–2.6)
ALBUMIN SERPL-MCNC: 4.7 G/DL (ref 3.5–5)
ALP BLD-CCNC: 76 U/L (ref 40–125)
ALT SERPL-CCNC: 17 U/L (ref 5–40)
ANION GAP SERPL CALCULATED.3IONS-SCNC: 10 MMOL/L (ref 3–15)
AST SERPL-CCNC: 29 U/L (ref 10–37)
BILIRUB SERPL-MCNC: 0.4 MG/DL (ref 0.2–1.2)
BUN BLDV-MCNC: 58 MG/DL (ref 6–22)
CALCIUM SERPL-MCNC: 10.8 MG/DL (ref 8.4–10.5)
CHLORIDE BLD-SCNC: 102 MMOL/L (ref 98–110)
CO2: 28 MMOL/L (ref 20–32)
CREAT SERPL-MCNC: 1.6 MG/DL (ref 0.8–1.6)
GLOBULIN: 2.5 G/DL (ref 2–4)
GLOMERULAR FILTRATION RATE: 46.8 ML/MIN/1.73 SQ.M.
GLUCOSE: 116 MG/DL (ref 70–99)
POTASSIUM SERPL-SCNC: 5.1 MMOL/L (ref 3.5–5.5)
SODIUM BLD-SCNC: 140 MMOL/L (ref 133–145)
TOTAL PROTEIN: 7.2 G/DL (ref 6.2–8.1)

## 2024-02-13 DIAGNOSIS — J96.92 RESPIRATORY FAILURE WITH HYPERCAPNIA, UNSPECIFIED CHRONICITY (HCC): ICD-10-CM

## 2024-02-13 LAB
ALPHA 2-MACROGLOBULINS, QN: 218 MG/DL (ref 110–276)
ALT SERPL-CCNC: 16 IU/L (ref 0–55)
APOLIPOPROTEIN A-1: 147 MG/DL (ref 101–178)
AST SERPL-CCNC: 32 IU/L (ref 0–40)
BILIRUB SERPL-MCNC: 0.4 MG/DL (ref 0–1.2)
CHOLESTEROL, TOTAL  (NASH): 155 MG/DL (ref 100–199)
COMMENT: ABNORMAL
FIBROSIS SCORE (NASH): 0.47 (ref 0–0.21)
FIBROSIS SCORING  (NASH): ABNORMAL
FIBROSIS STAGE (NASH): ABNORMAL
GGT (NASH): 123 IU/L (ref 0–65)
HAPTOGLOBIN (NASH): 110 MG/DL (ref 32–363)
INTERPRETATIONS  (NASH): ABNORMAL
LIMITATIONS  (NASH): ABNORMAL
Lab: ABNORMAL
NASH - GLUCOSE, SERUM: 117 MG/DL (ref 70–99)
NASH - STEATOSIS GRADE: ABNORMAL
NASH - STEATOSIS GRADING: ABNORMAL
NASH - STEATOSIS SCORE: 0.51 (ref 0–0.4)
NASH GRADE: 0.63 (ref 0–0.25)
NASH GRADE: ABNORMAL
NASH SCORING: ABNORMAL
TRIGLYCERIDES (NASH): 97 MG/DL (ref 0–149)

## 2024-02-13 RX ORDER — TIOTROPIUM BROMIDE INHALATION SPRAY 3.12 UG/1
2 SPRAY, METERED RESPIRATORY (INHALATION) DAILY
Qty: 4 G | Refills: 5 | Status: SHIPPED | OUTPATIENT
Start: 2024-02-13

## 2024-02-15 ENCOUNTER — CARE COORDINATION (OUTPATIENT)
Facility: CLINIC | Age: 64
End: 2024-02-15

## 2024-02-15 NOTE — CARE COORDINATION
Ambulatory Care Coordination Note  2/15/2024    Patient Current Location:  [place]    ACM contacted the patient by telephone. Verified name and  with patient as identifiers. Provided introduction to self, and explanation of the ACM role.     ACM: Gregory Aguilar RN    Challenges to be reviewed by the provider   Additional needs identified to be addressed with provider: no                 Method of communication with provider: none.    Hx of Gi bleed, OA, Asthma, BPH, COPD, CAD, DM2, neuropathy, HLD, HTN, ROJAS, CARLOTA on BiPAP, A-fib on chronic AC, Splenomegaly  CCM rec by PCP.  Recent admission for CHF fluid over load  States doing ok.  No other questions/issues at this time.    Offered patient enrollment in the Remote Patient Monitoring (RPM) program for in-home monitoring: Patient is not eligible for RPM program.    Future Appointments   Date Time Provider Department Center   2024 12:00 PM Blanca Alvarado MD [facility] BS AMB   3/1/2024 10:00 AM Roland Campo MD [facility] BS AMB

## 2024-02-22 ENCOUNTER — HOSPITAL ENCOUNTER (OUTPATIENT)
Facility: HOSPITAL | Age: 64
Discharge: HOME | End: 2024-02-25

## 2024-02-22 LAB — SENTARA SPECIMEN COLLECTION: NORMAL

## 2024-02-28 ENCOUNTER — CARE COORDINATION (OUTPATIENT)
Facility: CLINIC | Age: 64
End: 2024-02-28

## 2024-02-28 NOTE — CARE COORDINATION
Ambulatory Care Coordination Note  2024    Patient Current Location:  [place]    ACM contacted the patient by telephone. Verified name and  with patient as identifiers. Provided introduction to self, and explanation of the ACM role.     ACM: Gregory Aguilar RN    Challenges to be reviewed by the provider   Additional needs identified to be addressed with provider: no                 Method of communication with provider: none.    Hx of Gi bleed, OA, Asthma, BPH, COPD, CAD, DM2, neuropathy, HLD, HTN, ROJAS, CARLOTA on BiPAP, A-fib on chronic AC, Splenomegaly  CCM rec by PCP.  Recent admission for CHF fluid over load  States doing ok.  No other questions/issues at this time.    Offered patient enrollment in the Remote Patient Monitoring (RPM) program for in-home monitoring: Patient is not eligible for RPM program.    Future Appointments   Date Time Provider Department Center   2024 12:00 PM Blanca Alvarado MD [facility] BS AMB   3/1/2024 10:00 AM Roland Campo MD [facility] BS AMB

## 2024-02-29 ENCOUNTER — OFFICE VISIT (OUTPATIENT)
Age: 64
End: 2024-02-29
Payer: COMMERCIAL

## 2024-02-29 VITALS
HEIGHT: 70 IN | SYSTOLIC BLOOD PRESSURE: 103 MMHG | HEART RATE: 63 BPM | WEIGHT: 227 LBS | TEMPERATURE: 98 F | OXYGEN SATURATION: 96 % | RESPIRATION RATE: 18 BRPM | BODY MASS INDEX: 32.5 KG/M2 | DIASTOLIC BLOOD PRESSURE: 62 MMHG

## 2024-02-29 DIAGNOSIS — E11.21 TYPE 2 DIABETES MELLITUS WITH DIABETIC NEPHROPATHY, WITH LONG-TERM CURRENT USE OF INSULIN (HCC): Primary | ICD-10-CM

## 2024-02-29 DIAGNOSIS — Z86.19 HISTORY OF TINEA CRURIS: ICD-10-CM

## 2024-02-29 DIAGNOSIS — Z79.4 TYPE 2 DIABETES MELLITUS WITH DIABETIC NEPHROPATHY, WITH LONG-TERM CURRENT USE OF INSULIN (HCC): Primary | ICD-10-CM

## 2024-02-29 DIAGNOSIS — K75.81 NASH (NONALCOHOLIC STEATOHEPATITIS): ICD-10-CM

## 2024-02-29 DIAGNOSIS — I50.9 CONGESTIVE HEART FAILURE, UNSPECIFIED HF CHRONICITY, UNSPECIFIED HEART FAILURE TYPE (HCC): ICD-10-CM

## 2024-02-29 PROCEDURE — 3044F HG A1C LEVEL LT 7.0%: CPT | Performed by: INTERNAL MEDICINE

## 2024-02-29 PROCEDURE — 3074F SYST BP LT 130 MM HG: CPT | Performed by: INTERNAL MEDICINE

## 2024-02-29 PROCEDURE — 3078F DIAST BP <80 MM HG: CPT | Performed by: INTERNAL MEDICINE

## 2024-02-29 PROCEDURE — 99214 OFFICE O/P EST MOD 30 MIN: CPT | Performed by: INTERNAL MEDICINE

## 2024-02-29 RX ORDER — PSEUDOEPHEDRINE HCL 30 MG
100 TABLET ORAL 2 TIMES DAILY PRN
Qty: 60 CAPSULE | Refills: 5 | Status: SHIPPED | OUTPATIENT
Start: 2024-02-29

## 2024-02-29 RX ORDER — INSULIN DEGLUDEC INJECTION 100 U/ML
34 INJECTION, SOLUTION SUBCUTANEOUS EVERY MORNING
Qty: 5 ADJUSTABLE DOSE PRE-FILLED PEN SYRINGE | Refills: 5 | Status: SHIPPED | OUTPATIENT
Start: 2024-02-29 | End: 2024-02-29

## 2024-02-29 RX ORDER — INSULIN DEGLUDEC INJECTION 100 U/ML
38 INJECTION, SOLUTION SUBCUTANEOUS EVERY MORNING
Qty: 5 ADJUSTABLE DOSE PRE-FILLED PEN SYRINGE | Refills: 5 | Status: SHIPPED | OUTPATIENT
Start: 2024-02-29

## 2024-02-29 NOTE — PATIENT INSTRUCTIONS
Learning About Tests When You Have Diabetes  Why do you need regular tests?     Diabetes can lead to other health problems if it's not well managed. You'll need tests to monitor how well your diabetes is managed and to check for other things like high cholesterol or kidney problems. Having tests on a regular schedule can help your doctor find problems early, when it's best to start treating them.  What tests do you need?  These are the tests you may need and how often you should have them. The tests may vary depending on whether you have type 1 or type 2 diabetes.  A1c blood test.  This test shows the average level of blood sugar over the past 2 to 3 months. It helps your doctor see whether blood sugar levels have been staying within your target range.  How often: Every 3 to 6 months  Goal: A blood sugar level in your target range  Blood pressure test.  This test measures the pressure of blood flow in the arteries. Controlling blood pressure can help prevent damage to nerves and blood vessels.  How often: Every 3 to 6 months  Goal: A blood pressure level in your target range  Cholesterol test.  This test measures the amount of a type of fat in the blood. It is common for people with diabetes to also have high cholesterol. Too much cholesterol in the blood can build up inside the blood vessels and raise the risk for heart attack and stroke.  How often: At the time of your diabetes diagnosis, and as often as your doctor recommends after that  Goal: A cholesterol level in your target range  Albumin-creatinine ratio test.  This test checks for kidney damage by looking for the protein albumin (say \"al-BYOO-tammi\") in the urine. Albumin is normally found in the blood. Kidney damage can let small amounts of it (microalbumin) leak into the urine.  How often: Once a year  Goal: No protein in the urine  Blood creatinine test/estimated glomerular filtration (eGFR).  The blood creatinine (say \"dhxs-YT-rh-neen\") level shows

## 2024-02-29 NOTE — PROGRESS NOTES
Vicenta Fontana presents today for   Chief Complaint   Patient presents with    Follow-up     4 wks follow up    Congestive Heart Failure     4 wks follow up    Diabetes     4 wks follow up    Other     TYLER; ROJAS 4 wks follow up           \"Have you been to the ER, urgent care clinic since your last visit?  Hospitalized since your last visit?\"    NO    “Have you seen or consulted any other health care providers outside of [facility] since your last visit?”    NO             
are reviewed in the EHR and ordered as mentioned above    I have discussed the diagnosis with the patient and the intended plan as seen in the above orders.  The patient has received an after-visit summary and questions were answered concerning future plans.  I have discussed medication side effects and warnings with the patient as well. I have reviewed the plan of care with the patient, accepted their input and they are in agreement with the treatment goals. All questions were answered. The patient understands the plan of care. Handouts provided today with above information. Pt instructed if symptoms worsen to call the office or report to the ED for continued care.  Greater than 50% of the visit time was spent in counseling and/or coordination of care.      Voice recognition was used to generate this report, which may have resulted in some phonetic based errors in grammar and contents. Even though attempts were made to correct all the mistakes, some may have been missed, and remained in the body of the document.      No follow-up provider specified.    Blanca Alvarado MD

## 2024-03-01 ENCOUNTER — OFFICE VISIT (OUTPATIENT)
Age: 64
End: 2024-03-01
Payer: COMMERCIAL

## 2024-03-01 VITALS
SYSTOLIC BLOOD PRESSURE: 130 MMHG | HEIGHT: 70 IN | DIASTOLIC BLOOD PRESSURE: 60 MMHG | HEART RATE: 79 BPM | BODY MASS INDEX: 33.36 KG/M2 | OXYGEN SATURATION: 97 % | WEIGHT: 233 LBS

## 2024-03-01 DIAGNOSIS — E78.5 DYSLIPIDEMIA: ICD-10-CM

## 2024-03-01 DIAGNOSIS — I50.41 ACUTE HEART FAILURE WITH REDUCED EJECTION FRACTION AND DIASTOLIC DYSFUNCTION (HCC): Primary | ICD-10-CM

## 2024-03-01 DIAGNOSIS — G47.33 OSA TREATED WITH BIPAP: ICD-10-CM

## 2024-03-01 DIAGNOSIS — I10 ESSENTIAL HYPERTENSION: ICD-10-CM

## 2024-03-01 DIAGNOSIS — E66.01 SEVERE OBESITY (BMI 35.0-39.9) WITH COMORBIDITY (HCC): ICD-10-CM

## 2024-03-01 DIAGNOSIS — I48.19 ATRIAL FIBRILLATION, PERSISTENT (HCC): ICD-10-CM

## 2024-03-01 DIAGNOSIS — E11.21 TYPE 2 DIABETES WITH NEPHROPATHY (HCC): ICD-10-CM

## 2024-03-01 PROBLEM — K21.9 GASTRO-ESOPHAGEAL REFLUX DISEASE WITHOUT ESOPHAGITIS: Status: ACTIVE | Noted: 2023-08-17

## 2024-03-01 PROBLEM — R48.8 OTHER SYMBOLIC DYSFUNCTIONS: Status: ACTIVE | Noted: 2023-08-17

## 2024-03-01 PROBLEM — I48.20 CHRONIC ATRIAL FIBRILLATION, UNSPECIFIED (HCC): Status: ACTIVE | Noted: 2023-08-17

## 2024-03-01 PROBLEM — D89.89 OTHER SPECIFIED DISORDERS INVOLVING THE IMMUNE MECHANISM, NOT ELSEWHERE CLASSIFIED (HCC): Status: ACTIVE | Noted: 2023-08-17

## 2024-03-01 PROBLEM — E11.9 TYPE 2 DIABETES MELLITUS WITHOUT COMPLICATIONS (HCC): Status: ACTIVE | Noted: 2023-08-17

## 2024-03-01 PROBLEM — I21.4 NON-ST ELEVATION (NSTEMI) MYOCARDIAL INFARCTION (HCC): Status: ACTIVE | Noted: 2023-08-17

## 2024-03-01 PROBLEM — R53.1 WEAKNESS: Status: ACTIVE | Noted: 2023-08-17

## 2024-03-01 PROBLEM — R26.2 DIFFICULTY IN WALKING, NOT ELSEWHERE CLASSIFIED: Status: ACTIVE | Noted: 2023-08-17

## 2024-03-01 PROBLEM — J44.9 CHRONIC OBSTRUCTIVE PULMONARY DISEASE, UNSPECIFIED (HCC): Status: ACTIVE | Noted: 2023-08-17

## 2024-03-01 PROBLEM — G93.41 METABOLIC ENCEPHALOPATHY: Status: ACTIVE | Noted: 2023-08-17

## 2024-03-01 PROCEDURE — 3044F HG A1C LEVEL LT 7.0%: CPT | Performed by: INTERNAL MEDICINE

## 2024-03-01 PROCEDURE — 3075F SYST BP GE 130 - 139MM HG: CPT | Performed by: INTERNAL MEDICINE

## 2024-03-01 PROCEDURE — 99215 OFFICE O/P EST HI 40 MIN: CPT | Performed by: INTERNAL MEDICINE

## 2024-03-01 PROCEDURE — 3078F DIAST BP <80 MM HG: CPT | Performed by: INTERNAL MEDICINE

## 2024-03-01 RX ORDER — BUMETANIDE 1 MG/1
1 TABLET ORAL DAILY
COMMUNITY

## 2024-03-01 RX ORDER — POTASSIUM CHLORIDE 20 MEQ/1
20 TABLET, EXTENDED RELEASE ORAL DAILY
COMMUNITY

## 2024-03-01 ASSESSMENT — ANXIETY QUESTIONNAIRES
2. NOT BEING ABLE TO STOP OR CONTROL WORRYING: 0
4. TROUBLE RELAXING: 0
6. BECOMING EASILY ANNOYED OR IRRITABLE: 0
GAD7 TOTAL SCORE: 0
1. FEELING NERVOUS, ANXIOUS, OR ON EDGE: 0
5. BEING SO RESTLESS THAT IT IS HARD TO SIT STILL: 0
7. FEELING AFRAID AS IF SOMETHING AWFUL MIGHT HAPPEN: 0
3. WORRYING TOO MUCH ABOUT DIFFERENT THINGS: 0

## 2024-03-01 ASSESSMENT — ENCOUNTER SYMPTOMS
NAUSEA: 0
VOMITING: 0
ABDOMINAL PAIN: 0
SHORTNESS OF BREATH: 1
SORE THROAT: 0
COUGH: 0
ABDOMINAL DISTENTION: 0

## 2024-03-01 NOTE — PROGRESS NOTES
Vicenta Fontana presents today for   Chief Complaint   Patient presents with    Follow-up     6 month       Vicenta Fontana preferred language for health care discussion is english/other.    Is someone accompanying this pt? no    Is the patient using any DME equipment during OV? no    Depression Screening:  Depression: Not at risk (3/1/2024)    PHQ-2     PHQ-2 Score: 0        Learning Assessment:  Who is the primary learner? Patient    What is the preferred language for health care of the primary learner? ENGLISH    How does the primary learner prefer to learn new concepts? DEMONSTRATION    Answered By patient    Relationship to Learner SELF           Pt currently taking Anticoagulant therapy? Eliquis 5 mg bid    Pt currently taking Antiplatelet therapy ? no      Coordination of Care:  1. Have you been to the ER, urgent care clinic since your last visit? Hospitalized since your last visit? no    2. Have you seen or consulted any other health care providers outside of the [facility] since your last visit? Include any pap smears or colon screening. no    
2 puffs into the lungs every 6 hours as needed for Shortness of Breath or Wheezing 18 g 3    cyanocobalamin 1000 MCG tablet Take 1 tablet by mouth 2 times daily 30 tablet 3    ferrous sulfate (FEROSUL) 325 (65 Fe) MG tablet Take 1 tablet by mouth 2 times daily 60 tablet 5    cycloSPORINE (RESTASIS) 0.05 % ophthalmic emulsion Place 1 drop into both eyes in the morning and 1 drop in the evening.  3    vitamin D 25 MCG (1000 UT) CAPS Take 2 capsules by mouth daily 30 capsule 0    zinc oxide 20 % ointment Apply topically as needed.      insulin lispro (HUMALOG) 100 UNIT/ML SOLN injection vial Inject 1-20 Units into the skin 4 times daily (before meals and nightly) Insulin Sliding scale per facility protocol      omega-3 acid ethyl esters (LOVAZA) 1 g capsule Take 1 capsule by mouth daily 120 capsule 5    BD PEN NEEDLE DAYTON 2ND GEN 32G X 4 MM MISC USE TO TEST BLOOD SUGAR THREE TIMES DAILY PLUS SLIDING SCALE 200 each 5    Multiple Vitamins-Minerals (ONE-A-DAY 50 PLUS PO) Take 1 tablet by mouth daily      BiPAP Machine MISC by Does not apply route      Continuous Blood Gluc Sensor (DEXCOM G6 SENSOR) MISC USE WITH DEXCOM METER AND TRANSMITTER DEVICE TO MONITOR YOUR BLOOD GLUCOSE CONTINUOUSLY 9 each 5    Continuous Blood Gluc Transmit (DEXCOM G6 TRANSMITTER) MISC USE WITH DEXCOM SENSOR AND METER TO MONITOR BLOOD SUGAR CONTINUOUSLY, REMOVING AND REPLACING THIS METER EVERY 10 DAYS 3 each 5     No current facility-administered medications for this visit.     Allergies   Allergen Reactions    Penicillins Hives     Social History     Tobacco Use    Smoking status: Never    Smokeless tobacco: Never   Vaping Use    Vaping Use: Never used   Substance Use Topics    Alcohol use: Yes     Alcohol/week: 0.0 standard drinks of alcohol    Drug use: Never     Family History   Problem Relation Age of Onset    Elevated Lipids Mother     Other Father         Knee replacement    No Known Problems Sister     No Known Problems Brother     No

## 2024-03-06 PROBLEM — R26.2 DIFFICULTY IN WALKING, NOT ELSEWHERE CLASSIFIED: Status: RESOLVED | Noted: 2023-08-17 | Resolved: 2024-03-06

## 2024-03-06 PROBLEM — J44.9 CHRONIC OBSTRUCTIVE PULMONARY DISEASE, UNSPECIFIED (HCC): Status: RESOLVED | Noted: 2023-08-17 | Resolved: 2024-03-06

## 2024-03-06 PROBLEM — E11.9 TYPE 2 DIABETES MELLITUS WITHOUT COMPLICATIONS (HCC): Status: RESOLVED | Noted: 2023-08-17 | Resolved: 2024-03-06

## 2024-03-06 PROBLEM — R53.1 WEAKNESS: Status: RESOLVED | Noted: 2023-08-17 | Resolved: 2024-03-06

## 2024-03-06 PROBLEM — R16.1 SPLENOMEGALY: Status: RESOLVED | Noted: 2017-11-08 | Resolved: 2024-03-06

## 2024-03-06 PROBLEM — I21.4 NON-ST ELEVATION (NSTEMI) MYOCARDIAL INFARCTION (HCC): Status: RESOLVED | Noted: 2023-08-17 | Resolved: 2024-03-06

## 2024-03-06 PROBLEM — D89.89 OTHER SPECIFIED DISORDERS INVOLVING THE IMMUNE MECHANISM, NOT ELSEWHERE CLASSIFIED (HCC): Status: RESOLVED | Noted: 2023-08-17 | Resolved: 2024-03-06

## 2024-03-06 PROBLEM — G93.41 METABOLIC ENCEPHALOPATHY: Status: RESOLVED | Noted: 2023-08-17 | Resolved: 2024-03-06

## 2024-03-06 PROBLEM — R80.9 MICROALBUMINURIA: Status: RESOLVED | Noted: 2017-06-09 | Resolved: 2024-03-06

## 2024-03-06 PROBLEM — R48.8 OTHER SYMBOLIC DYSFUNCTIONS: Status: RESOLVED | Noted: 2023-08-17 | Resolved: 2024-03-06

## 2024-03-06 PROBLEM — K76.0 FATTY (CHANGE OF) LIVER, NOT ELSEWHERE CLASSIFIED: Status: RESOLVED | Noted: 2023-11-01 | Resolved: 2024-03-06

## 2024-03-06 ASSESSMENT — ENCOUNTER SYMPTOMS
BLOOD IN STOOL: 0
EYE PAIN: 0
BACK PAIN: 1
ABDOMINAL PAIN: 0
SORE THROAT: 0
COUGH: 0
SHORTNESS OF BREATH: 0
ANAL BLEEDING: 0

## 2024-03-07 ENCOUNTER — HOSPITAL ENCOUNTER (OUTPATIENT)
Facility: HOSPITAL | Age: 64
Discharge: HOME | End: 2024-03-10

## 2024-03-07 LAB — SENTARA SPECIMEN COLLECTION: NORMAL

## 2024-03-07 PROCEDURE — 99001 SPECIMEN HANDLING PT-LAB: CPT

## 2024-03-11 DIAGNOSIS — N17.9 AKI (ACUTE KIDNEY INJURY) (HCC): ICD-10-CM

## 2024-03-11 DIAGNOSIS — Z79.4 TYPE 2 DIABETES MELLITUS WITH DIABETIC NEPHROPATHY, WITH LONG-TERM CURRENT USE OF INSULIN (HCC): ICD-10-CM

## 2024-03-11 DIAGNOSIS — I50.9 ACUTE CONGESTIVE HEART FAILURE, UNSPECIFIED HEART FAILURE TYPE (HCC): ICD-10-CM

## 2024-03-11 DIAGNOSIS — E11.21 TYPE 2 DIABETES MELLITUS WITH DIABETIC NEPHROPATHY, WITH LONG-TERM CURRENT USE OF INSULIN (HCC): ICD-10-CM

## 2024-03-11 RX ORDER — AMLODIPINE BESYLATE 5 MG/1
5 TABLET ORAL EVERY MORNING
Qty: 90 TABLET | Refills: 1 | Status: SHIPPED | OUTPATIENT
Start: 2024-03-11

## 2024-03-11 NOTE — TELEPHONE ENCOUNTER
Last OV: 2/29/2024  Next OV: 9/3/2024  Last refill: 11/10/2023   Discontinued by: Rashi Woodard MD on 1/24/2024 13:51   Reason: Stop Taking at Discharge

## 2024-03-12 ENCOUNTER — CARE COORDINATION (OUTPATIENT)
Facility: CLINIC | Age: 64
End: 2024-03-12

## 2024-03-12 NOTE — CARE COORDINATION
Ambulatory Care Coordination Note  3/12/2024    Patient Current Location:  [place]    ACM contacted the patient by telephone. Verified name and  with patient as identifiers. Provided introduction to self, and explanation of the ACM role.     ACM: Gregory Aguilar RN    Challenges to be reviewed by the provider   Additional needs identified to be addressed with provider: no                 Method of communication with provider: none.    Hx of Gi bleed, OA, Asthma, BPH, COPD, CAD, DM2, neuropathy, HLD, HTN, ROJAS, CARLOTA on BiPAP, A-fib on chronic AC, Splenomegaly  CCM rec by PCP.  States doing ok.  No other questions/issues at this time.    Offered patient enrollment in the Remote Patient Monitoring (RPM) program for in-home monitoring: Patient is not eligible for RPM program.    Future Appointments   Date Time Provider Department Center   3/26/2024  9:00 AM Fuad Jane [facility] BS AMB   2024 12:00 PM Blanca Alvarado MD [facility] BS AMB   9/3/2024 10:00 AM Roland Campo MD [facility] BS AMB

## 2024-03-13 RX ORDER — OMEGA-3-ACID ETHYL ESTERS 1 G/1
CAPSULE, LIQUID FILLED ORAL
Qty: 120 CAPSULE | Refills: 5 | Status: SHIPPED | OUTPATIENT
Start: 2024-03-13

## 2024-03-21 ENCOUNTER — TELEPHONE (OUTPATIENT)
Age: 64
End: 2024-03-21

## 2024-03-21 RX ORDER — ACYCLOVIR 400 MG/1
TABLET ORAL
Qty: 3 EACH | Refills: 5 | Status: SHIPPED | OUTPATIENT
Start: 2024-03-21

## 2024-03-21 RX ORDER — SACUBITRIL AND VALSARTAN 24; 26 MG/1; MG/1
1 TABLET, FILM COATED ORAL 2 TIMES DAILY
COMMUNITY
End: 2024-03-21 | Stop reason: SDUPTHER

## 2024-03-21 RX ORDER — SACUBITRIL AND VALSARTAN 24; 26 MG/1; MG/1
1 TABLET, FILM COATED ORAL 2 TIMES DAILY
Qty: 60 TABLET | Refills: 6 | Status: SHIPPED | OUTPATIENT
Start: 2024-03-21

## 2024-03-21 NOTE — TELEPHONE ENCOUNTER
Patient called called the office to ask if he can start Entresto.    Verbal order and read back per Roland Campo MD  Ok to start. Entresto 24/26 mg tablet - take 1 tablet by mouth twice a day

## 2024-03-25 NOTE — PROGRESS NOTES
Pharmacy Progress Note - Diabetes Management       Assessment / Plan:   Diabetes Management:  Per ADA guidelines, Pt's A1c is at goal of < 7%.  However, his CGM data indicates that his current glycemic control is poor.  His AGP report from past 14 days shows time in target range 45%, average glucose 196 mg/dL, GMI 8%.  His basal control is lacking and he is having persistent post-prandial elevations.  His self-adjusted Humalog SSI is not consistent and he is skipping prandial injections when BG < 180 mg/dL which is leading to large post-prandial elevations.  Pt would benefit from a VGo device to provide even insulin delivery with fixed prandial dosing to start.  Will utilize the weight-based dosing and start the VGo 30 with 4 clicks (2 units each click) ac tid.  Will not add a snack dose at this time.  Advised the pt to contact the clinic with any issues with persistent hypo or hyperglycemia.  - Stop Tresiba and Humalog pens  - Start VGo 30 daily with 4 clicks ac tid  - Will reassess with CGM data in 3 weeks    Hyperlipidemia:  The ASCVD Risk score ([place] DK, et al., 2019) failed to calculate for the following reasons:    The valid total cholesterol range is 130 to 320 mg/dL based on parameters listed. Current lipid treatment guidelines recommend at least moderate-intensity statin doses for all patients with diabetes to decrease overall ASCVD risk. Patient currently qualifies for a high intensity statin therapy based on current recommendations and is currently taking a high intensity statin.      Nutrition/Lifestyle Modifications:  - Educated pt on the importance of moderating carbohydrate intake. Reviewed sources of carbohydrates and method to help determine appropriate portion sizes (e.g., Diabetes Plate Method).  - Advised patient to avoid sugar-sweetened beverages and replace with water or diet/zero sugar option.  - Recommend ~30 minutes consistent, moderately intensive, exercise/day or ~150 minutes/week. Start

## 2024-03-26 ENCOUNTER — PHARMACY VISIT (OUTPATIENT)
Age: 64
End: 2024-03-26

## 2024-03-26 DIAGNOSIS — E11.21 TYPE 2 DIABETES WITH NEPHROPATHY (HCC): Primary | ICD-10-CM

## 2024-03-26 RX ORDER — SUB-Q INSULIN DEVICE, 40 UNIT
EACH MISCELLANEOUS
Qty: 1 KIT | Refills: 11 | Status: SHIPPED | OUTPATIENT
Start: 2024-03-26

## 2024-03-26 RX ORDER — INSULIN LISPRO 100 [IU]/ML
INJECTION, SOLUTION INTRAVENOUS; SUBCUTANEOUS
Qty: 30 ML | Refills: 11 | Status: SHIPPED | OUTPATIENT
Start: 2024-03-26

## 2024-03-26 NOTE — PATIENT INSTRUCTIONS
- Start VGo 30 every day  - Use 4 clicks with each meal    - On the day that you start the VGo, skip Tresiba and do not inject this again    - When you start the VGo, stop Humalog

## 2024-04-04 ENCOUNTER — TELEPHONE (OUTPATIENT)
Age: 64
End: 2024-04-04

## 2024-04-04 NOTE — TELEPHONE ENCOUNTER
[facility] faxed a cardia clearance request for colonoscopy on 5-1-24.     Verbal order and read back per Roland Campo MD  Low  risk from cardiac standpoint   Can hold eliquis 2 days prior     Faxed form back to provider office.

## 2024-04-08 RX ORDER — FERROUS SULFATE 325(65) MG
1 TABLET ORAL 2 TIMES DAILY
Qty: 60 TABLET | Refills: 5 | Status: SHIPPED | OUTPATIENT
Start: 2024-04-08

## 2024-04-08 RX ORDER — FUROSEMIDE 20 MG/1
20 TABLET ORAL DAILY
Qty: 90 TABLET | Refills: 1 | Status: SHIPPED | OUTPATIENT
Start: 2024-04-08 | End: 2024-04-11 | Stop reason: SDUPTHER

## 2024-04-11 ENCOUNTER — OFFICE VISIT (OUTPATIENT)
Age: 64
End: 2024-04-11
Payer: COMMERCIAL

## 2024-04-11 VITALS
HEART RATE: 58 BPM | OXYGEN SATURATION: 100 % | BODY MASS INDEX: 37.39 KG/M2 | DIASTOLIC BLOOD PRESSURE: 59 MMHG | WEIGHT: 261.2 LBS | HEIGHT: 70 IN | TEMPERATURE: 98 F | SYSTOLIC BLOOD PRESSURE: 104 MMHG | RESPIRATION RATE: 17 BRPM

## 2024-04-11 DIAGNOSIS — I48.19 PERSISTENT ATRIAL FIBRILLATION (HCC): Chronic | ICD-10-CM

## 2024-04-11 DIAGNOSIS — B02.9 HERPES ZOSTER WITHOUT COMPLICATION: ICD-10-CM

## 2024-04-11 DIAGNOSIS — N17.9 AKI (ACUTE KIDNEY INJURY) (HCC): ICD-10-CM

## 2024-04-11 DIAGNOSIS — I50.9 ACUTE CONGESTIVE HEART FAILURE, UNSPECIFIED HEART FAILURE TYPE (HCC): Primary | ICD-10-CM

## 2024-04-11 DIAGNOSIS — E11.21 TYPE 2 DIABETES MELLITUS WITH DIABETIC NEPHROPATHY, WITH LONG-TERM CURRENT USE OF INSULIN (HCC): ICD-10-CM

## 2024-04-11 DIAGNOSIS — K75.81 NASH (NONALCOHOLIC STEATOHEPATITIS): ICD-10-CM

## 2024-04-11 DIAGNOSIS — Z79.4 TYPE 2 DIABETES MELLITUS WITH DIABETIC NEPHROPATHY, WITH LONG-TERM CURRENT USE OF INSULIN (HCC): ICD-10-CM

## 2024-04-11 PROCEDURE — 3078F DIAST BP <80 MM HG: CPT | Performed by: INTERNAL MEDICINE

## 2024-04-11 PROCEDURE — 3074F SYST BP LT 130 MM HG: CPT | Performed by: INTERNAL MEDICINE

## 2024-04-11 PROCEDURE — 3044F HG A1C LEVEL LT 7.0%: CPT | Performed by: INTERNAL MEDICINE

## 2024-04-11 PROCEDURE — 99214 OFFICE O/P EST MOD 30 MIN: CPT | Performed by: INTERNAL MEDICINE

## 2024-04-11 RX ORDER — VALACYCLOVIR HYDROCHLORIDE 1 G/1
1000 TABLET, FILM COATED ORAL 2 TIMES DAILY
Qty: 14 TABLET | Refills: 0 | Status: SHIPPED | OUTPATIENT
Start: 2024-04-11 | End: 2024-04-18

## 2024-04-11 RX ORDER — FUROSEMIDE 20 MG/1
40 TABLET ORAL 2 TIMES DAILY
Qty: 90 TABLET | Refills: 1
Start: 2024-04-11

## 2024-04-11 ASSESSMENT — ENCOUNTER SYMPTOMS
COUGH: 0
ABDOMINAL PAIN: 0
EYE PAIN: 0
SHORTNESS OF BREATH: 0
ABDOMINAL DISTENTION: 1
SORE THROAT: 0
BACK PAIN: 1

## 2024-04-11 NOTE — PROGRESS NOTES
Vicenta Fontana presents today for   Chief Complaint   Patient presents with    Follow-up     6 wks f/u           \"Have you been to the ER, urgent care clinic since your last visit?  Hospitalized since your last visit?\"    NO    “Have you seen or consulted any other health care providers outside of [facility] since your last visit?”    NO

## 2024-04-11 NOTE — PROGRESS NOTES
INTERNISTS OF [facility]:  4/11/2024, MRN: [id number]      Vicenta Fontana is a 63 y.o. 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     1. Type 2 DM and ROJAS: Treated with VGO 30 (4 clicks or 8 units of short acting insulin with meals; he is not using clicks with snacks) +Jardiance 25 mg daily, and low-carb diet.  CGM use: yes. He is being scheduled for a colonoscopy with . He met with him in between apts. He was told he does not have cirrhosis. No ETOH. One of his snacks was a banana yesterday.  He also reports having had a [facility] fried chicken sandwich yesterday with fries.  For dinner, he had stirfry with egg noodles.  His blood glucose was elevated after eating this snack.  He is on Crestor 40 mg daily for hyperlipidemia.  I reviewed his CGM readings today.  After eating the [facility] fried chicken sandwich, he has sugars closer to 50 range.  He is not within his target range most of the time.    Retroperitoneal Ultrasound 1/17/24: Incidental note is made of cirrhosis and moderate amounts of ascites. There are bilateral pleural effusions. Right kidney measures 13.4 x 5.4 x 4.4 cm. Left kidney measures 11.9 x 7 x 4.2 cm. 2.2 x 2.5 x 2.4 cm midpole renal cyst. 8 x 9 x 10 mm upper pole renal cyst. Renal cortex is echogenic. No hydronephrosis. Prevoid bladder volume 89 mL. Prostate is

## 2024-04-12 ENCOUNTER — CARE COORDINATION (OUTPATIENT)
Facility: CLINIC | Age: 64
End: 2024-04-12

## 2024-04-12 ENCOUNTER — COMMUNITY OUTREACH (OUTPATIENT)
Facility: CLINIC | Age: 64
End: 2024-04-12

## 2024-04-12 NOTE — CARE COORDINATION
4/18/2024 12:20 PM Blanca Alvarado MD [facility] BS AMB   9/3/2024 10:00 AM Roland Campo MD [facility] BS AMB

## 2024-04-16 ENCOUNTER — NURSE ONLY (OUTPATIENT)
Age: 64
End: 2024-04-16

## 2024-04-16 DIAGNOSIS — I50.9 ACUTE CONGESTIVE HEART FAILURE, UNSPECIFIED HEART FAILURE TYPE (HCC): ICD-10-CM

## 2024-04-17 LAB
A/G RATIO: 1.8 RATIO (ref 1.1–2.6)
ALBUMIN: 4.6 G/DL (ref 3.5–5)
ALP BLD-CCNC: 193 U/L (ref 40–125)
ALT SERPL-CCNC: 28 U/L (ref 5–40)
ANION GAP SERPL CALCULATED.3IONS-SCNC: 15 MMOL/L (ref 3–15)
AST SERPL-CCNC: 26 U/L (ref 10–37)
B-TYPE NATRIURETIC PEPTIDE: 1915 PG/ML
BILIRUB SERPL-MCNC: 0.5 MG/DL (ref 0.2–1.2)
BUN BLDV-MCNC: 73 MG/DL (ref 6–22)
CALCIUM SERPL-MCNC: 9.7 MG/DL (ref 8.4–10.5)
CHLORIDE BLD-SCNC: 104 MMOL/L (ref 98–110)
CO2: 21 MMOL/L (ref 20–32)
CREAT SERPL-MCNC: 2 MG/DL (ref 0.8–1.6)
GLOBULIN: 2.6 G/DL (ref 2–4)
GLOMERULAR FILTRATION RATE: 36.2 ML/MIN/1.73 SQ.M.
GLUCOSE: 149 MG/DL (ref 70–99)
POTASSIUM SERPL-SCNC: 4.9 MMOL/L (ref 3.5–5.5)
SODIUM BLD-SCNC: 140 MMOL/L (ref 133–145)
TOTAL PROTEIN: 7.2 G/DL (ref 6.2–8.1)

## 2024-04-18 ENCOUNTER — OFFICE VISIT (OUTPATIENT)
Age: 64
End: 2024-04-18
Payer: COMMERCIAL

## 2024-04-18 VITALS
TEMPERATURE: 98.3 F | DIASTOLIC BLOOD PRESSURE: 66 MMHG | WEIGHT: 253 LBS | HEIGHT: 70 IN | BODY MASS INDEX: 36.22 KG/M2 | HEART RATE: 64 BPM | SYSTOLIC BLOOD PRESSURE: 132 MMHG | OXYGEN SATURATION: 97 %

## 2024-04-18 DIAGNOSIS — E11.21 TYPE 2 DIABETES MELLITUS WITH DIABETIC NEPHROPATHY, WITH LONG-TERM CURRENT USE OF INSULIN (HCC): ICD-10-CM

## 2024-04-18 DIAGNOSIS — Z79.4 TYPE 2 DIABETES MELLITUS WITH DIABETIC NEPHROPATHY, WITH LONG-TERM CURRENT USE OF INSULIN (HCC): ICD-10-CM

## 2024-04-18 DIAGNOSIS — I50.9 ACUTE CONGESTIVE HEART FAILURE, UNSPECIFIED HEART FAILURE TYPE (HCC): Primary | ICD-10-CM

## 2024-04-18 DIAGNOSIS — B02.9 HERPES ZOSTER WITHOUT COMPLICATION: ICD-10-CM

## 2024-04-18 PROCEDURE — 3044F HG A1C LEVEL LT 7.0%: CPT | Performed by: INTERNAL MEDICINE

## 2024-04-18 PROCEDURE — 3075F SYST BP GE 130 - 139MM HG: CPT | Performed by: INTERNAL MEDICINE

## 2024-04-18 PROCEDURE — 3078F DIAST BP <80 MM HG: CPT | Performed by: INTERNAL MEDICINE

## 2024-04-18 PROCEDURE — 99214 OFFICE O/P EST MOD 30 MIN: CPT | Performed by: INTERNAL MEDICINE

## 2024-04-18 NOTE — PROGRESS NOTES
Vicenta Fontana presents today for   Chief Complaint   Patient presents with    Follow-up                 1. \"Have you been to the ER, urgent care clinic since your last visit?  Hospitalized since your last visit?\" no    2. \"Have you seen or consulted any other health care providers outside of the [facility] since your last visit?\" yes     3. For patients aged 45-75: Has the patient had a colonoscopy / FIT/ Cologuard? Yes - no Care Gap present      If the patient is female:    4. For patients aged 40-74: Has the patient had a mammogram within the past 2 years? No      5. For patients aged 21-65: Has the patient had a pap smear? No

## 2024-04-22 ENCOUNTER — NURSE ONLY (OUTPATIENT)
Age: 64
End: 2024-04-22

## 2024-04-22 DIAGNOSIS — I50.9 ACUTE CONGESTIVE HEART FAILURE, UNSPECIFIED HEART FAILURE TYPE (HCC): ICD-10-CM

## 2024-04-23 LAB
ANION GAP SERPL CALCULATED.3IONS-SCNC: 15 MMOL/L (ref 3–15)
BUN BLDV-MCNC: 70 MG/DL (ref 6–22)
CALCIUM SERPL-MCNC: 11.4 MG/DL (ref 8.4–10.5)
CHLORIDE BLD-SCNC: 98 MMOL/L (ref 98–110)
CO2: 25 MMOL/L (ref 20–32)
CREAT SERPL-MCNC: 1.9 MG/DL (ref 0.8–1.6)
GLOMERULAR FILTRATION RATE: 38.4 ML/MIN/1.73 SQ.M.
GLUCOSE: 185 MG/DL (ref 70–99)
POTASSIUM SERPL-SCNC: 5.1 MMOL/L (ref 3.5–5.5)
SODIUM BLD-SCNC: 138 MMOL/L (ref 133–145)

## 2024-04-24 ASSESSMENT — ENCOUNTER SYMPTOMS
SHORTNESS OF BREATH: 0
SORE THROAT: 0
COUGH: 0
EYE PAIN: 0
ABDOMINAL PAIN: 0
ABDOMINAL DISTENTION: 1
ANAL BLEEDING: 0
BLOOD IN STOOL: 0

## 2024-04-25 ENCOUNTER — OFFICE VISIT (OUTPATIENT)
Age: 64
End: 2024-04-25
Payer: COMMERCIAL

## 2024-04-25 VITALS
RESPIRATION RATE: 17 BRPM | BODY MASS INDEX: 35.76 KG/M2 | DIASTOLIC BLOOD PRESSURE: 66 MMHG | HEART RATE: 67 BPM | WEIGHT: 249.8 LBS | SYSTOLIC BLOOD PRESSURE: 117 MMHG | TEMPERATURE: 97.6 F | OXYGEN SATURATION: 99 % | HEIGHT: 70 IN

## 2024-04-25 DIAGNOSIS — Z79.4 TYPE 2 DIABETES MELLITUS WITH DIABETIC NEPHROPATHY, WITH LONG-TERM CURRENT USE OF INSULIN (HCC): ICD-10-CM

## 2024-04-25 DIAGNOSIS — E83.52 HYPERCALCEMIA: ICD-10-CM

## 2024-04-25 DIAGNOSIS — I50.9 ACUTE CONGESTIVE HEART FAILURE, UNSPECIFIED HEART FAILURE TYPE (HCC): Primary | ICD-10-CM

## 2024-04-25 DIAGNOSIS — E11.21 TYPE 2 DIABETES MELLITUS WITH DIABETIC NEPHROPATHY, WITH LONG-TERM CURRENT USE OF INSULIN (HCC): ICD-10-CM

## 2024-04-25 PROCEDURE — 99214 OFFICE O/P EST MOD 30 MIN: CPT | Performed by: INTERNAL MEDICINE

## 2024-04-25 PROCEDURE — 3078F DIAST BP <80 MM HG: CPT | Performed by: INTERNAL MEDICINE

## 2024-04-25 PROCEDURE — 3074F SYST BP LT 130 MM HG: CPT | Performed by: INTERNAL MEDICINE

## 2024-04-25 PROCEDURE — 3044F HG A1C LEVEL LT 7.0%: CPT | Performed by: INTERNAL MEDICINE

## 2024-04-25 RX ORDER — FUROSEMIDE 20 MG/1
20 TABLET ORAL DAILY
Qty: 90 TABLET | Refills: 1 | Status: SHIPPED | OUTPATIENT
Start: 2024-04-25 | End: 2024-04-25

## 2024-04-25 RX ORDER — FUROSEMIDE 40 MG/1
40 TABLET ORAL 2 TIMES DAILY
Qty: 60 TABLET | Refills: 5 | Status: SHIPPED | OUTPATIENT
Start: 2024-04-25 | End: 2024-04-30

## 2024-04-25 NOTE — PROGRESS NOTES
INTERNISTS OF [facility]:  4/24/2024, MRN: [id number]      Vicenta Fontana is a 63 y.o. male and presents to clinic for Follow-up      Subjective:   The patient is a 63-year-old male with history of renal cyst and BPH (followed by urology team), COPD from secondhand smoke exposure for several yrs, tubular adenomatous colon polyp and June 2016, ROJAS, diverticulosis, right inferior cuff tendinitis, nephrolithiasis, hypertension, atrial fibrillation, lumbar disc disease, splenomegaly, GI bleed (2018, while on xarelto; he required PRBCs), s/p partial gastrectomy for removal of a benign gastric mass, chronic gastritis, type 2 DM, systolic CHF, HLD, IBS, OA, obesity, recurrent pes anserinus bursitis involving the right total knee, CARLOTA (on BiPAP), and GERD.     1.  CHF/Atrial Fibrillation: At his last appointment, the patient had a significant weight gain in between appointments despite taking his blood pressure medications.  He endorsed having high salt foods.  His weight at that time was 261 pounds.  His dry weight earlier this year was 214 pounds.  At that time, I instructed him to continue taking amlodipine 5 mg daily, Entresto 24-26 mg twice daily, Bumex 1 mg daily, potassium 1 tablet (20 mill equivalents) daily, metoprolol 75 mg daily, spironolactone 25 mg daily, and Jardiance 25 mg daily.  He was to increase his Lasix from 20 mg daily to 40 mg twice daily.  Today at his follow-up visit, his weight is down to 253 pounds.  He feels like he has more energy.  Additionally, labs show an elevated BNP.  His creatinine has increased as well.    2.  Rash: At his last appointment, the patient reported a rash along his left side.  The rash was consistent with shingles.  He was given a course of medication (Valtrex) to complete.  The rash has improved dramatically and is no longer pruritic.  No side effects from taking Valtrex.    3.  Type 2 diabetes: At his last appointment, he is using a VGO 30 device.  He was using 4 clicks (8

## 2024-04-25 NOTE — PROGRESS NOTES
Vicenta Fontana presents today for   Chief Complaint   Patient presents with    Follow-up           \"Have you been to the ER, urgent care clinic since your last visit?  Hospitalized since your last visit?\"    NO    “Have you seen or consulted any other health care providers outside of [facility] since your last visit?”    NO             
   apixaban (ELIQUIS) 5 MG TABS tablet Take 1 tablet by mouth 2 times daily 60 tablet 5    omeprazole (PRILOSEC) 20 MG delayed release capsule Take 1 capsule by mouth daily      albuterol sulfate HFA (PROVENTIL;VENTOLIN;PROAIR) 108 (90 Base) MCG/ACT inhaler Inhale 2 puffs into the lungs every 6 hours as needed for Shortness of Breath or Wheezing 18 g 3    cyanocobalamin 1000 MCG tablet Take 1 tablet by mouth 2 times daily 30 tablet 3    cycloSPORINE (RESTASIS) 0.05 % ophthalmic emulsion Place 1 drop into both eyes in the morning and 1 drop in the evening.  3    vitamin D 25 MCG (1000 UT) CAPS Take 2 capsules by mouth daily 30 capsule 0    zinc oxide 20 % ointment Apply topically as needed.      insulin lispro (HUMALOG) 100 UNIT/ML SOLN injection vial Inject 1-20 Units into the skin 4 times daily (before meals and nightly) Insulin Sliding scale per facility protocol      BD PEN NEEDLE DAYTON 2ND GEN 32G X 4 MM MISC USE TO TEST BLOOD SUGAR THREE TIMES DAILY PLUS SLIDING SCALE 200 each 5    Multiple Vitamins-Minerals (ONE-A-DAY 50 PLUS PO) Take 1 tablet by mouth daily      BiPAP Machine MISC by Does not apply route      Continuous Blood Gluc Transmit (DEXCOM G6 TRANSMITTER) MISC USE WITH DEXCOM SENSOR AND METER TO MONITOR BLOOD SUGAR CONTINUOUSLY, REMOVING AND REPLACING THIS METER EVERY 10 DAYS 3 each 5     No current facility-administered medications for this visit.       Allergies   Allergen Reactions    Penicillins Hives    Mounjaro [Tirzepatide]      Pancreatitis       Past Medical History:   Diagnosis Date    Abnormal echocardiogram 03/25/2016    Tech difficult.  EF 55-60%.  No WMA.  Mod LVH.  Indeterminate diastolic fx.  Mild RVE.  LEE.  Mild AoRE.      Acute GI bleeding 12/2018    Arthritis     Asthma     Back problem     BPH (benign prostatic hyperplasia)     Bronchitis     Chronic anticoagulation     Chronic lung disease     Chronic obstructive pulmonary disease (HCC)     CMC (carpometacarpal joint) dislocation

## 2024-04-26 NOTE — PROGRESS NOTES
Vicenta Fontana presents today for CHF follow-up.  He was seen by his PCP on 4/18/24 and was being treated for acute on chronic CHF.  He was last seen by Dr Campo on 3/1/24.  He was instructed to follow-up with cardiology.    He states that he has noticed abdominal bloating and fullness and only occasional lower extremity edema.  He also has noticed some MORRIS but no orthopnea or PND.  He has not been weighing daily or strictly adhering to the fluid restriction discussed with him during previous visits.  He did not bring an updated list of medications.  He states that he is taking furosemide 40mg BID but during his last appointment with Dr Campo, he was taking bumetanide 1mg BID which was decreased to 1mg daily during that visit.  It is unclear of when the medications were switched.     He is a 63 year old male with history of persistent atrial fibrillation (2016), long-standing diabetes mellitus, type II, dyslipidemia, and hypertension.  He also has a history of fairly severe obstructive sleep apnea requiring BiPAP.  He underwent a cardiac catheterization in 2010, to evaluate symptoms of chest pain, but was found to have mild nonobstructive coronary artery disease.  He wore a Holter monitor in 2016 and it showed a rate-controlled atrial fibrillation with an average heart rate in the 90s without any prolonged tachyarrhythmias or bradyarrhythmias.  The patient was hospitalized in December 2018 for a GI bleed requiring blood transfusion.  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      He was hospitalized from 1/15/24 through 1/24/24 at [facility] for acute on chronic diastolic heart failure and persistent AFIB. He presented with complaints of progressive dyspnea, orthopnea, and severe lower extremity edema and scrotal edema (anasarca).  His NT pro-BNP was 665.  Chest x-ray showed pulmonary vascular congestion and bilateral pleural effusions.  He was

## 2024-04-29 ENCOUNTER — TELEPHONE (OUTPATIENT)
Age: 64
End: 2024-04-29

## 2024-04-29 NOTE — PROGRESS NOTES
Parameters of Interest:   Estimation of renal function:  Lab Results   Component Value Date/Time    LABGLOM 27 01/23/2024 03:17 AM    LABGLOM 32 01/22/2024 03:26 AM    LABGLOM 36 01/21/2024 02:22 AM    GFRAA 32.0 01/23/2024 03:17 AM    GFRAA 38.0 01/22/2024 03:26 AM    GFRAA 43.0 01/21/2024 02:22 AM     Wt Readings from Last 3 Encounters:   04/30/24 113.4 kg (250 lb)   04/25/24 113.3 kg (249 lb 12.8 oz)   04/18/24 114.8 kg (253 lb)     Ht Readings from Last 1 Encounters:   04/30/24 1.778 m (5' 10\")     Calculated estimated creatinine clearance: estimated creatinine clearance is 50 mL/min (A) (based on SCr of 1.9 mg/dL (H)).    Vital Signs Today:    There were no vitals taken for this visit.    Medications Discontinued During This Encounter   Medication Reason    insulin lispro (HUMALOG) 100 UNIT/ML SOLN injection vial LIST CLEANUP    Insulin Disposable Pump (V-GO 40) 40 UNIT/24HR KIT DOSE ADJUSTMENT    Continuous Blood Gluc Transmit (DEXCOM G6 TRANSMITTER) MISC LIST CLEANUP       Orders Placed This Encounter    Insulin Disposable Pump (V-GO 40) 40 UNIT/24HR KIT     Sig: Use to inject insulin up to 76 units per day - use 5 clicks with each meal and 1 click with each snack     Dispense:  1 kit     Refill:  11       Future Appointments   Date Time Provider Department Center   5/10/2024  1:20 PM Blanca Alvarado MD [facility] BS AMB   5/16/2024  2:30 PM Merry Prajapati APRN - [facility] BS AMB   5/20/2024  3:30 PM Fuad Jane, [facility] BS AMB   9/3/2024 10:00 AM Roland Campo MD [facility] AMB       Patient verbalized understanding of the information presented and all of the patient’s questions were answered.  AVS was handed to the patient. Patient advised to call the office with any additional questions or concerns.    Notifications of recommendations will be sent to Blanca Alvarado MD for review.      Thank you for the consult,  Fuad Jane, PharmD, BCACP, BC-[facility]        For Pharmacy Admin Tracking

## 2024-04-29 NOTE — TELEPHONE ENCOUNTER
Spoke to Jerry ([facility]) to advised that pt should only be taking furosemide 40 mg as prescribed 04/25/24 not furosemide 20 mg. Rcvd fax stating that furosemide 20 mg and 40 mg may cause a therapeutic duplicate.    MITESH Cordon LPN

## 2024-04-30 ENCOUNTER — OFFICE VISIT (OUTPATIENT)
Age: 64
End: 2024-04-30
Payer: COMMERCIAL

## 2024-04-30 VITALS
WEIGHT: 250 LBS | SYSTOLIC BLOOD PRESSURE: 112 MMHG | BODY MASS INDEX: 35.79 KG/M2 | HEART RATE: 59 BPM | OXYGEN SATURATION: 97 % | DIASTOLIC BLOOD PRESSURE: 68 MMHG | HEIGHT: 70 IN

## 2024-04-30 DIAGNOSIS — I50.33 ACUTE ON CHRONIC DIASTOLIC HEART FAILURE (HCC): Primary | ICD-10-CM

## 2024-04-30 DIAGNOSIS — I48.19 ATRIAL FIBRILLATION, PERSISTENT (HCC): ICD-10-CM

## 2024-04-30 DIAGNOSIS — G47.33 OSA TREATED WITH BIPAP: ICD-10-CM

## 2024-04-30 DIAGNOSIS — E11.21 TYPE 2 DIABETES WITH NEPHROPATHY (HCC): ICD-10-CM

## 2024-04-30 DIAGNOSIS — E66.01 SEVERE OBESITY (BMI 35.0-39.9) WITH COMORBIDITY (HCC): ICD-10-CM

## 2024-04-30 DIAGNOSIS — R06.09 DOE (DYSPNEA ON EXERTION): ICD-10-CM

## 2024-04-30 DIAGNOSIS — E78.5 DYSLIPIDEMIA: ICD-10-CM

## 2024-04-30 DIAGNOSIS — I10 ESSENTIAL HYPERTENSION: ICD-10-CM

## 2024-04-30 PROCEDURE — 3074F SYST BP LT 130 MM HG: CPT | Performed by: NURSE PRACTITIONER

## 2024-04-30 PROCEDURE — 3044F HG A1C LEVEL LT 7.0%: CPT | Performed by: NURSE PRACTITIONER

## 2024-04-30 PROCEDURE — 3078F DIAST BP <80 MM HG: CPT | Performed by: NURSE PRACTITIONER

## 2024-04-30 PROCEDURE — 99215 OFFICE O/P EST HI 40 MIN: CPT | Performed by: NURSE PRACTITIONER

## 2024-04-30 RX ORDER — BUMETANIDE 1 MG/1
1 TABLET ORAL 2 TIMES DAILY
Qty: 60 TABLET | Refills: 4 | Status: SHIPPED | OUTPATIENT
Start: 2024-04-30

## 2024-04-30 ASSESSMENT — PATIENT HEALTH QUESTIONNAIRE - PHQ9
SUM OF ALL RESPONSES TO PHQ QUESTIONS 1-9: 0
SUM OF ALL RESPONSES TO PHQ QUESTIONS 1-9: 0
2. FEELING DOWN, DEPRESSED OR HOPELESS: NOT AT ALL
SUM OF ALL RESPONSES TO PHQ QUESTIONS 1-9: 0
SUM OF ALL RESPONSES TO PHQ9 QUESTIONS 1 & 2: 0
1. LITTLE INTEREST OR PLEASURE IN DOING THINGS: NOT AT ALL
SUM OF ALL RESPONSES TO PHQ QUESTIONS 1-9: 0

## 2024-04-30 ASSESSMENT — ENCOUNTER SYMPTOMS
ABDOMINAL DISTENTION: 1
SHORTNESS OF BREATH: 1

## 2024-04-30 NOTE — PATIENT INSTRUCTIONS
Chief Complaint   Patient presents with   • Diabetes   NEW PATIENT APPOINTMENT/ TYPE 2 DIABETES MELLITUS     Lana Yañez 72 y.o. presents with Type 2 dm as a new patient. Consulted by Dr. Tavarez     Type 2 dm - Diagnosed about 6 - 8 months  Today in clinic pt reports being on metformin 500 mg po bid with meal.   Tolerating the medication with no issues but some times has diarrhea.   FBG - 120 - 180 mg/dl.   Pre meals - none  Checks BG - once a day  Sensor - none  Dm retinopathy - no ,Last eye exam -  Jan 2019  Dm nephropathy - CKD stage 2  Dm neuropathy - some tingling and numbness in her feet and hands ,Dm neuropathy meds - none  CAD - yes, treated with medication, hx of afib.   CVA - yes.   Episodes of hypoglycemia -  none  Pt is physically active. weight has been stable.   Pt tries to follow DM diet for most part.   On Ace inb.    Reviewed primary care physician's/consulting physician documentation and lab results :     I have reviewed the patient's allergies, medicines, past medical hx, family hx and social hx in detail.    Past Medical History:   Diagnosis Date   • Acute on chronic diastolic heart failure (CMS/HCC)    • Arthritis    • Asthma    • Atrial fibrillation (CMS/HCC)     Persistent; on warfarin   • Atypical chest pain    • Bronchitis    • Cellulitis of right elbow     due to MRSA   • CHF (congestive heart failure) (CMS/HCC)    • COPD (chronic obstructive pulmonary disease) (CMS/HCC)    • Coronary artery disease     Cardiac catheterization completed; 90% PDA stenosis with medical management recommended   • Coronary artery disease involving native coronary artery of native heart with angina pectoris with documented spasm (CMS/HCC)    • Disease of thyroid gland    • Displacement of lumbar intervertebral disc without myelopathy    • Dizziness    • ANTOINE (dyspnea on exertion)    • Essential hypertension    • Fatigue    • Generalized osteoarthritis of multiple sites    • History of rheumatic fever   Discontinue furosemide (Lasix) for now. Do not throw away.  Begin bumetanide 1mg twice a day for 1 week.  Continue potassium 20meq twice a day  All other medications to remain the same  BMP, NT pro-BNP to be done on Mon. 5/6/24  Weigh daily and record.  Please call the office on Tuesday, May 7, 2024 and inform us of your weight done that morning and weight done on May 1st.  Weigh daily and record  Limit sodium intake to 2000mg per day  Limit fluid intake to no more than 6, eight ounce glasses of any type of fluids per day (total of 48 ounces per day)  Call if you notice sudden or progressive weight gain (3-5 pounds in 2-3 days), increasing shortness of breath, abdominal bloating, increasing lower extremity edema, inability to lie flat or on your normal number of pillows, having to sit up to catch your breath, fatigue, increased somnolence (sleeping more), poor appetite         • History of transfusion    • Hx of bone density study     10/23/2014   • Hyperglycemia    • Hyperlipidemia    • Hypovitaminosis D    • Left arm pain    • Left-sided weakness    • Leg swelling    • Low back pain    • Lower extremity edema    • Malaise and fatigue    • Mitral valve disease     Moderate mitral valve prolapse and moderate mitral regurgitation   • Mitral valve insufficiency    • Morbid obesity with BMI of 40.0-44.9, adult (CMS/[facility])    • MRSA infection    • NSTEMI (non-ST elevated myocardial infarction) (CMS/[facility])    • PAF (paroxysmal atrial fibrillation) (CMS/[facility])    • RA (rheumatoid arthritis) (CMS/[facility])     involving both hands   • Sleep apnea    • SOB (shortness of breath)    • Stroke (CMS/[facility])     left side weakness   • Urge incontinence of urine    • UTI (urinary tract infection) 05/2020   • Vitamin D deficiency        Family History   Problem Relation Age of Onset   • Colon cancer Mother    • Glaucoma Mother    • Stroke Mother    • Arthritis Mother    • Hypertension Mother    • Glaucoma Sister    • Diabetes Sister    • Heart disease Sister    • Arthritis Sister    • Asthma Sister    • Hypertension Sister    • Miscarriages / Stillbirths Sister    • Lung disease Sister    • Heart disease Brother    • Diabetes Brother    • Arthritis Brother    • Drug abuse Brother    • Hypertension Brother    • Arthritis Father    • COPD Father    • Lung disease Father    • Arthritis Daughter    • Depression Daughter    • Alcohol abuse Maternal Uncle    • Heart disease Sister    • Heart disease Sister    • Heart disease Brother        Social History     Socioeconomic History   • Marital status:      Spouse name: Suresh   • Number of children: 5   • Years of education: 13   • Highest education level: Not on file   Occupational History   • 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     Employer: RETIRED   Tobacco Use   • Smoking status: Former Smoker     Packs/day: 3.00     Types: Cigarettes     Start  date: 1967     Last attempt to quit: 10/19/1968     Years since quittin.8   • Smokeless tobacco: Never Used   Substance and Sexual Activity   • Alcohol use: No     Comment: No caffeine use   • Drug use: No   • Sexual activity: Defer       Allergies   Allergen Reactions   • Contrast Dye Hives and Itching         Current Outpatient Medications:   •  acetaminophen (TYLENOL) 500 MG tablet, Take 500 mg by mouth Every 6 (Six) Hours As Needed for Mild Pain ., Disp: , Rfl:   •  albuterol (PROVENTIL HFA;VENTOLIN HFA) 108 (90 Base) MCG/ACT inhaler, Inhale 2 puffs Every 6 (Six) Hours As Needed., Disp: , Rfl:   •  aspirin 81 MG EC tablet, Take 1 tablet by mouth Daily., Disp: , Rfl:   •  atorvastatin (LIPITOR) 10 MG tablet, Take 1 tablet by mouth Daily., Disp: 90 tablet, Rfl: 3  •  Blood Glucose Monitoring Suppl (ACCU-CHEK GUIDE) w/Device kit, See Admin Instructions., Disp: , Rfl:   •  Blood Glucose Monitoring Suppl kit, 1 kit 3 (Three) Times a Day., Disp: 1 each, Rfl: 0  •  carvedilol (COREG) 25 MG tablet, TAKE 1 & 1/2 (ONE & ONE-HALF) TABLETS BY MOUTH TWICE DAILY WITH  MEALS, Disp: 270 tablet, Rfl: 1  •  digoxin (LANOXIN) 125 MCG tablet, Take 1 tablet by mouth Every Other Day., Disp: 30 tablet, Rfl: 5  •  furosemide (LASIX) 40 MG tablet, Take 2 tablets by mouth once daily (Patient taking differently: 40 mg Daily.), Disp: 180 tablet, Rfl: 1  •  glucose blood test strip, Use as instructed, Disp: 300 each, Rfl: 12  •  HYDROcodone-acetaminophen (NORCO) 5-325 MG per tablet, Take 1 tablet by mouth 2 (Two) Times a Day As Needed for Severe Pain ., Disp: 40 tablet, Rfl: 0  •  ipratropium-albuterol (DUO-NEB) 0.5-2.5 mg/mL nebulizer, As Needed., Disp: , Rfl:   •  Lancets (ACCU-CHEK MULTICLIX) lancets, Use 1 lancet per finger stick, Disp: 204 each, Rfl: 12  •  lisinopril (PRINIVIL,ZESTRIL) 10 MG tablet, Take 1 tablet by mouth once daily, Disp: 30 tablet, Rfl: 0  •  magnesium oxide (MAG-OX) 400 MG tablet, Take 400 mg by mouth  "Daily., Disp: , Rfl:   •  metFORMIN (Glucophage) 500 MG tablet, Take 1 tablet by mouth 2 (Two) Times a Day With Meals. (Patient taking differently: Take 1,000 mg by mouth 2 (Two) Times a Day With Meals.), Disp: 60 tablet, Rfl: 2  •  potassium chloride (K-DUR) 10 MEQ CR tablet, Take 4 tablets by mouth Daily., Disp: 360 tablet, Rfl: 1  •  TRAVATAN Z 0.004 % solution ophthalmic solution, , Disp: , Rfl:   •  vitamin D (ERGOCALCIFEROL) 1.25 MG (87604 UT) capsule capsule, Take 1 capsule by mouth once a week, Disp: 12 capsule, Rfl: 0  •  warfarin (COUMADIN) 5 MG tablet, Take one tablet by mouth daily or as directed by [facility]., Disp: 90 tablet, Rfl: 1  •  sulfamethoxazole-trimethoprim (BACTRIM DS,SEPTRA DS) 800-160 MG per tablet, Take 1 tablet by mouth 2 (Two) Times a Day., Disp: 14 tablet, Rfl: 1    Review of Systems   Constitutional: Positive for appetite change and fatigue. Negative for fever.   Eyes: Negative for visual disturbance.   Respiratory: Negative for shortness of breath.    Cardiovascular: Negative for palpitations and leg swelling.   Gastrointestinal: Negative for abdominal pain and vomiting.   Endocrine: Negative for polydipsia and polyuria.   Musculoskeletal: Negative for joint swelling and neck pain.   Skin: Negative for rash.   Neurological: Positive for numbness. Negative for weakness.   Psychiatric/Behavioral: Negative for behavioral problems.     I have reviewed the ROS as documented by the MA; Ed Ho MD.      Objective:     /70   Pulse 107   Ht 167.6 cm (66\")   Wt 111 kg (244 lb 3.2 oz)   LMP  (LMP Unknown)   SpO2 97%   BMI 39.41 kg/m²     Physical Exam   Constitutional: She is oriented to person, place, and time. She appears well-nourished.   Obese     HENT:   Head: Normocephalic and atraumatic.   Wide neck   Eyes: Conjunctivae and EOM are normal. No scleral icterus.   Neck: Normal range of motion. Neck supple. No thyromegaly present.   Acanthosis nigricans "   Cardiovascular: Normal rate and normal heart sounds.   Pulmonary/Chest: Effort normal and breath sounds normal. No stridor. She has no wheezes.   Abdominal: Soft. Bowel sounds are normal. She exhibits no distension. There is no tenderness.   Central obesity   Musculoskeletal: She exhibits no edema or tenderness.   Neurological: She is alert and oriented to person, place, and time.   Skin: Skin is warm and dry. She is not diaphoretic.   Psychiatric: She has a normal mood and affect.   Vitals reviewed.         Results Review:    I reviewed the patient's new clinical results.    Anticoagulation Visit on 08/04/2020   Component Date Value Ref Range Status   • Protime 08/04/2020 38.9* 10.0 - 13.8 seconds Final   • INR 08/04/2020 3.2* 0.91 - 1.09 Final       Lana was seen today for diabetes.    Diagnoses and all orders for this visit:    DM (diabetes mellitus), type 2, uncontrolled w/neurologic complication (CMS/[facility])  -     Hemoglobin A1c; Future  -     Creatinine, Serum; Future  -     eGFR-Glomerular Filtration; Future  -     Lipid Panel; Future  -     TSH; Future  -     T4, Free; Future  -     Vitamin B12 & Folate; Future  -     Ambulatory Referral to Diabetic Education    Hyperlipemia, mixed  -     Hemoglobin A1c; Future  -     Creatinine, Serum; Future  -     eGFR-Glomerular Filtration; Future  -     Lipid Panel; Future  -     TSH; Future  -     T4, Free; Future  -     Vitamin B12 & Folate; Future    Idiopathic sleep related nonobstructive alveolar hypoventilation  -     Ambulatory Referral to Sleep Medicine      Type 2 diabetes mellitus-uncontrolled  HbA1c is slowly trending up  Not at goal.  Continue the current medications  Discussed with the patient that based on her abnormal renal function would not be able to try most of the oral hypoglycemic agents that are available  Refer the patient to the diabetic educator for dietary restrictions and further improvement in the physical activity.  Will review her A1c  "in 3 months from now to see if there is an improvement with the behavioral modifications if not will make adjustments to the medications at that point    Hyperlipidemia  Continue Lipitor 10 mg oral daily    Obesity  Discussed calorie counting - limit to 1500 gil per day and exercising 2-3 times a week.    Sleep apnea-with daytime sleepiness  Refer the patient to sleep medicine  She needs adjustments of her CPAP machine  Thank you for asking me to see your patient, Lana Yañez in consultation.        Ed Ho MD  08/04/20    EMR Dragon / transcription disclaimer:     \"Dictated utilizing Dragon dictation\".   "

## 2024-04-30 NOTE — PROGRESS NOTES
Vicenta Fontana presents today for   Chief Complaint   Patient presents with    Follow-up     Add on due to edema     Edema     Bilateral ankle and abdomen edema on/off       Vicenta Fontana preferred language for health care discussion is english/other.    Is someone accompanying this pt? no    Is the patient using any DME equipment during OV? no    Depression Screening:  Depression: Not at risk (4/30/2024)    PHQ-2     PHQ-2 Score: 0        Learning Assessment:  Who is the primary learner? Patient    What is the preferred language for health care of the primary learner? ENGLISH    How does the primary learner prefer to learn new concepts? DEMONSTRATION    Answered By patient    Relationship to Learner SELF           Pt currently taking Anticoagulant therapy? Eliquis 5 mg 2x daily     Pt currently taking Antiplatelet therapy ? no      Coordination of Care:  1. Have you been to the ER, urgent care clinic since your last visit? Hospitalized since your last visit? no    2. Have you seen or consulted any other health care providers outside of the [facility] since your last visit? Include any pap smears or colon screening. no

## 2024-05-01 ENCOUNTER — PHARMACY VISIT (OUTPATIENT)
Age: 64
End: 2024-05-01

## 2024-05-01 DIAGNOSIS — E11.21 TYPE 2 DIABETES WITH NEPHROPATHY (HCC): ICD-10-CM

## 2024-05-01 RX ORDER — INSULIN DEGLUDEC 200 U/ML
10 INJECTION, SOLUTION SUBCUTANEOUS EVERY MORNING
COMMUNITY

## 2024-05-01 RX ORDER — SUB-Q INSULIN DEVICE, 40 UNIT
EACH MISCELLANEOUS
Qty: 1 KIT | Refills: 11 | Status: SHIPPED
Start: 2024-05-01

## 2024-05-01 ASSESSMENT — ENCOUNTER SYMPTOMS
EYE PAIN: 0
ABDOMINAL PAIN: 0
ANAL BLEEDING: 0
BACK PAIN: 1
SORE THROAT: 0
COUGH: 0
ABDOMINAL DISTENTION: 1
BLOOD IN STOOL: 0

## 2024-05-01 NOTE — PATIENT INSTRUCTIONS
- Change VGo clicks to 5 clicks with meals and 1 click with snacks (up to 3) - only 18 clicks total    - Start Tresiba 10 units every morning   * Increase 2 units every 3 days until your fasting first thing in the morning blood glucose is less than 130 mg/dL then stay at that dose.

## 2024-05-06 ENCOUNTER — HOSPITAL ENCOUNTER (OUTPATIENT)
Facility: HOSPITAL | Age: 64
Discharge: HOME | End: 2024-05-09

## 2024-05-06 DIAGNOSIS — I10 ESSENTIAL HYPERTENSION: ICD-10-CM

## 2024-05-06 DIAGNOSIS — R06.09 DOE (DYSPNEA ON EXERTION): ICD-10-CM

## 2024-05-06 LAB
SENTARA SPECIMEN COLLECTION: NORMAL
SENTARA SPECIMEN COLLECTION: NORMAL

## 2024-05-06 PROCEDURE — 99001 SPECIMEN HANDLING PT-LAB: CPT

## 2024-05-07 LAB
ANION GAP SERPL CALCULATED.3IONS-SCNC: 14 MMOL/L (ref 3–15)
ANION GAP SERPL CALCULATED.3IONS-SCNC: 15 MMOL/L (ref 3–15)
B-TYPE NATRIURETIC PEPTIDE: 1739 PG/ML
BUN BLDV-MCNC: 80 MG/DL (ref 6–22)
BUN BLDV-MCNC: 81 MG/DL (ref 6–22)
CALCIUM IONIZED: 4.9 MG/DL (ref 4.4–5.4)
CALCIUM SERPL-MCNC: 10.1 MG/DL (ref 8.4–10.5)
CALCIUM SERPL-MCNC: 10.3 MG/DL (ref 8.4–10.5)
CHLORIDE BLD-SCNC: 101 MMOL/L (ref 98–110)
CHLORIDE BLD-SCNC: 102 MMOL/L (ref 98–110)
CO2: 24 MMOL/L (ref 20–32)
CO2: 25 MMOL/L (ref 20–32)
CREAT SERPL-MCNC: 1.5 MG/DL (ref 0.8–1.6)
CREAT SERPL-MCNC: 1.6 MG/DL (ref 0.8–1.6)
GFR, ESTIMATED: 46.8 ML/MIN/1.73 SQ.M.
GFR, ESTIMATED: 50.4 ML/MIN/1.73 SQ.M.
GLUCOSE: 173 MG/DL (ref 70–99)
POTASSIUM SERPL-SCNC: 4.7 MMOL/L (ref 3.5–5.5)
POTASSIUM SERPL-SCNC: 4.8 MMOL/L (ref 3.5–5.5)
PTH INTACT: 27 PG/ML (ref 15–65)
SODIUM BLD-SCNC: 140 MMOL/L (ref 133–145)
SODIUM BLD-SCNC: 141 MMOL/L (ref 133–145)
VITAMIN D 25-HYDROXY: 37.9 NG/ML (ref 32–100)

## 2024-05-09 ENCOUNTER — TELEPHONE (OUTPATIENT)
Age: 64
End: 2024-05-09

## 2024-05-09 RX ORDER — BUMETANIDE 1 MG/1
1 TABLET ORAL DAILY
COMMUNITY

## 2024-05-09 NOTE — TELEPHONE ENCOUNTER
Patient was seen by NP, Merry Prajapati on 04-30-24 at this appointment Merry wanted him to call the office and tell her the weight of 05-01-24 and 05-07-24.   -patient reported  05-01-24 : 250.9lbs  05-07-24 : 253.2lbs    Labs were done as well.  Component      Latest Ref Rng 5/6/2024   Potassium      3.5 - 5.5 mmol/L 4.7    Sodium      133 - 145 mmol/L 140    Chloride      98 - 110 mmol/L 101    Glucose      70 - 99 mg/dL 173 (H)    Calcium      8.4 - 10.5 mg/dL 10.3    BUN,BUNPL      6 - 22 mg/dL 81 (H)    Creatinine      0.8 - 1.6 mg/dL 1.6    CARBON DIOXIDE      20 - 32 mmol/L 25    Est, Glom Filt Rate      >60.0 mL/min/1.73 sq.m. 46.8 (L)    Anion Gap      3.0 - 15.0 mmol/L 14.0    BNP      <=125 pg/mL 1,739 (H)      Patient stated he felt a little better but still the same. Stomach is still bloated.     Verbal order and read back per PAUL Wilhelm - NP  Due to elevated BUN at 81, decrease Bumex to 1 mg QD and decrease potassium to 20 mEq QD. Follow up with me next week 05-16-24 as scheduled.     This has been fully explained to the patient, who indicates understanding.

## 2024-05-10 NOTE — PROGRESS NOTES
Vicenta Fontana presents today for CHF follow-up.  When seen on 4/18/24, his weight was up 17 pounds.  He was taking lasix 40mg BID instead of bumetanide 1mg daily (it was unclear why he was taking lasix instead of bumex).  He had been drinking large amounts of liquids.  He was switched back to bumetanide 1mg and instructed to take it twice a day for a week and then have a BMP done.  When he had labs done, his BUN had increased to 81 and his creatinine was 1.6.  His bumetanide was decreased to 1mg daily and he was instructed to return for follow-up.  He states that his PCP increased the bumetanide to 1mg BID yesterday.    He denies any worsening shortness of breath or lower extremity edema.  He states that he has abdominal bloating and fullness.      He was seen by his PCP on 4/18/24 and was being treated for acute on chronic CHF.  He was last seen by Dr Campo on 3/1/24.      He states that he has noticed abdominal bloating and fullness and only occasional lower extremity edema.  He also has noticed some MORRIS but no orthopnea or PND.  He has not been weighing daily or strictly adhering to the fluid restriction discussed with him during previous visits.  He did not bring an updated list of medications.  He states that he is taking furosemide 40mg BID but during his last appointment with Dr Campo, he was taking bumetanide 1mg BID which was decreased to 1mg daily during that visit.  It is unclear of when the medications were switched.     He is a 63 year old male with history of persistent atrial fibrillation (2016), long-standing diabetes mellitus, type II, dyslipidemia, and hypertension.  He also has a history of fairly severe obstructive sleep apnea requiring BiPAP.  He underwent a cardiac catheterization in 2010, to evaluate symptoms of chest pain, but was found to have mild nonobstructive coronary artery disease.  He wore a Holter monitor in 2016 and it showed a rate-controlled atrial fibrillation with an

## 2024-05-14 ENCOUNTER — OFFICE VISIT (OUTPATIENT)
Age: 64
End: 2024-05-14
Payer: COMMERCIAL

## 2024-05-14 VITALS
SYSTOLIC BLOOD PRESSURE: 105 MMHG | DIASTOLIC BLOOD PRESSURE: 63 MMHG | HEIGHT: 70 IN | RESPIRATION RATE: 17 BRPM | WEIGHT: 257.4 LBS | BODY MASS INDEX: 36.85 KG/M2 | TEMPERATURE: 98.1 F | OXYGEN SATURATION: 99 % | HEART RATE: 56 BPM

## 2024-05-14 DIAGNOSIS — I50.9 ACUTE CONGESTIVE HEART FAILURE, UNSPECIFIED HEART FAILURE TYPE (HCC): ICD-10-CM

## 2024-05-14 DIAGNOSIS — E11.21 TYPE 2 DIABETES WITH NEPHROPATHY (HCC): ICD-10-CM

## 2024-05-14 DIAGNOSIS — N18.30 STAGE 3 CHRONIC KIDNEY DISEASE, UNSPECIFIED WHETHER STAGE 3A OR 3B CKD (HCC): ICD-10-CM

## 2024-05-14 DIAGNOSIS — I50.9 ACUTE CONGESTIVE HEART FAILURE, UNSPECIFIED HEART FAILURE TYPE (HCC): Primary | ICD-10-CM

## 2024-05-14 PROCEDURE — 3044F HG A1C LEVEL LT 7.0%: CPT | Performed by: INTERNAL MEDICINE

## 2024-05-14 PROCEDURE — 3078F DIAST BP <80 MM HG: CPT | Performed by: INTERNAL MEDICINE

## 2024-05-14 PROCEDURE — 3074F SYST BP LT 130 MM HG: CPT | Performed by: INTERNAL MEDICINE

## 2024-05-14 PROCEDURE — 99214 OFFICE O/P EST MOD 30 MIN: CPT | Performed by: INTERNAL MEDICINE

## 2024-05-14 RX ORDER — SUB-Q INSULIN DEVICE, 40 UNIT
EACH MISCELLANEOUS
Qty: 1 KIT | Refills: 11 | Status: SHIPPED | OUTPATIENT
Start: 2024-05-14

## 2024-05-14 RX ORDER — BUMETANIDE 1 MG/1
1 TABLET ORAL 2 TIMES DAILY
Qty: 60 TABLET | Refills: 5
Start: 2024-05-14

## 2024-05-14 NOTE — PROGRESS NOTES
INTERNISTS OF [facility]:  5/15/2024, MRN: [id number]      Vicenta Fontana is a 63 y.o. male and presents to clinic for Follow-up      Subjective:   The patient is a 63-year-old male with history of renal cyst and BPH (followed by urology team), COPD from secondhand smoke exposure for several yrs, tubular adenomatous colon polyp and June 2016, ROJAS, diverticulosis, right inferior cuff tendinitis, nephrolithiasis, hypertension, atrial fibrillation, lumbar disc disease, splenomegaly, GI bleed (2018, while on xarelto; he required PRBCs), s/p partial gastrectomy for removal of a benign gastric mass, chronic gastritis, type 2 DM, systolic CHF, HLD, IBS, OA, obesity, recurrent pes anserinus bursitis involving the right total knee, CARLOTA (on BiPAP), and GERD.     1.  Type 2 diabetes: At his last appointment, I was concerned about hypoglycemia.  He was transition to a VGO 40 device.  Since his last visit he met with our clinical pharmacy team.  The patient was administered 5 clicks with meals and 1 click with snacks per day.  Due to for basal control, the patient was started on Tresiba 10 units in the morning adjusting per his BG readings.  He is still using a CGM.  Today he reports he needs a refill on the VGO 40 device and only has 3 more at home.  His type 2 diabetes is complicated by CKD 3-4. He is injecting 12 units of tresiba daily. No hypoglycemia since the addition of tresiba with his vgo 40 device. +Jardiance 25mg daily.     2.  CHF: Earlier this year he weighed 261 pounds.  I was concerned that he was having worsening congestive heart failure due to bibasilar crackles.  He was placed on milligrams Lasix and his amlodipine 5 mg daily dose was discontinued.  He is to continue taking Entresto 24-26 mg twice daily, Bumex 1 mg daily, potassium 1 tablet 20 equivalents twice daily, metoprolol 75 mg daily, spironolactone 25 mg daily, and Jardiance 25 mg daily.  His weight is down to 250 pounds.  Imaging appointment, he had with

## 2024-05-14 NOTE — PROGRESS NOTES
Vicenta Fontana presents today for   Chief Complaint   Patient presents with    Follow-up           \"Have you been to the ER, urgent care clinic since your last visit?  Hospitalized since your last visit?\"    NO    “Have you seen or consulted any other health care providers outside of [facility] since your last visit?”    NO.

## 2024-05-15 ASSESSMENT — ENCOUNTER SYMPTOMS
SHORTNESS OF BREATH: 1
ANAL BLEEDING: 0
ABDOMINAL PAIN: 0
ABDOMINAL DISTENTION: 1
SORE THROAT: 0
BACK PAIN: 1
BLOOD IN STOOL: 0
EYE PAIN: 0
COUGH: 0

## 2024-05-16 ENCOUNTER — OFFICE VISIT (OUTPATIENT)
Age: 64
End: 2024-05-16
Payer: COMMERCIAL

## 2024-05-16 VITALS
BODY MASS INDEX: 36.36 KG/M2 | SYSTOLIC BLOOD PRESSURE: 120 MMHG | DIASTOLIC BLOOD PRESSURE: 64 MMHG | HEART RATE: 68 BPM | HEIGHT: 70 IN | WEIGHT: 254 LBS | OXYGEN SATURATION: 96 %

## 2024-05-16 DIAGNOSIS — I48.19 ATRIAL FIBRILLATION, PERSISTENT (HCC): Primary | ICD-10-CM

## 2024-05-16 DIAGNOSIS — E11.21 TYPE 2 DIABETES WITH NEPHROPATHY (HCC): ICD-10-CM

## 2024-05-16 DIAGNOSIS — R06.09 DOE (DYSPNEA ON EXERTION): ICD-10-CM

## 2024-05-16 DIAGNOSIS — I10 ESSENTIAL HYPERTENSION: ICD-10-CM

## 2024-05-16 DIAGNOSIS — E66.01 SEVERE OBESITY (BMI 35.0-39.9) WITH COMORBIDITY (HCC): ICD-10-CM

## 2024-05-16 DIAGNOSIS — G47.33 OSA TREATED WITH BIPAP: ICD-10-CM

## 2024-05-16 DIAGNOSIS — E78.5 DYSLIPIDEMIA: ICD-10-CM

## 2024-05-16 DIAGNOSIS — I50.41 ACUTE HEART FAILURE WITH REDUCED EJECTION FRACTION AND DIASTOLIC DYSFUNCTION (HCC): ICD-10-CM

## 2024-05-16 DIAGNOSIS — I50.33 ACUTE ON CHRONIC DIASTOLIC HEART FAILURE (HCC): ICD-10-CM

## 2024-05-16 PROCEDURE — 99214 OFFICE O/P EST MOD 30 MIN: CPT | Performed by: NURSE PRACTITIONER

## 2024-05-16 PROCEDURE — 93000 ELECTROCARDIOGRAM COMPLETE: CPT | Performed by: NURSE PRACTITIONER

## 2024-05-16 PROCEDURE — 3078F DIAST BP <80 MM HG: CPT | Performed by: NURSE PRACTITIONER

## 2024-05-16 PROCEDURE — 3074F SYST BP LT 130 MM HG: CPT | Performed by: NURSE PRACTITIONER

## 2024-05-16 PROCEDURE — 3044F HG A1C LEVEL LT 7.0%: CPT | Performed by: NURSE PRACTITIONER

## 2024-05-16 ASSESSMENT — PATIENT HEALTH QUESTIONNAIRE - PHQ9
SUM OF ALL RESPONSES TO PHQ QUESTIONS 1-9: 0
1. LITTLE INTEREST OR PLEASURE IN DOING THINGS: NOT AT ALL
SUM OF ALL RESPONSES TO PHQ9 QUESTIONS 1 & 2: 0
SUM OF ALL RESPONSES TO PHQ QUESTIONS 1-9: 0
2. FEELING DOWN, DEPRESSED OR HOPELESS: NOT AT ALL

## 2024-05-16 NOTE — PATIENT INSTRUCTIONS
Increase bumetanide to 2mg in the AM and 1mg in the PM for 3 days then back to 1mg twice a day  BMP, NT pro-BNP to be done on Mon or Tues of next week  Please try to see Dr. Banegas sooner than July as we need his opinion/expertise with regards to possibly using metolazone  Follow-up with Merry in 3 weeks  Weigh daily and record  Limit sodium intake to 2000mg per day  Limit fluid intake to no more than 6, eight ounce glasses of any type of fluids per day (total of 48 ounces per day)  Call if you notice sudden or progressive weight gain (3-5 pounds in 2-3 days), increasing shortness of breath, abdominal bloating, increasing lower extremity edema, inability to lie flat or on your normal number of pillows, having to sit up to catch your breath, fatigue, increased somnolence (sleeping more), poor appetite

## 2024-05-16 NOTE — PROGRESS NOTES
Vicenta Fontana presents today for   Chief Complaint   Patient presents with    Follow-up     2 month CHF       Vicenta Fontana preferred language for health care discussion is english/other.    Is someone accompanying this pt? NO    Is the patient using any DME equipment during OV? NO    Depression Screening:  Depression: Not at risk (5/16/2024)    PHQ-2     PHQ-2 Score: 0        Learning Assessment:  Who is the primary learner? Patient    What is the preferred language for health care of the primary learner? ENGLISH    How does the primary learner prefer to learn new concepts? DEMONSTRATION    Answered By PATIENT    Relationship to Learner SELF           Pt currently taking Anticoagulant therapy? NO    Pt currently taking Antiplatelet therapy ? ELIQUIS      Coordination of Care:  1. Have you been to the ER, urgent care clinic since your last visit? Hospitalized since your last visit? NO    2. Have you seen or consulted any other health care providers outside of the [facility] since your last visit? Include any pap smears or colon screening. NO

## 2024-05-16 NOTE — PROGRESS NOTES
mg/dL    12                         298 mg/dL     08/05/2023 6.4 (H) 3.8 - 5.6 % Final     Comment:       DIABETIC = GREATER THAN OR EQUALS TO 6.5%    PREDIABETIC = 5.7 - 6.4%                      IF A1C % IS:               YOUR ESTIMATED AVERAGE GLUCOSE IS:    4.0                        68 mg/dL    4.5                        82 mg/dL    5.0                        97 mg/dL    5.5                        111 mg/dL    6                          126 mg/dL    6.5                        140 mg/dL    7                          154 mg/dL    7.5                        169 mg/dL    8                          183 mg/dL    8.5                        197 mg/dL    9                          212 mg/dL    9.5                        226 mg/dL    10                         240 mg/dL    10.5                       255 mg/dL    11                         269 mg/dL    11.5                       283 mg/dL    12                         298 mg/dL     06/08/2023 6.2 (H) 4.8 - 5.6 % Final       Screenings/Prevention Parameters:  -Diabetic Eye and Foot Exams:      Diabetes Management   Topic Date Due    Diabetic foot exam  07/16/2020    Diabetic retinal exam  01/26/2024    Diabetic Alb to Cr ratio (uACR) test  06/08/2024    Lipids  06/08/2024     -Microalbumin / Creatinine ratio:       Lab Results   Component Value Date/Time    MICROALBUR 833.8 06/08/2023 01:24 PM        Lab Results   Component Value Date    MALBCR 772.0 (H) 06/08/2023    MALBCR 2074.9 (H) 08/11/2022    MALBCR 129.9 (H) 06/09/2022      No components found for: \"MALBCREARAT\"  -Immunizations:      Immunization History   Administered Date(s) Administered    COVID-19, PFIZER PURPLE top, DILUTE for use, (age 12 y+), 30mcg/0.3mL 03/11/2021, 04/01/2021, 11/16/2021    Influenza Virus Vaccine 10/01/2017, 10/01/2018, 10/30/2022    Influenza, FLUARIX, FLULAVAL, FLUZONE (age 6 mo+) AND AFLURIA, (age 3 y+), PF, 0.5mL 10/01/2018, 10/14/2019, 09/01/2020    Influenza, FLUCELVAX, (age 6 mo+),

## 2024-05-17 RX ORDER — MONTELUKAST SODIUM 10 MG/1
10 TABLET ORAL NIGHTLY
Qty: 90 TABLET | Refills: 1 | Status: SHIPPED | OUTPATIENT
Start: 2024-05-17

## 2024-05-19 DIAGNOSIS — B02.9 HERPES ZOSTER WITHOUT COMPLICATION: ICD-10-CM

## 2024-05-20 ENCOUNTER — PHARMACY VISIT (OUTPATIENT)
Age: 64
End: 2024-05-20

## 2024-05-20 ENCOUNTER — HOSPITAL ENCOUNTER (OUTPATIENT)
Facility: HOSPITAL | Age: 64
Discharge: HOME | End: 2024-05-23

## 2024-05-20 DIAGNOSIS — I10 ESSENTIAL HYPERTENSION: ICD-10-CM

## 2024-05-20 DIAGNOSIS — R06.09 DOE (DYSPNEA ON EXERTION): ICD-10-CM

## 2024-05-20 DIAGNOSIS — E11.21 TYPE 2 DIABETES WITH NEPHROPATHY (HCC): ICD-10-CM

## 2024-05-20 LAB — SENTARA SPECIMEN COLLECTION: NORMAL

## 2024-05-20 PROCEDURE — 99001 SPECIMEN HANDLING PT-LAB: CPT

## 2024-05-20 RX ORDER — SUB-Q INSULIN DEVICE, 40 UNIT
EACH MISCELLANEOUS
Qty: 1 KIT | Refills: 11 | Status: SHIPPED
Start: 2024-05-20

## 2024-05-20 RX ORDER — VALACYCLOVIR HYDROCHLORIDE 1 G/1
1000 TABLET, FILM COATED ORAL 2 TIMES DAILY
Qty: 14 TABLET | Refills: 0 | OUTPATIENT
Start: 2024-05-20 | End: 2024-05-27

## 2024-05-21 LAB
ANION GAP SERPL CALCULATED.3IONS-SCNC: 14 MMOL/L (ref 3–15)
B-TYPE NATRIURETIC PEPTIDE: 2029 PG/ML
BUN BLDV-MCNC: 74 MG/DL (ref 6–22)
CALCIUM SERPL-MCNC: 10.2 MG/DL (ref 8.4–10.5)
CHLORIDE BLD-SCNC: 101 MMOL/L (ref 98–110)
CO2: 24 MMOL/L (ref 20–32)
CREAT SERPL-MCNC: 1.4 MG/DL (ref 0.8–1.6)
GFR, ESTIMATED: 54.6 ML/MIN/1.73 SQ.M.
GLUCOSE: 85 MG/DL (ref 70–99)
POTASSIUM SERPL-SCNC: 3.6 MMOL/L (ref 3.5–5.5)
SODIUM BLD-SCNC: 139 MMOL/L (ref 133–145)

## 2024-05-31 RX ORDER — APIXABAN 5 MG/1
5 TABLET, FILM COATED ORAL 2 TIMES DAILY
Qty: 60 TABLET | Refills: 5 | Status: SHIPPED | OUTPATIENT
Start: 2024-05-31

## 2024-06-05 ENCOUNTER — OFFICE VISIT (OUTPATIENT)
Age: 64
End: 2024-06-05
Payer: COMMERCIAL

## 2024-06-05 VITALS
WEIGHT: 263 LBS | BODY MASS INDEX: 37.65 KG/M2 | HEART RATE: 66 BPM | DIASTOLIC BLOOD PRESSURE: 88 MMHG | SYSTOLIC BLOOD PRESSURE: 122 MMHG | HEIGHT: 70 IN | OXYGEN SATURATION: 97 %

## 2024-06-05 DIAGNOSIS — I10 ESSENTIAL (PRIMARY) HYPERTENSION: Primary | ICD-10-CM

## 2024-06-05 DIAGNOSIS — E78.5 DYSLIPIDEMIA: ICD-10-CM

## 2024-06-05 DIAGNOSIS — I50.33 ACUTE ON CHRONIC DIASTOLIC HEART FAILURE (HCC): ICD-10-CM

## 2024-06-05 DIAGNOSIS — G47.33 OSA TREATED WITH BIPAP: ICD-10-CM

## 2024-06-05 PROCEDURE — 99215 OFFICE O/P EST HI 40 MIN: CPT | Performed by: NURSE PRACTITIONER

## 2024-06-05 PROCEDURE — 3079F DIAST BP 80-89 MM HG: CPT | Performed by: NURSE PRACTITIONER

## 2024-06-05 PROCEDURE — 3074F SYST BP LT 130 MM HG: CPT | Performed by: NURSE PRACTITIONER

## 2024-06-05 RX ORDER — METOLAZONE 2.5 MG/1
2.5 TABLET ORAL
Qty: 15 TABLET | Refills: 2 | Status: SHIPPED | OUTPATIENT
Start: 2024-06-06

## 2024-06-05 ASSESSMENT — ANXIETY QUESTIONNAIRES
GAD7 TOTAL SCORE: 0
4. TROUBLE RELAXING: NOT AT ALL
6. BECOMING EASILY ANNOYED OR IRRITABLE: NOT AT ALL
1. FEELING NERVOUS, ANXIOUS, OR ON EDGE: NOT AT ALL
7. FEELING AFRAID AS IF SOMETHING AWFUL MIGHT HAPPEN: NOT AT ALL
2. NOT BEING ABLE TO STOP OR CONTROL WORRYING: NOT AT ALL
3. WORRYING TOO MUCH ABOUT DIFFERENT THINGS: NOT AT ALL
5. BEING SO RESTLESS THAT IT IS HARD TO SIT STILL: NOT AT ALL

## 2024-06-05 ASSESSMENT — PATIENT HEALTH QUESTIONNAIRE - PHQ9
SUM OF ALL RESPONSES TO PHQ QUESTIONS 1-9: 0
SUM OF ALL RESPONSES TO PHQ QUESTIONS 1-9: 0
SUM OF ALL RESPONSES TO PHQ9 QUESTIONS 1 & 2: 0
SUM OF ALL RESPONSES TO PHQ QUESTIONS 1-9: 0
1. LITTLE INTEREST OR PLEASURE IN DOING THINGS: NOT AT ALL
SUM OF ALL RESPONSES TO PHQ QUESTIONS 1-9: 0
2. FEELING DOWN, DEPRESSED OR HOPELESS: NOT AT ALL

## 2024-06-05 NOTE — PROGRESS NOTES
Vicenta Fontana presents today for   Chief Complaint   Patient presents with    Follow-up     CHF       Vicenta Fontana preferred language for health care discussion is english/other.    Is someone accompanying this pt? no    Is the patient using any DME equipment during OV? no    Depression Screening:  Depression: Not at risk (6/5/2024)    PHQ-2     PHQ-2 Score: 0        Learning Assessment:  Who is the primary learner? Patient    What is the preferred language for health care of the primary learner? ENGLISH    How does the primary learner prefer to learn new concepts? DEMONSTRATION    Answered By patient    Relationship to Learner SELF           Pt currently taking Anticoagulant therapy? Eliquis 5 mg bid    Pt currently taking Antiplatelet therapy ? no      Coordination of Care:  1. Have you been to the ER, urgent care clinic since your last visit? Hospitalized since your last visit? no    2. Have you seen or consulted any other health care providers outside of the [facility] since your last visit? Include any pap smears or colon screening. no    
    Lab Results   Component Value Date/Time    CHOL 122 06/08/2023 01:24 PM    CHOL 127 08/11/2022 11:25 AM    HDL 33 06/08/2023 01:24 PM    LDL 46 06/08/2023 01:24 PM     Lab Results   Component Value Date/Time    ALT 28 04/16/2024 03:16 PM     Impression/Plan:  Acute on Chronic systolic/diastolic heart failure (EF 43% echo done in Jan. 2024 @ [facility])  Persistent atrial fibrillation, rate controlled and anticoagulated with Eliquis  Hypertension, blood pressure controlled  Dyslipidemia, on rosuvastatin 40mg  Diabetes mellitus, recommend Hgb A1c less than 7% from cardiac standpoint  Severe obstructive sleep apnea, uses Bi-PAP.      Vicenta Fontana was seen today for follow-up of acute on chronic systolic heart failure.  During his last visit, he complained of abdominal fullness and bloating but no worsening shortness of breath, orthopnea, or PND.  His weight was up 4 pounds. He was instructed to take bumetanide 2mg in the AM and 1mg in the PM for 3 days only and then back to 1mg BID.  He was given a lab slip for a BMP, NT pro-BNP and lab results were reviewed with him.  His creatinine improved slightly.  His NT pro-BNP was 2029.    His weight is up 11 pounds. His main complaint is of increasing abdominal girth for which he has had to buy bigger pants. He has not noticed any worsening shortness of breath or lower extremity edema.    Will now attempt to diurese him by using metolazone.  He will begin metolazone 2.5mg to be taken twice a week (instructed to take it at least 30 minutes prior to his morning dose of bumetanide on Mon and Thurs only).  On the days that he takes the metolazone, he is to take 40meq of potassium.  He is to continue taking bumetanide 1mg BID.  He was given a lab slip for a BMP to be done 7-10 days after starting the metolazone.  He is to send me a message through [employer] with his pre-metolazone home weight and his weight the morning that he sends the message.  He has been scheduled to return in 4

## 2024-06-05 NOTE — PATIENT INSTRUCTIONS
Begin metolazone 2.5mg - take 1 tablet at least 30 minutes prior to your morning dose of bumetanide on Mondays and Thursday only.  Continue bumetanide 1mg twice a day  Take 2 of your 20meq potassium tablets on the days that you take the metolazone (Mon & Thurs only)  On all the other days, take 1 potassium tablet daily  BMP to be done 7-10 days after taking the metolazone  Message me through [employer] (after you've taken 2 doses of metolazone) and let me know what your weight was before you began the metolazone and the weight from the morning that you send the message.  Follow-up with Merry as scheduled and as needed  All other medications to remain the same  Follow-up with Dr. Campo as scheduled and as needed
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
## 2024-06-13 ENCOUNTER — HOSPITAL ENCOUNTER (OUTPATIENT)
Facility: HOSPITAL | Age: 64
Discharge: HOME | End: 2024-06-16

## 2024-06-13 DIAGNOSIS — I10 ESSENTIAL (PRIMARY) HYPERTENSION: ICD-10-CM

## 2024-06-13 LAB — SENTARA SPECIMEN COLLECTION: NORMAL

## 2024-06-13 PROCEDURE — 99001 SPECIMEN HANDLING PT-LAB: CPT

## 2024-06-14 LAB
ANION GAP SERPL CALCULATED.3IONS-SCNC: 16 MMOL/L (ref 3–15)
BUN BLDV-MCNC: 68 MG/DL (ref 6–22)
CALCIUM SERPL-MCNC: 10.5 MG/DL (ref 8.4–10.5)
CHLORIDE BLD-SCNC: 91 MMOL/L (ref 98–110)
CO2: 32 MMOL/L (ref 20–32)
CREAT SERPL-MCNC: 1.6 MG/DL (ref 0.8–1.6)
GFR, ESTIMATED: 46.8 ML/MIN/1.73 SQ.M.
GLUCOSE: 185 MG/DL (ref 70–99)
POTASSIUM SERPL-SCNC: 3.7 MMOL/L (ref 3.5–5.5)
SODIUM BLD-SCNC: 139 MMOL/L (ref 133–145)

## 2024-06-17 ENCOUNTER — PHARMACY VISIT (OUTPATIENT)
Facility: CLINIC | Age: 64
End: 2024-06-17

## 2024-06-17 DIAGNOSIS — E11.21 TYPE 2 DIABETES WITH NEPHROPATHY (HCC): ICD-10-CM

## 2024-06-17 RX ORDER — SUB-Q INSULIN DEVICE, 40 UNIT
EACH MISCELLANEOUS
Qty: 3 KIT | Refills: 3 | Status: SHIPPED | OUTPATIENT
Start: 2024-06-17

## 2024-06-18 DIAGNOSIS — G62.9 NEUROPATHY: ICD-10-CM

## 2024-06-19 NOTE — TELEPHONE ENCOUNTER
Patient is calling stating [employer] is not able to get the Trulicity 3mg. The  is out and Mari doesn't know how long it will be before they get any. Patient wants to know if he can be prescribed something else. Pt calling he had his MRI's done at [facility] and would like his test results and what will be the next step, thanks

## 2024-06-19 NOTE — TELEPHONE ENCOUNTER
[facility]/19/2024    The last fill date for Gabapentin 300 Mg Capsule  was 3/23/2023 for a 90 d/s qty 270      Last UDS: completed     CSA Last signed: not on file         PCP: Blanca Alvarado MD    Last appt: 5/14/2024  Future Appointments   Date Time Provider Department Center   7/2/2024 10:30 AM [facility]   7/2/2024  1:00 PM Merry Prajapati APRN - [facility]   7/9/2024  2:00 PM Blanca Alvarado MD [facility]   7/15/2024  2:30 PM Fuad [facility]   9/3/2024 10:00 AM Roland Campo MD [facility]       Requested Prescriptions     Pending Prescriptions Disp Refills    gabapentin (NEURONTIN) 300 MG capsule [Pharmacy Med Name: GABAPENTIN 300MG CAPSULES] 270 capsule      Sig: Take 1 capsule by mouth 3 times daily.

## 2024-06-19 NOTE — TELEPHONE ENCOUNTER
Is he taking this medication as prescribed? His  records suggest he takes it inconsistently.       Dr. Blanca Alvarado  [facility] of [address], [address]  [place] [address]  Phone: [phone]  Fax: [phone]

## 2024-06-20 NOTE — TELEPHONE ENCOUNTER
Spoke with patient he states he does take gabapentin for neuropathy.  Patient states he is pretty consistent taking it.

## 2024-06-24 RX ORDER — GABAPENTIN 300 MG/1
300 CAPSULE ORAL 3 TIMES DAILY
Qty: 270 CAPSULE | Refills: 1 | Status: SHIPPED | OUTPATIENT
Start: 2024-06-24 | End: 2024-12-21

## 2024-06-24 NOTE — PROGRESS NOTES
Vicenta Fontana presents today for CHF follow-up.  When seen on 6/5/24, he was started on metolazone 2.5mg twice a week along with bumetanide 1mg BID. Labs were repeated after the initiation of metolazone and there has been some improvement in BUN and slight increase in creatinine but overall, within the ranges that he has been in since the beginning of 2024.  When I spoke with him to discuss lab results, he stated that he was feeling better and noticed some improvement in his symptoms.      He was seen by his PCP on 6/27/24 and during that visit, he had gained 3 pounds and complained of puffiness along his groin and increasing abdominal size.  He was noted to have bibasilar crackles and exhibited signs of further decompensation.  Dr. Alvarado discontinued the metolazone and bumetanide and placed him on lasix 40mg TID and labs were ordered for 6/29/24.  His potassium was 3.0, BUN 80 and Creatinine 1.9.  His potassium was increased to 20meq TID.  He was seen again by Dr Alvarado today, 7/2/24.  He gained an additional 2 pounds.    She discontinued his lasix and recommended that he resume potassium 20meq daily.  He also had follow-up with us today and we had a discussion regarding the need for inpatient admission and diuresis.  I spoke with her and discussed his case. She  and I feel that inpatient admission is now warranted.      He was last seen by Dr Campo on 3/1/24.      He is a 63 year old male with history of persistent atrial fibrillation (2016), long-standing diabetes mellitus, type II, dyslipidemia, and hypertension.  He also has a history of fairly severe obstructive sleep apnea requiring BiPAP.  He underwent a cardiac catheterization in 2010, to evaluate symptoms of chest pain, but was found to have mild nonobstructive coronary artery disease.  He wore a Holter monitor in 2016 and it showed a rate-controlled atrial fibrillation with an average heart rate in the 90s without any prolonged tachyarrhythmias or

## 2024-06-25 ENCOUNTER — TELEPHONE (OUTPATIENT)
Facility: CLINIC | Age: 64
End: 2024-06-25

## 2024-06-25 NOTE — TELEPHONE ENCOUNTER
Pt has pink eye and asking for azithromycin   To be sent to his pharmacy   [facility] [id number]

## 2024-06-27 ENCOUNTER — OFFICE VISIT (OUTPATIENT)
Facility: CLINIC | Age: 64
End: 2024-06-27
Payer: COMMERCIAL

## 2024-06-27 ENCOUNTER — HOSPITAL ENCOUNTER (OUTPATIENT)
Facility: HOSPITAL | Age: 64
Discharge: HOME | End: 2024-06-30

## 2024-06-27 VITALS
RESPIRATION RATE: 17 BRPM | OXYGEN SATURATION: 92 % | HEIGHT: 70 IN | BODY MASS INDEX: 38.14 KG/M2 | SYSTOLIC BLOOD PRESSURE: 101 MMHG | DIASTOLIC BLOOD PRESSURE: 69 MMHG | TEMPERATURE: 97.5 F | HEART RATE: 57 BPM | WEIGHT: 266.4 LBS

## 2024-06-27 DIAGNOSIS — I50.9 ACUTE CONGESTIVE HEART FAILURE, UNSPECIFIED HEART FAILURE TYPE (HCC): Primary | ICD-10-CM

## 2024-06-27 DIAGNOSIS — H10.9 CONJUNCTIVITIS, UNSPECIFIED CONJUNCTIVITIS TYPE, UNSPECIFIED LATERALITY: ICD-10-CM

## 2024-06-27 DIAGNOSIS — N18.30 STAGE 3 CHRONIC KIDNEY DISEASE, UNSPECIFIED WHETHER STAGE 3A OR 3B CKD (HCC): ICD-10-CM

## 2024-06-27 DIAGNOSIS — E11.21 TYPE 2 DIABETES WITH NEPHROPATHY (HCC): ICD-10-CM

## 2024-06-27 LAB
SENTARA SPECIMEN COLLECTION: NORMAL
SENTARA SPECIMEN COLLECTION: NORMAL

## 2024-06-27 PROCEDURE — 99214 OFFICE O/P EST MOD 30 MIN: CPT | Performed by: INTERNAL MEDICINE

## 2024-06-27 PROCEDURE — 3044F HG A1C LEVEL LT 7.0%: CPT | Performed by: INTERNAL MEDICINE

## 2024-06-27 PROCEDURE — 3078F DIAST BP <80 MM HG: CPT | Performed by: INTERNAL MEDICINE

## 2024-06-27 PROCEDURE — 99001 SPECIMEN HANDLING PT-LAB: CPT

## 2024-06-27 PROCEDURE — 3074F SYST BP LT 130 MM HG: CPT | Performed by: INTERNAL MEDICINE

## 2024-06-27 RX ORDER — POLYMYXIN B SULFATE AND TRIMETHOPRIM 1; 10000 MG/ML; [USP'U]/ML
1 SOLUTION OPHTHALMIC EVERY 6 HOURS
Qty: 10 ML | Refills: 0 | Status: SHIPPED | OUTPATIENT
Start: 2024-06-27 | End: 2024-07-04

## 2024-06-27 RX ORDER — FUROSEMIDE 40 MG/1
40 TABLET ORAL 3 TIMES DAILY
Qty: 90 TABLET | Refills: 0 | Status: SHIPPED | OUTPATIENT
Start: 2024-06-27

## 2024-06-27 SDOH — ECONOMIC STABILITY: INCOME INSECURITY: HOW HARD IS IT FOR YOU TO PAY FOR THE VERY BASICS LIKE FOOD, HOUSING, MEDICAL CARE, AND HEATING?: NOT HARD AT ALL

## 2024-06-27 SDOH — ECONOMIC STABILITY: FOOD INSECURITY: WITHIN THE PAST 12 MONTHS, YOU WORRIED THAT YOUR FOOD WOULD RUN OUT BEFORE YOU GOT MONEY TO BUY MORE.: NEVER TRUE

## 2024-06-27 SDOH — ECONOMIC STABILITY: FOOD INSECURITY: WITHIN THE PAST 12 MONTHS, THE FOOD YOU BOUGHT JUST DIDN'T LAST AND YOU DIDN'T HAVE MONEY TO GET MORE.: NEVER TRUE

## 2024-06-27 ASSESSMENT — ENCOUNTER SYMPTOMS
BACK PAIN: 1
EYE DISCHARGE: 1
EYE PAIN: 0
ABDOMINAL DISTENTION: 1
SHORTNESS OF BREATH: 1
SORE THROAT: 0
ABDOMINAL PAIN: 1
BLOOD IN STOOL: 0
ANAL BLEEDING: 0

## 2024-06-27 NOTE — PROGRESS NOTES
Vicenta Fontana presents today for   Chief Complaint   Patient presents with    Conjunctivitis           \"Have you been to the ER, urgent care clinic since your last visit?  Hospitalized since your last visit?\"    NO    “Have you seen or consulted any other health care providers outside of [facility] since your last visit?”    NO             
Jardiance.  The last option was discussed as a nonideal option  -Given his hyperglycemia, discussed today with blood sugars in the 3 and 400s, I would recommend that we stop his present regimen of using the VGO insulin delivery device with Tresiba 12 units daily plus Jardiance and replace it with either Jardiance 25 mg daily plus a basal bolus regimen or Jardiance 25 mg daily with an insulin mix (45 units q am and 15 units at night to start this regimen).  Given that he is undergoing a CHF exacerbation, we will postpone changing his medications at this time until #2 is stable.  - Checking lab work.  - Low-carb diet recommended.    2. Acute congestive heart failure: He has bibasilar crackles on exam and gained 3 pounds this month despite taking metolazone twice a week and Bumex twice daily with his other medications.  Complicated ultimately by CKD 3. He had better diuresis but more strain on his renal function with high dose lasix use. Unfortunately though, he is failing his present treatment and is showing signs of impending decompensation. I agree with Cardiology - in that if he does not diurese w/I the next wk, he will need inpt treatment for aggressive diuresis with IV diuretic rx and close monitoring of his electrolytes.   -I am discontinuing his metolazone and Bumex.  He will take Lasix high-dose 40 mg 3 times daily.  He will get labs for me on Saturday.  I will follow-up with him on Monday.  He will continue taking Entresto 24-26 mg twice daily, Jardiance 25 mg daily, spironolactone 25 mg daily, and metoprolol 25 mg daily.    -I encouraged him to follow-up with his nephrologist for further recommendations.  -He will take 20 mill equivalents of potassium but twice a day versus once a day while on high dose lasix.  - He will continue taking his Crestor 40 mg daily and Eliquis 5 mg twice daily.  -I encouraged him to follow-up with cardiology for further recommendations.  - I encouraged patient to go to the ED if

## 2024-06-28 DIAGNOSIS — R06.02 SHORTNESS OF BREATH: ICD-10-CM

## 2024-06-28 DIAGNOSIS — I10 ESSENTIAL (PRIMARY) HYPERTENSION: ICD-10-CM

## 2024-06-29 ENCOUNTER — HOSPITAL ENCOUNTER (OUTPATIENT)
Facility: HOSPITAL | Age: 64
Discharge: HOME | End: 2024-07-02

## 2024-06-29 LAB — SENTARA SPECIMEN COLLECTION: NORMAL

## 2024-06-29 PROCEDURE — 99001 SPECIMEN HANDLING PT-LAB: CPT

## 2024-06-30 LAB
A/G RATIO: 1.8 RATIO (ref 1.1–2.6)
ALBUMIN: 4.2 G/DL (ref 3.5–5)
ALP BLD-CCNC: 137 U/L (ref 40–125)
ALT SERPL-CCNC: 22 U/L (ref 5–40)
ANION GAP SERPL CALCULATED.3IONS-SCNC: 14 MMOL/L (ref 3–15)
AST SERPL-CCNC: 21 U/L (ref 10–37)
BASOPHILS ABSOLUTE: 0 K/UL (ref 0–0.2)
BASOPHILS RELATIVE PERCENT: 0 % (ref 0–2)
BILIRUB SERPL-MCNC: 0.4 MG/DL (ref 0.2–1.2)
BUN BLDV-MCNC: 80 MG/DL (ref 6–22)
CALCIUM SERPL-MCNC: 10.5 MG/DL (ref 8.4–10.5)
CHLORIDE BLD-SCNC: 93 MMOL/L (ref 98–110)
CHOLESTEROL, TOTAL: 146 MG/DL (ref 110–200)
CHOLESTEROL/HDL RATIO: 4.7 (ref 0–5)
CO2: 34 MMOL/L (ref 20–32)
CREAT SERPL-MCNC: 1.9 MG/DL (ref 0.8–1.6)
EOSINOPHIL # BLD: 3 % (ref 0–6)
EOSINOPHILS ABSOLUTE: 0.2 K/UL (ref 0–0.5)
ESTIMATED AVERAGE GLUCOSE: 186 MG/DL (ref 91–123)
GFR, ESTIMATED: 38.4 ML/MIN/1.73 SQ.M.
GLOBULIN: 2.4 G/DL (ref 2–4)
GLUCOSE: 305 MG/DL (ref 70–99)
HBA1C MFR BLD: 8.1 % (ref 4.8–5.6)
HCT VFR BLD CALC: 50.7 % (ref 39.3–51.6)
HDLC SERPL-MCNC: 31 MG/DL
HEMOGLOBIN: 16.1 G/DL (ref 13.1–17.2)
LDL CHOLESTEROL: 42 MG/DL (ref 50–99)
LDL/HDL RATIO: 1.4
LYMPHOCYTES # BLD: 23 % (ref 20–45)
LYMPHOCYTES ABSOLUTE: 1.7 K/UL (ref 1–4.8)
MCH RBC QN AUTO: 30 PG (ref 26–34)
MCHC RBC AUTO-ENTMCNC: 32 G/DL (ref 31–36)
MCV RBC AUTO: 93 FL (ref 80–95)
MONOCYTES ABSOLUTE: 1 K/UL (ref 0.1–1)
MONOCYTES: 14 % (ref 3–12)
NEUTROPHILS ABSOLUTE: 4.4 K/UL (ref 1.8–7.7)
NEUTROPHILS: 60 % (ref 40–75)
NON-HDL CHOLESTEROL: 115 MG/DL
PDW BLD-RTO: 13.5 % (ref 10–15.5)
PLATELET # BLD: 165 K/UL (ref 140–440)
PMV BLD AUTO: 10.5 FL (ref 9–13)
POTASSIUM SERPL-SCNC: 3 MMOL/L (ref 3.5–5.5)
RBC # BLD: 5.45 M/UL (ref 3.8–5.8)
SODIUM BLD-SCNC: 141 MMOL/L (ref 133–145)
TOTAL PROTEIN: 6.6 G/DL (ref 6.2–8.1)
TRIGL SERPL-MCNC: 364 MG/DL (ref 40–149)
VLDLC SERPL CALC-MCNC: 73 MG/DL (ref 8–30)
WBC # BLD: 7.4 K/UL (ref 4–11)

## 2024-07-01 RX ORDER — BUPROPION HYDROCHLORIDE 150 MG/1
150 TABLET, EXTENDED RELEASE ORAL DAILY
Qty: 90 TABLET | Refills: 1 | Status: SHIPPED | OUTPATIENT
Start: 2024-07-01

## 2024-07-02 ENCOUNTER — OFFICE VISIT (OUTPATIENT)
Age: 64
End: 2024-07-02
Payer: COMMERCIAL

## 2024-07-02 ENCOUNTER — OFFICE VISIT (OUTPATIENT)
Facility: CLINIC | Age: 64
End: 2024-07-02
Payer: COMMERCIAL

## 2024-07-02 VITALS
HEART RATE: 65 BPM | WEIGHT: 268 LBS | SYSTOLIC BLOOD PRESSURE: 120 MMHG | OXYGEN SATURATION: 96 % | DIASTOLIC BLOOD PRESSURE: 54 MMHG | HEIGHT: 70 IN | BODY MASS INDEX: 38.37 KG/M2

## 2024-07-02 VITALS
SYSTOLIC BLOOD PRESSURE: 116 MMHG | RESPIRATION RATE: 18 BRPM | HEIGHT: 70 IN | TEMPERATURE: 98 F | BODY MASS INDEX: 38.48 KG/M2 | DIASTOLIC BLOOD PRESSURE: 84 MMHG | OXYGEN SATURATION: 96 % | WEIGHT: 268.8 LBS | HEART RATE: 76 BPM

## 2024-07-02 DIAGNOSIS — I10 ESSENTIAL (PRIMARY) HYPERTENSION: ICD-10-CM

## 2024-07-02 DIAGNOSIS — H10.9 CONJUNCTIVITIS OF RIGHT EYE, UNSPECIFIED CONJUNCTIVITIS TYPE: ICD-10-CM

## 2024-07-02 DIAGNOSIS — I50.33 ACUTE ON CHRONIC DIASTOLIC HEART FAILURE (HCC): Primary | ICD-10-CM

## 2024-07-02 DIAGNOSIS — E11.21 TYPE 2 DIABETES WITH NEPHROPATHY (HCC): ICD-10-CM

## 2024-07-02 DIAGNOSIS — E78.5 DYSLIPIDEMIA: ICD-10-CM

## 2024-07-02 DIAGNOSIS — E66.01 SEVERE OBESITY (BMI 35.0-39.9) WITH COMORBIDITY (HCC): ICD-10-CM

## 2024-07-02 DIAGNOSIS — G47.33 OSA TREATED WITH BIPAP: ICD-10-CM

## 2024-07-02 PROCEDURE — 3074F SYST BP LT 130 MM HG: CPT | Performed by: INTERNAL MEDICINE

## 2024-07-02 PROCEDURE — 99214 OFFICE O/P EST MOD 30 MIN: CPT | Performed by: NURSE PRACTITIONER

## 2024-07-02 PROCEDURE — 99214 OFFICE O/P EST MOD 30 MIN: CPT | Performed by: INTERNAL MEDICINE

## 2024-07-02 PROCEDURE — 3052F HG A1C>EQUAL 8.0%<EQUAL 9.0%: CPT | Performed by: INTERNAL MEDICINE

## 2024-07-02 PROCEDURE — 3078F DIAST BP <80 MM HG: CPT | Performed by: NURSE PRACTITIONER

## 2024-07-02 PROCEDURE — 3079F DIAST BP 80-89 MM HG: CPT | Performed by: INTERNAL MEDICINE

## 2024-07-02 PROCEDURE — 3074F SYST BP LT 130 MM HG: CPT | Performed by: NURSE PRACTITIONER

## 2024-07-02 PROCEDURE — 3052F HG A1C>EQUAL 8.0%<EQUAL 9.0%: CPT | Performed by: NURSE PRACTITIONER

## 2024-07-02 RX ORDER — METOLAZONE 2.5 MG/1
2.5 TABLET ORAL
COMMUNITY
End: 2024-07-02

## 2024-07-02 ASSESSMENT — ENCOUNTER SYMPTOMS
ABDOMINAL DISTENTION: 1
BACK PAIN: 1
SORE THROAT: 0
ANAL BLEEDING: 0
BLOOD IN STOOL: 0
EYE PAIN: 0
SHORTNESS OF BREATH: 1
COUGH: 0

## 2024-07-02 ASSESSMENT — PATIENT HEALTH QUESTIONNAIRE - PHQ9
SUM OF ALL RESPONSES TO PHQ QUESTIONS 1-9: 0
SUM OF ALL RESPONSES TO PHQ9 QUESTIONS 1 & 2: 0
SUM OF ALL RESPONSES TO PHQ QUESTIONS 1-9: 0
2. FEELING DOWN, DEPRESSED OR HOPELESS: NOT AT ALL
1. LITTLE INTEREST OR PLEASURE IN DOING THINGS: NOT AT ALL

## 2024-07-02 NOTE — PROGRESS NOTES
.Vicenta Fontana presents today for   Chief Complaint   Patient presents with    Follow-up     1 month f/u     Edema     Abdomen edema     Shortness of Breath     SOB with exertion and at rest        Vicenta Fontana preferred language for health care discussion is english/other.    Is someone accompanying this pt? no    Is the patient using any DME equipment during OV? no    Depression Screening:  Depression: Not at risk (7/2/2024)    PHQ-2     PHQ-2 Score: 0        Learning Assessment:  Who is the primary learner? Patient    What is the preferred language for health care of the primary learner? ENGLISH    How does the primary learner prefer to learn new concepts? DEMONSTRATION    Answered By patient    Relationship to Learner SELF           Pt currently taking Anticoagulant therapy? Eliquis 5 mg 2x daily     Pt currently taking Antiplatelet therapy ? no      Coordination of Care:  1. Have you been to the ER, urgent care clinic since your last visit? Hospitalized since your last visit? no    2. Have you seen or consulted any other health care providers outside of the [facility] since your last visit? Include any pap smears or colon screening. no

## 2024-07-02 NOTE — PROGRESS NOTES
Vicenta Fontana presents today for   Chief Complaint   Patient presents with    Follow-up     1 wk f/u    Acute Congestive Heart failure     1 wk f./u           \"Have you been to the ER, urgent care clinic since your last visit?  Hospitalized since your last visit?\"    NO    “Have you seen or consulted any other health care providers outside of [facility] since your last visit?”    NO             
mmol/L 93 (L)   CARBON DIOXIDE 20 - 32 mmol/L 34 (H)   BUN,BUNPL 6 - 22 mg/dL 80 (H)   Creatinine 0.8 - 1.6 mg/dL 1.9 (H)   (L): Data is abnormally low  (H): Data is abnormally high    2.  Right eye conjunctivitis: Reported at last visit.  At his last visit, I ordered Polytrim eyedrops to use every 6 hours for 7 days.  Today he reports: his right eye d/c has lessened since using the polytrim drops.    3.  Type 2 diabetes: Not well-controlled.  He was having blood sugars in the 3-400 range, reported last week.  At his last appointment, I discussed stopping his present regimen of Tresiba 12 units daily with Jardiance 25 mg daily and be VGO 40 insulin delivery device and replacing it with a basal bolus regimen with Jardiance 25 mg daily or an insulin mix (45 units in the morning and 15 units at night) with Jardiance 25 mg daily. Today he reports: he has had some additional hyperglycemic episodes since his last apt with Bgs in the 300-400s range.  His BG was 305 on Saturday per recent labs.        Patient Active Problem List    Diagnosis Date Noted    Stage 3b chronic kidney disease ([facility]) 01/22/2024    Acute on chronic diastolic heart failure ([facility]) 01/15/2024    Chronic pancreatitis ([facility]) 11/01/2023    Gastro-esophageal reflux disease without esophagitis 08/17/2023    Essential (primary) hypertension 08/17/2023    Chronic atrial fibrillation, unspecified ([facility]) 08/17/2023    CARLOTA treated with BiPAP     Type 2 diabetes with nephropathy ([facility]) 06/07/2019    History of GI bleed 12/10/2018    Symptomatic anemia 12/10/2018    Chronic anticoagulation 12/10/2018    Diabetic polyneuropathy associated with type 2 diabetes mellitus (HCC) 10/01/2018    Severe obesity (BMI 35.0-39.9) with comorbidity ([facility]) 04/25/2018    Lumbar disc disease 11/08/2017    Benign prostatic hyperplasia 06/09/2017    Nephrolithiasis 06/09/2017    Tubular adenoma of colon 06/09/2017    ROJAS (nonalcoholic steatohepatitis) 06/09/2017    COPD, group B, by GOLD

## 2024-07-02 NOTE — PATIENT INSTRUCTIONS
Learning About Tests When You Have Diabetes  Why do you need regular tests?     Diabetes can lead to other health problems if it's not well managed. You'll need tests to monitor how well your diabetes is managed and to check for other things like high cholesterol or kidney problems. Having tests on a regular schedule can help your doctor find problems early, when it's best to start treating them.  What tests do you need?  These are the tests you may need and how often you should have them. The tests may vary depending on whether you have type 1 or type 2 diabetes.  A1c blood test.  This test shows the average level of blood sugar over the past 2 to 3 months. It helps your doctor see whether blood sugar levels have been staying within your target range.  How often: Every 3 to 6 months  Goal: A blood sugar level in your target range  Blood pressure test.  This test measures the pressure of blood flow in the arteries. Controlling blood pressure can help prevent damage to nerves and blood vessels.  How often: Every 3 to 6 months  Goal: A blood pressure level in your target range  Cholesterol test.  This test measures the amount of a type of fat in the blood. It is common for people with diabetes to also have high cholesterol. Too much cholesterol in the blood can build up inside the blood vessels and raise the risk for heart attack and stroke.  How often: At the time of your diabetes diagnosis, and as often as your doctor recommends after that  Goal: A cholesterol level in your target range  Albumin-creatinine ratio test.  This test checks for kidney damage by looking for the protein albumin (say \"al-BYOO-tammi\") in the urine. Albumin is normally found in the blood. Kidney damage can let small amounts of it (microalbumin) leak into the urine.  How often: Once a year  Goal: No protein in the urine  Blood creatinine test/estimated glomerular filtration (eGFR).  The blood creatinine (say \"ivfv-FI-ev-neen\") level shows

## 2024-07-03 PROBLEM — I50.9 ACUTE DECOMPENSATED HEART FAILURE (HCC): Status: ACTIVE | Noted: 2024-07-03

## 2024-07-09 ENCOUNTER — CLINICAL DOCUMENTATION (OUTPATIENT)
Facility: HOSPITAL | Age: 64
End: 2024-07-09

## 2024-07-09 NOTE — PROGRESS NOTES
Vicenta Fontana  Appointment: 2024 3:15 PM  [facility]  Patient #: [id number]  : 1960  Undefined / Language: English / Race: White  Male    History of Present Illness (Jorge Banegas MD; 2024 4:53 PM)  The patient is a 63 year old male who presents for a Recheck of Chronic kidney disease.This is classified as stage 3.      Note: Patient is a 61-year-old male who has been referred for evaluation of proteinuria and CKD.    Past medical history:  #1 diabetes mellitus type 2 diagnosed for more than 10 years, with complications of neuropathy, no evidence of retinopathy per patient, and microalbuminuria likely indicating nephropathy.  Last hemoglobin A1c was 6.6, MCR worsening over the last several months from 200 range to now 2 g.  #2 essential hypertension, again longstanding, not at goal but reasonably controlled  #3 benign prostatic hypertrophy  #4 history of renal cysts  #5 atrial fibrillation  #6 obstructive sleep apnea  #7 dyslipidemia  #8 anemia  #9 lower back pain, spinal cord stimulator in place    Had a recent bump in creatinine to 1.9 range up from a baseline of 1.3 - 1.5 recently.  It was thought to be due to dehydration and with improving fluid intake his creatinine has improved back to baseline.  But patient had microalbumin creatinine ratio in the 200 range in the past and has worsened to about 2 g recently.  He takes losartan 100 mg daily for hypertension and CKD with proteinuria..  He says his blood pressures around 1940 systolic range.  His hemoglobin A1c is in 6 range.  Denies any urinary symptoms.  Does not use NSAIDs.  Does get some lower extremity swelling and uses Lasix as needed for the same.    Interval History:  hospital stay for CHF, was d/c eed on saturday this weekend  diuresed , now on Bumex 1 mg BID , metolazone 5 mg MWF and aldactone 25  wt stabilized around 258 , wt last visit was 240  improved SOB, edema  says its difficult to fluid restrict  creat

## 2024-07-11 ENCOUNTER — OFFICE VISIT (OUTPATIENT)
Facility: CLINIC | Age: 64
End: 2024-07-11
Payer: COMMERCIAL

## 2024-07-11 VITALS
OXYGEN SATURATION: 99 % | WEIGHT: 258.2 LBS | SYSTOLIC BLOOD PRESSURE: 101 MMHG | DIASTOLIC BLOOD PRESSURE: 64 MMHG | HEIGHT: 70 IN | HEART RATE: 74 BPM | TEMPERATURE: 98.3 F | RESPIRATION RATE: 18 BRPM | BODY MASS INDEX: 36.97 KG/M2

## 2024-07-11 DIAGNOSIS — E11.21 TYPE 2 DIABETES WITH NEPHROPATHY (HCC): ICD-10-CM

## 2024-07-11 DIAGNOSIS — I50.33 ACUTE ON CHRONIC DIASTOLIC HEART FAILURE (HCC): Primary | ICD-10-CM

## 2024-07-11 DIAGNOSIS — Z09 HOSPITAL DISCHARGE FOLLOW-UP: ICD-10-CM

## 2024-07-11 PROCEDURE — 3052F HG A1C>EQUAL 8.0%<EQUAL 9.0%: CPT | Performed by: INTERNAL MEDICINE

## 2024-07-11 PROCEDURE — 3078F DIAST BP <80 MM HG: CPT | Performed by: INTERNAL MEDICINE

## 2024-07-11 PROCEDURE — 3074F SYST BP LT 130 MM HG: CPT | Performed by: INTERNAL MEDICINE

## 2024-07-11 PROCEDURE — 99214 OFFICE O/P EST MOD 30 MIN: CPT | Performed by: INTERNAL MEDICINE

## 2024-07-11 PROCEDURE — 1111F DSCHRG MED/CURRENT MED MERGE: CPT | Performed by: INTERNAL MEDICINE

## 2024-07-11 NOTE — PROGRESS NOTES
Influenza, FLUARIX, FLULAVAL, FLUZONE (age 6 mo+) AND AFLURIA, (age 3 y+), PF, 0.5mL 10/01/2018, 10/14/2019, 09/01/2020    Influenza, FLUCELVAX, (age 6 mo+), MDCK, PF, 0.5mL 11/22/2023    Pneumococcal, PCV-13, PREVNAR 13, (age 6w+), IM, 0.5mL 10/01/2018    Pneumococcal, PPSV23, PNEUMOVAX 23, (age 2y+), SC/IM, 0.5mL 02/13/2017, 10/14/2019    TDaP, ADACEL (age 10y-64y), BOOSTRIX (age 10y+), IM, 0.5mL 02/13/2017    Zoster Recombinant (Shingrix) 09/29/2020, 12/03/2020       Additional Laboratory Parameters of Interest:   Estimation of renal function:  Lab Results   Component Value Date/Time    LABGLOM 42 07/06/2024 12:38 AM    LABGLOM 44 07/05/2024 01:41 AM    LABGLOM 54 07/04/2024 04:45 AM    LABGLOM 38.4 06/29/2024 07:50 AM    LABGLOM 46.8 06/13/2024 11:50 AM    LABGLOM 54.6 05/20/2024 04:17 PM    LABGLOM 38.4 04/22/2024 02:24 PM    LABGLOM 36.2 04/16/2024 03:16 PM    LABGLOM 46.8 02/08/2024 11:15 AM    LABGLOM 59.9 08/11/2022 11:25 AM    LABGLOM 59.9 08/11/2022 11:25 AM    LABGLOM 38.9 06/09/2022 10:03 AM    GFRAA 51.0 07/06/2024 12:38 AM    GFRAA 54.0 07/05/2024 01:41 AM    GFRAA >60 07/04/2024 04:45 AM     Wt Readings from Last 3 Encounters:   07/11/24 117.1 kg (258 lb 3.2 oz)   07/02/24 120.7 kg (266 lb)   07/02/24 121.6 kg (268 lb)     Ht Readings from Last 1 Encounters:   07/11/24 1.778 m (5' 10\")     Calculated estimated creatinine clearance: estimated creatinine clearance is 55 mL/min (A) (based on SCr of 1.73 mg/dL (H)).    Vital Signs Today:    There were no vitals taken for this visit.    Medications Discontinued During This Encounter   Medication Reason    Insulin Degludec (TRESIBA FLEXTOUCH) 200 UNIT/ML SOPN DOSE ADJUSTMENT       Orders Placed This Encounter    Insulin Degludec (TRESIBA FLEXTOUCH) 200 UNIT/ML SOPN     Sig: Inject 18 Units into the skin every morning (before breakfast)     Dispense:  9 mL     Refill:  0       Future Appointments   Date Time Provider Department Center   8/7/2024  3:00 PM

## 2024-07-11 NOTE — PROGRESS NOTES
Vicenta Marizol presents today for   Chief Complaint   Patient presents with    Follow-Up from Hospital           \"Have you been to the ER, urgent care clinic since your last visit?  Hospitalized since your last visit?\"    YES - When: approximately 1  weeks ago.  Where and Why: [facility].    “Have you seen or consulted any other health care providers outside of [facility] since your last visit?”    NO             
Jeni Estrella(PA)
daily  -Continue with CPAP.  - I encouraged him to follow-up with his nephrologist for routine checkups.  -I encouraged him to follow-up with his cardiology team for further recommendations.    2. Type 2 DM: Not well controlled.   -We discussed to be agreeable with plan in place of what he is presently taking.  He wants to hold off on a basal bolus regimen preferring to adjust his present regimen.  As result, he will continue with VGO 40, giving himself 6 clicks (12 units) with morning with breakfast, 5 clicks (10 units) with lunch, and  7 (14 units) clicks with dinner.  - He will continue with Jardiance 25 mg daily.  - I encouraged him to maintain the low-carb diet.    A. Key points we discussed today:  1. Pt instructed to call our office if symptoms worsen or if the pt/caregiver has any questions.  2. Handout given       No orders of the defined types were placed in this encounter.      B. New labs/medications ordered today:   1. A new medication list was given to the patient/family/caregiver.      The medication list has been updated. A new medication list was given today to the patient. I have discussed the diagnosis with the patient and the intended plan as seen in the above orders.  The patient has received an after-visit summary and questions were answered concerning future plans.  I have discussed medication side effects and warnings with the patient as well. I have reviewed the plan of care with the patient, accepted their input and they are in agreement with the treatment goals. All questions were answered. The patient understands the plan of care. Handouts provided today with above information. Pt instructed if symptoms worsen to call the office or report to the ED for continued care.  Greater than 50% of the visit time was spent in counseling and/or coordination of care.      No follow-up provider specified.  Initial transitional care contact was made on: N/A    Admit date: 7/2/2024  Discharge date:

## 2024-07-11 NOTE — PATIENT INSTRUCTIONS
Learning About Tests When You Have Diabetes  Why do you need regular tests?     Diabetes can lead to other health problems if it's not well managed. You'll need tests to monitor how well your diabetes is managed and to check for other things like high cholesterol or kidney problems. Having tests on a regular schedule can help your doctor find problems early, when it's best to start treating them.  What tests do you need?  These are the tests you may need and how often you should have them. The tests may vary depending on whether you have type 1 or type 2 diabetes.  A1c blood test.  This test shows the average level of blood sugar over the past 2 to 3 months. It helps your doctor see whether blood sugar levels have been staying within your target range.  How often: Every 3 to 6 months  Goal: A blood sugar level in your target range  Blood pressure test.  This test measures the pressure of blood flow in the arteries. Controlling blood pressure can help prevent damage to nerves and blood vessels.  How often: Every 3 to 6 months  Goal: A blood pressure level in your target range  Cholesterol test.  This test measures the amount of a type of fat in the blood. It is common for people with diabetes to also have high cholesterol. Too much cholesterol in the blood can build up inside the blood vessels and raise the risk for heart attack and stroke.  How often: At the time of your diabetes diagnosis, and as often as your doctor recommends after that  Goal: A cholesterol level in your target range  Albumin-creatinine ratio test.  This test checks for kidney damage by looking for the protein albumin (say \"al-BYOO-tammi\") in the urine. Albumin is normally found in the blood. Kidney damage can let small amounts of it (microalbumin) leak into the urine.  How often: Once a year  Goal: No protein in the urine  Blood creatinine test/estimated glomerular filtration (eGFR).  The blood creatinine (say \"agbb-PU-pq-neen\") level shows

## 2024-07-15 ENCOUNTER — PHARMACY VISIT (OUTPATIENT)
Facility: CLINIC | Age: 64
End: 2024-07-15

## 2024-07-15 DIAGNOSIS — E11.21 TYPE 2 DIABETES WITH NEPHROPATHY (HCC): Primary | ICD-10-CM

## 2024-07-15 RX ORDER — INSULIN DEGLUDEC 200 U/ML
18 INJECTION, SOLUTION SUBCUTANEOUS
Qty: 9 ML | Refills: 0 | Status: SHIPPED
Start: 2024-07-15

## 2024-07-15 NOTE — PATIENT INSTRUCTIONS
- Increase Tresiba to 18 units every morning      * Increase by 2 units every 2-3 days if your morning FASTING blood glucose (with proper VGo changes) is > 130 mg/dL.   * Once you are consistently < 130 mg/dL, stay at that dose    - Change the VGo at the same time every day    - Always give yourself the clicks with meals

## 2024-07-22 RX ORDER — PEN NEEDLE, DIABETIC 32GX 5/32"
NEEDLE, DISPOSABLE MISCELLANEOUS
Qty: 200 EACH | Refills: 5 | Status: SHIPPED | OUTPATIENT
Start: 2024-07-22

## 2024-08-05 RX ORDER — FENOFIBRATE 145 MG/1
145 TABLET, COATED ORAL DAILY
Qty: 90 TABLET | Refills: 1 | OUTPATIENT
Start: 2024-08-05

## 2024-08-07 ENCOUNTER — PHARMACY VISIT (OUTPATIENT)
Facility: CLINIC | Age: 64
End: 2024-08-07

## 2024-08-07 DIAGNOSIS — E11.21 TYPE 2 DIABETES WITH NEPHROPATHY (HCC): Primary | ICD-10-CM

## 2024-08-07 RX ORDER — INSULIN DEGLUDEC 200 U/ML
28 INJECTION, SOLUTION SUBCUTANEOUS
Qty: 9 ML | Refills: 0 | Status: SHIPPED | OUTPATIENT
Start: 2024-08-07

## 2024-08-07 NOTE — PROGRESS NOTES
Pharmacy Progress Note - Diabetes Management       Assessment / Plan:   Diabetes Management:  Per ADA guidelines, Pt's A1c is not at goal of < 7%.  Pt's nonadherence is leading to very poor glycemic control.  When he is adherent to his Tresiba, his basal control is still not adequate.  Will increase his Tresiba to 28 units qam and stressed the importance of increased adherence.  Will maintain his current VGo dosing for now.  Stressed the importance of proper timing of his clicks.  If additional prandial control is needed, will likely remove VGo from his regimen and switch to a basal/bolus regimen.  However, his lack of adherence would likely increase with the increase in injections required per day vs just clicking on the VGo.  Will reassess with CGM data in 4 weeks.     Nutrition/Lifestyle Modifications:  - Educated pt on the importance of moderating carbohydrate intake. Reviewed sources of carbohydrates and method to help determine appropriate portion sizes (e.g., Diabetes Plate Method).  - Advised patient to avoid sugar-sweetened beverages and replace with water or diet/zero sugar option.  - Recommend ~30 minutes consistent, moderately intensive, exercise/day or ~150 minutes/week. Start small, stay consistent, and increase length and types of exercise, as tolerated.       Patient will return to clinic in 4 week(s) for follow up.        S/O: Mr. Vicenta Fontana, a 63 y.o. male referred by Blanca Alvarado MD,  has a past medical history of Abnormal echocardiogram, Acute GI bleeding, Arthritis, Asthma, Back problem, BPH (benign prostatic hyperplasia), Bronchitis, Chronic anticoagulation, Chronic lung disease, Chronic obstructive pulmonary disease (HCC), CMC (carpometacarpal joint) dislocation, COPD (chronic obstructive pulmonary disease) (HCC), Coronary artery calcification, Diabetes mellitus (HCC), Diabetic polyneuropathy associated with type 2 diabetes mellitus (HCC), Diverticulitis, Dyslipidemia, Essential

## 2024-08-10 DIAGNOSIS — I50.9 ACUTE CONGESTIVE HEART FAILURE, UNSPECIFIED HEART FAILURE TYPE (HCC): ICD-10-CM

## 2024-08-10 DIAGNOSIS — N17.9 AKI (ACUTE KIDNEY INJURY) (HCC): ICD-10-CM

## 2024-08-10 DIAGNOSIS — E11.21 TYPE 2 DIABETES MELLITUS WITH DIABETIC NEPHROPATHY, WITH LONG-TERM CURRENT USE OF INSULIN (HCC): ICD-10-CM

## 2024-08-10 DIAGNOSIS — Z79.4 TYPE 2 DIABETES MELLITUS WITH DIABETIC NEPHROPATHY, WITH LONG-TERM CURRENT USE OF INSULIN (HCC): ICD-10-CM

## 2024-08-12 ENCOUNTER — OFFICE VISIT (OUTPATIENT)
Facility: CLINIC | Age: 64
End: 2024-08-12
Payer: COMMERCIAL

## 2024-08-12 ENCOUNTER — HOSPITAL ENCOUNTER (OUTPATIENT)
Facility: HOSPITAL | Age: 64
Discharge: HOME | End: 2024-08-15

## 2024-08-12 VITALS
HEIGHT: 70 IN | HEART RATE: 54 BPM | SYSTOLIC BLOOD PRESSURE: 122 MMHG | RESPIRATION RATE: 18 BRPM | WEIGHT: 259.4 LBS | TEMPERATURE: 98.6 F | DIASTOLIC BLOOD PRESSURE: 67 MMHG | BODY MASS INDEX: 37.14 KG/M2 | OXYGEN SATURATION: 99 %

## 2024-08-12 DIAGNOSIS — I50.9 ACUTE CONGESTIVE HEART FAILURE, UNSPECIFIED HEART FAILURE TYPE (HCC): ICD-10-CM

## 2024-08-12 DIAGNOSIS — Z72.52 HIGH RISK HOMOSEXUAL BEHAVIOR: ICD-10-CM

## 2024-08-12 DIAGNOSIS — E11.21 TYPE 2 DIABETES WITH NEPHROPATHY (HCC): Primary | ICD-10-CM

## 2024-08-12 DIAGNOSIS — N18.30 STAGE 3 CHRONIC KIDNEY DISEASE, UNSPECIFIED WHETHER STAGE 3A OR 3B CKD (HCC): ICD-10-CM

## 2024-08-12 DIAGNOSIS — N52.9 ERECTILE DYSFUNCTION, UNSPECIFIED ERECTILE DYSFUNCTION TYPE: ICD-10-CM

## 2024-08-12 LAB — SENTARA SPECIMEN COLLECTION: NORMAL

## 2024-08-12 PROCEDURE — 3052F HG A1C>EQUAL 8.0%<EQUAL 9.0%: CPT | Performed by: INTERNAL MEDICINE

## 2024-08-12 PROCEDURE — 3074F SYST BP LT 130 MM HG: CPT | Performed by: INTERNAL MEDICINE

## 2024-08-12 PROCEDURE — 3078F DIAST BP <80 MM HG: CPT | Performed by: INTERNAL MEDICINE

## 2024-08-12 PROCEDURE — 99001 SPECIMEN HANDLING PT-LAB: CPT

## 2024-08-12 PROCEDURE — 99214 OFFICE O/P EST MOD 30 MIN: CPT | Performed by: INTERNAL MEDICINE

## 2024-08-12 RX ORDER — INSULIN DEGLUDEC 200 U/ML
42 INJECTION, SOLUTION SUBCUTANEOUS
Qty: 12 ML | Refills: 5 | Status: SHIPPED | OUTPATIENT
Start: 2024-08-12

## 2024-08-12 RX ORDER — INSULIN LISPRO 100 [IU]/ML
13 INJECTION, SOLUTION INTRAVENOUS; SUBCUTANEOUS
Qty: 4 ADJUSTABLE DOSE PRE-FILLED PEN SYRINGE | Refills: 5 | Status: SHIPPED | OUTPATIENT
Start: 2024-08-12

## 2024-08-12 RX ORDER — EMPAGLIFLOZIN 25 MG/1
25 TABLET, FILM COATED ORAL DAILY
Qty: 30 TABLET | Refills: 5 | OUTPATIENT
Start: 2024-08-12

## 2024-08-12 NOTE — PROGRESS NOTES
Vicenta Fontana is a 63 y.o. male (: 1960) presenting to address:    Chief Complaint   Patient presents with    1 Month Follow-Up       Vitals:    24 1229   BP: 122/67   Pulse: 54   Resp: 18   Temp: 98.6 °F (37 °C)       \"Have you been to the ER, urgent care clinic since your last visit?  Hospitalized since your last visit?\"    NO    “Have you seen or consulted any other health care providers outside of [facility] since your last visit?”    NO.

## 2024-08-12 NOTE — PROGRESS NOTES
INTERNISTS OF [facility]:  8/19/2024, MRN: [id number]      Vicenta Fontana is a 63 y.o. 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      1. Type 2 DM: He forgets to give him self meal time clicks. He is using a VGO 40 + tresiba 28 units daily. He has itching along the VGO site. Taking 25mg daily of jardiance.  He is administering 6 clicks in the morning, 5: clicks with lunch, and 7 clicks with dinner.    2. CHF and CKD: Taking: jardiance 25mg daily, bumex 1mg bid, metolazone 5 mg 3 times per week, Eliquis 5 mg twice daily (given atrial fibrillation history), Entresto 24-26 mg twice daily, Lovaza 2 g twice daily, metoprolol 75mg daily, aldactone 25mg daily, and crestor 40mg daily . His weight is 259lbs. It was 258lbs at his July apt. Asymptomatic. Followed by  with Nephrology. He sees him later this year.     3.  ED and Prep request: The patient is requesting refill of the prophylaxis for HIV.  He plans to travel outside the country to [place]  and staying to meet with 2 then he has been dating online.  1 of these gentleman has admitted to being HIV positive.  The patient reports some difficulty maintaining erection.          Patient Active Problem List    Diagnosis Date Noted    Stage 3b chronic kidney disease (HCC) 01/22/2024    Acute decompensated heart failure (HCC) 07/03/2024    Acute

## 2024-08-19 ASSESSMENT — ENCOUNTER SYMPTOMS
COUGH: 0
BLOOD IN STOOL: 0
SHORTNESS OF BREATH: 0
EYE PAIN: 0
ANAL BLEEDING: 0
SORE THROAT: 0
ABDOMINAL PAIN: 0

## 2024-08-29 ENCOUNTER — OFFICE VISIT (OUTPATIENT)
Facility: CLINIC | Age: 64
End: 2024-08-29
Payer: COMMERCIAL

## 2024-08-29 VITALS
WEIGHT: 257 LBS | SYSTOLIC BLOOD PRESSURE: 119 MMHG | HEART RATE: 74 BPM | OXYGEN SATURATION: 94 % | BODY MASS INDEX: 36.79 KG/M2 | HEIGHT: 70 IN | TEMPERATURE: 98.9 F | DIASTOLIC BLOOD PRESSURE: 60 MMHG | RESPIRATION RATE: 16 BRPM

## 2024-08-29 DIAGNOSIS — I50.9 CHRONIC CONGESTIVE HEART FAILURE, UNSPECIFIED HEART FAILURE TYPE (HCC): ICD-10-CM

## 2024-08-29 DIAGNOSIS — Z72.52 HIGH RISK HOMOSEXUAL BEHAVIOR: ICD-10-CM

## 2024-08-29 DIAGNOSIS — N18.30 STAGE 3 CHRONIC KIDNEY DISEASE, UNSPECIFIED WHETHER STAGE 3A OR 3B CKD (HCC): ICD-10-CM

## 2024-08-29 DIAGNOSIS — E11.21 TYPE 2 DIABETES WITH NEPHROPATHY (HCC): Primary | ICD-10-CM

## 2024-08-29 PROCEDURE — 3052F HG A1C>EQUAL 8.0%<EQUAL 9.0%: CPT | Performed by: INTERNAL MEDICINE

## 2024-08-29 PROCEDURE — 3078F DIAST BP <80 MM HG: CPT | Performed by: INTERNAL MEDICINE

## 2024-08-29 PROCEDURE — 99214 OFFICE O/P EST MOD 30 MIN: CPT | Performed by: INTERNAL MEDICINE

## 2024-08-29 PROCEDURE — 3074F SYST BP LT 130 MM HG: CPT | Performed by: INTERNAL MEDICINE

## 2024-08-29 RX ORDER — INSULIN DEGLUDEC 200 U/ML
52 INJECTION, SOLUTION SUBCUTANEOUS
Qty: 12 ML | Refills: 5 | Status: SHIPPED | OUTPATIENT
Start: 2024-08-29 | End: 2024-09-06 | Stop reason: DRUGHIGH

## 2024-08-29 RX ORDER — BUMETANIDE 1 MG/1
1 TABLET ORAL 2 TIMES DAILY
Qty: 90 TABLET | Refills: 1 | Status: SHIPPED | OUTPATIENT
Start: 2024-08-29

## 2024-08-29 RX ORDER — INSULIN LISPRO 100 [IU]/ML
15 INJECTION, SOLUTION INTRAVENOUS; SUBCUTANEOUS
Qty: 4 ADJUSTABLE DOSE PRE-FILLED PEN SYRINGE | Refills: 5 | Status: SHIPPED | OUTPATIENT
Start: 2024-08-29

## 2024-08-29 RX ORDER — METOLAZONE 5 MG/1
5 TABLET ORAL
Qty: 45 TABLET | Refills: 2 | Status: SHIPPED | OUTPATIENT
Start: 2024-08-30

## 2024-08-29 NOTE — PROGRESS NOTES
Vicenta Fontana presents today for   Chief Complaint   Patient presents with    2 Week Follow-Up           \"Have you been to the ER, urgent care clinic since your last visit?  Hospitalized since your last visit?\"    NO    “Have you seen or consulted any other health care providers outside of [facility] since your last visit?”    NO           
    4. Intention Tremor: He has a tremor in his hands but only when he is intending to do an action.        Patient Active Problem List    Diagnosis Date Noted    Stage 3b chronic kidney disease ([facility]) 01/22/2024    Acute decompensated heart failure ([facility]) 07/03/2024    Acute on chronic diastolic heart failure ([facility]) 01/15/2024    Chronic pancreatitis ([facility]) 11/01/2023    Gastro-esophageal reflux disease without esophagitis 08/17/2023    Essential (primary) hypertension 08/17/2023    Chronic atrial fibrillation, unspecified ([facility]) 08/17/2023    CARLOTA treated with BiPAP     Type 2 diabetes with nephropathy ([facility]) 06/07/2019    History of GI bleed 12/10/2018    Symptomatic anemia 12/10/2018    Chronic anticoagulation 12/10/2018    Diabetic polyneuropathy associated with type 2 diabetes mellitus ([facility]) 10/01/2018    Severe obesity (BMI 35.0-39.9) with comorbidity ([facility]) 04/25/2018    Lumbar disc disease 11/08/2017    Benign prostatic hyperplasia 06/09/2017    Nephrolithiasis 06/09/2017    Tubular adenoma of colon 06/09/2017    ROJAS (nonalcoholic steatohepatitis) 06/09/2017    COPD, group B, by GOLD 2017 classification ([facility]) 06/09/2017    Renal cyst 06/09/2017    Diverticulosis 06/09/2017    Dyslipidemia 03/24/2016       Current Outpatient Medications   Medication Sig Dispense Refill    insulin lispro, 1 Unit Dial, (HUMALOG KWIKPEN) 100 UNIT/ML SOPN Inject 15 Units into the skin 3 times daily (before meals) 4 Adjustable Dose Pre-filled Pen Syringe 5    Insulin Degludec (TRESIBA FLEXTOUCH) 200 UNIT/ML SOPN Inject 52 Units into the skin every morning (before breakfast) 12 mL 5    metOLazone (ZAROXOLYN) 5 MG tablet Take 1 tablet by mouth three times a week 45 tablet 2    bumetanide (BUMEX) 1 MG tablet Take 1 tablet by mouth in the morning and 1 tablet in the evening. 90 tablet 1    apixaban (ELIQUIS) 2.5 MG TABS tablet Take 1 tablet by mouth 2 times daily 180 tablet 1    empagliflozin (JARDIANCE) 25 MG tablet Take 1 tablet by mouth

## 2024-09-03 ENCOUNTER — TELEPHONE (OUTPATIENT)
Facility: CLINIC | Age: 64
End: 2024-09-03

## 2024-09-03 ENCOUNTER — OFFICE VISIT (OUTPATIENT)
Age: 64
End: 2024-09-03
Payer: COMMERCIAL

## 2024-09-03 VITALS
BODY MASS INDEX: 36.36 KG/M2 | SYSTOLIC BLOOD PRESSURE: 128 MMHG | OXYGEN SATURATION: 97 % | HEIGHT: 70 IN | DIASTOLIC BLOOD PRESSURE: 88 MMHG | WEIGHT: 254 LBS | HEART RATE: 70 BPM

## 2024-09-03 DIAGNOSIS — I48.19 ATRIAL FIBRILLATION, PERSISTENT (HCC): ICD-10-CM

## 2024-09-03 DIAGNOSIS — E11.21 TYPE 2 DIABETES WITH NEPHROPATHY (HCC): ICD-10-CM

## 2024-09-03 DIAGNOSIS — E66.01 SEVERE OBESITY (BMI 35.0-39.9) WITH COMORBIDITY (HCC): ICD-10-CM

## 2024-09-03 DIAGNOSIS — E78.5 DYSLIPIDEMIA: ICD-10-CM

## 2024-09-03 DIAGNOSIS — I50.42 CHRONIC COMBINED SYSTOLIC AND DIASTOLIC HEART FAILURE (HCC): Primary | ICD-10-CM

## 2024-09-03 DIAGNOSIS — G47.33 OSA TREATED WITH BIPAP: ICD-10-CM

## 2024-09-03 DIAGNOSIS — I10 ESSENTIAL (PRIMARY) HYPERTENSION: ICD-10-CM

## 2024-09-03 PROCEDURE — 99214 OFFICE O/P EST MOD 30 MIN: CPT | Performed by: INTERNAL MEDICINE

## 2024-09-03 PROCEDURE — 3079F DIAST BP 80-89 MM HG: CPT | Performed by: INTERNAL MEDICINE

## 2024-09-03 PROCEDURE — 3052F HG A1C>EQUAL 8.0%<EQUAL 9.0%: CPT | Performed by: INTERNAL MEDICINE

## 2024-09-03 PROCEDURE — 93000 ELECTROCARDIOGRAM COMPLETE: CPT | Performed by: INTERNAL MEDICINE

## 2024-09-03 PROCEDURE — 3074F SYST BP LT 130 MM HG: CPT | Performed by: INTERNAL MEDICINE

## 2024-09-03 ASSESSMENT — PATIENT HEALTH QUESTIONNAIRE - PHQ9
SUM OF ALL RESPONSES TO PHQ QUESTIONS 1-9: 0
SUM OF ALL RESPONSES TO PHQ QUESTIONS 1-9: 0
2. FEELING DOWN, DEPRESSED OR HOPELESS: NOT AT ALL
SUM OF ALL RESPONSES TO PHQ9 QUESTIONS 1 & 2: 0
SUM OF ALL RESPONSES TO PHQ QUESTIONS 1-9: 0
1. LITTLE INTEREST OR PLEASURE IN DOING THINGS: NOT AT ALL
SUM OF ALL RESPONSES TO PHQ QUESTIONS 1-9: 0

## 2024-09-03 ASSESSMENT — ANXIETY QUESTIONNAIRES
5. BEING SO RESTLESS THAT IT IS HARD TO SIT STILL: NOT AT ALL
4. TROUBLE RELAXING: NOT AT ALL
1. FEELING NERVOUS, ANXIOUS, OR ON EDGE: NOT AT ALL
7. FEELING AFRAID AS IF SOMETHING AWFUL MIGHT HAPPEN: NOT AT ALL
GAD7 TOTAL SCORE: 0
3. WORRYING TOO MUCH ABOUT DIFFERENT THINGS: NOT AT ALL
2. NOT BEING ABLE TO STOP OR CONTROL WORRYING: NOT AT ALL
6. BECOMING EASILY ANNOYED OR IRRITABLE: NOT AT ALL

## 2024-09-03 ASSESSMENT — ENCOUNTER SYMPTOMS
NAUSEA: 0
COUGH: 0
SORE THROAT: 0
VOMITING: 0
ABDOMINAL DISTENTION: 0
SHORTNESS OF BREATH: 1
ABDOMINAL PAIN: 0

## 2024-09-03 NOTE — TELEPHONE ENCOUNTER
Rcvd \"message to prescriber\" notification from pharmacy ([facility]) stating that Furosemide 40mg and Bumetanide 1 mg are in the loop diuretics, oral class and may represent a therapeutic duplication.     Please confirm.      MITESH Cordon LPN
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
## 2024-09-03 NOTE — TELEPHONE ENCOUNTER
His lasix wax d/c.  Please let his pharmacy know.  We must not have the updated cancellation of his furosemide prescription.  Her medication list has been updated and no longer includes furosemide/Lasix.     Dr. Blanca Alvarado  [facility] [address], [address]  [place] [address]  Phone: [phone]  Fax: [phone]

## 2024-09-03 NOTE — PROGRESS NOTES
09/03/24     Vicenta Fontana  is a 63 y.o. male     Chief Complaint   Patient presents with    Follow-up     6 month       HPI    Patient presents for a follow-up office visit.  He has a past medical history significant for persistent atrial fibrillation, long-standing diabetes mellitus, type II, dyslipidemia, and hypertension.  He also has a history of fairly severe obstructive sleep apnea requiring BiPAP.  He reports undergoing a cardiac catheterization in 2010, to evaluate symptoms of chest pain, but was found to have mild nonobstructive coronary artery disease.    In 2016, he was discovered to be in atrial fibrillation and he then underwent an echocardiogram and a Holter monitor.  His echocardiogram was unremarkable, showing preserved LV systolic function and no significant valvular heart disease.  His Holter monitor showed a rate-controlled atrial fibrillation with an average heart rate in the 90s without any prolonged tachyarrhythmias or bradyarrhythmias.    The patient was hospitalized in December 2018 for a GI bleed requiring blood transfusion.  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  No recurrent GI bleeding since that time.    He underwent a pharmacologic nuclear stress test in August 2023 which was negative for ischemia infarction, but did show a mildly depressed ejection fraction of 48%.    He was hospitalized for over a week in January 2024 at [facility] for acute heart failure with mildly reduced ejection fraction.  His weight was up 20 kg and he had severe volume overload.  He was treated with IV diuretics.  A repeat echocardiogram was completed in January 2024 which showed a reduced ejection fraction at 43% with global hypokinesis, moderate concentric LVH, mild RV dysfunction but normal PA pressures.  Medications were adjusted and his diuretics were increased.    He was last seen in our office 2 months ago by our nurse practitioner and he

## 2024-09-03 NOTE — PROGRESS NOTES
Vicenta Fontana presents today for   Chief Complaint   Patient presents with    Follow-up     6 month       Vicenta Fontana preferred language for health care discussion is english/other.    Is someone accompanying this pt? no    Is the patient using any DME equipment during OV? no    Depression Screening:  Depression: Not at risk (9/3/2024)    PHQ-2     PHQ-2 Score: 0        Learning Assessment:  Who is the primary learner? Patient    What is the preferred language for health care of the primary learner? ENGLISH    How does the primary learner prefer to learn new concepts? DEMONSTRATION    Answered By patient    Relationship to Learner SELF           Pt currently taking Anticoagulant therapy? Eliquis 2.5 mg bid    Pt currently taking Antiplatelet therapy ? no      Coordination of Care:  1. Have you been to the ER, urgent care clinic since your last visit? Hospitalized since your last visit? no    2. Have you seen or consulted any other health care providers outside of the [facility] since your last visit? Include any pap smears or colon screening. no

## 2024-09-05 ASSESSMENT — ENCOUNTER SYMPTOMS
BLOOD IN STOOL: 0
COUGH: 0
EYE PAIN: 0
SORE THROAT: 0
ANAL BLEEDING: 0
BACK PAIN: 1
ABDOMINAL PAIN: 0

## 2024-09-05 NOTE — TELEPHONE ENCOUNTER
Contacted pharmacy to advise to d/c furosemide. No further action on encounter..      MITESH Cordon LPN

## 2024-09-06 ENCOUNTER — HOSPITAL ENCOUNTER (OUTPATIENT)
Facility: HOSPITAL | Age: 64
Discharge: HOME | End: 2024-09-09

## 2024-09-06 ENCOUNTER — PHARMACY VISIT (OUTPATIENT)
Facility: CLINIC | Age: 64
End: 2024-09-06

## 2024-09-06 DIAGNOSIS — E11.21 TYPE 2 DIABETES WITH NEPHROPATHY (HCC): ICD-10-CM

## 2024-09-06 LAB — SENTARA SPECIMEN COLLECTION: NORMAL

## 2024-09-06 PROCEDURE — 99001 SPECIMEN HANDLING PT-LAB: CPT

## 2024-09-06 RX ORDER — INSULIN DEGLUDEC 200 U/ML
68 INJECTION, SOLUTION SUBCUTANEOUS
Qty: 12 ML | Refills: 5 | Status: SHIPPED | OUTPATIENT
Start: 2024-09-06

## 2024-09-19 ENCOUNTER — OFFICE VISIT (OUTPATIENT)
Facility: CLINIC | Age: 64
End: 2024-09-19
Payer: COMMERCIAL

## 2024-09-19 VITALS
TEMPERATURE: 98.5 F | HEIGHT: 70 IN | WEIGHT: 262 LBS | RESPIRATION RATE: 18 BRPM | BODY MASS INDEX: 37.51 KG/M2 | SYSTOLIC BLOOD PRESSURE: 102 MMHG | HEART RATE: 74 BPM | DIASTOLIC BLOOD PRESSURE: 60 MMHG

## 2024-09-19 DIAGNOSIS — I50.33 ACUTE ON CHRONIC DIASTOLIC HEART FAILURE (HCC): Primary | ICD-10-CM

## 2024-09-19 DIAGNOSIS — E11.21 TYPE 2 DIABETES WITH NEPHROPATHY (HCC): ICD-10-CM

## 2024-09-19 PROCEDURE — 3052F HG A1C>EQUAL 8.0%<EQUAL 9.0%: CPT | Performed by: INTERNAL MEDICINE

## 2024-09-19 PROCEDURE — 99214 OFFICE O/P EST MOD 30 MIN: CPT | Performed by: INTERNAL MEDICINE

## 2024-09-19 PROCEDURE — 3078F DIAST BP <80 MM HG: CPT | Performed by: INTERNAL MEDICINE

## 2024-09-19 PROCEDURE — 3074F SYST BP LT 130 MM HG: CPT | Performed by: INTERNAL MEDICINE

## 2024-09-19 ASSESSMENT — ENCOUNTER SYMPTOMS
SORE THROAT: 0
ABDOMINAL PAIN: 0
SHORTNESS OF BREATH: 1
COUGH: 0
ANAL BLEEDING: 0
BLOOD IN STOOL: 0
EYE PAIN: 0
ABDOMINAL DISTENTION: 1

## 2024-10-01 ENCOUNTER — TRANSCRIBE ORDERS (OUTPATIENT)
Facility: HOSPITAL | Age: 64
End: 2024-10-01

## 2024-10-01 DIAGNOSIS — N18.32 STAGE 3B CHRONIC KIDNEY DISEASE (HCC): Primary | ICD-10-CM

## 2024-10-04 RX ORDER — ROSUVASTATIN CALCIUM 40 MG/1
40 TABLET, COATED ORAL NIGHTLY
Qty: 90 TABLET | Refills: 1 | OUTPATIENT
Start: 2024-10-04

## 2024-10-06 RX ORDER — ACYCLOVIR 400 MG/1
TABLET ORAL
Qty: 3 EACH | Refills: 5 | Status: SHIPPED | OUTPATIENT
Start: 2024-10-06

## 2024-10-10 ENCOUNTER — OFFICE VISIT (OUTPATIENT)
Facility: CLINIC | Age: 64
End: 2024-10-10

## 2024-10-10 ENCOUNTER — HOSPITAL ENCOUNTER (OUTPATIENT)
Facility: HOSPITAL | Age: 64
Setting detail: SPECIMEN
Discharge: HOME | End: 2024-10-13
Payer: MEDICARE

## 2024-10-10 VITALS
HEIGHT: 70 IN | DIASTOLIC BLOOD PRESSURE: 66 MMHG | BODY MASS INDEX: 37.56 KG/M2 | OXYGEN SATURATION: 93 % | WEIGHT: 262.4 LBS | RESPIRATION RATE: 17 BRPM | TEMPERATURE: 98.2 F | SYSTOLIC BLOOD PRESSURE: 122 MMHG | HEART RATE: 56 BPM

## 2024-10-10 DIAGNOSIS — E11.21 TYPE 2 DIABETES WITH NEPHROPATHY (HCC): ICD-10-CM

## 2024-10-10 DIAGNOSIS — E11.21 TYPE 2 DIABETES WITH NEPHROPATHY (HCC): Primary | ICD-10-CM

## 2024-10-10 DIAGNOSIS — Z00.00 WELCOME TO MEDICARE PREVENTIVE VISIT: ICD-10-CM

## 2024-10-10 DIAGNOSIS — N18.30 STAGE 3 CHRONIC KIDNEY DISEASE, UNSPECIFIED WHETHER STAGE 3A OR 3B CKD (HCC): ICD-10-CM

## 2024-10-10 DIAGNOSIS — I50.9 CHRONIC CONGESTIVE HEART FAILURE, UNSPECIFIED HEART FAILURE TYPE (HCC): ICD-10-CM

## 2024-10-10 DIAGNOSIS — Z71.89 ADVANCE CARE PLANNING: ICD-10-CM

## 2024-10-10 LAB
CREAT UR-MCNC: 63 MG/DL (ref 30–125)
MICROALBUMIN UR-MCNC: 12.4 MG/DL (ref 0–3)
MICROALBUMIN/CREAT UR-RTO: 197 MG/G (ref 0–30)

## 2024-10-10 PROCEDURE — 82043 UR ALBUMIN QUANTITATIVE: CPT

## 2024-10-10 PROCEDURE — 82570 ASSAY OF URINE CREATININE: CPT

## 2024-10-10 RX ORDER — INSULIN DEGLUDEC 200 U/ML
90 INJECTION, SOLUTION SUBCUTANEOUS
Qty: 12 ML | Refills: 5 | Status: SHIPPED | OUTPATIENT
Start: 2024-10-10

## 2024-10-10 ASSESSMENT — PATIENT HEALTH QUESTIONNAIRE - PHQ9
SUM OF ALL RESPONSES TO PHQ QUESTIONS 1-9: 0
1. LITTLE INTEREST OR PLEASURE IN DOING THINGS: NOT AT ALL
SUM OF ALL RESPONSES TO PHQ9 QUESTIONS 1 & 2: 0
2. FEELING DOWN, DEPRESSED OR HOPELESS: NOT AT ALL
SUM OF ALL RESPONSES TO PHQ QUESTIONS 1-9: 0

## 2024-10-10 ASSESSMENT — LIFESTYLE VARIABLES
HOW OFTEN DO YOU HAVE A DRINK CONTAINING ALCOHOL: NEVER
HOW MANY STANDARD DRINKS CONTAINING ALCOHOL DO YOU HAVE ON A TYPICAL DAY: PATIENT DOES NOT DRINK

## 2024-10-10 NOTE — PATIENT INSTRUCTIONS
problems.  Where can you learn more?  Go to https://www.[employer].net/patientEd and enter F075 to learn more about \"A Healthy Heart: Care Instructions.\"  Current as of: June 24, 2023  Content Version: 14.2  © 2024 [employer].   Care instructions adapted under license by [employer]. If you have questions about a medical condition or this instruction, always ask your healthcare professional. [employer] disclaims any warranty or liability for your use of this information.      Personalized Preventive Plan for Vicenta Fontana Jr. - 10/10/2024  Medicare offers a range of preventive health benefits. Some of the tests and screenings are paid in full while other may be subject to a deductible, co-insurance, and/or copay.    Some of these benefits include a comprehensive review of your medical history including lifestyle, illnesses that may run in your family, and various assessments and screenings as appropriate.    After reviewing your medical record and screening and assessments performed today your provider may have ordered immunizations, labs, imaging, and/or referrals for you.  A list of these orders (if applicable) as well as your Preventive Care list are included within your After Visit Summary for your review.    Other Preventive Recommendations:    A preventive eye exam performed by an eye specialist is recommended every 1-2 years to screen for glaucoma; cataracts, macular degeneration, and other eye disorders.  A preventive dental visit is recommended every 6 months.  Try to get at least 150 minutes of exercise per week or 10,000 steps per day on a pedometer .  Order or download the FREE \"Exercise & Physical Activity: Your Everyday Guide\" from [employer]. Call 1-454.305.4335 or search [employer] [place] [employer] online.  You need 6130-1658 mg of calcium and 3522-3580 IU of vitamin D per day. It is possible to meet your calcium requirement with diet alone, but a

## 2024-10-10 NOTE — PROGRESS NOTES
Faxed endo referral w/returned confirmation.      MITESH Cordon LPN  
Vicenta Fontana Jr. presents today for   Chief Complaint   Patient presents with    Medicare AWV    Follow-up           \"Have you been to the ER, urgent care clinic since your last visit?  Hospitalized since your last visit?\"    YES - When: approximately 1  weeks ago.  Where and Why: [facility]    “Have you seen or consulted any other health care providers outside of [facility] since your last visit?”    NO             
Blanca Alvarado MD   Ascorbic Acid (VITAMIN C) 250 MG tablet Take 1 tablet by mouth daily Yes Blanca Alvarado MD   omeprazole (PRILOSEC) 20 MG delayed release capsule Take 1 capsule by mouth daily Yes ProviderDeanna MD   cyanocobalamin 1000 MCG tablet Take 1 tablet by mouth 2 times daily Yes Hieu Medina MD   cycloSPORINE (RESTASIS) 0.05 % ophthalmic emulsion Place 1 drop into both eyes in the morning and 1 drop in the evening. Yes Hieu Medina MD   vitamin D 25 MCG (1000 UT) CAPS Take 2 capsules by mouth daily Yes Hieu Medina MD   Multiple Vitamins-Minerals (ONE-A-DAY 50 PLUS PO) Take 1 tablet by mouth daily Yes ProviderDeanna MD   BiPAP Machine MISC by Does not apply route Yes ProviderDeanna MD       CareTeam (Including outside providers/suppliers regularly involved in providing care):   Patient Care Team:  Blanca Alvarado MD as PCP - General (Family Medicine)  Blanca Alvarado MD as PCP - Empaneled Provider  Kyle Hein MD as Physician  Rodolfo Valencia DPM as Consulting Physician  Fuad Jane, [facility] as Pharmacist (Pharmacy)  Jorge Banegas MD (Nephrology)      Reviewed and updated this visit:  Allergies  Meds  Problems  Med Hx  Surg Hx  Soc Hx  Fam Hx

## 2024-10-11 ENCOUNTER — HOSPITAL ENCOUNTER (OUTPATIENT)
Facility: HOSPITAL | Age: 64
Discharge: HOME | End: 2024-10-14
Attending: INTERNAL MEDICINE
Payer: MEDICARE

## 2024-10-11 ENCOUNTER — TELEPHONE (OUTPATIENT)
Facility: CLINIC | Age: 64
End: 2024-10-11

## 2024-10-11 DIAGNOSIS — N18.32 STAGE 3B CHRONIC KIDNEY DISEASE (HCC): ICD-10-CM

## 2024-10-11 PROCEDURE — 76700 US EXAM ABDOM COMPLETE: CPT

## 2024-10-16 ASSESSMENT — ENCOUNTER SYMPTOMS
ABDOMINAL DISTENTION: 1
SHORTNESS OF BREATH: 1
ANAL BLEEDING: 0
ABDOMINAL PAIN: 0
EYE PAIN: 0
BLOOD IN STOOL: 0
COUGH: 0
SORE THROAT: 0

## 2024-10-17 NOTE — ACP (ADVANCE CARE PLANNING)
Advance Care Planning     General Advance Care Planning (ACP) Conversation    Date of Conversation: 10/10/24    Conducted with: Patient with Decision Making Capacity    Healthcare Decision Maker:Today we documented Decision Maker(s) consistent with Legal Next of Kin hierarchy.    Content/Action Overview:  Has NO ACP documents-Information provided    He was given a blank advance directive to complete. He wants his kids to be his primary POAs (2) and Peg Silvianoorn (his cousin to be his successor poa; she is a retired RN per his hx. He has a will but we don't have records.      Length of Voluntary ACP Conversation in minutes:  <16 minutes (Non-Billable)    Blanca Alvarado MD

## 2024-10-22 RX ORDER — FERROUS SULFATE 325(65) MG
1 TABLET ORAL 2 TIMES DAILY
Qty: 60 TABLET | Refills: 5 | Status: SHIPPED | OUTPATIENT
Start: 2024-10-22

## 2024-10-30 ENCOUNTER — TELEPHONE (OUTPATIENT)
Age: 64
End: 2024-10-30

## 2024-10-30 RX ORDER — SACUBITRIL AND VALSARTAN 24; 26 MG/1; MG/1
1 TABLET, FILM COATED ORAL 2 TIMES DAILY
Qty: 180 TABLET | Refills: 3 | Status: SHIPPED | OUTPATIENT
Start: 2024-10-30

## 2024-10-30 NOTE — TELEPHONE ENCOUNTER
Received call from PAC regarding this patient who is scheduled for NP appt 11/4.  He told PAC rep that his hand is wrapped from the ER and is hyper extended back and is very uncomfortable.  PAC rep wanted to know if patient could be worked in sooner for appt with BERTHA Weir or Dr. Sow.   Please advise.

## 2024-10-31 ENCOUNTER — OFFICE VISIT (OUTPATIENT)
Age: 64
End: 2024-10-31
Payer: MEDICARE

## 2024-10-31 VITALS — BODY MASS INDEX: 37.52 KG/M2 | HEIGHT: 71 IN | WEIGHT: 268 LBS

## 2024-10-31 DIAGNOSIS — M10.9 ACUTE GOUT OF RIGHT HAND, UNSPECIFIED CAUSE: Primary | ICD-10-CM

## 2024-10-31 PROCEDURE — 1124F ACP DISCUSS-NO DSCNMKR DOCD: CPT

## 2024-10-31 PROCEDURE — 99213 OFFICE O/P EST LOW 20 MIN: CPT

## 2024-10-31 NOTE — PROGRESS NOTES
Vicenta Fontana Jr. is a 64 y.o. male retiree.   Worker's Compensation and legal considerations: none    Chief Complaint   Patient presents with    Wrist Pain     Right wrist swelling      Pain Score:   0 - No pain    Subjective:     Initial HPI: Patient presents today with a concern of right wrist/hand swelling.  On 10/27/2024, the patient reports he woke up and his entire right hand and wrist was swollen, painful, warm.  He was subsequently seen at urgent care and there was concern for gout however they did not have equipment for aspiration so he was transferred to the emergency department.  In the emergency department where aspiration was performed revealing birefringent crystals compatible with uric acid. He was placed into a splint at that time. He was given Robaxin and Oxycodone for his pain.    Uric acid drawn yesterday 14.8.     Date of onset:  10/27/2024  Injury: No  Prior Treatment:  No  Contributory history: N/a    ROS: Review of Systems - General ROS: negative except HPI    Past Medical History:   Diagnosis Date    Abnormal echocardiogram 03/25/2016    Tech difficult.  EF 55-60%.  No WMA.  Mod LVH.  Indeterminate diastolic fx.  Mild RVE.  LEE.  Mild AoRE.      Acute GI bleeding 12/2018    Arthritis     Asthma     Back problem     BPH (benign prostatic hyperplasia)     Bronchitis     Chronic anticoagulation     Chronic lung disease     Chronic obstructive pulmonary disease (HCC)     CMC (carpometacarpal joint) dislocation     COPD (chronic obstructive pulmonary disease) (HCC)     Coronary artery calcification     Diabetes mellitus (HCC)     IDDM    Diabetic polyneuropathy associated with type 2 diabetes mellitus (HCC)     Diverticulitis     Dyslipidemia     Essential hypertension     GERD (gastroesophageal reflux disease)     Heart murmur     High cholesterol     History of fatty infiltration of liver     Holter monitor, abnormal 03/25/2016    Controlled atrial fibrillation, avg HR 90 bpm (range

## 2024-11-01 ENCOUNTER — TELEPHONE (OUTPATIENT)
Facility: CLINIC | Age: 64
End: 2024-11-01

## 2024-11-01 DIAGNOSIS — M10.9 GOUT, UNSPECIFIED CAUSE, UNSPECIFIED CHRONICITY, UNSPECIFIED SITE: Primary | ICD-10-CM

## 2024-11-01 RX ORDER — METHYLPREDNISOLONE 4 MG/1
TABLET ORAL
Qty: 1 KIT | Refills: 0 | Status: SHIPPED | OUTPATIENT
Start: 2024-11-01 | End: 2024-11-07

## 2024-11-01 NOTE — TELEPHONE ENCOUNTER
Pt called in states a week ago ate scallops and right hand was swollen [facility] to ER to test for gout drained fluid out of hand. Pt states went to hand specialist Dr. Zee  NP   Confirmed Pt had gout in hand Uric acid level was 14.     Pt wants to know if he can rcv any medication for gout.     Pharmacy     [facility] [address] [place] [address] [address] - P [phone] - F [phone] [[id number]]     Call back req

## 2024-11-01 NOTE — PROGRESS NOTES
Pt has liver and CKD. He cannot take colchcine. Her FBG this mornin. His BG now: 159. His highest BG in the past 24 hours: <250 per pt hx. Hypoglycemia: none. He will increase his tresiba to 95 units per day vs 90 units daily x 5 days before returning to the 90 units per day dosing.    Please add him to my schedule for a VV at 1:40pm on Monday       Dr. Blanca Alvarado  [facility] [address], [address]  [place] [address]  Phone: [phone]  Fax: [phone]

## 2024-11-04 ENCOUNTER — TELEMEDICINE (OUTPATIENT)
Facility: CLINIC | Age: 64
End: 2024-11-04

## 2024-11-04 DIAGNOSIS — E11.21 TYPE 2 DIABETES WITH NEPHROPATHY (HCC): Primary | ICD-10-CM

## 2024-11-04 DIAGNOSIS — M10.9 GOUT, UNSPECIFIED CAUSE, UNSPECIFIED CHRONICITY, UNSPECIFIED SITE: ICD-10-CM

## 2024-11-04 PROBLEM — I50.9 ACUTE DECOMPENSATED HEART FAILURE (HCC): Status: RESOLVED | Noted: 2024-07-03 | Resolved: 2024-11-04

## 2024-11-04 NOTE — PROGRESS NOTES
Vicenta Fontana Jr. presents today for   Chief Complaint   Patient presents with    Gout     Flare up x 1 wk ago           \"Have you been to the ER, urgent care clinic since your last visit?  Hospitalized since your last visit?\"    YES - When: approximately 1  weeks ago.  [facility]    “Have you seen or consulted any other health care providers outside of [facility] since your last visit?”    NO             
in-person clinic visit. Patient and provider were located at their individual home/office respectively.      We discussed the expected course, resolution and complications of the diagnosis(es) in detail.  Medication risks, benefits, costs, interactions, and alternatives were discussed as indicated.  I advised him to contact the office if his condition worsens, changes or fails to improve as anticipated. He expressed understanding with the diagnosis(es) and plan.     MD Vicenta Escalante Jr., who was evaluated through a synchronous (real-time)  encounter, and/or his healthcare decision maker, is aware that it is a billable service, which includes applicable co-pays, with coverage as determined by his insurance carrier. He provided verbal consent to proceed and patient identification was verified. This visit was conducted pursuant to the emergency declaration under the [place] Act and the National Emergencies Act, 1135 waiver authority and the Coronavirus Preparedness and Response Supplemental Appropriations Act. A caregiver was present when appropriate. Ability to conduct physical exam was limited. The patient was located at: Home: [address] [place] [address]   [facility] [id number]  The provider was located at: Facility (Appt Dept): [address] [place] [phone]    --Blanca Alvarado MD on 11/4/2024 at 7:10 PM        Vicenta Fontana Jr., was evaluated through a synchronous (real-time) audio-video encounter. The patient (or guardian if applicable) is aware that this is a billable service, which includes applicable co-pays. This Virtual Visit was conducted with patient's (and/or legal guardian's) consent. Patient identification was verified, and a caregiver was present when appropriate.   The patient was located at Home: [facility] [address]  [facility] [address]  Provider was located at Facility (Appt Dept): [address] [place] [address], [address] [id number]  Confirm you are

## 2024-11-05 ENCOUNTER — TELEPHONE (OUTPATIENT)
Facility: CLINIC | Age: 64
End: 2024-11-05

## 2024-11-05 NOTE — TELEPHONE ENCOUNTER
----- Message from Dr. Blanca Alvarado MD sent at 11/4/2024  7:10 PM EST -----  Please schedule the patient for a follow-up visit with me in 5 weeks with nonfasting labs schedule I week before    Thanks,  Dr. Blanca Alvarado  [facility] [address], [address]  [place] [address]  Phone: [phone]  Fax: [phone]

## 2024-11-14 RX ORDER — MONTELUKAST SODIUM 10 MG/1
10 TABLET ORAL NIGHTLY
Qty: 90 TABLET | Refills: 1 | Status: SHIPPED | OUTPATIENT
Start: 2024-11-14

## 2024-11-18 ENCOUNTER — TELEPHONE (OUTPATIENT)
Facility: CLINIC | Age: 64
End: 2024-11-18

## 2024-11-18 NOTE — TELEPHONE ENCOUNTER
Pt stated that medication has helped with his gout. He is still experiencing stiff fingers and his index finger is still swollen.   Pt would like to know what the next step would be to help with his gout swelling.     Please advise     Preferred pharmacy: [facility] [id number] [place], [place] [address] [place]CO ANTONIO SANTOS

## 2024-12-01 ENCOUNTER — PATIENT MESSAGE (OUTPATIENT)
Facility: CLINIC | Age: 64
End: 2024-12-01

## 2024-12-03 ENCOUNTER — OFFICE VISIT (OUTPATIENT)
Age: 64
End: 2024-12-03
Payer: MEDICARE

## 2024-12-03 VITALS
DIASTOLIC BLOOD PRESSURE: 66 MMHG | HEART RATE: 79 BPM | BODY MASS INDEX: 35.56 KG/M2 | OXYGEN SATURATION: 96 % | WEIGHT: 254 LBS | HEIGHT: 71 IN | SYSTOLIC BLOOD PRESSURE: 110 MMHG

## 2024-12-03 DIAGNOSIS — E78.5 DYSLIPIDEMIA: ICD-10-CM

## 2024-12-03 DIAGNOSIS — I50.42 CHRONIC COMBINED SYSTOLIC AND DIASTOLIC HEART FAILURE (HCC): Primary | ICD-10-CM

## 2024-12-03 DIAGNOSIS — G47.33 OSA TREATED WITH BIPAP: ICD-10-CM

## 2024-12-03 DIAGNOSIS — I48.19 ATRIAL FIBRILLATION, PERSISTENT (HCC): ICD-10-CM

## 2024-12-03 DIAGNOSIS — E66.01 SEVERE OBESITY (BMI 35.0-39.9) WITH COMORBIDITY: ICD-10-CM

## 2024-12-03 DIAGNOSIS — I10 ESSENTIAL (PRIMARY) HYPERTENSION: ICD-10-CM

## 2024-12-03 PROCEDURE — 3078F DIAST BP <80 MM HG: CPT | Performed by: NURSE PRACTITIONER

## 2024-12-03 PROCEDURE — 1124F ACP DISCUSS-NO DSCNMKR DOCD: CPT | Performed by: NURSE PRACTITIONER

## 2024-12-03 PROCEDURE — 99214 OFFICE O/P EST MOD 30 MIN: CPT | Performed by: NURSE PRACTITIONER

## 2024-12-03 PROCEDURE — 3074F SYST BP LT 130 MM HG: CPT | Performed by: NURSE PRACTITIONER

## 2024-12-03 ASSESSMENT — PATIENT HEALTH QUESTIONNAIRE - PHQ9
2. FEELING DOWN, DEPRESSED OR HOPELESS: NOT AT ALL
SUM OF ALL RESPONSES TO PHQ QUESTIONS 1-9: 0
SUM OF ALL RESPONSES TO PHQ9 QUESTIONS 1 & 2: 0
1. LITTLE INTEREST OR PLEASURE IN DOING THINGS: NOT AT ALL
SUM OF ALL RESPONSES TO PHQ QUESTIONS 1-9: 0

## 2024-12-03 NOTE — PATIENT INSTRUCTIONS
Continue present medication regimen  Follow-up with Dr. Campo as scheduled and as needed  Follow-up with Nephrology as scheduled  Weigh daily and record  Limit sodium intake to 2000mg per day  Limit fluid intake to no more than 6, eight ounce glasses of any type of fluids per day (total of 48 ounces per day)  Call if you notice sudden or progressive weight gain (3-5 pounds in 2-3 days), increasing shortness of breath, abdominal bloating, increasing lower extremity edema, inability to lie flat or on your normal number of pillows, having to sit up to catch your breath, fatigue, increased somnolence (sleeping more), poor appetite

## 2024-12-03 NOTE — PROGRESS NOTES
Vicenta Fontana Jr. presents today for   Chief Complaint   Patient presents with    Follow-up     3 month f/u     Edema     Bilateral feet edema on/off       Vicenta Fontana Jr. preferred language for health care discussion is english/other.    Is someone accompanying this pt? no    Is the patient using any DME equipment during OV? yes    Depression Screening:  Depression: Not at risk (12/3/2024)    PHQ-2     PHQ-2 Score: 0        Learning Assessment:  Who is the primary learner? Patient    What is the preferred language for health care of the primary learner? ENGLISH    How does the primary learner prefer to learn new concepts? DEMONSTRATION    Answered By patient    Relationship to Learner SELF           Pt currently taking Anticoagulant therapy? Eliquis 2.5 mg BID     Pt currently taking Antiplatelet therapy ? no      Coordination of Care:  1. Have you been to the ER, urgent care clinic since your last visit? Hospitalized since your last visit? no    2. Have you seen or consulted any other health care providers outside of the [facility] since your last visit? Include any pap smears or colon screening. no    
obstructive sleep apnea, uses Bi-PAP.  CRYSTAL Fontana Jr. was seen today for follow-up of chronic systolic heart failure.  He states that everything has been about the same since his last visit.  He has not noticed any increasing shortness of breath or progressive weight increases.  He has some lower extremity edema, at times less than other times.  He has been feeling well except for his recent episode of gout in his right hand and wrist as well as hip and back pain.    He is compliant with his medications.  He takes metolazone 3 times a week, at least 30 minutes prior to his AM dose of bumetanide on those days.  He states that he has an upcoming appointment with nephrology, Dr. Banegas, but does not remember the date at this time.    His blood pressure is well controlled as is his heart rate.  He remains on Eliquis.  Breath sounds are clear and he has 1+ edema only around his ankles at this time.  His weight is unchanged at 254 lbs.     No changes were made to his medications today.     Congestive heart failure teaching reinforced today.  Advised to limit sodium intake to no more than 2000mg per day and to also limit fluid intake to 48 ounces per day (unless otherwise specified).  Advised to weigh daily every morning and record weights.  Instructed to call our office if progressive weight gain is noted over a 2 to 3 day period of time, shortness of breath increases, or if abdominal bloating, nausea, fatigue, or increased lower extremity edema is noted.       Time:  30 minutes     He will follow-up with  as scheduled and as needed.      Merry Prajapati MSN, FNP-BC    Please note:  Portions of this chart were created with Dragon medical speech to text program.  Unrecognized errors may be present.

## 2024-12-05 ENCOUNTER — OFFICE VISIT (OUTPATIENT)
Facility: CLINIC | Age: 64
End: 2024-12-05
Payer: MEDICARE

## 2024-12-05 ENCOUNTER — HOSPITAL ENCOUNTER (OUTPATIENT)
Facility: HOSPITAL | Age: 64
Setting detail: SPECIMEN
Discharge: HOME | End: 2024-12-08

## 2024-12-05 VITALS
HEIGHT: 71 IN | HEART RATE: 62 BPM | OXYGEN SATURATION: 97 % | DIASTOLIC BLOOD PRESSURE: 70 MMHG | BODY MASS INDEX: 35.59 KG/M2 | WEIGHT: 254.2 LBS | RESPIRATION RATE: 17 BRPM | TEMPERATURE: 98.4 F | SYSTOLIC BLOOD PRESSURE: 127 MMHG

## 2024-12-05 DIAGNOSIS — M79.641 PAIN IN BOTH HANDS: ICD-10-CM

## 2024-12-05 DIAGNOSIS — G62.9 NEUROPATHY: ICD-10-CM

## 2024-12-05 DIAGNOSIS — E11.21 TYPE 2 DIABETES WITH NEPHROPATHY (HCC): ICD-10-CM

## 2024-12-05 DIAGNOSIS — N18.30 STAGE 3 CHRONIC KIDNEY DISEASE, UNSPECIFIED WHETHER STAGE 3A OR 3B CKD (HCC): ICD-10-CM

## 2024-12-05 DIAGNOSIS — I50.9 CHRONIC CONGESTIVE HEART FAILURE, UNSPECIFIED HEART FAILURE TYPE (HCC): ICD-10-CM

## 2024-12-05 DIAGNOSIS — M79.642 PAIN IN BOTH HANDS: ICD-10-CM

## 2024-12-05 DIAGNOSIS — M10.9 GOUT, UNSPECIFIED CAUSE, UNSPECIFIED CHRONICITY, UNSPECIFIED SITE: Primary | ICD-10-CM

## 2024-12-05 LAB — SENTARA SPECIMEN COLLECTION: NORMAL

## 2024-12-05 PROCEDURE — 3052F HG A1C>EQUAL 8.0%<EQUAL 9.0%: CPT | Performed by: INTERNAL MEDICINE

## 2024-12-05 PROCEDURE — 3078F DIAST BP <80 MM HG: CPT | Performed by: INTERNAL MEDICINE

## 2024-12-05 PROCEDURE — 1124F ACP DISCUSS-NO DSCNMKR DOCD: CPT | Performed by: INTERNAL MEDICINE

## 2024-12-05 PROCEDURE — 99214 OFFICE O/P EST MOD 30 MIN: CPT | Performed by: INTERNAL MEDICINE

## 2024-12-05 PROCEDURE — 99001 SPECIMEN HANDLING PT-LAB: CPT

## 2024-12-05 PROCEDURE — 3074F SYST BP LT 130 MM HG: CPT | Performed by: INTERNAL MEDICINE

## 2024-12-05 RX ORDER — GABAPENTIN 400 MG/1
400 CAPSULE ORAL EVERY EVENING
Qty: 90 CAPSULE | Refills: 1
Start: 2024-12-05 | End: 2025-06-03

## 2024-12-05 RX ORDER — PREDNISONE 10 MG/1
TABLET ORAL
Qty: 17 TABLET | Refills: 0 | Status: SHIPPED | OUTPATIENT
Start: 2024-12-05

## 2024-12-05 RX ORDER — PREDNISONE 10 MG/1
TABLET ORAL
Qty: 17 TABLET | Refills: 0 | Status: SHIPPED | OUTPATIENT
Start: 2024-12-05 | End: 2024-12-05

## 2024-12-05 NOTE — PROGRESS NOTES
Vicenta Fontana Jr. presents today for   Chief Complaint   Patient presents with    Gout     Flare up; fingers           \"Have you been to the ER, urgent care clinic since your last visit?  Hospitalized since your last visit?\"    NO    “Have you seen or consulted any other health care providers outside of [facility] since your last visit?”    NO             
status: Never    Smokeless tobacco: Never   Substance Use Topics    Alcohol use: Yes     Alcohol/week: 0.0 standard drinks of alcohol       ROS   Review of Systems   Constitutional:  Negative for fever.   HENT:  Negative for ear pain and sore throat.    Eyes:  Negative for pain.   Respiratory:  Negative for cough and shortness of breath.    Cardiovascular:  Negative for chest pain.   Gastrointestinal:  Positive for abdominal distention. Negative for abdominal pain, anal bleeding and blood in stool.   Genitourinary:  Negative for dysuria and hematuria.   Musculoskeletal:  Positive for arthralgias and back pain.   Skin:  Negative for rash.   Neurological:  Positive for numbness. Negative for headaches.   Hematological:  Does not bruise/bleed easily.   Psychiatric/Behavioral:  Negative for suicidal ideas.          Objective     /70 (Site: Left Upper Arm, Position: Sitting, Cuff Size: Medium Adult)   Pulse 62   Temp 98.4 °F (36.9 °C) (Temporal)   Resp 17   Ht 1.803 m (5' 11\")   Wt 115.3 kg (254 lb 3.2 oz)   SpO2 97%   BMI 35.45 kg/m²      Physical Exam  Constitutional:       Appearance: Normal appearance.   HENT:      Head: Normocephalic and atraumatic.      Right Ear: External ear normal.      Left Ear: External ear normal.   Eyes:      General: No scleral icterus.        Right eye: No discharge.         Left eye: No discharge.      Conjunctiva/sclera: Conjunctivae normal.   Cardiovascular:      Rate and Rhythm: Normal rate and regular rhythm.      Pulses: Normal pulses.      Heart sounds: Normal heart sounds.   Pulmonary:      Effort: Pulmonary effort is normal.      Breath sounds: Normal breath sounds.   Abdominal:      General: Abdomen is flat. Bowel sounds are normal. There is distension.      Palpations: Abdomen is soft.      Tenderness: There is no abdominal tenderness.   Musculoskeletal:         General: No swelling (BUE except along b/l 2nd fingers) or tenderness (BUE except along swollen 2nd

## 2024-12-06 LAB
A/G RATIO: 1.6 RATIO (ref 1.1–2.6)
ALBUMIN: 4.1 G/DL (ref 3.5–5)
ALP BLD-CCNC: 225 U/L (ref 40–125)
ALT SERPL-CCNC: 25 U/L (ref 5–40)
ANION GAP SERPL CALCULATED.3IONS-SCNC: 16 MMOL/L (ref 3–15)
AST SERPL-CCNC: 21 U/L (ref 10–37)
BASOPHILS ABSOLUTE: 0 K/UL (ref 0–0.2)
BASOPHILS RELATIVE PERCENT: 0 % (ref 0–2)
BILIRUB SERPL-MCNC: 0.4 MG/DL (ref 0.2–1.2)
BUN BLDV-MCNC: 73 MG/DL (ref 6–22)
CALCIUM SERPL-MCNC: 9.8 MG/DL (ref 8.4–10.5)
CHLORIDE BLD-SCNC: 99 MMOL/L (ref 98–110)
CO2: 27 MMOL/L (ref 20–32)
CREAT SERPL-MCNC: 1.5 MG/DL (ref 0.8–1.6)
EOSINOPHIL # BLD: 2 % (ref 0–6)
EOSINOPHILS ABSOLUTE: 0.2 K/UL (ref 0–0.5)
ESTIMATED AVERAGE GLUCOSE: 193 MG/DL (ref 91–123)
GFR, ESTIMATED: 50.1 ML/MIN/1.73 SQ.M.
GLOBULIN: 2.6 G/DL (ref 2–4)
GLUCOSE: 148 MG/DL (ref 70–99)
HBA1C MFR BLD: 8.3 % (ref 4.8–5.6)
HCT VFR BLD CALC: 45.6 % (ref 37.8–52.2)
HEMOGLOBIN: 13.8 G/DL (ref 12.6–17.1)
LYMPHOCYTES # BLD: 18 % (ref 20–45)
LYMPHOCYTES ABSOLUTE: 1.4 K/UL (ref 1–4.8)
MCH RBC QN AUTO: 28 PG (ref 26–34)
MCHC RBC AUTO-ENTMCNC: 30 G/DL (ref 31–36)
MCV RBC AUTO: 92 FL (ref 80–95)
MONOCYTES ABSOLUTE: 0.7 K/UL (ref 0.1–1)
MONOCYTES: 9 % (ref 3–12)
NEUTROPHILS ABSOLUTE: 5.8 K/UL (ref 1.8–7.7)
NEUTROPHILS: 72 % (ref 40–75)
PDW BLD-RTO: 15.7 % (ref 10–15.5)
PLATELET # BLD: 268 K/UL (ref 140–440)
PMV BLD AUTO: 9.9 FL (ref 9–13)
POTASSIUM SERPL-SCNC: 3.4 MMOL/L (ref 3.5–5.5)
RBC # BLD: 4.94 M/UL (ref 3.8–5.8)
SODIUM BLD-SCNC: 142 MMOL/L (ref 133–145)
TOTAL PROTEIN: 6.7 G/DL (ref 6.2–8.1)
URIC ACID: 12.3 MG/DL (ref 3.9–9)
WBC # BLD: 8.2 K/UL (ref 4–11)

## 2024-12-10 ENCOUNTER — HOSPITAL ENCOUNTER (OUTPATIENT)
Facility: HOSPITAL | Age: 64
Setting detail: SPECIMEN
Discharge: HOME | End: 2024-12-13

## 2024-12-10 ENCOUNTER — OFFICE VISIT (OUTPATIENT)
Facility: CLINIC | Age: 64
End: 2024-12-10
Payer: MEDICARE

## 2024-12-10 VITALS
HEIGHT: 71 IN | HEART RATE: 57 BPM | DIASTOLIC BLOOD PRESSURE: 62 MMHG | OXYGEN SATURATION: 100 % | TEMPERATURE: 98.2 F | RESPIRATION RATE: 17 BRPM | WEIGHT: 247.6 LBS | BODY MASS INDEX: 34.66 KG/M2 | SYSTOLIC BLOOD PRESSURE: 119 MMHG

## 2024-12-10 DIAGNOSIS — R74.8 ELEVATED ALKALINE PHOSPHATASE LEVEL: ICD-10-CM

## 2024-12-10 DIAGNOSIS — E11.21 TYPE 2 DIABETES WITH NEPHROPATHY (HCC): ICD-10-CM

## 2024-12-10 DIAGNOSIS — N18.30 STAGE 3 CHRONIC KIDNEY DISEASE, UNSPECIFIED WHETHER STAGE 3A OR 3B CKD (HCC): ICD-10-CM

## 2024-12-10 DIAGNOSIS — M10.9 GOUT, UNSPECIFIED CAUSE, UNSPECIFIED CHRONICITY, UNSPECIFIED SITE: Primary | ICD-10-CM

## 2024-12-10 PROCEDURE — 3052F HG A1C>EQUAL 8.0%<EQUAL 9.0%: CPT | Performed by: INTERNAL MEDICINE

## 2024-12-10 PROCEDURE — 99214 OFFICE O/P EST MOD 30 MIN: CPT | Performed by: INTERNAL MEDICINE

## 2024-12-10 PROCEDURE — 3074F SYST BP LT 130 MM HG: CPT | Performed by: INTERNAL MEDICINE

## 2024-12-10 PROCEDURE — 1124F ACP DISCUSS-NO DSCNMKR DOCD: CPT | Performed by: INTERNAL MEDICINE

## 2024-12-10 PROCEDURE — 3078F DIAST BP <80 MM HG: CPT | Performed by: INTERNAL MEDICINE

## 2024-12-10 PROCEDURE — 99001 SPECIMEN HANDLING PT-LAB: CPT

## 2024-12-10 RX ORDER — ERYTHROMYCIN 5 MG/G
3.5 OINTMENT OPHTHALMIC NIGHTLY
COMMUNITY
Start: 2024-12-09

## 2024-12-10 NOTE — PROGRESS NOTES
Endo referral sent w/returned confirmation.      MITESH Cordon LPN  
Vicenta Fontana Jr. presents today for   Chief Complaint   Patient presents with    Follow-up           \"Have you been to the ER, urgent care clinic since your last visit?  Hospitalized since your last visit?\"    NO    “Have you seen or consulted any other health care providers outside of [facility] since your last visit?”    YES - When: approximately 1 days ago.  Where and Why: Ophthalmology-f/u.             
Never    Smokeless tobacco: Never   Substance Use Topics    Alcohol use: Yes     Alcohol/week: 0.0 standard drinks of alcohol       ROS   Review of Systems   Constitutional:  Negative for fever.   HENT:  Negative for ear pain and sore throat.    Eyes:  Negative for pain.   Respiratory:  Negative for cough.    Cardiovascular:  Negative for chest pain.   Gastrointestinal:  Negative for abdominal pain, anal bleeding and blood in stool.   Genitourinary:  Negative for dysuria and hematuria.   Musculoskeletal:  Positive for arthralgias. Negative for myalgias.   Skin:  Negative for rash.   Neurological:  Positive for numbness. Negative for headaches.   Hematological:  Does not bruise/bleed easily.   Psychiatric/Behavioral:  Negative for suicidal ideas.          Objective     /62 (Site: Left Upper Arm, Position: Sitting, Cuff Size: Medium Adult)   Pulse 57   Temp 98.2 °F (36.8 °C) (Temporal)   Resp 17   Ht 1.803 m (5' 11\")   Wt 112.3 kg (247 lb 9.6 oz)   SpO2 100%   BMI 34.53 kg/m²      Physical Exam  Constitutional:       Appearance: Normal appearance.   HENT:      Head: Normocephalic and atraumatic.      Right Ear: External ear normal.      Left Ear: External ear normal.   Eyes:      General: No scleral icterus.        Right eye: No discharge.         Left eye: No discharge.      Conjunctiva/sclera: Conjunctivae normal.   Cardiovascular:      Rate and Rhythm: Normal rate and regular rhythm.      Pulses: Normal pulses.      Heart sounds: Normal heart sounds.   Pulmonary:      Effort: Pulmonary effort is normal.      Breath sounds: Normal breath sounds.   Abdominal:      General: Abdomen is flat. Bowel sounds are normal. There is distension (less distended since his last apt).      Palpations: Abdomen is soft.      Tenderness: There is no abdominal tenderness.   Musculoskeletal:         General: No swelling (BUE) or tenderness (BUE except along b/l 2nd digits, which are less swollen and less TTP today vs at his

## 2024-12-11 LAB — SENTARA SPECIMEN COLLECTION: NORMAL

## 2024-12-11 ASSESSMENT — ENCOUNTER SYMPTOMS
SORE THROAT: 0
EYE PAIN: 0
ABDOMINAL DISTENTION: 1
BLOOD IN STOOL: 0
SHORTNESS OF BREATH: 0
COUGH: 0
ANAL BLEEDING: 0
ABDOMINAL PAIN: 0
BACK PAIN: 1

## 2024-12-16 ASSESSMENT — ENCOUNTER SYMPTOMS
SORE THROAT: 0
ANAL BLEEDING: 0
EYE PAIN: 0
COUGH: 0
ABDOMINAL PAIN: 0
BLOOD IN STOOL: 0

## 2024-12-18 ENCOUNTER — TELEPHONE (OUTPATIENT)
Facility: CLINIC | Age: 64
End: 2024-12-18

## 2024-12-18 DIAGNOSIS — E11.21 TYPE 2 DIABETES WITH NEPHROPATHY (HCC): ICD-10-CM

## 2024-12-18 DIAGNOSIS — M10.9 GOUT, UNSPECIFIED CAUSE, UNSPECIFIED CHRONICITY, UNSPECIFIED SITE: Primary | ICD-10-CM

## 2024-12-19 RX ORDER — PREDNISONE 10 MG/1
TABLET ORAL
Qty: 25 TABLET | Refills: 0 | Status: SHIPPED | OUTPATIENT
Start: 2024-12-19

## 2024-12-19 RX ORDER — ACYCLOVIR 400 MG/1
TABLET ORAL
Qty: 3 EACH | Refills: 5 | Status: SHIPPED | OUTPATIENT
Start: 2024-12-19

## 2024-12-25 LAB
ANTI-DNA (DS) AB QN: <1 IU/ML
C-REACTIVE PROTEIN: <0.5 MG/DL
CCP ANTIBODY IGG: <16 UNITS
CENTROMERE B AB: <0.2 AI
CHROMATIN ANTIBODY: <0.2 AI
JO-1 ANTIBODY: <0.2 AI
MUTATED CITRULLINATED VIMENTIN (MCV) ANTIBODY: <20 U/ML
RHEUMATOID FACTOR: <10 IU/ML
RNP ANTIBODY: 0.3 AI
SCLERODERMA (SCL-70) AB: <0.2 AI
SJOGREN'S ANTI-SS-A: <0.2 AI
SJOGREN'S ANTI-SS-B: <0.2 AI
SMITH ABS: <0.2 AI

## 2024-12-27 ENCOUNTER — HOSPITAL ENCOUNTER (OUTPATIENT)
Facility: HOSPITAL | Age: 64
Setting detail: SPECIMEN
Discharge: HOME | End: 2024-12-30

## 2024-12-27 LAB — SENTARA SPECIMEN COLLECTION: NORMAL

## 2024-12-27 PROCEDURE — 99001 SPECIMEN HANDLING PT-LAB: CPT

## 2025-01-13 DIAGNOSIS — M10.9 GOUT, UNSPECIFIED CAUSE, UNSPECIFIED CHRONICITY, UNSPECIFIED SITE: ICD-10-CM

## 2025-01-13 RX ORDER — PREDNISONE 10 MG/1
TABLET ORAL
Qty: 25 TABLET | Refills: 0 | Status: CANCELLED | OUTPATIENT
Start: 2025-01-13

## 2025-01-13 NOTE — TELEPHONE ENCOUNTER
Refill request via fax     Medication: predniSONE (DELTASONE) 10 MG tablet [[id number]]  DISCONTINUED     Pharmacy:   [facility] [address] [place] [address] DIANE SANTOS -     Last Fill: 12/05/2024    PCP: Blanca Alvarado MD    LAST OFFICE VISIT: 12/10/2024      Future Appointments   Date Time Provider Department Center   1/15/2025  9:00 AM Fuad Kendrick PA Replaced by [facility]   1/21/2025 10:45 AM IOC LAB VISIT [facility] DEP   3/6/2025 11:20 AM Roland Campo MD [facility] BS [facility]   3/10/2025  1:00 PM Blanca Alvarado MD [facility] DEP   3/26/2025  1:30 PM Joo Flannery MD [facility] BS AMB

## 2025-01-13 NOTE — TELEPHONE ENCOUNTER
Contacted pt to inquire. Pt confirmed he is experiencing gout flare ups in his left and right index finger. Pt request medication before it gets worse per pt.      MITESH Cordon LPN

## 2025-01-14 ENCOUNTER — TELEMEDICINE (OUTPATIENT)
Facility: CLINIC | Age: 65
End: 2025-01-14

## 2025-01-14 DIAGNOSIS — M10.9 ACUTE GOUT, UNSPECIFIED CAUSE, UNSPECIFIED SITE: Primary | ICD-10-CM

## 2025-01-14 DIAGNOSIS — I50.42 CHRONIC COMBINED SYSTOLIC AND DIASTOLIC CONGESTIVE HEART FAILURE (HCC): ICD-10-CM

## 2025-01-14 DIAGNOSIS — M10.9 GOUT, UNSPECIFIED CAUSE, UNSPECIFIED CHRONICITY, UNSPECIFIED SITE: ICD-10-CM

## 2025-01-14 DIAGNOSIS — E11.21 TYPE 2 DIABETES WITH NEPHROPATHY (HCC): ICD-10-CM

## 2025-01-14 DIAGNOSIS — R27.0 ATAXIA: ICD-10-CM

## 2025-01-14 DIAGNOSIS — N18.30 STAGE 3 CHRONIC KIDNEY DISEASE, UNSPECIFIED WHETHER STAGE 3A OR 3B CKD (HCC): ICD-10-CM

## 2025-01-14 DIAGNOSIS — G62.9 NEUROPATHY: ICD-10-CM

## 2025-01-14 RX ORDER — PREDNISONE 10 MG/1
TABLET ORAL
Qty: 25 TABLET | Refills: 0 | OUTPATIENT
Start: 2025-01-14

## 2025-01-14 RX ORDER — PREDNISONE 10 MG/1
TABLET ORAL
Qty: 25 TABLET | Refills: 0 | Status: SHIPPED | OUTPATIENT
Start: 2025-01-14

## 2025-01-14 NOTE — PROGRESS NOTES
Vicenta Fontana is a 64 y.o. male who was seen by synchronous (real-time) audio-video technology on 1/14/2025.        Assessment & Plan:   1. Ataxia: Likely secondary to diabetic neuropathy.  -Continue with gabapentin 400 mg nightly.  I may increase this pending his follow-up blood test on January 21 to assess his renal function.  - Placing referral to physical therapy for home health services.  -Continue use of walker.    2. Gout: Having exacerbation.  -Ordering another course of prednisone.  Avoiding NSAIDs in the setting of CHF and CKD.  - Once he recovers from his gout exacerbation, I will start him on allopurinol and colchicine renally dosed, which was discussed with him today.  I will follow-up with him in the office in 3 weeks.    3. Type 2 diabetes with nephropathy: Complicated by hyperglycemia and CKD stage III  -I encouraged him to take 95 units of Tresiba while on prednisone mentioned above.  He was encouraged to be compliant with his insulin.  I encouraged him to continue use of lispro 15 units with meals and Jardiance 25 mg daily.  - Low-carb diet encouraged.    4. Chronic congestive heart failure: Worsening.  - C/w Entresto 24-26 mg twice daily, Eliquis 2.5 mg twice daily, metolazone 5 mg 3 days a week, Bumex 1 mg twice daily, Jardiance 25 mg daily, metoprolol 75 mg daily, spironolactone 25 mg daily, and Crestor 40 mg daily.  -I strongly encouraged him to follow-up with nephrology and cardiology as I suspect CHF is worsening.         Diagnosis Orders   1. Acute gout, unspecified cause, unspecified site  predniSONE (DELTASONE) 10 MG tablet      2. Ataxia  [facility]      3. Neuropathy  [facility]      4. Type 2 diabetes with nephropathy (HCC)        5. Stage 3 chronic kidney disease, unspecified whether stage 3a or 3b CKD (HCC)        6. Chronic combined systolic and diastolic congestive heart failure ([facility])            Lab review:

## 2025-01-15 RX ORDER — POTASSIUM CHLORIDE 1500 MG/1
10 TABLET, EXTENDED RELEASE ORAL 2 TIMES DAILY
Qty: 180 TABLET | Refills: 1 | OUTPATIENT
Start: 2025-01-15

## 2025-01-15 NOTE — TELEPHONE ENCOUNTER
Our records indicate that he is not on additional potassium. Is he taking potassium?    Dr. Blanca Alvarado  [facility] [address], [address]  [place] [address]  Phone: [phone]  Fax: [phone]

## 2025-01-21 ENCOUNTER — HOSPITAL ENCOUNTER (OUTPATIENT)
Facility: HOSPITAL | Age: 65
Setting detail: SPECIMEN
Discharge: HOME | End: 2025-01-24

## 2025-01-21 ENCOUNTER — LAB (OUTPATIENT)
Facility: CLINIC | Age: 65
End: 2025-01-21

## 2025-01-21 DIAGNOSIS — R74.8 ELEVATED ALKALINE PHOSPHATASE LEVEL: ICD-10-CM

## 2025-01-21 DIAGNOSIS — M10.9 GOUT, UNSPECIFIED CAUSE, UNSPECIFIED CHRONICITY, UNSPECIFIED SITE: ICD-10-CM

## 2025-01-21 DIAGNOSIS — E11.21 TYPE 2 DIABETES WITH NEPHROPATHY (HCC): ICD-10-CM

## 2025-01-21 DIAGNOSIS — N18.30 STAGE 3 CHRONIC KIDNEY DISEASE, UNSPECIFIED WHETHER STAGE 3A OR 3B CKD (HCC): ICD-10-CM

## 2025-01-21 LAB — SENTARA SPECIMEN COLLECTION: NORMAL

## 2025-01-21 PROCEDURE — 99001 SPECIMEN HANDLING PT-LAB: CPT

## 2025-01-22 LAB
A/G RATIO: 1.8 RATIO (ref 1.1–2.6)
ALBUMIN: 4.2 G/DL (ref 3.5–5)
ALP BLD-CCNC: 130 U/L (ref 40–125)
ALT SERPL-CCNC: 18 U/L (ref 5–40)
ANION GAP SERPL CALCULATED.3IONS-SCNC: 18 MMOL/L (ref 3–15)
AST SERPL-CCNC: 18 U/L (ref 10–37)
BILIRUB SERPL-MCNC: 0.4 MG/DL (ref 0.2–1.2)
BUN BLDV-MCNC: 79 MG/DL (ref 6–22)
CALCIUM SERPL-MCNC: 10 MG/DL (ref 8.4–10.5)
CHLORIDE BLD-SCNC: 95 MMOL/L (ref 98–110)
CO2: 28 MMOL/L (ref 20–32)
CREAT SERPL-MCNC: 1.6 MG/DL (ref 0.8–1.6)
GFR, ESTIMATED: 46.5 ML/MIN/1.73 SQ.M.
GLOBULIN: 2.3 G/DL (ref 2–4)
GLUCOSE: 74 MG/DL (ref 70–99)
POTASSIUM SERPL-SCNC: 3.8 MMOL/L (ref 3.5–5.5)
SODIUM BLD-SCNC: 141 MMOL/L (ref 133–145)
TOTAL PROTEIN: 6.5 G/DL (ref 6.2–8.1)

## 2025-01-26 LAB
ALP BLD-CCNC: 113 U/L (ref 35–144)
BONE ISOENZYMES: 25 % (ref 28–66)
INTESTINAL ISOENZYMES: 17 % (ref 1–24)
LIVER ISOENZYMES: 58 % (ref 25–69)
MACROHEPATIC ISOENZYME: 0 %
PLACENTAL ISOENZYMES: 0 %

## 2025-02-04 ENCOUNTER — OFFICE VISIT (OUTPATIENT)
Facility: CLINIC | Age: 65
End: 2025-02-04
Payer: MEDICARE

## 2025-02-04 VITALS
TEMPERATURE: 98.3 F | HEIGHT: 71 IN | RESPIRATION RATE: 20 BRPM | HEART RATE: 61 BPM | OXYGEN SATURATION: 97 % | SYSTOLIC BLOOD PRESSURE: 96 MMHG | WEIGHT: 250 LBS | BODY MASS INDEX: 35 KG/M2 | DIASTOLIC BLOOD PRESSURE: 54 MMHG

## 2025-02-04 DIAGNOSIS — E11.21 TYPE 2 DIABETES WITH NEPHROPATHY (HCC): Primary | ICD-10-CM

## 2025-02-04 DIAGNOSIS — Z00.00 INITIAL MEDICARE ANNUAL WELLNESS VISIT: ICD-10-CM

## 2025-02-04 DIAGNOSIS — M54.50 BILATERAL LOW BACK PAIN WITHOUT SCIATICA, UNSPECIFIED CHRONICITY: ICD-10-CM

## 2025-02-04 DIAGNOSIS — N18.30 STAGE 3 CHRONIC KIDNEY DISEASE, UNSPECIFIED WHETHER STAGE 3A OR 3B CKD (HCC): ICD-10-CM

## 2025-02-04 DIAGNOSIS — I50.42 CHRONIC COMBINED SYSTOLIC AND DIASTOLIC CONGESTIVE HEART FAILURE (HCC): ICD-10-CM

## 2025-02-04 DIAGNOSIS — M10.9 ACUTE GOUT, UNSPECIFIED CAUSE, UNSPECIFIED SITE: ICD-10-CM

## 2025-02-04 DIAGNOSIS — Z71.89 ADVANCE CARE PLANNING: ICD-10-CM

## 2025-02-04 DIAGNOSIS — L03.811 CELLULITIS OF HEAD OR SCALP: ICD-10-CM

## 2025-02-04 PROCEDURE — G0439 PPPS, SUBSEQ VISIT: HCPCS | Performed by: INTERNAL MEDICINE

## 2025-02-04 PROCEDURE — 1124F ACP DISCUSS-NO DSCNMKR DOCD: CPT | Performed by: INTERNAL MEDICINE

## 2025-02-04 PROCEDURE — 99214 OFFICE O/P EST MOD 30 MIN: CPT | Performed by: INTERNAL MEDICINE

## 2025-02-04 PROCEDURE — 3074F SYST BP LT 130 MM HG: CPT | Performed by: INTERNAL MEDICINE

## 2025-02-04 PROCEDURE — 3078F DIAST BP <80 MM HG: CPT | Performed by: INTERNAL MEDICINE

## 2025-02-04 RX ORDER — ALLOPURINOL 100 MG/1
50 TABLET ORAL DAILY
Qty: 45 TABLET | Refills: 3 | Status: SHIPPED | OUTPATIENT
Start: 2025-02-22

## 2025-02-04 RX ORDER — METHOCARBAMOL 750 MG/1
750 TABLET, FILM COATED ORAL 4 TIMES DAILY PRN
Qty: 30 TABLET | Refills: 3 | Status: SHIPPED | OUTPATIENT
Start: 2025-02-04

## 2025-02-04 RX ORDER — COLCHICINE 0.6 MG/1
0.6 TABLET ORAL DAILY
Qty: 90 TABLET | Refills: 1 | Status: SHIPPED | OUTPATIENT
Start: 2025-02-22 | End: 2025-08-21

## 2025-02-04 RX ORDER — PREDNISONE 10 MG/1
TABLET ORAL
Qty: 21 TABLET | Refills: 0 | Status: SHIPPED | OUTPATIENT
Start: 2025-02-04

## 2025-02-04 RX ORDER — DOXYCYCLINE HYCLATE 100 MG
100 TABLET ORAL 2 TIMES DAILY
Qty: 10 TABLET | Refills: 0 | Status: SHIPPED | OUTPATIENT
Start: 2025-02-04 | End: 2025-02-09

## 2025-02-04 RX ORDER — INSULIN DEGLUDEC 200 U/ML
100 INJECTION, SOLUTION SUBCUTANEOUS
Qty: 12 ML | Refills: 5 | Status: SHIPPED | OUTPATIENT
Start: 2025-02-04

## 2025-02-04 SDOH — ECONOMIC STABILITY: FOOD INSECURITY: WITHIN THE PAST 12 MONTHS, THE FOOD YOU BOUGHT JUST DIDN'T LAST AND YOU DIDN'T HAVE MONEY TO GET MORE.: NEVER TRUE

## 2025-02-04 SDOH — ECONOMIC STABILITY: FOOD INSECURITY: WITHIN THE PAST 12 MONTHS, YOU WORRIED THAT YOUR FOOD WOULD RUN OUT BEFORE YOU GOT MONEY TO BUY MORE.: NEVER TRUE

## 2025-02-04 ASSESSMENT — PATIENT HEALTH QUESTIONNAIRE - PHQ9
SUM OF ALL RESPONSES TO PHQ QUESTIONS 1-9: 0
SUM OF ALL RESPONSES TO PHQ QUESTIONS 1-9: 0
SUM OF ALL RESPONSES TO PHQ9 QUESTIONS 1 & 2: 0
1. LITTLE INTEREST OR PLEASURE IN DOING THINGS: NOT AT ALL
SUM OF ALL RESPONSES TO PHQ QUESTIONS 1-9: 0
SUM OF ALL RESPONSES TO PHQ QUESTIONS 1-9: 0
2. FEELING DOWN, DEPRESSED OR HOPELESS: NOT AT ALL

## 2025-02-04 NOTE — PROGRESS NOTES
Vicenta Fontana presents today for   Chief Complaint   Patient presents with    Medicare AWV     BP Recheck 96/54    Back pain and spot on back of the head pt is concerned.      \"Have you been to the ER, urgent care clinic since your last visit?  Hospitalized since your last visit?\"    NO    “Have you seen or consulted any other health care providers outside of [facility] since your last visit?”     2 weeks ago.            
Yes Blanca Alvarado MD   bumetanide (BUMEX) 1 MG tablet Take 1 tablet by mouth in the morning and 1 tablet in the evening. Yes Blanca Alvarado MD   empagliflozin (JARDIANCE) 25 MG tablet Take 1 tablet by mouth daily Yes Blanca Alvarado MD   BD PEN NEEDLE DAYTON 2ND GEN 32G X 4 MM MISC TAKE AS DIRECTED FOR BLOOD SUGAR THREE TIMES DAILY PLUS SLIDING SCALE Yes Blanca Alvarado MD   buPROPion (WELLBUTRIN SR) 150 MG extended release tablet TAKE 1 TABLET BY MOUTH DAILY Yes Blanca Alvarado MD   omega-3 acid ethyl esters (LOVAZA) 1 g capsule TAKE 2 CAPSULES BY MOUTH TWICE DAILY WITH MEALS Yes Blanca Alvarado MD   docusate (COLACE, DULCOLAX) 100 MG CAPS Take 100 mg by mouth 2 times daily as needed (PRN) Yes Blanca Alvarado MD   nystatin (MYCOSTATIN) 632317 UNIT/GM powder Apply 3 times daily prn along b/l inguinal creases Yes Blanca Alvarado MD   metoprolol succinate (TOPROL XL) 50 MG extended release tablet Take 1.5 tablets by mouth daily Yes Rashi Woodard MD   spironolactone (ALDACTONE) 25 MG tablet Take 1 tablet by mouth daily Yes Rashi Woodard MD   rosuvastatin (CRESTOR) 40 MG tablet TAKE 1 TABLET BY MOUTH EVERY NIGHT Yes Blanca Alvarado MD   Ascorbic Acid (VITAMIN C) 250 MG tablet Take 1 tablet by mouth daily Yes Blanca Alvarado MD   omeprazole (PRILOSEC) 20 MG delayed release capsule Take 1 capsule by mouth daily Yes Deanna Davis MD   cyanocobalamin 1000 MCG tablet Take 1 tablet by mouth 2 times daily Yes Hieu Medina MD   cycloSPORINE (RESTASIS) 0.05 % ophthalmic emulsion Place 1 drop into both eyes in the morning and 1 drop in the evening. Yes Hieu Medina MD   vitamin D 25 MCG (1000 UT) CAPS Take 2 capsules by mouth daily Yes Hieu Medina MD   Multiple Vitamins-Minerals (ONE-A-DAY 50 PLUS PO) Take 1 tablet by mouth daily Yes Deanna Davsi MD   BiPAP Machine MISC by Does not apply route Yes Provider, Historical, MD Prabhakar (Including

## 2025-02-06 ENCOUNTER — TELEPHONE (OUTPATIENT)
Facility: CLINIC | Age: 65
End: 2025-02-06

## 2025-02-06 NOTE — TELEPHONE ENCOUNTER
Patient contacted the office, he was supposed to get his RSV shot but the pharmacy said that they moved the age from 64 to 69. They told the patient that they can do the shot for him if Dr Mason calls in a script for it. Please advise

## 2025-02-07 NOTE — TELEPHONE ENCOUNTER
Please let him know that I will write a paper prescription for this vaccination when he returns to our office next month.  I am unable to electronically send it.      Dr. Blanca Alvarado  [facility] [address], [facility] [address]  [place] [address]  Phone: [phone]  Fax: [phone]

## 2025-02-10 ASSESSMENT — ENCOUNTER SYMPTOMS
ANAL BLEEDING: 0
BLOOD IN STOOL: 0
SORE THROAT: 0
ABDOMINAL PAIN: 0
BACK PAIN: 1
COUGH: 0
EYE PAIN: 0
SHORTNESS OF BREATH: 1

## 2025-02-10 NOTE — ACP (ADVANCE CARE PLANNING)
Advance Care Planning     General Advance Care Planning (ACP) Conversation    Date of Conversation: 1/4/25    Conducted with: Patient with Decision Making Capacity    Healthcare Decision Maker:  Today we documented Decision Maker(s) consistent with Legal Next of Kin hierarchy.    Content/Action Overview:  Has NO ACP documents-Information provided    His kids are to be his primary POAs. I encouraged him to complete his advance directive.    Length of Voluntary ACP Conversation in minutes:  <16 minutes (Non-Billable)    Blanca Alvarado MD

## 2025-02-14 ENCOUNTER — TELEPHONE (OUTPATIENT)
Age: 65
End: 2025-02-14

## 2025-02-27 ENCOUNTER — TELEPHONE (OUTPATIENT)
Facility: CLINIC | Age: 65
End: 2025-02-27

## 2025-02-27 NOTE — TELEPHONE ENCOUNTER
Please offer him a VV or in office apt with me tomorrow for evaluation of his rash in his arm pit per report from his HH team.      Thanks,  Dr. Blanca Alvarado  [facility] [address], [address]  [place] [address]  Phone: [phone]  Fax: [phone]

## 2025-02-27 NOTE — TELEPHONE ENCOUNTER
Annette from [facility] called to notify that pt is being discharged from home health PT and has made great improvement.   Recommending outpatient PT. [facility] in [place] if possible.     Annette also wanted PCP to be aware, pt has developed a rash in his left armpit. It is circular and directly in the crease of arm pit. Rash is red and burning. Patient has used nystatin on rash with no relief.     Please advise     Annette # [phone]

## 2025-02-28 ENCOUNTER — TELEMEDICINE (OUTPATIENT)
Facility: CLINIC | Age: 65
End: 2025-02-28

## 2025-02-28 DIAGNOSIS — E11.21 TYPE 2 DIABETES WITH NEPHROPATHY (HCC): ICD-10-CM

## 2025-02-28 DIAGNOSIS — L03.811 CELLULITIS OF HEAD EXCEPT FACE: ICD-10-CM

## 2025-02-28 DIAGNOSIS — B36.9 FUNGAL SKIN INFECTION: ICD-10-CM

## 2025-02-28 DIAGNOSIS — M10.9 ACUTE GOUT, UNSPECIFIED CAUSE, UNSPECIFIED SITE: Primary | ICD-10-CM

## 2025-02-28 RX ORDER — CLOTRIMAZOLE 1 %
CREAM (GRAM) TOPICAL
Qty: 60 G | Refills: 5 | Status: SHIPPED | OUTPATIENT
Start: 2025-02-28 | End: 2025-03-07

## 2025-02-28 RX ORDER — CLINDAMYCIN HYDROCHLORIDE 300 MG/1
300 CAPSULE ORAL 4 TIMES DAILY
Qty: 20 CAPSULE | Refills: 0 | Status: SHIPPED | OUTPATIENT
Start: 2025-02-28 | End: 2025-03-05

## 2025-02-28 RX ORDER — FLUCONAZOLE 150 MG/1
TABLET ORAL
Qty: 4 TABLET | Refills: 0 | Status: SHIPPED | OUTPATIENT
Start: 2025-02-28

## 2025-02-28 RX ORDER — PREDNISONE 10 MG/1
TABLET ORAL
Qty: 21 TABLET | Refills: 0 | Status: SHIPPED | OUTPATIENT
Start: 2025-02-28

## 2025-02-28 NOTE — PROGRESS NOTES
Vicenta Marizol presents today for   Chief Complaint   Patient presents with    Rash     Under arm pit           \"Have you been to the ER, urgent care clinic since your last visit?  Hospitalized since your last visit?\"    NO    “Have you seen or consulted any other health care providers outside of [facility] since your last visit?”    NO             
5 days, which she took.  He had no improvement in sx with use of doxycycline.    3. Gout: His gout is better since his last visit. Yesterday, his right hand began to hurt again. His hand is stiff when he bends his right hand fingers. Taking allopurinol 50mg daily and colchicine 0.6mg daily - for the past wk. His right hand is larger than his left hand as of yesterday.  He feels that his gout is starting to flareup again.    4. Type 2 DM: He was referred to Endocrinology. He is scheduled but does not know the date. Highest Bs. Avg Bgs: 130-140.  He is injecting 95 units of Tresiba since completing the prednisone course as prescribed by his last visit for #3.  While he was on prednisone, he was up to 100 units of Tresiba daily.  He is still injecting 15 units of lispro with meals and taking Jardiance 25 mg daily.      Prior to Admission medications    Medication Sig Start Date End Date Taking? Authorizing Provider   clindamycin (CLEOCIN) 300 MG capsule Take 1 capsule by mouth 4 times daily for 5 days Please take probiotics while on this medication 2/28/25 3/5/25 Yes Blanca Alvarado MD   predniSONE (DELTASONE) 10 MG tablet Take 20mg po daily x 6 days, then 10mg po daily x 6 days and then 5mg po daily x 6 days 25  Yes Blanca Alvarado MD   clotrimazole (LOTRIMIN) 1 % cream Apply topically 2 times daily to left axilla affected area 2/28/25 3/7/25 Yes Blanca Alvarado MD   fluconazole (DIFLUCAN) 150 MG tablet Take 150mg po every 7 days x 4 doses. 25  Yes Blanca Alvarado MD   predniSONE (DELTASONE) 10 MG tablet Take 20mg po daily x 6 days, then 10mg po daily x 6 days and then 5mg po daily x 6 days 25  Yes Blanca Alvarado MD   allopurinol (ZYLOPRIM) 100 MG tablet Take 0.5 tablets by mouth daily This is to prevent gout flare ups. Take this after your present flare up resolves. 25  Yes Blanca Alvarado MD   colchicine (COLCRYS) 0.6 MG tablet Take 1 tablet by mouth daily This

## 2025-03-10 ENCOUNTER — OFFICE VISIT (OUTPATIENT)
Facility: CLINIC | Age: 65
End: 2025-03-10
Payer: MEDICARE

## 2025-03-10 VITALS
WEIGHT: 264.4 LBS | OXYGEN SATURATION: 97 % | DIASTOLIC BLOOD PRESSURE: 51 MMHG | BODY MASS INDEX: 37.02 KG/M2 | HEART RATE: 89 BPM | SYSTOLIC BLOOD PRESSURE: 87 MMHG | TEMPERATURE: 98.2 F | RESPIRATION RATE: 17 BRPM | HEIGHT: 71 IN

## 2025-03-10 DIAGNOSIS — N18.30 STAGE 3 CHRONIC KIDNEY DISEASE, UNSPECIFIED WHETHER STAGE 3A OR 3B CKD (HCC): ICD-10-CM

## 2025-03-10 DIAGNOSIS — B36.9 FUNGAL SKIN INFECTION: ICD-10-CM

## 2025-03-10 DIAGNOSIS — L03.811 CELLULITIS OF HEAD EXCEPT FACE: ICD-10-CM

## 2025-03-10 DIAGNOSIS — M10.9 ACUTE GOUT, UNSPECIFIED CAUSE, UNSPECIFIED SITE: Primary | ICD-10-CM

## 2025-03-10 DIAGNOSIS — I50.42 CHRONIC COMBINED SYSTOLIC AND DIASTOLIC CONGESTIVE HEART FAILURE (HCC): ICD-10-CM

## 2025-03-10 DIAGNOSIS — G62.9 NEUROPATHY: ICD-10-CM

## 2025-03-10 DIAGNOSIS — E11.21 TYPE 2 DIABETES WITH NEPHROPATHY (HCC): ICD-10-CM

## 2025-03-10 PROCEDURE — 3074F SYST BP LT 130 MM HG: CPT | Performed by: INTERNAL MEDICINE

## 2025-03-10 PROCEDURE — 99214 OFFICE O/P EST MOD 30 MIN: CPT | Performed by: INTERNAL MEDICINE

## 2025-03-10 PROCEDURE — 1124F ACP DISCUSS-NO DSCNMKR DOCD: CPT | Performed by: INTERNAL MEDICINE

## 2025-03-10 PROCEDURE — 3078F DIAST BP <80 MM HG: CPT | Performed by: INTERNAL MEDICINE

## 2025-03-10 RX ORDER — CLOTRIMAZOLE 1 %
60 CREAM (GRAM) TOPICAL 2 TIMES DAILY
COMMUNITY
Start: 2025-02-28

## 2025-03-10 NOTE — PROGRESS NOTES
[facility]:  3/16/2025, MRN: [id number]      Vicenta Fontana is a 64 y.o. male and presents to clinic for Follow-up (3 mon f/u)      Subjective:   The patient is a 64-year-old male with history of renal cyst and BPH (followed by urology team), COPD from secondhand smoke exposure for several yrs, tubular adenomatous colon polyp and June 2016, severe ROJAS, diverticulosis, right inferior cuff tendinitis, nephrolithiasis, AF, HTN, lumbar disc disease, splenomegaly, GI bleed (2018, while on xarelto; he required PRBCs), s/p partial gastrectomy for removal of a benign gastric mass, chronic gastritis, type 2 DM, systolic CHF, HLD, gout, IBS, OA, obesity, recurrent pes anserinus bursitis involving the right total knee, CARLOTA (on BiPAP), and GERD.      1.  Axillary Rash/Fungal skin infection: On his left axilla per PE findings at his last visit.  At that time, I ordered clotrimazole cream 1% to be applied to the affected area twice daily along with fluconazole 150 mg every 7 days for 4 doses.  Today he reports: the area along his left axilla is lighter in pigment. No pain.     2.  Cellulitis of head: Along his scalp.  Clindamycin 300 mg 4 times daily for 5 days was ordered at his last visit.  Note that this infection was not responsive to doxycycline.  Today he reports: the area is still painful. He scratches it. No diarrhea. He completed 5 days of abx.     3.  Acute on chronic gout: Due to his history of CKD and CVD, NSAIDs are avoided.  At his last visit I ordered an extended prednisone course and encouraged him to take allopurinol 50 mg daily and colchicine 0.6 mg daily as he completed his prednisone course.  He is on colchicine and allopurinol for chronic management of gout. He has improvement in his sx but not resolution.     4.  Type 2 diabetes: Treated with Tresiba 100 units every morning while on prednisone.  He was instructed to go back to 95 units/day once he completed prednisone as mentioned above.  He is to

## 2025-03-10 NOTE — PATIENT INSTRUCTIONS
Learning About Tests When You Have Diabetes  Why do you need regular tests?     Diabetes can lead to other health problems if it's not well managed. You'll need tests to monitor how well your diabetes is managed and to check for other things like high cholesterol or kidney problems. Having tests on a regular schedule can help your doctor find problems early, when it's best to start treating them.  What tests do you need?  These are the tests you may need and how often you should have them. The tests may vary depending on whether you have type 1 or type 2 diabetes.  A1c blood test.  This test shows the average level of blood sugar over the past 2 to 3 months. It helps your doctor see whether blood sugar levels have been staying within your target range.  How often: Every 3 to 6 months  Goal: A blood sugar level in your target range  Blood pressure test.  This test measures the pressure of blood flow in the arteries. Controlling blood pressure can help prevent damage to nerves and blood vessels.  How often: Every 3 to 6 months  Goal: A blood pressure level in your target range  Cholesterol test.  This test measures the amount of a type of fat in the blood. It is common for people with diabetes to also have high cholesterol. Too much cholesterol in the blood can build up inside the blood vessels and raise the risk for heart attack and stroke.  How often: At the time of your diabetes diagnosis, and as often as your doctor recommends after that  Goal: A cholesterol level in your target range  Albumin-creatinine ratio test.  This test checks for kidney damage by looking for the protein albumin (say \"al-BYOO-tammi\") in the urine. Albumin is normally found in the blood. Kidney damage can let small amounts of it (microalbumin) leak into the urine.  How often: Once a year  Goal: No protein in the urine  Blood creatinine test/estimated glomerular filtration (eGFR).  The blood creatinine (say \"zppr-LK-ip-neen\") level shows

## 2025-03-10 NOTE — PROGRESS NOTES
Vicenta Gay presents today for   Chief Complaint   Patient presents with    Follow-up     3 mon f/u           \"Have you been to the ER, urgent care clinic since your last visit?  Hospitalized since your last visit?\"    NO    “Have you seen or consulted any other health care providers outside of [facility] since your last visit?”    NO

## 2025-03-11 RX ORDER — OMEGA-3-ACID ETHYL ESTERS 1 G/1
CAPSULE, LIQUID FILLED ORAL
Qty: 120 CAPSULE | Refills: 5 | Status: SHIPPED | OUTPATIENT
Start: 2025-03-11

## 2025-03-13 DIAGNOSIS — M10.9 ACUTE GOUT, UNSPECIFIED CAUSE, UNSPECIFIED SITE: ICD-10-CM

## 2025-03-13 RX ORDER — PREDNISONE 10 MG/1
TABLET ORAL
Qty: 21 TABLET | Refills: 0 | OUTPATIENT
Start: 2025-03-13

## 2025-03-13 RX ORDER — INSULIN DEGLUDEC 200 U/ML
115 INJECTION, SOLUTION SUBCUTANEOUS
Qty: 12 ML | Refills: 5 | Status: SHIPPED | OUTPATIENT
Start: 2025-03-13

## 2025-03-13 RX ORDER — GABAPENTIN 400 MG/1
400 CAPSULE ORAL 2 TIMES DAILY
Qty: 180 CAPSULE | Refills: 1 | Status: SHIPPED | OUTPATIENT
Start: 2025-03-13 | End: 2025-09-09

## 2025-03-13 RX ORDER — CLINDAMYCIN HYDROCHLORIDE 300 MG/1
300 CAPSULE ORAL 4 TIMES DAILY
Qty: 28 CAPSULE | Refills: 0 | Status: SHIPPED | OUTPATIENT
Start: 2025-03-13 | End: 2025-03-20

## 2025-03-13 RX ORDER — PREDNISONE 10 MG/1
TABLET ORAL
Qty: 32 TABLET | Refills: 0 | Status: SHIPPED | OUTPATIENT
Start: 2025-03-13

## 2025-03-13 NOTE — TELEPHONE ENCOUNTER
Patient contacted the office, his last visit with Dr Alvarado he discussed refills for an antibiotic and predniSONE (DELTASONE) 10 MG tablet but neither were called in. Please advise

## 2025-03-13 NOTE — TELEPHONE ENCOUNTER
I spoke with him. His rx were sent and he will pick them up today.    Dr. Blanca Alvarado  [facility] [address], [address]  [place] [address]  Phone: [phone]  Fax: [phone]

## 2025-03-13 NOTE — TELEPHONE ENCOUNTER
Called pt to inquire. Pt stated he discussed an antibiotic for diarrhea w/provider and he needed a refill for prednisone. Refill for Deltasone placed.

## 2025-03-19 ENCOUNTER — TELEPHONE (OUTPATIENT)
Facility: CLINIC | Age: 65
End: 2025-03-19

## 2025-03-19 NOTE — TELEPHONE ENCOUNTER
Hi Dr. Alvarado,    Please see msg below:     Kamini from [facility] said that pt mentioned that he was supposed to have a referral for his legs and low back pain for outpatient therapy but I didn't see a referral for this in pt's chart. Kamini requested that this referral be faxed to [phone] attn: Kamini. The number to the office is [phone]. Please advise.     Referral placed for review. Please advise.

## 2025-03-19 NOTE — TELEPHONE ENCOUNTER
Kamini from [facility] said that pt mentioned that he was supposed to have a referral for his legs and low back pain for outpatient therapy but I didn't see a referral for this in pt's chart. Kamini requested that this referral be faxed to [phone] attn: Kamini. The number to the office is [phone]. Please advise.

## 2025-03-20 DIAGNOSIS — E11.21 TYPE 2 DIABETES MELLITUS WITH DIABETIC NEPHROPATHY, WITH LONG-TERM CURRENT USE OF INSULIN (HCC): ICD-10-CM

## 2025-03-20 DIAGNOSIS — M51.9 LUMBAR DISC DISEASE: ICD-10-CM

## 2025-03-20 DIAGNOSIS — Z79.4 TYPE 2 DIABETES MELLITUS WITH DIABETIC NEPHROPATHY, WITH LONG-TERM CURRENT USE OF INSULIN (HCC): ICD-10-CM

## 2025-03-20 DIAGNOSIS — M25.561 PAIN IN BOTH KNEES, UNSPECIFIED CHRONICITY: ICD-10-CM

## 2025-03-20 DIAGNOSIS — M25.562 PAIN IN BOTH KNEES, UNSPECIFIED CHRONICITY: ICD-10-CM

## 2025-03-20 DIAGNOSIS — G62.9 NEUROPATHY: Primary | ICD-10-CM

## 2025-03-20 DIAGNOSIS — M54.50 BILATERAL LOW BACK PAIN WITHOUT SCIATICA, UNSPECIFIED CHRONICITY: ICD-10-CM

## 2025-03-20 NOTE — TELEPHONE ENCOUNTER
Spoke to pt and Kamini to advise referral has been signed. Sent referral w/demo and ins info to [facility] w/return confirmation.

## 2025-03-20 NOTE — TELEPHONE ENCOUNTER
Please let her know that I signed a PT order as requested.    Dr. Blanca Alvarado  [facility] [address], [address]  [place] [address]  Phone: [phone]  Fax: [phone]  ]

## 2025-03-24 NOTE — THERAPY EVALUATION
abilities, decrease activity tolerance, decrease flexibility/joint mobility, and decrease transfer abilities    Treatment Plan may include any combination of the followin Therapeutic Exercise, 03523 Neuromuscular Re-Education, 31075 Manual Therapy, 68418 Therapeutic Activity, 58625 Self Care/Home Management, 26011 Electrical Stim unattended /  (MCR), and 86576 Gait Training  Patient / Family readiness to learn indicated by: asking questions, trying to perform skills, and interest  Persons(s) to be included in education: patient (P) and family support person (FSP); list Sara Collins dtr  Barriers to Learning/Limitations: sensory deficits-vision/hearing/speech  Measures taken if barriers to learning present: increased volume of speech and repeated instructions as needed  Patient Goal (s): \"decrease pain\"  Patient Self Reported Health Status: fair  Rehabilitation Potential: good    Short Term Goals: To be accomplished in 4 WEEKS  1.  Pt will be educated in/compliant with appropriate HEP to decrease pain, increase ROM, increase strength and return pt to PLOF.    Status at last note/certification: to be issued NV  Current:     2. Pt will increase b/l quad strength by 1/3 MMT for improved standing tolerance with ADL's.  Status at last note/certification: Ext R 4/5, L 4+/5  Current:     3. Pt will improve Romberg stance EC >/=15\" for decreased fall risk in low light conditions.  Status at last note/certification: ROM EC 3\"  Current:       Long Term Goals: To be accomplished in 8 WEEKS  1. Pt will improve Oswestry score to </=47 to demo a significant improvement in functional activity tolerance.  Status at last note/certification: 60%  Current:    2. Pt will improve Tinetti score to >/= 18/28 for decreased fall risk with ambulation/functional mobility  Status at last note/certification:   Current:     3. Pt will demonstrate at least 10 reps bridge>/=50% AROM for improved axial stability with

## 2025-03-25 ENCOUNTER — HOSPITAL ENCOUNTER (OUTPATIENT)
Facility: HOSPITAL | Age: 65
Setting detail: RECURRING SERIES
Discharge: HOME | End: 2025-03-28
Attending: INTERNAL MEDICINE
Payer: MEDICARE

## 2025-03-25 DIAGNOSIS — I50.9 CHRONIC CONGESTIVE HEART FAILURE, UNSPECIFIED HEART FAILURE TYPE (HCC): ICD-10-CM

## 2025-03-25 PROCEDURE — 97162 PT EVAL MOD COMPLEX 30 MIN: CPT

## 2025-03-25 NOTE — PROGRESS NOTES
PHYSICAL / OCCUPATIONAL THERAPY - DAILY TREATMENT NOTE (updated 3/2025)  For Eval visit    Patient Name: Vicenta Fontana    Date: 3/25/2025    : 1960  Insurance: [facility] / Plan: [employer] / Product Type: *No Product type* /      Patient  verified yes     Visit #   Current / Total 1 16   Time   In / Out 9:00 9:44   Pain   In / Out 8 8   Subjective Functional Status/Changes: See POC     TREATMENT AREA =  see POC    OBJECTIVE      44 min   Eval - untimed                      Therapeutic Procedures:    Tx Min Billable or 1:1 Min (if diff from Tx Min) Procedure, Rationale, Specifics          Details if applicable:              Details if applicable:            Details if applicable:            Details if applicable:       [facility] Totals Reminder: bill using total billable min of TIMED therapeutic procedures (example: do not include dry needle or estim unattended, both untimed codes, in totals to left)  8-22 min = 1 unit; 23-37 min = 2 units; 38-52 min = 3 units; 53-67 min = 4 units; 68-82 min = 5 units   Total Total     Charge Capture    [x]  Patient Education billed concurrently with other procedures   [x] Review HEP    [] Progressed/Changed HEP, detail:    [] Other detail:       Objective Information/Functional Measures/Assessment    See POC    Patient will continue to benefit from skilled PT / OT services to modify and progress therapeutic interventions, analyze and address functional mobility deficits, analyze and address ROM deficits, analyze and address strength deficits, analyze and address soft tissue restrictions, analyze and cue for proper movement patterns, analyze and modify for postural abnormalities, analyze and address imbalance/dizziness, and instruct in home and community integration to address functional deficits and attain remaining goals.    Progress toward goals / Updated goals:  [x]  See POC      Next PN/ RC due 25 / 25  Auth due     PLAN  yes Continue

## 2025-03-26 ENCOUNTER — OFFICE VISIT (OUTPATIENT)
Age: 65
End: 2025-03-26
Payer: MEDICARE

## 2025-03-26 VITALS
OXYGEN SATURATION: 97 % | BODY MASS INDEX: 37.77 KG/M2 | SYSTOLIC BLOOD PRESSURE: 92 MMHG | WEIGHT: 263.8 LBS | TEMPERATURE: 97.3 F | RESPIRATION RATE: 16 BRPM | DIASTOLIC BLOOD PRESSURE: 63 MMHG | HEIGHT: 70 IN | HEART RATE: 59 BPM

## 2025-03-26 DIAGNOSIS — G47.33 OSA TREATED WITH BIPAP: Primary | ICD-10-CM

## 2025-03-26 PROCEDURE — 99214 OFFICE O/P EST MOD 30 MIN: CPT | Performed by: HOSPITALIST

## 2025-03-26 PROCEDURE — 3074F SYST BP LT 130 MM HG: CPT | Performed by: HOSPITALIST

## 2025-03-26 PROCEDURE — 1124F ACP DISCUSS-NO DSCNMKR DOCD: CPT | Performed by: HOSPITALIST

## 2025-03-26 PROCEDURE — 3078F DIAST BP <80 MM HG: CPT | Performed by: HOSPITALIST

## 2025-03-26 RX ORDER — BUMETANIDE 1 MG/1
1 TABLET ORAL 2 TIMES DAILY
Qty: 90 TABLET | Refills: 1 | Status: SHIPPED | OUTPATIENT
Start: 2025-03-26

## 2025-03-26 RX ORDER — ALBUTEROL SULFATE 90 UG/1
2 INHALANT RESPIRATORY (INHALATION) EVERY 6 HOURS PRN
Qty: 18 G | Refills: 3 | Status: SHIPPED | OUTPATIENT
Start: 2025-03-26

## 2025-03-26 NOTE — PROGRESS NOTES
[facility]   Pulmonary, Critical Care, and Sleep Medicine           Pulmonary Office Visit      Subjective:       Patient is here  for follow up-asthma COPD , CARLOTA on BiPAP ,chronic bronchitis.  S/p recent hospitalization follow-up  Was hospitalized at [facility] from 8/3/2023 through 8/17/2023 after he presented with altered mental status.  And had no recollection of what had happened and was found on the kitchen floor and subsequently brought to the hospital.  After evaluation was diagnosed with acute metabolic and toxic encephalopathy, sepsis secondary to scrotal abscess treated with vancomycin , cefepime and Flagyl.  In addition he was noted to have acute on chronic respiratory failure, acute diastolic heart failure.  He has been discharged to a skilled nursing facility and family requested him to be followed up due to concerns about his BiPAP settings.  Hospital discharge summary reports as follows  Acute on chronic resp faulure-COPD on home bronchodilator  and spiriva  Obesity Hypoventilation Syndrome-has Trilogy at home. Match the current BiPap settings to the Trilogy on discharge.but patient is noncompliant with BiPAP.  According to the family.  Patient to get more confused the next day if not to use the BiPAP at night     03/26/25      Reports feeling significantly improved  Has not had any increasing cough, congestion or any wheezing  He has been losing some weight and feels improved  Continues to wear his BiPAP as prescribed  Compliance data downloaded from 10/14/2023 through 11/12/2023 shows 100% usage for average of 7 hours 28 minutes at V auto IPAP 23 EPAP 11 pressure support 4 with resultant AHI of 0.2  Patient has been receiving wound care and his leg wounds as well as edema has improved  He is now back home    Background pertinent information  He has a past medical history significant for persistent atrial fibrillation.  Cardiology consult during hospitalization at

## 2025-03-26 NOTE — PROGRESS NOTES
Vicenta Fontana presents today for   Chief Complaint   Patient presents with    COPD       Is someone accompanying this pt? no    Is the patient using any DME equipment during OV? bipap    -D[employer] med    Depression Screening: [facility]        2/4/2025     1:45 PM   PHQ-9 Questionaire   Little interest or pleasure in doing things 0   Feeling down, depressed, or hopeless 0   PHQ-9 Total Score 0       Learning Assessment:    Failed to redirect to the Timeline version of the [employer] Smart[employer].    Abuse Screening:         No data to display                Fall Risk    Failed to redirect to the Timeline version of the [employer]ek[employer] SmartLink.    Coordination of Care:    1. Have you been to the ER, urgent care clinic since your last visit? Hospitalized since your last visit? no    2. Have you seen or consulted any other health care providers outside of the [facility] since your last visit? Include any pap smears or colon screening. no    Medication list has been update per patient.

## 2025-03-26 NOTE — TELEPHONE ENCOUNTER
PCP: Blanca Alvarado MD    LAST OFFICE VISIT: 03/10/2025    LAST REFILL PER CHART:  Medication:bumetanide (BUMEX) 1 MG tablet   Ordered On:08/29/2024  Instructions:Take 1 tablet by mouth in the morning and 1 tablet in the evening   Dispense:90 tablets  Refills:1      Future Appointments   Date Time Provider Department Center   3/26/2025  1:30 PM Joo Flannery MD [facility]   3/31/2025  1:00 PM IOC LAB VISIT [facility]   4/3/2025  9:00 AM SquinoKamini huertas, PT [facility]   4/7/2025  8:20 AM Squinobal, Kamini, PT [facility]   4/10/2025 11:00 AM Squinobal, Kamini, PT [facility]   4/14/2025 10:20 AM Squinobal, Kamini, PT [facility]   4/17/2025  9:00 AM SquinobalKamini, PT [facility]   4/21/2025 10:20 AM SquinobalKamini, PT [facility]   4/24/2025  8:20 AM Squinobal, Kamini, PT [facility]   4/28/2025  8:20 AM SquinobalKamini, PT [facility]   4/29/2025  1:00 PM Blanca Alvarado MD [facility] DEP   5/2/2025  9:20 AM Roland Campo MD [facility]   5/21/2025  9:40 AM Fuad Kendrick PA [facility] Sched

## 2025-03-28 ENCOUNTER — APPOINTMENT (OUTPATIENT)
Facility: HOSPITAL | Age: 65
End: 2025-03-28
Attending: INTERNAL MEDICINE
Payer: MEDICARE

## 2025-03-31 ENCOUNTER — HOSPITAL ENCOUNTER (OUTPATIENT)
Facility: HOSPITAL | Age: 65
Setting detail: SPECIMEN
Discharge: HOME | End: 2025-04-03

## 2025-03-31 ENCOUNTER — LAB (OUTPATIENT)
Facility: CLINIC | Age: 65
End: 2025-03-31

## 2025-03-31 DIAGNOSIS — E11.21 TYPE 2 DIABETES WITH NEPHROPATHY (HCC): ICD-10-CM

## 2025-03-31 LAB
SENTARA SPECIMEN COLLECTION: NORMAL
SENTARA SPECIMEN COLLECTION: NORMAL

## 2025-03-31 PROCEDURE — 99001 SPECIMEN HANDLING PT-LAB: CPT

## 2025-04-01 ENCOUNTER — TELEPHONE (OUTPATIENT)
Age: 65
End: 2025-04-01

## 2025-04-01 LAB
A/G RATIO: 2 RATIO (ref 1.1–2.6)
ALBUMIN: 4.3 G/DL (ref 3.5–5)
ALP BLD-CCNC: 66 U/L (ref 40–125)
ALT SERPL-CCNC: 30 U/L (ref 5–40)
ANION GAP SERPL CALCULATED.3IONS-SCNC: 20 MMOL/L (ref 3–15)
AST SERPL-CCNC: 23 U/L (ref 10–37)
BILIRUB SERPL-MCNC: 0.6 MG/DL (ref 0.2–1.2)
BUN BLDV-MCNC: 82 MG/DL (ref 6–22)
CALCIUM SERPL-MCNC: 9.9 MG/DL (ref 8.4–10.5)
CHLORIDE BLD-SCNC: 94 MMOL/L (ref 98–110)
CHOLESTEROL, TOTAL: 182 MG/DL (ref 110–200)
CHOLESTEROL/HDL RATIO: 4.7 (ref 0–5)
CO2: 22 MMOL/L (ref 20–32)
CREAT SERPL-MCNC: 1.6 MG/DL (ref 0.8–1.6)
ESTIMATED AVERAGE GLUCOSE: 238 MG/DL (ref 91–123)
GFR, ESTIMATED: 46.5 ML/MIN/1.73 SQ.M.
GLOBULIN: 2.2 G/DL (ref 2–4)
GLUCOSE: 134 MG/DL (ref 70–99)
HBA1C MFR BLD: 9.9 % (ref 4.8–5.6)
HDLC SERPL-MCNC: 39 MG/DL
LDL CHOLESTEROL: 69 MG/DL (ref 50–99)
LDL/HDL RATIO: 1.8
NON-HDL CHOLESTEROL: 143 MG/DL
POTASSIUM SERPL-SCNC: 3.5 MMOL/L (ref 3.5–5.5)
SODIUM BLD-SCNC: 136 MMOL/L (ref 133–145)
TOTAL PROTEIN: 6.5 G/DL (ref 6.2–8.1)
TRIGL SERPL-MCNC: 368 MG/DL (ref 40–149)
VLDLC SERPL CALC-MCNC: 74 MG/DL (ref 8–30)

## 2025-04-01 NOTE — TELEPHONE ENCOUNTER
Pt stated that the [facility] [employer] company has not received the order for resmed 11  The dme company can be reached  [phone]. For further information please call pt [phone]

## 2025-04-02 NOTE — PROGRESS NOTES
PHYSICAL / OCCUPATIONAL THERAPY - DAILY TREATMENT NOTE    Patient Name: Vicenta Fontana    Date: 4/3/2025    : 1960  Insurance: Payor: [employer] MEDIC[employer] / [facility] / Product Type: *No Product type* /      Patient  verified Yes     Visit #   Current / Total 2 16   Time   In / Out 8:57 9:56   Pain   In / Out 8- LBP, B hip  5- B knee 5   Subjective Functional Status/Changes: Pt c/o his usual pain to low back, hips and knees. States he also has occasional pain between his shoulder blades.     TREATMENT AREA =  Neuropathy  Bilateral low back pain without sciatica, unspecified chronicity  Type 2 diabetes mellitus with diabetic nephropathy, with long-term current use of insulin (HCC)  Lumbar disc disease  Pain in both knees, unspecified chronicity  Other low back pain  Pain in left knee  Pain in right knee    OBJECTIVE    Modalities Rationale:     decrease pain and increase tissue extensibility to improve patient's ability to progress to PLOF and address remaining functional goals.     min [] Estim Unattended, type/location:                                      []  w/ice    []  w/heat    min [] Estim Attended, type/location:                                     []  w/US     []  w/ice    []  w/heat    []  TENS insruct      min []  Mechanical Traction: type/lbs                   []  pro   []  sup   []  int   []  cont    []  before manual    []  after manual    min []  Ultrasound, settings/location:     10 min  unbill []  Ice     [x]  Heat    location/position: L/s in short sitting    min []  Paraffin,  details:     min []  Vasopneumatic Device, press/temp:     min []  Whirlpool / Fluido:    If using vaso (only need to measure limb vaso being performed on)      pre-treatment girth :       post-treatment girth :       measured at (landmark location) :      min []  Other:    Skin assessment post-treatment:   Intact      Therapeutic Procedures:    Tx Min Billable or 1:1 Min (if diff from Tx Min)

## 2025-04-03 ENCOUNTER — HOSPITAL ENCOUNTER (OUTPATIENT)
Facility: HOSPITAL | Age: 65
Setting detail: RECURRING SERIES
Discharge: HOME | End: 2025-04-06
Attending: INTERNAL MEDICINE
Payer: MEDICARE

## 2025-04-03 PROCEDURE — 97110 THERAPEUTIC EXERCISES: CPT

## 2025-04-03 PROCEDURE — 97530 THERAPEUTIC ACTIVITIES: CPT

## 2025-04-07 ENCOUNTER — APPOINTMENT (OUTPATIENT)
Facility: HOSPITAL | Age: 65
End: 2025-04-07
Attending: INTERNAL MEDICINE
Payer: MEDICARE

## 2025-04-08 RX ORDER — APIXABAN 2.5 MG/1
2.5 TABLET, FILM COATED ORAL 2 TIMES DAILY
Qty: 180 TABLET | Refills: 1 | Status: SHIPPED | OUTPATIENT
Start: 2025-04-08

## 2025-04-10 ENCOUNTER — HOSPITAL ENCOUNTER (OUTPATIENT)
Facility: HOSPITAL | Age: 65
Setting detail: RECURRING SERIES
Discharge: HOME | End: 2025-04-13
Attending: INTERNAL MEDICINE
Payer: MEDICARE

## 2025-04-10 PROCEDURE — 97112 NEUROMUSCULAR REEDUCATION: CPT

## 2025-04-10 PROCEDURE — 97110 THERAPEUTIC EXERCISES: CPT

## 2025-04-10 PROCEDURE — 97530 THERAPEUTIC ACTIVITIES: CPT

## 2025-04-10 NOTE — PROGRESS NOTES
(landmark location) :      min []  Other:    Skin assessment post-treatment:   Intact      Therapeutic Procedures:    Tx Min Billable or 1:1 Min (if diff from Tx Min) Procedure, Rationale, Specifics   20 20 [id number] Therapeutic Exercise (timed):  increase ROM, strength, coordination, balance, and proprioception to improve patient's ability to progress to PLOF and address remaining functional goals. (see flow sheet as applicable)     Details if applicable:       14 14 97112 Neuromuscular Re-Education (timed):  improve balance, coordination, kinesthetic sense, posture, core stability and proprioception to improve patient's ability to develop conscious control of individual muscles and awareness of position of extremities in order to progress to PLOF and address remaining functional goals. (see flow sheet as applicable)     Details if applicable:     10 10 57657 Therapeutic Activity (timed):  use of dynamic activities replicating functional movements to increase ROM, strength, coordination, balance, and proprioception in order to improve patient's ability to progress to PLOF and address remaining functional goals.  (see flow sheet as applicable)     Details if applicable:            Details if applicable:            Details if applicable:     44 44 [facility] Totals Reminder: bill using total billable min of TIMED therapeutic procedures (example: do not include dry needle or estim unattended, both untimed codes, in totals to left)  8-22 min = 1 unit; 23-37 min = 2 units; 38-52 min = 3 units; 53-67 min = 4 units; 68-82 min = 5 units   Total Total     Charge Capture    [x]  Patient Education billed concurrently with other procedures   [x] Review HEP    [] Progressed/Changed HEP, detail:    [] Other detail:       Objective Information/Functional Measures/Assessment    Improved tolerance to exercise regimen this visit. Pt able to complete exercises without increased pain. Pt required VC for proper form and technique 100% of the

## 2025-04-14 ENCOUNTER — HOSPITAL ENCOUNTER (OUTPATIENT)
Facility: HOSPITAL | Age: 65
Setting detail: RECURRING SERIES
End: 2025-04-14
Attending: INTERNAL MEDICINE
Payer: MEDICARE

## 2025-04-17 ENCOUNTER — TELEPHONE (OUTPATIENT)
Facility: CLINIC | Age: 65
End: 2025-04-17

## 2025-04-17 ENCOUNTER — APPOINTMENT (OUTPATIENT)
Facility: HOSPITAL | Age: 65
End: 2025-04-17
Attending: INTERNAL MEDICINE
Payer: MEDICARE

## 2025-04-18 DIAGNOSIS — L30.4 INTERTRIGO: ICD-10-CM

## 2025-04-21 ENCOUNTER — HOSPITAL ENCOUNTER (OUTPATIENT)
Facility: HOSPITAL | Age: 65
Setting detail: RECURRING SERIES
Discharge: HOME | End: 2025-04-24
Attending: INTERNAL MEDICINE
Payer: MEDICARE

## 2025-04-21 PROCEDURE — 97110 THERAPEUTIC EXERCISES: CPT

## 2025-04-21 PROCEDURE — 97530 THERAPEUTIC ACTIVITIES: CPT

## 2025-04-21 RX ORDER — NYSTATIN 100000 [USP'U]/G
POWDER TOPICAL
Qty: 60 G | Refills: 5 | Status: SHIPPED | OUTPATIENT
Start: 2025-04-21

## 2025-04-21 NOTE — PROGRESS NOTES
PHYSICAL / OCCUPATIONAL THERAPY - DAILY TREATMENT NOTE    Patient Name: Vicenta Fontana    Date: 2025    : 1960  Insurance: Payor: [employer] MEDICARE / [facility] / Product Type: *No Product type* /      Patient  verified Yes     Visit #   Current / Total 4 16   Time   In / Out 10:37 11:27   Pain   In / Out 9 LBP 7- back  4- knee   Subjective Functional Status/Changes: Pt went to [facility] and had to stand for 1.5 hrs which caused increased back pain and he had to sit. He sat for and hour and then couldn't stand up (no arm rests). Had to receive assistance. States blood glucose 130 this AM.     TREATMENT AREA =  Neuropathy  Bilateral low back pain without sciatica, unspecified chronicity  Type 2 diabetes mellitus with diabetic nephropathy, with long-term current use of insulin (HCC)  Lumbar disc disease  Pain in both knees, unspecified chronicity  Other low back pain  Pain in left knee  Pain in right knee    OBJECTIVE    Modalities Rationale:     decrease pain and increase tissue extensibility to improve patient's ability to progress to PLOF and address remaining functional goals.     min [] Estim Unattended, type/location:                                      []  w/ice    []  w/heat    min [] Estim Attended, type/location:                                     []  w/US     []  w/ice    []  w/heat    []  TENS insruct      min []  Mechanical Traction: type/lbs                   []  pro   []  sup   []  int   []  cont    []  before manual    []  after manual    min []  Ultrasound, settings/location:     ND min  unbill []  Ice     [x]  Heat    location/position: L/s in short sitting     min []  Paraffin,  details:     min []  Vasopneumatic Device, press/temp:     min []  Whirlpool / Fluido:    If using vaso (only need to measure limb vaso being performed on)      pre-treatment girth :       post-treatment girth :       measured at (landmark location) :      min []  Other:    Skin assessment

## 2025-04-23 NOTE — PROGRESS NOTES
for PN          Details if applicable:            Details if applicable:     63 53 [facility] Totals Reminder: bill using total billable min of TIMED therapeutic procedures (example: do not include dry needle or estim unattended, both untimed codes, in totals to left)  8-22 min = 1 unit; 23-37 min = 2 units; 38-52 min = 3 units; 53-67 min = 4 units; 68-82 min = 5 units   Total Total     Charge Capture    [x]  Patient Education billed concurrently with other procedures   [x] Review HEP    [] Progressed/Changed HEP, detail:    [] Other detail:       Objective Information/Functional Measures/Assessment  Pre- Tx: Glucose= 176  Post Tx:  Glu= 176. BP= 98/68 mmHg. Sat for~10 min and performed light seated ex's. Re-check BP= 100/54 mmHg. Pt denies any malaise other than light dizziness 3/10. Pt states he feels ok to stand. Stood ~2 min prior to ambulating to lobby where he sat for additional 10 min prior to exiting building. Pt advised to stay hydrated today within MD recommendations for his CHF. Pt advised to transfer carefully and wait several minutes before walking for safety.      Patient will continue to benefit from skilled PT / OT services to modify and progress therapeutic interventions, analyze and address functional mobility deficits, analyze and address ROM deficits, and analyze and address strength deficits to address functional deficits and attain remaining goals.    Progress toward goals / Updated goals:  [x]  See Progress Note/Recertification     Next PN/ RC due 5/24/25 / 5/20/25  Auth due (visit number/ date) 15 v/ 5/30/25      PLAN  - Continue Plan of Care  - Upgrade activities as tolerated    KAMINI MURRAY PT    4/24/2025    10:35 AM  If an interpreting service was utilized for treatment of this patient, the contents of this document represent the material reviewed with the patient via the .     Future Appointments   Date Time Provider Department Center   4/24/2025  8:20 AM Kamini Murray,

## 2025-04-23 NOTE — THERAPY RECERTIFICATION
last Prog Note/Eval to current Prog Note/Recert)  3/25/25 - 04/24/25     RECOMMENDATIONS  Patient would benefit from the continuation of skilled rehab interventions, per initial Plan of Care or most recent Medicare Recert, for functional progress to achieving above stated clinically significant goals.    If you have any questions/comments please contact us directly.  Thank you for allowing us to assist in the care of your patient.    VICTORINO MONTENEGRO, PT       4/24/2025       10:39 AM     No

## 2025-04-24 ENCOUNTER — HOSPITAL ENCOUNTER (OUTPATIENT)
Facility: HOSPITAL | Age: 65
Setting detail: RECURRING SERIES
Discharge: HOME | End: 2025-04-27
Attending: INTERNAL MEDICINE
Payer: MEDICARE

## 2025-04-24 PROCEDURE — 97530 THERAPEUTIC ACTIVITIES: CPT

## 2025-04-24 PROCEDURE — 97110 THERAPEUTIC EXERCISES: CPT

## 2025-04-28 ENCOUNTER — HOSPITAL ENCOUNTER (OUTPATIENT)
Facility: HOSPITAL | Age: 65
Setting detail: RECURRING SERIES
Discharge: HOME | End: 2025-05-01
Attending: INTERNAL MEDICINE
Payer: MEDICARE

## 2025-04-28 ENCOUNTER — PATIENT MESSAGE (OUTPATIENT)
Facility: CLINIC | Age: 65
End: 2025-04-28

## 2025-04-28 PROCEDURE — 97110 THERAPEUTIC EXERCISES: CPT

## 2025-04-28 PROCEDURE — 97112 NEUROMUSCULAR REEDUCATION: CPT

## 2025-04-28 NOTE — PROGRESS NOTES
stance EC >/=15\" for decreased fall risk in low light conditions.  Status at Eval: ROM EC 3\"  Status at last note/certification: : Goal not met:  2-4\"  Current:  : Goal in progress with [facility]      Long Term Goals: To be accomplished in 8 WEEKS  1. Pt will improve Oswestry score to </=47 to demo a significant improvement in functional activity tolerance.  Status at Eval: 60%  Status at last note/certification: : Goal not Met: 62% (2% regression from SOC)  Current:     2. Pt will improve Tinetti score to >/= 18/28 for decreased fall risk with ambulation/functional mobility  Status at Eval:   Status at last note/certification: : Goal progressin/28  Current:     3. Pt will demonstrate at least 10 reps bridge>/=50% AROM for improved axial stability with ambulation  Status at Eval: Bridge: unable to clear hips (LBP)  Status at last note/certification: : Goal not Met:  unable to tolerate bridge. Performs GS in supine with head elevated and LE wedge  Current:     4. Pt will demonstrate sit to stand from standard chair without use of Ue's for improved independence with transfers.  Status at Eval: requires b/l UE support (moderate wb'ing)  Status at last note/certification:  : Goal progressing: requires heavy 1 UE support, increased time/effort to complete  Current:            Next PN/ RC due 25 / 25  Auth due (visit number/ date) 15 v/ 25      PLAN  - Continue Plan of Care  - Upgrade activities as tolerated    KAMINI MURRAY PT    2025    7:22 AM  If an interpreting service was utilized for treatment of this patient, the contents of this document represent the material reviewed with the patient via the .     Future Appointments   Date Time Provider Department Center   2025  8:20 AM [facility]   2025  1:00 PM Blanca Alvarado MD [facility]   2025  9:00 AM [facility]   2025  9:20 AM

## 2025-05-01 ENCOUNTER — APPOINTMENT (OUTPATIENT)
Facility: HOSPITAL | Age: 65
End: 2025-05-01
Attending: INTERNAL MEDICINE
Payer: MEDICARE

## 2025-05-02 ENCOUNTER — OFFICE VISIT (OUTPATIENT)
Age: 65
End: 2025-05-02
Payer: MEDICARE

## 2025-05-02 VITALS
HEART RATE: 60 BPM | BODY MASS INDEX: 39.51 KG/M2 | DIASTOLIC BLOOD PRESSURE: 80 MMHG | HEIGHT: 70 IN | SYSTOLIC BLOOD PRESSURE: 116 MMHG | WEIGHT: 276 LBS | OXYGEN SATURATION: 95 %

## 2025-05-02 DIAGNOSIS — I42.9 CARDIOMYOPATHY, UNSPECIFIED TYPE (HCC): Primary | ICD-10-CM

## 2025-05-02 DIAGNOSIS — I48.19 ATRIAL FIBRILLATION, PERSISTENT (HCC): ICD-10-CM

## 2025-05-02 DIAGNOSIS — E66.01 SEVERE OBESITY (BMI 35.0-39.9) WITH COMORBIDITY (HCC): ICD-10-CM

## 2025-05-02 DIAGNOSIS — E11.21 TYPE 2 DIABETES WITH NEPHROPATHY (HCC): ICD-10-CM

## 2025-05-02 DIAGNOSIS — R06.02 SHORTNESS OF BREATH: ICD-10-CM

## 2025-05-02 DIAGNOSIS — I10 ESSENTIAL (PRIMARY) HYPERTENSION: ICD-10-CM

## 2025-05-02 DIAGNOSIS — I50.42 CHRONIC COMBINED SYSTOLIC AND DIASTOLIC HEART FAILURE (HCC): ICD-10-CM

## 2025-05-02 DIAGNOSIS — E78.5 DYSLIPIDEMIA: ICD-10-CM

## 2025-05-02 PROCEDURE — 1124F ACP DISCUSS-NO DSCNMKR DOCD: CPT | Performed by: INTERNAL MEDICINE

## 2025-05-02 PROCEDURE — 3074F SYST BP LT 130 MM HG: CPT | Performed by: INTERNAL MEDICINE

## 2025-05-02 PROCEDURE — 99214 OFFICE O/P EST MOD 30 MIN: CPT | Performed by: INTERNAL MEDICINE

## 2025-05-02 PROCEDURE — 93000 ELECTROCARDIOGRAM COMPLETE: CPT | Performed by: INTERNAL MEDICINE

## 2025-05-02 PROCEDURE — 3046F HEMOGLOBIN A1C LEVEL >9.0%: CPT | Performed by: INTERNAL MEDICINE

## 2025-05-02 PROCEDURE — 3079F DIAST BP 80-89 MM HG: CPT | Performed by: INTERNAL MEDICINE

## 2025-05-02 ASSESSMENT — ENCOUNTER SYMPTOMS
SORE THROAT: 0
COUGH: 0
ABDOMINAL DISTENTION: 0
ABDOMINAL PAIN: 0
NAUSEA: 0
SHORTNESS OF BREATH: 1
VOMITING: 0

## 2025-05-02 NOTE — PROGRESS NOTES
Vicenta Fontana presents today for   Chief Complaint   Patient presents with    Follow-up     6 months       Vicenta Fontana preferred language for health care discussion is english/other.    Is someone accompanying this pt? no    Is the patient using any DME equipment during OV? no    Depression Screening:  Depression: Not at risk (2/4/2025)    PHQ-2     PHQ-2 Score: 0        Learning Assessment:  No question data found.       Pt currently taking Anticoagulant therapy? Eliquis 2.5mg twice a day    Pt currently taking Antiplatelet therapy ? no      Coordination of Care:  1. Have you been to the ER, urgent care clinic since your last visit? Hospitalized since your last visit? no    2. Have you seen or consulted any other health care providers outside of the [facility] since your last visit? Include any pap smears or colon screening. no    
Permanent atrial fibrillation (HCC)     Pneumonia     Renal cyst     Right rotator cuff tendinitis     S/P cardiac catheterization 2010    Mild non-obstructive CAD.    Sinus problem     Splenomegaly     Spondylolisthesis of lumbar region     Status post right knee replacement     Subacromial bursitis     Right shoulder    Symptomatic anemia 12/10/2018    Tubular adenoma of colon      Current Outpatient Medications   Medication Sig Dispense Refill    nystatin 548161 UNIT/GM powder APPLY 3 TIMES DAILY AS NEEDED 60 g 5    ELIQUIS 2.5 MG TABS tablet TAKE 1 TABLET BY MOUTH TWICE DAILY 180 tablet 1    bumetanide (BUMEX) 1 MG tablet Take 1 tablet by mouth in the morning and 1 tablet in the evening. 90 tablet 1    albuterol sulfate HFA (PROVENTIL HFA) 108 (90 Base) MCG/ACT inhaler Inhale 2 puffs into the lungs every 6 hours as needed for Wheezing 18 g 3    gabapentin (NEURONTIN) 400 MG capsule Take 1 capsule by mouth in the morning and at bedtime for 180 days. Max Daily Amount: 800 mg (Patient taking differently: Take 1 capsule by mouth at bedtime.) 180 capsule 1    Insulin Degludec (TRESIBA FLEXTOUCH) 200 UNIT/ML SOPN Inject 115 Units into the skin every morning (before breakfast) Adjust as directed. 12 mL 5    omega-3 acid ethyl esters (LOVAZA) 1 g capsule TAKE 2 CAPSULES BY MOUTH TWICE DAILY WITH MEALS 120 capsule 5    clotrimazole (LOTRIMIN) 1 % cream Apply 60 g topically 2 times daily      allopurinol (ZYLOPRIM) 100 MG tablet Take 0.5 tablets by mouth daily This is to prevent gout flare ups. Take this after your present flare up resolves. 45 tablet 3    colchicine (COLCRYS) 0.6 MG tablet Take 1 tablet by mouth daily This is to prevent gout attacks. Please start this after your present flare up resolves. 90 tablet 1    methocarbamol (ROBAXIN-750) 750 MG tablet Take 1 tablet by mouth 4 times daily as needed (muscle aches/pains) 30 tablet 3    sildenafil (VIAGRA) 100 MG tablet Take 1 tablet by mouth as needed for Erectile

## 2025-05-05 ENCOUNTER — HOSPITAL ENCOUNTER (OUTPATIENT)
Facility: HOSPITAL | Age: 65
Setting detail: RECURRING SERIES
End: 2025-05-05
Attending: INTERNAL MEDICINE
Payer: MEDICARE

## 2025-05-07 NOTE — PROGRESS NOTES
PHYSICAL / OCCUPATIONAL THERAPY - DAILY TREATMENT NOTE    Patient Name: Vicenta Fontana    Date: 2025    : 1960  Insurance: Payor: [employer]ICARE / Plan: [facility] MEDICARE VALUE HMO / Product Type: *No Product type* /      Patient  verified Yes     Visit #   Current / Total 7 16   Time   In / Out 9:00 9:40   Pain   In / Out 7- LBP 6.5   Subjective Functional Status/Changes: Pt states he slid out of his barstool in home 8 days ago and skinned his R knee. Denies other injury and did not seek medical attention. Will be seeing PCP today.      TREATMENT AREA =  Neuropathy  Bilateral low back pain without sciatica, unspecified chronicity  Type 2 diabetes mellitus with diabetic nephropathy, with long-term current use of insulin (HCC)  Lumbar disc disease  Pain in both knees, unspecified chronicity  Other low back pain  Pain in left knee  Pain in right knee    OBJECTIVE    Therapeutic Procedures:    Tx Min Billable or 1:1 Min (if diff from Tx Min) Procedure, Rationale, Specifics   25 25 89645 Therapeutic Exercise (timed):  increase ROM, strength, coordination, balance, and proprioception to improve patient's ability to progress to PLOF and address remaining functional goals. (see flow sheet as applicable)     Details if applicable:       15 15 70344 Neuromuscular Re-Education (timed):  improve balance, coordination, kinesthetic sense, posture, core stability and proprioception to improve patient's ability to develop conscious control of individual muscles and awareness of position of extremities in order to progress to PLOF and address remaining functional goals. (see flow sheet as applicable)     Details if applicable:       20036 Therapeutic Activity (timed):  use of dynamic activities replicating functional movements to increase ROM, strength, coordination, balance, and proprioception in order to improve patient's ability to progress to PLOF and address remaining functional goals.  (see flow sheet as

## 2025-05-08 ENCOUNTER — LAB (OUTPATIENT)
Facility: CLINIC | Age: 65
End: 2025-05-08

## 2025-05-08 ENCOUNTER — OFFICE VISIT (OUTPATIENT)
Facility: CLINIC | Age: 65
End: 2025-05-08
Payer: MEDICARE

## 2025-05-08 ENCOUNTER — HOSPITAL ENCOUNTER (OUTPATIENT)
Facility: HOSPITAL | Age: 65
Setting detail: RECURRING SERIES
Discharge: HOME | End: 2025-05-11
Attending: INTERNAL MEDICINE
Payer: MEDICARE

## 2025-05-08 ENCOUNTER — HOSPITAL ENCOUNTER (OUTPATIENT)
Facility: HOSPITAL | Age: 65
Setting detail: SPECIMEN
Discharge: HOME | End: 2025-05-11

## 2025-05-08 VITALS
RESPIRATION RATE: 18 BRPM | HEART RATE: 64 BPM | HEIGHT: 70 IN | DIASTOLIC BLOOD PRESSURE: 53 MMHG | OXYGEN SATURATION: 94 % | BODY MASS INDEX: 39.8 KG/M2 | TEMPERATURE: 98.7 F | SYSTOLIC BLOOD PRESSURE: 130 MMHG | WEIGHT: 278 LBS

## 2025-05-08 DIAGNOSIS — G47.33 OSA (OBSTRUCTIVE SLEEP APNEA): ICD-10-CM

## 2025-05-08 DIAGNOSIS — I50.9 CHRONIC CONGESTIVE HEART FAILURE, UNSPECIFIED HEART FAILURE TYPE (HCC): ICD-10-CM

## 2025-05-08 DIAGNOSIS — L03.811 CELLULITIS OF HEAD EXCEPT FACE: ICD-10-CM

## 2025-05-08 DIAGNOSIS — M10.9 ACUTE GOUT, UNSPECIFIED CAUSE, UNSPECIFIED SITE: Primary | ICD-10-CM

## 2025-05-08 DIAGNOSIS — N18.30 STAGE 3 CHRONIC KIDNEY DISEASE, UNSPECIFIED WHETHER STAGE 3A OR 3B CKD (HCC): ICD-10-CM

## 2025-05-08 DIAGNOSIS — E11.21 TYPE 2 DIABETES WITH NEPHROPATHY (HCC): ICD-10-CM

## 2025-05-08 DIAGNOSIS — G62.9 NEUROPATHY: ICD-10-CM

## 2025-05-08 DIAGNOSIS — M10.9 ACUTE GOUT, UNSPECIFIED CAUSE, UNSPECIFIED SITE: ICD-10-CM

## 2025-05-08 DIAGNOSIS — S81.001A OPEN WOUND OF RIGHT KNEE, INITIAL ENCOUNTER: ICD-10-CM

## 2025-05-08 LAB — SENTARA SPECIMEN COLLECTION: NORMAL

## 2025-05-08 PROCEDURE — 99214 OFFICE O/P EST MOD 30 MIN: CPT | Performed by: INTERNAL MEDICINE

## 2025-05-08 PROCEDURE — 3046F HEMOGLOBIN A1C LEVEL >9.0%: CPT | Performed by: INTERNAL MEDICINE

## 2025-05-08 PROCEDURE — 97110 THERAPEUTIC EXERCISES: CPT

## 2025-05-08 PROCEDURE — 3075F SYST BP GE 130 - 139MM HG: CPT | Performed by: INTERNAL MEDICINE

## 2025-05-08 PROCEDURE — 1124F ACP DISCUSS-NO DSCNMKR DOCD: CPT | Performed by: INTERNAL MEDICINE

## 2025-05-08 PROCEDURE — 3078F DIAST BP <80 MM HG: CPT | Performed by: INTERNAL MEDICINE

## 2025-05-08 PROCEDURE — 97112 NEUROMUSCULAR REEDUCATION: CPT

## 2025-05-08 PROCEDURE — 99001 SPECIMEN HANDLING PT-LAB: CPT

## 2025-05-08 RX ORDER — INSULIN DEGLUDEC 200 U/ML
125 INJECTION, SOLUTION SUBCUTANEOUS
Qty: 12 ML | Refills: 5 | Status: SHIPPED | OUTPATIENT
Start: 2025-05-08

## 2025-05-08 RX ORDER — DOXYCYCLINE 100 MG/1
100 CAPSULE ORAL 2 TIMES DAILY
Qty: 14 CAPSULE | Refills: 0 | Status: SHIPPED | OUTPATIENT
Start: 2025-05-08 | End: 2025-05-15

## 2025-05-08 RX ORDER — INSULIN LISPRO 100 [IU]/ML
18 INJECTION, SOLUTION INTRAVENOUS; SUBCUTANEOUS
Qty: 4 ADJUSTABLE DOSE PRE-FILLED PEN SYRINGE | Refills: 5
Start: 2025-05-08

## 2025-05-08 RX ORDER — PREDNISONE 10 MG/1
TABLET ORAL
Qty: 21 TABLET | Refills: 0 | Status: SHIPPED | OUTPATIENT
Start: 2025-05-08

## 2025-05-08 NOTE — PROGRESS NOTES
[facility] OF [facility]:  5/14/2025, MRN: [id number]      Vicenta Fontana is a 64 y.o. male and presents to clinic for Gout (Left hand), Knee Pain (Hurt his right knee on the floor), and Shortness of Breath      Subjective:   The patient is a 64-year-old male with history of renal cyst and BPH (followed by urology team), COPD from secondhand smoke exposure for several yrs, tubular adenomatous colon polyp and June 2016, severe ROJAS, diverticulosis, right inferior cuff tendinitis, nephrolithiasis, AF, HTN, lumbar disc disease, splenomegaly, GI bleed (2018, while on xarelto; he required PRBCs), s/p partial gastrectomy for removal of a benign gastric mass, chronic gastritis, type 2 DM, systolic CHF, HLD, gout, IBS, OA, obesity, recurrent pes anserinus bursitis involving the right total knee, CARLOTA (on BiPAP), and GERD.      1. Gout: He completed the prednisone taper I gave him at his last apt. His sx resolved but recurred two days ago. He is having swelling and pain along both hands (L>R). No triggers are known. Taking allopurinol 50mg daily and colchicine 0.6mg daily. No organ meat, shellfish, or ETOH.     2. Scalp Cellulitis: At his last apt, I ordered clindamycin 300mg qid x 7 days. His sx per his hx have resolved.     3. Type 2 DM: His BG is 240 today. Tresiba dose is 115 units per day with 15 units of meal time insulin + jardiance 25mg daily. Hypoglycemia: none. He has yet to schedule with Endocrinology.      His GMI is 8.4.     CGM target in range data for the past 14 days:   26% very high range  31% high range  43% in range    4. CKD Stage 3: S/p a visit with . \"He was excited.\" He states that his renal function is improving. On meds as listed below.     5. CHF and CARLOTA: He met with  since his last apt. He was told to lose weight. He was told to rtc in 3 months for an echocardiogram. BP is 130/53. No CP. +SOB. His abdomen is more distended today. \"I just feel fat.\" Weight is 278lbs today. His weight

## 2025-05-08 NOTE — PROGRESS NOTES
Vicenta Fontana presents today for   Chief Complaint   Patient presents with    Gout     Left hand    Knee Pain     Hurt his right knee on the floor    Shortness of Breath           \"Have you been to the ER, urgent care clinic since your last visit?  Hospitalized since your last visit?\"    NO    “Have you seen or consulted any other health care providers outside of [facility] since your last visit?”    NO

## 2025-05-08 NOTE — PROGRESS NOTES
PHYSICAL / OCCUPATIONAL THERAPY - DAILY TREATMENT NOTE    Patient Name: Vicenta Fontana    Date: 2025    : 1960  Insurance: Payor: [employer] / Plan: [facility] MEDIC[facility] VALUE H[facility] / Product Type: *No Product type* /      Patient  verified Yes     Visit #   Current / Total 8 16   Time   In / Out 9:40 10:22   Pain   In / Out 7 6   Subjective Functional Status/Changes: Pt states he has done his HEP intermittently. Had trouble sleeping last night; feeling a bit fatigued today as a result.     TREATMENT AREA =  Neuropathy  Bilateral low back pain without sciatica, unspecified chronicity  Type 2 diabetes mellitus with diabetic nephropathy, with long-term current use of insulin (HCC)  Lumbar disc disease  Pain in both knees, unspecified chronicity  Other low back pain  Pain in left knee  Pain in right knee    OBJECTIVE    Therapeutic Procedures:    Tx Min Billable or 1:1 Min (if diff from Tx Min) Procedure, Rationale, Specifics   32 32 [id number] Therapeutic Exercise (timed):  increase ROM, strength, coordination, balance, and proprioception to improve patient's ability to progress to PLOF and address remaining functional goals. (see flow sheet as applicable)     Details if applicable:       10 10 61144 Neuromuscular Re-Education (timed):  improve balance, coordination, kinesthetic sense, posture, core stability and proprioception to improve patient's ability to develop conscious control of individual muscles and awareness of position of extremities in order to progress to PLOF and address remaining functional goals. (see flow sheet as applicable)     Details if applicable:     x  85123 Therapeutic Activity (timed):  use of dynamic activities replicating functional movements to increase ROM, strength, coordination, balance, and proprioception in order to improve patient's ability to progress to PLOF and address remaining functional goals.  (see flow sheet as applicable)     Details if applicable:

## 2025-05-10 DIAGNOSIS — E11.21 TYPE 2 DIABETES WITH NEPHROPATHY (HCC): ICD-10-CM

## 2025-05-12 ENCOUNTER — HOSPITAL ENCOUNTER (OUTPATIENT)
Facility: HOSPITAL | Age: 65
Setting detail: RECURRING SERIES
Discharge: HOME | End: 2025-05-15
Attending: INTERNAL MEDICINE
Payer: MEDICARE

## 2025-05-12 LAB
ALBUMIN SERPL-MCNC: 4.2 G/DL (ref 3.9–4.9)
ALP SERPL-CCNC: 127 IU/L (ref 44–121)
ALT SERPL-CCNC: 18 IU/L (ref 0–44)
AST SERPL-CCNC: 20 IU/L (ref 0–40)
BASOPHILS # BLD AUTO: 0 X10E3/UL (ref 0–0.2)
BASOPHILS NFR BLD AUTO: 0 %
BILIRUB SERPL-MCNC: 0.5 MG/DL (ref 0–1.2)
BUN SERPL-MCNC: 36 MG/DL (ref 8–27)
BUN/CREAT SERPL: 25 (ref 10–24)
CALCIUM SERPL-MCNC: 9.2 MG/DL (ref 8.6–10.2)
CHLORIDE SERPL-SCNC: 104 MMOL/L (ref 96–106)
CO2 SERPL-SCNC: 19 MMOL/L (ref 20–29)
CREAT SERPL-MCNC: 1.43 MG/DL (ref 0.76–1.27)
CRP SERPL-MCNC: 24 MG/L (ref 0–10)
EGFRCR SERPLBLD CKD-EPI 2021: 54 ML/MIN/1.73
EOSINOPHIL # BLD AUTO: 0.1 X10E3/UL (ref 0–0.4)
EOSINOPHIL NFR BLD AUTO: 2 %
ERYTHROCYTE [DISTWIDTH] IN BLOOD BY AUTOMATED COUNT: 14.3 % (ref 11.6–15.4)
GLOBULIN SER CALC-MCNC: 2.3 G/DL (ref 1.5–4.5)
GLUCOSE SERPL-MCNC: 163 MG/DL (ref 70–99)
HCT VFR BLD AUTO: 44.2 % (ref 37.5–51)
HGB BLD-MCNC: 14.4 G/DL (ref 13–17.7)
IMM GRANULOCYTES # BLD AUTO: 0 X10E3/UL (ref 0–0.1)
IMM GRANULOCYTES NFR BLD AUTO: 0 %
LYMPHOCYTES # BLD AUTO: 1.2 X10E3/UL (ref 0.7–3.1)
LYMPHOCYTES NFR BLD AUTO: 17 %
MCH RBC QN AUTO: 30.2 PG (ref 26.6–33)
MCHC RBC AUTO-ENTMCNC: 32.6 G/DL (ref 31.5–35.7)
MCV RBC AUTO: 93 FL (ref 79–97)
MONOCYTES # BLD AUTO: 0.8 X10E3/UL (ref 0.1–0.9)
MONOCYTES NFR BLD AUTO: 11 %
NEUTROPHILS # BLD AUTO: 4.7 X10E3/UL (ref 1.4–7)
NEUTROPHILS NFR BLD AUTO: 70 %
PLATELET # BLD AUTO: 184 X10E3/UL (ref 150–450)
POTASSIUM SERPL-SCNC: 3.6 MMOL/L (ref 3.5–5.2)
PROT SERPL-MCNC: 6.5 G/DL (ref 6–8.5)
RBC # BLD AUTO: 4.77 X10E6/UL (ref 4.14–5.8)
SODIUM SERPL-SCNC: 143 MMOL/L (ref 134–144)
SPECIMEN STATUS REPORT: NORMAL
SPECIMEN STATUS REPORT: NORMAL
URATE SERPL-MCNC: 10 MG/DL (ref 3.8–8.4)
WBC # BLD AUTO: 6.8 X10E3/UL (ref 3.4–10.8)

## 2025-05-12 PROCEDURE — 97112 NEUROMUSCULAR REEDUCATION: CPT

## 2025-05-12 PROCEDURE — 97110 THERAPEUTIC EXERCISES: CPT

## 2025-05-12 RX ORDER — MONTELUKAST SODIUM 10 MG/1
10 TABLET ORAL NIGHTLY
Qty: 90 TABLET | Refills: 1 | Status: SHIPPED | OUTPATIENT
Start: 2025-05-12

## 2025-05-12 RX ORDER — EMPAGLIFLOZIN 25 MG/1
25 TABLET, FILM COATED ORAL DAILY
Qty: 90 TABLET | Refills: 1 | Status: SHIPPED | OUTPATIENT
Start: 2025-05-12

## 2025-05-12 NOTE — TELEPHONE ENCOUNTER
PCP: Blanca Alvarado MD    LAST OFFICE VISIT: 05/08/2025    LAST REFILL PER CHART:  Medication:empagliflozin (JARDIANCE) 25 MG tablet   Ordered On:08/12/2024  Instructions:Take 1 tablet by mouth daily  Dispense:90 tablets  Refills:1    Future Appointments   Date Time Provider Department Center   5/12/2025  9:40 AM Kamini Murray, PT [facility]   5/13/2025 12:40 PM Blanca Alvarado MD [facility]   5/15/2025  9:00 AM Kamini Murray, PT [facility]   5/19/2025  8:20 AM Kamini Murray, PT [facility]   5/21/2025  9:40 AM Fuad Kendrick [facility]   5/22/2025  9:00 AM Kamini Murray, PT [facility]   5/27/2025  9:40 AM Kamini Murray, PT [facility]   5/29/2025  9:00 AM Kamini Murray, PT [facility]   8/4/2025 12:30 PM [facility] BS AMB   9/24/2025  1:00 PM Joo Flannery [facility] AM[facility]   11/7/2025  2:20 PM Roland Campo MD [facility] BS AMB

## 2025-05-14 ASSESSMENT — ENCOUNTER SYMPTOMS
COUGH: 0
SORE THROAT: 0
BLOOD IN STOOL: 0
EYE PAIN: 0
ANAL BLEEDING: 0
ABDOMINAL PAIN: 0
BACK PAIN: 1
SHORTNESS OF BREATH: 1

## 2025-05-15 ENCOUNTER — HOSPITAL ENCOUNTER (OUTPATIENT)
Facility: HOSPITAL | Age: 65
Setting detail: RECURRING SERIES
End: 2025-05-15
Attending: INTERNAL MEDICINE
Payer: MEDICARE

## 2025-05-19 ENCOUNTER — HOSPITAL ENCOUNTER (OUTPATIENT)
Facility: HOSPITAL | Age: 65
Setting detail: RECURRING SERIES
End: 2025-05-19
Attending: INTERNAL MEDICINE
Payer: MEDICARE

## 2025-05-22 ENCOUNTER — TELEPHONE (OUTPATIENT)
Facility: HOSPITAL | Age: 65
End: 2025-05-22

## 2025-05-22 NOTE — TELEPHONE ENCOUNTER
Pt returned VM left for pt earlier this AM which had notified him of his 7th missed appointment. Pt states he overslept. Pt was reminded of our attendance policy and advised that at this time we would recommend he schedule same day appointments to ensure improved attendance compliance and to decrease disruption to clinic scheduling. Pt verbalized understanding and agreement. Pt advised to call us tomorrow to schedule for Tuesday (next business day).    Pt c/o LLE pain when he lays down. States this is new. Denies redness/warmth/swelling. Pt states he has been compliant with his meds and daily weights/fluid restriction. Pt advised to contact his MD with concerns if leg pain persists vs Urgent Care if severe pain. (States at times up to 10/10).

## 2025-05-23 NOTE — PROGRESS NOTES
November 5, 2018     Morgan English, [address] [place] [address]    Patient: Benjamin Reyes   YOB: 1994   Date of Visit: 11/5/2018       Dear Dr Kam Siddiqui: Thank you for referring Benjamin Reyes to me for evaluation  Below are my notes for this consultation  If you have questions, please do not hesitate to call me  I look forward to following your patient along with you  Sincerely,        Marda Dubin, MD        CC: No Recipients  Marda Dubin, MD  11/5/2018  4:17 PM  Sign at close encounter  [facility] - Outpatient Consultation  Benjamin Reyes 21 y o  male MRN: [id number]  Encounter: [id number]          ASSESSMENT AND PLAN:      1  Abdominal pain of multiple sites  2  Bilious vomiting with nausea  - plan for EGD to rule out any peptic ulcer disease or obstructive lesions  - if EGD negative, consider gastric emptying test    - Patient has anxiety being referred to psychiatry service  It is possible his GI symptoms are consistent with functional disorders  - rule out celiac disease  ESOPHAGOGASTRODUODENOSCOPY (EGD); Standing  - Case request operating room: ESOPHAGOGASTRODUODENOSCOPY (EGD)    ______________________________________________________________________    HPI:  19-year-old man presented for evaluation of nausea vomiting and abdominal pain  Patient reported he has been having the abdominal pain in the past 1 5 years  Pain usually is on both side and can travel in the abdomen  Pain can last 30 to 40 min  It can be exacerbated by nausea vomiting  Patient reported nausea vomiting usually hours after eating  It was both bilious and sometimes mixed with food  He denies weight loss  REVIEW OF SYSTEMS:    CONSTITUTIONAL: Denies any fever, chills, rigors, and weight loss  HEENT: No earache or tinnitus  Denies hearing loss or visual disturbances  CARDIOVASCULAR: No chest pain or palpitations     RESPIRATORY: Denies PHYSICAL / OCCUPATIONAL THERAPY - DAILY TREATMENT NOTE    Patient Name: Vicenta Fontana    Date: 2025    : 1960  Insurance: Payor: [employer] / Plan: [employer] MEDICARE VALUE HMO / Product Type: *No Product type* /      Patient  verified Yes     Visit #   Current / Total 9 16   Time   In / Out 9:50 10:20   Pain   In / Out 8.5 8.5   Subjective Functional Status/Changes: See d/c summary     TREATMENT AREA =  Neuropathy  Bilateral low back pain without sciatica, unspecified chronicity  Type 2 diabetes mellitus with diabetic nephropathy, with long-term current use of insulin (HCC)  Lumbar disc disease  Pain in both knees, unspecified chronicity  Other low back pain  Pain in left knee  Pain in right knee    OBJECTIVE    Therapeutic Procedures:    Tx Min Billable or 1:1 Min (if diff from Tx Min) Procedure, Rationale, Specifics     78566 Therapeutic Exercise (timed):  increase ROM, strength, coordination, balance, and proprioception to improve patient's ability to progress to PLOF and address remaining functional goals. (see flow sheet as applicable)     Details if applicable:         78703 Neuromuscular Re-Education (timed):  improve balance, coordination, kinesthetic sense, posture, core stability and proprioception to improve patient's ability to develop conscious control of individual muscles and awareness of position of extremities in order to progress to PLOF and address remaining functional goals. (see flow sheet as applicable)     Details if applicable:     30 15 76067 Therapeutic Activity (timed):  use of dynamic activities replicating functional movements to increase ROM, strength, coordination, balance, and proprioception in order to improve patient's ability to progress to PLOF and address remaining functional goals.  (see flow sheet as applicable)     Details if applicable:            Details if applicable:            Details if applicable:     30 15 [facility] Totals Reminder: bill using total  any cough, hemoptysis, shortness of breath or dyspnea on exertion  GASTROINTESTINAL: As noted in the History of Present Illness  GENITOURINARY: No problems with urination  Denies any hematuria or dysuria  NEUROLOGIC: No dizziness or vertigo, denies headaches  MUSCULOSKELETAL: Denies any muscle or joint pain  SKIN: Denies skin rashes or itching  ENDOCRINE: Denies excessive thirst  Denies intolerance to heat or cold  PSYCHOSOCIAL: Denies depression or anxiety  Denies any recent memory loss  Historical Information   No past medical history on file  No past surgical history on file  Social History   History   Alcohol Use No     History   Drug Use No     History   Smoking Status    Current Every Day Smoker    Packs/day: 0 75   Smokeless Tobacco    Never Used     No family history on file  Meds/Allergies       Current Outpatient Prescriptions:     omeprazole (PriLOSEC) 20 mg delayed release capsule    varenicline (CHANTIX FORTINO) 0 5 MG X 11 & 1 MG X 42 tablet    No Known Allergies        Objective     Blood pressure 118/82, pulse 59, temperature 97 5 °F (36 4 °C), temperature source Tympanic, height 6' 0 25" (1 835 m), weight 61 2 kg (135 lb)  Body mass index is 18 18 kg/m²  PHYSICAL EXAM:      General Appearance:   Alert, cooperative, no distress   HEENT:   Normocephalic, atraumatic, anicteric      Neck:  Supple, symmetrical, trachea midline   Lungs:   Clear to auscultation bilaterally; no rales, rhonchi or wheezing; respirations unlabored    Heart[de-identified]   Regular rate and rhythm; no murmur, rub, or gallop  Abdomen:   Soft, non-tender, non-distended; normal bowel sounds; no masses, no organomegaly    Genitalia:   Deferred    Rectal:   Deferred    Extremities:  No cyanosis, clubbing or edema    Pulses:  2+ and symmetric    Skin:  No jaundice, rashes, or lesions    Lymph nodes:  No palpable cervical lymphadenopathy        Lab Results:   No visits with results within 1 Day(s) from this visit  Latest known visit with results is:   Appointment on 10/19/2017   Component Date Value    WBC 10/19/2017 5 53     RBC 10/19/2017 5 06     Hemoglobin 10/19/2017 15 7     Hematocrit 10/19/2017 45 2     MCV 10/19/2017 89     MCH 10/19/2017 31 0     MCHC 10/19/2017 34 7     RDW 10/19/2017 12 5     MPV 10/19/2017 10 8     Platelets 04/95/6796 202     nRBC 10/19/2017 0     Neutrophils Relative 10/19/2017 53     Lymphocytes Relative 10/19/2017 30     Monocytes Relative 10/19/2017 16*    Eosinophils Relative 10/19/2017 1     Basophils Relative 10/19/2017 0     Neutrophils Absolute 10/19/2017 2 91     Lymphocytes Absolute 10/19/2017 1 67     Monocytes Absolute 10/19/2017 0 86     Eosinophils Absolute 10/19/2017 0 04     Basophils Absolute 10/19/2017 0 01     Sodium 10/19/2017 140     Potassium 10/19/2017 4 3     Chloride 10/19/2017 105     CO2 10/19/2017 28     ANION GAP 10/19/2017 7     BUN 10/19/2017 9     Creatinine 10/19/2017 0 82     Glucose, Fasting 10/19/2017 88     Calcium 10/19/2017 9 4     AST 10/19/2017 14     ALT 10/19/2017 21     Alkaline Phosphatase 10/19/2017 93     Total Protein 10/19/2017 7 7     Albumin 10/19/2017 4 1     Total Bilirubin 10/19/2017 0 49     eGFR 10/19/2017 124     TSH 3RD GENERATON 10/19/2017 2 200     Cholesterol 10/19/2017 101     Triglycerides 10/19/2017 57     HDL, Direct 10/19/2017 36*    LDL Calculated 10/19/2017 54     Lipase 10/19/2017 120     Color, UA 10/19/2017 Yellow     Clarity, UA 10/19/2017 Turbid     Specific Gravity, UA 10/19/2017 1 020     pH, UA 10/19/2017 8 0     Leukocytes, UA 10/19/2017 Negative     Nitrite, UA 10/19/2017 Negative     Protein, UA 10/19/2017 Trace*    Glucose, UA 10/19/2017 Negative     Ketones, UA 10/19/2017 Negative     Urobilinogen, UA 10/19/2017 0 2     Bilirubin, UA 10/19/2017 Negative     Blood, UA 10/19/2017 Negative     Sed Rate 10/19/2017 5     CRP 10/19/2017 6 4*    RBC, UA 10/19/2017 None Seen     WBC, UA 10/19/2017 None Seen     Epithelial Cells 10/19/2017 None Seen     Bacteria, UA 10/19/2017 None Seen     Hyaline Casts, UA 10/19/2017 None Seen          Radiology Results:   No results found

## 2025-05-27 ENCOUNTER — APPOINTMENT (OUTPATIENT)
Facility: HOSPITAL | Age: 65
End: 2025-05-27
Attending: INTERNAL MEDICINE
Payer: MEDICARE

## 2025-05-27 ENCOUNTER — OFFICE VISIT (OUTPATIENT)
Facility: CLINIC | Age: 65
End: 2025-05-27
Payer: MEDICARE

## 2025-05-27 ENCOUNTER — HOSPITAL ENCOUNTER (OUTPATIENT)
Facility: HOSPITAL | Age: 65
Setting detail: RECURRING SERIES
Discharge: HOME | End: 2025-05-30
Attending: INTERNAL MEDICINE
Payer: MEDICARE

## 2025-05-27 VITALS
RESPIRATION RATE: 18 BRPM | HEART RATE: 66 BPM | DIASTOLIC BLOOD PRESSURE: 67 MMHG | OXYGEN SATURATION: 95 % | HEIGHT: 70 IN | TEMPERATURE: 98.7 F | WEIGHT: 278 LBS | SYSTOLIC BLOOD PRESSURE: 123 MMHG | BODY MASS INDEX: 39.8 KG/M2

## 2025-05-27 DIAGNOSIS — N18.30 STAGE 3 CHRONIC KIDNEY DISEASE, UNSPECIFIED WHETHER STAGE 3A OR 3B CKD (HCC): ICD-10-CM

## 2025-05-27 DIAGNOSIS — G89.29 OTHER CHRONIC PAIN: ICD-10-CM

## 2025-05-27 DIAGNOSIS — M10.9 ACUTE GOUT, UNSPECIFIED CAUSE, UNSPECIFIED SITE: ICD-10-CM

## 2025-05-27 DIAGNOSIS — E11.21 TYPE 2 DIABETES WITH NEPHROPATHY (HCC): ICD-10-CM

## 2025-05-27 DIAGNOSIS — S81.001A OPEN WOUND OF RIGHT KNEE, INITIAL ENCOUNTER: ICD-10-CM

## 2025-05-27 DIAGNOSIS — G47.33 OSA (OBSTRUCTIVE SLEEP APNEA): ICD-10-CM

## 2025-05-27 DIAGNOSIS — I50.9 CHRONIC CONGESTIVE HEART FAILURE, UNSPECIFIED HEART FAILURE TYPE (HCC): Primary | ICD-10-CM

## 2025-05-27 PROCEDURE — 3046F HEMOGLOBIN A1C LEVEL >9.0%: CPT | Performed by: INTERNAL MEDICINE

## 2025-05-27 PROCEDURE — 3078F DIAST BP <80 MM HG: CPT | Performed by: INTERNAL MEDICINE

## 2025-05-27 PROCEDURE — 3074F SYST BP LT 130 MM HG: CPT | Performed by: INTERNAL MEDICINE

## 2025-05-27 PROCEDURE — 1124F ACP DISCUSS-NO DSCNMKR DOCD: CPT | Performed by: INTERNAL MEDICINE

## 2025-05-27 PROCEDURE — 97530 THERAPEUTIC ACTIVITIES: CPT

## 2025-05-27 PROCEDURE — 99214 OFFICE O/P EST MOD 30 MIN: CPT | Performed by: INTERNAL MEDICINE

## 2025-05-27 RX ORDER — PREDNISONE 10 MG/1
TABLET ORAL
Qty: 21 TABLET | Refills: 0 | Status: SHIPPED | OUTPATIENT
Start: 2025-05-27

## 2025-05-27 RX ORDER — DOXYCYCLINE 100 MG/1
100 CAPSULE ORAL 2 TIMES DAILY
Qty: 14 CAPSULE | Refills: 0 | Status: SHIPPED | OUTPATIENT
Start: 2025-05-27 | End: 2025-06-03

## 2025-05-27 RX ORDER — SPIRONOLACTONE 25 MG/1
25 TABLET ORAL DAILY
Qty: 30 TABLET | Refills: 12 | Status: SHIPPED | OUTPATIENT
Start: 2025-05-27

## 2025-05-27 NOTE — PROGRESS NOTES
[facility]:  6/2/2025, MRN: [id number]      Vicenta Fontana is a 64 y.o. male and presents to clinic for Diabetes and Leg Pain (Left leg pain when he's laying down. The pain goes away once he gets up)      Subjective:   The patient is a 64-year-old male with history of renal cyst and BPH (followed by urology team), COPD from secondhand smoke exposure for several yrs, tubular adenomatous colon polyp and June 2016, severe ROJAS, diverticulosis, right inferior cuff tendinitis, nephrolithiasis, AF, HTN, lumbar disc disease, splenomegaly, GI bleed (2018, while on xarelto; he required PRBCs), s/p partial gastrectomy for removal of a benign gastric mass, chronic gastritis, type 2 DM, systolic CHF, HLD, gout, IBS, OA, obesity, recurrent pes anserinus bursitis involving the right total knee, CARLOTA (on BiPAP), and GERD.      1.  CHF: At last appointment, his weight was up to 278 pounds.  Since then, his weight today: 278lbs - unchanged.  At his last appointment, he was to continue taking Entresto 24-26 mg twice daily, Eliquis 2.5 mg twice daily, metolazone 5 mg 3 times per week, Bumex 1 mg twice daily, Jardiance 25 mg daily, metoprolol 25 mg daily, spironolactone 25 mg daily, Crestor 40 mg daily, and Lovaza 2 g twice daily.  He was to continue with nightly BiPAP using 2 L of oxygen at night as well.  Today he reports: his weight is unchanged at 278lbs.  His breathing is unchanged. \"I get a little winded if I walk too fast.\"     5/2/25 Cardiology Note: \"Patient was again hospitalized in July 2024 for volume overload.  Patient's weight is up 20 pounds from last visit, however his leg swelling has not returned.  I recommend he continue his current regimen of Bumex 1 mg twice daily and metolazone 5 mg 3 days a week.  He is also on Entresto, spironolactone and Jardiance, all of which I would continue.     Nonischemic cardiomyopathy.  EF 43% during his recent hospital stay.  He had a nuclear stress test in August 2023 which

## 2025-05-27 NOTE — THERAPY DISCHARGE
elevated and LE wedge  Current:   5/27: Goal n/a  4. Pt will demonstrate sit to stand from standard chair without use of Ue's for improved independence with transfers.  Status at Eval: requires b/l UE support (moderate wb'ing)  Status at last note/certification:  4/24: Goal progressing: requires heavy 1 UE support, increased time/effort to complete  Current:   5/27: Goal not Met: requires light UE support for STS            Medicare: Reporting Period (date from last assessment to current assessment): 4/24/25 - 5/27/25    RECOMMENDATIONS  Discontinue therapy due to lack of appreciable progress towards goals. Pt has been educated on appropriate long term HEP for self management.    If you have any questions/comments please contact us directly at [phone].   Thank you for allowing us to assist in the care of your patient.    VICTORINO MONTENEGRO, PT       5/27/2025       10:20 AM    Not Medicaid Ins, no signature required for DC

## 2025-05-29 ENCOUNTER — APPOINTMENT (OUTPATIENT)
Facility: HOSPITAL | Age: 65
End: 2025-05-29
Attending: INTERNAL MEDICINE
Payer: MEDICARE

## 2025-06-22 ENCOUNTER — TELEPHONE (OUTPATIENT)
Facility: CLINIC | Age: 65
End: 2025-06-22

## 2025-06-22 NOTE — TELEPHONE ENCOUNTER
Patient called on 6/21, 11:45 AM complaining of hip pain for last several weeks.  Patient had been following with Dr. Bundy, orthopedics.  Patient is on gabapentin.  Patient has also tried Tylenol.  Patient has appointment with Dr. Bundy on 6/25.  Suggested patient to go to the ER for further evaluation and management if pain is worse than what he is having for last several weeks otherwise patient may call on 6/23 morning to be seen by provider.    Thanks

## 2025-06-23 PROBLEM — L03.115 CELLULITIS OF RIGHT LOWER EXTREMITY: Status: ACTIVE | Noted: 2025-06-23

## (undated) DEVICE — TRAY SUPP STD NO DRUG W EXTENSION SET

## (undated) DEVICE — AVANOS* SHORT BEVEL NEEDLE: Brand: AVANOS

## (undated) DEVICE — STERILE LATEX POWDER-FREE SURGICAL GLOVESWITH NITRILE COATING: Brand: PROTEXIS

## (undated) DEVICE — SOLUTION IRRIG 1000ML H2O STRL BLT

## (undated) DEVICE — CUFF BLD PRESSURE MONITORING LNG AD 23-33 CM 1 TUBE MY CUF

## (undated) DEVICE — MIRAGE SWIFT II PILLOW LGE: Brand: MIRAGE SWIFT II

## (undated) DEVICE — TABLE COVER: Brand: CONVERTORS

## (undated) DEVICE — Device

## (undated) DEVICE — SYR 50ML SLIP TIP NSAF LF STRL --

## (undated) DEVICE — STRETCH BANDAGE ROLL: Brand: DERMACEA

## (undated) DEVICE — INFUSOR PRSS BG CUF 500ML -- MEDICHOICE

## (undated) DEVICE — JACKSON TABLE POSITIONER KIT: Brand: MEDLINE INDUSTRIES, INC.

## (undated) DEVICE — PREP SKN CHLRAPRP APL 26ML STR --

## (undated) DEVICE — GOWN,REINFORCE,POLY,SIRUS,SETSLV,XLARGE: Brand: MEDLINE

## (undated) DEVICE — SOLUTION IV 1000ML 0.9% SOD CHL

## (undated) DEVICE — MEDI-VAC NON-CONDUCTIVE SUCTION TUBING: Brand: CARDINAL HEALTH

## (undated) DEVICE — (D)PREP SKN CHLRAPRP APPL 26ML -- CONVERT TO ITEM 371833

## (undated) DEVICE — SYRINGE MED 25GA 3ML L5/8IN SUBQ PLAS W/ DETACH NDL SFTY

## (undated) DEVICE — SUT SLK 0 30IN SH BLK --

## (undated) DEVICE — SUTURE PERMAHAND SZ 3-0 L18IN NONABSORBABLE BLK L26MM SH C013D

## (undated) DEVICE — (D)GLOVE EXAM LG NITRL NS -- DISC BY MFR NO SUB

## (undated) DEVICE — TRAY MYEL SFTY +

## (undated) DEVICE — BLADE ELECTRODE: Brand: EDGE

## (undated) DEVICE — MEDI-VAC SUCTION HIGH CAPACITY: Brand: CARDINAL HEALTH

## (undated) DEVICE — KIT CLN UP BON SECOURS MARYV

## (undated) DEVICE — SPIROMETER INCENT 2500ML W ONE W VLV

## (undated) DEVICE — BANDAGE ADH W0.75XL3IN UNIV WVN FAB NAT GEN USE STRP N ADH

## (undated) DEVICE — KENDALL SCD EXPRESS SLEEVES, KNEE LENGTH, MEDIUM: Brand: KENDALL SCD

## (undated) DEVICE — THREE-QUARTER SHEET: Brand: CONVERTORS

## (undated) DEVICE — AIRLIFE™ NASAL OXYGEN CANNULA CURVED, FLARED TIP WITH 14 FOOT (4.3 M) CRUSH-RESISTANT TUBING, OVER-THE-EAR STYLE: Brand: AIRLIFE™

## (undated) DEVICE — STIMULATOR NERVE PT CTRL PROCLAIM

## (undated) DEVICE — SUT SLK 2-0SH 30IN BLK --

## (undated) DEVICE — REM POLYHESIVE ADULT PATIENT RETURN ELECTRODE: Brand: VALLEYLAB

## (undated) DEVICE — ELECTRODE ES AD DISPER HYDRGEL THN FOAM ADH SCALLOPED EDGE

## (undated) DEVICE — AIRLIFE™ ADULT AEROSOL MASK VINYL, UNDER-THE-CHIN STYLE: Brand: AIRLIFE™

## (undated) DEVICE — SUTURE PDS II SZ 1 L96IN ABSRB VLT TP-1 L65MM 1/2 CIR Z880G

## (undated) DEVICE — PRESSURE MONITORING SET: Brand: TRUWAVE

## (undated) DEVICE — PATIENT MANUAL AND MAGNET

## (undated) DEVICE — KIT PRB 17GA L100MM MULTI-3 COOLED RF W/ 4MM ACT TIP

## (undated) DEVICE — DRAPE XR C ARM 41X74IN LF --

## (undated) DEVICE — INTENDED FOR TISSUE SEPARATION, AND OTHER PROCEDURES THAT REQUIRE A SHARP SURGICAL BLADE TO PUNCTURE OR CUT.: Brand: BARD-PARKER SAFETY BLADES SIZE 10, STERILE

## (undated) DEVICE — DRAPE,REIN 53X77,STERILE: Brand: MEDLINE

## (undated) DEVICE — SUTURE STRATAFIX SPRL MCRYL + SZ 2-0 L18IN ABSRB UD CT-1 SXMP1B413

## (undated) DEVICE — THE MILL DISPOSABLE - FINE

## (undated) DEVICE — KIT PRB 17GA L100MM COOLED RF CE MRK DISP

## (undated) DEVICE — (D)BNDG ADHESIVE FABRIC 3/4X3 -- DISC BY MFR USE ITEM 357960

## (undated) DEVICE — SUTURE MCRYL SZ 4-0 L27IN ABSRB UD L24MM PS-1 3/8 CIR PRIM Y935H

## (undated) DEVICE — SUTURE MCRYL SZ 4-0 L18IN ABSRB UD L19MM PS-2 3/8 CIR PRIM Y496G

## (undated) DEVICE — CATHETERIZATION KIT AD 7 FRX16 CM CV ARROWG+ARD

## (undated) DEVICE — DRAPE TWL SURG 16X26IN BLU ORB04] ALLCARE INC]

## (undated) DEVICE — GARMENT,MEDLINE,DVT,INT,CALF,MED, GEN2: Brand: MEDLINE

## (undated) DEVICE — 3M™ DURAPORE™ SURGICAL TAPE 2 INCHES X 10YARDS (5.0CM X 9.1M) 6ROLLS/CARTON 10CARTONS/CASE 1538-2: Brand: 3M™ DURAPORE™

## (undated) DEVICE — SYR 10ML LUER LOK 1/5ML GRAD --

## (undated) DEVICE — SLIM BODY SKIN STAPLER: Brand: APPOSE ULC

## (undated) DEVICE — 450 ML BOTTLE OF 0.05% CHLORHEXIDINE GLUCONATE IN 99.95% STERILE WATER FOR IRRIGATION, USP AND APPLICATOR.: Brand: IRRISEPT ANTIMICROBIAL WOUND LAVAGE

## (undated) DEVICE — DRAIN SURG W7MMXL20CM SIL FULL PERF HUBLESS FLAT RADPQ STRP

## (undated) DEVICE — SYSTEM SKIN CLSR 22CM DERMBND PRINEO

## (undated) DEVICE — STOCKING COMPR XL L16-18IN LNG 19MMHG ANK 10-11IN CALF

## (undated) DEVICE — SUTURE VCRL SZ 3-0 L27IN ABSRB UD L26MM SH 1/2 CIR J416H

## (undated) DEVICE — (D)PACK ICE DISP -- DISC BY MFR

## (undated) DEVICE — BONE VAC

## (undated) DEVICE — 3M™ BAIR PAWS FLEX™ WARMING GOWN, STANDARD, 20 PER CASE 81003: Brand: BAIR PAWS™

## (undated) DEVICE — ENDOSCOPY PUMP TUBING/ CAP SET: Brand: ERBE

## (undated) DEVICE — BASIN EMESIS 500CC ROSE 250/CS 60/PLT: Brand: MEDEGEN MEDICAL PRODUCTS, LLC

## (undated) DEVICE — PACK PROCEDURE SURG MAJ W/ BASIN LF

## (undated) DEVICE — STAPLER SURG L60MM BLU RELD DISP W/ 3.5MM TI STPL DST SER

## (undated) DEVICE — STAPLER INT L60MM STD TISS BLU 7/8 FIRING W/ 3.5MM 21 TI

## (undated) DEVICE — BLANKET WRM AD W50XL85.8IN PACU FULL BODY FORC AIR

## (undated) DEVICE — INTENDED FOR TISSUE SEPARATION, AND OTHER PROCEDURES THAT REQUIRE A SHARP SURGICAL BLADE TO PUNCTURE OR CUT.: Brand: BARD-PARKER SAFETY BLADES SIZE 15, STERILE

## (undated) DEVICE — COOLIEF* COOLED RADIOFREQUENCY PROBE KIT: Brand: AVANOS

## (undated) DEVICE — SUTURE PDS II SZ 1 L36IN ABSRB VLT CTXB L48MM 1/2 CIR BLNT ZB371

## (undated) DEVICE — WAX SURG 2.5GM HEMSTAT BNE BEESWAX PARAFFIN ISO PALMITATE

## (undated) DEVICE — SUTURE MCRYL SZ 3-0 L27IN ABSRB UD L19MM PS-2 3/8 CIR PRIM Y427H

## (undated) DEVICE — DRAPE CAM W7XL96IN CAM-WRAP

## (undated) DEVICE — RELOAD STPL L60MM REG TISS BLU TI FOR PROX LIN CUT DST SER

## (undated) DEVICE — STERILE POLYISOPRENE POWDER-FREE SURGICAL GLOVES: Brand: PROTEXIS

## (undated) DEVICE — STAPLER INT L90MM DIA35MM TI STPL RELD DISPOSABLE DST SER TA

## (undated) DEVICE — RELOAD STPL 90MM L35MM STPL CRSS SECT 019X03MM BLU CART 7

## (undated) DEVICE — SUTURE ABSORBABLE MONOFILAMENT 4-0 PS1 27 IN UD MONOCRYL + SXMP1B116

## (undated) DEVICE — BITE BLOCK ENDOSCP UNIV AD 6 TO 9.4 MM

## (undated) DEVICE — NDL SPNE MANAN 22GX6IN --

## (undated) DEVICE — 3M™ STERI-STRIP™ COMPOUND BENZOIN TINCTURE 40 BAGS/CARTON 4 CARTONS/CASE C1544: Brand: 3M™ STERI-STRIP™

## (undated) DEVICE — HEX-LOCKING BLADE ELECTRODE: Brand: EDGE

## (undated) DEVICE — SPONGE LAP 18X18IN STRL -- 5/PK

## (undated) DEVICE — SUTURE STRATAFIX SYMMETRIC PDS + ETHIGUARD SZ 1 L18IN ABSRB SXPP1A300

## (undated) DEVICE — FLUFF AND POLYMER UNDERPAD,EXTRA HEAVY: Brand: WINGS

## (undated) DEVICE — GAUZE SPONGES,16 PLY: Brand: CURITY

## (undated) DEVICE — FLEX ADVANTAGE 3000CC: Brand: FLEX ADVANTAGE

## (undated) DEVICE — FCPS RAD JAW 4LC 240CM W/NDL -- BX/20 RADIAL JAW 4

## (undated) DEVICE — DISPOSABLE TOURNIQUET CUFF SINGLE BLADDER, DUAL PORT AND QUICK CONNECT CONNECTOR: Brand: COLOR CUFF

## (undated) DEVICE — CATHETER SUCT TR FL TIP 14FR W/ O CTRL

## (undated) DEVICE — OPTIFOAM GENTLE SA, POSTOP, 4X8: Brand: MEDLINE

## (undated) DEVICE — NEBULIZER MISTY FAST 7 TBNG

## (undated) DEVICE — TRAY CATH OD16FR SIL URIN M STATLOK STBL DEV SURSTP

## (undated) DEVICE — DRAPE,CHEST,FENES,15X10,STERIL: Brand: MEDLINE

## (undated) DEVICE — SUTURE PERMA-HAND SZ 2-0 L24IN NONABSORBABLE BLK W/O NDL SA75H

## (undated) DEVICE — STAPLER INT RELD REG 60 MM VERY THCK TISS 2 ROW 4FIRING PROX

## (undated) DEVICE — STAPLER INT L60MM THK38MM BLU TI RELD DISPOSABLE DST SER GIA

## (undated) DEVICE — MEDIA CONTRAST 10ML 200MG/ML 41%

## (undated) DEVICE — SUTURE VCRL SZ 2-0 L18IN ABSRB VLT CT-1 L36MM 1/2 CIR J739D

## (undated) DEVICE — DRAPE,HAND,STERILE: Brand: MEDLINE

## (undated) DEVICE — COVER LT HNDL FLX

## (undated) DEVICE — AIRLIFE™ ADULT CUSHION NASAL CANNULA 14 FOOT (4.3) CRUSH-RESISTANT OXYGEN TUBING, AND U/CONNECT-IT ADAPTER: Brand: AIRLIFE™

## (undated) DEVICE — MARKER,SKIN,WI/RULER AND LABELS: Brand: MEDLINE

## (undated) DEVICE — ADHESIVE TISS DERMA FLEX 0.7ML -- HIGH VISCOSITY

## (undated) DEVICE — 1010 S-DRAPE TOWEL DRAPE 10/BX: Brand: STERI-DRAPE™

## (undated) DEVICE — ELEVATOR NEUROSTIMULATOR SPNL PASS FOR SPNL CRD STIM SYS

## (undated) DEVICE — BLADE ASSEMB CLP HAIR FINE --

## (undated) DEVICE — PREP CHLORAPREP 10.5 ML ORG --

## (undated) DEVICE — 3M™ WARMING BLANKET, UPPER BODY, 10 PER CASE, 42268: Brand: BAIR HUGGER™

## (undated) DEVICE — SPONGE DISSECT PNUT SM 3/8IN -- 5/PK

## (undated) DEVICE — DRSG MEPILEX AG 4X4IN -- CONVERT TO ITEM 365990

## (undated) DEVICE — OCCLUSIVE GAUZE STRIP,3% BISMUTH TRIBROMOPHENATE IN PETROLATUM BLEND: Brand: XEROFORM

## (undated) DEVICE — SUTURE VCRL SZ 2-0 L36IN ABSRB UD L36MM CT-1 1/2 CIR J945H

## (undated) DEVICE — (D)GLOVE SURG TRIFLX 8 PWD LTX -- DISC BY MFR USE ITEM 302994